# Patient Record
Sex: FEMALE | Race: WHITE | Employment: OTHER | URBAN - METROPOLITAN AREA
[De-identification: names, ages, dates, MRNs, and addresses within clinical notes are randomized per-mention and may not be internally consistent; named-entity substitution may affect disease eponyms.]

---

## 2017-01-11 ENCOUNTER — OFFICE VISIT (OUTPATIENT)
Dept: ENDOCRINOLOGY CLINIC | Facility: CLINIC | Age: 67
End: 2017-01-11

## 2017-01-11 VITALS
HEART RATE: 63 BPM | DIASTOLIC BLOOD PRESSURE: 62 MMHG | SYSTOLIC BLOOD PRESSURE: 132 MMHG | WEIGHT: 171 LBS | RESPIRATION RATE: 18 BRPM | HEIGHT: 65 IN | TEMPERATURE: 99 F | BODY MASS INDEX: 28.49 KG/M2

## 2017-01-11 DIAGNOSIS — E11.65 UNCONTROLLED TYPE 2 DIABETES MELLITUS WITH HYPERGLYCEMIA, UNSPECIFIED LONG TERM INSULIN USE STATUS: Primary | ICD-10-CM

## 2017-01-11 DIAGNOSIS — Z23 NEED FOR TETANUS BOOSTER: ICD-10-CM

## 2017-01-11 DIAGNOSIS — I10 ESSENTIAL HYPERTENSION: ICD-10-CM

## 2017-01-11 PROCEDURE — 99214 OFFICE O/P EST MOD 30 MIN: CPT | Performed by: NURSE PRACTITIONER

## 2017-01-11 PROCEDURE — 95250 CONT GLUC MNTR PHYS/QHP EQP: CPT | Performed by: NURSE PRACTITIONER

## 2017-01-11 RX ORDER — FUROSEMIDE 20 MG/1
20 TABLET ORAL DAILY
Qty: 90 TABLET | Refills: 0 | Status: SHIPPED | OUTPATIENT
Start: 2017-01-11 | End: 2017-04-12

## 2017-01-11 NOTE — PROGRESS NOTES
CC: Patient presents with:  Diabetes: follow up. pt did not bring meter. HPI:   HPI: 78year old y/o female who presents for follow up diabetes visit.  Did not bring any readings stated she is not regularly testing but when she is, she is seeing 160's in Psychiatric/Behavioral: The patient is not nervous/anxious. No depression. Current outpatient prescriptions:   •  furosemide (LASIX) 20 MG Oral Tab, Take 1 tablet (20 mg total) by mouth daily. , Disp: 90 tablet, Rfl: 0  •  Insulin Pen Needle (Zaria Goldman 62.5 MCG/INH Inhalation Aerosol Powder, Breath Activated, Inhale 1 puff into the lungs daily. , Disp: 1 each, Rfl: 2    Exam:  /62 mmHg  Pulse 63  Temp(Src) 98.5 °F (36.9 °C) (Oral)  Resp 18  Ht 65\"  Wt 171 lb  BMI 28.46 kg/m2  Breastfeeding?  No  Con 3 servings/ meal and impact of fat/carb on BG. Benefits of testing BG reviewed in depth. Hypoglycemia S&S, Rx, and when to call MD reviewed using Rule of 15's. Stressed need to call if 2 lows below 80 mg/dl in 1 week for further insulin adjustment.        O

## 2017-01-18 ENCOUNTER — PATIENT OUTREACH (OUTPATIENT)
Dept: CASE MANAGEMENT | Age: 67
End: 2017-01-18

## 2017-01-31 NOTE — PROGRESS NOTES
Spoke to pt for CCM today. Pt states she is doing ok at this time. Pt states she has been seeing the diabetes educator and has to call to schedule her next appt. Pt states she has not been testing her blood sugar as often as she should.   Pt was unable t

## 2017-02-03 DIAGNOSIS — E11.65 UNCONTROLLED TYPE 2 DIABETES MELLITUS WITH HYPERGLYCEMIA, UNSPECIFIED LONG TERM INSULIN USE STATUS: Primary | ICD-10-CM

## 2017-02-05 RX ORDER — INSULIN GLARGINE 300 U/ML
INJECTION, SOLUTION SUBCUTANEOUS
Qty: 4.5 ML | Refills: 0 | Status: SHIPPED | OUTPATIENT
Start: 2017-02-05 | End: 2017-04-11

## 2017-02-20 ENCOUNTER — PATIENT OUTREACH (OUTPATIENT)
Dept: CASE MANAGEMENT | Age: 67
End: 2017-02-20

## 2017-02-26 ENCOUNTER — TELEPHONE (OUTPATIENT)
Dept: ENDOCRINOLOGY CLINIC | Facility: CLINIC | Age: 67
End: 2017-02-26

## 2017-03-14 ENCOUNTER — PATIENT OUTREACH (OUTPATIENT)
Dept: CASE MANAGEMENT | Age: 67
End: 2017-03-14

## 2017-04-11 ENCOUNTER — OFFICE VISIT (OUTPATIENT)
Dept: ENDOCRINOLOGY CLINIC | Facility: CLINIC | Age: 67
End: 2017-04-11

## 2017-04-11 VITALS
HEART RATE: 56 BPM | HEIGHT: 65 IN | BODY MASS INDEX: 29.16 KG/M2 | TEMPERATURE: 98 F | DIASTOLIC BLOOD PRESSURE: 54 MMHG | RESPIRATION RATE: 18 BRPM | WEIGHT: 175 LBS | SYSTOLIC BLOOD PRESSURE: 128 MMHG

## 2017-04-11 DIAGNOSIS — E11.65 UNCONTROLLED TYPE 2 DIABETES MELLITUS WITH HYPERGLYCEMIA, UNSPECIFIED LONG TERM INSULIN USE STATUS: Primary | ICD-10-CM

## 2017-04-11 PROCEDURE — 99214 OFFICE O/P EST MOD 30 MIN: CPT | Performed by: NURSE PRACTITIONER

## 2017-04-11 RX ORDER — LANCETS
1 EACH MISCELLANEOUS 3 TIMES DAILY
Qty: 1 BOX | Refills: 3 | Status: SHIPPED | OUTPATIENT
Start: 2017-04-11 | End: 2018-04-11

## 2017-04-11 RX ORDER — GLIPIZIDE 10 MG/1
10 TABLET, FILM COATED, EXTENDED RELEASE ORAL DAILY
Qty: 30 TABLET | Refills: 0 | Status: SHIPPED | OUTPATIENT
Start: 2017-04-11 | End: 2017-05-10

## 2017-04-11 RX ORDER — METFORMIN HYDROCHLORIDE 500 MG/1
2000 TABLET, EXTENDED RELEASE ORAL
Qty: 60 TABLET | Refills: 0 | COMMUNITY
Start: 2017-04-11 | End: 2017-04-25

## 2017-04-11 NOTE — PROGRESS NOTES
CC: Patient presents with:  Diabetes: follow up. pt did bring meter. HPI:   HPI: 78year old y/o female who presents for follow up diabetes visit. LOV 3 months ago, was supposed to return 2 weeks later with Marine Carver, which fell off and was lost. Flat affect •  GlipiZIDE ER 10 MG Oral Tablet 24 Hr, Take 1 tablet (10 mg total) by mouth daily. , Disp: 30 tablet, Rfl: 0  •  MetFORMIN HCl  MG Oral Tablet 24 Hr, Take 4 tablets (2,000 mg total) by mouth daily with breakfast., Disp: 60 tablet, Rfl: 0  •  Insul mouth daily. , Disp: , Rfl:   •  Levalbuterol Tartrate (XOPENEX HFA) 45 MCG/ACT Inhalation Aerosol, Inhale 2 puffs into the lungs every 8 (eight) hours as needed for Wheezing., Disp: 1 Inhaler, Rfl: 2    Exam:  /54 mmHg  Pulse 56  Temp(Src) 98.4 °F (3 Maintenance  Up to date on annual labs  Needs micro, lipid, cmp   Ophtho / dilated eye exam: up to date no retinopathy   Albumin/Cr ratio: microalbuminuria, improve BG and on arb   Monofilament exam: today   BP: at goal cont meds   Lipids: not at goal cont

## 2017-04-11 NOTE — PATIENT INSTRUCTIONS
Test sugar 2-3 times a day   Increase Toujeo to 32 units nightly    Start glipizide 10 mg before breakfast daily   Metformin increase to 3 tablets in am after 5 days go to 4 tablets daily     Return in 2 weeks

## 2017-04-12 ENCOUNTER — TELEPHONE (OUTPATIENT)
Dept: INTERNAL MEDICINE CLINIC | Facility: CLINIC | Age: 67
End: 2017-04-12

## 2017-04-12 DIAGNOSIS — E11.65 UNCONTROLLED TYPE 2 DIABETES MELLITUS WITH HYPERGLYCEMIA, UNSPECIFIED LONG TERM INSULIN USE STATUS: ICD-10-CM

## 2017-04-12 DIAGNOSIS — I10 ESSENTIAL HYPERTENSION: Primary | ICD-10-CM

## 2017-04-12 DIAGNOSIS — E78.00 HIGH CHOLESTEROL: ICD-10-CM

## 2017-04-12 RX ORDER — FUROSEMIDE 20 MG/1
TABLET ORAL
Qty: 90 TABLET | Refills: 0 | Status: SHIPPED | OUTPATIENT
Start: 2017-04-12 | End: 2017-05-31

## 2017-04-25 ENCOUNTER — OFFICE VISIT (OUTPATIENT)
Dept: ENDOCRINOLOGY CLINIC | Facility: CLINIC | Age: 67
End: 2017-04-25

## 2017-04-25 VITALS
WEIGHT: 176 LBS | TEMPERATURE: 99 F | DIASTOLIC BLOOD PRESSURE: 50 MMHG | BODY MASS INDEX: 29.32 KG/M2 | HEART RATE: 56 BPM | HEIGHT: 65 IN | RESPIRATION RATE: 18 BRPM | SYSTOLIC BLOOD PRESSURE: 130 MMHG

## 2017-04-25 DIAGNOSIS — E78.5 HYPERLIPIDEMIA ASSOCIATED WITH TYPE 2 DIABETES MELLITUS (HCC): ICD-10-CM

## 2017-04-25 DIAGNOSIS — E11.65 UNCONTROLLED TYPE 2 DIABETES MELLITUS WITH HYPERGLYCEMIA, UNSPECIFIED LONG TERM INSULIN USE STATUS: Primary | ICD-10-CM

## 2017-04-25 DIAGNOSIS — E11.69 HYPERLIPIDEMIA ASSOCIATED WITH TYPE 2 DIABETES MELLITUS (HCC): ICD-10-CM

## 2017-04-25 PROCEDURE — 99214 OFFICE O/P EST MOD 30 MIN: CPT | Performed by: NURSE PRACTITIONER

## 2017-04-25 RX ORDER — ROSUVASTATIN CALCIUM 40 MG/1
40 TABLET, COATED ORAL NIGHTLY
Qty: 90 TABLET | Refills: 0 | Status: SHIPPED | OUTPATIENT
Start: 2017-04-25 | End: 2017-05-14

## 2017-04-25 RX ORDER — METFORMIN HYDROCHLORIDE 500 MG/1
1000 TABLET, EXTENDED RELEASE ORAL
Qty: 60 TABLET | Refills: 0 | COMMUNITY
Start: 2017-04-25 | End: 2017-08-07

## 2017-04-25 NOTE — PROGRESS NOTES
CC: Patient presents with:  Diabetes: follow up. pt did bring meter. HPI:   HPI: 78year old y/o female who presents for follow up diabetes visit. In better spirits today, and has started testing more often.  A1C shows no improvement over past few months The patient is not nervous/anxious. No depression.     Current outpatient prescriptions:   •  MetFORMIN HCl  MG Oral Tablet 24 Hr, Take 2 tablets (1,000 mg total) by mouth daily with breakfast., Disp: 60 tablet, Rfl: 0  •  Insulin Glargine (TOUJEO SOL Oral Tablet 24 Hr, Take 1 tablet by mouth daily. , Disp: , Rfl: 3  •  Amlodipine Besy-Benazepril HCl 10-20 MG Oral Cap, Take 1 Cap by mouth daily. , Disp: , Rfl:     Exam:  /50 mmHg  Pulse 56  Temp(Src) 98.5 °F (36.9 °C) (Oral)  Resp 18  Ht 65\"  Wt 17 retinopathy   Albumin/Cr ratio: no microalbuminuria, improve BG and on arb   Monofilament exam: up to date   BP: at goal cont meds   Lipids: not at goal increase statin, low fat diet, recheck 3 months    Tobacco: not ready to quit   Pneumonia vaccine: up t

## 2017-04-25 NOTE — PATIENT INSTRUCTIONS
Metformin XR 1000 mg daily   Toujeo 42 units daily   Continue glipizide xr 10 mg daily     Cholesterol increase to 40 mg nightly   Decrease fats in foods     Try to increase activity   You got this     Return in 2 weeks

## 2017-05-02 ENCOUNTER — PATIENT OUTREACH (OUTPATIENT)
Dept: CASE MANAGEMENT | Age: 67
End: 2017-05-02

## 2017-05-10 ENCOUNTER — OFFICE VISIT (OUTPATIENT)
Dept: ENDOCRINOLOGY CLINIC | Facility: CLINIC | Age: 67
End: 2017-05-10

## 2017-05-10 VITALS
WEIGHT: 176 LBS | RESPIRATION RATE: 18 BRPM | HEART RATE: 56 BPM | SYSTOLIC BLOOD PRESSURE: 120 MMHG | BODY MASS INDEX: 29.32 KG/M2 | HEIGHT: 65 IN | DIASTOLIC BLOOD PRESSURE: 60 MMHG | TEMPERATURE: 98 F

## 2017-05-10 DIAGNOSIS — E11.65 UNCONTROLLED TYPE 2 DIABETES MELLITUS WITH HYPERGLYCEMIA, UNSPECIFIED LONG TERM INSULIN USE STATUS: Primary | ICD-10-CM

## 2017-05-10 PROCEDURE — 99213 OFFICE O/P EST LOW 20 MIN: CPT | Performed by: NURSE PRACTITIONER

## 2017-05-10 RX ORDER — GLIPIZIDE 10 MG/1
20 TABLET, FILM COATED, EXTENDED RELEASE ORAL DAILY
Qty: 60 TABLET | Refills: 0 | Status: SHIPPED | OUTPATIENT
Start: 2017-05-10 | End: 2017-08-07

## 2017-05-10 NOTE — PATIENT INSTRUCTIONS
Rosuvastatin increase to 40 mg ( 2 20 mg tabs) or 1 40 mg tab nightly   Glipizide XR increase to 20 mg (take 2 10 mg tabs every morning)  Continue metformin 1-2 tabs every am as tolerated   Increase Toujeo to 45 units nightly   Continue testing sugar and a

## 2017-05-15 RX ORDER — ROSUVASTATIN CALCIUM 40 MG/1
TABLET, COATED ORAL
Qty: 90 TABLET | Refills: 0 | Status: SHIPPED | OUTPATIENT
Start: 2017-05-15 | End: 2017-12-05

## 2017-05-31 DIAGNOSIS — I10 ESSENTIAL HYPERTENSION: Primary | ICD-10-CM

## 2017-05-31 RX ORDER — FUROSEMIDE 20 MG/1
TABLET ORAL
Qty: 30 TABLET | Refills: 1 | Status: SHIPPED | OUTPATIENT
Start: 2017-05-31 | End: 2018-02-17

## 2017-06-16 ENCOUNTER — PATIENT OUTREACH (OUTPATIENT)
Dept: CASE MANAGEMENT | Age: 67
End: 2017-06-16

## 2017-06-16 DIAGNOSIS — E11.69 HYPERLIPIDEMIA ASSOCIATED WITH TYPE 2 DIABETES MELLITUS (HCC): ICD-10-CM

## 2017-06-16 DIAGNOSIS — J43.2 CENTRILOBULAR EMPHYSEMA (HCC): ICD-10-CM

## 2017-06-16 DIAGNOSIS — I50.22 CHRONIC SYSTOLIC CONGESTIVE HEART FAILURE (HCC): Primary | ICD-10-CM

## 2017-06-16 DIAGNOSIS — E78.5 HYPERLIPIDEMIA ASSOCIATED WITH TYPE 2 DIABETES MELLITUS (HCC): ICD-10-CM

## 2017-06-16 DIAGNOSIS — E78.00 HIGH CHOLESTEROL: ICD-10-CM

## 2017-06-16 DIAGNOSIS — F32.0 MILD SINGLE CURRENT EPISODE OF MAJOR DEPRESSIVE DISORDER (HCC): ICD-10-CM

## 2017-06-16 DIAGNOSIS — I10 ESSENTIAL HYPERTENSION: ICD-10-CM

## 2017-06-16 DIAGNOSIS — E11.65 UNCONTROLLED TYPE 2 DIABETES MELLITUS WITH HYPERGLYCEMIA, UNSPECIFIED LONG TERM INSULIN USE STATUS: ICD-10-CM

## 2017-06-16 PROCEDURE — 99490 CHRNC CARE MGMT STAFF 1ST 20: CPT

## 2017-06-16 NOTE — PROGRESS NOTES
Spoke to pt at length about CCM, current care plan and performed CCM assessment with pt, reviewed meds and compliance. Reviewed pt dx DM2, HLD, HTN, CAD, CHF, COPD and depression.   Support system: lives with family  Diet: Portion control and states she is

## 2017-06-23 ENCOUNTER — PATIENT OUTREACH (OUTPATIENT)
Dept: CASE MANAGEMENT | Age: 67
End: 2017-06-23

## 2017-07-10 ENCOUNTER — TELEPHONE (OUTPATIENT)
Dept: ENDOCRINOLOGY CLINIC | Facility: CLINIC | Age: 67
End: 2017-07-10

## 2017-07-21 ENCOUNTER — PATIENT OUTREACH (OUTPATIENT)
Dept: CASE MANAGEMENT | Age: 67
End: 2017-07-21

## 2017-08-02 ENCOUNTER — TELEPHONE (OUTPATIENT)
Dept: ENDOCRINOLOGY CLINIC | Facility: CLINIC | Age: 67
End: 2017-08-02

## 2017-08-07 ENCOUNTER — OFFICE VISIT (OUTPATIENT)
Dept: ENDOCRINOLOGY CLINIC | Facility: CLINIC | Age: 67
End: 2017-08-07

## 2017-08-07 VITALS
DIASTOLIC BLOOD PRESSURE: 58 MMHG | SYSTOLIC BLOOD PRESSURE: 120 MMHG | HEART RATE: 72 BPM | TEMPERATURE: 99 F | RESPIRATION RATE: 18 BRPM | HEIGHT: 65 IN | BODY MASS INDEX: 30.99 KG/M2 | WEIGHT: 186 LBS

## 2017-08-07 DIAGNOSIS — E11.65 UNCONTROLLED TYPE 2 DIABETES MELLITUS WITH HYPERGLYCEMIA, UNSPECIFIED LONG TERM INSULIN USE STATUS: ICD-10-CM

## 2017-08-07 PROCEDURE — 95251 CONT GLUC MNTR ANALYSIS I&R: CPT | Performed by: NURSE PRACTITIONER

## 2017-08-07 PROCEDURE — 83036 HEMOGLOBIN GLYCOSYLATED A1C: CPT | Performed by: NURSE PRACTITIONER

## 2017-08-07 PROCEDURE — 99213 OFFICE O/P EST LOW 20 MIN: CPT | Performed by: NURSE PRACTITIONER

## 2017-08-07 RX ORDER — METFORMIN HYDROCHLORIDE 500 MG/1
500 TABLET, EXTENDED RELEASE ORAL
Qty: 90 TABLET | Refills: 0 | COMMUNITY
Start: 2017-08-07 | End: 2017-12-13

## 2017-08-08 LAB
CARTRIDGE LOT#: 706 NUMERIC
HEMOGLOBIN A1C: 9.8 % (ref 4.3–5.6)

## 2017-08-08 NOTE — PROGRESS NOTES
CC: Patient presents with:  Diabetes: follow up. pt did bring meter. HPI:   HPI: 75year old y/o female who presents for follow up diabetes visit. Has not been here for 3 months.  Has been testing BG, is no longer in [employer] exercise class but has been ph nervous/anxious. No depression.     Current Outpatient Prescriptions:   •  MetFORMIN HCl  MG Oral Tablet 24 Hr, Take 1 tablet (500 mg total) by mouth daily with breakfast., Disp: 90 tablet, Rfl: 0  •  FUROSEMIDE 20 MG Oral Tab, TAKE 1 TABLET (20 MG TO Wheezing., Disp: 1 Inhaler, Rfl: 2    Exam:  /58   Pulse 72   Temp 98.7 °F (37.1 °C) (Oral)   Resp 18   Ht 65\"   Wt 186 lb   Breastfeeding? No   BMI 30.95 kg/m²   Constitutional: Oriented to person, place, and time. No distress.    HEENT: Normocephal bedtime. Call/fax/email glucose logs or f/u in 21-30 days. Return in about 3 weeks (around 8/28/2017) for diabetes. Pt verbalized understanding and has no further questions at this time.     Claudine HANDY,GEOVANNYE

## 2017-08-11 ENCOUNTER — PATIENT OUTREACH (OUTPATIENT)
Dept: CASE MANAGEMENT | Age: 67
End: 2017-08-11

## 2017-08-11 NOTE — PROGRESS NOTES
Chart review for CCM. LM for pt to call back.   Coordination of education, review of chart, update to record, sending materials:[employer]  Time: 7 minutes

## 2017-09-07 DIAGNOSIS — E78.5 HYPERLIPIDEMIA ASSOCIATED WITH TYPE 2 DIABETES MELLITUS (HCC): Primary | ICD-10-CM

## 2017-09-07 DIAGNOSIS — E11.69 HYPERLIPIDEMIA ASSOCIATED WITH TYPE 2 DIABETES MELLITUS (HCC): Primary | ICD-10-CM

## 2017-09-08 RX ORDER — GLIPIZIDE 10 MG/1
TABLET, FILM COATED, EXTENDED RELEASE ORAL
Qty: 60 TABLET | Refills: 0 | Status: SHIPPED | OUTPATIENT
Start: 2017-09-08 | End: 2017-09-11

## 2017-09-08 NOTE — TELEPHONE ENCOUNTER
Medication(s) to Refill:   Pending Prescriptions Disp Refills    GLIPIZIDE ER 10 MG Oral Tablet 24 Hr [Pharmacy Med Name: GlipiZIDE ER Oral Tablet Extended Release 24 Hour 10 MG] 60 tablet 0     Sig: TAKE 2 TABLETS (20 MG TOTAL) BY MOUTH DAILY           La

## 2017-09-11 ENCOUNTER — PRIOR ORIGINAL RECORDS (OUTPATIENT)
Dept: OTHER | Age: 67
End: 2017-09-11

## 2017-09-11 ENCOUNTER — OFFICE VISIT (OUTPATIENT)
Dept: ENDOCRINOLOGY CLINIC | Facility: CLINIC | Age: 67
End: 2017-09-11

## 2017-09-11 ENCOUNTER — APPOINTMENT (OUTPATIENT)
Dept: LAB | Age: 67
End: 2017-09-11
Attending: NURSE PRACTITIONER
Payer: MEDICARE

## 2017-09-11 VITALS
RESPIRATION RATE: 18 BRPM | SYSTOLIC BLOOD PRESSURE: 138 MMHG | HEIGHT: 65 IN | DIASTOLIC BLOOD PRESSURE: 74 MMHG | BODY MASS INDEX: 29.99 KG/M2 | WEIGHT: 180 LBS | TEMPERATURE: 99 F | HEART RATE: 80 BPM

## 2017-09-11 DIAGNOSIS — R10.11 RUQ ABDOMINAL PAIN: ICD-10-CM

## 2017-09-11 DIAGNOSIS — E11.65 UNCONTROLLED TYPE 2 DIABETES MELLITUS WITH HYPERGLYCEMIA, UNSPECIFIED LONG TERM INSULIN USE STATUS: Primary | ICD-10-CM

## 2017-09-11 DIAGNOSIS — E78.5 HYPERLIPIDEMIA ASSOCIATED WITH TYPE 2 DIABETES MELLITUS (HCC): ICD-10-CM

## 2017-09-11 DIAGNOSIS — E11.69 HYPERLIPIDEMIA ASSOCIATED WITH TYPE 2 DIABETES MELLITUS (HCC): ICD-10-CM

## 2017-09-11 LAB
ALBUMIN SERPL-MCNC: 3.5 G/DL (ref 3.5–4.8)
ALP LIVER SERPL-CCNC: 61 U/L (ref 55–142)
ALT SERPL-CCNC: 32 U/L (ref 14–54)
AST SERPL-CCNC: 25 U/L (ref 15–41)
BILIRUB SERPL-MCNC: 0.5 MG/DL (ref 0.1–2)
BUN BLD-MCNC: 13 MG/DL (ref 8–20)
CALCIUM BLD-MCNC: 8.9 MG/DL (ref 8.3–10.3)
CARTRIDGE LOT#: 741 NUMERIC
CHLORIDE: 108 MMOL/L (ref 101–111)
CHOLEST SMN-MCNC: 185 MG/DL (ref ?–200)
CO2: 25 MMOL/L (ref 22–32)
CREAT BLD-MCNC: 0.85 MG/DL (ref 0.55–1.02)
GLUCOSE BLD-MCNC: 135 MG/DL (ref 70–99)
HDLC SERPL-MCNC: 39 MG/DL (ref 45–?)
HDLC SERPL: 4.74 {RATIO} (ref ?–4.44)
HEMOGLOBIN A1C: 8.4 % (ref 4.3–5.6)
LDLC SERPL CALC-MCNC: 104 MG/DL (ref ?–130)
LDLC SERPL-MCNC: 42 MG/DL (ref 5–40)
M PROTEIN MFR SERPL ELPH: 7.8 G/DL (ref 6.1–8.3)
NONHDLC SERPL-MCNC: 146 MG/DL (ref ?–130)
POTASSIUM SERPL-SCNC: 4.3 MMOL/L (ref 3.6–5.1)
SODIUM SERPL-SCNC: 140 MMOL/L (ref 136–144)
TRIGLYCERIDES: 209 MG/DL (ref ?–150)

## 2017-09-11 PROCEDURE — 99214 OFFICE O/P EST MOD 30 MIN: CPT | Performed by: NURSE PRACTITIONER

## 2017-09-11 PROCEDURE — 83036 HEMOGLOBIN GLYCOSYLATED A1C: CPT | Performed by: NURSE PRACTITIONER

## 2017-09-11 RX ORDER — GLIPIZIDE 10 MG/1
TABLET, FILM COATED, EXTENDED RELEASE ORAL
Qty: 60 TABLET | Refills: 0 | Status: SHIPPED | OUTPATIENT
Start: 2017-09-11 | End: 2017-12-13

## 2017-09-11 RX ORDER — CLOPIDOGREL BISULFATE 75 MG/1
75 TABLET ORAL DAILY
Refills: 0 | COMMUNITY
Start: 2017-09-07 | End: 2018-09-10

## 2017-09-11 NOTE — PROGRESS NOTES
CC: Patient presents with:  Diabetes: follow up. pt did bring meter. HPI:   HPI: 75year old y/o female who presents for follow up diabetes visit.  Has significantly improved since starting Xultophy 20 units daily, is taking meds more consistently and lo breath. Everyday smoker. Cardiovascular: Negative for chest pain, palpitations and leg swelling. ASA daily 81 mg   Gastrointestinal: Negative for nausea, vomiting, diarrhea, and abdominal distention.  + intermittent dull RUQ pain  Genitourinary: Negative f mouth every 8 (eight) hours. , Disp: 90 tablet, Rfl: 3  •  Metoprolol Succinate ER (TOPROL XL) 50 MG Oral Tablet 24 Hr, Take 1 tablet by mouth daily. , Disp: , Rfl: 3  •  Amlodipine Besy-Benazepril HCl 10-20 MG Oral Cap, Take 1 Cap by mouth daily. , Disp: , R and f/u per pcp. Asked to see PCP after US.  CMP today       Orders Placed This Encounter      Lipid Panel [E]      Comp Metabolic Panel (14) [E]    Meds & Refills for this Visit:    Signed Prescriptions Disp Refills    Insulin Degludec-Liraglutide (Maik Jara

## 2017-09-13 ENCOUNTER — PATIENT OUTREACH (OUTPATIENT)
Dept: CASE MANAGEMENT | Age: 67
End: 2017-09-13

## 2017-09-13 NOTE — PROGRESS NOTES
Called pt for CCM today and spoke with family member advising pt unable to come to phone at this time. Encouraged to have pt call back for CCM. Time spent this outreach: 6 minutes collecting, reviewing pt data, communication and documentation.

## 2017-09-19 ENCOUNTER — HOSPITAL ENCOUNTER (OUTPATIENT)
Dept: ULTRASOUND IMAGING | Facility: HOSPITAL | Age: 67
Discharge: HOME | End: 2017-09-19
Attending: NURSE PRACTITIONER
Payer: MEDICARE

## 2017-09-19 DIAGNOSIS — R10.11 RUQ ABDOMINAL PAIN: ICD-10-CM

## 2017-09-19 PROCEDURE — 76700 US EXAM ABDOM COMPLETE: CPT | Performed by: NURSE PRACTITIONER

## 2017-09-25 ENCOUNTER — OFFICE VISIT (OUTPATIENT)
Dept: INTERNAL MEDICINE CLINIC | Facility: CLINIC | Age: 67
End: 2017-09-25

## 2017-09-25 VITALS
SYSTOLIC BLOOD PRESSURE: 138 MMHG | TEMPERATURE: 98 F | DIASTOLIC BLOOD PRESSURE: 50 MMHG | WEIGHT: 183 LBS | HEART RATE: 56 BPM | HEIGHT: 65 IN | BODY MASS INDEX: 30.49 KG/M2

## 2017-09-25 DIAGNOSIS — F32.0 MILD SINGLE CURRENT EPISODE OF MAJOR DEPRESSIVE DISORDER (HCC): ICD-10-CM

## 2017-09-25 DIAGNOSIS — F41.1 ANXIETY STATE: ICD-10-CM

## 2017-09-25 DIAGNOSIS — E78.00 HIGH CHOLESTEROL: ICD-10-CM

## 2017-09-25 DIAGNOSIS — Z23 NEEDS FLU SHOT: ICD-10-CM

## 2017-09-25 DIAGNOSIS — R19.8 ALTERNATING CONSTIPATION AND DIARRHEA: Primary | ICD-10-CM

## 2017-09-25 DIAGNOSIS — I10 ESSENTIAL HYPERTENSION: ICD-10-CM

## 2017-09-25 PROCEDURE — 90653 IIV ADJUVANT VACCINE IM: CPT | Performed by: NURSE PRACTITIONER

## 2017-09-25 PROCEDURE — 99214 OFFICE O/P EST MOD 30 MIN: CPT | Performed by: NURSE PRACTITIONER

## 2017-09-25 PROCEDURE — G0008 ADMIN INFLUENZA VIRUS VAC: HCPCS | Performed by: NURSE PRACTITIONER

## 2017-09-25 NOTE — PROGRESS NOTES
Griselda Pizarro is a 75year old female. Patient presents with:  Test Results: Room 10. Review ultra sound      HPI:   Presents for f/u abd pain. Saw SC last week and US abd ordered and asked her to see GI.  appt Oct 17. She has never had colonscopy. Subcutaneous Solution Pen-injector Inject 20-40 Units into the skin daily.  Disp: 3 mL Rfl: 0   GlipiZIDE ER 10 MG Oral Tablet 24 Hr TAKE 2 TABLETS (20 MG TOTAL) BY MOUTH DAILY Disp: 60 tablet Rfl: 0   MetFORMIN HCl  MG Oral Tablet 24 Hr Take 1 tablet coronary artery 2/28/14    acute MI, CHF, Stents    • CONGESTIVE HEART FAILURE 3/2013    EF 35-40 %   • COPD    • Disorder of liver     \"fatty liver\"   • HEART ATTACK 2/28/14    Acute mi and stents   • High blood pressure    • High cholesterol    • Histo 3    ASSESSMENT AND PLAN:     Needs flu shot    Essential hypertension  Stable  Same meds. Mild single current episode of major depressive disorder (hcc)  Sertraline.     High cholesterol  Stable  crestor  Anxiety state  Alternating constipation and diarr

## 2017-10-13 ENCOUNTER — PATIENT OUTREACH (OUTPATIENT)
Dept: CASE MANAGEMENT | Age: 67
End: 2017-10-13

## 2017-10-13 NOTE — PROGRESS NOTES
Coordination of education, review of chart, update to pt record, compilation and mailing resources/materials: Flu education, Why get the flu vaccine, and request to get flu vaccine with PCP and or [facility] locations.   Time: 5 min

## 2017-10-27 ENCOUNTER — PATIENT OUTREACH (OUTPATIENT)
Dept: CASE MANAGEMENT | Age: 67
End: 2017-10-27

## 2017-10-27 DIAGNOSIS — J43.2 CENTRILOBULAR EMPHYSEMA (HCC): ICD-10-CM

## 2017-10-27 DIAGNOSIS — M15.9 PRIMARY OSTEOARTHRITIS INVOLVING MULTIPLE JOINTS: ICD-10-CM

## 2017-10-27 DIAGNOSIS — I50.22 CHRONIC SYSTOLIC CONGESTIVE HEART FAILURE (HCC): ICD-10-CM

## 2017-10-27 DIAGNOSIS — I10 ESSENTIAL HYPERTENSION: ICD-10-CM

## 2017-10-27 DIAGNOSIS — F32.0 MILD SINGLE CURRENT EPISODE OF MAJOR DEPRESSIVE DISORDER (HCC): ICD-10-CM

## 2017-10-27 DIAGNOSIS — E78.5 HYPERLIPIDEMIA ASSOCIATED WITH TYPE 2 DIABETES MELLITUS (HCC): ICD-10-CM

## 2017-10-27 DIAGNOSIS — I77.9 BILATERAL CAROTID ARTERY DISEASE (HCC): ICD-10-CM

## 2017-10-27 DIAGNOSIS — E11.69 HYPERLIPIDEMIA ASSOCIATED WITH TYPE 2 DIABETES MELLITUS (HCC): ICD-10-CM

## 2017-10-27 DIAGNOSIS — E78.00 HIGH CHOLESTEROL: ICD-10-CM

## 2017-10-27 PROCEDURE — 99490 CHRNC CARE MGMT STAFF 1ST 20: CPT

## 2017-10-27 NOTE — PROGRESS NOTES
Spoke to pt at length about CCM, current care plan and performed CCM assessment with pt, reviewed meds and compliance. Reviewed pt dx HTN, HLD, DM, CAD, CHF, anxiety and depression. Support system: Family. Daughter lives with pt.       Diet: Pt states she vaccine, and request to get flu vaccine with PCP and or [facility] locations. Time: 5 min  Total time spent month to date for CCM: 24 minutes collecting, reviewing pt data, communication and documentation.

## 2017-11-09 ENCOUNTER — TELEPHONE (OUTPATIENT)
Dept: ENDOCRINOLOGY CLINIC | Facility: CLINIC | Age: 67
End: 2017-11-09

## 2017-11-28 ENCOUNTER — PATIENT OUTREACH (OUTPATIENT)
Dept: CASE MANAGEMENT | Age: 67
End: 2017-11-28

## 2017-11-28 NOTE — PROGRESS NOTES
Reviewed chart for CCM today. LVM for pt to call back. Time spent this ccm outreach: 3 min collecting, reviewing pt data, communication and documentation.

## 2017-11-29 ENCOUNTER — TELEPHONE (OUTPATIENT)
Dept: ENDOCRINOLOGY CLINIC | Facility: CLINIC | Age: 67
End: 2017-11-29

## 2017-12-05 DIAGNOSIS — E11.65 UNCONTROLLED TYPE 2 DIABETES MELLITUS WITH HYPERGLYCEMIA, UNSPECIFIED LONG TERM INSULIN USE STATUS: ICD-10-CM

## 2017-12-05 RX ORDER — ROSUVASTATIN CALCIUM 40 MG/1
TABLET, COATED ORAL
Qty: 90 TABLET | Refills: 0 | Status: SHIPPED | OUTPATIENT
Start: 2017-12-05 | End: 2018-03-10

## 2017-12-05 NOTE — TELEPHONE ENCOUNTER
LFV:09/25/17 with SD  Future Appt: none on file  Last Rx: 12/6/16 for 90 days w/1 refill  Last Labs:cmp done 9/11/17 stable  Per protocol to provider  Pt was to return in 4 weeks

## 2017-12-13 ENCOUNTER — OFFICE VISIT (OUTPATIENT)
Dept: ENDOCRINOLOGY CLINIC | Facility: CLINIC | Age: 67
End: 2017-12-13

## 2017-12-13 VITALS
HEIGHT: 65 IN | TEMPERATURE: 98 F | BODY MASS INDEX: 30.49 KG/M2 | HEART RATE: 72 BPM | WEIGHT: 183 LBS | DIASTOLIC BLOOD PRESSURE: 60 MMHG | SYSTOLIC BLOOD PRESSURE: 126 MMHG | RESPIRATION RATE: 18 BRPM

## 2017-12-13 DIAGNOSIS — E78.5 HYPERLIPIDEMIA ASSOCIATED WITH TYPE 2 DIABETES MELLITUS (HCC): Primary | ICD-10-CM

## 2017-12-13 DIAGNOSIS — E11.65 UNCONTROLLED TYPE 2 DIABETES MELLITUS WITH HYPERGLYCEMIA, UNSPECIFIED LONG TERM INSULIN USE STATUS: ICD-10-CM

## 2017-12-13 DIAGNOSIS — E11.69 HYPERLIPIDEMIA ASSOCIATED WITH TYPE 2 DIABETES MELLITUS (HCC): Primary | ICD-10-CM

## 2017-12-13 PROCEDURE — 99214 OFFICE O/P EST MOD 30 MIN: CPT | Performed by: NURSE PRACTITIONER

## 2017-12-13 PROCEDURE — 83036 HEMOGLOBIN GLYCOSYLATED A1C: CPT | Performed by: NURSE PRACTITIONER

## 2017-12-13 RX ORDER — GLIPIZIDE 10 MG/1
TABLET, FILM COATED, EXTENDED RELEASE ORAL
Qty: 60 TABLET | Refills: 0 | Status: SHIPPED | OUTPATIENT
Start: 2017-12-13 | End: 2018-01-08

## 2017-12-13 RX ORDER — INSULIN GLARGINE 300 U/ML
INJECTION, SOLUTION SUBCUTANEOUS
Refills: 0 | COMMUNITY
Start: 2017-12-05 | End: 2017-12-13

## 2017-12-13 NOTE — PROGRESS NOTES
CC: Patient presents with:  Diabetes: follow up. pt forgot meter. HPI:   HPI: 75year old y/o female who presents for follow up diabetes visit. F/U.  Vague about her DM  No readings, but stated has been higher, only checking in am.  Stopped metformin and Pen-injector, Inject 20-40 Units into the skin daily. , Disp: 3 mL, Rfl: 0  •  GlipiZIDE ER 10 MG Oral Tablet 24 Hr, TAKE 2 TABLETS (20 MG TOTAL) BY MOUTH DAILY, Disp: 60 tablet, Rfl: 0  •  ROSUVASTATIN CALCIUM 40 MG Oral Tab, TAKE 1 TABLET (40 MG TOTAL) BY °C) (Oral)   Resp 18   Ht 65\"   Wt 183 lb   Breastfeeding? No   BMI 30.45 kg/m²   Constitutional: Oriented to person, place, and time. No distress. HEENT: Normocephalic and atraumatic. Eyes: Conjunctivae are normal.  Neck: Normal range of motion.  Neck Lipids: not at goal cont statin recheck asap    Tobacco: not ready to quit   Pneumonia vaccine: up to date needs TD   Depression screen: up to date     Check glucoses 1-3 times daily - fasting, premeals and/or bedtime.     Return in about 4 weeks (around

## 2017-12-14 ENCOUNTER — PATIENT OUTREACH (OUTPATIENT)
Dept: CASE MANAGEMENT | Age: 67
End: 2017-12-14

## 2017-12-14 NOTE — PROGRESS NOTES
Reviewed chart for ccm. LVM to call back. Time spent for ccm: 4 min collecting, reviewing pt data, communication and documentation.

## 2017-12-19 ENCOUNTER — MYAURORA ACCOUNT LINK (OUTPATIENT)
Dept: OTHER | Age: 67
End: 2017-12-19

## 2017-12-19 ENCOUNTER — PRIOR ORIGINAL RECORDS (OUTPATIENT)
Dept: OTHER | Age: 67
End: 2017-12-19

## 2018-01-05 ENCOUNTER — PATIENT OUTREACH (OUTPATIENT)
Dept: CASE MANAGEMENT | Age: 68
End: 2018-01-05

## 2018-01-08 ENCOUNTER — TELEPHONE (OUTPATIENT)
Dept: ENDOCRINOLOGY CLINIC | Facility: CLINIC | Age: 68
End: 2018-01-08

## 2018-01-08 DIAGNOSIS — E11.65 UNCONTROLLED TYPE 2 DIABETES MELLITUS WITH HYPERGLYCEMIA, UNSPECIFIED LONG TERM INSULIN USE STATUS: ICD-10-CM

## 2018-01-08 RX ORDER — GLIPIZIDE 10 MG/1
TABLET, FILM COATED, EXTENDED RELEASE ORAL
Qty: 60 TABLET | Refills: 2 | Status: ON HOLD | OUTPATIENT
Start: 2018-01-08 | End: 2018-05-18

## 2018-01-08 NOTE — TELEPHONE ENCOUNTER
Pt due for f/u in January please call to schedule ask if she can do fasting labs prior to visit thanks    Megan Abebe

## 2018-01-11 LAB
ALBUMIN: 3.5 G/DL
ALKALINE PHOSPHATATE(ALK PHOS): 61 IU/L
BILIRUBIN TOTAL: 0.5 MG/DL
BUN: 13 MG/DL
CALCIUM: 8.9 MG/DL
CHLORIDE: 108 MEQ/L
CHOLESTEROL, TOTAL: 185 MG/DL
CREATININE, SERUM: 0.85 MG/DL
GLUCOSE: 135 MG/DL
HDL CHOLESTEROL: 39 MG/DL
LDL CHOLESTEROL: 104 MG/DL
POTASSIUM, SERUM: 4.3 MEQ/L
PROTEIN, TOTAL: 7.8 G/DL
SGOT (AST): 25 IU/L
SGPT (ALT): 32 IU/L
SODIUM: 140 MEQ/L
TRIGLYCERIDES: 209 MG/DL

## 2018-01-12 ENCOUNTER — PATIENT OUTREACH (OUTPATIENT)
Dept: CASE MANAGEMENT | Age: 68
End: 2018-01-12

## 2018-01-12 NOTE — PROGRESS NOTES
Left vm to pt advising removed from ccm program as have been unable to reach pt since few months. Advised can re-enroll at anytime by contact [facility] main office EO#[phone]. Letter mailed to pt.

## 2018-01-23 NOTE — TELEPHONE ENCOUNTER
Future Appointments  2/5/2018 1:45 PM Ryan Najera NP [facility]     For DB FU. Pt will complete labs at [facility] before appt.

## 2018-01-26 ENCOUNTER — MYAURORA ACCOUNT LINK (OUTPATIENT)
Dept: OTHER | Age: 68
End: 2018-01-26

## 2018-01-26 ENCOUNTER — PRIOR ORIGINAL RECORDS (OUTPATIENT)
Dept: OTHER | Age: 68
End: 2018-01-26

## 2018-01-30 ENCOUNTER — PRIOR ORIGINAL RECORDS (OUTPATIENT)
Dept: OTHER | Age: 68
End: 2018-01-30

## 2018-01-31 ENCOUNTER — PRIOR ORIGINAL RECORDS (OUTPATIENT)
Dept: OTHER | Age: 68
End: 2018-01-31

## 2018-02-01 ENCOUNTER — HOSPITAL ENCOUNTER (OUTPATIENT)
Dept: CV DIAGNOSTICS | Facility: HOSPITAL | Age: 68
Discharge: HOME | End: 2018-02-01
Attending: INTERNAL MEDICINE
Payer: MEDICARE

## 2018-02-01 DIAGNOSIS — Z95.1 POSTSURGICAL AORTOCORONARY BYPASS STATUS: ICD-10-CM

## 2018-02-01 DIAGNOSIS — I25.10 CORONARY ATHEROSCLEROSIS OF NATIVE CORONARY ARTERY: ICD-10-CM

## 2018-02-01 PROCEDURE — 93017 CV STRESS TEST TRACING ONLY: CPT | Performed by: INTERNAL MEDICINE

## 2018-02-01 PROCEDURE — 78452 HT MUSCLE IMAGE SPECT MULT: CPT | Performed by: INTERNAL MEDICINE

## 2018-02-01 PROCEDURE — 93018 CV STRESS TEST I&R ONLY: CPT | Performed by: INTERNAL MEDICINE

## 2018-02-02 ENCOUNTER — MED REC SCAN ONLY (OUTPATIENT)
Dept: ENDOCRINOLOGY CLINIC | Facility: CLINIC | Age: 68
End: 2018-02-02

## 2018-02-06 ENCOUNTER — PRIOR ORIGINAL RECORDS (OUTPATIENT)
Dept: OTHER | Age: 68
End: 2018-02-06

## 2018-02-07 ENCOUNTER — PRIOR ORIGINAL RECORDS (OUTPATIENT)
Dept: OTHER | Age: 68
End: 2018-02-07

## 2018-02-17 DIAGNOSIS — I10 ESSENTIAL HYPERTENSION: ICD-10-CM

## 2018-02-19 RX ORDER — FUROSEMIDE 20 MG/1
TABLET ORAL
Qty: 30 TABLET | Refills: 2 | Status: ON HOLD | OUTPATIENT
Start: 2018-02-19 | End: 2018-05-10

## 2018-02-28 DIAGNOSIS — E11.69 HYPERLIPIDEMIA ASSOCIATED WITH TYPE 2 DIABETES MELLITUS (HCC): Primary | ICD-10-CM

## 2018-02-28 DIAGNOSIS — E78.5 HYPERLIPIDEMIA ASSOCIATED WITH TYPE 2 DIABETES MELLITUS (HCC): Primary | ICD-10-CM

## 2018-02-28 DIAGNOSIS — E11.65 UNCONTROLLED TYPE 2 DIABETES MELLITUS WITH HYPERGLYCEMIA, UNSPECIFIED LONG TERM INSULIN USE STATUS: ICD-10-CM

## 2018-02-28 NOTE — TELEPHONE ENCOUNTER
Pt has been given samples and would like a rx to be sent to [facility] for   Insulin Degludec-Liraglutide (XULTOPHY) 100-3.6 UNIT-MG/ML Subcutaneous Solution Pen-injector 3 mL 0 12/13/2017    Sig :  Inject 20-40 Units into the skin daily.        Please advise

## 2018-02-28 NOTE — TELEPHONE ENCOUNTER
Medication(s) to Refill:   Pending Prescriptions Disp Refills    Insulin Degludec-Liraglutide (XULTOPHY) 100-3.6 UNIT-MG/ML Subcutaneous Solution Pen-injector 3 mL 0     Sig: Inject 20-40 Units into the skin daily.              Reason for Medication Refill

## 2018-02-28 NOTE — TELEPHONE ENCOUNTER
Pt overdue for visit please call and ask to do fasting labs prior to visit   Thanks  Jessica Guevara

## 2018-03-05 ENCOUNTER — TELEPHONE (OUTPATIENT)
Dept: ENDOCRINOLOGY CLINIC | Facility: CLINIC | Age: 68
End: 2018-03-05

## 2018-03-05 NOTE — TELEPHONE ENCOUNTER
Future Appointments  Date Time Provider Henok Azucena   3/20/2018 10:30 AM Monae Avendaño NP [facility]     FOR DM FU

## 2018-03-05 NOTE — TELEPHONE ENCOUNTER
Per SC to call and let her know she needs to come in for an OV she is due. Pt and SC is aware that xultophy is not covered.

## 2018-03-10 DIAGNOSIS — E11.65 UNCONTROLLED TYPE 2 DIABETES MELLITUS WITH HYPERGLYCEMIA, UNSPECIFIED LONG TERM INSULIN USE STATUS: ICD-10-CM

## 2018-03-12 RX ORDER — ROSUVASTATIN CALCIUM 40 MG/1
TABLET, COATED ORAL
Qty: 90 TABLET | Refills: 0 | Status: SHIPPED | OUTPATIENT
Start: 2018-03-12 | End: 2018-09-10

## 2018-03-12 NOTE — TELEPHONE ENCOUNTER
LOV: 9/25/17 SD   Future office visit: No upcoming visit  Last labs: 9/11/17 A1C Cmp Lipid   Last RX: Sertraline 12/5/17 #90 Refills  Per protocol: Route to provider

## 2018-04-17 ENCOUNTER — OFFICE VISIT (OUTPATIENT)
Dept: ENDOCRINOLOGY CLINIC | Facility: CLINIC | Age: 68
End: 2018-04-17

## 2018-04-17 ENCOUNTER — APPOINTMENT (OUTPATIENT)
Dept: LAB | Age: 68
End: 2018-04-17
Attending: NURSE PRACTITIONER
Payer: MEDICARE

## 2018-04-17 VITALS
BODY MASS INDEX: 29.49 KG/M2 | RESPIRATION RATE: 18 BRPM | WEIGHT: 177 LBS | TEMPERATURE: 98 F | DIASTOLIC BLOOD PRESSURE: 62 MMHG | HEIGHT: 65 IN | SYSTOLIC BLOOD PRESSURE: 124 MMHG | HEART RATE: 68 BPM

## 2018-04-17 DIAGNOSIS — E11.65 UNCONTROLLED TYPE 2 DIABETES MELLITUS WITH HYPERGLYCEMIA, UNSPECIFIED WHETHER LONG TERM INSULIN USE: Primary | ICD-10-CM

## 2018-04-17 DIAGNOSIS — F33.0 MILD EPISODE OF RECURRENT MAJOR DEPRESSIVE DISORDER (HCC): ICD-10-CM

## 2018-04-17 PROCEDURE — 99214 OFFICE O/P EST MOD 30 MIN: CPT | Performed by: NURSE PRACTITIONER

## 2018-04-17 PROCEDURE — 95250 CONT GLUC MNTR PHYS/QHP EQP: CPT | Performed by: NURSE PRACTITIONER

## 2018-04-17 NOTE — PROGRESS NOTES
CC: Patient presents with:  Diabetes: follow up. pt forgot meter. HPI:   HPI: 75year old y/o female who presents for follow up diabetes visit. F/U. Vague about her DM  No readings, has been a pattern for her.  LOV over 4 months ago,not testing, having d benefit from [facility]. Current Outpatient Prescriptions:   •  Liraglutide (VICTOZA) 18 MG/3ML Subcutaneous Solution Pen-injector, Inject 1.8 mg into the skin daily. , Disp: 3 mL, Rfl: 0  •  Insulin Degludec (TRESIBA FLEXTOUCH) 200 UNIT/ML Subcutaneous Solutio Resp 18   Ht 65\"   Wt 177 lb   Breastfeeding? No   BMI 29.45 kg/m²   Constitutional: Oriented to person, place, and time. No distress. HEENT: Normocephalic and atraumatic. Eyes: Conjunctivae are normal.  Neck: Normal range of motion. Neck supple.    Ca labs   Ophtho / dilated eye exam: due recheck appt made for 4/20/18 with CHRISTIANO Love   Albumin/Cr ratio: microalbuminuria, improve BG and on arb  recheck  Monofilament exam: today  BP: at goal cont meds   Lipids: not at goal cont statin recheck today   Booker

## 2018-04-17 NOTE — PATIENT INSTRUCTIONS
Continue glipizide 20 mg daily   Start victoza (light blue) 0.6 mg daily for 1 week then increase to 1.2 mg daily   Start tresiba 30 units daily   Return in 2 weeks

## 2018-04-26 ENCOUNTER — HOSPITAL ENCOUNTER (INPATIENT)
Facility: HOSPITAL | Age: 68
LOS: 22 days | Discharge: NURSING, REHAB OR LONG-TERM CARE | DRG: 252 | End: 2018-05-18
Attending: EMERGENCY MEDICINE | Admitting: HOSPITALIST
Payer: MEDICARE

## 2018-04-26 ENCOUNTER — APPOINTMENT (OUTPATIENT)
Dept: CT IMAGING | Facility: HOSPITAL | Age: 68
DRG: 252 | End: 2018-04-26
Attending: EMERGENCY MEDICINE
Payer: MEDICARE

## 2018-04-26 DIAGNOSIS — R63.30 FEEDING DIFFICULTIES: ICD-10-CM

## 2018-04-26 DIAGNOSIS — E11.65 UNCONTROLLED TYPE 2 DIABETES MELLITUS WITH HYPERGLYCEMIA (HCC): ICD-10-CM

## 2018-04-26 DIAGNOSIS — R19.7 VOMITING AND DIARRHEA: Primary | ICD-10-CM

## 2018-04-26 DIAGNOSIS — R11.10 VOMITING AND DIARRHEA: Primary | ICD-10-CM

## 2018-04-26 DIAGNOSIS — I77.9 BILATERAL CAROTID ARTERY DISEASE (HCC): ICD-10-CM

## 2018-04-26 DIAGNOSIS — I77.9 RIGHT-SIDED CAROTID ARTERY DISEASE (HCC): ICD-10-CM

## 2018-04-26 DIAGNOSIS — I10 ESSENTIAL HYPERTENSION: ICD-10-CM

## 2018-04-26 LAB
ALBUMIN SERPL-MCNC: 3.8 G/DL (ref 3.5–4.8)
ALP LIVER SERPL-CCNC: 82 U/L (ref 55–142)
ALT SERPL-CCNC: 35 U/L (ref 14–54)
AST SERPL-CCNC: 30 U/L (ref 15–41)
BASOPHILS # BLD AUTO: 0.09 X10(3) UL (ref 0–0.1)
BASOPHILS NFR BLD AUTO: 0.4 %
BILIRUB SERPL-MCNC: 0.5 MG/DL (ref 0.1–2)
BILIRUB UR QL STRIP.AUTO: NEGATIVE
BUN BLD-MCNC: 29 MG/DL (ref 8–20)
CALCIUM BLD-MCNC: 10.3 MG/DL (ref 8.3–10.3)
CHLORIDE: 101 MMOL/L (ref 101–111)
CLARITY UR REFRACT.AUTO: CLEAR
CO2: 23 MMOL/L (ref 22–32)
COLOR UR AUTO: YELLOW
CREAT BLD-MCNC: 1.96 MG/DL (ref 0.55–1.02)
EOSINOPHIL # BLD AUTO: 0.11 X10(3) UL (ref 0–0.3)
EOSINOPHIL NFR BLD AUTO: 0.5 %
ERYTHROCYTE [DISTWIDTH] IN BLOOD BY AUTOMATED COUNT: 14.4 % (ref 11.5–16)
ETHYL ALCOHOL: <3 MG/DL (ref ?–3)
GLUCOSE BLD-MCNC: 315 MG/DL (ref 65–99)
GLUCOSE BLD-MCNC: 321 MG/DL (ref 65–99)
GLUCOSE BLD-MCNC: 332 MG/DL (ref 70–99)
GLUCOSE UR STRIP.AUTO-MCNC: 500 MG/DL
HCT VFR BLD AUTO: 54.6 % (ref 34–50)
HGB BLD-MCNC: 18.2 G/DL (ref 12–16)
IMMATURE GRANULOCYTE COUNT: 0.15 X10(3) UL (ref 0–1)
IMMATURE GRANULOCYTE RATIO %: 0.7 %
LACTIC ACID: 1.8 MMOL/L (ref 0.5–2)
LEUKOCYTE ESTERASE UR QL STRIP.AUTO: NEGATIVE
LIPASE: 202 U/L (ref 73–393)
LYMPHOCYTES # BLD AUTO: 2.38 X10(3) UL (ref 0.9–4)
LYMPHOCYTES NFR BLD AUTO: 10.4 %
M PROTEIN MFR SERPL ELPH: 8.6 G/DL (ref 6.1–8.3)
MCH RBC QN AUTO: 28.8 PG (ref 27–33.2)
MCHC RBC AUTO-ENTMCNC: 33.3 G/DL (ref 31–37)
MCV RBC AUTO: 86.5 FL (ref 81–100)
MONOCYTES # BLD AUTO: 1.2 X10(3) UL (ref 0.1–1)
MONOCYTES NFR BLD AUTO: 5.2 %
NEUTROPHIL ABS PRELIM: 18.96 X10 (3) UL (ref 1.3–6.7)
NEUTROPHILS # BLD AUTO: 18.96 X10(3) UL (ref 1.3–6.7)
NEUTROPHILS NFR BLD AUTO: 82.8 %
NITRITE UR QL STRIP.AUTO: POSITIVE
PH UR STRIP.AUTO: 5.5 [PH] (ref 4.5–8)
PLATELET # BLD AUTO: 311 10(3)UL (ref 150–450)
POTASSIUM SERPL-SCNC: 4.5 MMOL/L (ref 3.6–5.1)
RBC # BLD AUTO: 6.31 X10(6)UL (ref 3.8–5.1)
RBC UR QL AUTO: NEGATIVE
RED CELL DISTRIBUTION WIDTH-SD: 44.5 FL (ref 35.1–46.3)
SODIUM SERPL-SCNC: 134 MMOL/L (ref 136–144)
SP GR UR STRIP.AUTO: 1.02 (ref 1–1.03)
TROPONIN: <0.046 NG/ML (ref ?–0.05)
UROBILINOGEN UR STRIP.AUTO-MCNC: 0.2 MG/DL
WBC # BLD AUTO: 22.9 X10(3) UL (ref 4–13)

## 2018-04-26 PROCEDURE — 99223 1ST HOSP IP/OBS HIGH 75: CPT | Performed by: HOSPITALIST

## 2018-04-26 PROCEDURE — 70450 CT HEAD/BRAIN W/O DYE: CPT | Performed by: EMERGENCY MEDICINE

## 2018-04-26 RX ORDER — ONDANSETRON 2 MG/ML
4 INJECTION INTRAMUSCULAR; INTRAVENOUS ONCE
Status: COMPLETED | OUTPATIENT
Start: 2018-04-26 | End: 2018-04-26

## 2018-04-26 RX ORDER — METOPROLOL SUCCINATE 50 MG/1
50 TABLET, EXTENDED RELEASE ORAL
Status: DISCONTINUED | OUTPATIENT
Start: 2018-04-26 | End: 2018-04-30

## 2018-04-26 RX ORDER — SODIUM CHLORIDE 9 MG/ML
INJECTION, SOLUTION INTRAVENOUS CONTINUOUS
Status: DISCONTINUED | OUTPATIENT
Start: 2018-04-26 | End: 2018-04-27

## 2018-04-26 RX ORDER — ACETAMINOPHEN 325 MG/1
650 TABLET ORAL EVERY 6 HOURS PRN
Status: DISCONTINUED | OUTPATIENT
Start: 2018-04-26 | End: 2018-04-30

## 2018-04-26 RX ORDER — ONDANSETRON 2 MG/ML
4 INJECTION INTRAMUSCULAR; INTRAVENOUS EVERY 6 HOURS PRN
Status: DISCONTINUED | OUTPATIENT
Start: 2018-04-26 | End: 2018-04-27

## 2018-04-26 RX ORDER — DEXTROSE MONOHYDRATE 25 G/50ML
50 INJECTION, SOLUTION INTRAVENOUS
Status: DISCONTINUED | OUTPATIENT
Start: 2018-04-26 | End: 2018-04-27

## 2018-04-26 RX ORDER — ASPIRIN 81 MG/1
81 TABLET ORAL DAILY
Status: DISCONTINUED | OUTPATIENT
Start: 2018-04-26 | End: 2018-04-27

## 2018-04-26 RX ORDER — NICOTINE 21 MG/24HR
1 PATCH, TRANSDERMAL 24 HOURS TRANSDERMAL DAILY
Status: DISCONTINUED | OUTPATIENT
Start: 2018-04-26 | End: 2018-05-02

## 2018-04-26 RX ORDER — HEPARIN SODIUM 5000 [USP'U]/ML
5000 INJECTION, SOLUTION INTRAVENOUS; SUBCUTANEOUS EVERY 12 HOURS SCHEDULED
Status: DISCONTINUED | OUTPATIENT
Start: 2018-04-26 | End: 2018-04-27

## 2018-04-26 RX ORDER — CLOPIDOGREL BISULFATE 75 MG/1
75 TABLET ORAL DAILY
Status: DISCONTINUED | OUTPATIENT
Start: 2018-04-26 | End: 2018-04-27

## 2018-04-26 NOTE — ED INITIAL ASSESSMENT (HPI)
Pt began to vomit today with vomiting and diarrhea after lunch. Pt became dizzy and fell and hit her head. Pt on blood thinners.

## 2018-04-26 NOTE — ED NOTES
Patient to and from CT Scan with RN, on the monitor. Patient was able to stand up after CT scan and sit on stretcher. Facial droop and slurred speech have resolved.   Remains A/OX4

## 2018-04-26 NOTE — ED NOTES
Patient to ED with daughter, reports she was last normal at noon today while they were eating lunch.  + vomiting, weakness and diarrhea at 1pm.  Reports her \"liver and kidney's were hurting\"  Patient is A/Ox4. Patient is very weak, ?  Left sided facial d

## 2018-04-26 NOTE — ED PROVIDER NOTES
Patient Seen in: [facility] Emergency Department    History   Patient presents with:  Nausea/Vomiting/Diarrhea (gastrointestinal)    Stated Complaint: vomiting and diarrhea since noon,  has weakness    HPI    Yuan Stafford is a 16-year-old female presenting SURGERY  No date: BIOPSY OF SKIN LESION      Comment: hyperkeratosis  1996 and 2012: BYPASS SURGERY      Comment: cabg x 4  1996: CABG      Comment: x4  No date: CATH BARE METAL STENT (BMS)      Comment: R carotid/coronary  1987: CHOLECYSTECTOMY  No date: Positive (*)     All other components within normal limits   URINE MICROSCOPIC W REFLEX CULTURE - Abnormal; Notable for the following:     WBC Urine 11-20 (*)     All other components within normal limits   POCT GLUCOSE - Abnormal; Notable for the followin specified.       Medications Prescribed:  Current Discharge Medication List        Present on Admission  Date Reviewed: 9/11/2017          ICD-10-CM Noted POA    Vomiting and diarrhea R11.10, R19.7 4/26/2018 Unknown

## 2018-04-27 ENCOUNTER — ANESTHESIA (OUTPATIENT)
Dept: PERIOP | Facility: HOSPITAL | Age: 68
DRG: 252 | End: 2018-04-27
Payer: MEDICARE

## 2018-04-27 ENCOUNTER — APPOINTMENT (OUTPATIENT)
Dept: GENERAL RADIOLOGY | Facility: HOSPITAL | Age: 68
DRG: 252 | End: 2018-04-27
Attending: INTERNAL MEDICINE
Payer: MEDICARE

## 2018-04-27 ENCOUNTER — APPOINTMENT (OUTPATIENT)
Dept: ULTRASOUND IMAGING | Facility: HOSPITAL | Age: 68
DRG: 252 | End: 2018-04-27
Attending: NURSE PRACTITIONER
Payer: MEDICARE

## 2018-04-27 ENCOUNTER — APPOINTMENT (OUTPATIENT)
Dept: CT IMAGING | Facility: HOSPITAL | Age: 68
DRG: 252 | End: 2018-04-27
Attending: Other
Payer: MEDICARE

## 2018-04-27 ENCOUNTER — APPOINTMENT (OUTPATIENT)
Dept: INTERVENTIONAL RADIOLOGY/VASCULAR | Facility: HOSPITAL | Age: 68
DRG: 252 | End: 2018-04-27
Attending: NURSE PRACTITIONER
Payer: MEDICARE

## 2018-04-27 ENCOUNTER — ANESTHESIA EVENT (OUTPATIENT)
Dept: PERIOP | Facility: HOSPITAL | Age: 68
DRG: 252 | End: 2018-04-27
Payer: MEDICARE

## 2018-04-27 PROBLEM — I65.21 CAROTID OCCLUSION, RIGHT: Status: ACTIVE | Noted: 2018-04-27

## 2018-04-27 PROBLEM — I63.511 ACUTE RIGHT MCA STROKE (HCC): Status: ACTIVE | Noted: 2018-04-27

## 2018-04-27 LAB
ARTERIAL BLD GAS O2 SATURATION: 91 % (ref 92–100)
ARTERIAL BLOOD GAS BASE EXCESS: -6.3
ARTERIAL BLOOD GAS HCO3: 19.2 MEQ/L (ref 22–26)
ARTERIAL BLOOD GAS PCO2: 38 MM HG (ref 35–45)
ARTERIAL BLOOD GAS PH: 7.32 (ref 7.35–7.45)
ARTERIAL BLOOD GAS PO2: 65 MM HG (ref 80–105)
ATRIAL RATE: 58 BPM
BASOPHILS # BLD AUTO: 0.03 X10(3) UL (ref 0–0.1)
BASOPHILS NFR BLD AUTO: 0.3 %
BUN BLD-MCNC: 18 MG/DL (ref 8–20)
BUN BLD-MCNC: 24 MG/DL (ref 8–20)
CALCIUM BLD-MCNC: 7.6 MG/DL (ref 8.3–10.3)
CALCIUM BLD-MCNC: 8.7 MG/DL (ref 8.3–10.3)
CALCULATED O2 SATURATION: 91 % (ref 92–100)
CARBOXYHEMOGLOBIN: 1.6 % SAT (ref 0–3)
CHLORIDE: 105 MMOL/L (ref 101–111)
CHLORIDE: 112 MMOL/L (ref 101–111)
CO2: 21 MMOL/L (ref 22–32)
CO2: 25 MMOL/L (ref 22–32)
CREAT BLD-MCNC: 0.93 MG/DL (ref 0.55–1.02)
CREAT BLD-MCNC: 1.06 MG/DL (ref 0.55–1.02)
EOSINOPHIL # BLD AUTO: 0.12 X10(3) UL (ref 0–0.3)
EOSINOPHIL NFR BLD AUTO: 1.3 %
ERYTHROCYTE [DISTWIDTH] IN BLOOD BY AUTOMATED COUNT: 14.2 % (ref 11.5–16)
EST. AVERAGE GLUCOSE BLD GHB EST-MCNC: 324 MG/DL (ref 68–126)
GLUCOSE BLD-MCNC: 168 MG/DL (ref 70–99)
GLUCOSE BLD-MCNC: 175 MG/DL (ref 65–99)
GLUCOSE BLD-MCNC: 181 MG/DL (ref 65–99)
GLUCOSE BLD-MCNC: 207 MG/DL (ref 65–99)
GLUCOSE BLD-MCNC: 207 MG/DL (ref 70–99)
HAV IGM SER QL: 1.8 MG/DL (ref 1.8–2.5)
HBA1C MFR BLD HPLC: 12.9 % (ref ?–5.7)
HCT VFR BLD AUTO: 45.3 % (ref 34–50)
HGB BLD-MCNC: 14.5 G/DL (ref 12–16)
HSCRP: 20.5 MG/L (ref ?–3)
IMMATURE GRANULOCYTE COUNT: 0.05 X10(3) UL (ref 0–1)
IMMATURE GRANULOCYTE RATIO %: 0.6 %
L/M: 11 L/MIN
LYMPHOCYTES # BLD AUTO: 2.04 X10(3) UL (ref 0.9–4)
LYMPHOCYTES NFR BLD AUTO: 22.7 %
MCH RBC QN AUTO: 27.9 PG (ref 27–33.2)
MCHC RBC AUTO-ENTMCNC: 32 G/DL (ref 31–37)
MCV RBC AUTO: 87.1 FL (ref 81–100)
METHEMOGLOBIN: 0.6 % SAT (ref 0.4–1.5)
MONOCYTES # BLD AUTO: 0.59 X10(3) UL (ref 0.1–1)
MONOCYTES NFR BLD AUTO: 6.6 %
NEUTROPHIL ABS PRELIM: 6.17 X10 (3) UL (ref 1.3–6.7)
NEUTROPHILS # BLD AUTO: 6.17 X10(3) UL (ref 1.3–6.7)
NEUTROPHILS NFR BLD AUTO: 68.5 %
P-R INTERVAL: 168 MS
P/F RATIO: 111.2 MMHG
P2Y12 REACTION UNITS: 225 PRU
PATIENT TEMPERATURE: 98 F
PHOSPHATE SERPL-MCNC: 3 MG/DL (ref 2.5–4.9)
PLATELET # BLD AUTO: 203 10(3)UL (ref 150–450)
POTASSIUM SERPL-SCNC: 4.1 MMOL/L (ref 3.6–5.1)
POTASSIUM SERPL-SCNC: 4.1 MMOL/L (ref 3.6–5.1)
Q-T INTERVAL: 486 MS
QRS DURATION: 94 MS
QTC CALCULATION (BEZET): 477 MS
R AXIS: -14 DEGREES
RBC # BLD AUTO: 5.2 X10(6)UL (ref 3.8–5.1)
RED CELL DISTRIBUTION WIDTH-SD: 45.1 FL (ref 35.1–46.3)
SODIUM SERPL-SCNC: 138 MMOL/L (ref 136–144)
SODIUM SERPL-SCNC: 140 MMOL/L (ref 136–144)
T AXIS: 91 DEGREES
TOTAL HEMOGLOBIN: 12.8 G/DL (ref 11.7–16)
VENTRICULAR RATE: 58 BPM
WBC # BLD AUTO: 9 X10(3) UL (ref 4–13)

## 2018-04-27 PROCEDURE — 70498 CT ANGIOGRAPHY NECK: CPT | Performed by: OTHER

## 2018-04-27 PROCEDURE — B311YZZ FLUOROSCOPY OF RIGHT BRACHIOCEPHALIC-SUBCLAVIAN ARTERY USING OTHER CONTRAST: ICD-10-PCS | Performed by: RADIOLOGY

## 2018-04-27 PROCEDURE — 5A09557 ASSISTANCE WITH RESPIRATORY VENTILATION, GREATER THAN 96 CONSECUTIVE HOURS, CONTINUOUS POSITIVE AIRWAY PRESSURE: ICD-10-PCS | Performed by: INTERNAL MEDICINE

## 2018-04-27 PROCEDURE — B315YZZ FLUOROSCOPY OF BILATERAL COMMON CAROTID ARTERIES USING OTHER CONTRAST: ICD-10-PCS | Performed by: RADIOLOGY

## 2018-04-27 PROCEDURE — 037K35Z DILATION OF RIGHT INTERNAL CAROTID ARTERY WITH TWO DRUG-ELUTING INTRALUMINAL DEVICES, PERCUTANEOUS APPROACH: ICD-10-PCS | Performed by: RADIOLOGY

## 2018-04-27 PROCEDURE — 3E03317 INTRODUCTION OF OTHER THROMBOLYTIC INTO PERIPHERAL VEIN, PERCUTANEOUS APPROACH: ICD-10-PCS | Performed by: RADIOLOGY

## 2018-04-27 PROCEDURE — 71045 X-RAY EXAM CHEST 1 VIEW: CPT | Performed by: INTERNAL MEDICINE

## 2018-04-27 PROCEDURE — 70450 CT HEAD/BRAIN W/O DYE: CPT | Performed by: OTHER

## 2018-04-27 PROCEDURE — 3E053PZ INTRODUCTION OF PLATELET INHIBITOR INTO PERIPHERAL ARTERY, PERCUTANEOUS APPROACH: ICD-10-PCS | Performed by: RADIOLOGY

## 2018-04-27 PROCEDURE — 99291 CRITICAL CARE FIRST HOUR: CPT | Performed by: OTHER

## 2018-04-27 PROCEDURE — 99291 CRITICAL CARE FIRST HOUR: CPT | Performed by: HOSPITALIST

## 2018-04-27 PROCEDURE — 70496 CT ANGIOGRAPHY HEAD: CPT | Performed by: OTHER

## 2018-04-27 PROCEDURE — B312YZZ FLUOROSCOPY OF LEFT SUBCLAVIAN ARTERY USING OTHER CONTRAST: ICD-10-PCS | Performed by: RADIOLOGY

## 2018-04-27 RX ORDER — HEPARIN SODIUM 5000 [USP'U]/ML
INJECTION, SOLUTION INTRAVENOUS; SUBCUTANEOUS
Status: COMPLETED
Start: 2018-04-27 | End: 2018-04-27

## 2018-04-27 RX ORDER — LIDOCAINE HYDROCHLORIDE 10 MG/ML
INJECTION, SOLUTION INFILTRATION; PERINEURAL
Status: COMPLETED
Start: 2018-04-27 | End: 2018-04-27

## 2018-04-27 RX ORDER — VERAPAMIL HYDROCHLORIDE 2.5 MG/ML
INJECTION, SOLUTION INTRAVENOUS
Status: COMPLETED
Start: 2018-04-27 | End: 2018-04-27

## 2018-04-27 RX ORDER — LABETALOL HYDROCHLORIDE 5 MG/ML
10 INJECTION, SOLUTION INTRAVENOUS EVERY 4 HOURS PRN
Status: DISCONTINUED | OUTPATIENT
Start: 2018-04-27 | End: 2018-05-05

## 2018-04-27 RX ORDER — EPTIFIBATIDE 0.75 MG/ML
INJECTION, SOLUTION INTRAVENOUS
Status: COMPLETED
Start: 2018-04-27 | End: 2018-04-27

## 2018-04-27 RX ORDER — MIDAZOLAM HYDROCHLORIDE 1 MG/ML
1 INJECTION INTRAMUSCULAR; INTRAVENOUS EVERY 5 MIN PRN
Status: DISCONTINUED | OUTPATIENT
Start: 2018-04-27 | End: 2018-04-27 | Stop reason: HOSPADM

## 2018-04-27 RX ORDER — IPRATROPIUM BROMIDE AND ALBUTEROL SULFATE 2.5; .5 MG/3ML; MG/3ML
SOLUTION RESPIRATORY (INHALATION)
Status: COMPLETED
Start: 2018-04-27 | End: 2018-04-27

## 2018-04-27 RX ORDER — FAMOTIDINE 20 MG/1
20 TABLET ORAL DAILY
Status: CANCELLED | OUTPATIENT
Start: 2018-04-27

## 2018-04-27 RX ORDER — METOCLOPRAMIDE HYDROCHLORIDE 5 MG/ML
10 INJECTION INTRAMUSCULAR; INTRAVENOUS EVERY 8 HOURS PRN
Status: DISCONTINUED | OUTPATIENT
Start: 2018-04-27 | End: 2018-04-27

## 2018-04-27 RX ORDER — SODIUM CHLORIDE 9 MG/ML
INJECTION, SOLUTION INTRAVENOUS CONTINUOUS
Status: DISCONTINUED | OUTPATIENT
Start: 2018-04-27 | End: 2018-04-28

## 2018-04-27 RX ORDER — IPRATROPIUM BROMIDE AND ALBUTEROL SULFATE 2.5; .5 MG/3ML; MG/3ML
3 SOLUTION RESPIRATORY (INHALATION)
Status: DISCONTINUED | OUTPATIENT
Start: 2018-04-27 | End: 2018-05-09

## 2018-04-27 RX ORDER — LABETALOL HYDROCHLORIDE 5 MG/ML
10 INJECTION, SOLUTION INTRAVENOUS EVERY 10 MIN PRN
Status: DISCONTINUED | OUTPATIENT
Start: 2018-04-27 | End: 2018-04-27

## 2018-04-27 RX ORDER — HYDRALAZINE HYDROCHLORIDE 20 MG/ML
10 INJECTION INTRAMUSCULAR; INTRAVENOUS EVERY 4 HOURS PRN
Status: DISCONTINUED | OUTPATIENT
Start: 2018-04-27 | End: 2018-05-05

## 2018-04-27 RX ORDER — SODIUM CHLORIDE 9 MG/ML
INJECTION, SOLUTION INTRAVENOUS CONTINUOUS
Status: DISCONTINUED | OUTPATIENT
Start: 2018-04-27 | End: 2018-04-27

## 2018-04-27 RX ORDER — EPTIFIBATIDE 0.75 MG/ML
2 INJECTION, SOLUTION INTRAVENOUS CONTINUOUS
Status: DISCONTINUED | OUTPATIENT
Start: 2018-04-27 | End: 2018-04-28

## 2018-04-27 RX ORDER — ACETAMINOPHEN 650 MG/1
650 SUPPOSITORY RECTAL EVERY 4 HOURS PRN
Status: DISCONTINUED | OUTPATIENT
Start: 2018-04-27 | End: 2018-05-18

## 2018-04-27 RX ORDER — SODIUM CHLORIDE, SODIUM LACTATE, POTASSIUM CHLORIDE, CALCIUM CHLORIDE 600; 310; 30; 20 MG/100ML; MG/100ML; MG/100ML; MG/100ML
INJECTION, SOLUTION INTRAVENOUS CONTINUOUS
Status: DISCONTINUED | OUTPATIENT
Start: 2018-04-27 | End: 2018-04-27

## 2018-04-27 RX ORDER — ONDANSETRON 2 MG/ML
4 INJECTION INTRAMUSCULAR; INTRAVENOUS EVERY 6 HOURS PRN
Status: DISCONTINUED | OUTPATIENT
Start: 2018-04-27 | End: 2018-04-27

## 2018-04-27 RX ORDER — IPRATROPIUM BROMIDE AND ALBUTEROL SULFATE 2.5; .5 MG/3ML; MG/3ML
3 SOLUTION RESPIRATORY (INHALATION)
Status: DISCONTINUED | OUTPATIENT
Start: 2018-04-27 | End: 2018-05-18

## 2018-04-27 RX ORDER — HYDROMORPHONE HYDROCHLORIDE 1 MG/ML
0.4 INJECTION, SOLUTION INTRAMUSCULAR; INTRAVENOUS; SUBCUTANEOUS EVERY 5 MIN PRN
Status: DISCONTINUED | OUTPATIENT
Start: 2018-04-27 | End: 2018-04-27 | Stop reason: HOSPADM

## 2018-04-27 RX ORDER — METOCLOPRAMIDE HYDROCHLORIDE 5 MG/ML
10 INJECTION INTRAMUSCULAR; INTRAVENOUS AS NEEDED
Status: DISCONTINUED | OUTPATIENT
Start: 2018-04-27 | End: 2018-04-27 | Stop reason: HOSPADM

## 2018-04-27 RX ORDER — LABETALOL HYDROCHLORIDE 5 MG/ML
10 INJECTION, SOLUTION INTRAVENOUS ONCE AS NEEDED
Status: COMPLETED | OUTPATIENT
Start: 2018-04-27 | End: 2018-04-27

## 2018-04-27 RX ORDER — DEXTROSE MONOHYDRATE 25 G/50ML
50 INJECTION, SOLUTION INTRAVENOUS
Status: DISCONTINUED | OUTPATIENT
Start: 2018-04-27 | End: 2018-04-27 | Stop reason: HOSPADM

## 2018-04-27 RX ORDER — IPRATROPIUM BROMIDE AND ALBUTEROL SULFATE 2.5; .5 MG/3ML; MG/3ML
3 SOLUTION RESPIRATORY (INHALATION) EVERY 4 HOURS PRN
Status: DISCONTINUED | OUTPATIENT
Start: 2018-04-27 | End: 2018-05-15

## 2018-04-27 RX ORDER — NALOXONE HYDROCHLORIDE 0.4 MG/ML
80 INJECTION, SOLUTION INTRAMUSCULAR; INTRAVENOUS; SUBCUTANEOUS AS NEEDED
Status: DISCONTINUED | OUTPATIENT
Start: 2018-04-27 | End: 2018-04-27 | Stop reason: HOSPADM

## 2018-04-27 RX ORDER — FAMOTIDINE 10 MG/ML
20 INJECTION, SOLUTION INTRAVENOUS DAILY
Status: CANCELLED | OUTPATIENT
Start: 2018-04-27

## 2018-04-27 RX ORDER — ONDANSETRON 2 MG/ML
4 INJECTION INTRAMUSCULAR; INTRAVENOUS AS NEEDED
Status: DISCONTINUED | OUTPATIENT
Start: 2018-04-27 | End: 2018-04-27 | Stop reason: HOSPADM

## 2018-04-27 NOTE — ANESTHESIA PREPROCEDURE EVALUATION
PRE-OP EVALUATION    Patient Name: Daniele Raygoza    Pre-op Diagnosis: * No pre-op diagnosis entered *    * No procedures listed *    * No surgeons found in log *    Pre-op vitals reviewed.   Temp: 98 °F (36.7 °C)  Pulse: 52  Resp: 18  BP: 172/68  SpO2: 100 UNIT/ML flextouch 6 Units 6 Units Subcutaneous [email or web address]   Insulin Aspart Pen (NOVOLOG) 100 UNIT/ML flexpen 1-68 Units 1-68 Units Subcutaneous TID CC   Insulin Aspart Pen (NOVOLOG) 100 UNIT/ML flexpen 1-10 Units 1-10 Units Subcutaneous TID AC and H dysfunction. 2. Aortic valve: Structurally normal valve. Trileaflet. Sclerosis no     stenosis with moderate thickening/calcification of leaflets, cannot rule     out vegetation. Clinical correlation is recommended. There was no     stenosis.   3. Mitral v status: Current Every Day Smoker  1.00 Packs/day  For 50.00 Years     Types: Cigarettes    Smokeless tobacco: Never Used    Alcohol use No    Comment: social       Drug use: No   Types:      Available pre-op labs reviewed.     Lab Results  Component Value D

## 2018-04-27 NOTE — CONSULTS
[id number] [facility]ly[facility] Truong Interventional Neuro Radiology Consult    Markus Leonardo Patient Status:  Inpatient    1950 MRN [id number]   [facility] Luis Faustin MD   Hosp Day # 1 PCP Mita Bird MD Abdominal distention    • Anxiety    • Atherosclerosis of coronary artery-2 surgeries for CABG X 4 1996 2012  1996  & 2012   • Belching    • Carotid Artery Stenosis s/p R CEA    • Black stools    • Congestive Heart Failure - EF 35-40 % 3/2013   • COPD   • MG Oral Tab TAKE ONE TABLET BY MOUTH ONE TIME DAILY  Disp: 90 tablet Rfl: 0 4/25/2018 at Unknown time   GLIPIZIDE ER 10 MG Oral Tablet 24 Hr TAKE TWO TABLETS BY MOUTH DAILY  Disp: 60 tablet Rfl: 2 4/25/2018 at Unknown time   aspirin 81 MG Oral Tab EC Take Daily   Clopidogrel Bisulfate (PLAVIX) tab 75 mg 75 mg Oral Daily   Metoprolol Succinate ER (Toprol XL) 24 hr tab 50 mg 50 mg Oral Daily Beta Blocker   Umeclidinium Bromide (INCRUSE ELLIPTA) 62.5 MCG/INH inhaler 1 puff 1 puff Inhalation Daily   glucose (DE Shoulder shrug normal bilaterally. Remaining CN's GI. Sensation to light touch is intact bilaterally.     Motor: hemiparesis of the left arm, limited movement of the left lower extremity, unable to completely lift off the bed, full strength of the ri

## 2018-04-27 NOTE — PROGRESS NOTES
No diarrhea overnight, anastasiya clear liquids. Up to bathroom with one assist. unsteady gait. Iv fluids continued.

## 2018-04-27 NOTE — PROGRESS NOTES
7669 Give am meds to patient,tolerated the oral meds,asking for something to   Eat,she said chicken broth and red jello, called dietary for order. Noticed pt w/ left   Droopy mouth,slurred speech and left upper arm weakness,unable to raise arm,  Step out

## 2018-04-27 NOTE — H&P
[facility]  History and Physical     Odalys Melina Patient Status:  Emergency    1950 MRN [id number]   Location [address] [facility] Attending Tanya Rajput MD   Hosp Day # 0 PCP Mona Monatño MD     Chief Complain APPENDECTOMY  No date: BACK SURGERY  No date: BIOPSY OF SKIN LESION      Comment: hyperkeratosis  1996 and 2012: BYPASS SURGERY      Comment: cabg x 4  1996: CABG      Comment: x4  No date: CATH BARE METAL STENT (BMS)      Comment: R carotid/coronary  7609 External Ointment APPLY 1 APPLICATION TOPICALLY EVERY EVENING FOR TWO WEEKS, THEN EVERY OTHER DAY FOR 2 WEEKS, THEN APPLY TWICE WEEKLY. Disp: 60 g Rfl: 0   aspirin 81 MG Oral Tab EC Take 1 tablet (81 mg total) by mouth daily.  Disp: 30 tablet Rfl: 0   Leval 1640   WBC  22.9*   HGB  18.2*   MCV  86.5   PLT  311.0       Recent Labs   Lab  04/26/18   1640   GLU  332*   BUN  29*   CREATSERUM  1.96*   GFRAA  30*   GFRNAA  26*   CA  10.3   ALB  3.8   NA  134*   K  4.5   CL  101   CO2  23.0   ALKPHO  82   AST  30

## 2018-04-27 NOTE — PROCEDURES
[facility]  Pre-Procedural Note  Ever Modoc Patient Status:  Inpatient    1950 MRN [id number]   [facility] 6NE-A Attending Mathieu Rivera MD   Hosp Day # 1 PCP Negin Barajas MD     Procedure: Cererbral angiography and po patch Transdermal Daily       Labs:    Lab Results  Component Value Date   WBC 9.0 04/27/2018   HGB 14.5 04/27/2018   HCT 45.3 04/27/2018   .0 04/27/2018   CREATSERUM 1.06 04/27/2018   BUN 24 04/27/2018    04/27/2018   K 4.1 04/27/2018   CL 10 procedure. I have discussed the risks and benefits and alternatives with the patient/family. They understand and agree to proceed with plan of care.     4/27/2018  4:18 PM  Shell Yun MD

## 2018-04-27 NOTE — ANESTHESIA POSTPROCEDURE EVALUATION
Lam Kolb Patient Status:  Inpatient   Age/Gender 75year old female MRN [id number]   [facility] 6NE-A Attending Amber Gaona MD   [facility] Day # 1 PCP Franc Beebe MD       Anesthesia Post-op Note    * No procedu

## 2018-04-27 NOTE — PLAN OF CARE
Called to rapid response around 1026. Pt alert and oriented, unable to move left arm with slurred speech and left facial droop. Dr Tarik Ponce at bedside and attempted to call neuro APN/ anthony navigator.  Unable to reach so code stroke called at 1030 and Dr Cornel Hoskins

## 2018-04-27 NOTE — CONSULTS
[facility]  Neurocritical care Consultation    Tariq Bean Patient Status:  Inpatient    1950 MRN [id number]   [facility] 6NE-A Attending Marv Abreu MD   Hosp Day # 1 PCP Maciel Turk MD     Date of Admission:   History:   Diagnosis Date   • Abdominal distention    • Anxiety    • Atherosclerosis of coronary artery 1996  & 2012     2 surgeries for CABG X 4 1996 2012    • Atherosclerosis of coronary artery 2/28/14    acute MI, CHF, Stents    • Belching    • Black st drink alcohol.     Allergies:    Lipitor [Atorvastat*    Myalgia    Comment:TABS  Wellbutrin [Bupropi*        Comment:TABS-tremor    Medications:    Current Facility-Administered Medications:   •  ALTEPLASE (TPA) STROKE BOLUS FROM BAG 7.3 mg infusion, 0.09 %, not currently breastfeeding. GENERAL:  Patient is a(n) 75year old female in no acute distress. HEENT:  Normocephalic, atraumatic  LUNGS: Clear to auscultation bilaterally. HEART:  S1, S2, Regular rate and rhythm. UE: left arm hematoma.  Pulse pre 100-130mmHg    Assessment/Plan:  Patient Active Problem List:     Essential hypertension     Carotid arterial disease (HCC)     Depression     Esophageal reflux     Osteoarthritis     High cholesterol     Anxiety state     Coronary atherosclerosis     S/P Francesco       Time spent 60 minutes to prevent neurologic instability. This involved direct patient intervention complex and decision-making and or extensive discussion with the patient/family and clinical staff. (Exclusive of billlable procedures)    Thank

## 2018-04-27 NOTE — PROGRESS NOTES
[facility]  Progress Note     Kacey Garcia Patient Status:  Inpatient    1950 MRN [id number]   [facility] 4NW-A Attending Emmanuel Manuel MD   Hosp Day # 1 PCP Sandra White MD     Chief Complaint: Dysphagia     Signific last 72 hours. Recent Labs   Lab  04/26/18   1640   TROP  <0.046            Imaging: Imaging data reviewed in Epic.     Medications:   • aspirin  81 mg Oral Daily   • Clopidogrel Bisulfate  75 mg Oral Daily   • Metoprolol Succinate ER  50 mg Oral Daily B

## 2018-04-27 NOTE — CONSULTS
[facility]  Report of Consultation    Jarvisyady Julio Cesar Patient Status:  Inpatient    1950 MRN [id number]   [facility] 6NE-A Attending Crow Richter MD   Hosp Day # 1 PCP Irene Tsang MD     Reason for Consultation: Tony Sanabria liver  · Essential hypertension  · Hyperlipidemia  · PATRICIA    Past Surgical History:  No date: ANGIOGRAM  2/28/14: ANGIOPLASTY (CORONARY)      Comment: stents acute mi  No date: APPENDECTOMY  No date: BACK SURGERY  No date: BIOPSY OF SKIN LESION      Comment Besy-Benazepril HCl 10-20 MG Oral Cap Take 1 Cap by mouth daily. Disp:  Rfl:  4/25/2018 at Unknown time   Liraglutide (VICTOZA) 18 MG/3ML Subcutaneous Solution Pen-injector Inject 1.8 mg into the skin daily.  Disp: 3 mL Rfl: 0    FUROSEMIDE 20 MG Oral Tab T rhythm, normal sinus rhythm, normal S1S2, no murmur. Abdomen: soft, obese, non-tender, non-distended, no masses, no guarding, no     rebound, positive BS. Extremity: Trace to 1+ lower extremity/dependent edema, no cyanosis   Skin: No rashes or lesions.

## 2018-04-27 NOTE — PROCEDURES
[facility]  Neurointerventional Surgery Operative Report  Jimtoñoe Larger Patient Status:  Inpatient    1950 MRN [id number]   [facility] 6NE-A Attending Sherry Rodriguez MD   Hosp Day # 1 PCP Steven Farr MD     Procedure:  Ce procedure well. Disposition: ICU - extubated and stable. Condition: stable    Plan: The patient will be sent directly to the [facility].       Cristofer Holly MD  4/27/2018

## 2018-04-28 ENCOUNTER — APPOINTMENT (OUTPATIENT)
Dept: CT IMAGING | Facility: HOSPITAL | Age: 68
DRG: 252 | End: 2018-04-28
Attending: Other
Payer: MEDICARE

## 2018-04-28 ENCOUNTER — APPOINTMENT (OUTPATIENT)
Dept: ULTRASOUND IMAGING | Facility: HOSPITAL | Age: 68
DRG: 252 | End: 2018-04-28
Attending: NURSE PRACTITIONER
Payer: MEDICARE

## 2018-04-28 ENCOUNTER — APPOINTMENT (OUTPATIENT)
Dept: CT IMAGING | Facility: HOSPITAL | Age: 68
DRG: 252 | End: 2018-04-28
Attending: NURSE PRACTITIONER
Payer: MEDICARE

## 2018-04-28 ENCOUNTER — APPOINTMENT (OUTPATIENT)
Dept: MRI IMAGING | Facility: HOSPITAL | Age: 68
DRG: 252 | End: 2018-04-28
Attending: Other
Payer: MEDICARE

## 2018-04-28 ENCOUNTER — APPOINTMENT (OUTPATIENT)
Dept: GENERAL RADIOLOGY | Facility: HOSPITAL | Age: 68
DRG: 252 | End: 2018-04-28
Attending: INTERNAL MEDICINE
Payer: MEDICARE

## 2018-04-28 LAB
ARTERIAL BLD GAS O2 SATURATION: 88 % (ref 92–100)
ARTERIAL BLD GAS O2 SATURATION: 95 % (ref 92–100)
ARTERIAL BLD GAS O2 SATURATION: 96 % (ref 92–100)
ARTERIAL BLOOD GAS BASE EXCESS: -5.4
ARTERIAL BLOOD GAS BASE EXCESS: -6.1
ARTERIAL BLOOD GAS BASE EXCESS: -6.9
ARTERIAL BLOOD GAS HCO3: 17.9 MEQ/L (ref 22–26)
ARTERIAL BLOOD GAS HCO3: 18.9 MEQ/L (ref 22–26)
ARTERIAL BLOOD GAS HCO3: 19.5 MEQ/L (ref 22–26)
ARTERIAL BLOOD GAS PCO2: 33 MM HG (ref 35–45)
ARTERIAL BLOOD GAS PCO2: 33 MM HG (ref 35–45)
ARTERIAL BLOOD GAS PCO2: 39 MM HG (ref 35–45)
ARTERIAL BLOOD GAS PH: 7.32 (ref 7.35–7.45)
ARTERIAL BLOOD GAS PH: 7.35 (ref 7.35–7.45)
ARTERIAL BLOOD GAS PH: 7.37 (ref 7.35–7.45)
ARTERIAL BLOOD GAS PO2: 59 MM HG (ref 80–105)
ARTERIAL BLOOD GAS PO2: 87 MM HG (ref 80–105)
ARTERIAL BLOOD GAS PO2: 92 MM HG (ref 80–105)
BASOPHILS # BLD AUTO: 0.02 X10(3) UL (ref 0–0.1)
BASOPHILS NFR BLD AUTO: 0.2 %
BUN BLD-MCNC: 19 MG/DL (ref 8–20)
CALCIUM BLD-MCNC: 7.3 MG/DL (ref 8.3–10.3)
CALCULATED O2 SATURATION: 87 % (ref 92–100)
CALCULATED O2 SATURATION: 96 % (ref 92–100)
CALCULATED O2 SATURATION: 97 % (ref 92–100)
CARBOXYHEMOGLOBIN: 1.2 % SAT (ref 0–3)
CARBOXYHEMOGLOBIN: 1.4 % SAT (ref 0–3)
CARBOXYHEMOGLOBIN: 1.4 % SAT (ref 0–3)
CHLORIDE: 113 MMOL/L (ref 101–111)
CO2: 21 MMOL/L (ref 22–32)
CREAT BLD-MCNC: 1.04 MG/DL (ref 0.55–1.02)
EOSINOPHIL # BLD AUTO: 0 X10(3) UL (ref 0–0.3)
EOSINOPHIL NFR BLD AUTO: 0 %
ERYTHROCYTE [DISTWIDTH] IN BLOOD BY AUTOMATED COUNT: 14.1 % (ref 11.5–16)
EXPIRATORY PRESSURE: 6 CM H2O
EXPIRATORY PRESSURE: 6 CM H2O
FIO2: 50 %
FIO2: 80 %
GLUCOSE BLD-MCNC: 158 MG/DL (ref 65–99)
GLUCOSE BLD-MCNC: 227 MG/DL (ref 70–99)
GLUCOSE BLD-MCNC: 236 MG/DL (ref 65–99)
GLUCOSE BLD-MCNC: 248 MG/DL (ref 65–99)
GLUCOSE BLD-MCNC: 269 MG/DL (ref 65–99)
GLUCOSE BLD-MCNC: 334 MG/DL (ref 65–99)
HAV IGM SER QL: 1.6 MG/DL (ref 1.8–2.5)
HCT VFR BLD AUTO: 35.1 % (ref 34–50)
HGB BLD-MCNC: 11.9 G/DL (ref 12–16)
IMMATURE GRANULOCYTE COUNT: 0.03 X10(3) UL (ref 0–1)
IMMATURE GRANULOCYTE RATIO %: 0.3 %
INSP PRESSURE: 12 CM H2O
INSP PRESSURE: 12 CM H2O
L/M: 15 L/MIN
LYMPHOCYTES # BLD AUTO: 1.1 X10(3) UL (ref 0.9–4)
LYMPHOCYTES NFR BLD AUTO: 10.5 %
MCH RBC QN AUTO: 29.8 PG (ref 27–33.2)
MCHC RBC AUTO-ENTMCNC: 33.9 G/DL (ref 31–37)
MCV RBC AUTO: 88 FL (ref 81–100)
METHEMOGLOBIN: 0.5 % SAT (ref 0.4–1.5)
METHEMOGLOBIN: 0.6 % SAT (ref 0.4–1.5)
METHEMOGLOBIN: 0.6 % SAT (ref 0.4–1.5)
MONOCYTES # BLD AUTO: 0.71 X10(3) UL (ref 0.1–1)
MONOCYTES NFR BLD AUTO: 6.8 %
NEUTROPHIL ABS PRELIM: 8.61 X10 (3) UL (ref 1.3–6.7)
NEUTROPHILS # BLD AUTO: 8.61 X10(3) UL (ref 1.3–6.7)
NEUTROPHILS NFR BLD AUTO: 82.2 %
P/F RATIO: 107.9 MMHG
P/F RATIO: 186.1 MMHG
P/F RATIO: 277 MMHG
PATIENT TEMPERATURE: 98.2 F
PATIENT TEMPERATURE: 98.9 F
PATIENT TEMPERATURE: 98.9 F
PLATELET # BLD AUTO: 151 10(3)UL (ref 150–450)
POTASSIUM SERPL-SCNC: 3.6 MMOL/L (ref 3.6–5.1)
RBC # BLD AUTO: 3.99 X10(6)UL (ref 3.8–5.1)
RED CELL DISTRIBUTION WIDTH-SD: 45.1 FL (ref 35.1–46.3)
SODIUM SERPL-SCNC: 142 MMOL/L (ref 136–144)
TOTAL HEMOGLOBIN: 11.7 G/DL (ref 11.7–16)
TOTAL HEMOGLOBIN: 12 G/DL (ref 11.7–16)
TOTAL HEMOGLOBIN: 12.4 G/DL (ref 11.7–16)
VENT RATE: 16 /MIN
WBC # BLD AUTO: 10.5 X10(3) UL (ref 4–13)

## 2018-04-28 PROCEDURE — 99233 SBSQ HOSP IP/OBS HIGH 50: CPT | Performed by: HOSPITALIST

## 2018-04-28 PROCEDURE — 71045 X-RAY EXAM CHEST 1 VIEW: CPT | Performed by: INTERNAL MEDICINE

## 2018-04-28 PROCEDURE — B548ZZA ULTRASONOGRAPHY OF SUPERIOR VENA CAVA, GUIDANCE: ICD-10-PCS | Performed by: INTERNAL MEDICINE

## 2018-04-28 PROCEDURE — 70450 CT HEAD/BRAIN W/O DYE: CPT | Performed by: NURSE PRACTITIONER

## 2018-04-28 PROCEDURE — 02HV33Z INSERTION OF INFUSION DEVICE INTO SUPERIOR VENA CAVA, PERCUTANEOUS APPROACH: ICD-10-PCS | Performed by: INTERNAL MEDICINE

## 2018-04-28 PROCEDURE — 93880 EXTRACRANIAL BILAT STUDY: CPT | Performed by: NURSE PRACTITIONER

## 2018-04-28 PROCEDURE — 70551 MRI BRAIN STEM W/O DYE: CPT | Performed by: OTHER

## 2018-04-28 RX ORDER — DOCUSATE SODIUM 100 MG/1
100 CAPSULE, LIQUID FILLED ORAL 2 TIMES DAILY
Status: DISCONTINUED | OUTPATIENT
Start: 2018-04-28 | End: 2018-04-30

## 2018-04-28 RX ORDER — FAMOTIDINE 10 MG/ML
20 INJECTION, SOLUTION INTRAVENOUS DAILY
Status: DISCONTINUED | OUTPATIENT
Start: 2018-04-28 | End: 2018-05-11

## 2018-04-28 RX ORDER — SODIUM PHOSPHATE, DIBASIC AND SODIUM PHOSPHATE, MONOBASIC 7; 19 G/133ML; G/133ML
1 ENEMA RECTAL ONCE AS NEEDED
Status: DISCONTINUED | OUTPATIENT
Start: 2018-04-28 | End: 2018-05-18

## 2018-04-28 RX ORDER — SODIUM CHLORIDE 9 MG/ML
INJECTION, SOLUTION INTRAVENOUS CONTINUOUS
Status: DISCONTINUED | OUTPATIENT
Start: 2018-04-28 | End: 2018-04-28

## 2018-04-28 RX ORDER — ASPIRIN 300 MG
300 SUPPOSITORY, RECTAL RECTAL DAILY
Status: DISCONTINUED | OUTPATIENT
Start: 2018-04-28 | End: 2018-04-30

## 2018-04-28 RX ORDER — PHENYLEPHRINE HCL IN 0.9% NACL 50MG/250ML
PLASTIC BAG, INJECTION (ML) INTRAVENOUS CONTINUOUS PRN
Status: DISCONTINUED | OUTPATIENT
Start: 2018-04-28 | End: 2018-05-03

## 2018-04-28 RX ORDER — METOCLOPRAMIDE HYDROCHLORIDE 5 MG/ML
10 INJECTION INTRAMUSCULAR; INTRAVENOUS EVERY 8 HOURS PRN
Status: DISCONTINUED | OUTPATIENT
Start: 2018-04-28 | End: 2018-05-03

## 2018-04-28 RX ORDER — BISACODYL 10 MG
10 SUPPOSITORY, RECTAL RECTAL
Status: DISCONTINUED | OUTPATIENT
Start: 2018-04-28 | End: 2018-05-18

## 2018-04-28 RX ORDER — CLOPIDOGREL BISULFATE 75 MG/1
75 TABLET ORAL DAILY
Status: DISCONTINUED | OUTPATIENT
Start: 2018-04-28 | End: 2018-05-18

## 2018-04-28 RX ORDER — DEXMEDETOMIDINE HYDROCHLORIDE 4 UG/ML
INJECTION, SOLUTION INTRAVENOUS CONTINUOUS
Status: DISCONTINUED | OUTPATIENT
Start: 2018-04-28 | End: 2018-05-03

## 2018-04-28 RX ORDER — MAGNESIUM SULFATE HEPTAHYDRATE 40 MG/ML
2 INJECTION, SOLUTION INTRAVENOUS ONCE
Status: COMPLETED | OUTPATIENT
Start: 2018-04-28 | End: 2018-04-28

## 2018-04-28 RX ORDER — FUROSEMIDE 10 MG/ML
20 INJECTION INTRAMUSCULAR; INTRAVENOUS ONCE
Status: COMPLETED | OUTPATIENT
Start: 2018-04-28 | End: 2018-04-28

## 2018-04-28 RX ORDER — FAMOTIDINE 20 MG/1
20 TABLET ORAL DAILY
Status: DISCONTINUED | OUTPATIENT
Start: 2018-04-28 | End: 2018-05-18

## 2018-04-28 RX ORDER — SENNOSIDES 8.6 MG
17.2 TABLET ORAL NIGHTLY
Status: DISCONTINUED | OUTPATIENT
Start: 2018-04-28 | End: 2018-05-08

## 2018-04-28 RX ORDER — SODIUM CHLORIDE 0.9 % (FLUSH) 0.9 %
10 SYRINGE (ML) INJECTION AS NEEDED
Status: DISCONTINUED | OUTPATIENT
Start: 2018-04-28 | End: 2018-05-18

## 2018-04-28 RX ORDER — ONDANSETRON 2 MG/ML
4 INJECTION INTRAMUSCULAR; INTRAVENOUS EVERY 6 HOURS PRN
Status: DISCONTINUED | OUTPATIENT
Start: 2018-04-28 | End: 2018-05-18

## 2018-04-28 RX ORDER — POLYETHYLENE GLYCOL 3350 17 G/17G
17 POWDER, FOR SOLUTION ORAL DAILY PRN
Status: DISCONTINUED | OUTPATIENT
Start: 2018-04-28 | End: 2018-05-18

## 2018-04-28 RX ORDER — EPTIFIBATIDE 0.75 MG/ML
2 INJECTION, SOLUTION INTRAVENOUS CONTINUOUS
Status: DISCONTINUED | OUTPATIENT
Start: 2018-04-28 | End: 2018-04-28

## 2018-04-28 RX ORDER — ASPIRIN 325 MG
325 TABLET, DELAYED RELEASE (ENTERIC COATED) ORAL DAILY
Status: DISCONTINUED | OUTPATIENT
Start: 2018-04-28 | End: 2018-04-30

## 2018-04-28 NOTE — PLAN OF CARE
Assumed care of this patient at [address] [place] [address]. S/p Code CVA with TPA and 2 stents placed to RCA in neuro intervention lab. Neuro checks are every 30 minutes until 00, following every hour x 16 hours per protocol. Patient is drowsy, yet oriented x 4.  Pupils are [place] the night.

## 2018-04-28 NOTE — RESPIRATORY THERAPY NOTE
Pt seen this afternoon for re-insertion of arterial line after being dislodged. Pt is currently on continuous bipap, precidex and cardine drip requiring continuous BP monitoring.  Sterile field was prepped and 20g arrow arterial line placed into the right r

## 2018-04-28 NOTE — PROGRESS NOTES
[facility]  Progress Note    Kristofer Vasquez Patient Status:  Inpatient    1950 MRN [id number]   [facility] 6NE-A Attending Royal Blount MD   Hosp Day # 2 PCP Xu Alcaraz MD     Subjective:  Kristofer Vasquez is a(n) 75 y reviewed with poor film however mild increase interstitial edema is noted some chronicity to left  Diaphragmatic silhouetting  Recent Labs   Lab  04/28/18   0842   ABGPHT  7.35   TVULGR0F  33*   [id number]  92   ABGHCO3  17.9*   ABGBE  -6.9   TEMP  98.2   ALL

## 2018-04-28 NOTE — PLAN OF CARE
Pt. Follows commands, became agitated at about 1700 precedex drip started, family at bedside, vitals stable, repeat head ct completed, no complaints of pain, will continue to monitor.

## 2018-04-28 NOTE — PROGRESS NOTES
[facility]  Interventional Neuroradiology Progress Note    Jose Officer Patient Status:  Inpatient    1950 MRN [id number]   [facility] 6NE-A Attending Radha Alves MD   Hosp Day # 2 PCP Saima Sanderson MD       Subjective: Subcutaneous 4 times per day   • Metoprolol Succinate ER  50 mg Oral Daily Beta Blocker   • insulin detemir  6 Units Subcutaneous [email or web address]   • nicotine  1 patch Transdermal Daily         Labs:    Lab Results  Component Value Date   WBC 10.5 04/28/2018

## 2018-04-28 NOTE — PLAN OF CARE
CARDIOVASCULAR - ADULT    • Maintains optimal cardiac output and hemodynamic stability Progressing    • Absence of cardiac arrhythmias or at baseline Progressing        GASTROINTESTINAL - ADULT    • Minimal or absence of nausea and vomiting Progressing per recent orders. Neuro checks currently Q30 minutes. See assessment for complete details. Right groin remains intact. Left arm from hand to elbow swollen/red/purple upon arrival to unit. Hematoma noted to left upper arm. MD notified.  No further orders re

## 2018-04-28 NOTE — SLP NOTE
Attempted this morning to see patient for evaluation, however, patient currently on BiPap. Will evaluate when patient medically appropriate.

## 2018-04-28 NOTE — SLP NOTE
ADULT SWALLOWING EVALUATION    ASSESSMENT    ASSESSMENT/OVERALL IMPRESSION:  RNMalachi paged to have swallow evaluated as ABG is favorable. Patient was switched from BiPap to 10L NC at this time. Patient required constant verbal cues to keep eyes open. precautions posted HOB. Recommendations/POC:  1.   If acceptable with MD, initiate puree diet (NO eggs, cottage cheese or jello) with nectar-thick liquids using aspiration precautions outlined below with assistance and supervision with all PO due to impu HEART FAILURE 3/2013    EF 35-40 %   • COPD    • Diabetes mellitus ([facility] [address]    • Disorder of liver     \"fatty liver\"   • Fatigue    • HEART ATTACK 2/28/14    Acute mi and stents   • High blood pressure    • High cholesterol    • History of blood transfusion Presentation: Self presentation;Staff/Clinician assistance;Spoon;Cup;Single sips  Patient Positioning: Upright;Midline    Oral Phase of Swallow: Impaired  Bolus Retrieval: Intact  Bilabial Seal: Impaired  Bolus Formation: Impaired  Bolus Propulsion: Impair Visits to Meet Established Goals: 7  Follow Up Needed: Yes  SLP Follow-up Date: 04/29/18    Thank you for your referral.   If you have any questions, please contact Ange Guzmán

## 2018-04-28 NOTE — PROGRESS NOTES
[facility]  Progress Note     Erica Darnell Patient Status:  Inpatient    1950 MRN [id number]   [facility] 4NW-A Attending Amy Toure MD   Hosp Day # 2 PCP Regi Jauregui MD     Chief Complaint: Dysphagia     Signific 113*   CO2  23.0  25.0  21.0*  21.0*   ALKPHO  82   --    --    --    AST  30   --    --    --    ALT  35   --    --    --    BILT  0.5   --    --    --    TP  8.6*   --    --    --        Estimated Creatinine Clearance: 46.6 mL/min (A) (based on SCr of 1.

## 2018-04-28 NOTE — PROGRESS NOTES
[facility]  Neuro Critical Care Progress Note    Tariq Bean Patient Status:  Inpatient    1950 MRN [id number]   [facility] 6NE-A Attending Marv Abreu MD   Hosp Day # 2 PCP Maciel Turk MD     Subjective:    Object mL, 3 mL, Nebulization, 6 times per day  •  CefTRIAXone Sodium (ROCEPHIN) 1 g in sodium chloride 0.9 % 100 mL MBP/ADD-vantage, 1 g, Intravenous, Q24H  •  Insulin Aspart Pen (NOVOLOG) 100 UNIT/ML flexpen 1-10 Units, 1-10 Units, Subcutaneous, 4 times per day protocol. Check left arm hematoma with each assessment  - Maintain -130, monitor for signs of hyperperfusion syndrome.  Use IV labetalol or cardene drip as needed  - CT head tomorrow morning  - Switching to Plavix and ASA  - She is allergic to lipito

## 2018-04-29 ENCOUNTER — APPOINTMENT (OUTPATIENT)
Dept: CT IMAGING | Facility: HOSPITAL | Age: 68
DRG: 252 | End: 2018-04-29
Attending: Other
Payer: MEDICARE

## 2018-04-29 ENCOUNTER — APPOINTMENT (OUTPATIENT)
Dept: GENERAL RADIOLOGY | Facility: HOSPITAL | Age: 68
DRG: 252 | End: 2018-04-29
Attending: INTERNAL MEDICINE
Payer: MEDICARE

## 2018-04-29 LAB
ALBUMIN SERPL-MCNC: 2.6 G/DL (ref 3.5–4.8)
ALP LIVER SERPL-CCNC: 53 U/L (ref 55–142)
ALT SERPL-CCNC: 22 U/L (ref 14–54)
ARTERIAL BLD GAS O2 SATURATION: 94 % (ref 92–100)
ARTERIAL BLOOD GAS BASE EXCESS: -5.6
ARTERIAL BLOOD GAS HCO3: 17.8 MEQ/L (ref 22–26)
ARTERIAL BLOOD GAS PCO2: 29 MM HG (ref 35–45)
ARTERIAL BLOOD GAS PH: 7.42 (ref 7.35–7.45)
ARTERIAL BLOOD GAS PO2: 73 MM HG (ref 80–105)
AST SERPL-CCNC: 47 U/L (ref 15–41)
BASOPHILS # BLD AUTO: 0.03 X10(3) UL (ref 0–0.1)
BASOPHILS NFR BLD AUTO: 0.3 %
BILIRUB SERPL-MCNC: 0.4 MG/DL (ref 0.1–2)
BUN BLD-MCNC: 25 MG/DL (ref 8–20)
CALCIUM BLD-MCNC: 7.9 MG/DL (ref 8.3–10.3)
CALCULATED O2 SATURATION: 94 % (ref 92–100)
CARBOXYHEMOGLOBIN: 1.3 % SAT (ref 0–3)
CHLORIDE: 115 MMOL/L (ref 101–111)
CO2: 21 MMOL/L (ref 22–32)
CREAT BLD-MCNC: 1.09 MG/DL (ref 0.55–1.02)
EOSINOPHIL # BLD AUTO: 0 X10(3) UL (ref 0–0.3)
EOSINOPHIL NFR BLD AUTO: 0 %
ERYTHROCYTE [DISTWIDTH] IN BLOOD BY AUTOMATED COUNT: 14.5 % (ref 11.5–16)
EXPIRATORY PRESSURE: 6 CM H2O
FIO2: 80 %
GLUCOSE BLD-MCNC: 201 MG/DL (ref 65–99)
GLUCOSE BLD-MCNC: 220 MG/DL (ref 65–99)
GLUCOSE BLD-MCNC: 244 MG/DL (ref 70–99)
GLUCOSE BLD-MCNC: 255 MG/DL (ref 65–99)
GLUCOSE BLD-MCNC: 262 MG/DL (ref 65–99)
GLUCOSE BLD-MCNC: 262 MG/DL (ref 65–99)
HAV IGM SER QL: 2.4 MG/DL (ref 1.8–2.5)
HCT VFR BLD AUTO: 33.1 % (ref 34–50)
HGB BLD-MCNC: 10.8 G/DL (ref 12–16)
IMMATURE GRANULOCYTE COUNT: 0.04 X10(3) UL (ref 0–1)
IMMATURE GRANULOCYTE RATIO %: 0.4 %
INSP PRESSURE: 12 CM H2O
LYMPHOCYTES # BLD AUTO: 1.12 X10(3) UL (ref 0.9–4)
LYMPHOCYTES NFR BLD AUTO: 9.9 %
M PROTEIN MFR SERPL ELPH: 6 G/DL (ref 6.1–8.3)
MCH RBC QN AUTO: 28.8 PG (ref 27–33.2)
MCHC RBC AUTO-ENTMCNC: 32.6 G/DL (ref 31–37)
MCV RBC AUTO: 88.3 FL (ref 81–100)
METHEMOGLOBIN: 0.5 % SAT (ref 0.4–1.5)
MONOCYTES # BLD AUTO: 0.99 X10(3) UL (ref 0.1–1)
MONOCYTES NFR BLD AUTO: 8.8 %
NEUTROPHIL ABS PRELIM: 9.1 X10 (3) UL (ref 1.3–6.7)
NEUTROPHILS # BLD AUTO: 9.1 X10(3) UL (ref 1.3–6.7)
NEUTROPHILS NFR BLD AUTO: 80.6 %
P/F RATIO: 335.5 MMHG
PATIENT TEMPERATURE: 99.5 F
PHOSPHATE SERPL-MCNC: 2.1 MG/DL (ref 2.5–4.9)
PLATELET # BLD AUTO: 138 10(3)UL (ref 150–450)
POTASSIUM SERPL-SCNC: 3.8 MMOL/L (ref 3.6–5.1)
RBC # BLD AUTO: 3.75 X10(6)UL (ref 3.8–5.1)
RED CELL DISTRIBUTION WIDTH-SD: 46.6 FL (ref 35.1–46.3)
SODIUM SERPL-SCNC: 144 MMOL/L (ref 136–144)
TOTAL HEMOGLOBIN: 10.7 G/DL (ref 11.7–16)
WBC # BLD AUTO: 11.3 X10(3) UL (ref 4–13)

## 2018-04-29 PROCEDURE — 70496 CT ANGIOGRAPHY HEAD: CPT | Performed by: OTHER

## 2018-04-29 PROCEDURE — 99291 CRITICAL CARE FIRST HOUR: CPT | Performed by: OTHER

## 2018-04-29 PROCEDURE — 70498 CT ANGIOGRAPHY NECK: CPT | Performed by: OTHER

## 2018-04-29 PROCEDURE — 71045 X-RAY EXAM CHEST 1 VIEW: CPT | Performed by: INTERNAL MEDICINE

## 2018-04-29 PROCEDURE — 99233 SBSQ HOSP IP/OBS HIGH 50: CPT | Performed by: HOSPITALIST

## 2018-04-29 RX ORDER — FUROSEMIDE 10 MG/ML
40 INJECTION INTRAMUSCULAR; INTRAVENOUS ONCE
Status: COMPLETED | OUTPATIENT
Start: 2018-04-29 | End: 2018-04-29

## 2018-04-29 RX ORDER — ACETAMINOPHEN 10 MG/ML
1000 INJECTION, SOLUTION INTRAVENOUS EVERY 6 HOURS PRN
Status: DISCONTINUED | OUTPATIENT
Start: 2018-04-29 | End: 2018-04-29

## 2018-04-29 RX ORDER — BUDESONIDE 0.5 MG/2ML
0.5 INHALANT ORAL
Status: DISCONTINUED | OUTPATIENT
Start: 2018-04-29 | End: 2018-05-18

## 2018-04-29 RX ORDER — POTASSIUM CHLORIDE 14.9 MG/ML
20 INJECTION INTRAVENOUS ONCE
Status: COMPLETED | OUTPATIENT
Start: 2018-04-29 | End: 2018-04-29

## 2018-04-29 NOTE — PLAN OF CARE
Pt. Follows commands, no complaints of pain, family at bedside, cardene and precedex drips infusing, vitals stable, will continue to monitor.

## 2018-04-29 NOTE — PROGRESS NOTES
[facility]  Interventional Neuroradiology Progress Note    Lu Schneider Patient Status:  Inpatient    1950 MRN [id number]   [facility] 6NE-A Attending Sigrid Sheldon MD   Hosp Day # 3 PCP Cruzito Ireland MD       Subjective: [email or web address]   • piperacillin-tazobactam  3.375 g Intravenous Q8H   • Clopidogrel Bisulfate  75 mg Oral Daily   • aspirin EC  325 mg Oral Daily    Or   • aspirin  300 mg Rectal Daily   • Senna  17.2 mg Oral Nightly   • docusate sodium  100 mg Oral BID   • Systolic Velocity:  80.62 cm/s              Right ICA/CCA Velocity Ratio:  4.9                              Left CCA Peak Systolic Velocity:  912.73 cm/s              Left Proximal ICA Peak Systolic Velocity:  359.98 cm/s              Left Mid ICA Peak Sys

## 2018-04-29 NOTE — PROGRESS NOTES
[facility]  Progress Note     Tatyana Rayo Patient Status:  Inpatient    1950 MRN [id number]   [facility] 6NE-A Attending Jreson Mancuso MD   Hosp Day # 3 PCP Babita Lau MD     Chief Complaint: CVA  S:  Moving right 22   BILT  0.5   --    --    --   0.4   TP  8.6*   --    --    --   6.0*    < > = values in this interval not displayed. No results for input(s): PTP, INR in the last 72 hours.   Recent Labs   Lab  04/26/18   1640   TROP  <0.046        Imaging: Imaging

## 2018-04-29 NOTE — PLAN OF CARE
Assumed care of this patient at [address] [place] [address]. Patient is drowsy, yet oriented x 4. Precedex gtt infusing for restlessness and mild agitation. Neuro checks every 2 hours per protocol.  MRI Brain completed this evening, critical results reported to [facility], [facility]

## 2018-04-29 NOTE — PHYSICAL THERAPY NOTE
Orders received and hx and chart reviewed. Pt is currently sedated and on BIPAP and RN states pt not appropriate for therapy at this point. Will attempt again as schedule allows. Thank you.

## 2018-04-29 NOTE — SLP NOTE
Attempted to see patient today for follow-up after clinical bedside swallow exam. She was on BiPap yesterday, then was switched to nasal cannula and RN requested swallow evaluation.   She was impulsive during eval and required verbal cues and physical deon

## 2018-04-29 NOTE — PROGRESS NOTES
[facility]  Progress Note    Harrison Zelaya Patient Status:  Inpatient    1950 MRN [id number]   [facility] 6NE-A Attending Yojana Smith MD   Hosp Day # 3 PCP Jhony Navarrete MD     Subjective:  Harrison Zelaya is a(n) 75 ye BUN 29* 24* 18 19 25*   CREATSERUM 1.96* 1.06* 0.93 1.04* 1.09*     No results for input(s): PTP, INR, PTT in the last 72 hours.     Cultures: Urine with E. coli    Radiology:  Chest x-ray with increasing right basilar infiltrate and left perihilar  Evide mellitus  9. COPD  10. Hepato-steatosis/fatty liver - Questionable ascites  11. PATRICIA  12. Nutrition currently unable to take p.o.     Plan at this time to begin diuresis as her weight has increased 4 pounds  Plan to broaden antibiotic coverage for better jeannie

## 2018-04-29 NOTE — OCCUPATIONAL THERAPY NOTE
Attempted to see pt for skilled OT services this date. Pt is currently on Bi-Pap and sedated. Pt's RN reporting that pt is not yet appropriate for skilled therapy intervention. Will re-attempt as schedule allows.  Emilia Bustillos, 04/29/18, 7:15 AM

## 2018-04-30 ENCOUNTER — APPOINTMENT (OUTPATIENT)
Dept: GENERAL RADIOLOGY | Facility: HOSPITAL | Age: 68
DRG: 252 | End: 2018-04-30
Attending: NURSE PRACTITIONER
Payer: MEDICARE

## 2018-04-30 ENCOUNTER — APPOINTMENT (OUTPATIENT)
Dept: ULTRASOUND IMAGING | Facility: HOSPITAL | Age: 68
DRG: 252 | End: 2018-04-30
Attending: INTERNAL MEDICINE
Payer: MEDICARE

## 2018-04-30 ENCOUNTER — APPOINTMENT (OUTPATIENT)
Dept: GENERAL RADIOLOGY | Facility: HOSPITAL | Age: 68
DRG: 252 | End: 2018-04-30
Attending: INTERNAL MEDICINE
Payer: MEDICARE

## 2018-04-30 ENCOUNTER — APPOINTMENT (OUTPATIENT)
Dept: CT IMAGING | Facility: HOSPITAL | Age: 68
DRG: 252 | End: 2018-04-30
Attending: Other
Payer: MEDICARE

## 2018-04-30 LAB
ALBUMIN SERPL-MCNC: 2.5 G/DL (ref 3.5–4.8)
ALP LIVER SERPL-CCNC: 54 U/L (ref 55–142)
ALT SERPL-CCNC: 21 U/L (ref 14–54)
ARTERIAL BLD GAS O2 SATURATION: 94 % (ref 92–100)
ARTERIAL BLD GAS O2 SATURATION: 94 % (ref 92–100)
ARTERIAL BLOOD GAS BASE EXCESS: -4.2
ARTERIAL BLOOD GAS BASE EXCESS: -4.6
ARTERIAL BLOOD GAS HCO3: 17.8 MEQ/L (ref 22–26)
ARTERIAL BLOOD GAS HCO3: 18.1 MEQ/L (ref 22–26)
ARTERIAL BLOOD GAS PCO2: 25 MM HG (ref 35–45)
ARTERIAL BLOOD GAS PCO2: 25 MM HG (ref 35–45)
ARTERIAL BLOOD GAS PH: 7.47 (ref 7.35–7.45)
ARTERIAL BLOOD GAS PH: 7.47 (ref 7.35–7.45)
ARTERIAL BLOOD GAS PO2: 67 MM HG (ref 80–105)
ARTERIAL BLOOD GAS PO2: 67 MM HG (ref 80–105)
AST SERPL-CCNC: 37 U/L (ref 15–41)
BASOPHILS # BLD AUTO: 0.04 X10(3) UL (ref 0–0.1)
BASOPHILS NFR BLD AUTO: 0.3 %
BETA NATRIURETIC PEPTIDE: 1103 PG/ML (ref 2–99)
BILIRUB SERPL-MCNC: 0.6 MG/DL (ref 0.1–2)
BUN BLD-MCNC: 31 MG/DL (ref 8–20)
CALCIUM BLD-MCNC: 8 MG/DL (ref 8.3–10.3)
CALCULATED O2 SATURATION: 94 % (ref 92–100)
CALCULATED O2 SATURATION: 94 % (ref 92–100)
CARBOXYHEMOGLOBIN: 1.4 % SAT (ref 0–3)
CARBOXYHEMOGLOBIN: 1.4 % SAT (ref 0–3)
CHLORIDE: 118 MMOL/L (ref 101–111)
CO2: 19 MMOL/L (ref 22–32)
CREAT BLD-MCNC: 1.1 MG/DL (ref 0.55–1.02)
EOSINOPHIL # BLD AUTO: 0 X10(3) UL (ref 0–0.3)
EOSINOPHIL NFR BLD AUTO: 0 %
ERYTHROCYTE [DISTWIDTH] IN BLOOD BY AUTOMATED COUNT: 14.9 % (ref 11.5–16)
EXPIRATORY PRESSURE: 6 CM H2O
EXPIRATORY PRESSURE: 7 CM H2O
FIO2: 60 %
FIO2: 70 %
GLUCOSE BLD-MCNC: 125 MG/DL (ref 65–99)
GLUCOSE BLD-MCNC: 142 MG/DL (ref 70–99)
GLUCOSE BLD-MCNC: 144 MG/DL (ref 65–99)
GLUCOSE BLD-MCNC: 153 MG/DL (ref 65–99)
GLUCOSE BLD-MCNC: 160 MG/DL (ref 65–99)
GLUCOSE BLD-MCNC: 231 MG/DL (ref 65–99)
HAV IGM SER QL: 2.5 MG/DL (ref 1.8–2.5)
HCT VFR BLD AUTO: 30.9 % (ref 34–50)
HGB BLD-MCNC: 10.3 G/DL (ref 12–16)
IMMATURE GRANULOCYTE COUNT: 0.06 X10(3) UL (ref 0–1)
IMMATURE GRANULOCYTE RATIO %: 0.5 %
INSP PRESSURE: 12 CM H2O
IONIZED CALCIUM: 1.1 MMOL/L (ref 1.12–1.32)
LACTIC ACID ARTERIAL: 1.5 MMOL/L (ref 0.5–2)
LYMPHOCYTES # BLD AUTO: 0.97 X10(3) UL (ref 0.9–4)
LYMPHOCYTES NFR BLD AUTO: 8.2 %
M PROTEIN MFR SERPL ELPH: 6.2 G/DL (ref 6.1–8.3)
MCH RBC QN AUTO: 29.3 PG (ref 27–33.2)
MCHC RBC AUTO-ENTMCNC: 33.3 G/DL (ref 31–37)
MCV RBC AUTO: 88 FL (ref 81–100)
METHEMOGLOBIN: 0.4 % SAT (ref 0.4–1.5)
METHEMOGLOBIN: 0.5 % SAT (ref 0.4–1.5)
MONOCYTES # BLD AUTO: 1.19 X10(3) UL (ref 0.1–1)
MONOCYTES NFR BLD AUTO: 10.1 %
NEUTROPHIL ABS PRELIM: 9.56 X10 (3) UL (ref 1.3–6.7)
NEUTROPHILS # BLD AUTO: 9.56 X10(3) UL (ref 1.3–6.7)
NEUTROPHILS NFR BLD AUTO: 80.9 %
P/F RATIO: 317.9 MMHG
P/F RATIO: 320 MMHG
P2Y12 REACTION UNITS: 160 PRU
PATIENT TEMPERATURE: 98.6 F
PATIENT TEMPERATURE: 98.6 F
PHOSPHATE SERPL-MCNC: 2.7 MG/DL (ref 2.5–4.9)
PLATELET # BLD AUTO: 120 10(3)UL (ref 150–450)
POTASSIUM BLOOD GAS: 3.5 MMOL/L (ref 3.6–5.1)
POTASSIUM SERPL-SCNC: 3.1 MMOL/L (ref 3.6–5.1)
POTASSIUM SERPL-SCNC: 3.3 MMOL/L (ref 3.6–5.1)
POTASSIUM SERPL-SCNC: 3.3 MMOL/L (ref 3.6–5.1)
PROCALCITONIN SERPL-MCNC: <0.11 NG/ML (ref ?–0.11)
RBC # BLD AUTO: 3.51 X10(6)UL (ref 3.8–5.1)
RED CELL DISTRIBUTION WIDTH-SD: 47.3 FL (ref 35.1–46.3)
SODIUM BLOOD GAS: 149 MMOL/L (ref 136–144)
SODIUM SERPL-SCNC: 148 MMOL/L (ref 136–144)
TIDAL VOLUME: 450 ML
TOTAL HEMOGLOBIN: 10.1 G/DL (ref 11.7–16)
TOTAL HEMOGLOBIN: 11 G/DL (ref 11.7–16)
VENT RATE: 20 /MIN
WBC # BLD AUTO: 11.8 X10(3) UL (ref 4–13)

## 2018-04-30 PROCEDURE — 93970 EXTREMITY STUDY: CPT | Performed by: INTERNAL MEDICINE

## 2018-04-30 PROCEDURE — 71045 X-RAY EXAM CHEST 1 VIEW: CPT | Performed by: INTERNAL MEDICINE

## 2018-04-30 PROCEDURE — 99233 SBSQ HOSP IP/OBS HIGH 50: CPT | Performed by: HOSPITALIST

## 2018-04-30 PROCEDURE — 99291 CRITICAL CARE FIRST HOUR: CPT | Performed by: OTHER

## 2018-04-30 PROCEDURE — 76700 US EXAM ABDOM COMPLETE: CPT | Performed by: INTERNAL MEDICINE

## 2018-04-30 PROCEDURE — 99223 1ST HOSP IP/OBS HIGH 75: CPT | Performed by: INTERNAL MEDICINE

## 2018-04-30 PROCEDURE — 70450 CT HEAD/BRAIN W/O DYE: CPT | Performed by: OTHER

## 2018-04-30 PROCEDURE — 71045 X-RAY EXAM CHEST 1 VIEW: CPT | Performed by: NURSE PRACTITIONER

## 2018-04-30 RX ORDER — ROSUVASTATIN CALCIUM 40 MG/1
40 TABLET, COATED ORAL NIGHTLY
Status: DISCONTINUED | OUTPATIENT
Start: 2018-04-30 | End: 2018-05-18

## 2018-04-30 RX ORDER — ARIPIPRAZOLE 15 MG/1
40 TABLET ORAL EVERY 4 HOURS
Status: COMPLETED | OUTPATIENT
Start: 2018-04-30 | End: 2018-04-30

## 2018-04-30 RX ORDER — EZETIMIBE 10 MG/1
10 TABLET ORAL NIGHTLY
Status: DISCONTINUED | OUTPATIENT
Start: 2018-04-30 | End: 2018-05-18

## 2018-04-30 RX ORDER — ACETAMINOPHEN 160 MG/5ML
650 SOLUTION ORAL EVERY 6 HOURS PRN
Status: DISCONTINUED | OUTPATIENT
Start: 2018-04-30 | End: 2018-05-08

## 2018-04-30 RX ORDER — METOPROLOL TARTRATE 50 MG/1
50 TABLET, FILM COATED ORAL
Status: DISCONTINUED | OUTPATIENT
Start: 2018-04-30 | End: 2018-05-02

## 2018-04-30 RX ORDER — ASPIRIN 81 MG/1
81 TABLET ORAL DAILY
Status: DISCONTINUED | OUTPATIENT
Start: 2018-05-01 | End: 2018-05-18

## 2018-04-30 RX ORDER — FUROSEMIDE 10 MG/ML
60 INJECTION INTRAMUSCULAR; INTRAVENOUS
Status: COMPLETED | OUTPATIENT
Start: 2018-04-30 | End: 2018-05-01

## 2018-04-30 RX ORDER — AMIODARONE HYDROCHLORIDE 200 MG/1
400 TABLET ORAL 3 TIMES DAILY
Status: DISCONTINUED | OUTPATIENT
Start: 2018-04-30 | End: 2018-05-01

## 2018-04-30 RX ORDER — QUETIAPINE 25 MG/1
12.5 TABLET, FILM COATED ORAL 2 TIMES DAILY
Status: DISCONTINUED | OUTPATIENT
Start: 2018-04-30 | End: 2018-05-01

## 2018-04-30 NOTE — PLAN OF CARE
Patient drowsy,alert and oriented x3, on precedex drip at 0.5mcg/kg/h. Patient responds appropriately, her right side is very strong, right hand on soft restraints. Left arm is flaccid,swollen and ecchymotic, no sensation on her left side. Left visual field

## 2018-04-30 NOTE — OCCUPATIONAL THERAPY NOTE
OT order received. No activity order from Neuro present following changes in MRI. Spoke with RN about obtaining activity clearance prior to OT eval.  RN in agreement. Will try again later pending upgraded activity orders from Neurology.

## 2018-04-30 NOTE — CONSULTS
[facility]  Report of Consultation    Harrison Zelaya Patient Status:  Inpatient    1950 MRN [id number]   [facility] 6NE-A Attending Yojana Smith MD   Hosp Day # 4 PCP Jhony Navarrete MD     Reason for Consultation:  Hypoxia sleep apnea    • Wears glasses    • Wheezing      Past Surgical History:  No date: ANGIOGRAM  2/28/14: ANGIOPLASTY (CORONARY)      Comment: stents acute mi  No date: APPENDECTOMY  No date: BACK SURGERY  No date: BIOPSY OF SKIN LESION      Comment: hyperker (PLAVIX) tab 75 mg, 75 mg, Oral, Daily  •  Normal Saline Flush 0.9 % injection 10 mL, 10 mL, Intravenous, PRN  •  Dexmedetomidine HCl in NaCl (PRECEDEX) 400 MCG/100ML premix infusion, 0.2-1.5 mcg/kg/hr, Intravenous, Continuous  •  phenylephrine in NaCl (NE Medications:    No current outpatient prescriptions on file. Review of Systems:  See HPI; A total of 12 systems reviewed and otherwise unremarkable.     Physical Exam:  Vital signs: Blood pressure 107/47, pulse 78, temperature 98.9 °F (37.2 °C), temperat fluid component; underlying COPD  - supportive care w/ bipap  - IV lasix   - monitor weights  - monitor bladder scan- discussed w/nurse - leave tate overnight if she needs another straight cath later today  3.  HTN- controlled; bp parameters per neurlogy s

## 2018-04-30 NOTE — OCCUPATIONAL THERAPY NOTE
OCCUPATIONAL THERAPY EVALUATION - INPATIENT     Room Number: 3104/5556-L  Evaluation Date: 4/30/2018  Type of Evaluation: Initial  Presenting Problem: Vomiting and diarrhea    Physician Order: IP Consult to Occupational Therapy  Reason for Therapy: ADL/IAD \"fatty liver\"   • Fatigue    • HEART ATTACK 2/28/14    Acute mi and stents   • High blood pressure    • High cholesterol    • History of blood transfusion    • History of depression    • Leaking of urine    • Night sweats    • Peripheral vascular dis Bearing Restriction: None                PAIN ASSESSMENT  Rating: Unable to rate  Location: back  Management Techniques: Relaxation;Repositioning    COGNITION  Attention Span:  difficulty attending to directions and difficulty dividing attention  Following Total  -   Putting on and taking off regular upper body clothing?: Total  -   Taking care of personal grooming such as brushing teeth?: Total  -   Eating meals?: Total    AM-PAC Score:  Score: 6  Approx Degree of Impairment: 100%  Standardized Score (AM-PA considered high complexity. Pt is highly motivated and would would benefit from continued intensive inpatient rehab in an acute rehab setting.   This would aim to maximize independence/safety with self-care, t/f's, community mobility, and home management a

## 2018-04-30 NOTE — PHYSICAL THERAPY NOTE
PT order received. No activity order from Neuro present following changes in MRI. Spoke with RN about obtaining activity clearance prior to PT eval.  RN in agreement.   Will try again later pending upgraded activity orders from Neurology

## 2018-04-30 NOTE — PROGRESS NOTES
[facility]  Interventional Neuroradiology Progress Note    Che Galeano Patient Status:  Inpatient    1950 MRN [id number]   [facility] 6NE-A Attending Elaine Linda MD   Hosp Day # 4 PCP Steven Farr MD     Subjective:  Leonardo Ireland 3mm brisk, Full movement on the right, left eye unable to fully cross over the midline, left sided extinction, no nystagmus,   facial sensation intact, face asymmetric- left ptosis, unable to show teeth, raise eyebrows or squeeze eye shut on the left   ton Stable appearance to the right MCA infarct with hemorrhagic products and small amount of subarachnoid hemorrhage. Stable midline shift. Stable mass effect. The basal cisterns are patent.     MRI Brain w + w/out 4/28/18:  CONCLUSION:    1.  Changes wit

## 2018-04-30 NOTE — CM/SW NOTE
04/30/18 1100   CM/SW Referral Data   Referral Source Physician   Reason for Referral Discharge planning   Informant Children   Social History   Recreational Drug/Alcohol Use no   Major Changes Last 6 Months no   Domestic/Partner Violence no   Suicidal

## 2018-04-30 NOTE — PROGRESS NOTES
[facility]  Neuro Critical Care Progress Note    Che Galeano Patient Status:  Inpatient    1950 MRN [id number]   [facility] 6NE-A Attending Sherry Rodriguez MD   Hosp Day # 4 PCP Steven Farr MD     Subjective:  Waking a (PRECEDEX) 400 MCG/100ML premix infusion, 0.2-1.5 mcg/kg/hr, Intravenous, Continuous  •  phenylephrine in NaCl (BREE-SYNEPHRINE) 50 mg/250 ml premix infusion SOLN, 100-200 mcg/min, Intravenous, Continuous PRN  •  Senna (SENOKOT) tab TABS 17.2 mg, 17.2 mg, O 04/30/2018   HGB 10.3 04/30/2018   HCT 30.9 04/30/2018   .0 04/30/2018   CREATSERUM 1.10 04/30/2018   BUN 31 04/30/2018    04/30/2018   K 3.3 04/30/2018   K 3.3 04/30/2018    04/30/2018   CO2 19.0 04/30/2018    04/30/2018   CA 8.0 pulmonary congestion     GI  GI Prophylaxis and Bowel regimen ordered    Heme/ID:  - afebrile and culture if temp >100.5 F  - Monitor H/H with goal hemoglobin more than 7gm/dl    Endocrine:  - hyperglycemia protocol    Skin:  - Monitor for skin breakdown.

## 2018-04-30 NOTE — PROGRESS NOTES
[facility]  Progress Note     Thea Fus Patient Status:  Inpatient    1950 MRN [id number]   [facility] 6NE-A Attending Douglas Alvarez MD   Hosp Day # 4 PCP Flakito Mccarty MD     Chief Complaint: CVA  S:    On BIPAP, TROP, CK in the last 72 hours. Imaging: Imaging data reviewed in Epic.   Medications:   • ezetimibe  10 mg Oral Nightly   • furosemide  60 mg Intravenous BID (Diuretic)   • insulin detemir  18 Units Subcutaneous [email or web address]   • QUEtiapine Fumarate  12 15. FEN- NPO since admission  Mostly, now w/ respir compromise   16.  Tobacco dependency  - 1 ppd, Nicoderm patch     PLAN;  US abd - P   Cards consult for AF   On zosyn   plts lower 120K   Seroquel to relax   precedex  Drip wean   New a line   For Bp ass

## 2018-04-30 NOTE — SLP NOTE
Attempted follow up, patient remains on BiPAP and not appropriate. Will continue to follow up as appropriate. Discussed with SUKHJINDER.     Margo Wilson Jay 87 CCC-SLP  Pager [phone]

## 2018-04-30 NOTE — PLAN OF CARE
Safety Risk - Non-Violent Restraints    • Patient will remain free from self-harm Completed          CARDIOVASCULAR - ADULT    • Maintains optimal cardiac output and hemodynamic stability Progressing    • Absence of cardiac arrhythmias or at baseline Progr level or patient's stated pain goal Progressing        RESPIRATORY - ADULT    • Achieves optimal ventilation and oxygenation Progressing        RISK FOR INFECTION - ADULT    • Absence of fever/infection during anticipated neutropenic period Progressing

## 2018-04-30 NOTE — PHYSICAL THERAPY NOTE
PHYSICAL THERAPY EVALUATION - INPATIENT     Room Number: 1861/0611-I  Evaluation Date: 4/30/2018  Type of Evaluation: Initial  Physician Order: PT Eval and Treat    Presenting Problem: CVA  Reason for Therapy: Mobility Dysfunction and Discharge Plann stated as uncontrolled    • Uncontrolled diabetes mellitus ([facility] [address] 3/19/2014   • Unspecified essential hypertension    • Unspecified sleep apnea    • Wears glasses    • Wheezing        Past Surgical History  Past Surgical History:  No date: ANGIOGRAM  2/28/1 functional limits see OT evaluation    Lower extremity ROM is within functional limits     Lower extremity strength is within functional limits Right Hip flexion  4/5  Left Hip flexion  3-/5  Right Knee extension  4/5  Left Knee extension  3-/5  Right Dors min a to total assist for balance. patient attempting to support self on foot board of bed. Returned to supine due to complaints of fatigue, dependent with scooting.     Exercise/Education Provided:  Bed mobility  sitting balance, discharge planning    Pa to sit at EOB x 10 minutes with cga     Goal #4 Assess ambulation as able   Goal #5    Goal #6    Goal Comments: Goals established on 4/30/2018

## 2018-04-30 NOTE — PROGRESS NOTES
[facility]  Progress Note    Ever Adjuntas Patient Status:  Inpatient    1950 MRN [id number]   [facility] 6NE-A Attending Gilbert Friend MD   [facility] Day # 4 PCP Negin Barajas MD     Subjective:  Paul Lester is a(n) 75 ye 30.9*   MCV  88.0  88.3  88.0   MCH  29.8  28.8  29.3   MCHC  33.9  32.6  33.3   RDW  14.1  14.5  14.9   NEPRELIM  8.61*  9.10*  9.56*   WBC  10.5  11.3  11.8   PLT  151.0  138.0*  120.0*     Recent Labs   Lab  04/27/18   1702  04/28/18   0326  04/29/18 remains critically ill. intensify attempts at diuresis  Continue antibiotics  Continues to support with BiPAP at this time    Eric De SR.  Crit care 35  4/30/2018  6:50 AM

## 2018-04-30 NOTE — HISTORICAL OFFICE NOTE
Mehdi Mckeon  : 1950  ACCOUNT:  [id number]  [phone]  PCP: Dr. Kam Sportsman     TODAY'S DATE: 2017  DICTATED BY:  [Dr. Radha Woodard: [Followup of Carotid artery stenosis, bilateral and Followup of History of CABG.]    HP use, advised to quit. smokes, advised to quit. CAFFEINE: 3 cups coffee daily. ALCOHOL: drinks rarely. EXERCISE: physical therapy. DIET: no special diet. MARITAL STATUS: . LIFESTYLE: high stress lifestyle. OCCUPATION: homemaker.      ALLERGIES: No Kno compensated and asymptomatic.   We will base further treatment decisions after the above cardiodiagnostic studies are performed, and we will follow this very nice lady back in office.  ]    PRESCRIPTIONS:   12/06/16 *Clopidogrel [id number]      ONE TABLE

## 2018-05-01 ENCOUNTER — TELEPHONE (OUTPATIENT)
Dept: INTERNAL MEDICINE CLINIC | Facility: CLINIC | Age: 68
End: 2018-05-01

## 2018-05-01 ENCOUNTER — APPOINTMENT (OUTPATIENT)
Dept: GENERAL RADIOLOGY | Facility: HOSPITAL | Age: 68
DRG: 252 | End: 2018-05-01
Attending: INTERNAL MEDICINE
Payer: MEDICARE

## 2018-05-01 LAB
ALBUMIN SERPL-MCNC: 2.1 G/DL (ref 3.5–4.8)
ALP LIVER SERPL-CCNC: 61 U/L (ref 55–142)
ALT SERPL-CCNC: 21 U/L (ref 14–54)
ARTERIAL BLD GAS O2 SATURATION: 96 % (ref 92–100)
ARTERIAL BLOOD GAS BASE EXCESS: -4.6
ARTERIAL BLOOD GAS HCO3: 18.4 MEQ/L (ref 22–26)
ARTERIAL BLOOD GAS PCO2: 27 MM HG (ref 35–45)
ARTERIAL BLOOD GAS PH: 7.45 (ref 7.35–7.45)
ARTERIAL BLOOD GAS PO2: 92 MM HG (ref 80–105)
AST SERPL-CCNC: 29 U/L (ref 15–41)
BASOPHILS # BLD AUTO: 0.02 X10(3) UL (ref 0–0.1)
BASOPHILS NFR BLD AUTO: 0.2 %
BILIRUB SERPL-MCNC: 0.9 MG/DL (ref 0.1–2)
BUN BLD-MCNC: 32 MG/DL (ref 8–20)
CALCIUM BLD-MCNC: 8.5 MG/DL (ref 8.3–10.3)
CALCULATED O2 SATURATION: 98 % (ref 92–100)
CARBOXYHEMOGLOBIN: 1.5 % SAT (ref 0–3)
CHLORIDE: 121 MMOL/L (ref 101–111)
CO2: 20 MMOL/L (ref 22–32)
CREAT BLD-MCNC: 1.04 MG/DL (ref 0.55–1.02)
EOSINOPHIL # BLD AUTO: 0 X10(3) UL (ref 0–0.3)
EOSINOPHIL NFR BLD AUTO: 0 %
ERYTHROCYTE [DISTWIDTH] IN BLOOD BY AUTOMATED COUNT: 15.2 % (ref 11.5–16)
FIO2: 60 %
GLUCOSE BLD-MCNC: 122 MG/DL (ref 65–99)
GLUCOSE BLD-MCNC: 144 MG/DL (ref 65–99)
GLUCOSE BLD-MCNC: 163 MG/DL (ref 70–99)
GLUCOSE BLD-MCNC: 187 MG/DL (ref 65–99)
GLUCOSE BLD-MCNC: 202 MG/DL (ref 70–99)
HAV IGM SER QL: 2.3 MG/DL (ref 1.8–2.5)
HCT VFR BLD AUTO: 29.2 % (ref 34–50)
HGB BLD-MCNC: 9.5 G/DL (ref 12–16)
IMMATURE GRANULOCYTE COUNT: 0.08 X10(3) UL (ref 0–1)
IMMATURE GRANULOCYTE RATIO %: 0.8 %
ISTAT ACTIVATED CLOTTING TIME: 136 SECONDS (ref 74–137)
ISTAT ACTIVATED CLOTTING TIME: 213 SECONDS (ref 74–137)
ISTAT BLOOD GAS BASE DEFICIT: -2 MMOL/L
ISTAT BLOOD GAS HCO3: 23 MEQ/L (ref 22–26)
ISTAT BLOOD GAS O2 SATURATION: 93 % (ref 92–100)
ISTAT BLOOD GAS PCO2: 41 MMHG (ref 35–45)
ISTAT BLOOD GAS PH: 7.36 (ref 7.35–7.45)
ISTAT BLOOD GAS PO2: 71 MMHG (ref 80–105)
ISTAT BLOOD GAS TCO2: 24 MMOL/L (ref 22–32)
ISTAT HEMATOCRIT: 40 % (ref 34–50)
ISTAT IONIZED CALCIUM: 1.22 MMOL/L (ref 1.12–1.32)
ISTAT POTASSIUM: 4 MMOL/L (ref 3.6–5.1)
ISTAT SODIUM: 138 MMOL/L (ref 136–144)
LYMPHOCYTES # BLD AUTO: 0.76 X10(3) UL (ref 0.9–4)
LYMPHOCYTES NFR BLD AUTO: 8 %
M PROTEIN MFR SERPL ELPH: 6 G/DL (ref 6.1–8.3)
MCH RBC QN AUTO: 29.1 PG (ref 27–33.2)
MCHC RBC AUTO-ENTMCNC: 32.5 G/DL (ref 31–37)
MCV RBC AUTO: 89.3 FL (ref 81–100)
METHEMOGLOBIN: 0.4 % SAT (ref 0.4–1.5)
MONOCYTES # BLD AUTO: 0.66 X10(3) UL (ref 0.1–1)
MONOCYTES NFR BLD AUTO: 6.9 %
NEUTROPHIL ABS PRELIM: 7.98 X10 (3) UL (ref 1.3–6.7)
NEUTROPHILS # BLD AUTO: 7.98 X10(3) UL (ref 1.3–6.7)
NEUTROPHILS NFR BLD AUTO: 84.1 %
P/F RATIO: 156.4 MMHG
PATIENT TEMPERATURE: 98 F
PHOSPHATE SERPL-MCNC: 2.5 MG/DL (ref 2.5–4.9)
PLATELET # BLD AUTO: 130 10(3)UL (ref 150–450)
POTASSIUM SERPL-SCNC: 3.2 MMOL/L (ref 3.6–5.1)
POTASSIUM SERPL-SCNC: 3.6 MMOL/L (ref 3.6–5.1)
POTASSIUM SERPL-SCNC: 3.9 MMOL/L (ref 3.6–5.1)
POTASSIUM SERPL-SCNC: 3.9 MMOL/L (ref 3.6–5.1)
RBC # BLD AUTO: 3.27 X10(6)UL (ref 3.8–5.1)
RED CELL DISTRIBUTION WIDTH-SD: 49.6 FL (ref 35.1–46.3)
SODIUM SERPL-SCNC: 150 MMOL/L (ref 136–144)
TOTAL HEMOGLOBIN: 10.2 G/DL (ref 11.7–16)
WBC # BLD AUTO: 9.5 X10(3) UL (ref 4–13)

## 2018-05-01 PROCEDURE — 71045 X-RAY EXAM CHEST 1 VIEW: CPT | Performed by: INTERNAL MEDICINE

## 2018-05-01 PROCEDURE — 99233 SBSQ HOSP IP/OBS HIGH 50: CPT | Performed by: HOSPITALIST

## 2018-05-01 PROCEDURE — 99232 SBSQ HOSP IP/OBS MODERATE 35: CPT | Performed by: INTERNAL MEDICINE

## 2018-05-01 PROCEDURE — 99291 CRITICAL CARE FIRST HOUR: CPT | Performed by: OTHER

## 2018-05-01 RX ORDER — POTASSIUM CHLORIDE 29.8 MG/ML
40 INJECTION INTRAVENOUS ONCE
Status: COMPLETED | OUTPATIENT
Start: 2018-05-01 | End: 2018-05-01

## 2018-05-01 RX ORDER — AMIODARONE HYDROCHLORIDE 200 MG/1
200 TABLET ORAL 3 TIMES DAILY
Status: DISCONTINUED | OUTPATIENT
Start: 2018-05-01 | End: 2018-05-04

## 2018-05-01 RX ORDER — HEPARIN SODIUM 5000 [USP'U]/ML
5000 INJECTION, SOLUTION INTRAVENOUS; SUBCUTANEOUS EVERY 12 HOURS SCHEDULED
Status: DISCONTINUED | OUTPATIENT
Start: 2018-05-01 | End: 2018-05-07

## 2018-05-01 RX ORDER — POTASSIUM CHLORIDE 14.9 MG/ML
20 INJECTION INTRAVENOUS ONCE
Status: COMPLETED | OUTPATIENT
Start: 2018-05-01 | End: 2018-05-01

## 2018-05-01 RX ORDER — QUETIAPINE 25 MG/1
12.5 TABLET, FILM COATED ORAL ONCE
Status: COMPLETED | OUTPATIENT
Start: 2018-05-01 | End: 2018-05-01

## 2018-05-01 RX ORDER — QUETIAPINE 25 MG/1
25 TABLET, FILM COATED ORAL 2 TIMES DAILY
Status: DISCONTINUED | OUTPATIENT
Start: 2018-05-01 | End: 2018-05-08

## 2018-05-01 NOTE — PROGRESS NOTES
[facility]  Interventional Neuroradiology Progress Note    Cristina Bosch Patient Status:  Inpatient    1950 MRN [id number]   [facility] 6NE-A Attending Catrachita Jaramillo MD   Hosp Day # 5 PCP Rodger Hernandez MD       Subjective: forgetful, requires frequent reminding, but able to identify objects correctly   Cranial Nerves:   Left field cut, No deviation, PERRL 3mm brisk, Full movement on the right, left eye unable to fully cross over the midline, left sided extinction, no nystagm 05/01/2018   BILT 0.9 05/01/2018   TP 6.0 05/01/2018   AST 29 05/01/2018   ALT 21 05/01/2018   MG 2.3 05/01/2018   PHOS 2.5 05/01/2018   PGLU 153 04/30/2018 4/17/18  Trig 320  HDL 35  LDL 64    Imaging:  CXR - per Pulm, showing some improvement    Asse

## 2018-05-01 NOTE — DIETARY NOTE
Nutrition short note:  Pt with HgbA1c of 12.9%. Pt is on Bipap and is NPO. Pt is not appropriate for Diabetic education at this time. Will continue to follow for education once appropriate.      Mark Bazan MS, RD, LDN  Pager [phone]

## 2018-05-01 NOTE — OCCUPATIONAL THERAPY NOTE
Attempted to see pt this AM, pt on BiPAP and not approp for therapy, OT will follow up as pt is able, RN in agreement.

## 2018-05-01 NOTE — PROGRESS NOTES
[facility]  Progress Note    Mart Morales Patient Status:  Inpatient    1950 MRN [id number]   [facility] 6NE-A Attending Aysha Ward MD   Hosp Day # 5 PCP Beau Salazar MD     STATUS UPDATE: The patient's oxygenation con BS   Extremity: Less edema, no cyanosis   Neurological: Alert, interactive, no focal deficits    Lab Data Review:  Recent Labs   Lab  04/29/18   0419  04/30/18   0600   RBC  3.75*  3.51*   HGB  10.8*  10.3*   HCT  33.1*  30.9*   MCV  88.3  88.0   MCH  28.8 failure with a preserved ejection fraction  7. Paroxysmal atrial fibrillation  8. Mild chronic renal insufficiency-attempting to diuresis, today's labs pending  9. Peripheral vascular disease-status post procedures on both carotids  10.  Diabetes mellitus

## 2018-05-01 NOTE — SLP NOTE
Attempted follow up, patient remains on BiPAP and not appropriate at this time. Will hold today and continue to follow.     James Wilson Jay 87 CCC-SLP  Pager [phone]

## 2018-05-01 NOTE — PROGRESS NOTES
[facility]  Neuro Critical Care Progress Note    Clay Welsh Patient Status:  Inpatient    1950 MRN [id number]   [facility] 6NE-A Attending Keo Medley MD   Hosp Day # 5 PCP Raghu Fonseca MD     Subjective:  Waking a NG Tube, TID  •  Rosuvastatin Calcium (CRESTOR) 20 MG tab 40 mg, 40 mg, Per NG Tube, Nightly  •  acetaminophen (TYLENOL) 160 MG/5ML oral liquid 650 mg, 650 mg, Oral, Q6H PRN  •  docusate sodium (COLACE) liquid 100 mg, 100 mg, Oral, BID  •  budesonide (PULM day  •  Insulin Aspart Pen (NOVOLOG) 100 UNIT/ML flexpen 1-10 Units, 1-10 Units, Subcutaneous, 4 times per day  •  Labetalol HCl (TRANDATE) injection 10 mg, 10 mg, Intravenous, Q4H PRN  •  hydrALAzine HCl (APRESOLINE) injection 10 mg, 10 mg, Intravenous, Q 150  - 45/30/2018  CT head - stable rmca hemorrhagic comonent  - On Plavix and ASA.  4/30/2018 -  P2Y12 - 168  -PT/OT evaluation when appropriate  - Zetia 10mg PO Q Daily, allergic to statin.  - PT/OT eval, Speech Eval  - Increase Seroquel 25mg po bid    Ca

## 2018-05-01 NOTE — PROGRESS NOTES
[facility]  Progress Note     Kacey Garcia Patient Status:  Inpatient    1950 MRN [id number]   [facility] 6NE-A Attending Chrystal Oppenheim, MD   Hosp Day # 5 PCP Sandra White MD     Chief Complaint: CVA  S:    Much calme --    BILT  0.4  0.6   --    --    TP  6.0*  6.2   --    --      No results for input(s): PTP, INR in the last 72 hours. No results for input(s): TROP, CK in the last 72 hours. Imaging: Imaging data reviewed in Epic.   Medications:   • ezetimibe  10 / EP to see  2. amio started  3. No AC at present   4. ekg today   6. Urinary retention-  neph consult, tate placed    7. Fever- blood cultures P 4/30   8. E.Coli UTI- zosyn. Initially on rocephin x2   9. h/o CEA in 2016  10.  Chronic COPD with hypoxia/ta

## 2018-05-01 NOTE — CM/SW NOTE
Acute rehab recommended. MSW will wait to consult Physiatry until the patient's respiratory status has improved. MSW discussed choices with the patient's daughter Jeromy Garcia at bedside for Acute rehab. She will look at [facility] and [facility].     Ursula [employer]

## 2018-05-01 NOTE — PLAN OF CARE
RESPIRATORY - ADULT    • Achieves optimal ventilation and oxygenation Not Progressing        Assumed care of pt at [address] [place] [address] Road. Pt is s/p R MCA occlusion with stenting and subsequent right carotid stenting. Left side remains flaccid and will move to pain.   Pt c

## 2018-05-01 NOTE — PROGRESS NOTES
[facility]  Progress Note    Kacey Garcia Patient Status:  Inpatient    1950 MRN [id number]   [facility] 6NE-A Attending Chrystal Oppenheim, MD   Hosp Day # 5 PCP Sandra White MD     Assessment:  1. PAF - currently in SR  2. 9.5 05/01/2018   HGB 9.5 05/01/2018   HCT 29.2 05/01/2018   .0 05/01/2018       Lab Results  Component Value Date   PT 13.4 02/28/2014   INR 1.06 02/28/2014       Lab Results  Component Value Date    05/01/2018   K 3.9 05/01/2018   K 3.9 05/01

## 2018-05-01 NOTE — PROGRESS NOTES
05/01/18 1008   Clinical Encounter Type   Visited With Patient and family together  (Daughter Beto Guevara at bedside.   Patient asleep at the time.)   Routine Visit Introduction   Continue Visiting (If requested)   Patient's Supportive Strategies/Resources

## 2018-05-01 NOTE — PHYSICAL THERAPY NOTE
Pt being followed by physical therapy. Pt requiring bipap and not medically appropriate for therapy at this time. Will follow up 5/2/18.

## 2018-05-01 NOTE — PROGRESS NOTES
[facility]  Progress Note    Anuel Harrell Patient Status:  Inpatient    1950 MRN [id number]   [facility] 6NE-A Attending Donna Stover MD   Hosp Day # 5 PCP Irene Tsang MD     On bipap  Good UOP ; had tate placed overni Daily PRN   bisacodyl (DULCOLAX) rectal suppository 10 mg 10 mg Rectal Daily PRN   FLEET ENEMA (FLEET) 7-19 GM/118ML enema 133 mL 1 enema Rectal Once PRN   ondansetron HCl (ZOFRAN) injection 4 mg 4 mg Intravenous Q6H PRN   Or      Metoclopramide HCl (CORRY Results  Component Value Date   WBC 9.5 05/01/2018   HGB 9.5 05/01/2018   HCT 29.2 05/01/2018   .0 05/01/2018   CREATSERUM 1.04 05/01/2018   BUN 32 05/01/2018    05/01/2018   K 3.9 05/01/2018   K 3.9 05/01/2018    05/01/2018   CO2 20.0 0

## 2018-05-01 NOTE — PROGRESS NOTES
Pt awake and alert. Persistently asked to her mouth swabbed. Still remains anxious. Precedex gtt remains on and Seroquel dose increased. Request for Al Dsouza denied by Dr. Cecilia Gamboa. A-line pressures running higher than cuff. AVAPS in place at 60%.  Plan of care r

## 2018-05-02 ENCOUNTER — APPOINTMENT (OUTPATIENT)
Dept: GENERAL RADIOLOGY | Facility: HOSPITAL | Age: 68
DRG: 252 | End: 2018-05-02
Attending: INTERNAL MEDICINE
Payer: MEDICARE

## 2018-05-02 LAB
ALBUMIN SERPL-MCNC: 2.1 G/DL (ref 3.5–4.8)
ALP LIVER SERPL-CCNC: 73 U/L (ref 55–142)
ALT SERPL-CCNC: 28 U/L (ref 14–54)
ARTERIAL BLD GAS O2 SATURATION: 95 % (ref 92–100)
ARTERIAL BLOOD GAS BASE EXCESS: -1.2
ARTERIAL BLOOD GAS HCO3: 22 MEQ/L (ref 22–26)
ARTERIAL BLOOD GAS PCO2: 31 MM HG (ref 35–45)
ARTERIAL BLOOD GAS PH: 7.47 (ref 7.35–7.45)
ARTERIAL BLOOD GAS PO2: 75 MM HG (ref 80–105)
AST SERPL-CCNC: 33 U/L (ref 15–41)
BASOPHILS # BLD AUTO: 0.03 X10(3) UL (ref 0–0.1)
BASOPHILS NFR BLD AUTO: 0.4 %
BILIRUB SERPL-MCNC: 0.7 MG/DL (ref 0.1–2)
BUN BLD-MCNC: 29 MG/DL (ref 8–20)
BUN BLD-MCNC: 29 MG/DL (ref 8–20)
CALCIUM BLD-MCNC: 8.4 MG/DL (ref 8.3–10.3)
CALCIUM BLD-MCNC: 8.6 MG/DL (ref 8.3–10.3)
CALCULATED O2 SATURATION: 96 % (ref 92–100)
CARBOXYHEMOGLOBIN: 1.4 % SAT (ref 0–3)
CHLORIDE: 114 MMOL/L (ref 101–111)
CHLORIDE: 121 MMOL/L (ref 101–111)
CO2: 26 MMOL/L (ref 22–32)
CO2: 26 MMOL/L (ref 22–32)
CREAT BLD-MCNC: 0.96 MG/DL (ref 0.55–1.02)
CREAT BLD-MCNC: 1.14 MG/DL (ref 0.55–1.02)
EOSINOPHIL # BLD AUTO: 0.05 X10(3) UL (ref 0–0.3)
EOSINOPHIL NFR BLD AUTO: 0.6 %
ERYTHROCYTE [DISTWIDTH] IN BLOOD BY AUTOMATED COUNT: 15 % (ref 11.5–16)
FIO2: 60 %
GLUCOSE BLD-MCNC: 112 MG/DL (ref 65–99)
GLUCOSE BLD-MCNC: 179 MG/DL (ref 65–99)
GLUCOSE BLD-MCNC: 231 MG/DL (ref 70–99)
GLUCOSE BLD-MCNC: 273 MG/DL (ref 65–99)
GLUCOSE BLD-MCNC: 98 MG/DL (ref 70–99)
HAV IGM SER QL: 2 MG/DL (ref 1.8–2.5)
HAV IGM SER QL: 2 MG/DL (ref 1.8–2.5)
HCT VFR BLD AUTO: 29.7 % (ref 34–50)
HGB BLD-MCNC: 9.5 G/DL (ref 12–16)
IMMATURE GRANULOCYTE COUNT: 0.03 X10(3) UL (ref 0–1)
IMMATURE GRANULOCYTE RATIO %: 0.4 %
L/M: 40 L/MIN
LYMPHOCYTES # BLD AUTO: 1 X10(3) UL (ref 0.9–4)
LYMPHOCYTES NFR BLD AUTO: 11.9 %
M PROTEIN MFR SERPL ELPH: 6.2 G/DL (ref 6.1–8.3)
MCH RBC QN AUTO: 28.5 PG (ref 27–33.2)
MCHC RBC AUTO-ENTMCNC: 32 G/DL (ref 31–37)
MCV RBC AUTO: 89.2 FL (ref 81–100)
METHEMOGLOBIN: 0.5 % SAT (ref 0.4–1.5)
MONOCYTES # BLD AUTO: 0.56 X10(3) UL (ref 0.1–1)
MONOCYTES NFR BLD AUTO: 6.7 %
NEUTROPHIL ABS PRELIM: 6.75 X10 (3) UL (ref 1.3–6.7)
NEUTROPHILS # BLD AUTO: 6.75 X10(3) UL (ref 1.3–6.7)
NEUTROPHILS NFR BLD AUTO: 80 %
P/F RATIO: 124.6 MMHG
PATIENT TEMPERATURE: 98.6 F
PHOSPHATE SERPL-MCNC: 2.7 MG/DL (ref 2.5–4.9)
PLATELET # BLD AUTO: 158 10(3)UL (ref 150–450)
POTASSIUM SERPL-SCNC: 3.2 MMOL/L (ref 3.6–5.1)
POTASSIUM SERPL-SCNC: 3.5 MMOL/L (ref 3.6–5.1)
POTASSIUM SERPL-SCNC: 3.5 MMOL/L (ref 3.6–5.1)
POTASSIUM SERPL-SCNC: 3.9 MMOL/L (ref 3.6–5.1)
RBC # BLD AUTO: 3.33 X10(6)UL (ref 3.8–5.1)
RED CELL DISTRIBUTION WIDTH-SD: 49.2 FL (ref 35.1–46.3)
SODIUM SERPL-SCNC: 149 MMOL/L (ref 136–144)
SODIUM SERPL-SCNC: 153 MMOL/L (ref 136–144)
TOTAL HEMOGLOBIN: 9.9 G/DL (ref 11.7–16)
WBC # BLD AUTO: 8.4 X10(3) UL (ref 4–13)

## 2018-05-02 PROCEDURE — 71045 X-RAY EXAM CHEST 1 VIEW: CPT | Performed by: INTERNAL MEDICINE

## 2018-05-02 PROCEDURE — 99233 SBSQ HOSP IP/OBS HIGH 50: CPT | Performed by: OTHER

## 2018-05-02 PROCEDURE — 99233 SBSQ HOSP IP/OBS HIGH 50: CPT | Performed by: HOSPITALIST

## 2018-05-02 PROCEDURE — 99233 SBSQ HOSP IP/OBS HIGH 50: CPT | Performed by: INTERNAL MEDICINE

## 2018-05-02 RX ORDER — POTASSIUM CHLORIDE 14.9 MG/ML
20 INJECTION INTRAVENOUS ONCE
Status: COMPLETED | OUTPATIENT
Start: 2018-05-02 | End: 2018-05-02

## 2018-05-02 RX ORDER — ARIPIPRAZOLE 15 MG/1
40 TABLET ORAL EVERY 4 HOURS
Status: COMPLETED | OUTPATIENT
Start: 2018-05-02 | End: 2018-05-02

## 2018-05-02 RX ORDER — LISINOPRIL 10 MG/1
10 TABLET ORAL DAILY
Status: DISCONTINUED | OUTPATIENT
Start: 2018-05-02 | End: 2018-05-04

## 2018-05-02 RX ORDER — POTASSIUM CHLORIDE 29.8 MG/ML
40 INJECTION INTRAVENOUS ONCE
Status: COMPLETED | OUTPATIENT
Start: 2018-05-02 | End: 2018-05-02

## 2018-05-02 RX ORDER — DEXTROSE MONOHYDRATE 50 MG/ML
1000 INJECTION, SOLUTION INTRAVENOUS ONCE
Status: COMPLETED | OUTPATIENT
Start: 2018-05-02 | End: 2018-05-02

## 2018-05-02 RX ORDER — DEXTROSE MONOHYDRATE 50 MG/ML
INJECTION, SOLUTION INTRAVENOUS CONTINUOUS
Status: DISCONTINUED | OUTPATIENT
Start: 2018-05-02 | End: 2018-05-02

## 2018-05-02 RX ORDER — POTASSIUM CHLORIDE 29.8 MG/ML
40 INJECTION INTRAVENOUS ONCE
Status: COMPLETED | OUTPATIENT
Start: 2018-05-02 | End: 2018-05-03

## 2018-05-02 RX ORDER — NICOTINE 21 MG/24HR
1 PATCH, TRANSDERMAL 24 HOURS TRANSDERMAL DAILY
Status: DISCONTINUED | OUTPATIENT
Start: 2018-05-02 | End: 2018-05-18

## 2018-05-02 RX ORDER — FUROSEMIDE 10 MG/ML
40 INJECTION INTRAMUSCULAR; INTRAVENOUS
Status: COMPLETED | OUTPATIENT
Start: 2018-05-02 | End: 2018-05-02

## 2018-05-02 NOTE — PROGRESS NOTES
[facility]  Progress Note    Marciano Pedroza Patient Status:  Inpatient    1950 MRN [id number]   [facility] 6NE-A Attending Antony Nicole MD   Hosp Day # 6 PCP Yuri Bolden MD     Assessment:  1. PAF - currently in SR  2. Without clubbing, cyanosis or edema. Peripheral pulses are 2+. Skin: Warm and dry.      Labs:    Lab Results  Component Value Date   WBC 8.4 05/02/2018   HGB 9.5 05/02/2018   HCT 29.7 05/02/2018   .0 05/02/2018       Lab Results  Component Value Da Ling Littlejohn MD  5/2/2018  9:08 AM

## 2018-05-02 NOTE — PLAN OF CARE
Problem: Impaired Swallowing  Goal: Minimize aspiration risk  Interventions:  - Patient should be alert and upright for all feedings (90 degrees preferred)  - Offer food and liquids at a slow rate  - No straws, all liquids by tsp only  - Encourage small bi

## 2018-05-02 NOTE — PROGRESS NOTES
[facility]  Progress Note    Thea Floyd Patient Status:  Inpatient    1950 MRN [id number]   [facility] 6NE-A Attending Douglas Alvarez MD   [facility] Day # 6 PCP Flakito Mccarty MD     Subjective:  Thea Floyd is a(n) 75 ye 29.3  29.1  28.5   MCHC  33.3  32.5  32.0   RDW  14.9  15.2  15.0   NEPRELIM  9.56*  7.98*  6.75*   WBC  11.8  9.5  8.4   PLT  120.0*  130.0*  158.0     Recent Labs   Lab  04/30/18   0600   05/01/18   0625  05/01/18   1334  05/01/18   6637  05/02/18   0433 fraction  7. Paroxysmal atrial fibrillation  8. Mild chronic renal insufficiency-attempting to diuresis, today's labs pending  9. Peripheral vascular disease-status post procedures on both carotids  10. Diabetes mellitus  11. COPD  15.  Hepato-steatosis/fat

## 2018-05-02 NOTE — PHYSICAL THERAPY NOTE
PHYSICAL THERAPY TREATMENT NOTE - INPATIENT    Room Number: 6074/5214-K     Session: 1  Number of Visits to Meet Established Goals: 6    Presenting Problem: CVA   History related to current admission: Pt is a 75 y.o.  Female admitted 4/26/18 from home with Past Surgical History  Past Surgical History:  No date: ANGIOGRAM  2/28/14: ANGIOPLASTY (CORONARY)      Comment: stents acute mi  No date: APPENDECTOMY  No date: BACK SURGERY  No date: BIOPSY OF SKIN LESION      Comment: hyperkeratosis  1996 and 2012 Scale): 28.58   CMS Modifier (G-Code): CM    FUNCTIONAL ABILITY STATUS  Gait Assessment   Gait Assistance: Not tested           Stoop/Curb Assistance: Not tested  Comment : above per FIM    Skilled Therapy Provided: Pt received supine in bed and is agreeab independent bed mobility, transfers, and gait and stairs. The patient is below baseline and would benefit from skilled inpatient PT to address the above deficits to assist patient in returning to prior to level of function.  Recommend Pt D/C [place] in order to

## 2018-05-02 NOTE — CM/SW NOTE
Pt discussed in rounds this am. Referral made this afternoon to [facility] Per SW note from 5/1- family may also consider [facility].     Deedee Childs, 05/02/18, 3:53 PM

## 2018-05-02 NOTE — PROGRESS NOTES
[facility]  Neuro Critical Care Progress Note    Lu Schneider Patient Status:  Inpatient    1950 MRN [id number]   [facility] 6NE-A Attending Omero Roy MD   Hosp Day # 6 PCP Cruzito Ireland MD     Subjective:  Waking a UNIT/ML injection 5,000 Units, 5,000 Units, Subcutaneous, 2 times per day  •  Sertraline HCl (ZOLOFT) tab 50 mg, 50 mg, Oral, Daily  •  QUEtiapine Fumarate (SEROQUEL) tab 25 mg, 25 mg, Oral, BID  •  amiodarone HCl (PACERONE) tab 200 mg, 200 mg, Per NG Tube (TYLENOL) 650 MG rectal suppository 650 mg, 650 mg, Rectal, Q4H PRN  •  ipratropium-albuterol (DUONEB) nebulizer solution 3 mL, 3 mL, Nebulization, Q1H PRN  •  ipratropium-albuterol (DUONEB) nebulizer solution 3 mL, 3 mL, Nebulization, Q4H PRN  •  niCARdip occlusion, right     Hyponatremia     Aspiration pneumonia of both lower lobes (HCC)     Hypervolemia     Respiratory insufficiency     Hypokalemia     Hypernatremia        Neuro:  Right MCA stroke due to right ICA occlusion and thromboembolism of right M2

## 2018-05-02 NOTE — PROGRESS NOTES
[facility]  Nephrology Progress Note    Mely Pouch Patient Status:  Inpatient    1950 MRN [id number]   [facility] 6NE-A Attending Judit Luna MD   Hosp Day # 6 PCP Franc Beebe MD       SUBJECTIVE:  Awake and alert, c 121*   CO2  19.0*   --    --   20.0*   --    --   26.0   BUN  31*   --    --   32*   --    --   29*   CREATSERUM  1.10*   --    --   1.04*   --    --   0.96   CA  8.0*   --    --   8.5   --    --   8.4   MG  2.5   --    --   2.3   --    --   2.0   PHOS  2. Bisulfate (PLAVIX) tab 75 mg 75 mg Oral Daily   Normal Saline Flush 0.9 % injection 10 mL 10 mL Intravenous PRN   Dexmedetomidine HCl in NaCl (PRECEDEX) 400 MCG/100ML premix infusion 0.2-1.5 mcg/kg/hr Intravenous Continuous   phenylephrine in NaCl (BREE-SYN and free water flushes via NG    #3. HTN- BP stable, cont to follow parameters per neuro    #4. resp insuff- multifactorial with poss pneumonia as well as vol excess. Cont to diurese, lasix decr.   Remains on abx as well.  pulm following    Questions/con

## 2018-05-02 NOTE — PROGRESS NOTES
[facility]  Interventional Neuroradiology Progress Note    Leena Kilgore Patient Status:  Inpatient    1950 MRN [id number]   [facility] 6NE-A Attending Torin Appiah MD   Hosp Day # 6 PCP Kaylynn Woods MD     Subjective:  Rohan Valenzuela of ecchymosis stable, non,tender, no drainage at site, open to air    Inpt Meds:   • furosemide  40 mg Intravenous BID (Diuretic)   • dextrose  1,000 mL Intravenous Once   • nicotine  1 patch Transdermal Daily   • Heparin Sodium (Porcine)  5,000 Units Subc A1C = 12.9     COPD    Plan:  Continue Dual antiplatelet therapy with ASA 81 mg and Plavix 75 mg   Continue statin therapy with Zetia 10 mg and Crestor 40 mg, goal LDL < 80 mg/dL  Sbp goal < 160 mmHg  OK for Heparin SQ for DVT prophylaxis  GI prophylaxis

## 2018-05-02 NOTE — OCCUPATIONAL THERAPY NOTE
OCCUPATIONAL THERAPY TREATMENT NOTE - INPATIENT     Room Number: 3979/2281-S  Session: 1   Number of Visits to Meet Established Goals: 10    Presenting Problem: Vomiting and diarrhea    History related to current admission: Pt was initially admitted for vo • HEART ATTACK 2/28/14    Acute mi and stents   • High blood pressure    • High cholesterol    • History of blood transfusion    • History of depression    • Leaking of urine    • Night sweats    • Peripheral vascular disease ([facility] [address] 3/14    Carotid Artery washing, rinsing, drying)?: Total  -   Toileting, which includes using toilet, bedpan or urinal? : Total  -   Putting on and taking off regular upper body clothing?: Total  -   Taking care of personal grooming such as brushing teeth?: A Lot  -   Eating ariana evaluation  Rehab Potential : Good  Frequency (Obs): Daily      OT Goals:   ADL Goals   Patient will perform grooming: with min assist  Patient will perform toileting: with max assist     Functional Transfer Goals  Patient will perform all functional trans

## 2018-05-02 NOTE — SLP NOTE
ADULT SWALLOWING EVALUATION    ASSESSMENT    ASSESSMENT/OVERALL IMPRESSION:  Patient seen for reassessment of swallow function as she is now off BiPAP. Patient alert and very motivated to participate. She is a bit impulsive but is redirectable.   She foll stroke [facility])    Carotid occlusion, right    Hyponatremia    Aspiration pneumonia of both lower lobes (HCC)    Hypervolemia    Respiratory insufficiency    Hypokalemia    Hypernatremia      Past Medical History  Past Medical History:   Diagnosis Date   • Ab Dwain Rajput MD on 5/02/2018 at 7:24       Approved by: Dwain Rajput MD     Brain CT from 4/30/18 revealed:  Impression     CONCLUSION:  Stable appearance to the right MCA infarct with hemorrhagic products and small amount of subarachnoid hemorrhag in cognitive communcation evaluation as appropriate  In Progress     FOLLOW UP  Treatment Plan/Recommendations: Dysphagia therapy;Communication evaluation  Number of Visits to Meet Established Goals: 6  Follow Up Needed: Yes  SLP Follow-up Date: 05/03/18

## 2018-05-02 NOTE — CONSULTS
.Lam Kolb Patient Status:  Inpatient    1950 MRN [id number]   [facility] 6NE-A Attending Beth Parker MD   [address] Day # 6 PCP Keaton Mitchell MD     Patient Identification  Bipin Pool is a 75year old children / family / friends to help, patient and daughter. Patient is not employed. Daughter works from [address] / Accessibility: 2-story house  Current Functional Status:  Total assistance for all mobility and self-care skills      Past Med carotid artery      [COMPLETED] potassium chloride IVPB premix 40 mEq 40 mEq Intravenous Once   Followed by      [COMPLETED] potassium chloride IVPB premix 20 mEq 20 mEq Intravenous Once   [COMPLETED] furosemide (LASIX) injection 40 mg 40 mg Intravenous IVPB premix 20 mEq 20 mEq Intravenous Once   [COMPLETED] sodium phosphate 15 mmol in sodium chloride 0.9 % 100 mL IVPB 15 mmol Intravenous Once   [COMPLETED] furosemide (LASIX) injection 40 mg 40 mg Intravenous Once   budesonide (PULMICORT) 0.5 MG/2ML nebu (CARDENE) 20 mg/200 ml premix infusion      [COMPLETED] iohexol (OMNIPAQUE) 350 MG/ML injection 60 mL 60 mL Intravenous ONCE PRN   [COMPLETED] niCARdipine HCl in NaCl (CARDENE) 20 mg/200 ml premix infusion      [COMPLETED] eptifibatide (INTEGRILIN) 2 MG/ML 05/02/2018    05/02/2018   K 3.5 05/02/2018    05/02/2018   CO2 26.0 05/02/2018    05/02/2018   CA 8.6 05/02/2018   ALB 2.1 05/02/2018   ALKPHO 73 05/02/2018   BILT 0.7 05/02/2018   TP 6.2 05/02/2018   AST 33 05/02/2018   ALT 28 05/02/20 left neglect. . Sensation to dull touch intact in right upper and lower extremities while severely impaired left upper and lower extremities and reflexes are 1+, bilateral and symmetric for biceps, brachioradialis, triceps, patella and achilles throughout. Physiatric medical oversight to monitor kidney function, cardiac function, pulmonary status, neurologic status, DVT prevention. Estimated length of stay in recommended rehabilitation level of care is 3-4 weeks.       The patient has fair to

## 2018-05-02 NOTE — PLAN OF CARE
CARDIOVASCULAR - ADULT    • Maintains optimal cardiac output and hemodynamic stability Progressing    • Absence of cardiac arrhythmias or at baseline Progressing        DISCHARGE PLANNING    • Discharge to home or other facility with appropriate resources ventilation and oxygenation Progressing        RISK FOR INFECTION - ADULT    • Absence of fever/infection during anticipated neutropenic period Progressing        SAFETY ADULT - FALL    • Free from fall injury Progressing        SKIN/TISSUE INTEGRITY - LUCAS

## 2018-05-02 NOTE — PROGRESS NOTES
[facility]  Progress Note     Sai Villarreal Patient Status:  Inpatient    1950 MRN [id number]   [facility] 6NE-A Attending Yoli Rowell MD   Hosp Day # 6 PCP Joselyn Venegas MD     Chief Complaint: CVA  S:    On vapothe --    ALB  2.5*   --   2.1*   --    --   2.1*   --    --    NA  148*   --   150*   --    --   153*  149*  149*  149*   K  3.3*  3.3*   < >  3.9  3.9   < >  3.2*  3.9  3.5*   --    CL  118*   --   121*   --    --   121*  114*   --    CO2  19.0*   --   20.0* Neurointervention following  3. BP Control   4. Statin   crestor intolerant to atorvastatin in past , zetia   5. Pt rec acute rehab- not ready for acute consult yet  6.   poor po intake needs chopped diet- pockets left side of mouth    Dobbhoff  placed for B/L  CVS: S1, S2, RRR  ABD: Soft, NT, ND, BS+  EXT: LUE flaccid    Imaging: Reviewed  Agree with above  Appreciate consult  Correcting Na+  Cont abx  IV tristan Oakley MD

## 2018-05-03 ENCOUNTER — APPOINTMENT (OUTPATIENT)
Dept: GENERAL RADIOLOGY | Facility: HOSPITAL | Age: 68
DRG: 252 | End: 2018-05-03
Attending: INTERNAL MEDICINE
Payer: MEDICARE

## 2018-05-03 LAB
ALBUMIN SERPL-MCNC: 2.2 G/DL (ref 3.5–4.8)
ALP LIVER SERPL-CCNC: 100 U/L (ref 55–142)
ALT SERPL-CCNC: 76 U/L (ref 14–54)
AST SERPL-CCNC: 81 U/L (ref 15–41)
BASOPHILS # BLD AUTO: 0.05 X10(3) UL (ref 0–0.1)
BASOPHILS NFR BLD AUTO: 0.6 %
BILIRUB SERPL-MCNC: 0.7 MG/DL (ref 0.1–2)
BUN BLD-MCNC: 23 MG/DL (ref 8–20)
CALCIUM BLD-MCNC: 8.9 MG/DL (ref 8.3–10.3)
CHLORIDE: 114 MMOL/L (ref 101–111)
CO2: 29 MMOL/L (ref 22–32)
CREAT BLD-MCNC: 1.03 MG/DL (ref 0.55–1.02)
EOSINOPHIL # BLD AUTO: 0.29 X10(3) UL (ref 0–0.3)
EOSINOPHIL NFR BLD AUTO: 3.3 %
ERYTHROCYTE [DISTWIDTH] IN BLOOD BY AUTOMATED COUNT: 14.8 % (ref 11.5–16)
GLUCOSE BLD-MCNC: 116 MG/DL (ref 70–99)
GLUCOSE BLD-MCNC: 138 MG/DL (ref 65–99)
GLUCOSE BLD-MCNC: 150 MG/DL (ref 65–99)
GLUCOSE BLD-MCNC: 187 MG/DL (ref 65–99)
GLUCOSE BLD-MCNC: 200 MG/DL (ref 65–99)
GLUCOSE BLD-MCNC: 206 MG/DL (ref 65–99)
HAV IGM SER QL: 2 MG/DL (ref 1.8–2.5)
HCT VFR BLD AUTO: 32.4 % (ref 34–50)
HGB BLD-MCNC: 10.2 G/DL (ref 12–16)
IMMATURE GRANULOCYTE COUNT: 0.04 X10(3) UL (ref 0–1)
IMMATURE GRANULOCYTE RATIO %: 0.5 %
LYMPHOCYTES # BLD AUTO: 1.16 X10(3) UL (ref 0.9–4)
LYMPHOCYTES NFR BLD AUTO: 13.2 %
M PROTEIN MFR SERPL ELPH: 6.6 G/DL (ref 6.1–8.3)
MCH RBC QN AUTO: 28.3 PG (ref 27–33.2)
MCHC RBC AUTO-ENTMCNC: 31.5 G/DL (ref 31–37)
MCV RBC AUTO: 90 FL (ref 81–100)
MONOCYTES # BLD AUTO: 0.63 X10(3) UL (ref 0.1–1)
MONOCYTES NFR BLD AUTO: 7.2 %
NEUTROPHIL ABS PRELIM: 6.64 X10 (3) UL (ref 1.3–6.7)
NEUTROPHILS # BLD AUTO: 6.64 X10(3) UL (ref 1.3–6.7)
NEUTROPHILS NFR BLD AUTO: 75.2 %
PHOSPHATE SERPL-MCNC: 3.6 MG/DL (ref 2.5–4.9)
PLATELET # BLD AUTO: 203 10(3)UL (ref 150–450)
POTASSIUM SERPL-SCNC: 3.9 MMOL/L (ref 3.6–5.1)
POTASSIUM SERPL-SCNC: 3.9 MMOL/L (ref 3.6–5.1)
RBC # BLD AUTO: 3.6 X10(6)UL (ref 3.8–5.1)
RED CELL DISTRIBUTION WIDTH-SD: 49.6 FL (ref 35.1–46.3)
SODIUM SERPL-SCNC: 150 MMOL/L (ref 136–144)
WBC # BLD AUTO: 8.8 X10(3) UL (ref 4–13)

## 2018-05-03 PROCEDURE — 99232 SBSQ HOSP IP/OBS MODERATE 35: CPT | Performed by: INTERNAL MEDICINE

## 2018-05-03 PROCEDURE — 99233 SBSQ HOSP IP/OBS HIGH 50: CPT | Performed by: OTHER

## 2018-05-03 PROCEDURE — 71045 X-RAY EXAM CHEST 1 VIEW: CPT | Performed by: INTERNAL MEDICINE

## 2018-05-03 PROCEDURE — 99232 SBSQ HOSP IP/OBS MODERATE 35: CPT | Performed by: HOSPITALIST

## 2018-05-03 PROCEDURE — 99231 SBSQ HOSP IP/OBS SF/LOW 25: CPT | Performed by: NURSE PRACTITIONER

## 2018-05-03 NOTE — PROGRESS NOTES
[facility]  Cardiology Progress Note    Subjective:  Sleeping with Bipap in place. Easy to arouse. Transferred from CCU earlier today. Complains of slight headache to posterior right portion of head. Daughter and sister-in-law in the room.   State Nebulization Ricki@[employer]   • piperacillin-tazobactam  3.375 g Intravenous Q8H   • Clopidogrel Bisulfate  75 mg Oral Daily   • Senna  17.2 mg Oral Nightly   • famoTIDine  20 mg Oral Daily    Or   • famoTIDine  20 mg Intravenous Daily   • ipratropium-albut

## 2018-05-03 NOTE — PROGRESS NOTES
[facility] Interventional Neuro Radiology Progress Note    Claudetta Freud Patient Status:  Inpatient    1950 MRN [id number]   [facility] 6NE-A Attending Juan Luis Reardon MD   Hosp Day # 7 PCP Aurora Rodriguez MD     CC: Left Bruising noted. No drainage noted. Site open to air.       Lab Results  Component Value Date   WBC 8.8 05/03/2018   HGB 10.2 05/03/2018   HCT 32.4 05/03/2018   .0 05/03/2018   CREATSERUM 1.03 05/03/2018   BUN 23 05/03/2018    05/03/2018   K 3 goal < 160mmHg  · GI prophylaxis  · Bilateral effusion and infiltrates  · Pulm following  · Paroxymal a-fib  · Cardiology following  · No anticoagulation currently due to hemorrhagic component of CVA  · Neuro to decide when appropriate to start  · Chronic

## 2018-05-03 NOTE — CM/SW NOTE
AURY met with pt and daughter this am. Explained that Dr. Kaya Marie met with pt and approved for acute rehab. Daughter still deciding between [facility]. Advised that she tour both. Also asked her to let aury know by tomorrow her choice.   Aury gooden

## 2018-05-03 NOTE — PROGRESS NOTES
[facility]  Neuro Critical Care Progress Note    Margarette Gut Patient Status:  Inpatient    1950 MRN [id number]   [facility] 6NE-A Attending Ivana Hoover MD   Hosp Day # 7 PCP Hernandez Nolan MD     Subjective:  Waking a Subcutaneous, [email or web address]  •  Heparin Sodium (Porcine) 5000 UNIT/ML injection 5,000 Units, 5,000 Units, Subcutaneous, 2 times per day  •  Sertraline HCl (ZOLOFT) tab 50 mg, 50 mg, Oral, Daily  •  QUEtiapine Fumarate (SEROQUEL) tab 25 mg, 25 mg, Oral, BID 650 mg, Rectal, Q4H PRN  •  ipratropium-albuterol (DUONEB) nebulizer solution 3 mL, 3 mL, Nebulization, Q1H PRN  •  ipratropium-albuterol (DUONEB) nebulizer solution 3 mL, 3 mL, Nebulization, Q4H PRN  •  niCARdipine HCl (CARDENE) 50 mg in sodium chloride 0 Hyponatremia     Aspiration pneumonia of both lower lobes (HCC)     Hypervolemia     Respiratory insufficiency     Hypokalemia     Hypernatremia        Neuro:  Right MCA stroke due to right ICA occlusion and thromboembolism of right M2 s/p IV tpa and s/p c

## 2018-05-03 NOTE — PROGRESS NOTES
[facility]  Progress Note     Claudetta Freud Patient Status:  Inpatient    1950 MRN [id number]   [facility] 6NE-A Attending Juan Luis Reardon MD   Hosp Day # 7 PCP Aurora Rodriguez MD     Chief Complaint: CVA  S:    Now sound 29.0   ALKPHO  61   --   73   --    --    --   100   AST  29   --   33   --    --    --   81*   ALT  21   --   28   --    --    --   76*   BILT  0.9   --   0.7   --    --    --   0.7   TP  6.0*   --   6.2   --    --    --   6.6    < > = values in this inte NGTD   3. Acute heart failure- diastolic-  1. Resolving    2. Cards following   3. Han removed cont close monitoring    4. Replace lytes   4. PAF  1. Cards following / EP   2. amio  Cont   3.  No AC at present   Hoping to avoid  w/ recent neuro events   5

## 2018-05-03 NOTE — PLAN OF CARE
Problem: Impaired Swallowing  Goal: Minimize aspiration risk  Interventions:  - Patient should be alert and upright for all feedings (90 degrees preferred); 1:1 supervision  - Offer food and liquids at a slow rate  - OK for straws in single sips  - Osmany Childs

## 2018-05-03 NOTE — PROGRESS NOTES
Pt transferred to room 7601 at 1100. Pt A&Ox4. On 8L HF nc. L side weakness. L facial droop and visual cut noted. R arm precautions. Fall and safety precautions maintained. Pt oriented to room and call light. Will continue to monitor.

## 2018-05-03 NOTE — PLAN OF CARE
CARDIOVASCULAR - ADULT    • Maintains optimal cardiac output and hemodynamic stability Progressing    • Absence of cardiac arrhythmias or at baseline Progressing        DISCHARGE PLANNING    • Discharge to home or other facility with appropriate resources from fall injury Progressing        SKIN/TISSUE INTEGRITY - ADULT    • Skin integrity remains intact Progressing    • Incision(s), wounds(s) or drain site(s) healing without S/S of infection Progressing    • Oral mucous membranes remain intact Progressing

## 2018-05-03 NOTE — PROGRESS NOTES
[facility]  Progress Note    Janett Bronson Patient Status:  Inpatient    1950 MRN [id number]   [facility] 6NE-A Attending Dionte Miller MD   [facility] Day # 7 PCP Bell Wilhelm MD     Subjective:  Janett Bronson is a(n) 75 ye 32.0  31.5   RDW  15.2  15.0  14.8   NEPRELIM  7.98*  6.75*  6.64   WBC  9.5  8.4  8.8   PLT  130.0*  158.0  203.0     Recent Labs   Lab  05/02/18   0431  05/02/18   1048  05/02/18   1412  05/02/18   2145  05/03/18   0430   GLU  98  231*   --    --   116* carotids  10. Diabetes mellitus  11. COPD without evidence of acute exacerbation  12. Hepato-steatosis/fatty liver - Questionable ascites  13. PATRICIA  14.  Nutrition beginning orals with speech following     Plan:   Slow improvement with plans for extended jonathan

## 2018-05-03 NOTE — PROGRESS NOTES
[facility]  Nephrology Progress Note    Ever Mohave Patient Status:  Inpatient    1950 MRN [id number]   [facility] 6NE-A Attending Gilbert Friend MD   Hosp Day # 7 PCP Negin Barajas MD       SUBJECTIVE:  Awake and alert, e 3.9  3.9   CL  121*   --    --   121*  114*   --    --   114*   CO2  20.0*   --    --   26.0  26.0   --    --   29.0   BUN  32*   --    --   29*  29*   --    --   23*   CREATSERUM  1.04*   --    --   0.96  1.14*   --    --   1.03*   CA  8.5   --    --   8. tab 75 mg 75 mg Oral Daily   Normal Saline Flush 0.9 % injection 10 mL 10 mL Intravenous PRN   Senna (SENOKOT) tab TABS 17.2 mg 17.2 mg Oral Nightly   PEG 3350 (MIRALAX) powder packet 17 g 17 g Oral Daily PRN   magnesium hydroxide (MILK OF MAGNESIA) 400 MG

## 2018-05-03 NOTE — PLAN OF CARE
HEMATOLOGIC - ADULT    • Free from bleeding injury Completed    • Maintains hematologic stability Completed          CARDIOVASCULAR - ADULT    • Maintains optimal cardiac output and hemodynamic stability Progressing    • Absence of cardiac arrhythmias or a Absence of fever/infection during anticipated neutropenic period Progressing        SAFETY ADULT - FALL    • Free from fall injury Progressing        SKIN/TISSUE INTEGRITY - ADULT    • Skin integrity remains intact Progressing    • Incision(s), wounds(s) o

## 2018-05-03 NOTE — DIETARY NOTE
NUTRITION INITIAL ASSESSMENT    Pt is at moderate nutrition risk. Pt does not meet malnutrition criteria.     NUTRITION DIAGNOSIS/PROBLEM:    Inadequate oral intake related to inability to take sufficient po as evidenced by decreased appetite, dysphagia s/p Last 6 Encounters:  05/03/18 : 73.6 kg (162 lb 4.1 oz)  04/17/18 : 80.3 kg (177 lb)  12/13/17 : 83 kg (183 lb)  09/25/17 : 83 kg (183 lb)  09/11/17 : 81.6 kg (180 lb)  08/07/17 : 84.4 kg (186 lb)      NUTRITION:  Diet: 1800 DM, pureed, nectar thick  Oral S

## 2018-05-03 NOTE — PHYSICAL THERAPY NOTE
PHYSICAL THERAPY TREATMENT NOTE - INPATIENT    Room Number: 1598/8786-Z     Session: 2  Number of Visits to Meet Established Goals: 6    Presenting Problem: CVA   History related to current admission: Pt is a 75 y.o.  Female admitted 4/26/18 from home with Past Surgical History  Past Surgical History:  No date: ANGIOGRAM  2/28/14: ANGIOPLASTY (CORONARY)      Comment: stents acute mi  No date: APPENDECTOMY  No date: BACK SURGERY  No date: BIOPSY OF SKIN LESION      Comment: hyperkeratosis  1996 and 2012 (AM-PAC Scale): 28.58   CMS Modifier (G-Code): CM    FUNCTIONAL ABILITY STATUS  Gait Assessment   Gait Assistance: Not tested           Stoop/Curb Assistance: Not tested  Comment : above per FIM    Skilled Therapy Provided: Pt received supine in bed and is with poor midline orientation, poor static/dynamic sitting balance, decreased safety awareness, decreased insight into impairments, L UE/LE strength deficits, and decreased activity tolerance.  These impairments and comorbidities manifest themselves as func

## 2018-05-03 NOTE — DIETARY NOTE
Nutrition short note:    RD consulted for TF recommendations. Enteral Nutrition - Recommend Glucerna 1.5 at 30 ml/hour advancing 10 ml/hour q 6 hours to goal rate of 50 ml/hour.  Recommend 150 ml water flush q 4 hours to meet fluid needs or per MD discre

## 2018-05-03 NOTE — SLP NOTE
SPEECH DAILY NOTE - INPATIENT    ASSESSMENT & PLAN   ASSESSMENT  Pt seen for dysphagia tx to assess tolerance with recommended diet, ensure proper utilization of aspiration precautions and provide pt/family education.   Patient alert and up in bedside chair accuracy over 2-3 session(s). In Progress  Updated 5/3/18   Goal #2 The patient/family/caregiver will demonstrate understanding and implementation of aspiration precautions and swallow strategies independently over 2-3 session(s).      In Progress   Goal #

## 2018-05-04 ENCOUNTER — APPOINTMENT (OUTPATIENT)
Dept: GENERAL RADIOLOGY | Facility: HOSPITAL | Age: 68
DRG: 252 | End: 2018-05-04
Attending: INTERNAL MEDICINE
Payer: MEDICARE

## 2018-05-04 LAB
ALBUMIN SERPL-MCNC: 2.1 G/DL (ref 3.5–4.8)
ALP LIVER SERPL-CCNC: 96 U/L (ref 55–142)
ALT SERPL-CCNC: 59 U/L (ref 14–54)
AST SERPL-CCNC: 40 U/L (ref 15–41)
ATRIAL RATE: 136 BPM
BILIRUB SERPL-MCNC: 0.6 MG/DL (ref 0.1–2)
BUN BLD-MCNC: 16 MG/DL (ref 8–20)
CALCIUM BLD-MCNC: 9.1 MG/DL (ref 8.3–10.3)
CHLORIDE: 108 MMOL/L (ref 101–111)
CO2: 25 MMOL/L (ref 22–32)
CREAT BLD-MCNC: 0.87 MG/DL (ref 0.55–1.02)
ERYTHROCYTE [DISTWIDTH] IN BLOOD BY AUTOMATED COUNT: 14.5 % (ref 11.5–16)
GLUCOSE BLD-MCNC: 153 MG/DL (ref 70–99)
GLUCOSE BLD-MCNC: 156 MG/DL (ref 65–99)
GLUCOSE BLD-MCNC: 167 MG/DL (ref 65–99)
GLUCOSE BLD-MCNC: 192 MG/DL (ref 65–99)
GLUCOSE BLD-MCNC: 238 MG/DL (ref 65–99)
HAV IGM SER QL: 2.1 MG/DL (ref 1.8–2.5)
HCT VFR BLD AUTO: 33 % (ref 34–50)
HGB BLD-MCNC: 10.2 G/DL (ref 12–16)
M PROTEIN MFR SERPL ELPH: 6.5 G/DL (ref 6.1–8.3)
MCH RBC QN AUTO: 28 PG (ref 27–33.2)
MCHC RBC AUTO-ENTMCNC: 30.9 G/DL (ref 31–37)
MCV RBC AUTO: 90.7 FL (ref 81–100)
PHOSPHATE SERPL-MCNC: 4.3 MG/DL (ref 2.5–4.9)
PLATELET # BLD AUTO: 223 10(3)UL (ref 150–450)
POTASSIUM SERPL-SCNC: 3.9 MMOL/L (ref 3.6–5.1)
Q-T INTERVAL: 398 MS
QRS DURATION: 102 MS
QTC CALCULATION (BEZET): 484 MS
R AXIS: -29 DEGREES
RBC # BLD AUTO: 3.64 X10(6)UL (ref 3.8–5.1)
RED CELL DISTRIBUTION WIDTH-SD: 48.2 FL (ref 35.1–46.3)
SODIUM SERPL-SCNC: 142 MMOL/L (ref 136–144)
T AXIS: 68 DEGREES
VENTRICULAR RATE: 89 BPM
WBC # BLD AUTO: 8 X10(3) UL (ref 4–13)

## 2018-05-04 PROCEDURE — 99223 1ST HOSP IP/OBS HIGH 75: CPT | Performed by: OTHER

## 2018-05-04 PROCEDURE — 71045 X-RAY EXAM CHEST 1 VIEW: CPT | Performed by: INTERNAL MEDICINE

## 2018-05-04 PROCEDURE — 99232 SBSQ HOSP IP/OBS MODERATE 35: CPT | Performed by: HOSPITALIST

## 2018-05-04 PROCEDURE — 99232 SBSQ HOSP IP/OBS MODERATE 35: CPT | Performed by: INTERNAL MEDICINE

## 2018-05-04 RX ORDER — EZETIMIBE 10 MG/1
10 TABLET ORAL NIGHTLY
Qty: 30 TABLET | Refills: 1 | Status: SHIPPED | OUTPATIENT
Start: 2018-05-04 | End: 2018-09-10

## 2018-05-04 RX ORDER — HYDRALAZINE HYDROCHLORIDE 50 MG/1
50 TABLET, FILM COATED ORAL
Status: DISCONTINUED | OUTPATIENT
Start: 2018-05-04 | End: 2018-05-05

## 2018-05-04 RX ORDER — DOCUSATE SODIUM 100 MG/1
100 CAPSULE, LIQUID FILLED ORAL 2 TIMES DAILY
Status: DISCONTINUED | OUTPATIENT
Start: 2018-05-04 | End: 2018-05-18

## 2018-05-04 RX ORDER — LISINOPRIL 20 MG/1
20 TABLET ORAL DAILY
Status: DISCONTINUED | OUTPATIENT
Start: 2018-05-05 | End: 2018-05-05

## 2018-05-04 RX ORDER — AMIODARONE HYDROCHLORIDE 200 MG/1
200 TABLET ORAL DAILY
Status: DISCONTINUED | OUTPATIENT
Start: 2018-05-05 | End: 2018-05-14

## 2018-05-04 NOTE — PROGRESS NOTES
[facility]  Progress Note    Odalys Summers Patient Status:  Inpatient    1950 MRN [id number]   [facility] 6NE-A Attending Isaiah Camacho MD   [address] Day # 8 PCP Mona Montaño MD     Subjective:  Odalys Summers is a(n 75 ye NEPRELIM  6.75*  6.64   --    WBC  8.4  8.8  8.0   PLT  158.0  203.0  223.0     Recent Labs   Lab  05/02/18   1048  05/02/18   1412  05/02/18   2145  05/03/18   0430  05/04/18   0535   GLU  231*   --    --   116*  153*   BUN  29*   --    --   23*  16   C ascites  13. PATRICIA  14.  Nutrition beginning orals with speech following     Plan:   Continue empiric abx, day 6 today  Will continue nocturnal and prn AVAPS  Wean o2 for sats >89%  Continue aspiration precautions    Merlin Dike, MD  05/04/18

## 2018-05-04 NOTE — PROGRESS NOTES
[facility]  Interventional Neuroradiology Progress Note    Laquetta Paget Patient Status:  Inpatient    1950 MRN [id number]   [facility] 7NE-A Attending Tre Graham MD   Hosp Day # 8 PCP Avery Giordano MD     Subjective:  Angel Curtis • insulin detemir  20 Units Subcutaneous [email or web address]   • Heparin Sodium (Porcine)  5,000 Units Subcutaneous 2 times per day   • Sertraline HCl  50 mg Oral Daily   • QUEtiapine Fumarate  25 mg Oral BID   • amiodarone HCl  200 mg Per NG Tube TID   • ezet support nutritional needs for rehab  PT/OT following, recommending AR  Will plan to do single vessel angio on Monday at 0900  Patient to be NPO at midnight on 5/7/18  Nursing to verify informed consent  Continue ASA and Plavix    Discussed with Dr. Joseline Hernandez

## 2018-05-04 NOTE — PHYSICAL THERAPY NOTE
PHYSICAL THERAPY TREATMENT NOTE - INPATIENT    Room Number: 1059/8237-I     Session: 3    Number of Visits to Meet Established Goals: 6    Presenting Problem: CVA  History related to current admission: Pt is a 75 y.o.  Female admitted 4/26/18 from home wit Past Surgical History  Past Surgical History:  No date: ANGIOGRAM  2/28/14: ANGIOPLASTY (CORONARY)      Comment: stents acute mi  No date: APPENDECTOMY  No date: BACK SURGERY  No date: BIOPSY OF SKIN LESION      Comment: hyperkeratosis  1996 and 2012 (AM-PAC Scale): 28.58   CMS Modifier (G-Code): CM    FUNCTIONAL ABILITY STATUS  Gait Assessment   Gait Assistance: Not tested           Stoop/Curb Assistance: Not tested  Comment : above per FIM    Skilled Therapy Provided: Pt presents supine in bed, agree Recommendations: Acute rehabilitation     PLAN  PT Treatment Plan: Bed mobility; Endurance; Energy conservation;Patient education; Family education;Gait training;Neuromuscular re-educate;Transfer training;Balance training  Rehab Potential : Good  Frequency (O

## 2018-05-04 NOTE — PLAN OF CARE
Assumed care at 0700  A&0x4- forgetful  No c/o pain   LUE swelling and bruising   LLE weakness   PRN labetalol given for HTN- HR 50's okayed by APN to give   Nursing will continue to monitor                   CARDIOVASCULAR - ADULT    • Maintains optimal c Patient/Family Long Term Goal Progressing    • Patient/Family Short Term Goal Progressing        RESPIRATORY - ADULT    • Achieves optimal ventilation and oxygenation Progressing        RISK FOR INFECTION - ADULT    • Absence of fever/infection during anti

## 2018-05-04 NOTE — PROGRESS NOTES
[id number] [facility] Neurology Progress Note    Anuel Harrell Patient Status:  Inpatient    1950 MRN [id number]   [facility] 7NE-A Attending Donna Stover MD   Hosp Day # 8 PCP Irene Tsang MD     CC: Left sided weakness    Sub 05/04/2018   .0 05/04/2018   CREATSERUM 0.87 05/04/2018   BUN 16 05/04/2018    05/04/2018   K 3.9 05/04/2018    05/04/2018   CO2 25.0 05/04/2018    05/04/2018   CA 9.1 05/04/2018   ALB 2.1 05/04/2018   ALKPHO 96 05/04/2018   BILT therapy  · ASA 81mg daily  · Plavix 75mg daily  · Bilateral effusion and infiltrates  · Paroxymal a-fib  · Cardiology following  · Chronic renal insufficiency   · Nephrology following  · Crt down to 0.87 today  · CAD  · HF with preserved EF  · DM- poorly c

## 2018-05-04 NOTE — PLAN OF CARE
Assumed care at 1100. Pt A&O x4. Slurred speech, L field cut, L facial droop. L arm flaccid, pt is able to move LLE at the hip and knee joint. Sensation decreased on left side. On 8L HF nc. IS encouraged. BP within parameters.  Incontinent, changed and repo Achieves maximal functionality and self care Progressing        PAIN - ADULT    • Verbalizes/displays adequate comfort level or patient's stated pain goal Progressing        RESPIRATORY - ADULT    • Achieves optimal ventilation and oxygenation Progressing

## 2018-05-04 NOTE — DIETARY NOTE
Nutrition Short Note    3 day calorie count started. Envelope hung on door, will be monitored daily by RD. For accuracy, please document amount of all meals, snacks, and supplements.      Patrick Buckner MA, RD, LDN  Pager [phone]

## 2018-05-04 NOTE — PROGRESS NOTES
[facility]  Progress Note     Sai Villarreal Patient Status:  Inpatient    1950 MRN [id number]   [facility] 6NE-A Attending Yoli Rowell MD   Hosp Day # 8 PCP Joselyn Venegas MD     Chief Complaint: CVA  S:     No c/o ot --    --    --   6.6  6.5     No results for input(s): PTP, INR in the last 72 hours. No results for input(s): TROP, CK in the last 72 hours. Imaging: Imaging data reviewed in Epic.   Medications:   • nicotine  1 patch Transdermal Daily   • metoprolol 5. Urinary retention-   Resolved  Voiding well    6. Fever- blood cultures   4/30  NGTD    Zosyn   7. E.Coli  10-50K   UTI- zosyn    Day 5      Initially on rocephin x2   8. h/o CEA in 2016  9. Chronic COPD with hypoxia/tachypnea.  Probable PATRICIA- Pulmonary

## 2018-05-04 NOTE — PROGRESS NOTES
[facility] Cardiology Progress Note    Subjective:  Noticing some small improvements every day. Starting to eat. Able to stand at bedside yesterday.      Objective:  /34   Pulse 53   Temp 97.7 °F (36.5 °C) (Oral)   Resp 15   Wt 162 lb 4.1 oz (73.6 kg)

## 2018-05-04 NOTE — PLAN OF CARE
Assumed care at 1900. Pt A&Ox4. NSR on tele. 8L O2 hi-flow NC while awake, BIPAP with sleep. Lung sounds diminished. Neuro assessment unchanged. Left arm flaccid, able to move LLE. Speech slurred, but able to understand.  Tolerating thin liquids, aspiration

## 2018-05-04 NOTE — SLP NOTE
SPEECH/LANGUAGE/COGNITIVE EVALUATION & DYSPHAGIA  FOLLOW UP- INPATIENT    Admission Date: 4/26/2018  Evaluation Date: 05/04/18    Reason for Referral: Stroke protocol;RN dysphagia screen    ASSESSMENT & PLAN   ASSESSMENT & IMPRESSION  Patient seen for mayur Level of Function: Independent      Imaging Results:   CXR from 5/4/18 revealed:  Impression     CONCLUSION:    1.  Compared to 5/3/18 chest radiograph, slight improvement of right lower lobe increased opacity may be due to improved inspiratory film, correl

## 2018-05-04 NOTE — PLAN OF CARE
Problem: Impaired Swallowing  Goal: Minimize aspiration risk  Interventions:  - Patient should be alert and upright for all feedings (90 degrees preferred); 1:1 supervision  - Offer food and liquids at a slow rate  - OK for straws in single sips  - Cirilo Fischer

## 2018-05-04 NOTE — CM/SW NOTE
Pt not yet ready for discharge. aury left VM for dtr [address] with contact information to notify [facility] once a decision on [facility] is made. [facility] liaison states that pt will be placed on list for follow up on Monday; they will not admit pt this weekend.  aury [place]

## 2018-05-05 ENCOUNTER — APPOINTMENT (OUTPATIENT)
Dept: GENERAL RADIOLOGY | Facility: HOSPITAL | Age: 68
DRG: 252 | End: 2018-05-05
Attending: INTERNAL MEDICINE
Payer: MEDICARE

## 2018-05-05 LAB
BUN BLD-MCNC: 16 MG/DL (ref 8–20)
CALCIUM BLD-MCNC: 9 MG/DL (ref 8.3–10.3)
CHLORIDE: 106 MMOL/L (ref 101–111)
CO2: 29 MMOL/L (ref 22–32)
CREAT BLD-MCNC: 1.07 MG/DL (ref 0.55–1.02)
GLUCOSE BLD-MCNC: 150 MG/DL (ref 65–99)
GLUCOSE BLD-MCNC: 156 MG/DL (ref 70–99)
GLUCOSE BLD-MCNC: 211 MG/DL (ref 65–99)
GLUCOSE BLD-MCNC: 246 MG/DL (ref 65–99)
GLUCOSE BLD-MCNC: 281 MG/DL (ref 65–99)
GLUCOSE BLD-MCNC: 288 MG/DL (ref 65–99)
POTASSIUM SERPL-SCNC: 3.9 MMOL/L (ref 3.6–5.1)
SODIUM SERPL-SCNC: 140 MMOL/L (ref 136–144)

## 2018-05-05 PROCEDURE — 71045 X-RAY EXAM CHEST 1 VIEW: CPT | Performed by: INTERNAL MEDICINE

## 2018-05-05 PROCEDURE — 99233 SBSQ HOSP IP/OBS HIGH 50: CPT | Performed by: HOSPITALIST

## 2018-05-05 PROCEDURE — 99232 SBSQ HOSP IP/OBS MODERATE 35: CPT | Performed by: INTERNAL MEDICINE

## 2018-05-05 RX ORDER — AMLODIPINE BESYLATE 10 MG/1
10 TABLET ORAL DAILY
Status: DISCONTINUED | OUTPATIENT
Start: 2018-05-05 | End: 2018-05-18

## 2018-05-05 RX ORDER — HYDRALAZINE HYDROCHLORIDE 50 MG/1
100 TABLET, FILM COATED ORAL EVERY 8 HOURS SCHEDULED
Status: DISCONTINUED | OUTPATIENT
Start: 2018-05-05 | End: 2018-05-18

## 2018-05-05 RX ORDER — CLONIDINE HYDROCHLORIDE 0.2 MG/1
0.2 TABLET ORAL ONCE
Status: COMPLETED | OUTPATIENT
Start: 2018-05-05 | End: 2018-05-05

## 2018-05-05 RX ORDER — ACETAMINOPHEN 325 MG/1
TABLET ORAL
Status: COMPLETED
Start: 2018-05-05 | End: 2018-05-05

## 2018-05-05 RX ORDER — LABETALOL HYDROCHLORIDE 5 MG/ML
20 INJECTION, SOLUTION INTRAVENOUS EVERY 4 HOURS PRN
Status: DISCONTINUED | OUTPATIENT
Start: 2018-05-05 | End: 2018-05-18

## 2018-05-05 RX ORDER — LISINOPRIL 20 MG/1
20 TABLET ORAL 2 TIMES DAILY
Status: DISCONTINUED | OUTPATIENT
Start: 2018-05-05 | End: 2018-05-18

## 2018-05-05 RX ORDER — HYDRALAZINE HYDROCHLORIDE 20 MG/ML
20 INJECTION INTRAMUSCULAR; INTRAVENOUS EVERY 4 HOURS PRN
Status: DISCONTINUED | OUTPATIENT
Start: 2018-05-05 | End: 2018-05-18

## 2018-05-05 NOTE — DIETARY NOTE
Nutrition Short Note    3 day calorie count started. Envelope hung on door, will be monitored daily by RD. For accuracy, please document amount of all meals, snacks, and supplements.      5/4/18:  Dinner: 266 calories, 21.2 gm protein      NUTRITION PRESCRI

## 2018-05-05 NOTE — PROGRESS NOTES
[facility]  Progress Note     Daniele Raygoza Patient Status:  Inpatient    1950 MRN [id number]   [facility] 6NE-A Attending Camilla Jansen MD   Hosp Day # 5 PCP Peggy Nguyen MD     Chief Complaint: CVA  S:     Feels ok. 100 mg Oral BID   • nicotine  1 patch Transdermal Daily   • metoprolol Tartrate  25 mg Per NG Tube 2x Daily(Beta Blocker)   • insulin detemir  20 Units Subcutaneous [email or web address]   • Heparin Sodium (Porcine)  5,000 Units Subcutaneous 2 times per day   • Se Nicoderm patch     Working on better BP control  Increased Hydralazine to 100mg Q8H  Appreciate consults   DC planning to rehab hopefully Monday     Quality:  · DVT Prophylaxis: SCDs  · CODE status: Full  · PICC right    · Han placed  Removed   Estimated

## 2018-05-05 NOTE — PLAN OF CARE
Pt with flaccid left arm. Left neglect. Alert. Remains to have elevated bp's and new meds started. Better this afternoon. Remains on Zosyn for pneumonia. Pt needs to be fed as she pockets left cheek and unsteady with intake. Incontinent.  Large areas of bru NEUROLOGICAL - ADULT    • Achieves stable or improved neurological status Progressing    • Achieves maximal functionality and self care Progressing        PAIN - ADULT    • Verbalizes/displays adequate comfort level or patient's stated pain goal Progress

## 2018-05-05 NOTE — PLAN OF CARE
Assumed pt care at [address] [place] [address] for the night shift. Pt sitting in bed eating dinner. Aspiration precautions in place. Dght at the bedside. Pt denies any pain or other discomfort. Neuro assessment unchanged. SBP remains in 170's. Scheduled and prn meds given with brief i

## 2018-05-05 NOTE — PROGRESS NOTES
[facility]  Progress Note    Lucy Rowland Patient Status:  Inpatient    1950 MRN [id number]   [facility] 7NE-A Attending Kanu Sheldon MD   [facility] Day # 9 PCP Basia Verma MD     Subjective:  Lucy Rowland is a(n 75 ye 6.64   --    WBC  8.8  8.0   PLT  203.0  223.0     Recent Labs   Lab  05/03/18   0430  05/04/18   0535  05/05/18   0520   GLU  116*  153*  156*   BUN  23*  16  16   CREATSERUM  1.03*  0.87  1.07*   GFRAA  65  79  62   GFRNAA  56*  69  53*   CA  8.9  9.1  9 saturation.     Myles Nearing Santino SCHMIDT.  5/5/2018  9:26 AM

## 2018-05-05 NOTE — PROGRESS NOTES
[facility]  Nephrology Progress Note    Lucy Rowland Patient Status:  Inpatient    1950 MRN [id number]   [facility] 6NE-A Attending Kanu Sheldon MD   Hosp Day # 9 PCP Chika Dominguez MD       SUBJECTIVE:  Sleepy this AM, BP 4.3   --    GLU   --    --   116*  153*  156*       Recent Labs   Lab  05/03/18   0430  05/04/18   0535   ALT  76*  59*   AST  81*  40   ALB  2.2*  2.1*       Recent Labs   Lab  05/04/18   0612  05/04/18   1234  05/04/18   1800  05/05/18   0021  05/05/18 (MILK OF MAGNESIA) 400 MG/5ML suspension 30 mL 30 mL Oral Daily PRN   bisacodyl (DULCOLAX) rectal suppository 10 mg 10 mg Rectal Daily PRN   FLEET ENEMA (FLEET) 7-19 GM/118ML enema 133 mL 1 enema Rectal Once PRN   ondansetron HCl (ZOFRAN) injection 4 mg 4

## 2018-05-05 NOTE — PROGRESS NOTES
[id number] [facility] Neurology Progress Note    Mely Pettit Patient Status:  Inpatient    1950 MRN [id number]   [facility] 7NE-A Attending Judit Luna MD   [facility] Day # 9 PCP Franc Beebe MD     Subjective:  Mely Pettit deferred    Labs:    Lab Results  Component Value Date   CREATSERUM 1.07 05/05/2018   BUN 16 05/05/2018    05/05/2018   K 3.9 05/05/2018    05/05/2018   CO2 29.0 05/05/2018    05/05/2018   CA 9.0 05/05/2018   PGLU 150 05/05/2018     Imag

## 2018-05-05 NOTE — PROGRESS NOTES
[facility] Card[facility] Progress Note    Subjective:  Slept well. Still no movement in L arm, can pull left leg up at knee. Does not like bipap at night.      Objective:  /52 (BP Location: Left arm)   Pulse 67   Temp 98 °F (36.7 °C) (Oral)   Resp 20   Wt 1

## 2018-05-05 NOTE — SLP NOTE
Attempted to see patient for dysphagia, dysarthria and cognitive therapy, however, patient currently receiving RT. Spoke with RN Efrain Morrison, who reported pocketing and coughing on eggs.   Reviewed results of clinical bedside evaluation in which no eggs, cottage

## 2018-05-06 ENCOUNTER — APPOINTMENT (OUTPATIENT)
Dept: GENERAL RADIOLOGY | Facility: HOSPITAL | Age: 68
DRG: 252 | End: 2018-05-06
Attending: INTERNAL MEDICINE
Payer: MEDICARE

## 2018-05-06 LAB
ALLENS TEST: POSITIVE
ARTERIAL BLD GAS O2 SATURATION: 96 % (ref 92–100)
ARTERIAL BLOOD GAS BASE EXCESS: 1.6
ARTERIAL BLOOD GAS HCO3: 25.3 MEQ/L (ref 22–26)
ARTERIAL BLOOD GAS PCO2: 36 MM HG (ref 35–45)
ARTERIAL BLOOD GAS PH: 7.47 (ref 7.35–7.45)
ARTERIAL BLOOD GAS PO2: 88 MM HG (ref 80–105)
CALCULATED O2 SATURATION: 98 % (ref 92–100)
CARBOXYHEMOGLOBIN: 1.6 % SAT (ref 0–3)
GLUCOSE BLD-MCNC: 113 MG/DL (ref 65–99)
GLUCOSE BLD-MCNC: 182 MG/DL (ref 65–99)
GLUCOSE BLD-MCNC: 209 MG/DL (ref 65–99)
GLUCOSE BLD-MCNC: 216 MG/DL (ref 65–99)
L/M: 4 L/MIN
METHEMOGLOBIN: 0.5 % SAT (ref 0.4–1.5)
P/F RATIO: 430 MMHG
PATIENT TEMPERATURE: 97.8 F
TOTAL HEMOGLOBIN: 10.4 G/DL (ref 11.7–16)

## 2018-05-06 PROCEDURE — 99232 SBSQ HOSP IP/OBS MODERATE 35: CPT | Performed by: HOSPITALIST

## 2018-05-06 PROCEDURE — 71045 X-RAY EXAM CHEST 1 VIEW: CPT | Performed by: INTERNAL MEDICINE

## 2018-05-06 PROCEDURE — 99232 SBSQ HOSP IP/OBS MODERATE 35: CPT | Performed by: OTHER

## 2018-05-06 PROCEDURE — 99232 SBSQ HOSP IP/OBS MODERATE 35: CPT | Performed by: INTERNAL MEDICINE

## 2018-05-06 NOTE — SLP NOTE
SPEECH DAILY NOTE - INPATIENT    ASSESSMENT & PLAN   ASSESSMENT  Pt seen for meal monitor to assess tolerance of recommended diet, ensure proper utilization of aspiration precautions and provide pt/family education.  Pt awake and alert in bed with family/fr 100 % accuracy over 2 session(s). New goal   Goal #2 The patient/family/caregiver will demonstrate understanding and implementation of aspiration precautions and swallow strategies independently over 2-3 session(s).     In Progress     FOLLOW UP  Follow Up

## 2018-05-06 NOTE — PROGRESS NOTES
[facility]  Progress Note     Lucy Rowland Patient Status:  Inpatient    1950 MRN [id number]   [facility] 6NE-A Attending Kanu Sheldon MD   Hosp Day # 8 PCP Basia Verma MD     Chief Complaint: CVA  S:     BP gris Transdermal Daily   • metoprolol Tartrate  25 mg Per NG Tube 2x Daily(Beta Blocker)   • insulin detemir  20 Units Subcutaneous [email or web address]   • Heparin Sodium (Porcine)  5,000 Units Subcutaneous 2 times per day   • Sertraline HCl  50 mg Oral Daily   • QUE Appreciate consults    Quality:  · DVT Prophylaxis: SCDs  · CODE status: Full  · PICC right    · Han placed  Removed   Estimated date of discharge: ?Tuesday  Discharge is dependent on: weaning O2 and NI recs   At this point Ms. Rekha Usama is expected

## 2018-05-06 NOTE — PROGRESS NOTES
[facility]  Progress Note    Margarette Tyler Patient Status:  Inpatient    1950 MRN [id number]   [facility] 7NE-A Attending Natalie Owen MD   Hosp Day # 10 PCP Yaron Davis MD     Subjective:  Margarette Tyler is a(n) 75 y GLU  153*  156*   BUN  16  16   CREATSERUM  0.87  1.07*   GFRAA  79  62   GFRNAA  69  53*   CA  9.1  9.0   NA  142  140   K  3.9  3.9   CL  108  106   CO2  25.0  29.0     Recent Labs   Lab  05/06/18   0825   ABGPHT  7.47*   NSQCZO3C  36   OPOQS2G  88   A

## 2018-05-06 NOTE — PROGRESS NOTES
[id number] [facility] Neurology Progress Note    Kacey Garcia Patient Status:  Inpatient    1950 MRN [id number]   [facility] 7NE-A Attending Chrystal Oppenheim, MD   Hosp Day # 10 PCP Sandra White MD     Subjective:  Kacey Garcia absent  Gait: deferred    Labs:    Lab Results  Component Value Date   PGLU 209 05/06/2018       Imaging:  No new imaging    Impression:  Right MCA stroke- related to right ICA occlusion and right thromboembolism of right M2; s/p IV tPA day #9  Right ICA o Atorvastatin on discharge  - Plan for Angio on tomorrow as per NI. Will need to be NPO for this  - Neurology will continue to follow          Karen Clark D.O.   Neurology

## 2018-05-06 NOTE — PROGRESS NOTES
[facility] Progress Note    Subjective:  Eating  Breakfast and enjoying family at bedside.  L arm remains flaccid     Objective:  BP (!) 171/50 (BP Location: Left arm)   Pulse 53   Temp 97.8 °F (36.6 °C) (Oral)   Resp 18   Wt 163 lb 5.8 oz (74.1 kg)

## 2018-05-06 NOTE — DIETARY NOTE
Nutrition Short Note    3 day calorie count started. Envelope hung on door, will be monitored daily by RD. For accuracy, please document amount of all meals, snacks, and supplements.      5/4/18:  Dinner: 266 calories, 21.2 gm protein    5/5/18  Breakfast:

## 2018-05-06 NOTE — PROGRESS NOTES
Assumed pt care at 0730  Pt drowsy neuro check q4h see flowsheet  nsr/sb on tele   3L NC    bp elevated apresoline given x 1   Good appetite tolerating diet   meds crushed and given with applesauce  Plan for angiogram tomorrow npo p midnight

## 2018-05-06 NOTE — PROGRESS NOTES
[facility]  Nephrology Progress Note    Daniele Raygoza Patient Status:  Inpatient    1950 MRN [id number]   [facility] 6NE-A Attending Camilla Jansen MD   Hosp Day # 8 PCP Yojana Austin MD       SUBJECTIVE:  BP had incr yest Labs   Lab  05/04/18   0535   ALT  59*   AST  40   ALB  2.1*       Recent Labs   Lab  05/05/18   0021  05/05/18   0544  05/05/18   1221  05/05/18   1744  05/05/18   2045   PGLU  246*  150*  211*  281*  288*       Meds:     Current Facility-Administered Med mg 17.2 mg Oral Nightly   PEG 3350 (MIRALAX) powder packet 17 g 17 g Oral Daily PRN   magnesium hydroxide (MILK OF MAGNESIA) 400 MG/5ML suspension 30 mL 30 mL Oral Daily PRN   bisacodyl (DULCOLAX) rectal suppository 10 mg 10 mg Rectal Daily PRN   FLEET SNEHA

## 2018-05-06 NOTE — PLAN OF CARE
BP improved overnight w/SBP in 150's. Neuro assessment stable. Tylenol given for headache. NSR on tele. Remains on 3LHF O2 overnight w/O2 sats >92%.  in place. Sleeping comfortably in no apparent distress. Fall precautions in place. Will monitor.   Jami Musa

## 2018-05-07 ENCOUNTER — APPOINTMENT (OUTPATIENT)
Dept: GENERAL RADIOLOGY | Facility: HOSPITAL | Age: 68
DRG: 252 | End: 2018-05-07
Attending: INTERNAL MEDICINE
Payer: MEDICARE

## 2018-05-07 ENCOUNTER — APPOINTMENT (OUTPATIENT)
Dept: INTERVENTIONAL RADIOLOGY/VASCULAR | Facility: HOSPITAL | Age: 68
DRG: 252 | End: 2018-05-07
Attending: NURSE PRACTITIONER
Payer: MEDICARE

## 2018-05-07 LAB
BUN BLD-MCNC: 21 MG/DL (ref 8–20)
CALCIUM BLD-MCNC: 8.8 MG/DL (ref 8.3–10.3)
CHLORIDE: 102 MMOL/L (ref 101–111)
CO2: 27 MMOL/L (ref 22–32)
CREAT BLD-MCNC: 1.34 MG/DL (ref 0.55–1.02)
GLUCOSE BLD-MCNC: 162 MG/DL (ref 65–99)
GLUCOSE BLD-MCNC: 245 MG/DL (ref 65–99)
GLUCOSE BLD-MCNC: 253 MG/DL (ref 65–99)
GLUCOSE BLD-MCNC: 266 MG/DL (ref 65–99)
GLUCOSE BLD-MCNC: 323 MG/DL (ref 70–99)
POTASSIUM SERPL-SCNC: 4.4 MMOL/L (ref 3.6–5.1)
SODIUM SERPL-SCNC: 136 MMOL/L (ref 136–144)

## 2018-05-07 PROCEDURE — 99232 SBSQ HOSP IP/OBS MODERATE 35: CPT | Performed by: HOSPITALIST

## 2018-05-07 PROCEDURE — 99233 SBSQ HOSP IP/OBS HIGH 50: CPT | Performed by: OTHER

## 2018-05-07 PROCEDURE — 99232 SBSQ HOSP IP/OBS MODERATE 35: CPT | Performed by: INTERNAL MEDICINE

## 2018-05-07 PROCEDURE — B31QYZZ FLUOROSCOPY OF CERVICO-CEREBRAL ARCH USING OTHER CONTRAST: ICD-10-PCS | Performed by: RADIOLOGY

## 2018-05-07 PROCEDURE — B313YZZ FLUOROSCOPY OF RIGHT COMMON CAROTID ARTERY USING OTHER CONTRAST: ICD-10-PCS | Performed by: RADIOLOGY

## 2018-05-07 PROCEDURE — 71045 X-RAY EXAM CHEST 1 VIEW: CPT | Performed by: INTERNAL MEDICINE

## 2018-05-07 RX ORDER — HEPARIN SODIUM 5000 [USP'U]/ML
5000 INJECTION, SOLUTION INTRAVENOUS; SUBCUTANEOUS EVERY 8 HOURS SCHEDULED
Status: DISCONTINUED | OUTPATIENT
Start: 2018-05-07 | End: 2018-05-18

## 2018-05-07 RX ORDER — HEPARIN SODIUM 5000 [USP'U]/ML
INJECTION, SOLUTION INTRAVENOUS; SUBCUTANEOUS
Status: COMPLETED
Start: 2018-05-07 | End: 2018-05-07

## 2018-05-07 RX ORDER — HYDRALAZINE HYDROCHLORIDE 20 MG/ML
INJECTION INTRAMUSCULAR; INTRAVENOUS
Status: COMPLETED
Start: 2018-05-07 | End: 2018-05-07

## 2018-05-07 RX ORDER — LIDOCAINE HYDROCHLORIDE 10 MG/ML
INJECTION, SOLUTION INFILTRATION; PERINEURAL
Status: COMPLETED
Start: 2018-05-07 | End: 2018-05-07

## 2018-05-07 RX ORDER — CEFAZOLIN SODIUM/WATER 2 G/20 ML
SYRINGE (ML) INTRAVENOUS
Status: COMPLETED
Start: 2018-05-07 | End: 2018-05-07

## 2018-05-07 RX ORDER — LABETALOL 100 MG/1
100 TABLET, FILM COATED ORAL EVERY 12 HOURS SCHEDULED
Status: DISCONTINUED | OUTPATIENT
Start: 2018-05-07 | End: 2018-05-09

## 2018-05-07 RX ORDER — MINOXIDIL 2.5 MG/1
2.5 TABLET ORAL 2 TIMES DAILY
Status: DISCONTINUED | OUTPATIENT
Start: 2018-05-07 | End: 2018-05-08

## 2018-05-07 RX ORDER — MIDAZOLAM HYDROCHLORIDE 1 MG/ML
INJECTION INTRAMUSCULAR; INTRAVENOUS
Status: COMPLETED
Start: 2018-05-07 | End: 2018-05-07

## 2018-05-07 NOTE — DIETARY NOTE
NUTRITION Follow Up ASSESSMENT    Pt is at moderate nutrition risk. Pt does not meet malnutrition criteria.     NUTRITION DIAGNOSIS/PROBLEM:    Inadequate oral intake related to inability to take sufficient po as evidenced by decreased appetite, dysphagia s oz). This is 130 % of IBW  BMI: Body mass index is 27.18 kg/m².   IBW: 57 kg  Usual Body Wt: 82 kg    WEIGHT HISTORY:   Wt Readings from Last 6 Encounters:  05/06/18 : 74.1 kg (163 lb 5.8 oz)  04/17/18 : 80.3 kg (177 lb)  12/13/17 : 83 kg (183 lb)  09/25/17

## 2018-05-07 NOTE — PROCEDURES
[facility]  Neurointerventional Surgery Operative Report  Markus Leonardo Patient Status:  Inpatient    1950 MRN [id number]   [facility] Flor [facility]   [facility] Day # 6 PCP Manish Herrera MD

## 2018-05-07 NOTE — PROGRESS NOTES
[facility]  Progress Note     Margarette Gut Patient Status:  Inpatient    1950 MRN [id number]   [facility] 6NE-A Attending Natalie Owen MD   Hosp Day # 6 PCP Hernandez Nolan MD     Chief Complaint: CVA  S:     c/o  mil docusate sodium  100 mg Oral BID   • nicotine  1 patch Transdermal Daily   • metoprolol Tartrate  25 mg Per NG Tube 2x Daily(Beta Blocker)   • insulin detemir  20 Units Subcutaneous [email or web address]   • Sertraline HCl  50 mg Oral Daily   • QUEtiapine Fumarate was following   14. FEN-  Chopped diet, thin liquids    1. Calorie in place       15.  Tobacco dependency  - 1 ppd, Nicoderm patch     PLAN:  s/p angio- no kinking  Sbp remains in 170's-   Minoxidil 2.5 mg BID added   Cr 1.34   levemir held last noc./  this

## 2018-05-07 NOTE — PROGRESS NOTES
[facility]  Nephrology Progress Note    Claudetta Freud Patient Status:  Inpatient    1950 MRN [id number]   [facility] 6NE-A Attending Juan Luis Reardon MD   Hosp Day # 6 PCP Aurora Rodriguez MD       SUBJECTIVE:  BP has been eleva AST, ALB, AMYLASE, LIPASE, LDH in the last 72 hours.     Invalid input(s): ALPHOS, TBIL, DBIL, TPROT    Recent Labs   Lab  05/06/18   0733  05/06/18   1314  05/06/18   1709  05/06/18   2033  05/07/18   0755   PGLU  209*  182*  216*  113*  266*       Meds: 75 mg 75 mg Oral Daily   Normal Saline Flush 0.9 % injection 10 mL 10 mL Intravenous PRN   Senna (SENOKOT) tab TABS 17.2 mg 17.2 mg Oral Nightly   PEG 3350 (MIRALAX) powder packet 17 g 17 g Oral Daily PRN   magnesium hydroxide (MILK OF MAGNESIA) 400 MG/5ML

## 2018-05-07 NOTE — CARDIAC REHAB
Attempted to see patient for stroke education. Just back from cerebral angiogram with sedation. Too drowsy right now , cant keep eyes open. Will try back later today .

## 2018-05-07 NOTE — PROGRESS NOTES
[id number] [facility] Neurology Progress Note    Laquetta Paget Patient Status:  Inpatient    1950 MRN [id number]   [facility] 7NE-A Attending Tre Graham MD   Hosp Day # 6 PCP Avery Giordano MD     CC: Left sided weakness    Jones Value Date   CREATSERUM 1.34 05/07/2018   BUN 21 05/07/2018    05/07/2018   K 4.4 05/07/2018    05/07/2018   CO2 27.0 05/07/2018    05/07/2018   CA 8.8 05/07/2018   PGLU 266 05/07/2018       Diagnostics:  4/30/2018 CT Brain  Stable appea insufficiency   ? Nephrology following  ? Crt increased to 1.34 today  · CAD  · HF with preserved EF  · DM- poorly controlled    Patient more focused today. Plan for diagnotic cerebral angio today.   Await results from calorie count to determine if PEG umesh

## 2018-05-07 NOTE — SLP NOTE
Attempted to see pt for speech therapy services- pt off the floor for testing. Will follow up as scheduling permits. Thank you.     Richard Thornton M.S. [id number] [facility]  Pager [phone]

## 2018-05-07 NOTE — PROGRESS NOTES
[facility]  Progress Note    Clay Welsh Patient Status:  Inpatient    1950 MRN [id number]   [facility] Attending Jose Enrique Multani MD   [facility] Day # 11 PCP Raghu Fonseca MD     Assessment and Plan:   1.  I endarterectomy     History of carotid endarterectomy     Aortic sclerosis     Hyperlipidemia associated with type 2 diabetes mellitus (HCC)     Vomiting and diarrhea     TIP (acute kidney injury) ([address]     Acute right MCA stroke ([address]     Carotid occlusion K  3.9  4.4   CL  106  102   CO2  29.0  27.0     @MG@      Lab Results  Component Value Date   PT 13.4 02/28/2014   PT 13.9 02/27/2014   PT 20.2 (H) 10/22/2012   INR 1.06 02/28/2014   INR 1.11 02/27/2014   INR 1.79 (H) 10/22/2012        No results for in solution 0.5 mg 0.5 mg Nebulization [email or web address]   Piperacillin Sod-Tazobactam So (ZOSYN) 3.375 g in dextrose 5 % 100 mL ADD-vantage 3.375 g Intravenous Q8H   Clopidogrel Bisulfate (PLAVIX) tab 75 mg 75 mg Oral Daily   Normal Saline Flush 0.9 % injection

## 2018-05-07 NOTE — PLAN OF CARE
CARDIOVASCULAR - ADULT    • Maintains optimal cardiac output and hemodynamic stability Progressing    • Absence of cardiac arrhythmias or at baseline Progressing        DISCHARGE PLANNING    • Discharge to home or other facility with appropriate resources ADULT    • Absence of fever/infection during anticipated neutropenic period Progressing        SAFETY ADULT - FALL    • Free from fall injury Progressing        SKIN/TISSUE INTEGRITY - ADULT    • Skin integrity remains intact Progressing    • Incision(s),

## 2018-05-07 NOTE — PHYSICAL THERAPY NOTE
Attempted to see Pt this AM - per RN - systolic BP remains above parameters set by Neuro (<140mmHg). Attempted to see Pt this PM - RN aware of attempt. BP remains >160, and Pt reported extreme fatigue after earlier procedure.   Will f/u later today if t

## 2018-05-07 NOTE — CM/SW NOTE
Pt's dtr [facility] called to say that they have chosen [facility] for acute rehab. Pt not quite ready for d/c.

## 2018-05-07 NOTE — PLAN OF CARE
Assumed care at 1900  Neuro checks q4h  Deficits noted - slurred speech, L facial droop, field cut, L arm flaccid, L leg weakness, L side decreased sensation, L shoulder shrug abnormal  AOx4, Forgetful at times  3L NC,  maintained  SR/SB via tele  BP pa function Progressing    • Maintain proper alignment of affected body part Progressing        NEUROLOGICAL - ADULT    • Achieves stable or improved neurological status Progressing    • Achieves maximal functionality and self care Progressing        PAIN - A

## 2018-05-07 NOTE — PROGRESS NOTES
[facility]  Cardiology Progress Note    Griselda Pizarro Patient Status:  Inpatient    1950 MRN [id number]   [facility] 7NE-A Attending Clinton oDwning MD   Hosp Day # 11 PCP Tyrone Pizarro MD     Subjective:  Tired, mild headach Transdermal Daily   • metoprolol Tartrate  25 mg Per NG Tube 2x Daily(Beta Blocker)   • insulin detemir  20 Units Subcutaneous [email or web address]   • Sertraline HCl  50 mg Oral Daily   • QUEtiapine Fumarate  25 mg Oral BID   • ezetimibe  10 mg Oral Nightly   •

## 2018-05-07 NOTE — PROGRESS NOTES
[facility] Cardiology Progress Note    Subjective:  Denies chest pain or SOB    Objective:  BP (!) 165/51 (BP Location: Right arm)   Pulse 57   Temp 97.8 °F (36.6 °C) (Axillary)   Resp 17   Wt 163 lb 5.8 oz (74.1 kg)   SpO2 91%   BMI 27.18 kg/m²     Physical

## 2018-05-07 NOTE — PROGRESS NOTES
[facility]  Progress Note     Lucy Rowland Patient Status:  Inpatient    1950 MRN [id number]   [facility] 6NE-A Attending Kanu Sheldon MD   Hosp Day # [facility] PCP Basia Verma MD     Chief Complaint: CVA  S:     c/o  mil docusate sodium  100 mg Oral BID   • nicotine  1 patch Transdermal Daily   • metoprolol Tartrate  25 mg Per NG Tube 2x Daily(Beta Blocker)   • insulin detemir  20 Units Subcutaneous Sg@"[email or web address]   • Sertraline HCl  50 mg Oral Daily   • QUEtiapine Fumarate was following   14. FEN-  Chopped diet, thin liquids    1. Calorie in place       15.  Tobacco dependency  - 1 ppd, Nicoderm patch     PLAN:  s/p angio- no kinking  Sbp remains in 170's-   Minoxidil 2.5 mg BID added   Cr 1.34   levemir held last noc./  this

## 2018-05-08 ENCOUNTER — APPOINTMENT (OUTPATIENT)
Dept: GENERAL RADIOLOGY | Facility: HOSPITAL | Age: 68
DRG: 252 | End: 2018-05-08
Attending: INTERNAL MEDICINE
Payer: MEDICARE

## 2018-05-08 LAB
BUN BLD-MCNC: 24 MG/DL (ref 8–20)
CALCIUM BLD-MCNC: 9.3 MG/DL (ref 8.3–10.3)
CHLORIDE: 99 MMOL/L (ref 101–111)
CO2: 29 MMOL/L (ref 22–32)
CREAT BLD-MCNC: 1.33 MG/DL (ref 0.55–1.02)
GLUCOSE BLD-MCNC: 192 MG/DL (ref 65–99)
GLUCOSE BLD-MCNC: 235 MG/DL (ref 65–99)
GLUCOSE BLD-MCNC: 243 MG/DL (ref 65–99)
GLUCOSE BLD-MCNC: 265 MG/DL (ref 70–99)
GLUCOSE BLD-MCNC: 341 MG/DL (ref 65–99)
HAV IGM SER QL: 2.2 MG/DL (ref 1.8–2.5)
POTASSIUM SERPL-SCNC: 4.3 MMOL/L (ref 3.6–5.1)
SODIUM SERPL-SCNC: 134 MMOL/L (ref 136–144)

## 2018-05-08 PROCEDURE — 99233 SBSQ HOSP IP/OBS HIGH 50: CPT | Performed by: OTHER

## 2018-05-08 PROCEDURE — 71045 X-RAY EXAM CHEST 1 VIEW: CPT | Performed by: INTERNAL MEDICINE

## 2018-05-08 PROCEDURE — 99232 SBSQ HOSP IP/OBS MODERATE 35: CPT | Performed by: HOSPITALIST

## 2018-05-08 PROCEDURE — 99232 SBSQ HOSP IP/OBS MODERATE 35: CPT | Performed by: INTERNAL MEDICINE

## 2018-05-08 RX ORDER — CALCIUM CARBONATE 200(500)MG
500 TABLET,CHEWABLE ORAL 2 TIMES DAILY PRN
Status: DISCONTINUED | OUTPATIENT
Start: 2018-05-08 | End: 2018-05-18

## 2018-05-08 RX ORDER — QUETIAPINE 25 MG/1
25 TABLET, FILM COATED ORAL NIGHTLY
Status: DISCONTINUED | OUTPATIENT
Start: 2018-05-08 | End: 2018-05-09

## 2018-05-08 RX ORDER — CALCIUM CARBONATE 200(500)MG
1000 TABLET,CHEWABLE ORAL 2 TIMES DAILY PRN
Status: DISCONTINUED | OUTPATIENT
Start: 2018-05-08 | End: 2018-05-18

## 2018-05-08 RX ORDER — MAGNESIUM HYDROXIDE/ALUMINUM HYDROXICE/SIMETHICONE 120; 1200; 1200 MG/30ML; MG/30ML; MG/30ML
30 SUSPENSION ORAL 4 TIMES DAILY PRN
Status: DISCONTINUED | OUTPATIENT
Start: 2018-05-08 | End: 2018-05-18

## 2018-05-08 RX ORDER — ACETAMINOPHEN 325 MG/1
650 TABLET ORAL EVERY 6 HOURS PRN
Status: DISCONTINUED | OUTPATIENT
Start: 2018-05-08 | End: 2018-05-18

## 2018-05-08 RX ORDER — MINOXIDIL 2.5 MG/1
5 TABLET ORAL 2 TIMES DAILY
Status: DISCONTINUED | OUTPATIENT
Start: 2018-05-08 | End: 2018-05-09

## 2018-05-08 NOTE — PLAN OF CARE
Problem: Patient/Family Goals  Goal: Patient/Family Short Term Goal  Patient's Short Term Goal: to have SBP <160    Interventions:   -add Catapres patch and po  -increase scheduled Hydralazine   -prn Hydralazine IV,prn Lobetalol  -monitor I/O's  - See diana

## 2018-05-08 NOTE — PROGRESS NOTES
[id number] [facility] Neurology Progress Note    Anuel Harrell Patient Status:  Inpatient    1950 MRN [id number]   [facility] 7NE-A Attending Donna Stover MD   [facility] Day # 12 PCP Irene Tsang MD     Subjective:  Anuel Harrell Coord:  Finger-to-nose and Heel-knee-shin is intact on the right, no tremor    Romberg: absent  Gait: deferred    Labs:    Lab Results  Component Value Date   CREATSERUM 1.33 05/08/2018   BUN 24 05/08/2018    05/08/2018   K 4.3 05/08/2018   CL 99 05 detail  Assessment:  Overall condition: stable  Principal Problem:    Acute right MCA stroke (HCC)  Active Problems:    Vomiting and diarrhea    Carotid occlusion, right    Hyponatremia    Aspiration pneumonia of both lower lobes (HCC)    Hypervolemia    R

## 2018-05-08 NOTE — PROGRESS NOTES
INPATIENT NEUROLOGY PROGRESS NOTE    Date: 5/7/2018    Lucy Rowland is a 75year old female at Hospital Day ( LOS: 11 days )    Assessment/Plan:  · Right MCA stroke- related to right ICA occlusion and right thromboembolism of right M2; s/p IV tPA   ? Oral Daily   QUEtiapine Fumarate (SEROQUEL) tab 25 mg 25 mg Oral BID   ezetimibe (ZETIA) tab 10 mg 10 mg Oral Nightly   aspirin EC tab 81 mg 81 mg Oral Daily   Rosuvastatin Calcium (CRESTOR) 20 MG tab 40 mg 40 mg Per NG Tube Nightly   acetaminophen (TYLENO commands.  Face is asymmetrical with left facial droop.  Sadler Keys.  Left field cut greater to right eye than left. Tongue midline.  Minimal muscle movement with shoulder shrug attempt to left  Remaining CNs intact.  Sensation to light touch is decreased

## 2018-05-08 NOTE — PLAN OF CARE
Pt had vomit x1. Dr. Steffanie Leigh notified pt request something for acid reflux. Maalox po given. Will continue to monitor.

## 2018-05-08 NOTE — PLAN OF CARE
Problem: Patient/Family Goals  Goal: Patient/Family Long Term Goal  Patient's Long Term Goal: to go to rehab    Interventions:  - PT/OT  -SW consult  - increase activity as tolerated  - See additional Care Plan goals for specific interventions    Outcome:

## 2018-05-08 NOTE — PROGRESS NOTES
[facility]  Nephrology Progress Note    Janett Bracket Patient Status:  Inpatient    1950 MRN [id number]   [facility] 6NE-A Attending Dionte Miller MD   Hosp Day # 15 PCP Bell Wilhelm MD       SUBJECTIVE:  BP better this AM ALT, AST, ALB, AMYLASE, LIPASE, LDH in the last 72 hours.     Invalid input(s): ALPHOS, TBIL, DBIL, TPROT    Recent Labs   Lab  05/07/18   0755  05/07/18   1315  05/07/18   1641  05/07/18   2039  05/08/18   0740   PGLU  266*  245*  253*  162*  341*       Me Sod-Tazobactam So (ZOSYN) 3.375 g in dextrose 5 % 100 mL ADD-vantage 3.375 g Intravenous Q8H   Clopidogrel Bisulfate (PLAVIX) tab 75 mg 75 mg Oral Daily   Normal Saline Flush 0.9 % injection 10 mL 10 mL Intravenous PRN   PEG 3350 (MIRALAX) powder packet 17

## 2018-05-08 NOTE — PLAN OF CARE
Pt AOx4. Family at bedside. Neuro Q4. NSR. 3LNC.  . Right PICC CDI. Pt mild nausea. Zofran prn relief. Pt mild HA. PRN tylenol relief. Will continue to monitor.

## 2018-05-08 NOTE — PROGRESS NOTES
[facility]  Cardiology Progress Note    Bipin Pool Patient Status:  Inpatient    1950 MRN [id number]   [facility] 7NE-A Attending Beth Parker MD   Hosp Day # 12 PCP Keaton Mitchell MD     Subjective:  Sleepy, easily [place] Fernando mg Oral Q8H [facility]   • AmLODIPine Besylate  10 mg Oral Daily   • amiodarone HCl  200 mg Per NG Tube Daily   • docusate sodium  100 mg Oral BID   • nicotine  1 patch Transdermal Daily   • insulin detemir  20 Units Subcutaneous [email or web address]   • Sertraline HCl

## 2018-05-08 NOTE — PLAN OF CARE
Assumed care at 0730. Pt A&O x4. Drowsy much of the day, reports she did not sleep well overnight due to N/V. Lt sided weakness, field cut, facial droop and slurred speech. On 4L HF nc. NSR/SB on tele.  BP within defined parameters while at rest, BP elevate and self care Progressing        PAIN - ADULT    • Verbalizes/displays adequate comfort level or patient's stated pain goal Progressing        Patient/Family Goals    • Patient/Family Long Term Goal Progressing    • Patient/Family Short Term Goal Progressi

## 2018-05-08 NOTE — PROGRESS NOTES
[facility]  Progress Note     Odalys Melina Patient Status:  Inpatient    1950 MRN [id number]   [facility] 6NE-A Attending Isaiah Camacho MD   Hosp Day # 15 PCP Mona Montaño MD     Chief Complaint: CVA  S:     Vomited Labetalol HCl  100 mg Oral 2 times per day   • CloNIDine HCl  1 patch Transdermal Weekly   • Insulin Aspart Pen  1-20 Units Subcutaneous TID CC   • Insulin Aspart Pen  1-10 Units Subcutaneous TID CC and HS   • lisinopril  20 mg Per NG Tube BID   • hydrALAz 6. Urinary retention- Resolved  7. Fever suspect 2/2 PNA- resolved   8. E.Coli UTI- completed abx but still on zosyn for PNA  9. h/o CEA in 2016  10. Chronic COPD and Probable PATRICIA- Pulmonary following  Wean o2   11. TIP-  Resolved  12.  T2DM-  Uncontrolle

## 2018-05-08 NOTE — PROGRESS NOTES
[facility]  Progress Note    Giancarlo Sutton Patient Status:  Inpatient    1950 MRN [id number]   [facility] 7NE-A Attending Marquez Georges MD   Hosp Day # 12 PCP Sebastian Singleton MD        Assessment and Plan:   1. Initial pre endarterectomy     History of carotid endarterectomy     Aortic sclerosis     Hyperlipidemia associated with type 2 diabetes mellitus (HCC)     Vomiting and diarrhea     TIP (acute kidney injury) ([address]     Acute right MCA stroke ([address]     Carotid occlusion 4.4  4.3   CL  102  99*   CO2  27.0  29.0     @MG@      Lab Results  Component Value Date   PT 13.4 02/28/2014   PT 13.9 02/27/2014   PT 20.2 (H) 10/22/2012   INR 1.06 02/28/2014   INR 1.11 02/27/2014   INR 1.79 (H) 10/22/2012        No results for input( Daily   Rosuvastatin Calcium (CRESTOR) 20 MG tab 40 mg 40 mg Per NG Tube Nightly   acetaminophen (TYLENOL) 160 MG/5ML oral liquid 650 mg 650 mg Oral Q6H PRN   budesonide (PULMICORT) 0.5 MG/2ML nebulizer solution 0.5 mg 0.5 mg Nebulization [email or web address]   P

## 2018-05-08 NOTE — PHYSICAL THERAPY NOTE
PHYSICAL THERAPY TREATMENT NOTE - INPATIENT    Room Number: 5875/3645-V     Session: 4    Number of Visits to Meet Established Goals: 6    Presenting Problem: CVA  History related to current admission: Pt is a 75 y.o.  Female admitted 4/26/18 from home wit Unspecified sleep apnea    • Wears glasses    • Wheezing        Past Surgical History  Past Surgical History:  No date: ANGIOGRAM  2/28/14: ANGIOPLASTY (CORONARY)      Comment: stents acute mi  No date: APPENDECTOMY  No date: BACK SURGERY  No date: BIOPSY Approx Degree of Impairment Score: 81.38%   Standardized Score (AM-PAC Scale): 30.55   CMS Modifier (G-Code): CM    FUNCTIONAL ABILITY STATUS  Gait Assessment   Gait Assistance: Not tested           Stoop/Curb Assistance: Not tested  Comment : above per FI monitored, parameters observed, SBP increased to 163/49 with standing, pt was immediately placed in supine and BP declined 132/48.  Pt continues to present with poor midline orientation, poor static/dynamic sitting balance, visual cut, sensation deficits on

## 2018-05-08 NOTE — PROGRESS NOTES
SUBJECTIVE:No headaches. Emesis yesterday. No fevers. CC: R ICA CVA with L stefani/aspiration PNA and persistent HTN  Interval History: HTN is an issue. Unable to participate in PT yesterday due to elevated BP.   Currently on catapress  0.3.  Hydralazine 10 139/47 98.8 °F (37.1 °C) Oral 65 20 95 %   05/08/18 0741 - - - - - 94 %   05/08/18 0415 152/58 - - 74 - -   05/08/18 0353 (!) 191/50 98 °F (36.7 °C) Oral 73 20 91 %   05/08/18 0100 (!) 161/43 98.4 °F (36.9 °C) Oral 63 20 94 %   05/07/18 1820 147/48 - - 66 05/08/2018   CL 99 05/08/2018   CO2 29.0 05/08/2018    05/08/2018   CA 9.3 05/08/2018   PGLU 341 05/08/2018         ASSESSMENT/PLAN:  Principal Problem:    Acute right MCA stroke [facility])  Active Problems:    Vomiting and diarrhea    Carotid occlusion,

## 2018-05-09 ENCOUNTER — APPOINTMENT (OUTPATIENT)
Dept: GENERAL RADIOLOGY | Facility: HOSPITAL | Age: 68
DRG: 252 | End: 2018-05-09
Attending: INTERNAL MEDICINE
Payer: MEDICARE

## 2018-05-09 LAB
BASOPHILS # BLD AUTO: 0.05 X10(3) UL (ref 0–0.1)
BASOPHILS NFR BLD AUTO: 0.4 %
BUN BLD-MCNC: 36 MG/DL (ref 8–20)
CALCIUM BLD-MCNC: 8.7 MG/DL (ref 8.3–10.3)
CHLORIDE: 103 MMOL/L (ref 101–111)
CO2: 25 MMOL/L (ref 22–32)
CREAT BLD-MCNC: 1.91 MG/DL (ref 0.55–1.02)
EOSINOPHIL # BLD AUTO: 0.42 X10(3) UL (ref 0–0.3)
EOSINOPHIL NFR BLD AUTO: 3.4 %
ERYTHROCYTE [DISTWIDTH] IN BLOOD BY AUTOMATED COUNT: 14.5 % (ref 11.5–16)
GLUCOSE BLD-MCNC: 150 MG/DL (ref 70–99)
GLUCOSE BLD-MCNC: 156 MG/DL (ref 65–99)
GLUCOSE BLD-MCNC: 178 MG/DL (ref 65–99)
GLUCOSE BLD-MCNC: 196 MG/DL (ref 65–99)
GLUCOSE BLD-MCNC: 212 MG/DL (ref 65–99)
HCT VFR BLD AUTO: 29.7 % (ref 34–50)
HGB BLD-MCNC: 9.6 G/DL (ref 12–16)
IMMATURE GRANULOCYTE COUNT: 0.11 X10(3) UL (ref 0–1)
IMMATURE GRANULOCYTE RATIO %: 0.9 %
LYMPHOCYTES # BLD AUTO: 1.86 X10(3) UL (ref 0.9–4)
LYMPHOCYTES NFR BLD AUTO: 15.1 %
MCH RBC QN AUTO: 29.3 PG (ref 27–33.2)
MCHC RBC AUTO-ENTMCNC: 32.3 G/DL (ref 31–37)
MCV RBC AUTO: 90.5 FL (ref 81–100)
MONOCYTES # BLD AUTO: 1.15 X10(3) UL (ref 0.1–1)
MONOCYTES NFR BLD AUTO: 9.3 %
NEUTROPHIL ABS PRELIM: 8.71 X10 (3) UL (ref 1.3–6.7)
NEUTROPHILS # BLD AUTO: 8.71 X10(3) UL (ref 1.3–6.7)
NEUTROPHILS NFR BLD AUTO: 70.9 %
PLATELET # BLD AUTO: 312 10(3)UL (ref 150–450)
POTASSIUM SERPL-SCNC: 4.3 MMOL/L (ref 3.6–5.1)
RBC # BLD AUTO: 3.28 X10(6)UL (ref 3.8–5.1)
RED CELL DISTRIBUTION WIDTH-SD: 46.9 FL (ref 35.1–46.3)
SODIUM SERPL-SCNC: 135 MMOL/L (ref 136–144)
WBC # BLD AUTO: 12.3 X10(3) UL (ref 4–13)

## 2018-05-09 PROCEDURE — 99233 SBSQ HOSP IP/OBS HIGH 50: CPT | Performed by: OTHER

## 2018-05-09 PROCEDURE — 71045 X-RAY EXAM CHEST 1 VIEW: CPT | Performed by: INTERNAL MEDICINE

## 2018-05-09 PROCEDURE — 99233 SBSQ HOSP IP/OBS HIGH 50: CPT | Performed by: HOSPITALIST

## 2018-05-09 PROCEDURE — 99232 SBSQ HOSP IP/OBS MODERATE 35: CPT | Performed by: INTERNAL MEDICINE

## 2018-05-09 RX ORDER — SODIUM CHLORIDE 9 MG/ML
INJECTION, SOLUTION INTRAVENOUS CONTINUOUS
Status: DISCONTINUED | OUTPATIENT
Start: 2018-05-09 | End: 2018-05-11

## 2018-05-09 RX ORDER — LABETALOL 200 MG/1
200 TABLET, FILM COATED ORAL EVERY 12 HOURS SCHEDULED
Status: DISCONTINUED | OUTPATIENT
Start: 2018-05-09 | End: 2018-05-18

## 2018-05-09 RX ORDER — IPRATROPIUM BROMIDE AND ALBUTEROL SULFATE 2.5; .5 MG/3ML; MG/3ML
3 SOLUTION RESPIRATORY (INHALATION)
Status: DISCONTINUED | OUTPATIENT
Start: 2018-05-09 | End: 2018-05-14

## 2018-05-09 NOTE — PLAN OF CARE
Problem: Impaired Swallowing  Goal: Minimize aspiration risk  Interventions:  - Patient should be alert and upright for all feedings (90 degrees preferred); 1:1 direct supervision/assistance  - Offer food and liquids at a slow rate  - OK for straws in sing

## 2018-05-09 NOTE — PROGRESS NOTES
[facility]  Progress Note    Lucy Rowland Patient Status:  Inpatient    1950 MRN [id number]   [facility] 7NE-A Attending Mackenzie Horne MD   [facility] Day # 15 PCP Basia Verma MD       Assessment and Plan:  Patient Active Problem weeks in the setting of small hemorrhagic transformation. As per Dr. Catherine Mancia note from yesterday, would stop asa and continue plavix once oral anticoagulation is resumed. Continue supportive care. Thanks.      Subjective:  No chest pain or short times daily)   Labetalol HCl (NORMODYNE) tab 200 mg 200 mg Oral 2 times per day   0.9%  NaCl infusion  Intravenous Continuous   Alum & Mag Hydroxide-Simeth (MAALOX) oral suspension 30 mL 30 mL Oral QID PRN   QUEtiapine Fumarate (SEROQUEL) tab 25 mg 25 mg O Daily PRN   magnesium hydroxide (MILK OF MAGNESIA) 400 MG/5ML suspension 30 mL 30 mL Oral Daily PRN   bisacodyl (DULCOLAX) rectal suppository 10 mg 10 mg Rectal Daily PRN   FLEET ENEMA (FLEET) 7-19 GM/118ML enema 133 mL 1 enema Rectal Once PRN   ondansetro

## 2018-05-09 NOTE — PROGRESS NOTES
[facility]  Progress Note    Guanako Fierro Patient Status:  Inpatient    1950 MRN [id number]   [facility] 7NE-A Attending Tom Roa MD   [address] Day # 15 PCP Tevin Romo MD     Subjective:  Guanako Fierro is a(n) 75 year 323*  265*  150*   BUN  21*  24*  36*   CREATSERUM  1.34*  1.33*  1.91*   GFRAA  47*  47*  31*   GFRNAA  41*  41*  27*   CA  8.8  9.3  8.7   NA  136  134*  135*   K  4.4  4.3  4.3   CL  102  99*  103   CO2  27.0  29.0  25.0     No results for input(s): ABG  baseline O2 needs   Stop ABX    PT /nutrition / BP control    Amita De SR.  5/9/2018  8:44 AM

## 2018-05-09 NOTE — PHYSICAL THERAPY NOTE
PHYSICAL THERAPY TREATMENT NOTE - INPATIENT    Room Number: 6310/1625-R     Session: 5    Number of Visits to Meet Established Goals: 6    Presenting Problem: CVA  History related to current admission: Pt is a 75 y.o.  Female admitted 4/26/18 from home wit Unspecified sleep apnea    • Wears glasses    • Wheezing        Past Surgical History  Past Surgical History:  No date: ANGIOGRAM  2/28/14: ANGIOPLASTY (CORONARY)      Comment: stents acute mi  No date: APPENDECTOMY  No date: BACK SURGERY  No date: BIOPSY Score: 86.62%   Standardized Score (AM-PAC Scale): 28.58   CMS Modifier (G-Code): CM    FUNCTIONAL ABILITY STATUS  Gait Assessment   Gait Assistance: Not tested  Distance (ft): 0        Stoop/Curb Assistance: Not tested  Comment : above per FIM    Skilled impairments manifest themselves as functional limitations in independent bed mobility, transfers,gait and stairs.  The patient is below baseline and would benefit from skilled inpatient PT to address the above deficits to assist patient in returning to [facility]

## 2018-05-09 NOTE — PLAN OF CARE
PT DROWSY AND SLEEPING MOST OF THE NIGHT. NEURO ASSESSMENT STABLE. BP MAINTAINED WITHIN THE SET PARAMETERS. REMAINS ON 3LO2 PER NC W/O2 SATS >92%.  IN PLACE. NSR ON TELE. INC OF URINE. RACHEL CARE GIVEN. T/R FOR COMFORT. ALL SAFETY IN PLACE. WILL CONT TO MONITOR.

## 2018-05-09 NOTE — OCCUPATIONAL THERAPY NOTE
OCCUPATIONAL THERAPY TREATMENT NOTE - INPATIENT     Room Number: 1977/6516-Z  Session: 2   Number of Visits to Meet Established Goals: 10    Presenting Problem: Vomiting and diarrhea    History related to current admission: Pt was initially admitted for vo Disorder of liver     \"fatty liver\"   • Fatigue    • HEART ATTACK 2/28/14    Acute mi and stents   • High blood pressure    • High cholesterol    • History of blood transfusion    • History of depression    • Leaking of urine    • Night sweats    • Perip drying)?: Total  -   Toileting, which includes using toilet, bedpan or urinal? : Total  -   Putting on and taking off regular upper body clothing?: Total  -   Taking care of personal grooming such as brushing teeth?: A Lot  -   Eating meals?: A Lot    AM-P UE, L UE strength, impaired attention to L side, impaired visual attention, impaired visual perception, impaired trunk control, and decreased sitting balance. Pt will benefit from skilled OT services to maximize independence with ADLs.   Recommend discharg

## 2018-05-09 NOTE — PROGRESS NOTES
[id number] [facility] Neurology Progress Note    Sherry Mccarty Patient Status:  Inpatient    1950 MRN [id number]   [facility] 7NE-A Attending Jamey Mullen MD   [facility] Day # 15 PCP Oneil Bass MD     Subjective:  Sherry Mccarty i 05/09/2018   HCT 29.7 05/09/2018   .0 05/09/2018   CREATSERUM 1.91 05/09/2018   BUN 36 05/09/2018    05/09/2018   K 4.3 05/09/2018    05/09/2018   CO2 25.0 05/09/2018    05/09/2018   CA 8.7 05/09/2018   PGLU 196 05/09/2018     Debra Pulse 66   Temp 98.1 °F (36.7 °C) (Oral)   Resp 18   Wt 163 lb 5.8 oz   SpO2 95%   BMI 27.18 kg/m²   Neuro exam unchanged, see above for detail  Assessment:  Overall condition: unchanged  Principal Problem:    Acute right MCA stroke (HCC)  Active Problems:

## 2018-05-09 NOTE — PLAN OF CARE
Assumed care at 0730  A&Ox4, drowsy, VSS, NSR on tele  Neuros q4. Left sided paralysis, droop, and slurred speech noted  1:1 supervision with meals. Left sided pocketing of food noted.  Aspiration precautions maintained  PO fluids encouraged  Up to [facility] for d Progressing    • Achieves maximal functionality and self care Progressing        PAIN - ADULT    • Verbalizes/displays adequate comfort level or patient's stated pain goal Progressing        Patient/Family Goals    • Patient/Family Long Term Goal Progressi

## 2018-05-09 NOTE — PROGRESS NOTES
[facility]  Progress Note     Erica Darnell Patient Status:  Inpatient    1950 MRN [id number]   [facility] 7NE-A Attending Mike Bearden MD   Hosp Day # 15 PCP Regi Jauregui MD     Chief Complaint: rt sided weakness    S: P 1-20 Units Subcutaneous TID CC   • Insulin Aspart Pen  1-10 Units Subcutaneous TID CC and HS   • lisinopril  20 mg Per NG Tube BID   • hydrALAzine HCl  100 mg Oral Q8H [address]   • AmLODIPine Besylate  10 mg Oral Daily   • amiodarone HCl  200 mg Per NG Tube Da 14. FEN-  Chopped diet, thin liquids    1. Calorie  Count        15.  Tobacco dependency  - 1 ppd, Nicoderm patch      PLAN:  CXR- reviewed   HR better but Bp elevated overnoc    4 liters NC   BB changed to labetalol 100 mg BID   Decrease Seroquel to HS o

## 2018-05-09 NOTE — SLP NOTE
SPEECH DAILY NOTE - INPATIENT    ASSESSMENT & PLAN   ASSESSMENT  Pt seen for dysphagia tx to assess tolerance with recommended diet, ensure proper utilization of aspiration precautions and provide pt/family education. Note patient with suboptimal intake. chopped consistency and thin liquids without overt signs or symptoms of aspiration with 100 % accuracy over 2-3 session(s).  In Progress  Updated 5/3/18   Goal #2 The patient/family/caregiver will demonstrate understanding and implementation of aspiration

## 2018-05-09 NOTE — PROGRESS NOTES
[facility]  Nephrology Progress Note    Yudith Dhillon Patient Status:  Inpatient    1950 MRN [id number]   [facility] 6NE-A Attending Loretta Snider MD   Hosp Day # 15 PCP Jim Crabtree MD       SUBJECTIVE:  No acute events, input(s): ALT, AST, ALB, AMYLASE, LIPASE, LDH in the last 72 hours.     Invalid input(s): ALPHOS, TBIL, DBIL, TPROT    Recent Labs   Lab  05/08/18   0740  05/08/18   1125  05/08/18   1657  05/08/18   2019  05/09/18   0854   PGLU  341*  235*  192*  243*  196 Oral Nightly   aspirin EC tab 81 mg 81 mg Oral Daily   Rosuvastatin Calcium (CRESTOR) 20 MG tab 40 mg 40 mg Per NG Tube Nightly   budesonide (PULMICORT) 0.5 MG/2ML nebulizer solution 0.5 mg 0.5 mg Nebulization [email or web address]   Clopidogrel Bisulfate (PLAVIX)

## 2018-05-10 LAB
ALBUMIN SERPL-MCNC: 2.2 G/DL (ref 3.5–4.8)
ALP LIVER SERPL-CCNC: 60 U/L (ref 55–142)
ALT SERPL-CCNC: 24 U/L (ref 14–54)
AST SERPL-CCNC: 24 U/L (ref 15–41)
BILIRUB SERPL-MCNC: 0.4 MG/DL (ref 0.1–2)
BUN BLD-MCNC: 44 MG/DL (ref 8–20)
BUN BLD-MCNC: 44 MG/DL (ref 8–20)
CALCIUM BLD-MCNC: 8.5 MG/DL (ref 8.3–10.3)
CALCIUM BLD-MCNC: 8.5 MG/DL (ref 8.3–10.3)
CHLORIDE: 105 MMOL/L (ref 101–111)
CHLORIDE: 105 MMOL/L (ref 101–111)
CO2: 24 MMOL/L (ref 22–32)
CO2: 24 MMOL/L (ref 22–32)
CREAT BLD-MCNC: 1.64 MG/DL (ref 0.55–1.02)
CREAT BLD-MCNC: 1.64 MG/DL (ref 0.55–1.02)
GLUCOSE BLD-MCNC: 115 MG/DL (ref 65–99)
GLUCOSE BLD-MCNC: 129 MG/DL (ref 65–99)
GLUCOSE BLD-MCNC: 156 MG/DL (ref 65–99)
GLUCOSE BLD-MCNC: 159 MG/DL (ref 65–99)
GLUCOSE BLD-MCNC: 163 MG/DL (ref 70–99)
GLUCOSE BLD-MCNC: 163 MG/DL (ref 70–99)
HAV IGM SER QL: 2.2 MG/DL (ref 1.8–2.5)
M PROTEIN MFR SERPL ELPH: 6 G/DL (ref 6.1–8.3)
POTASSIUM SERPL-SCNC: 4.8 MMOL/L (ref 3.6–5.1)
POTASSIUM SERPL-SCNC: 4.8 MMOL/L (ref 3.6–5.1)
SODIUM SERPL-SCNC: 136 MMOL/L (ref 136–144)
SODIUM SERPL-SCNC: 136 MMOL/L (ref 136–144)

## 2018-05-10 PROCEDURE — 99233 SBSQ HOSP IP/OBS HIGH 50: CPT | Performed by: OTHER

## 2018-05-10 PROCEDURE — 99232 SBSQ HOSP IP/OBS MODERATE 35: CPT | Performed by: INTERNAL MEDICINE

## 2018-05-10 PROCEDURE — 99233 SBSQ HOSP IP/OBS HIGH 50: CPT | Performed by: HOSPITALIST

## 2018-05-10 RX ORDER — LISINOPRIL 20 MG/1
20 TABLET ORAL 2 TIMES DAILY
Qty: 60 TABLET | Refills: 5 | Status: ON HOLD | OUTPATIENT
Start: 2018-05-10 | End: 2018-06-05

## 2018-05-10 RX ORDER — AMLODIPINE BESYLATE 10 MG/1
10 TABLET ORAL DAILY
Qty: 30 TABLET | Refills: 5 | Status: SHIPPED | OUTPATIENT
Start: 2018-05-11 | End: 2018-09-10

## 2018-05-10 RX ORDER — HYDRALAZINE HYDROCHLORIDE 100 MG/1
100 TABLET, FILM COATED ORAL 3 TIMES DAILY
Qty: 90 TABLET | Refills: 5 | Status: SHIPPED | OUTPATIENT
Start: 2018-05-10 | End: 2018-09-10

## 2018-05-10 RX ORDER — AMIODARONE HYDROCHLORIDE 200 MG/1
200 TABLET ORAL DAILY
Qty: 30 TABLET | Refills: 5 | Status: SHIPPED | OUTPATIENT
Start: 2018-05-11 | End: 2018-09-10

## 2018-05-10 RX ORDER — LABETALOL 200 MG/1
200 TABLET, FILM COATED ORAL EVERY 12 HOURS SCHEDULED
Qty: 60 TABLET | Refills: 5 | Status: ON HOLD | OUTPATIENT
Start: 2018-05-10 | End: 2018-06-05

## 2018-05-10 NOTE — PROGRESS NOTES
[facility]  Progress Note    Thea Floyd Patient Status:  Inpatient    1950 MRN [id number]   [facility] 7NE-A Attending Amy Mancera MD   [address] Day # 15 PCP Flakito Mccarty MD     Subjective:  Thea Floyd is a(n) 75 year hypertension     Carotid arterial disease (HCC)     Depression     Esophageal reflux     Osteoarthritis     High cholesterol     Anxiety state     Coronary atherosclerosis     S/P CABG (coronary artery bypass graft)     CHF (congestive heart failure) [address] [address] attention to nutrition  Continue bronchopulmonary toilet  Med changes being considered by neurology  Ongoing discharge planning    Alex Ortiz MD  5/10/2018  2:18 PM

## 2018-05-10 NOTE — PHYSICAL THERAPY NOTE
PHYSICAL THERAPY TREATMENT NOTE - INPATIENT    Room Number: 2369/3498-M     Session: 6    Number of Visits to Meet Established Goals: 6    Presenting Problem: CVA  History related to current admission: Pt is a 75 y.o.  Female admitted 4/26/18 from home wit Unspecified sleep apnea    • Wears glasses    • Wheezing        Past Surgical History  Past Surgical History:  No date: ANGIOGRAM  2/28/14: ANGIOPLASTY (CORONARY)      Comment: stents acute mi  No date: APPENDECTOMY  No date: BACK SURGERY  No date: BIOPSY 86.62%   Standardized Score (AM-PAC Scale): 28.58   CMS Modifier (G-Code): CM    FUNCTIONAL ABILITY STATUS  Gait Assessment   Gait Assistance: Not tested  Distance (ft): 0        Stoop/Curb Assistance: Not tested  Comment : above per FIM    Skilled Therapy patient in returning to prior to level of function. Continue to recommend AR upon d/c from [facility].     *Recommend PT-to see next session to re-evaluate goals    DISCHARGE RECOMMENDATIONS  PT Discharge Recommendations: Acute rehabilitation     PLAN  PT Treatment

## 2018-05-10 NOTE — PROGRESS NOTES
[facility]  Cardiology Progress Note    Kacey Garcia Patient Status:  Inpatient    1950 MRN [id number]   [facility] 7NE-A Attending Kris Gonzalez MD   Hosp Day # 15 PCP Sandra White MD     Subjective:  Clementina Correa.  No complaints 0.5 mg Nebulization [email or web address]   • Clopidogrel Bisulfate  75 mg Oral Daily   • famoTIDine  20 mg Oral Daily    Or   • famoTIDine  20 mg Intravenous Daily     • sodium chloride 100 mL/hr at 05/09/18 3748       Assessment:  · Acute R MCA stroke with R car
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
## 2018-05-10 NOTE — PROGRESS NOTES
[facility]  Nephrology Progress Note    Giancarlo Sutton Patient Status:  Inpatient    1950 MRN [id number]   [facility] 6NE-A Attending Marquez Georges MD   Hosp Day # 15 PCP Atul Turner MD       SUBJECTIVE:  No acute events, CO2  25.0  29.0  27.0  29.0  25.0  24.0  24.0   BUN  16  16  21*  24*  36*  44*  44*   CREATSERUM  0.87  1.07*  1.34*  1.33*  1.91*  1.64*  1.64*   CA  9.1  9.0  8.8  9.3  8.7  8.5  8.5   MG  2.1   --    --   2.2   --   2.2   PHOS  4.3   --    --    -- nicotine (NICODERM CQ) 14 MG/24HR 1 patch 1 patch Transdermal Daily   insulin detemir (LEVEMIR) 100 UNIT/ML flextouch 20 Units 20 Units Subcutaneous [email or web address]   Sertraline HCl (ZOLOFT) tab 50 mg 50 mg Oral Daily   ezetimibe (ZETIA) tab 10 mg 10 mg Or

## 2018-05-10 NOTE — SLP NOTE
Attempted follow up, patient occupied with psych liason. Will return as schedule permits.     Patsy Thompson 87 CCC-SLP  Pager [phone]

## 2018-05-10 NOTE — PLAN OF CARE
Assumed care at [address]. Stroke protocol, no acute changes. BP within parameters. Aspiration precaution. D/c planning to [facility]. Will cont to monitor.     CARDIOVASCULAR - ADULT    • Maintains optimal cardiac output and hemodynamic stability Progressing    • Patient/Family Short Term Goal Progressing        RESPIRATORY - ADULT    • Achieves optimal ventilation and oxygenation Progressing        RISK FOR INFECTION - ADULT    • Absence of fever/infection during anticipated neutropenic period Progressing        S

## 2018-05-10 NOTE — PLAN OF CARE
Assumed care at 0730  A&Ox4, drowsy, VSS, SB on tele  Neuros q4, left sided weakness, droop, and slurred speech noted  1:1 supervision with meals. Left sided pocketing noted.  Encouraged increase in oral intake  Encouraged up to chair for meals, pt declined functionality and self care Progressing        PAIN - ADULT    • Verbalizes/displays adequate comfort level or patient's stated pain goal Progressing        Patient/Family Goals    • Patient/Family Long Term Goal Progressing    • Patient/Family Short Term

## 2018-05-10 NOTE — PROGRESS NOTES
[facility]  Progress Note    Margarette Gut Patient Status:  Inpatient    1950 MRN [id number]   [facility] 7NE-A Attending Jasmyne Reyna MD   Hosp Day # 15 PCP Hernandez Nolan MD       Assessment and Plan:  Patient Active Problem 98.2 °F (36.8 °C) (Oral)   Resp 19   Wt 163 lb 5.8 oz (74.1 kg)   SpO2 90%   BMI 27.18 kg/m²     Temp (24hrs), Av.2 °F (36.8 °C), Min:98 °F (36.7 °C), Max:98.4 °F (36.9 °C)      Intake/Output:    Intake/Output Summary (Last 24 hours) at 05/10/18 0550 Intravenous Continuous   Alum & Mag Hydroxide-Simeth (MAALOX) oral suspension 30 mL 30 mL Oral QID PRN   Calcium Carbonate Antacid (TUMS) chewable tab 500 mg 500 mg Oral BID PRN   And      Calcium Carbonate Antacid (TUMS) chewable tab 1,000 mg 1,000 mg Ora Daily PRN   FLEET ENEMA (FLEET) 7-19 GM/118ML enema 133 mL 1 enema Rectal Once PRN   ondansetron HCl (ZOFRAN) injection 4 mg 4 mg Intravenous Q6H PRN   famoTIDine (PEPCID) tab 20 mg 20 mg Oral Daily   Or      famoTIDine (PEPCID) injection 20 mg 20 mg Intra

## 2018-05-10 NOTE — PROGRESS NOTES
[id number] [facility] Progress Note    Guanako Fierro Patient Status:  Inpatient    1950 MRN [id number]   [facility] 7NE-A Attending Tom Roa MD   [facility] Day # 15 PCP Tevin Romo MD     Subjective:  Guanako Fierro i CREATSERUM 1.64 05/10/2018   BUN 44 05/10/2018   BUN 44 05/10/2018    05/10/2018    05/10/2018   K 4.8 05/10/2018   K 4.8 05/10/2018    05/10/2018    05/10/2018   CO2 24.0 05/10/2018   CO2 24.0 05/10/2018    05/10/2018   GL detail  Assessment:  Overall condition: unchanged  Principal Problem:    Acute right MCA stroke (HCC)  Active Problems:    Vomiting and diarrhea    Carotid occlusion, right    Hyponatremia    Aspiration pneumonia of both lower lobes (HCC)    Hypervolemia

## 2018-05-10 NOTE — CM/SW NOTE
Order received for PHQ4. Spoke with pt who says she had been diagnosed with depression in the past and takes medication for it which is prescribed to her by her PCP. She says she does not need any other resources for depression/anxiety at this time.   Pt

## 2018-05-10 NOTE — DIETARY NOTE
NUTRITION Follow Up ASSESSMENT    Pt is at moderate nutrition risk. Pt does not meet malnutrition criteria.     NUTRITION DIAGNOSIS/PROBLEM:    Inadequate oral intake related to inability to take sufficient po as evidenced by decreased appetite, dysphagia s may consider tube feeding.      5/3 - Per MD, Guanako Vadim is a(n) 75year old female, with a PMH of CAD s/p CABG x 4, HTN, dyslipidemia, DM, PVD, Left CEA and right carotid stenting, who was initially admitted for GI symptoms, but during stay develope Hugo Luque, Dietetic Intern

## 2018-05-10 NOTE — PROGRESS NOTES
[facility]  Progress Note     Guanako Fierro Patient Status:  Inpatient    1950 MRN [id number]   [facility] 7NE-A Attending Tom Roa MD   Hosp Day # 15 PCP Tevin Romo MD     Chief Complaint: rt sided weakness    S: P Epic.    Medications:   • ipratropium-albuterol  3 mL Nebulization Q4H WA (5 times daily)   • Labetalol HCl  200 mg Oral 2 times per day   • Heparin Sodium (Porcine)  5,000 Units Subcutaneous Q8H [facility]   • CloNIDine HCl  1 patch Transdermal Weekly   • Yong 8. E.Coli UTI- completed abx  Completed    9. h/o CEA in 2016  10. Chronic COPD and Probable PATRICIA- Pulmonary following  Wean o2   11. TIP-  Resolved  12. T2DM-  Uncontrolled A1c 13.4  hyperglycemia protocol. 13. Hypernatremia  Resolved-   14.  FEN-  Cho

## 2018-05-10 NOTE — OCCUPATIONAL THERAPY NOTE
OCCUPATIONAL THERAPY TREATMENT NOTE - INPATIENT     Room Number: 1887/6358-I  Session: 3   Number of Visits to Meet Established Goals: 10    Presenting Problem: Vomiting and diarrhea    History related to current admission: Pt was initially admitted for vo Disorder of liver     \"fatty liver\"   • Fatigue    • HEART ATTACK 2/28/14    Acute mi and stents   • High blood pressure    • High cholesterol    • History of blood transfusion    • History of depression    • Leaking of urine    • Night sweats    • Perip drying)?: Total  -   Toileting, which includes using toilet, bedpan or urinal? : Total  -   Putting on and taking off regular upper body clothing?: Total  -   Taking care of personal grooming such as brushing teeth?: A Lot  -   Eating meals?: A Lot    AM-P Balance activities; Energy conservation/work simplification techniques;ADL training;IADL training;Functional transfer training; Endurance training;Cognitive reorientation;Patient/Family education;Patient/Family training;Neuromuscluar reeducation; Compensatory

## 2018-05-11 LAB
BASOPHILS # BLD AUTO: 0.08 X10(3) UL (ref 0–0.1)
BASOPHILS NFR BLD AUTO: 0.8 %
BUN BLD-MCNC: 36 MG/DL (ref 8–20)
CALCIUM BLD-MCNC: 8.6 MG/DL (ref 8.3–10.3)
CHLORIDE: 107 MMOL/L (ref 101–111)
CO2: 24 MMOL/L (ref 22–32)
CREAT BLD-MCNC: 1.2 MG/DL (ref 0.55–1.02)
EOSINOPHIL # BLD AUTO: 0.32 X10(3) UL (ref 0–0.3)
EOSINOPHIL NFR BLD AUTO: 3.2 %
ERYTHROCYTE [DISTWIDTH] IN BLOOD BY AUTOMATED COUNT: 14.2 % (ref 11.5–16)
GLUCOSE BLD-MCNC: 126 MG/DL (ref 65–99)
GLUCOSE BLD-MCNC: 153 MG/DL (ref 65–99)
GLUCOSE BLD-MCNC: 159 MG/DL (ref 65–99)
GLUCOSE BLD-MCNC: 162 MG/DL (ref 70–99)
GLUCOSE BLD-MCNC: 189 MG/DL (ref 65–99)
HAV IGM SER QL: 2.2 MG/DL (ref 1.8–2.5)
HCT VFR BLD AUTO: 30.1 % (ref 34–50)
HGB BLD-MCNC: 9.4 G/DL (ref 12–16)
IMMATURE GRANULOCYTE COUNT: 0.12 X10(3) UL (ref 0–1)
IMMATURE GRANULOCYTE RATIO %: 1.2 %
LYMPHOCYTES # BLD AUTO: 1.11 X10(3) UL (ref 0.9–4)
LYMPHOCYTES NFR BLD AUTO: 11.2 %
MCH RBC QN AUTO: 28.1 PG (ref 27–33.2)
MCHC RBC AUTO-ENTMCNC: 31.2 G/DL (ref 31–37)
MCV RBC AUTO: 90.1 FL (ref 81–100)
MONOCYTES # BLD AUTO: 0.75 X10(3) UL (ref 0.1–1)
MONOCYTES NFR BLD AUTO: 7.6 %
NEUTROPHIL ABS PRELIM: 7.49 X10 (3) UL (ref 1.3–6.7)
NEUTROPHILS # BLD AUTO: 7.49 X10(3) UL (ref 1.3–6.7)
NEUTROPHILS NFR BLD AUTO: 76 %
PLATELET # BLD AUTO: 355 10(3)UL (ref 150–450)
POTASSIUM SERPL-SCNC: 4.8 MMOL/L (ref 3.6–5.1)
RBC # BLD AUTO: 3.34 X10(6)UL (ref 3.8–5.1)
RED CELL DISTRIBUTION WIDTH-SD: 45.3 FL (ref 35.1–46.3)
SODIUM SERPL-SCNC: 135 MMOL/L (ref 136–144)
WBC # BLD AUTO: 9.9 X10(3) UL (ref 4–13)

## 2018-05-11 PROCEDURE — 99233 SBSQ HOSP IP/OBS HIGH 50: CPT | Performed by: OTHER

## 2018-05-11 PROCEDURE — 99232 SBSQ HOSP IP/OBS MODERATE 35: CPT | Performed by: HOSPITALIST

## 2018-05-11 PROCEDURE — 99232 SBSQ HOSP IP/OBS MODERATE 35: CPT | Performed by: INTERNAL MEDICINE

## 2018-05-11 RX ORDER — MEGESTROL ACETATE 40 MG/ML
400 SUSPENSION ORAL DAILY
Status: DISCONTINUED | OUTPATIENT
Start: 2018-05-11 | End: 2018-05-18

## 2018-05-11 RX ORDER — MEGESTROL ACETATE 40 MG/1
40 TABLET ORAL DAILY
Status: DISCONTINUED | OUTPATIENT
Start: 2018-05-11 | End: 2018-05-11

## 2018-05-11 NOTE — PROGRESS NOTES
[facility]  Progress Note    Sherry Mccarty Patient Status:  Inpatient    1950 MRN [id number]   [facility] 7NE-A Attending Jamey Mullen MD   [facility] Day # 15 PCP Oneil Bass MD     Subjective:  Sherry Mccarty is a(n) 75 year 163*  163*  162*   BUN  36*  44*  44*  36*   CREATSERUM  1.91*  1.64*  1.64*  1.20*   GFRAA  31*  37*  37*  54*   GFRNAA  27*  32*  32*  47*   CA  8.7  8.5  8.5  8.6   NA  135*  136  136  135*   K  4.3  4.8  4.8  4.8   CL  103  105  105  107   CO2  25.0 o2 for sats> 89%  Rehab; discharge planning    Yunior Dee MD  05/11/18

## 2018-05-11 NOTE — SLP NOTE
SPEECH DAILY NOTE - INPATIENT    ASSESSMENT & PLAN   ASSESSMENT  Pt seen for dysphagia tx to assess tolerance with recommended diet, ensure proper utilization of aspiration precautions and provide pt/family education. Patient seen with moisés carty.  Gabrielle 2-3 session(s).  In Progress  Updated 5/3/18   Goal #2 The patient/family/caregiver will demonstrate understanding and implementation of aspiration precautions and swallow strategies independently over 2-3 session(s).     In Progress   Goal #3 Patient will

## 2018-05-11 NOTE — PROGRESS NOTES
[facility]  Cardiology Progress Note    Lu Schneider Patient Status:  Inpatient    1950 MRN [id number]   [facility] 7NE-A Attending Ahsan Jackson MD   Hosp Day # 13 PCP Cruzito Ireland MD     Subjective:  Up in chair, ready to Daily   • ezetimibe  10 mg Oral Nightly   • aspirin  81 mg Oral Daily   • Rosuvastatin Calcium  40 mg Per NG Tube Nightly   • budesonide  0.5 mg Nebulization [email or web address]   • Clopidogrel Bisulfate  75 mg Oral Daily   • famoTIDine  20 mg Oral Daily

## 2018-05-11 NOTE — PROGRESS NOTES
PM&R Follow Up    Chart reviewed. Patient dependent with PT. With SLP, on mechanical soft chopped diet and thin liquids, however, per dietary team 5/10, patient is noted to have inadequate oral intake.  Pt has been supplemented with IVF due to dehydration/A

## 2018-05-11 NOTE — PHYSICAL THERAPY NOTE
PHYSICAL THERAPY TREATMENT NOTE - INPATIENT    Room Number: 1753/9996-B     Session: 7   Number of Visits to Meet Established Goals: 10    Presenting Problem: right MCA CVA    History related to current admission: Pt is a 75 y.o.  Female admitted 4/26/18 f hypertension    • Unspecified sleep apnea    • Wears glasses    • Wheezing        Past Surgical History  Past Surgical History:  No date: ANGIOGRAM  2/28/14: ANGIOPLASTY (CORONARY)      Comment: stents acute mi  No date: APPENDECTOMY  No date: BACK SURGERY 28.58   CMS Modifier (G-Code): CM    FUNCTIONAL ABILITY STATUS  Gait Assessment   Gait Assistance: Not tested  Distance (ft): 0        Stoop/Curb Assistance: Not tested  Comment : above per FIM    Skilled Therapy Provided: Session approved by RN.  Pt receiv address the above deficits to assist patient in returning to prior to level of function. Continue to recommend acute rehab upon D/C to maximize functional independence.     DISCHARGE RECOMMENDATIONS  PT Discharge Recommendations: Acute rehabilitation     PL

## 2018-05-11 NOTE — CM/SW NOTE
[facility] does not have a bed today; however, per Ayana Turner from [place] [address], they are hesitant to take pt at this time due to her poor oral intake and are concerned about her hydration/nutrition.   Dr Aubree Bond, physiatrist from [place] [address], wrote that she may recommend assessment for a

## 2018-05-11 NOTE — DISCHARGE SUMMARY
[facility] HOSPITALIST  DISCHARGE SUMMARY     Odalys Summers Patient Status:  Inpatient    1950 MRN [id number]   [facility] 7NE-A Attending Kristi Sanchez MD   [facility] Day # 13 PCP Mona Montaño MD     Date of Admission: 2018  Date of 200 mg twice daily lisinopril 20 mg twice daily as well as aspirin 81 mg daily and Plavix 75 mg daily. Patient completed treatment for E. coli UTI and she remained hemodynamically stable on the floor awaiting transfer to acute rehab.   She still has signif CONTINUE taking these medications      Instructions Prescription details   aspirin 81 MG Tbec      Take 1 tablet (81 mg total) by mouth daily.    Quantity:  30 tablet  Refills:  0     Clopidogrel Bisulfate 75 MG Tabs  Commonly known as:  PLAVIX      Take Tabs  · AmLODIPine Besylate 10 MG Tabs  · CloNIDine HCl 0.3 MG/24HR Ptwk  · ezetimibe 10 MG Tabs  · HydrALAZINE HCl 100 MG Tabs  · Labetalol HCl 200 MG Tabs  · lisinopril 20 MG Tabs         [place]      Follow-up appointment:   Richard Dominguez MD

## 2018-05-11 NOTE — PROGRESS NOTES
[facility]  Progress Note     Yudith Alejo Patient Status:  Inpatient    1950 MRN [id number]   [facility] 7NE-A Attending Gloria Pérez MD   Hosp Day # 13 PCP Jim Crabtree MD     Chief Complaint: rt sided weakness    S: P [facility]   • CloNIDine HCl  1 patch Transdermal Weekly   • Insulin Aspart Pen  1-20 Units Subcutaneous TID CC   • Insulin Aspart Pen  1-10 Units Subcutaneous TID CC and HS   • lisinopril  20 mg Per NG Tube BID   • hydrALAzine HCl  100 mg Oral Q8H [facility]   • AmLODI Uncontrolled A1c 13.4  hyperglycemia protocol. BS better w/ coverage   13. Hypernatremia  Resolved-   14. FEN-  Chopped diet, thin liquids      need to maintain po liquid intake      15.  Tobacco dependency  - 1 ppd, Nicoderm patch      PLAN:  Bp/ HR sta

## 2018-05-11 NOTE — PLAN OF CARE
Discharge instructions,prescriptions, medications & follow-up appointments in discharge instructions sent to [facility]. Also reviewed instructions with wife. Verbalizes understanding. IV JACOB MORA'd. Wife to drive pt to [facility].   To ca

## 2018-05-11 NOTE — PROGRESS NOTES
[id number] [facility] Progress Note    Ever Rappahannock Patient Status:  Inpatient    1950 MRN [id number]   [facility] 7NE-A Attending Luma Jorgensen MD   [address] Day # 15 PCP Negin Barajas MD     Subjective:  Ever [place] i 05/11/2018   K 4.8 05/11/2018    05/11/2018   CO2 24.0 05/11/2018    05/11/2018   CA 8.6 05/11/2018   MG 2.2 05/11/2018   PGLU 159 05/11/2018         Discussed with OLE Krishna General  Spectra 6 acetaminophen, hydrALAzine HCl, Labetalol HCl, Normal Saline Flush, PEG 3350, magnesium hydroxide, bisacodyl, FLEET ENEMA, ondansetron HCl **OR** [DISCONTINUED] Metoclopramide HCl, acetaminophen, ipratropium-albuterol, ipratropium-albuterol      /50

## 2018-05-11 NOTE — OCCUPATIONAL THERAPY NOTE
OCCUPATIONAL THERAPY TREATMENT NOTE - INPATIENT     Room Number: 6748/9098-M  Session: 4   Number of Visits to Meet Established Goals: 10    Presenting Problem: Vomiting and diarrhea    History related to current admission: Pt was initially admitted for vo Disorder of liver     \"fatty liver\"   • Fatigue    • HEART ATTACK 2/28/14    Acute mi and stents   • High blood pressure    • High cholesterol    • History of blood transfusion    • History of depression    • Leaking of urine    • Night sweats    • Perip currently need…  -   Putting on and taking off regular lower body clothing?: Total  -   Bathing (including washing, rinsing, drying)?: Total  -   Toileting, which includes using toilet, bedpan or urinal? : Total  -   Putting on and taking off regular upper independence with ADLs.   Recommend discharge to ACUTE rehabilitation facility where pt can participate in a comprehensive and intensive therapy program.        OT Discharge Recommendations: Acute rehabilitation       PLAN  OT Treatment Plan: Fabiano Duran

## 2018-05-11 NOTE — PLAN OF CARE
Assumed patient care at [facility]. Pt A&Ox4. VSS on 3L NC. Neuro status unchanged. Incontinent, needs attended to. +BM. No c/o pain. Resting in bed comfortably. Will continue to monitor.      CARDIOVASCULAR - ADULT    • Maintains optimal cardiac outpu

## 2018-05-11 NOTE — PROGRESS NOTES
[facility]  Nephrology Progress Note    Marciano Pedroza Patient Status:  Inpatient    1950 MRN [id number]   [facility] 6NE-A Attending Antony Nicole MD   Hosp Day # 13 PCP Yuri Bolden MD       SUBJECTIVE:  Sleepy today, no 44*  36*   CREATSERUM  1.34*  1.33*  1.91*  1.64*  1.64*  1.20*   CA  8.8  9.3  8.7  8.5  8.5  8.6   MG   --   2.2   --   2.2  2.2   GLU  323*  265*  150*  163*  163*  162*       Recent Labs   Lab  05/10/18   0604   ALT  24   AST  24   ALB  2.2*       Rece HCl (ZOLOFT) tab 50 mg 50 mg Oral Daily   ezetimibe (ZETIA) tab 10 mg 10 mg Oral Nightly   aspirin EC tab 81 mg 81 mg Oral Daily   Rosuvastatin Calcium (CRESTOR) 20 MG tab 40 mg 40 mg Per NG Tube Nightly   budesonide (PULMICORT) 0.5 MG/2ML nebulizer [facility]

## 2018-05-12 LAB
BUN BLD-MCNC: 28 MG/DL (ref 8–20)
CALCIUM BLD-MCNC: 8.7 MG/DL (ref 8.3–10.3)
CHLORIDE: 106 MMOL/L (ref 101–111)
CO2: 23 MMOL/L (ref 22–32)
CREAT BLD-MCNC: 1.05 MG/DL (ref 0.55–1.02)
GLUCOSE BLD-MCNC: 130 MG/DL (ref 65–99)
GLUCOSE BLD-MCNC: 153 MG/DL (ref 65–99)
GLUCOSE BLD-MCNC: 157 MG/DL (ref 65–99)
GLUCOSE BLD-MCNC: 165 MG/DL (ref 70–99)
GLUCOSE BLD-MCNC: 181 MG/DL (ref 65–99)
HAV IGM SER QL: 2 MG/DL (ref 1.8–2.5)
POTASSIUM SERPL-SCNC: 4.9 MMOL/L (ref 3.6–5.1)
SODIUM SERPL-SCNC: 136 MMOL/L (ref 136–144)

## 2018-05-12 PROCEDURE — 99232 SBSQ HOSP IP/OBS MODERATE 35: CPT | Performed by: INTERNAL MEDICINE

## 2018-05-12 PROCEDURE — 99232 SBSQ HOSP IP/OBS MODERATE 35: CPT | Performed by: OTHER

## 2018-05-12 PROCEDURE — 99232 SBSQ HOSP IP/OBS MODERATE 35: CPT | Performed by: HOSPITALIST

## 2018-05-12 NOTE — PHYSICAL THERAPY NOTE
PHYSICAL THERAPY TREATMENT NOTE - INPATIENT    Room Number: 1060/1259-F     Session: 8   Number of Visits to Meet Established Goals: 10    Presenting Problem: right MCA CVA    History related to current admission: Pt is a 75 y.o.  Female admitted 4/26/18 f hypertension    • Unspecified sleep apnea    • Wears glasses    • Wheezing        Past Surgical History  Past Surgical History:  No date: ANGIOGRAM  2/28/14: ANGIOPLASTY (CORONARY)      Comment: stents acute mi  No date: APPENDECTOMY  No date: BACK SURGERY 86.62%   Standardized Score (AM-PAC Scale): 28.58   CMS Modifier (G-Code): CM    FUNCTIONAL ABILITY STATUS  Gait Assessment   Gait Assistance: Not tested  Distance (ft): 0        Stoop/Curb Assistance: Not tested  Comment : above per FIM    Skilled Therapy PLAN  PT Treatment Plan: Bed mobility; Endurance; Energy conservation;Patient education; Family education;Gait training;Neuromuscular re-educate;Transfer training;Balance training  Rehab Potential : Good  Frequency (Obs): Daily    CURRENT GOALS   Goal #1

## 2018-05-12 NOTE — PROGRESS NOTES
[id number] [facility] Progress Note    Lenore Brown Patient Status:  Inpatient    1950 MRN [id number]   [facility] 7NE-A Attending Juan Manuel Cordero MD   [facility] Day # 12 PCP Nahed Dorsey MD     CC: Left sided weakness    Tally Pour weakness. Gait deferred due to patient's sleepiness.      Lab Results  Component Value Date   CREATSERUM 1.05 05/12/2018   BUN 28 05/12/2018    05/12/2018   K 4.9 05/12/2018    05/12/2018   CO2 23.0 05/12/2018    05/12/2018   CA 8.7 05/12 angioplasty and stenting with TICI 2b reperfusion; PPD #15  ? Continue dual antiplatelet therapy  ? ASA 81mg daily  ? Plavix 75mg daily  · Bilateral effusion and infiltrates  · Paroxymal a-fib  ? Cardiology following  ?  No anticoagulation therapy until see Discussion/Recommendations:   As outlined above, no additional recommendations    Vandana Mckeon MD  Vascular & General Neurology  [facility]  5/12/2018    Decision making:         (   ) other records reviewed -1  (   ) labs rev

## 2018-05-12 NOTE — DIETARY NOTE
Calorie Count    Calorie Count re-ordered. Recent calorie count concluded on 5/8 showed that pt was meeting 32% of caloric needs & 46% of protein needs. Pt would be appropriate for supplemental EN. New fady count envelope hung on door.  RD to follow daily

## 2018-05-12 NOTE — PLAN OF CARE
Assumed care at 0700. Patient alert and oriented, drowsy. Vitals stable, NSR per tele, 3L NC. Calorie count in progress, supervision with meals. Up to chair with elen lift, tolerated well. Daughter at bedside, updated on plan of care.  Will continue to mon pain goal Progressing        Patient/Family Goals    • Patient/Family Long Term Goal Progressing    • Patient/Family Short Term Goal Progressing        RESPIRATORY - ADULT    • Achieves optimal ventilation and oxygenation Progressing        RISK FOR INFECT

## 2018-05-12 NOTE — PROGRESS NOTES
[facility]  Progress Note    Lu Schneider Patient Status:  Inpatient    1950 MRN [id number]   [facility] 7NE-A Attending Ahsan Jackson MD   [facility] Day # 12 PCP Cruzito Ireland MD     Subjective:  Lu Schneider is a(n) 75 year artery bypass graft)     CHF (congestive heart failure) (HCC)     COPD (chronic obstructive pulmonary disease) (HCC)     Uncontrolled diabetes mellitus (HCC)     S/P carotid endarterectomy     History of carotid endarterectomy     Aortic sclerosis     Hype

## 2018-05-12 NOTE — PLAN OF CARE
Assumed care at [address]. Pt A&Ox4, forgetful and calls out at times. VSS on 4L NC overnight. NSR per tele. No c/o pain. Incontinent, +BM. Needs attended to. Pt ate 50% of dinner and shake brought in by daughter. Resting in bed comfortably.   Will co

## 2018-05-12 NOTE — PROGRESS NOTES
Cardiology    Awaiting transfer to [address] [place] [address]. Plan is to continue ASA and Plavix for right carotid stent. She will be by Dr. Ladan Burns as outpatient 2-4 weeks. Anticoagulation for AFib held given small hemorrhagic transformation of CVA.   She is holding

## 2018-05-12 NOTE — PROGRESS NOTES
[facility]  Nephrology Progress Note    Margarette Gut Patient Status:  Inpatient    1950 MRN [id number]   [facility] 6NE-A Attending Natalie Owen MD   Hosp Day # 12 PCP Hernandez Nolan MD       SUBJECTIVE:  No acute issues o Oral Nightly   aspirin EC tab 81 mg 81 mg Oral Daily   Rosuvastatin Calcium (CRESTOR) 20 MG tab 40 mg 40 mg Per NG Tube Nightly   budesonide (PULMICORT) 0.5 MG/2ML nebulizer solution 0.5 mg 0.5 mg Nebulization [email or web address]   Clopidogrel Bisulfate (PLAVIX) 05/11/18  0435 05/12/18  0545     136 135* 136   K 4.8  4.8 4.8 4.9     105 107 106   CO2 24.0  24.0 24.0 23.0   BUN 44*  44* 36* 28*   CREATSERUM 1.64*  1.64* 1.20* 1.05*   CA 8.5  8.5 8.6 8.7   MG 2.2 2.2 2.0         Recent Labs   05/10/18  0

## 2018-05-13 LAB
BUN BLD-MCNC: 24 MG/DL (ref 8–20)
CALCIUM BLD-MCNC: 8.7 MG/DL (ref 8.3–10.3)
CHLORIDE: 106 MMOL/L (ref 101–111)
CO2: 24 MMOL/L (ref 22–32)
CREAT BLD-MCNC: 0.99 MG/DL (ref 0.55–1.02)
GLUCOSE BLD-MCNC: 134 MG/DL (ref 70–99)
GLUCOSE BLD-MCNC: 138 MG/DL (ref 65–99)
GLUCOSE BLD-MCNC: 140 MG/DL (ref 65–99)
GLUCOSE BLD-MCNC: 167 MG/DL (ref 65–99)
GLUCOSE BLD-MCNC: 173 MG/DL (ref 65–99)
HAV IGM SER QL: 2 MG/DL (ref 1.8–2.5)
POTASSIUM SERPL-SCNC: 4.5 MMOL/L (ref 3.6–5.1)
SODIUM SERPL-SCNC: 136 MMOL/L (ref 136–144)

## 2018-05-13 PROCEDURE — 99233 SBSQ HOSP IP/OBS HIGH 50: CPT | Performed by: OTHER

## 2018-05-13 PROCEDURE — 99232 SBSQ HOSP IP/OBS MODERATE 35: CPT | Performed by: HOSPITALIST

## 2018-05-13 RX ORDER — ECHINACEA PURPUREA EXTRACT 125 MG
1 TABLET ORAL
Status: DISCONTINUED | OUTPATIENT
Start: 2018-05-13 | End: 2018-05-18

## 2018-05-13 NOTE — PROGRESS NOTES
[facility]  Progress Note     Yudith Alejo Patient Status:  Inpatient    1950 MRN [id number]   [facility] 7NE-A Attending Gloria Pérez MD   Hosp Day # 16 PCP Jim Crabtree MD     Chief Complaint: nausea vomiting weakness CK in the last 168 hours. Imaging: Imaging data reviewed in Epic.     Medications:   • Megestrol Acetate  400 mg Oral Daily   • ipratropium-albuterol  3 mL Nebulization Q4H WA (5 times daily)   • Labetalol HCl  200 mg Oral 2 times per day   • Hepari Nicoderm patch      PLAN:  Bp/ HR stable on multiple meds. NSR  More awake off seroquel now  Needs to be up for meals  Using lift- not sure why not standing? Moves left leg.  Need to push this pt to progress  Cont to wean o2  Pt needs rehab  Quality:  · DVT

## 2018-05-13 NOTE — SLP NOTE
SPEECH DAILY NOTE - INPATIENT    ASSESSMENT & PLAN   ASSESSMENT  Pt seen this AM for dysphagia to assess tolerance of mechanial soft hopped with thin liquid diet. Pt with decreased PO intake. Pt leaning to the left side. Positioned pt more upright.   Pt

## 2018-05-13 NOTE — DIETARY NOTE
Calorie Count    Calorie Count re-ordered. Recent calorie count concluded on 5/8 showed that pt was meeting 32% of caloric needs & 46% of protein needs. Pt continues to struggle with PO intake. Pt would be appropriate for supplemental EN.       New fady

## 2018-05-13 NOTE — PLAN OF CARE
Assumed pt care @ 1930. Pt alert and oriented x 4, drowsy, falling asleep between cares, oxygen sat 93% with 3 L NC,  SB-SR on the monitor, SCD bilaterally on, right arm with triple lumen piccline, daughter at bedside.    Pt eating dinner assisted by ric ELECTROLYTES - ADULT    • Glucose maintained within prescribed range Progressing    • Electrolytes maintained within normal limits Progressing    • Hemodynamic stability and optimal renal function maintained Progressing        MUSCULOSKELETAL - ADULT    •

## 2018-05-13 NOTE — PLAN OF CARE
Alert and oriented time 4, drowsy  BP > 160 labetalol administered, now 157/54  No changes to neuro assessment, same deficts  O2 sat 92% on 3L   Calorie count/ poor appetite, supervised meals, pocketing food on the left cheek    1830 BP elevated again 185/

## 2018-05-13 NOTE — PROGRESS NOTES
Pt's HR went up to 130's for about 10 sec on the monitor. Pt sleeping in bed, no acute distress observed. Dr. Flo Edwards made aware, received order to check BMP and Mg in AM. Will follow orders.

## 2018-05-13 NOTE — PROGRESS NOTES
[facility]  Progress Note     Lucy Rowland Patient Status:  Inpatient    1950 MRN [id number]   [facility] 7NE-A Attending Mackenzie Horne MD   Hosp Day # 16 PCP Basia Verma MD     Chief Complaint: nausea vomiting weakness Imaging: Imaging data reviewed in Epic.     Medications:   • Megestrol Acetate  400 mg Oral Daily   • ipratropium-albuterol  3 mL Nebulization Q4H WA (5 times daily)   • Labetalol HCl  200 mg Oral 2 times per day   • Heparin Sodium (Porcine)  5,000 Unit patch      PLAN:  Bp/ HR stable on multiple meds. NSR  tko IVF  Cr level better  More awake off seroquel now  Needs to be up for meals  Using lift- not sure why not standing? Moves left leg.  Need to push this pt to progress  Should try to get to [place] today-

## 2018-05-13 NOTE — PROGRESS NOTES
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

## 2018-05-14 LAB
GLUCOSE BLD-MCNC: 135 MG/DL (ref 65–99)
GLUCOSE BLD-MCNC: 149 MG/DL (ref 65–99)
GLUCOSE BLD-MCNC: 164 MG/DL (ref 65–99)
GLUCOSE BLD-MCNC: 171 MG/DL (ref 65–99)

## 2018-05-14 PROCEDURE — 99232 SBSQ HOSP IP/OBS MODERATE 35: CPT | Performed by: HOSPITALIST

## 2018-05-14 PROCEDURE — 99232 SBSQ HOSP IP/OBS MODERATE 35: CPT | Performed by: OTHER

## 2018-05-14 RX ORDER — IPRATROPIUM BROMIDE AND ALBUTEROL SULFATE 2.5; .5 MG/3ML; MG/3ML
3 SOLUTION RESPIRATORY (INHALATION)
Status: DISCONTINUED | OUTPATIENT
Start: 2018-05-14 | End: 2018-05-18

## 2018-05-14 RX ORDER — MINOXIDIL 2.5 MG/1
2.5 TABLET ORAL 2 TIMES DAILY
Status: DISCONTINUED | OUTPATIENT
Start: 2018-05-14 | End: 2018-05-18

## 2018-05-14 RX ORDER — AMIODARONE HYDROCHLORIDE 200 MG/1
200 TABLET ORAL DAILY
Status: DISCONTINUED | OUTPATIENT
Start: 2018-05-15 | End: 2018-05-18

## 2018-05-14 NOTE — PROGRESS NOTES
[facility]  Progress Note    Francine Roth Patient Status:  Inpatient    1950 MRN [id number]   [facility] 7NE-A Attending Sundeep Adler MD   Hosp Day # 25 PCP Fani Bailey MD       Subjective:  Up working with pt.  Now more gr TID CC   • Insulin Aspart Pen  1-10 Units Subcutaneous TID CC and HS   • lisinopril  20 mg Per NG Tube BID   • hydrALAzine HCl  100 mg Oral Q8H [facility]   • AmLODIPine Besylate  10 mg Oral Daily   • amiodarone HCl  200 mg Per NG Tube Daily   • docusate sodium

## 2018-05-14 NOTE — PROGRESS NOTES
[facility]  Progress Note     Harrison Zelaya Patient Status:  Inpatient    1950 MRN [id number]   [facility] 7NE-A Attending Carmela Ha MD   Hosp Day # 25 PCP Jhony Navarrete MD     Chief Complaint: nausea vomiting weakness --    --    --    BILT  0.4   --    --    --    TP  6.0*   --    --    --           Imaging: Imaging data reviewed in Epic.     Medications:   • Megestrol Acetate  400 mg Oral Daily   • ipratropium-albuterol  3 mL Nebulization Q4H WA (5 times daily)   • La  Chopped diet, thin liquids      megace  Daily, po intake still limited       10.  Tobacco dependency  - 1 ppd, Nicoderm patch      PLAN:  Needed prn Bp dosed overnoc   4 liters NC   Megace daily but po intake still limited  But poor documentation of po int

## 2018-05-14 NOTE — PLAN OF CARE
Assumed care of patient at 1900. BP elevated, HS BP medications given with stil elevated BP, PRN Hydralazine and Labetalol both given to bring SBP into 160's. Alert and oriented x4. SB/SR on tele. 5 liters per nasal cannula.  Takes pills well crushed in wit

## 2018-05-14 NOTE — OCCUPATIONAL THERAPY NOTE
OCCUPATIONAL THERAPY TREATMENT NOTE - INPATIENT     Room Number: 9604/0532-M  Session: 5   Number of Visits to Meet Established Goals: 10    Presenting Problem: Vomiting and diarrhea    History related to current admission: Pt was initially admitted for vo Disorder of liver     \"fatty liver\"   • Fatigue    • HEART ATTACK 2/28/14    Acute mi and stents   • High blood pressure    • High cholesterol    • History of blood transfusion    • History of depression    • Leaking of urine    • Night sweats    • Perip clothing?: Total  -   Bathing (including washing, rinsing, drying)?: Total  -   Toileting, which includes using toilet, bedpan or urinal? : Total  -   Putting on and taking off regular upper body clothing?: Total  -   Taking care of personal grooming such flaccid L UE, L UE strength, impaired attention to L side, impaired visual attention, impaired visual perception, impaired trunk control, and decreased sitting balance. Pt will benefit from skilled OT services to maximize independence with ADLs.   Recommen

## 2018-05-14 NOTE — PHYSICAL THERAPY NOTE
PHYSICAL THERAPY TREATMENT NOTE - INPATIENT    Room Number: 7624/6483-W     Session: 9   Number of Visits to Meet Established Goals: 10    Presenting Problem: right MCA CVA    History related to current admission: Pt is a 75 y.o.  Female admitted 4/26/18 f hypertension    • Unspecified sleep apnea    • Wears glasses    • Wheezing        Past Surgical History  Past Surgical History:  No date: ANGIOGRAM  2/28/14: ANGIOPLASTY (CORONARY)      Comment: stents acute mi  No date: APPENDECTOMY  No date: BACK SURGERY Impairment Score: 86.62%   Standardized Score (AM-PAC Scale): 28.58   CMS Modifier (G-Code): CM    FUNCTIONAL ABILITY STATUS  Gait Assessment   Gait Assistance: Not tested  Distance (ft): 0        Stoop/Curb Assistance: Not tested  Comment : above per FIM sitting balance, left neglect, impaired sensation/proprioception of LLE/LUE, decreased safety awareness, decreased insight into impairments, LUE/LLE strength deficits, and decreased activity tolerance.      *Pt's daughter Ofelia Carrasco present for session 5/14/2018

## 2018-05-14 NOTE — SLP NOTE
SPEECH DAILY NOTE - INPATIENT    ASSESSMENT & PLAN   ASSESSMENT  Pt seen for dysphagia tx to assess tolerance with recommended diet, ensure proper utilization of aspiration precautions and provide pt/family education.   Patient up in chair, lethargic and wi swallow strategies independently over 2-3 session(s).     In Progress   Goal #3 Patient will participate in cognitive communcation evaluation as appropriate  MET         FOLLOW UP  Follow Up Needed: Yes  SLP Follow-up Date: 05/15/18  Number of Visits to Me

## 2018-05-14 NOTE — PROGRESS NOTES
[facility] [facility] Progress Note    Mart Morales Patient Status:  Inpatient    1950 MRN [id number]   [facility] 7NE-A Attending Liang Valadez MD   [address] Day # 25 PCP Beau Salazar MD     Subjective:  Mart Morales i sensation  Motor:  5/5 strength RUE and RLE, no drift noted on the right side, unable to move left arm, 4/5 proximal LLE strength, 0/5 distal strength, poor tone on the left.    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 She continues to be stable as far as neurologic exam. Will need to ciondult GI for PEG placement. No complaints.  Continu

## 2018-05-14 NOTE — DIETARY NOTE
Nutrition Short Note-Calorie Count    Calorie Count re-ordered. Pt continues to have low po intake, some related to alertness/lethargy, SLP present at visit discussing PEG tube with pt/daughter.  Agreeable to start ensure high protein for additional nutrit

## 2018-05-14 NOTE — PLAN OF CARE
Assumed patient care at 0730. BP elevated this morning, scheduled medications given, prn hydralzine given with improvement. No pain reported. PO intake encouraged, meds crushed in applesauce, Calorie count  Up to chair with total lift.  Stood with PT/ improved neurological status Progressing    • Achieves maximal functionality and self care Progressing        PAIN - ADULT    • Verbalizes/displays adequate comfort level or patient's stated pain goal Progressing        Patient/Family Goals    • Patient/Fa

## 2018-05-14 NOTE — PROGRESS NOTES
[facility]  Progress Note    Giancarlo Sutton Patient Status:  Inpatient    1950 MRN [id number]   [facility] 7NE-A Attending Jeanette Burciaga MD   [address] Day # 25 PCP Atul Turner MD     Subjective:  Giancarlo Sutton is a(n) 75 year GFRAA  54*  63  68   GFRNAA  47*  55*  59*   CA  8.6  8.7  8.7   NA  135*  136  136   K  4.8  4.9  4.5   CL  107  106  106   CO2  24.0  23.0  24.0     No results for input(s): ABGPHT, GNCECR8J, GOUCF2Y, ABGHCO3, ABGBE, TEMP, ALLY, SITE, DEV, THGB in the

## 2018-05-14 NOTE — PHYSICAL THERAPY NOTE
Spoke to [employer] in order to coordinate PT/OT session with nursing staff this AM. RN requesting late morning, 1100 in order to prepare Pt for session. Will trial walking pants this AM with use of elen lift. Will attempt as able.

## 2018-05-14 NOTE — CM/SW NOTE
Spoke with Chong Martin at [facility] to have pt re-evaluated by Dr Cait Pham or Dr Dinah Atkinson for acute rehab. SW following.

## 2018-05-15 ENCOUNTER — APPOINTMENT (OUTPATIENT)
Dept: GENERAL RADIOLOGY | Facility: HOSPITAL | Age: 68
DRG: 252 | End: 2018-05-15
Attending: INTERNAL MEDICINE
Payer: MEDICARE

## 2018-05-15 ENCOUNTER — APPOINTMENT (OUTPATIENT)
Dept: ULTRASOUND IMAGING | Facility: HOSPITAL | Age: 68
DRG: 252 | End: 2018-05-15
Attending: NURSE PRACTITIONER
Payer: MEDICARE

## 2018-05-15 ENCOUNTER — ANESTHESIA EVENT (OUTPATIENT)
Dept: ENDOSCOPY | Facility: HOSPITAL | Age: 68
DRG: 252 | End: 2018-05-15
Payer: MEDICARE

## 2018-05-15 LAB
ALLENS TEST: POSITIVE
ARTERIAL BLD GAS O2 SATURATION: 95 % (ref 92–100)
ARTERIAL BLOOD GAS BASE EXCESS: -2.6
ARTERIAL BLOOD GAS HCO3: 21.2 MEQ/L (ref 22–26)
ARTERIAL BLOOD GAS PCO2: 32 MM HG (ref 35–45)
ARTERIAL BLOOD GAS PH: 7.43 (ref 7.35–7.45)
ARTERIAL BLOOD GAS PO2: 73 MM HG (ref 80–105)
BUN BLD-MCNC: 20 MG/DL (ref 8–20)
CALCIUM BLD-MCNC: 9.3 MG/DL (ref 8.3–10.3)
CALCULATED O2 SATURATION: 95 % (ref 92–100)
CARBOXYHEMOGLOBIN: 1.5 % SAT (ref 0–3)
CHLORIDE: 103 MMOL/L (ref 101–111)
CO2: 23 MMOL/L (ref 22–32)
CREAT BLD-MCNC: 0.91 MG/DL (ref 0.55–1.02)
ERYTHROCYTE [DISTWIDTH] IN BLOOD BY AUTOMATED COUNT: 15.3 % (ref 11.5–16)
GLUCOSE BLD-MCNC: 136 MG/DL (ref 70–99)
GLUCOSE BLD-MCNC: 140 MG/DL (ref 65–99)
GLUCOSE BLD-MCNC: 142 MG/DL (ref 65–99)
GLUCOSE BLD-MCNC: 145 MG/DL (ref 65–99)
GLUCOSE BLD-MCNC: 154 MG/DL (ref 65–99)
HAV IGM SER QL: 2.1 MG/DL (ref 1.8–2.5)
HCT VFR BLD AUTO: 29.7 % (ref 34–50)
HGB BLD-MCNC: 9.8 G/DL (ref 12–16)
L/M: 3 L/MIN
MCH RBC QN AUTO: 29.3 PG (ref 27–33.2)
MCHC RBC AUTO-ENTMCNC: 33 G/DL (ref 31–37)
MCV RBC AUTO: 88.9 FL (ref 81–100)
METHEMOGLOBIN: 0.4 % SAT (ref 0.4–1.5)
P/F RATIO: 362 MMHG
PATIENT TEMPERATURE: 97.5 F
PLATELET # BLD AUTO: 365 10(3)UL (ref 150–450)
POTASSIUM SERPL-SCNC: 4.4 MMOL/L (ref 3.6–5.1)
RBC # BLD AUTO: 3.34 X10(6)UL (ref 3.8–5.1)
RED CELL DISTRIBUTION WIDTH-SD: 48.8 FL (ref 35.1–46.3)
SODIUM SERPL-SCNC: 134 MMOL/L (ref 136–144)
TOTAL HEMOGLOBIN: 10.2 G/DL (ref 11.7–16)
TSI SER-ACNC: 4.43 MIU/ML (ref 0.35–5.5)
WBC # BLD AUTO: 8.3 X10(3) UL (ref 4–13)

## 2018-05-15 PROCEDURE — 76775 US EXAM ABDO BACK WALL LIM: CPT | Performed by: NURSE PRACTITIONER

## 2018-05-15 PROCEDURE — 93975 VASCULAR STUDY: CPT | Performed by: NURSE PRACTITIONER

## 2018-05-15 PROCEDURE — 71045 X-RAY EXAM CHEST 1 VIEW: CPT | Performed by: INTERNAL MEDICINE

## 2018-05-15 PROCEDURE — 99232 SBSQ HOSP IP/OBS MODERATE 35: CPT | Performed by: HOSPITALIST

## 2018-05-15 PROCEDURE — 99232 SBSQ HOSP IP/OBS MODERATE 35: CPT | Performed by: OTHER

## 2018-05-15 RX ORDER — SERTRALINE HYDROCHLORIDE 100 MG/1
100 TABLET, FILM COATED ORAL
Status: DISCONTINUED | OUTPATIENT
Start: 2018-05-15 | End: 2018-05-18

## 2018-05-15 RX ORDER — ASENAPINE 10 MG/1
10 TABLET SUBLINGUAL EVERY EVENING
Status: ON HOLD | COMMUNITY
End: 2018-05-15 | Stop reason: CLARIF

## 2018-05-15 RX ORDER — CEFAZOLIN SODIUM/WATER 2 G/20 ML
2 SYRINGE (ML) INTRAVENOUS
Status: DISPENSED | OUTPATIENT
Start: 2018-05-16 | End: 2018-05-16

## 2018-05-15 RX ORDER — MODAFINIL 100 MG/1
100 TABLET ORAL DAILY
Status: DISCONTINUED | OUTPATIENT
Start: 2018-05-15 | End: 2018-05-18

## 2018-05-15 NOTE — OCCUPATIONAL THERAPY NOTE
OCCUPATIONAL THERAPY TREATMENT NOTE - INPATIENT     Room Number: 3454/0648-C  Session: 6   Number of Visits to Meet Established Goals: 10    Presenting Problem: Vomiting and diarrhea    History related to current admission: Pt was initially admitted for vo Disorder of liver     \"fatty liver\"   • Fatigue    • HEART ATTACK 2/28/14    Acute mi and stents   • High blood pressure    • High cholesterol    • History of blood transfusion    • History of depression    • Leaking of urine    • Night sweats    • Perip rinsing, drying)?: Total  -   Toileting, which includes using toilet, bedpan or urinal? : Total  -   Putting on and taking off regular upper body clothing?: Total  -   Taking care of personal grooming such as brushing teeth?: A Lot  -   Eating meals?: A Lo therapy program.        OT Discharge Recommendations: Acute rehabilitation       PLAN  OT Treatment Plan: Balance activities; Energy conservation/work simplification techniques;ADL training;IADL training;Functional transfer training; Endurance training;Cogni

## 2018-05-15 NOTE — PROGRESS NOTES
[address] [facility] Progress Note    Margarette Tyler Patient Status:  Inpatient    1950 MRN [id number]   [facility] 7NE-A Attending Jasmyne Reyna MD   [facility] Day # 23 PCP Hernandez Nolan MD         Subjective:  Ange Bush intake  COPD  PAF  CAD  HF with preserved EF  DM - poorly controlled (A1c 12. 9)  Depression     Plan  Stroke workup completed  SBP < 160, has been difficult to control, Cardiology   Continue DAPT ASA 81 mg and Plavix 75 mg  Hold AC for AFib until evaluated at bedside today is concerned that calorie count is not correct as she and family have been bring in smoothies etc that she reports mother is consuming. Also concerned that AMY is not adequate and would like AR.  Discussed that her mother is not active enou

## 2018-05-15 NOTE — PLAN OF CARE
Pt and family now accepting of peg tube placement. Care needed with feeding due to pocketing in left cheek. BP within parameters. No prn BP meds given. Tyl given for mild headache and neck pain.       CARDIOVASCULAR - ADULT    • Maintains optimal cardia

## 2018-05-15 NOTE — ANESTHESIA PREPROCEDURE EVALUATION
PRE-OP EVALUATION    Patient Name: Claudetta Freud    Pre-op Diagnosis: Feeding difficulties [R63.3]    Procedure(s):  PERCUTANEOUS ENDOSCOPIC GASTROSTOMY PLACEMENT(PEG)    Surgeon(s) and Role:     Cory Mueller MD - Primary    Pre-op vitals revie Units Subcutaneous TID CC   Insulin Aspart Pen (NOVOLOG) 100 UNIT/ML flexpen 1-10 Units 1-10 Units Subcutaneous TID CC and HS   lisinopril (PRINIVIL,ZESTRIL) tab 20 mg 20 mg Per NG Tube BID   hydrALAzine HCl (APRESOLINE) tab 100 mg 100 mg Oral Q8H [address]   hy mg Oral Daily   Rosuvastatin Calcium (CRESTOR) 20 MG tab 40 mg 40 mg Per NG Tube Nightly   [COMPLETED] potassium chloride IVPB premix 20 mEq 20 mEq Intravenous Once   [COMPLETED] sodium phosphate 15 mmol in sodium chloride 0.9 % 100 mL IVPB 15 mmol Michael Iglesias 650 MG rectal suppository 650 mg 650 mg Rectal Q4H PRN   [COMPLETED] Labetalol HCl (TRANDATE) injection 10 mg 10 mg Intravenous Once PRN   [COMPLETED] eptifibatide (INTEGRILIN) 75 MG/100ML infusion      [COMPLETED] iodixanol (VISIPAQUE) 320 MG/ML injection Inhale 1 puff into the lungs daily. Disp: 1 each Rfl: 2       Allergies: Lipitor [Atorvastatin Calcium];  Wellbutrin [Bupropion Hcl]      Anesthesia Evaluation        Anesthetic Complications           GI/Hepatic/Renal  Comment: Feeding difficulties    (+) Vomiting and diarrhea     TIP (acute kidney injury) ([facility]     Acute right MCA stroke (HCC)     Carotid occlusion, right     Hyponatremia     Aspiration pneumonia of both lower lobes (HCC)     Hypervolemia     Respiratory insufficiency     Hypokalemia     H patient meets guidelines.           Plan/risks discussed with: patient and child/children  Use of blood product(s) discussed with: patient and child/children              Present on Admission:  **None**

## 2018-05-15 NOTE — PROGRESS NOTES
[facility]  Progress Note    Marciano Pedroza Patient Status:  Inpatient    1950 MRN [id number]   [facility] 7NE-A Attending Riccardo Cunningham MD   [address] Day # 23 PCP Yuri Bolden MD     Subjective:  Marciano Pedroza is a(n) 75 year CREATSERUM  1.20*  1.05*  0.99   GFRAA  54*  63  68   GFRNAA  47*  55*  59*   CA  8.6  8.7  8.7   NA  135*  136  136   K  4.8  4.9  4.5   CL  107  106  106   CO2  24.0  23.0  24.0     No results for input(s): ABGPHT, HFRPTU3I, EDSGD7T, ABGHCO3, ABGBE, TE

## 2018-05-15 NOTE — BH PROGRESS NOTE
Received a call from Adam Josue NP about seeing the pt again for her history of depression. Seen the pt last week and the assessment was done. At that time the pt was refusing any services.   She had said, she has her own mental health provider and ha

## 2018-05-15 NOTE — PLAN OF CARE
Patient A/OX4  Cooperative with care and staff  Room air during the day, up in the chair with elen for breakfast and lunch  Participated in therapy  Denies pain, N/V/D  Call light within reach. Needs voiced and attended to. Will continue to monitor.    Fa neurological status Progressing    • Achieves maximal functionality and self care Progressing        PAIN - ADULT    • Verbalizes/displays adequate comfort level or patient's stated pain goal Progressing        Patient/Family Goals    • Patient/Family Long

## 2018-05-15 NOTE — CONSULTS
[facility]                       Gastroenterology Consultation-[facility]    Ellen May Patient Status:  Inpatient    1950 MRN [id number]   [facility] 7NE-A Attending Beatriz Cazares MD   [facility] Day # 23 DANA Duque Night sweats    • Peripheral vascular disease ([facility] 3/14    Carotid Artery Blockage TIA   • Shortness of breath    • Sputum production    • Stented coronary artery    • STROKE 2005    TIA   • Stroke syndrome ([facility] 12/2005    CVA no residual effects   • Typ UNIT/ML injection      [COMPLETED] Lidocaine HCl (XYLOCAINE) 1 % injection      [COMPLETED] fentaNYL citrate (SUBLIMAZE) 0.05 MG/ML injection      [COMPLETED] Midazolam HCl (VERSED) 2 MG/2ML injection      [COMPLETED] ceFAZolin sodium (ANCEF/KEFZOL) 2 GM/2 [COMPLETED] potassium chloride IVPB premix 40 mEq 40 mEq Intravenous Once   [COMPLETED] QUEtiapine Fumarate (SEROQUEL) tab 12.5 mg 12.5 mg Oral Once   [COMPLETED] potassium chloride IVPB premix 40 mEq 40 mEq Intravenous Once   Followed by      [facility] 133 mL 1 enema Rectal Once PRN   ondansetron HCl (ZOFRAN) injection 4 mg 4 mg Intravenous Q6H PRN   famoTIDine (PEPCID) tab 20 mg 20 mg Oral Daily   [COMPLETED] Heparin Sodium (Porcine) 5000 UNIT/ML injection      [COMPLETED] Verapamil HCl (ISOPTIN) 2.5 MG reports no easy bruising, frequent gum bleeding or nose bleeding;   The patient has no history of known chronic anemia            Dermatologic: The patient reports no recent rashes or chronic skin disorders            Rheumatologic: The patient reports no h WBC  9.9   PLT  355.0       Recent Labs   Lab  05/10/18   0604   ALT  24   AST  24       Imaging:     PROCEDURE:  Cerebral angiography and cervical right carotid stenting for acute stroke.      : Dr David Bonner, Interventional Neuroradiology cc  Intravenous normal saline 800 cc  Integrillin IA 7.3 ml  Integrillin IV infusion  Total fluoroscopy time 53.8 min  Total Air kerma 1466 mGy  Blood loss <40 cc     SEDATION: The patient received monitored anesthesia care as administered by the anesthesi angioplasty of the plaque using the   [place] Balloon micro catheter with some improvement in caliber but with early rebound stenosis.  Due to the extreme tortuosity of the carotid just distal to the previous stent and the way the carotid had formed a shereen demonstrates collateral flow through the   ethmoidal and middle meningeal branches to the ophthalmic territory with back filling of the main ophthalmic artery into the supra clinoid right ICA with dilute antegrade flow into the right MCA territory but with stenosis. Blythe Del is a mild long segment stenosis within the remainder of the carotid stent. Blythe Del is a 50% stenosis of the distal left common carotid artery and a mild stenosis of the left external   carotid artery origin.     Repeat angiography of the ri at the distal common femoral segment, projecting over the femoral head.  There is no femoral artery stenosis and the appearance is suitable for the use of a closure device.     Xper CT of the head shows no evidence of intracranial hemorrhage or significant measurements and renal/aortic ratios are as follows:  AORTA:     100 cm/s     RIGHT RENAL ARTERY  ORIGIN:    47 cm/s,  ratio = 0.5  PROXIMAL:  78 cm/s,  ratio = 0.8  MID:       101 cm/s,  ratio = 1.0  DISTAL:    96 cm/s,  ratio = 1.0     MAXIMUM ACCELERATI  Bibasilar infiltrates on a background of COPD     PATIENT STATED HISTORY: (As transcribed by Technologist)           FINDINGS:  Stable changes of COPD.  Right-sided PICC tip in the SVC.  Median sternotomy changes noted.  Cardiomegaly with normal pulmonary the patient, agree with the above findings, assessment and plan. New CVA and carotid vasculopathy post TPA and stenting now on plavix and ASA that is not recommended being held per neurology notes.  Needs enteral nutrition access, high risk for bleeding pos

## 2018-05-15 NOTE — PROGRESS NOTES
SUBJECTIVE:  Left arm and leg weakness, poor appetite, poor endurance and balance. No fevers/chills.  Limited progress with PT and OT  CC: left arm and leg weakness  Interval History:poor appetite, prefers to keep eyes shut, no coughing or choking with meal (!) 49 18 92 % -   05/14/18 1015 (!) 162/42 - - - - - -   05/14/18 0827 (!) 175/46 98.9 °F (37.2 °C) Oral 71 18 92 % -   05/14/18 0748 - - - - - 97 % -   05/14/18 0408 (!) 170/52 98.7 °F (37.1 °C) Oral 53 20 95 % 181 lb 7 oz (82.3 kg)   05/14/18 0100 (!) 1

## 2018-05-15 NOTE — PHYSICAL THERAPY NOTE
PHYSICAL THERAPY TREATMENT NOTE - INPATIENT    Room Number: 8167/3033-N     Session: 10 extended 6 more visits  Number of Visits to Meet Established Goals: 10    Presenting Problem: right MCA CVA        History related to current admission: Pt is a 75 y. o Unspecified essential hypertension    • Unspecified sleep apnea    • Wears glasses    • Wheezing        Past Surgical History  Past Surgical History:  No date: ANGIOGRAM  2/28/14: ANGIOPLASTY (CORONARY)      Comment: stents acute mi  No date: APPENDECTOMY Standardized Score (AM-PAC Scale): 30.55   CMS Modifier (G-Code): CM    FUNCTIONAL ABILITY STATUS  Gait Assessment   Gait Assistance: Not tested  Distance (ft): 0        Stoop/Curb Assistance: Not tested  Comment : above per FIM    Skilled Therapy Provid skilled inpatient PT to address the above deficits to assist patient in returning to prior to level of function      DISCHARGE RECOMMENDATIONS  PT Discharge Recommendations: Acute rehabilitation     PLAN  PT Treatment Plan: Bed mobility; Endurance; Energy co

## 2018-05-15 NOTE — PROGRESS NOTES
[facility]  Progress Note     Cristina Hiro Patient Status:  Inpatient    1950 MRN [id number]   [facility] 7NE-A Attending Roland Dumas MD   Hosp Day # 23 PCP Rodger Hernandez MD     Chief Complaint: nausea vomiting weakness --    ALT  24   --    --    --    BILT  0.4   --    --    --    TP  6.0*   --    --    --           Imaging: Imaging data reviewed in Epic.     Medications:   • amiodarone HCl  200 mg Oral Daily   • minoxidil  2.5 mg Oral BID   • ipratropium-albuterol  3 liquids      megace         10.  Tobacco dependency  - 1 ppd, Nicoderm patch      PLAN:  CXR - CONCLUSION:    Improvement, with some decrease in interstitial opacity likely improving edema, with continued milder interstitial opacities present, continue stab NP   Patient seen and examined agree with the above  Chest cta b/l  Heart RRR s1 S2, no S3 S4  LEs no edema  Dysphagia for g tube today  cva with left weakness  MJ transfer in am?

## 2018-05-15 NOTE — PROGRESS NOTES
[facility]  Cardiology Progress Note    Nathan Paget Patient Status:  Inpatient    1950 MRN [id number]   [facility] 7NE-A Attending Tor Ramon MD   [address] Day # 23 PCP Avery Giordano MD     Subjective:  Sitting up in chair, e Subcutaneous TID CC   • Insulin Aspart Pen  1-10 Units Subcutaneous TID CC and HS   • lisinopril  20 mg Per NG Tube BID   • hydrALAzine HCl  100 mg Oral Q8H [address]   • AmLODIPine Besylate  10 mg Oral Daily   • docusate sodium  100 mg Oral BID   • nicotine  1

## 2018-05-15 NOTE — SLP NOTE
SPEECH DAILY NOTE - INPATIENT    ASSESSMENT & PLAN   ASSESSMENT  Met with patient and family at bedside. Patient reportedly agreeable to PEG. Initially she was refusing again but then after discussion was agreeable. Family is quite supportive.   All ques

## 2018-05-15 NOTE — DIETARY NOTE
[facility] ASSESSMENT    Pt is at moderate nutrition risk. Pt does not meet malnutrition criteria.     NUTRITION DIAGNOSIS/PROBLEM:    Inadequate oral intake related to inability to take sufficient po as evidenced by decreased ap through PO, may consider tube feeding. 5/7 - Calorie count initiated 5/4. Attempted to see pt, pt was not in the room at time of visit. Will f/u. If PO unable to meet 75% of pt needs, may consider tube feeding.      5/3 - Per MD, Margarette bhatti a( least 75% intake of oral supplements  3. No signs of skin breakdown  4. Maintain lean body mass  5. Tolerance of enteral nutrition without signs/symptoms of intolerance (abdominal discomfort/distention)  6.  Alternative means of nutrition at goal to meet 10

## 2018-05-15 NOTE — CM/SW NOTE
Dr Griffin Wetzel from [facility] saw pt yesterday and is now recommending AMY. Pt no longer meeting criteria for acute rehab. Pt agreeable to getting PEG tube now. Spoke with pt's dtr Sera Yen about AMY placement. SNF list given.   Dtr will tour facilities and choose o

## 2018-05-16 ENCOUNTER — ANESTHESIA (OUTPATIENT)
Dept: ENDOSCOPY | Facility: HOSPITAL | Age: 68
DRG: 252 | End: 2018-05-16
Payer: MEDICARE

## 2018-05-16 ENCOUNTER — SURGERY (OUTPATIENT)
Age: 68
End: 2018-05-16

## 2018-05-16 LAB
BASOPHILS # BLD AUTO: 0.06 X10(3) UL (ref 0–0.1)
BASOPHILS NFR BLD AUTO: 0.6 %
BUN BLD-MCNC: 33 MG/DL (ref 8–20)
CALCIUM BLD-MCNC: 9.3 MG/DL (ref 8.3–10.3)
CHLORIDE: 103 MMOL/L (ref 101–111)
CO2: 23 MMOL/L (ref 22–32)
CREAT BLD-MCNC: 1.28 MG/DL (ref 0.55–1.02)
EOSINOPHIL # BLD AUTO: 0.41 X10(3) UL (ref 0–0.3)
EOSINOPHIL NFR BLD AUTO: 4.2 %
ERYTHROCYTE [DISTWIDTH] IN BLOOD BY AUTOMATED COUNT: 15.3 % (ref 11.5–16)
GLUCOSE BLD-MCNC: 141 MG/DL (ref 65–99)
GLUCOSE BLD-MCNC: 152 MG/DL (ref 70–99)
GLUCOSE BLD-MCNC: 159 MG/DL (ref 65–99)
GLUCOSE BLD-MCNC: 162 MG/DL (ref 65–99)
GLUCOSE BLD-MCNC: 164 MG/DL (ref 65–99)
HAV IGM SER QL: 2.1 MG/DL (ref 1.8–2.5)
HCT VFR BLD AUTO: 28.1 % (ref 34–50)
HGB BLD-MCNC: 9.2 G/DL (ref 12–16)
IMMATURE GRANULOCYTE COUNT: 0.05 X10(3) UL (ref 0–1)
IMMATURE GRANULOCYTE RATIO %: 0.5 %
INR BLD: 1.08 (ref 0.9–1.1)
LYMPHOCYTES # BLD AUTO: 1.71 X10(3) UL (ref 0.9–4)
LYMPHOCYTES NFR BLD AUTO: 17.4 %
MCH RBC QN AUTO: 29.1 PG (ref 27–33.2)
MCHC RBC AUTO-ENTMCNC: 32.7 G/DL (ref 31–37)
MCV RBC AUTO: 88.9 FL (ref 81–100)
MONOCYTES # BLD AUTO: 0.95 X10(3) UL (ref 0.1–1)
MONOCYTES NFR BLD AUTO: 9.7 %
NEUTROPHIL ABS PRELIM: 6.62 X10 (3) UL (ref 1.3–6.7)
NEUTROPHILS # BLD AUTO: 6.62 X10(3) UL (ref 1.3–6.7)
NEUTROPHILS NFR BLD AUTO: 67.6 %
PLATELET # BLD AUTO: 312 10(3)UL (ref 150–450)
POTASSIUM SERPL-SCNC: 4.5 MMOL/L (ref 3.6–5.1)
PSA SERPL DL<=0.01 NG/ML-MCNC: 14.4 SECONDS (ref 12.4–14.7)
RBC # BLD AUTO: 3.16 X10(6)UL (ref 3.8–5.1)
RED CELL DISTRIBUTION WIDTH-SD: 47.9 FL (ref 35.1–46.3)
SODIUM SERPL-SCNC: 135 MMOL/L (ref 136–144)
WBC # BLD AUTO: 9.8 X10(3) UL (ref 4–13)

## 2018-05-16 PROCEDURE — 99232 SBSQ HOSP IP/OBS MODERATE 35: CPT | Performed by: INTERNAL MEDICINE

## 2018-05-16 PROCEDURE — 99232 SBSQ HOSP IP/OBS MODERATE 35: CPT | Performed by: OTHER

## 2018-05-16 PROCEDURE — 0DH63UZ INSERTION OF FEEDING DEVICE INTO STOMACH, PERCUTANEOUS APPROACH: ICD-10-PCS | Performed by: INTERNAL MEDICINE

## 2018-05-16 DEVICE — SAFETY PEG KIT 20FR: Type: IMPLANTABLE DEVICE | Status: FUNCTIONAL

## 2018-05-16 RX ORDER — DEXTROSE MONOHYDRATE 25 G/50ML
50 INJECTION, SOLUTION INTRAVENOUS
Status: DISCONTINUED | OUTPATIENT
Start: 2018-05-16 | End: 2018-05-16 | Stop reason: HOSPADM

## 2018-05-16 RX ORDER — SODIUM CHLORIDE, SODIUM LACTATE, POTASSIUM CHLORIDE, CALCIUM CHLORIDE 600; 310; 30; 20 MG/100ML; MG/100ML; MG/100ML; MG/100ML
INJECTION, SOLUTION INTRAVENOUS CONTINUOUS
Status: DISCONTINUED | OUTPATIENT
Start: 2018-05-16 | End: 2018-05-17

## 2018-05-16 RX ORDER — CEFAZOLIN SODIUM/WATER 2 G/20 ML
SYRINGE (ML) INTRAVENOUS
Status: COMPLETED
Start: 2018-05-16 | End: 2018-05-16

## 2018-05-16 RX ORDER — SODIUM CHLORIDE 9 MG/ML
INJECTION, SOLUTION INTRAVENOUS CONTINUOUS
Status: DISCONTINUED | OUTPATIENT
Start: 2018-05-16 | End: 2018-05-17

## 2018-05-16 RX ORDER — ONDANSETRON 2 MG/ML
4 INJECTION INTRAMUSCULAR; INTRAVENOUS AS NEEDED
Status: DISCONTINUED | OUTPATIENT
Start: 2018-05-16 | End: 2018-05-16 | Stop reason: HOSPADM

## 2018-05-16 RX ORDER — NALOXONE HYDROCHLORIDE 0.4 MG/ML
80 INJECTION, SOLUTION INTRAMUSCULAR; INTRAVENOUS; SUBCUTANEOUS AS NEEDED
Status: DISCONTINUED | OUTPATIENT
Start: 2018-05-16 | End: 2018-05-16 | Stop reason: HOSPADM

## 2018-05-16 NOTE — OPERATIVE REPORT
Francine Roth Patient Status:  Inpatient    1950 MRN [id number]   [facility] ENDOSCOPY Attending Honorio Lawrence MD   Hosp Day # 21 PCP Deepa Stack MD     PREOPERATIVE DIAGNOSIS/INDICATION: 75year old female with CAD s/p CA sterile drape, the skin was numbed using a 22 gauge needle and a 5 cc syringe with 1% lidocaine, a total of 2 cc were injected, after this an introducer trocar was advanced into the gastric cavity, a guidewire was advanced through the trocar and grasped th

## 2018-05-16 NOTE — PROGRESS NOTES
[facility]  Progress Note    Daniele Raygoza Patient Status:  Inpatient    1950 MRN [id number]   [facility] 7NE-A Attending Allison Michaels MD   Psychiatric Day # 21 PCP Yojana Austin MD     Assessment and Plan:      1.  Acute rig [facility])     COPD (chronic obstructive pulmonary disease) ([facility]     Uncontrolled diabetes mellitus (HCC)     S/P carotid endarterectomy     History of carotid endarterectomy     Aortic sclerosis     Hyperlipidemia associated with type 2 diabetes mellitus ([facility] 05/12/18   0545  05/13/18   0459  05/15/18   1220  05/16/18   0518   GLU  163*  163*  162*  165*  134*  136*  152*   BUN  44*  44*  36*  28*  24*  20  33*   CREATSERUM  1.64*  1.64*  1.20*  1.05*  0.99  0.91  1.28*   GFRAA  37*  37*  54*  63  68  75  50* Mag Hydroxide-Simeth (MAALOX) oral suspension 30 mL 30 mL Oral QID PRN   Calcium Carbonate Antacid (TUMS) chewable tab 500 mg 500 mg Oral BID PRN   And      Calcium Carbonate Antacid (TUMS) chewable tab 1,000 mg 1,000 mg Oral BID PRN   acetaminophen (TYLEN famoTIDine (PEPCID) tab 20 mg 20 mg Oral Daily   acetaminophen (TYLENOL) 650 MG rectal suppository 650 mg 650 mg Rectal Q4H PRN   ipratropium-albuterol (DUONEB) nebulizer solution 3 mL 3 mL Nebulization Q1H PRN       PEAK flow  PF Readings from Last 1 En

## 2018-05-16 NOTE — PROGRESS NOTES
[facility]  Progress Note     Kaisertoñoyady Galeano Patient Status:  Inpatient    1950 MRN [id number]   [facility] 7NE-A Attending Joe Mesa MD   Hosp Day # 21 PCP Steven Farr MD     Chief Complaint: nausea vomiting weakness 4.5  4.4  4.5   CL  105  105   < >  106  103  103   CO2  24.0  24.0   < >  24.0  23.0  23.0   ALKPHO  60   --    --    --    --    AST  24   --    --    --    --    ALT  24   --    --    --    --    BILT  0.4   --    --    --    --    TP  6.0*   --    -- seen, right side normal      4. Bp better today   4. Acute diastolic CHF- compensated  5. PAF- NSR    1. Amio 200 mg daily   2. DAPT per cards/ neuro  Re timing   will wait until f/u w/ neuro as outpt   6. h/o CEA in 2016  7.  Chronic COPD and Probable PATRICIA-

## 2018-05-16 NOTE — ANESTHESIA POSTPROCEDURE EVALUATION
Lam Kolb Patient Status:  Inpatient   Age/Gender 75year old female MRN [id number]   Location [facility]. Attending Reanna Elizabeth, [address] [facility] [address] Day [address] 21 PCP Deric Land MD       Anesthesia Post-op Note    Proced

## 2018-05-16 NOTE — PHYSICAL THERAPY NOTE
PHYSICAL THERAPY TREATMENT NOTE - INPATIENT    Room Number: 5504/1437-X     Session: 1 (extended 6 more visits 5/15/18)  Number of Visits to Meet Established Goals: 6    Presenting Problem: right MCA CVA        History related to current admission: Pt is 3/19/2014   • Unspecified essential hypertension    • Unspecified sleep apnea    • Wears glasses    • Wheezing        Past Surgical History  Past Surgical History:  No date: ANGIOGRAM  2/28/14: ANGIOPLASTY (CORONARY)      Comment: stents acute mi  No date: Score: 81.38%   Standardized Score (AM-PAC Scale): 30.55   CMS Modifier (G-Code): CM    FUNCTIONAL ABILITY STATUS  Gait Assessment   Gait Assistance: Not tested  Distance (ft): 0        Stoop/Curb Assistance: Not tested  Comment : above per FIM    Skilled CVA, HTN, CEA, COPD  .   The patient presents with the following impairments: decreased safety awareness, decreased static and dynamic sitting and standing balance, decreased endurance , decreased force generating capacity L>R, impaired midline orientation

## 2018-05-16 NOTE — CM/SW NOTE
MSW spoke with OLE Ha to discuss dc planning. The patient will likely have peg placed today. Staff would still like the patient to dc to  for Acute rehab but back up to [facility] will also be discussed. MSW left a message for the patient's daughter Adelia Edmonds.

## 2018-05-16 NOTE — DIETARY NOTE
Nutrition Short Note    Dietitian spoke with daughter on phone, she is concerned with pt's nutritional status and understands RD has been following.  Pt's daughter states she has been bringing in daily smoothies for pt but does not believe they are being re

## 2018-05-16 NOTE — PLAN OF CARE
Assumed patient care at 0730. NPO or PEG placement this afternoon   Vital signs stable. 1-2 L NC. Consents signed. Up to chair this AM   Family updated on plan of care   Will continue to monitor.      CARDIOVASCULAR - ADULT    • Maintains optimal card Patient/Family Long Term Goal Progressing    • Patient/Family Short Term Goal Progressing        RESPIRATORY - ADULT    • Achieves optimal ventilation and oxygenation Progressing        RISK FOR INFECTION - ADULT    • Absence of fever/infection during anti

## 2018-05-16 NOTE — BH PROGRESS NOTE
Went to see the pt per request from Carolyn Wu NP. The pt was not interested in f/u with a psychiatrist or psychotherapist.  She said, she would continue to see her pcp for medication management with the Sertaline.  Was able to talk with her daughter o

## 2018-05-16 NOTE — PROGRESS NOTES
[id number] [facility] Neurology Progress Note    Margarette Gut Patient Status:  Inpatient    1950 MRN [id number]   [facility] 7NE-A Attending Genesis Joyce MD   Hosp Day # 21 PCP Hernandez Nolan MD         Subjective:  Erna Baker 160  Continue DAPT ASA 81 mg and Plavix 75 mg  Hold AC for AFib until evaluated in the office for NI follow up  GI prophylaxis, Pepcid  DVT prophylaxis, heparin  ST and dietician following  GI consulted for PEG--but likely cannot stop both ASA/Plavix due t

## 2018-05-16 NOTE — PLAN OF CARE
Pt ate one third of dinner and drank 480ml. Family brought in Ensure which pt drank two bottles of. POA will sign consent for peg placement in the morning. Pt NPO and subq heparin will be held. BP within parameters and no need for prn BP meds.

## 2018-05-16 NOTE — PROGRESS NOTES
[facility]  Progress Note    Che Galeano Patient Status:  Inpatient    1950 MRN [id number]   [facility] 7NE-A Attending Jesica Freedman MD   [address] Day # 21 PCP Steven Farr MD       Assessment and Plan:  Patient Active Pr °C) (Oral)   Resp 20   Wt 185 lb 6.5 oz (84.1 kg)   SpO2 94%   BMI 30.85 kg/m²     Temp (24hrs), Av.1 °F (36.7 °C), Min:97.4 °F (36.3 °C), Max:98.8 °F (37.1 °C)      Intake/Output:    Intake/Output Summary (Last 24 hours) at 18 1002  Last data fi minoxidil (LONITEN) tab 2.5 mg 2.5 mg Oral BID   ipratropium-albuterol (DUONEB) nebulizer solution 3 mL 3 mL Nebulization QID   Saline Nasal Spray (SALINE MIST) 1 spray 1 spray Each Nare Q3H PRN   Megestrol Acetate (MEGACE) oral suspension 400 mg 400 mg powder packet 17 g 17 g Oral Daily PRN   magnesium hydroxide (MILK OF MAGNESIA) 400 MG/5ML suspension 30 mL 30 mL Oral Daily PRN   bisacodyl (DULCOLAX) rectal suppository 10 mg 10 mg Rectal Daily PRN   FLEET ENEMA (FLEET) 7-19 GM/118ML enema 133 mL 1 enema

## 2018-05-17 LAB
BUN BLD-MCNC: 31 MG/DL (ref 8–20)
CALCIUM BLD-MCNC: 8.9 MG/DL (ref 8.3–10.3)
CHLORIDE: 104 MMOL/L (ref 101–111)
CO2: 23 MMOL/L (ref 22–32)
CREAT BLD-MCNC: 1.08 MG/DL (ref 0.55–1.02)
GLUCOSE BLD-MCNC: 116 MG/DL (ref 65–99)
GLUCOSE BLD-MCNC: 140 MG/DL (ref 70–99)
GLUCOSE BLD-MCNC: 144 MG/DL (ref 65–99)
GLUCOSE BLD-MCNC: 159 MG/DL (ref 65–99)
GLUCOSE BLD-MCNC: 183 MG/DL (ref 65–99)
POTASSIUM SERPL-SCNC: 4.9 MMOL/L (ref 3.6–5.1)
SODIUM SERPL-SCNC: 136 MMOL/L (ref 136–144)

## 2018-05-17 PROCEDURE — 99232 SBSQ HOSP IP/OBS MODERATE 35: CPT | Performed by: INTERNAL MEDICINE

## 2018-05-17 NOTE — OCCUPATIONAL THERAPY NOTE
OCCUPATIONAL THERAPY TREATMENT NOTE - INPATIENT     Room Number: 9040/0261-Y  Session: 7  Number of Visits to Meet Established Goals: 10    Presenting Problem: Vomiting and diarrhea    History related to current admission: Pt was initially admitted for [facility] Diabetes mellitus ([facility] [address]    • Disorder of liver     \"fatty liver\"   • Fatigue    • HEART ATTACK 2/28/14    Acute mi and stents   • High blood pressure    • High cholesterol    • History of blood transfusion    • History of depression    • Leaking of urine off regular lower body clothing?: Total  -   Bathing (including washing, rinsing, drying)?: Total  -   Toileting, which includes using toilet, bedpan or urinal? : Total  -   Putting on and taking off regular upper body clothing?: Total  -   Taking care of ACUTE rehabilitation facility where pt can participate in a comprehensive and intensive therapy program.        OT Discharge Recommendations: Acute rehabilitation       PLAN  OT Treatment Plan: Balance activities; Energy conservation/work simplification cameron

## 2018-05-17 NOTE — DIETARY NOTE
Nutrition Short Note    Spoke with NP, will have TF ran over 24 hours to increase nutritional intake as pt not taking good po. RD ordered Glucerna 1.5 at 40 ml/hour x 24 hours.  Recommend 160 ml water flush q 4 hours to meet fluid needs or per MD discretion

## 2018-05-17 NOTE — CM/SW NOTE
Spoke with pt's dtr Anitha Rodriguez who said her back-up SNF is [facility] if pt does not qualify for [facility]. Referral made to [facility] via ecin. Waiting for a response.

## 2018-05-17 NOTE — PROGRESS NOTES
[facility]  Progress Note    Griselda Pizarro Patient Status:  Inpatient    1950 MRN [id number]   [facility] 7NE-A Attending Kamran Hilario MD   [address] Day # 21 PCP Tyrone Pizarro MD     Assessment and Plan:      1.  Acute rig CHF (congestive heart failure) (HCC)     COPD (chronic obstructive pulmonary disease) (HCC)     Uncontrolled diabetes mellitus (HCC)     S/P carotid endarterectomy     History of carotid endarterectomy     Aortic sclerosis     Hyperlipidemia associated wit 05/13/18   0459  05/15/18   1220  05/16/18   0518  05/17/18   0945   GLU  165*  134*  136*  152*  140*   BUN  28*  24*  20  33*  31*   CREATSERUM  1.05*  0.99  0.91  1.28*  1.08*   GFRAA  63  68  75  50*  61   GFRNAA  55*  59*  65  43*  53*   CA  8.7  8.7 flexpen 1-20 Units 1-20 Units Subcutaneous TID CC   Insulin Aspart Pen (NOVOLOG) 100 UNIT/ML flexpen 1-10 Units 1-10 Units Subcutaneous TID CC and HS   lisinopril (PRINIVIL,ZESTRIL) tab 20 mg 20 mg Per NG Tube BID   hydrALAzine HCl (APRESOLINE) tab 100 mg

## 2018-05-17 NOTE — PROGRESS NOTES
[id number] Aly[facility] Tru[facility] Neurology Progress Note    Che Waleska Patient Status:  Inpatient    1950 MRN [id number]   [facility] 7NE-A Attending Jesica Freedman MD   [address] Day # 21 PCP Steven Farr MD     Subjective:  Elby Apgar Imaging:  No new imaging     Impression:  R MCA stroke ICA occlusion and thromboembolism of right M2 s/p tPA #19  R ICA occlusion s/p carotid angioplasty with stenting with TICI 2b reperfusion ppd #19  BL effusions / infiltrates - resolved  Poor PO i

## 2018-05-17 NOTE — DIETARY NOTE
Nutrition Short Note    Calorie Count- PEG tube placed, TF started and ordered. End calorie count.      5/12  Lunch - 20 cals, 0.5g pro  Dinner  - 330 cals, 28g pro  Total Intake: 350 kcals & 28.5 g pro (21% energy needs, 32% protein needs)    5/13  Break

## 2018-05-17 NOTE — CM/SW NOTE
Pt had PEG placed yesterday. MJ will re-eval on Friday.  Family to make plan for back up AMY facilities

## 2018-05-17 NOTE — PLAN OF CARE
Assumed patient care at 0730, vital signs stable. PEG tube feedings stopped at 0800, restarted to be 24 hours per dietician  Encouraged patient increase oral intake. Calorie count dc'd   Up to chair this afternoon.    Patient to wear resting hand splint a ADULT    • Verbalizes/displays adequate comfort level or patient's stated pain goal Progressing        Patient/Family Goals    • Patient/Family Long Term Goal Progressing    • Patient/Family Short Term Goal Progressing        RESPIRATORY - ADULT    • Achie

## 2018-05-17 NOTE — PLAN OF CARE
Assumed care at [address]. Pt A/O x4. 4-5L O2 overnight. VSS. PRN Tylenol given for  headache. At 2245, Glucerna 1.5 started at 30 ml/hr with a goal of 70/hr. 20 ml residual.   At 0500 Glucerna 1.5 increased to 40ml/hr. Call light within reach.  Will continue to stated pain goal Progressing        Patient/Family Goals    • Patient/Family Long Term Goal Progressing    • Patient/Family Short Term Goal Progressing        RESPIRATORY - ADULT    • Achieves optimal ventilation and oxygenation Progressing        RISK FOR

## 2018-05-17 NOTE — PROGRESS NOTES
[facility]  Progress Note     Gaboino Burnett Patient Status:  Inpatient    1950 MRN [id number]   [facility] 7NE-A Attending Harrell MD   Hosp Day # 21 PCP Vida Montero MD     Chief Complaint: nausea vomiting we ipratropium-albuterol  3 mL Nebulization QID   • Megestrol Acetate  400 mg Oral Daily   • Labetalol HCl  200 mg Oral 2 times per day   • Heparin Sodium (Porcine)  5,000 Units Subcutaneous Q8H [address]   • CloNIDine HCl  1 patch Transdermal Weekly   • Insulin As vs AR    Staff needs to record TF intake each shift  And po intake   Will discuss TF w/ dietary again     Spoke w/ Kirsten Varela wants to se how she does w/ increased nutrition today and reassess her Fri. They have no bed today for her anyway.  Dottie Whipple will look at

## 2018-05-17 NOTE — PHYSICAL THERAPY NOTE
PHYSICAL THERAPY TREATMENT NOTE - INPATIENT    Room Number: 5418/4759-I     Session: 2 (extended 6 more visits 5/15/18)  Number of Visits to Meet Established Goals: 6    Presenting Problem: right MCA CVA     History related to current admission: Pt is a 6 • Unspecified essential hypertension    • Unspecified sleep apnea    • Wears glasses    • Wheezing        Past Surgical History  Past Surgical History:  No date: ANGIOGRAM  2/28/14: ANGIOPLASTY (CORONARY)      Comment: stents acute mi  No date: APPENDECT 81.38%   Standardized Score (AM-PAC Scale): 30.55   CMS Modifier (G-Code): CM    FUNCTIONAL ABILITY STATUS  Gait Assessment   Gait Assistance: Not tested  Distance (ft): 0        Stoop/Curb Assistance: Not tested  Comment : above per FIM    Skilled Therapy to demonstrate supine - sit EOB @ level: max assistance      Goal #2 Patient is able to demonstrate transfers sit to stand at assistance level: max A    Goal #3 Patient is able to sit at EOB x 10 minutes with CGA      Goal #4 Pt will attend to left side wi

## 2018-05-17 NOTE — DIETARY NOTE
1230 [facility] ASSESSMENT    Pt is at moderate nutrition risk. Pt does not meet malnutrition criteria.     NUTRITION DIAGNOSIS/PROBLEM:    Inadequate oral intake related to inability to take sufficient po as evidenced by decreased ap initiate TF. Spoke with pt/family yesterday regarding po and supplements, agreeable to try ensure HP. Will continue to monitor. 5/10 - Noted 1 bottle of Ensure High Protein at bedside when visited.  Pt reports eating \"a little bit\" of the breakfast, a BMI-30.85     2.  Fluid Accumulation: none noted    NUTRITION PRESCRIPTION: using CBW 84 kg   Calories: 4873-9751 calories/day (20-24 calories per kg)  Protein:  grams protein/day (1.5-2 grams protein per kg IBW)  Fluid: ~1 ml/kcal or per MD discretio

## 2018-05-17 NOTE — SLP NOTE
SPEECH DAILY NOTE - INPATIENT    ASSESSMENT & PLAN   ASSESSMENT  Pt seen for dysphagia tx to assess tolerance with recommended diet, ensure proper utilization of aspiration precautions and provide pt/family education.   Patient in bed asleep but arouses to #3 Patient will participate in cognitive communcation evaluation as appropriate  MET     FOLLOW UP  Follow Up Needed: Yes  SLP Follow-up Date: 05/18/18  Number of Visits to Meet Established Goals: 6    Session: 5 of 6    If you have any questions, please c

## 2018-05-17 NOTE — PROGRESS NOTES
[facility]  Cardiology Progress Note    Kristofer Vasquez Patient Status:  Inpatient    1950 MRN [id number]   [facility] 7NE-A Attending Jamie Taylor MD   [address] Day # 21 PCP Xu Alcaraz MD     Subjective:  Hoping to be disc Daily   • Rosuvastatin Calcium  40 mg Per NG Tube Nightly   • budesonide  0.5 mg Nebulization Swati@[email or web address] [place]   • Clopidogrel Bisulfate  75 mg Oral Daily   • famoTIDine  20 mg Oral Daily     • sodium chloride 75 mL/hr at 05/16/18 0950   • lactated ringers St

## 2018-05-18 VITALS
RESPIRATION RATE: 20 BRPM | SYSTOLIC BLOOD PRESSURE: 129 MMHG | HEART RATE: 56 BPM | OXYGEN SATURATION: 94 % | BODY MASS INDEX: 31 KG/M2 | TEMPERATURE: 99 F | DIASTOLIC BLOOD PRESSURE: 49 MMHG | WEIGHT: 183.44 LBS

## 2018-05-18 LAB
GLUCOSE BLD-MCNC: 156 MG/DL (ref 65–99)
GLUCOSE BLD-MCNC: 177 MG/DL (ref 65–99)
GLUCOSE BLD-MCNC: 178 MG/DL (ref 65–99)

## 2018-05-18 PROCEDURE — 99239 HOSP IP/OBS DSCHRG MGMT >30: CPT | Performed by: INTERNAL MEDICINE

## 2018-05-18 PROCEDURE — 99232 SBSQ HOSP IP/OBS MODERATE 35: CPT | Performed by: NURSE PRACTITIONER

## 2018-05-18 RX ORDER — MODAFINIL 100 MG/1
100 TABLET ORAL DAILY
Qty: 1 TABLET | Refills: 0 | Status: SHIPPED | OUTPATIENT
Start: 2018-05-19 | End: 2018-08-30

## 2018-05-18 RX ORDER — MINOXIDIL 2.5 MG/1
2.5 TABLET ORAL 2 TIMES DAILY
Qty: 60 TABLET | Refills: 0 | Status: ON HOLD | OUTPATIENT
Start: 2018-05-18 | End: 2018-06-05

## 2018-05-18 RX ORDER — SERTRALINE HYDROCHLORIDE 100 MG/1
100 TABLET, FILM COATED ORAL
Qty: 30 TABLET | Refills: 0 | Status: SHIPPED | OUTPATIENT
Start: 2018-05-19 | End: 2018-09-10

## 2018-05-18 RX ORDER — MEGESTROL ACETATE 40 MG/ML
400 SUSPENSION ORAL DAILY
Qty: 1 BOTTLE | Refills: 0 | Status: ON HOLD | OUTPATIENT
Start: 2018-05-19 | End: 2018-06-05

## 2018-05-18 NOTE — PROGRESS NOTES
SUBJECTIVE: Drowzy, arousable, denies pain    CC: R ICA CVA with dysphagia/cog impairments and L Gonsalo  Interval History: Underwent EGD with PEG placement on 5/16/18.  Tolerating tube feeds  Scheduled Meds:  • docusate sodium  100 mg Oral BID   • Sertraline 0423 149/48 98 °F (36.7 °C) Oral 61 22 92 % 183 lb 6.8 oz (83.2 kg)   05/18/18 0000 139/46 98.2 °F (36.8 °C) Oral 59 22 95 % -   05/17/18 2030 148/38 98.1 °F (36.7 °C) Oral 56 20 90 % -   05/17/18 1719 148/38 98.6 °F (37 °C) Oral 55 20 93 % -         Intak

## 2018-05-18 NOTE — PROGRESS NOTES
[facility]  Progress Note    Lenore Brown Patient Status:  Inpatient    1950 MRN [id number]   [facility] 7NE-A Attending Km Ashton MD   [address] Day # 25 PCP Nahed Dorsey MD     Subjective:  Lenore Brown is a(n) 75 endarterectomy     Aortic sclerosis     Hyperlipidemia associated with type 2 diabetes mellitus (HCC)     Vomiting and diarrhea     TIP (acute kidney injury) ([address]     Acute right MCA stroke ([address]     Carotid occlusion, right     Hyponatremia     Aspiratio

## 2018-05-18 NOTE — CM/SW NOTE
MD Chayito Ayala - physiatrist evaluated pt and recommended [facility]. sw spoke to dtr L[place] who is now agreeable for pt to DC to [facility]. Bed confirmed at [facility]. RN to call report to [phone].  [facility] ambulance arranged for 6pm. PCS form comp

## 2018-05-18 NOTE — PROGRESS NOTES
[facility]  Progress Note     Margarette Gut Patient Status:  Inpatient    1950 MRN [id number]   [facility] 7NE-A Attending Heredia MD   Hosp Day # 25 PCP Hernandez Nolan MD     Chief Complaint: nausea vomiting we times per day   • Heparin Sodium (Porcine)  5,000 Units Subcutaneous Q8H [address]   • CloNIDine HCl  1 patch Transdermal Weekly   • Insulin Aspart Pen  1-20 Units Subcutaneous TID CC   • Insulin Aspart Pen  1-10 Units Subcutaneous TID CC and HS   • lisinopril today  Quality:  · DVT Prophylaxis: SCDs  Heparin tid      · CODE status: Full  · PICC right    · Han placed  Removed   Estimated date of discharge:   FRI    Discharge is dependent on:  Bp control, po intake    At this point Ms. Reese Dickey is expected to b

## 2018-05-18 NOTE — PLAN OF CARE
Assumed care at [address]. Pt A/o x4, forgetful. 4L of O2 overnight. NSR/SB on tele. VSS. Glucerna 1.5 infusing at goal, 40ml/hr with 160ml water flushes q4. Plan for MJ to   Reevaluate pt today. Call light within reach. Will continue to monitor.     CARDIOVASC Progressing        Patient/Family Goals    • Patient/Family Long Term Goal Progressing    • Patient/Family Short Term Goal Progressing        RESPIRATORY - ADULT    • Achieves optimal ventilation and oxygenation Progressing        RISK FOR INFECTION - ADUL

## 2018-05-18 NOTE — SLP NOTE
SPEECH DAILY NOTE - INPATIENT    ASSESSMENT & PLAN   ASSESSMENT  Pt seen for dysphagia tx to assess tolerance with recommended diet, ensure proper utilization of aspiration precautions and provide pt/family education.   Patient up in bed and alert, though b of Visits to Meet Established Goals: 6    Session: 6 of 6, added 2 additional sessions    If you have any questions, please contact Elissa Nguyen [phone]  Pager [phone]

## 2018-05-18 NOTE — PLAN OF CARE
Patient A/Ox4, forgetful but easily reoriented  Very drowsy this morning and difficult to hold conversation  Up in the recliner for breakfast, poor intake due to lethargy  4L NC while asleep  Denies pain, N/V/D  TF at goal, minimal residual, surgical site maximal functionality and self care Progressing        PAIN - ADULT    • Verbalizes/displays adequate comfort level or patient's stated pain goal Progressing        Patient/Family Goals    • Patient/Family Long Term Goal Progressing    • Patient/Family Wendy

## 2018-05-18 NOTE — PLAN OF CARE
NURSING DISCHARGE NOTE    Discharged Rehab facility , [facility], via Ambulance.   Accompanied by Support staff  Belongings Taken by patient/family including glasses and flower vase    Report given to [place] [phone]  Daughter Marian Cardenass neurological status Adequate for Discharge    • Achieves maximal functionality and self care Adequate for Discharge        PAIN - ADULT    • Verbalizes/displays adequate comfort level or patient's stated pain goal Adequate for Discharge        Patient/Fami

## 2018-05-18 NOTE — PHYSICAL THERAPY NOTE
PHYSICAL THERAPY TREATMENT NOTE - INPATIENT    Room Number: 6683/9633-O     Session: 3 (extended 6 more visits 5/15/18)  Number of Visits to Meet Established Goals: 6    Presenting Problem: right MCA CVA     History related to current admission: Pt is a 6 • Unspecified essential hypertension    • Unspecified sleep apnea    • Wears glasses    • Wheezing        Past Surgical History  Past Surgical History:  No date: ANGIOGRAM  2/28/14: ANGIOPLASTY (CORONARY)      Comment: stents acute mi  No date: APPENDECT Standardized Score (AM-PAC Scale): 28.58   CMS Modifier (G-Code): CM    FUNCTIONAL ABILITY STATUS  Gait Assessment   Gait Assistance: Not tested  Distance (ft): 0        Stoop/Curb Assistance: Not tested  Comment : above scores based on dept protocol assistance      Goal #2 Patient is able to demonstrate transfers sit to stand at assistance level: max A    Goal #3 Patient is able to sit at EOB x 10 minutes with CGA      Goal #4 Pt will attend to left side with 50% verbal cueing  met 5/16 new: 25% verba

## 2018-05-18 NOTE — DISCHARGE SUMMARY
[facility] [place] HOSPITALIST  DISCHARGE SUMMARY     Marciano Pedroza Patient Status:  Inpatient    1950 MRN [id number]   [facility] 7NE-A Attending Jt Dodd MD   [facility] Day # 24 PCP Yuri Bolden MD     Date of Admission: 2018  Gelacio nausea vomiting. Patient had been admitted to the medical floor. Patient went in in the morning to give a.m. meds and patient found to have left facial droop slurred speech stefani-paresis of her left side. Code stroke was called.   CT showed an occluded ri feedings. Patient also needs to free water to maintain good fluid balance is. Patient has been followed by  Pulmonology for her acute respiratory failure due to stroke primarily with hypoxemia and initial thought for possible aspiration.   Patient was mariano rehabilitation with speech PT OT  · Left arm splint at night  · Needs to remain off cigarettes  · Wean O2 as able  · Needs to follow-up with cardiology, neurology, PCP  · Compliance issues in the past    Lab/Test results pending at Discharge:   · None    C 30 tablet  Refills:  0     Clopidogrel Bisulfate 75 MG Tabs  Commonly known as:  PLAVIX      Take 75 mg by mouth daily.    Refills:  0     GlipiZIDE ER 10 MG Tb24  Commonly known as:  GLIPIZIDE XL      TAKE TWO TABLETS BY MOUTH DAILY   Quantity:  60 tablet Tabs  · lisinopril 20 MG Tabs         Follow-up appointment:   Dee Saul MD  [address] [place] [address] [phone]    On 6/26/2018  appointment time 2:20pm    Markus Nguyen MD  [address] [facility] [address]

## 2018-05-18 NOTE — CM/SW NOTE
05/18/18 1600   Discharge disposition   Expected discharge disposition Skilled Nurs   Name of Jad Francis   Discharge transportation [employer]  [phone] [facility] of [address]  H\"[phone]

## 2018-05-18 NOTE — OCCUPATIONAL THERAPY NOTE
OCCUPATIONAL THERAPY TREATMENT NOTE - INPATIENT     Room Number: 0947/8330-O  Session: 8   Number of Visits to Meet Established Goals: 10    Presenting Problem: Vomiting and diarrhea    History related to current admission:Pt was initially admitted for [facility] Diabetes mellitus ([facility] [address]    • Disorder of liver     \"fatty liver\"   • Fatigue    • HEART ATTACK 2/28/14    Acute mi and stents   • High blood pressure    • High cholesterol    • History of blood transfusion    • History of depression    • Leaking of urine bedpan or urinal? : Total  -   Putting on and taking off regular upper body clothing?: Total  -   Taking care of personal grooming such as brushing teeth?: A Lot  -   Eating meals?: A Lot    AM-PAC Score:  Score: 8  Approx Degree of Impairment: 85.69%  Sta reeducation; Compensatory technique education;Continued evaluation  Rehab Potential : Good  Frequency (Obs): Daily           OT Goals: -progressing 5/18  ADL Goals   Patient will perform grooming: with min assist  Patient will perform toileting: with max as

## 2018-05-19 ENCOUNTER — HOSPITAL ENCOUNTER (INPATIENT)
Facility: HOSPITAL | Age: 68
LOS: 16 days | Discharge: NURSING, REHAB OR LONG-TERM CARE | DRG: 377 | End: 2018-06-05
Attending: EMERGENCY MEDICINE | Admitting: INTERNAL MEDICINE
Payer: MEDICARE

## 2018-05-19 ENCOUNTER — APPOINTMENT (OUTPATIENT)
Dept: GENERAL RADIOLOGY | Facility: HOSPITAL | Age: 68
DRG: 377 | End: 2018-05-19
Attending: EMERGENCY MEDICINE
Payer: MEDICARE

## 2018-05-19 DIAGNOSIS — R11.2 NON-INTRACTABLE VOMITING WITH NAUSEA, UNSPECIFIED VOMITING TYPE: Primary | ICD-10-CM

## 2018-05-19 DIAGNOSIS — E87.5 HYPERKALEMIA: ICD-10-CM

## 2018-05-19 LAB
ALBUMIN SERPL-MCNC: 2.6 G/DL (ref 3.5–4.8)
ALLENS TEST: POSITIVE
ALP LIVER SERPL-CCNC: 56 U/L (ref 55–142)
ALT SERPL-CCNC: 13 U/L (ref 14–54)
ANTIBODY SCREEN: NEGATIVE
APTT PPP: 29.8 SECONDS (ref 26.1–34.6)
ARTERIAL BLD GAS O2 SATURATION: 97 % (ref 92–100)
ARTERIAL BLOOD GAS BASE EXCESS: -1.5
ARTERIAL BLOOD GAS HCO3: 22.1 MEQ/L (ref 22–26)
ARTERIAL BLOOD GAS PCO2: 33 MM HG (ref 35–45)
ARTERIAL BLOOD GAS PH: 7.45 (ref 7.35–7.45)
ARTERIAL BLOOD GAS PO2: 228 MM HG (ref 80–105)
AST SERPL-CCNC: 17 U/L (ref 15–41)
BASOPHILS # BLD AUTO: 0.06 X10(3) UL (ref 0–0.1)
BASOPHILS NFR BLD AUTO: 0.5 %
BILIRUB SERPL-MCNC: 0.5 MG/DL (ref 0.1–2)
BUN BLD-MCNC: 46 MG/DL (ref 8–20)
CALCIUM BLD-MCNC: 9.4 MG/DL (ref 8.3–10.3)
CALCULATED O2 SATURATION: 100 % (ref 92–100)
CARBOXYHEMOGLOBIN: 1.6 % SAT (ref 0–3)
CHLORIDE: 101 MMOL/L (ref 101–111)
CO2: 25 MMOL/L (ref 22–32)
CREAT BLD-MCNC: 1.39 MG/DL (ref 0.55–1.02)
DEPRECATED HBV CORE AB SER IA-ACNC: 164.4 NG/ML (ref 10–291)
EOSINOPHIL # BLD AUTO: 0.18 X10(3) UL (ref 0–0.3)
EOSINOPHIL NFR BLD AUTO: 1.5 %
ERYTHROCYTE [DISTWIDTH] IN BLOOD BY AUTOMATED COUNT: 15.8 % (ref 11.5–16)
EXPIRATORY PRESSURE: 5 CM H2O
FIO2: 85 %
FOLATE (FOLIC ACID), SERUM: 16.5 NG/ML (ref 8.7–24)
GLUCOSE BLD-MCNC: 129 MG/DL (ref 70–99)
GLUCOSE BLD-MCNC: 131 MG/DL (ref 65–99)
GLUCOSE BLD-MCNC: 139 MG/DL (ref 65–99)
GLUCOSE BLD-MCNC: 215 MG/DL (ref 65–99)
HAV AB SERPL IA-ACNC: 1028 PG/ML (ref 193–986)
HCT VFR BLD AUTO: 30.2 % (ref 34–50)
HGB BLD-MCNC: 9.7 G/DL (ref 12–16)
IMMATURE GRANULOCYTE COUNT: 0.06 X10(3) UL (ref 0–1)
IMMATURE GRANULOCYTE RATIO %: 0.5 %
INR BLD: 1.1 (ref 0.9–1.1)
INSP PRESSURE: 12 CM H2O
IRON SATURATION: 13 % (ref 13–45)
IRON: 40 UG/DL (ref 28–170)
LIPASE: 190 U/L (ref 73–393)
LYMPHOCYTES # BLD AUTO: 0.69 X10(3) UL (ref 0.9–4)
LYMPHOCYTES NFR BLD AUTO: 5.6 %
M PROTEIN MFR SERPL ELPH: 7 G/DL (ref 6.1–8.3)
MCH RBC QN AUTO: 28.5 PG (ref 27–33.2)
MCHC RBC AUTO-ENTMCNC: 32.1 G/DL (ref 31–37)
MCV RBC AUTO: 88.8 FL (ref 81–100)
METHEMOGLOBIN: 0.5 % SAT (ref 0.4–1.5)
MONOCYTES # BLD AUTO: 0.8 X10(3) UL (ref 0.1–1)
MONOCYTES NFR BLD AUTO: 6.5 %
NEUTROPHIL ABS PRELIM: 10.49 X10 (3) UL (ref 1.3–6.7)
NEUTROPHILS # BLD AUTO: 10.49 X10(3) UL (ref 1.3–6.7)
NEUTROPHILS NFR BLD AUTO: 85.4 %
PATIENT TEMPERATURE: 98.6 F
PLATELET # BLD AUTO: 270 10(3)UL (ref 150–450)
POTASSIUM SERPL-SCNC: 5.3 MMOL/L (ref 3.6–5.1)
PSA SERPL DL<=0.01 NG/ML-MCNC: 14.6 SECONDS (ref 12.4–14.7)
RBC # BLD AUTO: 3.4 X10(6)UL (ref 3.8–5.1)
RED CELL DISTRIBUTION WIDTH-SD: 50.6 FL (ref 35.1–46.3)
RH BLOOD TYPE: POSITIVE
SODIUM SERPL-SCNC: 134 MMOL/L (ref 136–144)
TOTAL HEMOGLOBIN: 9.7 G/DL (ref 11.7–16)
TOTAL IRON BINDING CAPACITY: 297 UG/DL (ref 298–536)
TRANSFERRIN: 199 MG/DL (ref 200–360)
WBC # BLD AUTO: 12.3 X10(3) UL (ref 4–13)

## 2018-05-19 PROCEDURE — 5A09557 ASSISTANCE WITH RESPIRATORY VENTILATION, GREATER THAN 96 CONSECUTIVE HOURS, CONTINUOUS POSITIVE AIRWAY PRESSURE: ICD-10-PCS | Performed by: HOSPITALIST

## 2018-05-19 PROCEDURE — 74019 RADEX ABDOMEN 2 VIEWS: CPT | Performed by: EMERGENCY MEDICINE

## 2018-05-19 PROCEDURE — 99223 1ST HOSP IP/OBS HIGH 75: CPT | Performed by: INTERNAL MEDICINE

## 2018-05-19 RX ORDER — HYDRALAZINE HYDROCHLORIDE 50 MG/1
100 TABLET, FILM COATED ORAL 3 TIMES DAILY
Status: DISCONTINUED | OUTPATIENT
Start: 2018-05-19 | End: 2018-05-24

## 2018-05-19 RX ORDER — CLOPIDOGREL BISULFATE 75 MG/1
75 TABLET ORAL DAILY
Status: DISCONTINUED | OUTPATIENT
Start: 2018-05-19 | End: 2018-06-05

## 2018-05-19 RX ORDER — LABETALOL 200 MG/1
200 TABLET, FILM COATED ORAL EVERY 12 HOURS SCHEDULED
Status: DISCONTINUED | OUTPATIENT
Start: 2018-05-19 | End: 2018-05-30

## 2018-05-19 RX ORDER — ONDANSETRON 2 MG/ML
4 INJECTION INTRAMUSCULAR; INTRAVENOUS EVERY 6 HOURS PRN
Status: DISCONTINUED | OUTPATIENT
Start: 2018-05-19 | End: 2018-06-05

## 2018-05-19 RX ORDER — ASPIRIN 81 MG/1
81 TABLET ORAL DAILY
Status: DISCONTINUED | OUTPATIENT
Start: 2018-05-19 | End: 2018-06-05

## 2018-05-19 RX ORDER — AMLODIPINE BESYLATE 5 MG/1
10 TABLET ORAL DAILY
Status: DISCONTINUED | OUTPATIENT
Start: 2018-05-19 | End: 2018-06-05

## 2018-05-19 RX ORDER — IPRATROPIUM BROMIDE AND ALBUTEROL SULFATE 2.5; .5 MG/3ML; MG/3ML
3 SOLUTION RESPIRATORY (INHALATION) EVERY 6 HOURS PRN
Status: DISCONTINUED | OUTPATIENT
Start: 2018-05-19 | End: 2018-06-05

## 2018-05-19 RX ORDER — AMIODARONE HYDROCHLORIDE 200 MG/1
200 TABLET ORAL DAILY
Status: DISCONTINUED | OUTPATIENT
Start: 2018-05-19 | End: 2018-06-05

## 2018-05-19 RX ORDER — MINOXIDIL 2.5 MG/1
2.5 TABLET ORAL 2 TIMES DAILY
Status: DISCONTINUED | OUTPATIENT
Start: 2018-05-19 | End: 2018-05-23

## 2018-05-19 RX ORDER — METOCLOPRAMIDE HYDROCHLORIDE 5 MG/ML
10 INJECTION INTRAMUSCULAR; INTRAVENOUS EVERY 8 HOURS PRN
Status: DISCONTINUED | OUTPATIENT
Start: 2018-05-19 | End: 2018-05-23

## 2018-05-19 RX ORDER — ONDANSETRON 2 MG/ML
INJECTION INTRAMUSCULAR; INTRAVENOUS
Status: COMPLETED
Start: 2018-05-19 | End: 2018-05-19

## 2018-05-19 RX ORDER — ROSUVASTATIN CALCIUM 20 MG/1
40 TABLET, COATED ORAL NIGHTLY
Status: DISCONTINUED | OUTPATIENT
Start: 2018-05-19 | End: 2018-06-05

## 2018-05-19 RX ORDER — SERTRALINE HYDROCHLORIDE 100 MG/1
100 TABLET, FILM COATED ORAL
Status: DISCONTINUED | OUTPATIENT
Start: 2018-05-19 | End: 2018-06-05

## 2018-05-19 RX ORDER — HYDRALAZINE HYDROCHLORIDE 20 MG/ML
10 INJECTION INTRAMUSCULAR; INTRAVENOUS EVERY 4 HOURS PRN
Status: DISCONTINUED | OUTPATIENT
Start: 2018-05-19 | End: 2018-06-05

## 2018-05-19 RX ORDER — FAMOTIDINE 10 MG/ML
20 INJECTION, SOLUTION INTRAVENOUS ONCE
Status: COMPLETED | OUTPATIENT
Start: 2018-05-19 | End: 2018-05-19

## 2018-05-19 RX ORDER — SODIUM CHLORIDE 9 MG/ML
INJECTION, SOLUTION INTRAVENOUS CONTINUOUS
Status: DISCONTINUED | OUTPATIENT
Start: 2018-05-19 | End: 2018-05-19

## 2018-05-19 RX ORDER — FUROSEMIDE 10 MG/ML
40 INJECTION INTRAMUSCULAR; INTRAVENOUS ONCE
Status: COMPLETED | OUTPATIENT
Start: 2018-05-19 | End: 2018-05-19

## 2018-05-19 RX ORDER — MEGESTROL ACETATE 40 MG/ML
400 SUSPENSION ORAL DAILY
Status: DISCONTINUED | OUTPATIENT
Start: 2018-05-19 | End: 2018-05-20

## 2018-05-19 RX ORDER — ENOXAPARIN SODIUM 100 MG/ML
40 INJECTION SUBCUTANEOUS DAILY
Status: DISCONTINUED | OUTPATIENT
Start: 2018-05-19 | End: 2018-05-21

## 2018-05-19 RX ORDER — MODAFINIL 100 MG/1
100 TABLET ORAL DAILY
Status: DISCONTINUED | OUTPATIENT
Start: 2018-05-19 | End: 2018-06-05

## 2018-05-19 RX ORDER — DEXTROSE MONOHYDRATE 25 G/50ML
50 INJECTION, SOLUTION INTRAVENOUS
Status: DISCONTINUED | OUTPATIENT
Start: 2018-05-19 | End: 2018-06-05

## 2018-05-19 NOTE — ED PROVIDER NOTES
Patient Seen in: [facility] Emergency Department    History   Patient presents with:  GI Bleeding (gastrointestinal)    Stated Complaint: gi bleeding    HELLEN Bañuelos is a pleasant 76-year-old female presenting to the emergency department for vomitin SURGERY  No date: BIOPSY OF SKIN LESION      Comment: hyperkeratosis  1996 and 2012: BYPASS SURGERY      Comment: cabg x 4  1996: CABG      Comment: x4  No date: CATH BARE METAL STENT (BMS)      Comment: R carotid/coronary  1987: CHOLECYSTECTOMY  No date: Absolute 10.49 (*)     Lymphocyte Absolute 0.69 (*)     All other components within normal limits   PROTHROMBIN TIME (PT) - Normal   PTT, ACTIVATED - Normal   CBC WITH DIFFERENTIAL WITH PLATELET    Narrative:      The following orders were created for panel

## 2018-05-19 NOTE — PLAN OF CARE
Seen & evaluated by Dr Casi Wells. with orders for clear liquid diet & may start tube feedings. Dietician to recommend GTF. All due meds given through G tube. Gastro informed RN & daughter patient can resume aspirin & plavix. Clarification needed whether patient n

## 2018-05-19 NOTE — PLAN OF CARE
Admitted from [facility] for possible GI bleed. Vomited 3x,emesis had browninsh discoloration,daughter states it was dark chocolate colored emesis PTA. Patient is comfortably resting in bed with O2 per NC,no SOB noted. No further vomiting. Very drowsy & falls aslee

## 2018-05-19 NOTE — H&P
[facility]  History and Physical     Marcianoshawanda Pedroza Patient Status:  Observation    1950 MRN [id number]   [facility] 4NW-A Attending Jt Dodd MD   Hosp Day # 0 PCP Yuri Bolden MD     Chief Complaint: nausea and essential hypertension    • Unspecified sleep apnea    • Wears glasses    • Wheezing         Past Surgical History: Past Surgical History:  No date: ANGIOGRAM  2/28/14: ANGIOPLASTY (CORONARY)      Comment: stents acute mi  No date: APPENDECTOMY  No date:  B route daily. Disp: 30 tablet Rfl: 5   CloNIDine HCl 0.3 MG/24HR Transdermal Patch Weekly Place 1 patch onto the skin once a week. Disp: 4 patch Rfl: 5   hydrALAzine HCl 100 MG Oral Tab Take 1 tablet (100 mg total) by mouth 3 (three) times daily.  Disp: 90 t PERRLA. Anicteric. Neck: No lymphadenopathy. No JVD. No carotid bruits. Respiratory: Clear to auscultation bilaterally. No wheezes. No rhonchi. Cardiovascular: S1, S2. Regular rate and rhythm. No murmurs, rubs or gallops. Equal pulses.    Chest and Back: MCA stroke due to ICA occlusion with left hemiplegia   1. On supplemental TF  2. Dietician consult for TF  3. Hold ASA and plavix until seen by GI  6. HTN   1. Resume home meds  7. uncontrolled DM 2  1. Hyperglycemia protocol  8. Chronic COPD   9.  Chronic

## 2018-05-19 NOTE — CONSULTS
[facility]                       Gastroenterology Consultation-[facility]    [facility] Patient Status:  Observation    1950 MRN [id number]   [facility] 4NW-A Attending Marisa Mcgarry MD   H Unspecified essential hypertension    • Unspecified sleep apnea    • Wears glasses    • Wheezing      PSHx: Past Surgical History:  No date: ANGIOGRAM  2/28/14: ANGIOPLASTY (CORONARY)      Comment: stents acute mi  No date: APPENDECTOMY  No date: BACK SURG dextrose 50 % injection 50 mL 50 mL Intravenous Q15 Min PRN   Or      glucose (DEX4) oral liquid 30 g 30 g Oral Q15 Min PRN   Or      Glucose-Vitamin C (DEX-4) 4-0.006 g chewable tab 8 tablet 8 tablet Oral Q15 Min PRN   0.9%  NaCl infusion  Intravenous reports on history of prior solid tumor or hematologic malignancy           ENT: The patient reports no hoarseness of voice, hearing loss, sinus congestion, tinnitus           Neurologic: +CVA; denies seizure    PE: /59 (BP Location: Right arm)   Pul MCH  28.5   MCHC  32.1   RDW  15.8   NEPRELIM  10.49*   WBC  12.3   PLT  270.0       Recent Labs   Lab  05/19/18   0837   ALT  13*   AST  17       Imaging:   Abdominal XR:  CONCLUSION:    1. No acute abdominal findings.   2.  Small left-sided pleural eff

## 2018-05-19 NOTE — CM/SW NOTE
EMILIANA received a page to c/b Alfredo Lozano ([phone]) regarding pt. EMILIANA attempted to c/b, however no answer.      GÓMEZ Perez

## 2018-05-19 NOTE — RESPIRATORY THERAPY NOTE
Incruse attempted with patient not able to take deep enough breath to obtain med. Daughter states patient no longer takes medication.

## 2018-05-19 NOTE — PLAN OF CARE
Noted with labored breathing on 5L O2 ,O2 sat 85-89%. Placed on non rebreather & notified respiratory. Placed patient on high back rest.Will continue to monitor. Paged Dr Enrique Hanks to refer.     [facility] with Dr Katie Lopez obtained for lasix IV 40 once, stop

## 2018-05-19 NOTE — CM/SW NOTE
Patient is sleeping in her bed with her daughter at her bedside. Daughter, Yoni Carpenter reports that patient was just discharged yesterday to [address] [facility] [address].   She reports that she came this morning to see her Mom at 0600 and her Mom felt terrible so she jimy

## 2018-05-19 NOTE — CM/SW NOTE
Pt admitted from [facility] where pt was discharged to on 5/18/18 for AMY. aury confirmed with keon of [facility] that they can accept pt back. Pt was last evaluated by [facility] on 5/18/18 and was declined for their AMY and acute rehab units.  aury to fol

## 2018-05-20 ENCOUNTER — APPOINTMENT (OUTPATIENT)
Dept: GENERAL RADIOLOGY | Facility: HOSPITAL | Age: 68
DRG: 377 | End: 2018-05-20
Attending: INTERNAL MEDICINE
Payer: MEDICARE

## 2018-05-20 PROBLEM — R09.02 HYPOXIA: Status: ACTIVE | Noted: 2018-05-20

## 2018-05-20 LAB
BUN BLD-MCNC: 42 MG/DL (ref 8–20)
CALCIUM BLD-MCNC: 9 MG/DL (ref 8.3–10.3)
CHLORIDE: 106 MMOL/L (ref 101–111)
CO2: 24 MMOL/L (ref 22–32)
CREAT BLD-MCNC: 1.44 MG/DL (ref 0.55–1.02)
FOLATE (FOLIC ACID), SERUM: 17.9 NG/ML (ref 8.7–24)
GLUCOSE BLD-MCNC: 103 MG/DL (ref 65–99)
GLUCOSE BLD-MCNC: 104 MG/DL (ref 65–99)
GLUCOSE BLD-MCNC: 111 MG/DL (ref 65–99)
GLUCOSE BLD-MCNC: 95 MG/DL (ref 65–99)
GLUCOSE BLD-MCNC: 95 MG/DL (ref 70–99)
HGB BLD-MCNC: 7.9 G/DL (ref 12–16)
HGB BLD-MCNC: 8.7 G/DL (ref 12–16)
POTASSIUM SERPL-SCNC: 5 MMOL/L (ref 3.6–5.1)
PRO-BETA NATRIURETIC PEPTIDE: ABNORMAL PG/ML (ref ?–125)
PROCALCITONIN SERPL-MCNC: 0.26 NG/ML (ref ?–0.11)
SODIUM SERPL-SCNC: 138 MMOL/L (ref 136–144)

## 2018-05-20 PROCEDURE — 71045 X-RAY EXAM CHEST 1 VIEW: CPT | Performed by: INTERNAL MEDICINE

## 2018-05-20 PROCEDURE — 99233 SBSQ HOSP IP/OBS HIGH 50: CPT | Performed by: HOSPITALIST

## 2018-05-20 RX ORDER — FUROSEMIDE 10 MG/ML
40 INJECTION INTRAMUSCULAR; INTRAVENOUS
Status: COMPLETED | OUTPATIENT
Start: 2018-05-20 | End: 2018-05-21

## 2018-05-20 NOTE — SLP NOTE
ADULT SWALLOWING EVALUATION    ASSESSMENT    ASSESSMENT/OVERALL IMPRESSION:  Pt seen for BSSE. Pt just discharged from [facility] on 5/18/18 and was seen by our service from end of April until discharge following a CVA.  Chopped and thin liquids were r Atherosclerosis of coronary artery 1996  & 2012     2 surgeries for CABG X 4 1996 2012    • Atherosclerosis of coronary artery 2/28/14    acute MI, CHF, Stents    • Belching    • Black stools    • CONGESTIVE HEART FAILURE 3/2013    EF 35-40 %   • COPD    • Upright;Midline    Oral Phase of Swallow: Within Functional Limits                      Pharyngeal Phase of Swallow: Within Functional Limits           (Please note: Silent aspiration cannot be evaluated clinically.  Videofluoroscopic Swallow Study is requi

## 2018-05-20 NOTE — PROGRESS NOTES
Gastroenterology Progress Note  Patient Name: Giancarlo Sutton  Chief Complaint: N/V, CGE  S: Pt denies N/V, melena. She wants to eat. She was placed on Bipap overnight.    O: /45 (BP Location: Right arm)   Pulse 66   Temp 98.6 °F (37 °C) (Oral)   R c-scope when she had come off of blood thinners given anemia  8.  Obtain folate    Total time spent in the care of the patient today: 25 minutes    Oscar Martinez DO  11:47 AM  5/20/2018  [facility] Gastroenterology  [phone]

## 2018-05-20 NOTE — PHYSICAL THERAPY NOTE
PHYSICAL THERAPY EVALUATION - INPATIENT     Room Number: 410/410-A  Evaluation Date: 5/20/2018  Type of Evaluation: Initial  Physician Order: PT Eval and Treat    Presenting Problem: intractable nausea with vomiting  Reason for Therapy: Mobility Dysf Uncontrolled diabetes mellitus ([facility] [address] 3/19/2014   • Unspecified essential hypertension    • Unspecified sleep apnea    • Wears glasses    • Wheezing        Past Surgical History  Past Surgical History:  No date: ANGIOGRAM  2/28/14: ANGIOPLASTY (CORONARY) ;Strength 0/5  Left  impaired ;Strength 0/5    Lower extremity ROM is within functional limits PROM, WFL AAROM    Lower extremity strength is within functional limits except for the following:    Left Hip flexion  3-/5  Left Knee extension  3/5  Left K falling asleep again and required some verbal cueing to wake up. Pt found to be incontinent of urine, rolled to L side with max A, rolled to R side with total A. Total A for tasha hygiene. PCT made aware that pt's linens will need to be changed and cleaned. conservation;Patient education; Family education;Gait training;Range of motion;Strengthening;Transfer training;Balance training  Rehab Potential : Good  Frequency (Obs): 5x/week  Number of Visits to Meet Established Goals: 5      CURRENT GOALS    Goal #1 Pa

## 2018-05-20 NOTE — CONSULTS
[facility]  Report of Consultation    Michael Burnett Patient Status:  Observation    1950 MRN [id number]   [facility] 4NW-A Attending Godfrey Richards MD   Hosp Day # 0 PCP Vida Montero MD     Reason for Consultation:  [employer] • Unspecified essential hypertension    • Unspecified sleep apnea    • Wears glasses    • Wheezing      Past Surgical History:  No date: ANGIOGRAM  2/28/14: ANGIOPLASTY (CORONARY)      Comment: stents acute mi  No date: APPENDECTOMY  No date: BACK SURGER Nightly  •  Sertraline HCl (ZOLOFT) tab 100 mg, 100 mg, Oral, Daily  •  Umeclidinium Bromide (INCRUSE ELLIPTA) 62.5 MCG/INH inhaler 1 puff, 1 puff, Inhalation, Daily  •  glucose (DEX4) oral liquid 15 g, 15 g, Oral, Q15 Min PRN **OR** Glucose-Vitamin C (DEX Negative for rash, skin lesions, and pruritus. Hematologic/lymphatic: Negative for easy bruising, bleeding, and lymphadenopathy. Musculoskeletal: Negative for myalgias, arthralgias, muscle weakness.   Neurological: Negative for headaches, dizziness, seizu lesions.    Neurological: Alert, interactive, L weakness    Lab Data Review:  Recent Labs   Lab  05/15/18   1220  05/16/18   0518  05/19/18   0837  05/20/18   0651   WBC  8.3  9.8  12.3   --    HGB  9.8*  9.2*  9.7*  8.7*   HCT  29.7*  28.1*  30.2*   -- provigil  · Diet per GI. Possible plans for EGD, colonoscopy as outpatient unless progressive anemia  · Aspiration precautions with meals when diet approved    Thank you for the consultation. Will follow with you. Discussed with RN at the bedside.

## 2018-05-20 NOTE — DIETARY NOTE
[facility]     NUTRITION Initial ASSESSMENT     Pt is at moderate nutrition risk.  Pt does not meet malnutrition criteria.     NUTRITION DIAGNOSIS/PROBLEM:     Inadequate oral intake related to inability to take sufficient po as evidenced by need for s Poor  Intake: 50%  Intake Meeting Needs: No  Food Allergies: No  Cultural/Ethnic/Restoration Preferences Addresses: Yes     NUTRITION RELATED PHYSICAL FINDINGS:     1. Body Fat/Muscle Mass: BMI-30.29     2.  Fluid Accumulation: none noted     NUTRITION PRESCR

## 2018-05-20 NOTE — PROGRESS NOTES
[facility]  Progress Note     Markus Leonardo Patient Status:  Inpatient    1950 MRN [id number]   [facility] 4NW-A Attending Lana Marinelli MD   Hosp Day # 0 PCP Manish Herrera MD     Chief Complaint: Emesis    S: Patient de 81 mg Oral Daily   • CloNIDine HCl  1 patch Transdermal Weekly   • Clopidogrel Bisulfate  75 mg Oral Daily   • HydrALAZINE HCl  100 mg Oral TID   • insulin detemir  20 Units Subcutaneous BID   • Labetalol HCl  200 mg Oral 2 times per day   • Megestrol Acet team.    Humberto Quevedo MD          **Certification      PHYSICIAN Certification of Need for Inpatient Hospitalization - Initial Certification    Patient will require inpatient services that will reasonably be expected to span two midnight's based on the c

## 2018-05-20 NOTE — PLAN OF CARE
Diabetes/Glucose Control    • Glucose maintained within prescribed range Progressing        GASTROINTESTINAL - ADULT    • Minimal or absence of nausea and vomiting Progressing        HEMATOLOGIC - ADULT    • Maintains hematologic stability Progressing    •

## 2018-05-20 NOTE — PLAN OF CARE
Problem: Impaired Swallowing  Goal: Minimize aspiration risk  Interventions:   Full assist with all meals  - Patient should be alert and upright for all feedings (90 degrees preferred)  - Offer food and liquids at a slow rate  - No straws  - Encourage smal

## 2018-05-20 NOTE — PROGRESS NOTES
Assumed care at [facility], patient on Bipap, saturation is 100%, lasix 40 mg IVP given. IVfluids discontinued,  pulm. Consulted re; earlier episode of resp. Distress, ABG done,  PO2 is elevated, relayed to pulm. BIPAP settings adjusted by Resp.  Therapist. Chema Woodruff

## 2018-05-21 ENCOUNTER — APPOINTMENT (OUTPATIENT)
Dept: GENERAL RADIOLOGY | Facility: HOSPITAL | Age: 68
DRG: 377 | End: 2018-05-21
Attending: INTERNAL MEDICINE
Payer: MEDICARE

## 2018-05-21 LAB
BASOPHILS # BLD AUTO: 0.03 X10(3) UL (ref 0–0.1)
BASOPHILS NFR BLD AUTO: 0.3 %
BUN BLD-MCNC: 56 MG/DL (ref 8–20)
CALCIUM BLD-MCNC: 8.7 MG/DL (ref 8.3–10.3)
CHLORIDE: 104 MMOL/L (ref 101–111)
CO2: 24 MMOL/L (ref 22–32)
CREAT BLD-MCNC: 1.96 MG/DL (ref 0.55–1.02)
EOSINOPHIL # BLD AUTO: 0.42 X10(3) UL (ref 0–0.3)
EOSINOPHIL NFR BLD AUTO: 4.8 %
ERYTHROCYTE [DISTWIDTH] IN BLOOD BY AUTOMATED COUNT: 15.9 % (ref 11.5–16)
GLUCOSE BLD-MCNC: 114 MG/DL (ref 70–99)
GLUCOSE BLD-MCNC: 132 MG/DL (ref 65–99)
GLUCOSE BLD-MCNC: 138 MG/DL (ref 65–99)
GLUCOSE BLD-MCNC: 141 MG/DL (ref 65–99)
GLUCOSE BLD-MCNC: 158 MG/DL (ref 65–99)
HCT VFR BLD AUTO: 25.8 % (ref 34–50)
HGB BLD-MCNC: 8.1 G/DL (ref 12–16)
HGB BLD-MCNC: 8.4 G/DL (ref 12–16)
IMMATURE GRANULOCYTE COUNT: 0.04 X10(3) UL (ref 0–1)
IMMATURE GRANULOCYTE RATIO %: 0.5 %
LYMPHOCYTES # BLD AUTO: 1.32 X10(3) UL (ref 0.9–4)
LYMPHOCYTES NFR BLD AUTO: 15 %
MCH RBC QN AUTO: 28.2 PG (ref 27–33.2)
MCHC RBC AUTO-ENTMCNC: 31.4 G/DL (ref 31–37)
MCV RBC AUTO: 89.9 FL (ref 81–100)
MONOCYTES # BLD AUTO: 0.96 X10(3) UL (ref 0.1–1)
MONOCYTES NFR BLD AUTO: 10.9 %
NEUTROPHIL ABS PRELIM: 6.01 X10 (3) UL (ref 1.3–6.7)
NEUTROPHILS # BLD AUTO: 6.01 X10(3) UL (ref 1.3–6.7)
NEUTROPHILS NFR BLD AUTO: 68.5 %
PLATELET # BLD AUTO: 197 10(3)UL (ref 150–450)
POTASSIUM SERPL-SCNC: 4.7 MMOL/L (ref 3.6–5.1)
RBC # BLD AUTO: 2.87 X10(6)UL (ref 3.8–5.1)
RED CELL DISTRIBUTION WIDTH-SD: 52.2 FL (ref 35.1–46.3)
SODIUM SERPL-SCNC: 137 MMOL/L (ref 136–144)
WBC # BLD AUTO: 8.8 X10(3) UL (ref 4–13)

## 2018-05-21 PROCEDURE — 99232 SBSQ HOSP IP/OBS MODERATE 35: CPT | Performed by: INTERNAL MEDICINE

## 2018-05-21 PROCEDURE — 71045 X-RAY EXAM CHEST 1 VIEW: CPT | Performed by: INTERNAL MEDICINE

## 2018-05-21 RX ORDER — PANTOPRAZOLE SODIUM 40 MG/1
40 TABLET, DELAYED RELEASE ORAL
Qty: 60 TABLET | Refills: 2 | Status: SHIPPED | OUTPATIENT
Start: 2018-05-21 | End: 2018-08-30 | Stop reason: DRUGHIGH

## 2018-05-21 RX ORDER — ENOXAPARIN SODIUM 100 MG/ML
30 INJECTION SUBCUTANEOUS DAILY
Status: DISCONTINUED | OUTPATIENT
Start: 2018-05-22 | End: 2018-05-27

## 2018-05-21 NOTE — SLP NOTE
SPEECH DAILY NOTE - INPATIENT    ASSESSMENT & PLAN   ASSESSMENT  Pt seen for dysphagia tx to assess tolerance with recommended diet, ensure proper utilization of aspiration precautions and provide pt/family education.  Patient alert and and up in bed, known of aspiration precautions and swallow strategies independently over 1-2 session(s).     MET       FOLLOW UP  Follow Up Needed: No  SLP Follow-up Date: 05/21/18       Session: 1    If you have any questions, please contact Yovani Darnell

## 2018-05-21 NOTE — PROGRESS NOTES
[facility] Pulmonary/Critical Care Progress Note     SUBJECTIVE/24H Events: All events, procedures, notes reviewed. She denies concerns, feels \"okay\" today. Denies cough, dyspnea, wheezing, pain.  No f/c/s.  o2 weaned 5. 3*  5.0  4.7   CL  101  106  104   CO2  25.0  24.0  24.0     Recent Labs   Lab  05/16/18   0518  05/19/18   0837  05/20/18   0651  05/20/18   1813  05/21/18   0551   RBC  3.16*  3.40*   --    --   2.87*   HGB  9.2*  9.7*  8.7*  7.9*  8.1*   HCT  28.1*  3 atelectasis/infiltrates. Dictated by: Raulito Stewart MD on 5/20/2018 at 6:48     Approved by: Raulito Stewart MD            Xr Abdomen Obstructive Series Routine(2 Vw)(cpt=74019)    Result Date: 5/19/2018  CONCLUSION:  1.   No acute abdominal finding respiratory status; would continue holding abx at this time. Afebrile, normal WBC and improvement in Chest imaging with diuresis. Suspect respiratory distress related to fluid overload and/or secretions/aspiration which have improved.    · Continue diuresis

## 2018-05-21 NOTE — PROGRESS NOTES
[facility] Pharmacy Note:  Renal Dose Adjustment for Enoxaparin (LOVENOX)    Erica Darnell has been prescribed Enoxaparin (LOVENOX) 40 mg subcutaneously every 24 hours. Estimated Creatinine Clearance: 24.7 mL/min (A) (based on SCr of 1.96 mg/dL (H)).     He

## 2018-05-21 NOTE — PROGRESS NOTES
[facility]    Gastroenterology Follow-Up Note      Clay Welsh Patient Status:  Inpatient    1950 MRN [id number]   [facility] 4NW-A Attending Jagdish Graham MD   Hosp Day # 1 PCP Raghu Fonseca MD     Chief Complaint/Reaso

## 2018-05-21 NOTE — PROGRESS NOTES
[facility]  Progress Note     Tariq Bean Patient Status:  Inpatient    1950 MRN [id number]   [facility] 4NW-A Attending Leonides Rodgers MD   Hosp Day # 1 PCP Maciel Turk MD     Chief Complaint: on 4 L NC    S: Patient 0837   PTP  14.4  14.6   INR  1.08  1.10       No results for input(s): TROP, CK in the last 168 hours. Imaging: Imaging data reviewed in Epic.     Medications:   • furosemide  40 mg Intravenous BID (Diuretic)   • amiodarone HCl  200 mg Per NG Tube · Code: full     Estimated date of discharge: TBD  Discharge is dependent on: Clinical coure  At this point Ms. Bijal Sánchez is expected to be discharge to: TBD     Plan of care discussed with patient, RN .  Mariya Bassett MD

## 2018-05-21 NOTE — CM/SW NOTE
05/21/18 1100   CM/SW Referral Data   Referral Source Social Work (self-referral)   Reason for Referral Discharge planning   Informant Patient   Readmission Assessment   Factors that patient feels contributed to this readmission (vomiting)   Was full as

## 2018-05-21 NOTE — PLAN OF CARE
No acute change overnight, O2 remains at 4 liters, sats 92-94%. inco of urine.  G tube feeds increased to 40cc at 3am , next increase will be 9am. Residual 20-60cc

## 2018-05-21 NOTE — PLAN OF CARE
Problem: Impaired Swallowing  Goal: Minimize aspiration risk  Interventions:   Full assist with all meals  - Patient should be alert and upright for all feedings (90 degrees preferred)  - Offer food and liquids at a slow rate  - Encourage small bites of fo

## 2018-05-21 NOTE — PLAN OF CARE
GASTROINTESTINAL - ADULT    • Minimal or absence of nausea and vomiting Progressing        HEMATOLOGIC - ADULT    • Free from bleeding injury Progressing        Impaired Swallowing    • Minimize aspiration risk Progressing          GASTROINTESTINAL - ADULT

## 2018-05-22 ENCOUNTER — APPOINTMENT (OUTPATIENT)
Dept: GENERAL RADIOLOGY | Facility: HOSPITAL | Age: 68
DRG: 377 | End: 2018-05-22
Attending: INTERNAL MEDICINE
Payer: MEDICARE

## 2018-05-22 LAB
GLUCOSE BLD-MCNC: 149 MG/DL (ref 65–99)
GLUCOSE BLD-MCNC: 154 MG/DL (ref 65–99)
GLUCOSE BLD-MCNC: 170 MG/DL (ref 65–99)
GLUCOSE BLD-MCNC: 183 MG/DL (ref 65–99)
HGB BLD-MCNC: 8.2 G/DL (ref 12–16)

## 2018-05-22 PROCEDURE — 71045 X-RAY EXAM CHEST 1 VIEW: CPT | Performed by: INTERNAL MEDICINE

## 2018-05-22 PROCEDURE — 99232 SBSQ HOSP IP/OBS MODERATE 35: CPT | Performed by: INTERNAL MEDICINE

## 2018-05-22 RX ORDER — FUROSEMIDE 10 MG/ML
40 INJECTION INTRAMUSCULAR; INTRAVENOUS
Status: DISCONTINUED | OUTPATIENT
Start: 2018-05-22 | End: 2018-05-23

## 2018-05-22 NOTE — PROGRESS NOTES
[facility]  Progress Note    Lu Schneider Patient Status:  Inpatient    1950 MRN [id number]   [facility] 4NW-A Attending Nas Roth MD   Hosp Day # 2 PCP Cruzito Ireland MD     Assessment:  · Hypoxemic respiratory distr Hyponatremia     Aspiration pneumonia of both lower lobes (HCC)     Hypervolemia     Respiratory insufficiency     Hypokalemia     Hypernatremia     Acute hypoxemic respiratory failure (HCC)     Non-intractable vomiting with nausea     Non-intractable vomi 95  114*   BUN  33*  31*  46*  42*  56*   CREATSERUM  1.28*  1.08*  1.39*  1.44*  1.96*   GFRAA  50*  61  45*  43*  30*   GFRNAA  43*  53*  39*  37*  26*   CA  9.3  8.9  9.4  9.0  8.7   ALB   --    --   2.6*   --    --    NA  135*  136  134*  138  137   K Umeclidinium Bromide (INCRUSE ELLIPTA) 62.5 MCG/INH inhaler 1 puff 1 puff Inhalation Daily   glucose (DEX4) oral liquid 15 g 15 g Oral Q15 Min PRN   Or      Glucose-Vitamin C (DEX-4) 4-0.006 g chewable tab 4 tablet 4 tablet Oral Q15 Min PRN   Or      dex

## 2018-05-22 NOTE — PROGRESS NOTES
[facility]  Progress Note     Markus Leonardo Patient Status:  Inpatient    1950 MRN [id number]   [facility] 4NW-A Attending Julián Thapa MD   Hosp Day # 2 PCP Manish Herrera MD     Chief Complaint: on 4 L NC    S: Patient Clearance: 24.7 mL/min (A) (based on SCr of 1.96 mg/dL (H)). Recent Labs   Lab  05/16/18   0518  05/19/18   0837   PTP  14.4  14.6   INR  1.08  1.10       No results for input(s): TROP, CK in the last 168 hours.          Imaging: Imaging data reviewed in hemiplegia   1. ASA, statin, plavix   11. COPD without exacerbation  1. Incruse   12. Uncontrolled DM 2  A1c > 13 at last admission   1. Hyperglycemia protocol  2. ISS/ levemir bid   13. PVD  14. Hx of tobacco use   15.  Depression  Zoloft      PLAN  Cont l

## 2018-05-22 NOTE — PHYSICAL THERAPY NOTE
PHYSICAL THERAPY TREATMENT NOTE - INPATIENT    Room Number: 410/410-A     Session: 1   Number of Visits to Meet Established Goals: 5    Presenting Problem: intractable nausea with vomiting       History related to current admission: Pt is 75year old fema unspecified type diabetes mellitus without mention of complication, not stated as uncontrolled    • [facility] 3/19/2014   • Unspecified essential hypertension    • Unspecified sleep apnea    • Wears glasses    • Wheezing        P Need to walk in hospital room?: Total   -   Climbing 3-5 steps with a railing?: Total       AM-PAC Score:  Raw Score: 9   PT Approx Degree of Impairment Score: 81.38%   Standardized Score (AM-PAC Scale): 30.55   CMS Modifier (G-Code): CM    FUNCTIONAL CAROLE 81.38% degree of basic mobility impairment. Research supports that patients with this level of impairment often benefit from LTAC. Pt presents with L sided weakness , L inattention , and is able to follow commands.  At this time, Pt. presents with decre

## 2018-05-23 ENCOUNTER — APPOINTMENT (OUTPATIENT)
Dept: GENERAL RADIOLOGY | Facility: HOSPITAL | Age: 68
DRG: 377 | End: 2018-05-23
Attending: INTERNAL MEDICINE
Payer: MEDICARE

## 2018-05-23 ENCOUNTER — APPOINTMENT (OUTPATIENT)
Dept: ULTRASOUND IMAGING | Facility: HOSPITAL | Age: 68
DRG: 377 | End: 2018-05-23
Attending: INTERNAL MEDICINE
Payer: MEDICARE

## 2018-05-23 ENCOUNTER — APPOINTMENT (OUTPATIENT)
Dept: CT IMAGING | Facility: HOSPITAL | Age: 68
DRG: 377 | End: 2018-05-23
Attending: INTERNAL MEDICINE
Payer: MEDICARE

## 2018-05-23 LAB
BASOPHILS # BLD AUTO: 0.02 X10(3) UL (ref 0–0.1)
BASOPHILS NFR BLD AUTO: 0.2 %
BETA NATRIURETIC PEPTIDE: 780 PG/ML (ref 2–99)
BUN BLD-MCNC: 86 MG/DL (ref 8–20)
CALCIUM BLD-MCNC: 8 MG/DL (ref 8.3–10.3)
CHLORIDE: 98 MMOL/L (ref 101–111)
CO2: 24 MMOL/L (ref 22–32)
CREAT BLD-MCNC: 3.09 MG/DL (ref 0.55–1.02)
CREAT UR-SCNC: 69 MG/DL
EOSINOPHIL # BLD AUTO: 0.83 X10(3) UL (ref 0–0.3)
EOSINOPHIL NFR BLD AUTO: 10.1 %
ERYTHROCYTE [DISTWIDTH] IN BLOOD BY AUTOMATED COUNT: 15.9 % (ref 11.5–16)
GLUCOSE BLD-MCNC: 121 MG/DL (ref 65–99)
GLUCOSE BLD-MCNC: 125 MG/DL (ref 70–99)
GLUCOSE BLD-MCNC: 138 MG/DL (ref 65–99)
GLUCOSE BLD-MCNC: 139 MG/DL (ref 65–99)
GLUCOSE BLD-MCNC: 143 MG/DL (ref 65–99)
HCT VFR BLD AUTO: 24.1 % (ref 34–50)
HGB BLD-MCNC: 7.6 G/DL (ref 12–16)
IMMATURE GRANULOCYTE COUNT: 0.03 X10(3) UL (ref 0–1)
IMMATURE GRANULOCYTE RATIO %: 0.4 %
LYMPHOCYTES # BLD AUTO: 1.1 X10(3) UL (ref 0.9–4)
LYMPHOCYTES NFR BLD AUTO: 13.4 %
MCH RBC QN AUTO: 28.7 PG (ref 27–33.2)
MCHC RBC AUTO-ENTMCNC: 31.5 G/DL (ref 31–37)
MCV RBC AUTO: 90.9 FL (ref 81–100)
MONOCYTES # BLD AUTO: 0.81 X10(3) UL (ref 0.1–1)
MONOCYTES NFR BLD AUTO: 9.9 %
NEUTROPHIL ABS PRELIM: 5.43 X10 (3) UL (ref 1.3–6.7)
NEUTROPHILS # BLD AUTO: 5.43 X10(3) UL (ref 1.3–6.7)
NEUTROPHILS NFR BLD AUTO: 66 %
PLATELET # BLD AUTO: 171 10(3)UL (ref 150–450)
POTASSIUM SERPL-SCNC: 5.5 MMOL/L (ref 3.6–5.1)
RBC # BLD AUTO: 2.65 X10(6)UL (ref 3.8–5.1)
RED CELL DISTRIBUTION WIDTH-SD: 52.3 FL (ref 35.1–46.3)
SED RATE-ML: 60 MM/HR (ref 0–25)
SODIUM SERPL-SCNC: 131 MMOL/L (ref 136–144)
SODIUM SERPL-SCNC: 46 MMOL/L
WBC # BLD AUTO: 8.2 X10(3) UL (ref 4–13)

## 2018-05-23 PROCEDURE — 99223 1ST HOSP IP/OBS HIGH 75: CPT | Performed by: INTERNAL MEDICINE

## 2018-05-23 PROCEDURE — 71250 CT THORAX DX C-: CPT | Performed by: INTERNAL MEDICINE

## 2018-05-23 PROCEDURE — 99232 SBSQ HOSP IP/OBS MODERATE 35: CPT | Performed by: INTERNAL MEDICINE

## 2018-05-23 PROCEDURE — 71048 X-RAY EXAM CHEST 4+ VIEWS: CPT | Performed by: INTERNAL MEDICINE

## 2018-05-23 PROCEDURE — 76775 US EXAM ABDO BACK WALL LIM: CPT | Performed by: INTERNAL MEDICINE

## 2018-05-23 RX ORDER — METOCLOPRAMIDE HYDROCHLORIDE 5 MG/ML
5 INJECTION INTRAMUSCULAR; INTRAVENOUS EVERY 8 HOURS PRN
Status: DISCONTINUED | OUTPATIENT
Start: 2018-05-23 | End: 2018-06-05

## 2018-05-23 NOTE — CONSULTS
[facility] Pharmacy Note:  Renal Dose Adjustment for Metoclopramide (REGLAN)    Marciano Pedroza has been prescribed Metoclopramide (REGLAN) 10 mg every 8 hours as needed. Estimated Creatinine Clearance: 15.7 mL/min (A) (based on SCr of 3.09 mg/dL (H)).     He

## 2018-05-23 NOTE — CONSULTS
[facility]  Report of Consultation    Daniele Raygoza Patient Status:  Inpatient    1950 MRN [id number]   [facility] 4NW-A Attending Jimmie Rodriguez MD   Hosp Day # 3 PCP Yojana Austin MD       Assessment / Plan:    1) TIP- agre 2/28/14    acute MI, CHF, Stents    • Belching    • Black stools    • CONGESTIVE HEART FAILURE 3/2013    EF 35-40 %   • COPD    • Diabetes mellitus ([facility] [address]    • Disorder of liver     \"fatty liver\"   • Fatigue    • HEART ATTACK 2/28/14    Acute mi and stent •  Enoxaparin Sodium (LOVENOX) 30 MG/0.3ML injection 30 mg, 30 mg, Subcutaneous, Daily  •  amiodarone HCl (PACERONE) tab 200 mg, 200 mg, Per NG Tube, Daily  •  AmLODIPine Besylate (NORVASC) tab 10 mg, 10 mg, Oral, Daily  •  aspirin EC tab 81 mg, 81 mg, O Oral Tab EC Take 1 tablet (40 mg total) by mouth 2 (two) times daily before meals. Before meal       Review of Systems:  Please see HPI for pertinent positives. 10 point review of systems otherwise reviewed and negative.      Physical Exam:  /61 (BP L

## 2018-05-23 NOTE — PLAN OF CARE
Diabetes/Glucose Control    • Glucose maintained within prescribed range Progressing          GASTROINTESTINAL - ADULT    • Minimal or absence of nausea and vomiting Progressing          Impaired Swallowing    • Minimize aspiration risk Progressing

## 2018-05-23 NOTE — PROGRESS NOTES
[facility]  Progress Note    Sean Thomas Patient Status:  Inpatient    1950 MRN [id number]   [facility] 4NW-A Attending Marcos Zimmerman MD   Hosp Day # 3 PCP Deric Land MD     Assessment:  · Hypoxemic respiratory distress Hypernatremia     Acute hypoxemic respiratory failure (HCC)     Non-intractable vomiting with nausea     Non-intractable vomiting with nausea, unspecified vomiting type     Hyperkalemia     Hypoxia     Acute on chronic diastolic heart failure ([address]     Ur 9.0  8.7  8.0*   ALB   --   2.6*   --    --    --    NA  136  134*  138  137  131*   K  4.9  5.3*  5.0  4.7  5.5*   CL  104  101  106  104  98*   CO2  23.0  25.0  24.0  24.0  24.0   ALKPHO   --   56   --    --    --    AST   --   17   --    --    --    ALT Glucose-Vitamin C (DEX-4) 4-0.006 g chewable tab 4 tablet 4 tablet Oral Q15 Min PRN   Or      dextrose 50 % injection 50 mL 50 mL Intravenous Q15 Min PRN   Or      glucose (DEX4) oral liquid 30 g 30 g Oral Q15 Min PRN   Or      Glucose-Vitamin C (DEX-4)

## 2018-05-23 NOTE — PROGRESS NOTES
[facility]  Progress Note     Guanako Fierro Patient Status:  Inpatient    1950 MRN [id number]   [facility] 4NW-A Attending Sukhi Velazquez MD   Hosp Day # 3 PCP Tevin Romo MD     Chief Complaint: on 6L %    S: Danish Eugenie 05/19/18   0837   PTP  14.6   INR  1.10       No results for input(s): TROP, CK in the last 168 hours. Imaging: Imaging data reviewed in Epic.     Medications:   • enoxaparin  30 mg Subcutaneous Daily   • amiodarone HCl  200 mg Per NG Tube Daily   • 1. Hyperglycemia protocol  2. ISS/ levemir bid   13. PVD  14. Hx of tobacco use   1.  Depression  Zoloft       Quality:  · DVT Prophylaxis:lovenox  · CODE status: full  · Han: none  · Central line: none    Estimated date of discharge: TBD  Discharge is

## 2018-05-23 NOTE — PROGRESS NOTES
Patient was bladders scanned and 550 was shown. Han placed for retension and accurate I and O. Pt has 1300 of urine. Dr Ashley Donahue and Dr Andrews Vegas aware. Pt up in chair with elen lift at this time.

## 2018-05-23 NOTE — PHYSICAL THERAPY NOTE
PHYSICAL THERAPY TREATMENT NOTE - INPATIENT    Room Number: 410/410-A     Session: 2   Number of Visits to Meet Established Goals: 5    Presenting Problem: intractable nausea with vomiting       History related to current admission: Pt is 75year old fema artery    • STROKE 2005    TIA   • Stroke syndrome ([facility] 12/2005    CVA no residual effects   • Type II or unspecified type diabetes mellitus without mention of complication, not stated as uncontrolled    • Uncontrolled diabetes mellitus ([facility] 3/19/2014 sitting on the side of the bed?: A Lot   How much help from another person does the patient currently need. ..   -   Moving to and from a bed to a chair (including a wheelchair)?: Total   -   Need to walk in hospital room?: Total   -   Climbing 3-5 steps wi rehabilitation patient should achieve supervision level in functional mobility. DISCHARGE RECOMMENDATIONS  PT Discharge Recommendations: Sub-acute rehabilitation (ELOS 12-14 days)     PLAN  PT Treatment Plan: Bed mobility; Body mechanics; Endurance; En

## 2018-05-24 LAB
ALLENS TEST: POSITIVE
ARTERIAL BLD GAS O2 SATURATION: 95 % (ref 92–100)
ARTERIAL BLOOD GAS BASE EXCESS: -0.4
ARTERIAL BLOOD GAS HCO3: 23.1 MEQ/L (ref 22–26)
ARTERIAL BLOOD GAS PCO2: 33 MM HG (ref 35–45)
ARTERIAL BLOOD GAS PH: 7.46 (ref 7.35–7.45)
ARTERIAL BLOOD GAS PO2: 69 MM HG (ref 80–105)
BASOPHILS # BLD AUTO: 0.03 X10(3) UL (ref 0–0.1)
BASOPHILS NFR BLD AUTO: 0.4 %
BILIRUB UR QL STRIP.AUTO: NEGATIVE
BUN BLD-MCNC: 101 MG/DL (ref 8–20)
CALCIUM BLD-MCNC: 8.2 MG/DL (ref 8.3–10.3)
CALCULATED O2 SATURATION: 95 % (ref 92–100)
CARBOXYHEMOGLOBIN: 1.6 % SAT (ref 0–3)
CHLORIDE: 98 MMOL/L (ref 101–111)
CLARITY UR REFRACT.AUTO: CLEAR
CO2: 24 MMOL/L (ref 22–32)
COLOR UR AUTO: YELLOW
CREAT BLD-MCNC: 3.03 MG/DL (ref 0.55–1.02)
EOSINOPHIL # BLD AUTO: 0.76 X10(3) UL (ref 0–0.3)
EOSINOPHIL NFR BLD AUTO: 9 %
ERYTHROCYTE [DISTWIDTH] IN BLOOD BY AUTOMATED COUNT: 16.1 % (ref 11.5–16)
GLUCOSE BLD-MCNC: 124 MG/DL (ref 70–99)
GLUCOSE BLD-MCNC: 144 MG/DL (ref 65–99)
GLUCOSE BLD-MCNC: 146 MG/DL (ref 65–99)
GLUCOSE BLD-MCNC: 155 MG/DL (ref 65–99)
GLUCOSE BLD-MCNC: 189 MG/DL (ref 65–99)
GLUCOSE UR STRIP.AUTO-MCNC: NEGATIVE MG/DL
HAV IGM SER QL: 2.8 MG/DL (ref 1.8–2.5)
HCT VFR BLD AUTO: 23.6 % (ref 34–50)
HGB BLD-MCNC: 7.7 G/DL (ref 12–16)
IMMATURE GRANULOCYTE COUNT: 0.03 X10(3) UL (ref 0–1)
IMMATURE GRANULOCYTE RATIO %: 0.4 %
KETONES UR STRIP.AUTO-MCNC: NEGATIVE MG/DL
L/M: 5 L/MIN
LEUKOCYTE ESTERASE UR QL STRIP.AUTO: NEGATIVE
LYMPHOCYTES # BLD AUTO: 1.04 X10(3) UL (ref 0.9–4)
LYMPHOCYTES NFR BLD AUTO: 12.3 %
MCH RBC QN AUTO: 29.3 PG (ref 27–33.2)
MCHC RBC AUTO-ENTMCNC: 32.6 G/DL (ref 31–37)
MCV RBC AUTO: 89.7 FL (ref 81–100)
METHEMOGLOBIN: 0.5 % SAT (ref 0.4–1.5)
MONOCYTES # BLD AUTO: 0.86 X10(3) UL (ref 0.1–1)
MONOCYTES NFR BLD AUTO: 10.2 %
NEUTROPHIL ABS PRELIM: 5.73 X10 (3) UL (ref 1.3–6.7)
NEUTROPHILS # BLD AUTO: 5.73 X10(3) UL (ref 1.3–6.7)
NEUTROPHILS NFR BLD AUTO: 67.7 %
NITRITE UR QL STRIP.AUTO: NEGATIVE
PATIENT TEMPERATURE: 98.4 F
PH UR STRIP.AUTO: 5 [PH] (ref 4.5–8)
PLATELET # BLD AUTO: 175 10(3)UL (ref 150–450)
POTASSIUM SERPL-SCNC: 5.5 MMOL/L (ref 3.6–5.1)
PROCALCITONIN SERPL-MCNC: 0.62 NG/ML
PROT UR STRIP.AUTO-MCNC: NEGATIVE MG/DL
RBC # BLD AUTO: 2.63 X10(6)UL (ref 3.8–5.1)
RBC UR QL AUTO: NEGATIVE
RED CELL DISTRIBUTION WIDTH-SD: 52.4 FL (ref 35.1–46.3)
SODIUM SERPL-SCNC: 132 MMOL/L (ref 136–144)
SP GR UR STRIP.AUTO: 1.01 (ref 1–1.03)
TOTAL HEMOGLOBIN: 8 G/DL (ref 11.7–16)
UROBILINOGEN UR STRIP.AUTO-MCNC: <2 MG/DL
WBC # BLD AUTO: 8.5 X10(3) UL (ref 4–13)

## 2018-05-24 PROCEDURE — 99232 SBSQ HOSP IP/OBS MODERATE 35: CPT | Performed by: INTERNAL MEDICINE

## 2018-05-24 PROCEDURE — 99233 SBSQ HOSP IP/OBS HIGH 50: CPT | Performed by: INTERNAL MEDICINE

## 2018-05-24 RX ORDER — SODIUM POLYSTYRENE SULFONATE 15 G/60ML
15 SUSPENSION ORAL; RECTAL ONCE
Status: COMPLETED | OUTPATIENT
Start: 2018-05-24 | End: 2018-05-24

## 2018-05-24 NOTE — PROGRESS NOTES
[facility]  Nephrology Progress Note    Yudith Dhillon Attending:  Manjit Soto MD       Assessment and Plan:       1) TIP- agree this is due to a combination of mild volume depletion / recent diuresis along with urinary retention; no other acute lb    General: awake alert  HEENT: No scleral icterus, MMM  Neck: Supple, no MEGHANA or thyromegaly  Cardiac: Regular rate and rhythm, S1, S2 normal, no murmur or tub  Lungs: Decreased BS at bases bilaterally   Abdomen: Soft, non-tender. + bowel sounds, no pal PRN   Or      dextrose 50 % injection 50 mL 50 mL Intravenous Q15 Min PRN   Or      glucose (DEX4) oral liquid 30 g 30 g Oral Q15 Min PRN   Or      Glucose-Vitamin C (DEX-4) 4-0.006 g chewable tab 8 tablet 8 tablet Oral Q15 Min PRN   ondansetron HCl (ZOFRA

## 2018-05-24 NOTE — PHYSICAL THERAPY NOTE
PHYSICAL THERAPY TREATMENT NOTE - INPATIENT    Room Number: 410/410-A     Session: 3   Number of Visits to Meet Established Goals: 5    Presenting Problem: intractable nausea with vomiting       History related to current admission: Pt is 75year old fema artery    • STROKE 2005    TIA   • Stroke syndrome ([facility] 12/2005    CVA no residual effects   • Type II or unspecified type diabetes mellitus without mention of complication, not stated as uncontrolled    • Uncontrolled diabetes mellitus ([facility] 3/19/2014 of the bed?: A Lot   How much help from another person does the patient currently need. ..   -   Moving to and from a bed to a chair (including a wheelchair)?: Total   -   Need to walk in hospital room?: Total   -   Climbing 3-5 steps with a railing?: Total Due to above deficits, Pt will benefit from continued IP PT, so that patient may achieve highest functional independence/return to baseline. Subacute rehab is recommended for 12-14 days.   After this period of rehabilitation patient should achieve supervis

## 2018-05-24 NOTE — OCCUPATIONAL THERAPY NOTE
OCCUPATIONAL THERAPY EVALUATION - INPATIENT     Room Number: 410/410-A  Evaluation Date: 5/23/2018  Type of Evaluation: Initial  Presenting Problem: N/V, hypoxia, hyperkalemia     Physician Order: IP Consult to Occupational Therapy  Reason for Therapy: ADL artery    • STROKE 2005    TIA   • Stroke syndrome ([facility] 12/2005    CVA no residual effects   • Type II or unspecified type diabetes mellitus without mention of complication, not stated as uncontrolled    • Uncontrolled diabetes mellitus ([facility] 3/19/2014 decreased awareness of need for assistance and decreased awareness of need for safety  Problem Solving:  assistance required to generate solutions and assistance required to implement solutions    VISION  Will perform formal visual assessment next session session/findings; All patient questions and concerns addressed    ASSESSMENT     Patient is a 75year old female admitted on 5/19/2018 for N/V, hypoxia, hyperkalemia. Complete medical history and occupational profile noted above.  Functional outcome measures eval/education; Neuromuscluar reeducation; Compensatory technique education;Continued evaluation  Rehab Potential : Fair  Frequency (Obs): 3x/week  Number of Visits to Meet Established Goals: 3    ADL Goals   Patient will perform eating: with min assist  Ervin Robert

## 2018-05-24 NOTE — DIETARY NOTE
[facility]     NUTRITION FOLLOW UP ASSESSMENT     Pt is at moderate nutrition risk.  Pt does not meet malnutrition criteria.     NUTRITION DIAGNOSIS/PROBLEM:     Inadequate oral intake related to inability to take sufficient po as evidenced by need for emesis. Now with possible CGE including PUD, gastritis/esohpagitis, AVM, small hiatal hernia. RD paged for TF recommendations.     ANTHROPOMETRICS:  Ht:  65\"  Wt: 82.6 kg. This is 145% of IBW  BMI: Body mass index is 30.32 kg/m².   IBW: 57 kg  Usual Body W

## 2018-05-24 NOTE — OCCUPATIONAL THERAPY NOTE
OCCUPATIONAL THERAPY TREATMENT NOTE - INPATIENT     Room Number: 410/410-A  Session: 1   Number of Visits to Meet Established Goals: 3    Presenting Problem: N/V, hypoxia, hyperkalemia     History related to current admission: Pt is 75year old female admi unspecified type diabetes mellitus without mention of complication, not stated as uncontrolled    • Uncontrolled diabetes mellitus ([facility] 3/19/2014   • Unspecified essential hypertension    • Unspecified sleep apnea    • Wears glasses    • Wheezing        P TRANSFER ASSESSMENT  Supine to Sit : Dependent assistance  Sit to Stand: Not tested    Skilled Therapy Provided: Pt seen semi supine. Educated pt on Self-ROM of LUE.  Hand over hand assist and frequent cues for sustained attention, early termination, motor eval/education; Neuromuscluar reeducation; Compensatory technique education;Continued evaluation  Rehab Potential : Fair  Frequency (Obs): 3x/week      OT Goals:   ADL Goals -ongoing  Patient will perform eating: with min assist  Patient will perform groomin

## 2018-05-24 NOTE — PROGRESS NOTES
[facility]  Progress Note    Lucy Rowland Patient Status:  Inpatient    1950 MRN [id number]   [facility] 4NW-A Attending Keisha Bhakta MD   Hosp Day # 4 PCP Chika Dominguez MD     Subjective:  Lucy Rowland is a(n) 60 yea Cultures: bc remain neg     Radiology:  Ct chest reviewed - lloyd effusions and lower lobe infiltrates       Medications reviewed     Assessment and Plan:   Patient Active Problem List:     Essential hypertension     Carotid arterial disease ([facility] continue to hold on antibiotics at this time  Serial chest x-rays and ongoing assessment  Continue efforts of bronchopulmonary toilet  Continuing to follow      Linda Nelson MD  5/24/2018  3:19 PM

## 2018-05-24 NOTE — PROGRESS NOTES
[facility]  Progress Note     Hulon Bracket Patient Status:  Inpatient    1950 MRN [id number]   [facility] 4NW-A Attending Jimmy Sanford MD   Hosp Day # 4 PCP Bell Wilhelm MD     Chief Complaint: on 6L %    S: Opal Chimera 56   --    --    --    --    AST  17   --    --    --    --    ALT  13*   --    --    --    --    BILT  0.5   --    --    --    --    TP  7.0   --    --    --    --     < > = values in this interval not displayed.        Estimated Creatinine Clearance: 16 m 9. PVD  10. Cerebrovascular disease, recent right MCA stroke due to ICA occlusion with left hemiplegia   1. ASA, statin, plavix   11. COPD without exacerbation  1. Incruse   2. BIPAP as needed   12. Uncontrolled DM 2  A1c > 13 at last admission   1.  Hype

## 2018-05-24 NOTE — PROGRESS NOTES
Vitals stable and afebrile. No complaints of pain. Tube feeding continued at goal rate of 50mL. 160mL water flushes q4h. Tolerating feeding well, no residuals noted. Han patent. BIPAP on, sating in high 90's.

## 2018-05-25 ENCOUNTER — APPOINTMENT (OUTPATIENT)
Dept: GENERAL RADIOLOGY | Facility: HOSPITAL | Age: 68
DRG: 377 | End: 2018-05-25
Attending: INTERNAL MEDICINE
Payer: MEDICARE

## 2018-05-25 LAB
ALLENS TEST: POSITIVE
ALLENS TEST: POSITIVE
ARTERIAL BLD GAS O2 SATURATION: 95 % (ref 92–100)
ARTERIAL BLD GAS O2 SATURATION: 96 % (ref 92–100)
ARTERIAL BLOOD GAS BASE EXCESS: 1
ARTERIAL BLOOD GAS BASE EXCESS: 1.1
ARTERIAL BLOOD GAS HCO3: 25.8 MEQ/L (ref 22–26)
ARTERIAL BLOOD GAS HCO3: 26.5 MEQ/L (ref 22–26)
ARTERIAL BLOOD GAS PCO2: 40 MM HG (ref 35–45)
ARTERIAL BLOOD GAS PCO2: 46 MM HG (ref 35–45)
ARTERIAL BLOOD GAS PH: 7.38 (ref 7.35–7.45)
ARTERIAL BLOOD GAS PH: 7.42 (ref 7.35–7.45)
ARTERIAL BLOOD GAS PO2: 72 MM HG (ref 80–105)
ARTERIAL BLOOD GAS PO2: 76 MM HG (ref 80–105)
BASOPHILS # BLD AUTO: 0.02 X10(3) UL (ref 0–0.1)
BASOPHILS # BLD AUTO: 0.02 X10(3) UL (ref 0–0.1)
BASOPHILS NFR BLD AUTO: 0.1 %
BASOPHILS NFR BLD AUTO: 0.2 %
BUN BLD-MCNC: 82 MG/DL (ref 8–20)
BUN BLD-MCNC: 90 MG/DL (ref 8–20)
CALCIUM BLD-MCNC: 8.2 MG/DL (ref 8.3–10.3)
CALCIUM BLD-MCNC: 8.4 MG/DL (ref 8.3–10.3)
CALCULATED O2 SATURATION: 94 % (ref 92–100)
CALCULATED O2 SATURATION: 96 % (ref 92–100)
CARBOXYHEMOGLOBIN: 1.3 % SAT (ref 0–3)
CARBOXYHEMOGLOBIN: 1.6 % SAT (ref 0–3)
CHLORIDE: 100 MMOL/L (ref 101–111)
CHLORIDE: 101 MMOL/L (ref 101–111)
CO2: 25 MMOL/L (ref 22–32)
CO2: 26 MMOL/L (ref 22–32)
CREAT BLD-MCNC: 1.76 MG/DL (ref 0.55–1.02)
CREAT BLD-MCNC: 2.21 MG/DL (ref 0.55–1.02)
EOSINOPHIL # BLD AUTO: 0.2 X10(3) UL (ref 0–0.3)
EOSINOPHIL # BLD AUTO: 0.67 X10(3) UL (ref 0–0.3)
EOSINOPHIL NFR BLD AUTO: 1.4 %
EOSINOPHIL NFR BLD AUTO: 7.7 %
ERYTHROCYTE [DISTWIDTH] IN BLOOD BY AUTOMATED COUNT: 16 % (ref 11.5–16)
ERYTHROCYTE [DISTWIDTH] IN BLOOD BY AUTOMATED COUNT: 16.1 % (ref 11.5–16)
EXPIRATORY PRESSURE: 5 CM H2O
EXPIRATORY PRESSURE: 5 CM H2O
FIO2: 45 %
FIO2: 50 %
GLUCOSE BLD-MCNC: 123 MG/DL (ref 65–99)
GLUCOSE BLD-MCNC: 136 MG/DL (ref 70–99)
GLUCOSE BLD-MCNC: 137 MG/DL (ref 70–99)
GLUCOSE BLD-MCNC: 156 MG/DL (ref 65–99)
GLUCOSE BLD-MCNC: 163 MG/DL (ref 65–99)
GLUCOSE BLD-MCNC: 173 MG/DL (ref 65–99)
HAV IGM SER QL: 2.9 MG/DL (ref 1.8–2.5)
HCT VFR BLD AUTO: 23.9 % (ref 34–50)
HCT VFR BLD AUTO: 25.8 % (ref 34–50)
HGB BLD-MCNC: 7.7 G/DL (ref 12–16)
HGB BLD-MCNC: 8.3 G/DL (ref 12–16)
IMMATURE GRANULOCYTE COUNT: 0.04 X10(3) UL (ref 0–1)
IMMATURE GRANULOCYTE COUNT: 0.05 X10(3) UL (ref 0–1)
IMMATURE GRANULOCYTE RATIO %: 0.4 %
IMMATURE GRANULOCYTE RATIO %: 0.5 %
INSP PRESSURE: 12 CM H2O
LYMPHOCYTES # BLD AUTO: 0.44 X10(3) UL (ref 0.9–4)
LYMPHOCYTES # BLD AUTO: 0.9 X10(3) UL (ref 0.9–4)
LYMPHOCYTES NFR BLD AUTO: 10.3 %
LYMPHOCYTES NFR BLD AUTO: 3.2 %
MCH RBC QN AUTO: 29.5 PG (ref 27–33.2)
MCH RBC QN AUTO: 29.6 PG (ref 27–33.2)
MCHC RBC AUTO-ENTMCNC: 32.2 G/DL (ref 31–37)
MCHC RBC AUTO-ENTMCNC: 32.2 G/DL (ref 31–37)
MCV RBC AUTO: 91.6 FL (ref 81–100)
MCV RBC AUTO: 92.1 FL (ref 81–100)
METHEMOGLOBIN: 0.5 % SAT (ref 0.4–1.5)
METHEMOGLOBIN: 0.5 % SAT (ref 0.4–1.5)
MONOCYTES # BLD AUTO: 0.82 X10(3) UL (ref 0.1–1)
MONOCYTES # BLD AUTO: 0.87 X10(3) UL (ref 0.1–1)
MONOCYTES NFR BLD AUTO: 6.3 %
MONOCYTES NFR BLD AUTO: 9.4 %
NEUTROPHIL ABS PRELIM: 12.24 X10 (3) UL (ref 1.3–6.7)
NEUTROPHIL ABS PRELIM: 6.28 X10 (3) UL (ref 1.3–6.7)
NEUTROPHILS # BLD AUTO: 12.24 X10(3) UL (ref 1.3–6.7)
NEUTROPHILS # BLD AUTO: 6.28 X10(3) UL (ref 1.3–6.7)
NEUTROPHILS NFR BLD AUTO: 71.9 %
NEUTROPHILS NFR BLD AUTO: 88.6 %
PATIENT TEMPERATURE: 97 F
PATIENT TEMPERATURE: 98.1 F
PLATELET # BLD AUTO: 171 10(3)UL (ref 150–450)
PLATELET # BLD AUTO: 196 10(3)UL (ref 150–450)
POTASSIUM SERPL-SCNC: 4.8 MMOL/L (ref 3.6–5.1)
POTASSIUM SERPL-SCNC: 4.9 MMOL/L (ref 3.6–5.1)
PROCALCITONIN SERPL-MCNC: 0.39 NG/ML
RBC # BLD AUTO: 2.61 X10(6)UL (ref 3.8–5.1)
RBC # BLD AUTO: 2.8 X10(6)UL (ref 3.8–5.1)
RED CELL DISTRIBUTION WIDTH-SD: 52.7 FL (ref 35.1–46.3)
RED CELL DISTRIBUTION WIDTH-SD: 53.5 FL (ref 35.1–46.3)
SODIUM SERPL-SCNC: 136 MMOL/L (ref 136–144)
SODIUM SERPL-SCNC: 136 MMOL/L (ref 136–144)
TIDAL VOLUME: 500 ML
TOTAL HEMOGLOBIN: 9 G/DL (ref 11.7–16)
TOTAL HEMOGLOBIN: 9.6 G/DL (ref 11.7–16)
TROPONIN: 0.22 NG/ML (ref ?–0.05)
VENT RATE: 18 /MIN
WBC # BLD AUTO: 13.8 X10(3) UL (ref 4–13)
WBC # BLD AUTO: 8.7 X10(3) UL (ref 4–13)

## 2018-05-25 PROCEDURE — 71045 X-RAY EXAM CHEST 1 VIEW: CPT | Performed by: INTERNAL MEDICINE

## 2018-05-25 PROCEDURE — 99232 SBSQ HOSP IP/OBS MODERATE 35: CPT | Performed by: INTERNAL MEDICINE

## 2018-05-25 PROCEDURE — 99233 SBSQ HOSP IP/OBS HIGH 50: CPT | Performed by: INTERNAL MEDICINE

## 2018-05-25 RX ORDER — IPRATROPIUM BROMIDE AND ALBUTEROL SULFATE 2.5; .5 MG/3ML; MG/3ML
3 SOLUTION RESPIRATORY (INHALATION)
Status: DISCONTINUED | OUTPATIENT
Start: 2018-05-25 | End: 2018-05-30

## 2018-05-25 RX ORDER — HYDRALAZINE HYDROCHLORIDE 50 MG/1
100 TABLET, FILM COATED ORAL EVERY 8 HOURS SCHEDULED
Status: DISCONTINUED | OUTPATIENT
Start: 2018-05-25 | End: 2018-06-05

## 2018-05-25 RX ORDER — IPRATROPIUM BROMIDE AND ALBUTEROL SULFATE 2.5; .5 MG/3ML; MG/3ML
3 SOLUTION RESPIRATORY (INHALATION)
Status: DISCONTINUED | OUTPATIENT
Start: 2018-05-25 | End: 2018-05-25

## 2018-05-25 RX ORDER — FUROSEMIDE 10 MG/ML
40 INJECTION INTRAMUSCULAR; INTRAVENOUS EVERY 8 HOURS
Status: DISCONTINUED | OUTPATIENT
Start: 2018-05-25 | End: 2018-05-26

## 2018-05-25 RX ORDER — FUROSEMIDE 10 MG/ML
40 INJECTION INTRAMUSCULAR; INTRAVENOUS ONCE
Status: DISCONTINUED | OUTPATIENT
Start: 2018-05-26 | End: 2018-05-25 | Stop reason: DRUGHIGH

## 2018-05-25 RX ORDER — FUROSEMIDE 10 MG/ML
40 INJECTION INTRAMUSCULAR; INTRAVENOUS
Status: DISCONTINUED | OUTPATIENT
Start: 2018-05-25 | End: 2018-05-25

## 2018-05-25 RX ORDER — FUROSEMIDE 10 MG/ML
40 INJECTION INTRAMUSCULAR; INTRAVENOUS ONCE
Status: COMPLETED | OUTPATIENT
Start: 2018-05-25 | End: 2018-05-25

## 2018-05-25 NOTE — PROGRESS NOTES
[facility]  Progress Note    Harrison Zelaya Patient Status:  Inpatient    1950 MRN [id number]   [facility] 4NW-A Attending Sanchez Sheffield MD   Hosp Day # 5 PCP Jhony Navarrete MD     Subjective:  Harrison Zelaya is a(n) 75 yea 15*  15*  22*   CA  8.0*  8.2*  8.2*   NA  131*  132*  136   K  5.5*  5.5*  4.9   CL  98*  98*  101   CO2  24.0  24.0  25.0       Lab Results  Component Value Date   PT 13.4 02/28/2014   PT 13.9 02/27/2014   PT 20.2 (H) 10/22/2012   INR 1.10 05/19/2018   I

## 2018-05-25 NOTE — PROGRESS NOTES
No new events overnight. Denies any pain. Vitals stable. Continued on tube feeding, tolerating well. No residuals noted. No nausea/vomiting. Large BM overnight. BIPAP on, sating in mid 90's. Fall precautions in place. Will monitor.

## 2018-05-25 NOTE — OCCUPATIONAL THERAPY NOTE
OCCUPATIONAL THERAPY TREATMENT NOTE - INPATIENT     Room Number: 410/410-A  Session: 2   Number of Visits to Meet Established Goals: 3    Presenting Problem: N/V, hypoxia, hyperkalemia     History related to current admission: Pt is 75year old female admi • Type II or unspecified type diabetes mellitus without mention of complication, not stated as uncontrolled    • Uncontrolled diabetes mellitus ([facility] 3/19/2014   • Unspecified essential hypertension    • Unspecified sleep apnea    • Wears glasses    • Wh CL    FUNCTIONAL TRANSFER ASSESSMENT  Supine to Sit : Dependent assistance  Sit to Stand: Not tested    Skilled Therapy Provided: Pt seen semi supine. Completed UE re-assesment.  Pt with improved scapular retraction on L side, continues to require assist to reeducation; Compensatory technique education;Continued evaluation  Rehab Potential : Fair  Frequency (Obs): 3x/week      OT Goals:   ADL Goals -ongoing  Patient will perform eating: with min assist  Patient will perform grooming: with min assist     Functi

## 2018-05-25 NOTE — PHYSICAL THERAPY NOTE
PHYSICAL THERAPY TREATMENT NOTE - INPATIENT    Room Number: 410/410-A     Session: 4  Number of Visits to Meet Established Goals: 5    Presenting Problem: intractable nausea with vomiting       History related to current admission: Pt is 75year old femal Stented coronary artery    • STROKE 2005    TIA   • Stroke syndrome ([facility] 12/2005    CVA no residual effects   • Type II or unspecified type diabetes mellitus without mention of complication, not stated as uncontrolled    • Uncontrolled diabetes mellitus ( Unable   -   Moving from lying on back to sitting on the side of the bed?: A Lot   How much help from another person does the patient currently need. ..   -   Moving to and from a bed to a chair (including a wheelchair)?: Total   -   Need to walk in hospita inattention , and is able to follow commands.  Pt able to tolerate EOB activities for ~25' c mod/max A and progress to CGA/supervision for static seated bal . At this time, Pt. presents with decreased balance, impaired sensation to LLE ,  impaired strength,

## 2018-05-25 NOTE — PROGRESS NOTES
[facility]  Nephrology Progress Note    Francine Roth Attending:  Estiven Diego MD       Assessment and Plan:    1) TIP- agree this is due to a combination of mild volume depletion / recent diuresis / relative hypotension along with urinary reten organomegaly  Extremities: Without clubbing, cyanosis; no edema  Neurologic: Cranial nerves grossly intact, moving all extremities  Skin: Warm and dry, no rashes       Labs:     Lab Results  Component Value Date   WBC 8.7 05/25/2018   HGB 7.7 05/25/2018 (NOVOLOG) 100 UNIT/ML flexpen 2-10 Units 2-10 Units Subcutaneous TID CC and HS   Pantoprazole Sodium (PROTONIX) 40 mg in Sodium Chloride 0.9 % 10 mL IV push 40 mg Intravenous Q12H   hydrALAzine HCl (APRESOLINE) injection 10 mg 10 mg Intravenous Q4H PRN   i

## 2018-05-25 NOTE — CDS QUERY
Present on Admission (POA)  Mary Ma    Dear Doctor:  Clinical information (provided below) does not conclusively specify if the condition was Present On Admission (POA).  In order to apply the POA indicator to the final set cerebrovascular accident associated with carotid artery occlusion and atherosclerotic plaque emboli. Status post urgent stenting of the right carotid artery”.    4/27 Neurology Note:” initially admitted for gastroenteritis symptoms developed an acute left s

## 2018-05-25 NOTE — PROGRESS NOTES
[facility]  Progress Note     Sherry Montoyawashington Patient Status:  Inpatient    1950 MRN [id number]   [facility] 4NW-A Attending Jin Samuel MD   Hosp Day # 5 PCP Oneil Bass MD     Chief Complaint: on 6L %    S: Miller Vasquez AST  17   --    --    --    --    ALT  13*   --    --    --    --    BILT  0.5   --    --    --    --    TP  7.0   --    --    --    --     < > = values in this interval not displayed.        Estimated Creatinine Clearance: 21.9 mL/min (A) (based on SCr o 9. PVD  10. Cerebrovascular disease, recent right MCA stroke due to ICA occlusion with left hemiplegia   1. ASA, statin, plavix   11. COPD without exacerbation  1. Incruse   2. BIPAP as needed   3. pulm following   12.  Uncontrolled DM 2  A1c > 13 at last

## 2018-05-26 ENCOUNTER — PRIOR ORIGINAL RECORDS (OUTPATIENT)
Dept: OTHER | Age: 68
End: 2018-05-26

## 2018-05-26 ENCOUNTER — APPOINTMENT (OUTPATIENT)
Dept: GENERAL RADIOLOGY | Facility: HOSPITAL | Age: 68
DRG: 377 | End: 2018-05-26
Attending: INTERNAL MEDICINE
Payer: MEDICARE

## 2018-05-26 ENCOUNTER — APPOINTMENT (OUTPATIENT)
Dept: CV DIAGNOSTICS | Facility: HOSPITAL | Age: 68
DRG: 377 | End: 2018-05-26
Attending: NURSE PRACTITIONER
Payer: MEDICARE

## 2018-05-26 LAB
ATRIAL RATE: 104 BPM
ATRIAL RATE: 71 BPM
BASOPHILS # BLD AUTO: 0.02 X10(3) UL (ref 0–0.1)
BASOPHILS NFR BLD AUTO: 0.2 %
BUN BLD-MCNC: 82 MG/DL (ref 8–20)
CALCIUM BLD-MCNC: 8.8 MG/DL (ref 8.3–10.3)
CHLORIDE: 100 MMOL/L (ref 101–111)
CO2: 26 MMOL/L (ref 22–32)
CREAT BLD-MCNC: 1.68 MG/DL (ref 0.55–1.02)
EOSINOPHIL # BLD AUTO: 0.02 X10(3) UL (ref 0–0.3)
EOSINOPHIL NFR BLD AUTO: 0.2 %
ERYTHROCYTE [DISTWIDTH] IN BLOOD BY AUTOMATED COUNT: 16.1 % (ref 11.5–16)
GLUCOSE BLD-MCNC: 118 MG/DL (ref 65–99)
GLUCOSE BLD-MCNC: 124 MG/DL (ref 65–99)
GLUCOSE BLD-MCNC: 125 MG/DL (ref 70–99)
GLUCOSE BLD-MCNC: 137 MG/DL (ref 65–99)
GLUCOSE BLD-MCNC: 210 MG/DL (ref 65–99)
HCT VFR BLD AUTO: 24.9 % (ref 34–50)
HGB BLD-MCNC: 7.9 G/DL (ref 12–16)
IMMATURE GRANULOCYTE COUNT: 0.04 X10(3) UL (ref 0–1)
IMMATURE GRANULOCYTE RATIO %: 0.4 %
LYMPHOCYTES # BLD AUTO: 0.73 X10(3) UL (ref 0.9–4)
LYMPHOCYTES NFR BLD AUTO: 7.8 %
MCH RBC QN AUTO: 29.3 PG (ref 27–33.2)
MCHC RBC AUTO-ENTMCNC: 31.7 G/DL (ref 31–37)
MCV RBC AUTO: 92.2 FL (ref 81–100)
MONOCYTES # BLD AUTO: 0.61 X10(3) UL (ref 0.1–1)
MONOCYTES NFR BLD AUTO: 6.6 %
NEUTROPHIL ABS PRELIM: 7.88 X10 (3) UL (ref 1.3–6.7)
NEUTROPHILS # BLD AUTO: 7.88 X10(3) UL (ref 1.3–6.7)
NEUTROPHILS NFR BLD AUTO: 84.8 %
P AXIS: 40 DEGREES
P AXIS: 54 DEGREES
P-R INTERVAL: 172 MS
P-R INTERVAL: 172 MS
PLATELET # BLD AUTO: 188 10(3)UL (ref 150–450)
POTASSIUM SERPL-SCNC: 4.3 MMOL/L (ref 3.6–5.1)
PRO-BETA NATRIURETIC PEPTIDE: ABNORMAL PG/ML (ref ?–125)
Q-T INTERVAL: 358 MS
Q-T INTERVAL: 402 MS
QRS DURATION: 110 MS
QRS DURATION: 130 MS
QTC CALCULATION (BEZET): 436 MS
QTC CALCULATION (BEZET): 470 MS
R AXIS: 7 DEGREES
R AXIS: 8 DEGREES
RBC # BLD AUTO: 2.7 X10(6)UL (ref 3.8–5.1)
RED CELL DISTRIBUTION WIDTH-SD: 53.6 FL (ref 35.1–46.3)
SODIUM SERPL-SCNC: 138 MMOL/L (ref 136–144)
T AXIS: 115 DEGREES
T AXIS: 136 DEGREES
TROPONIN: 2 NG/ML (ref ?–0.05)
TROPONIN: 2.67 NG/ML (ref ?–0.05)
TROPONIN: 3.23 NG/ML (ref ?–0.05)
VENTRICULAR RATE: 104 BPM
VENTRICULAR RATE: 71 BPM
WBC # BLD AUTO: 9.3 X10(3) UL (ref 4–13)

## 2018-05-26 PROCEDURE — 99233 SBSQ HOSP IP/OBS HIGH 50: CPT | Performed by: INTERNAL MEDICINE

## 2018-05-26 PROCEDURE — 99233 SBSQ HOSP IP/OBS HIGH 50: CPT | Performed by: HOSPITALIST

## 2018-05-26 PROCEDURE — 93306 TTE W/DOPPLER COMPLETE: CPT | Performed by: NURSE PRACTITIONER

## 2018-05-26 PROCEDURE — 71045 X-RAY EXAM CHEST 1 VIEW: CPT | Performed by: INTERNAL MEDICINE

## 2018-05-26 RX ORDER — FUROSEMIDE 10 MG/ML
40 INJECTION INTRAMUSCULAR; INTRAVENOUS
Status: DISCONTINUED | OUTPATIENT
Start: 2018-05-27 | End: 2018-05-26

## 2018-05-26 RX ORDER — FUROSEMIDE 10 MG/ML
40 INJECTION INTRAMUSCULAR; INTRAVENOUS
Status: DISCONTINUED | OUTPATIENT
Start: 2018-05-26 | End: 2018-05-28

## 2018-05-26 NOTE — PLAN OF CARE
Achieves optimal ventilation and oxygenation Progressing        Pt A0x4. Forgetful. Poor insight into conversations at times. Hx recent CVA w/ left side effected. VSS. Trop elevated this shift. MD's aware. Echocardiogram completed this shift.  Repeat trop a

## 2018-05-26 NOTE — PROGRESS NOTES
[facility]  Nephrology Progress Note    Sean Thomas Attending:  Marcos Zimmerman MD       Subjective:  Placed on bipap for resp distress yesterday pm; transferred to ICU  Lasix given w/ good response  Currently on NC  Feels better      Current Fac UNIT/ML flexpen 2-10 Units 2-10 Units Subcutaneous TID CC and HS   Pantoprazole Sodium (PROTONIX) 40 mg in Sodium Chloride 0.9 % 10 mL IV push 40 mg Intravenous Q12H   hydrALAzine HCl (APRESOLINE) injection 10 mg 10 mg Intravenous Q4H PRN   ipratropium-alb have acute respiratory failure which in retrospect appears to be related to volume overload + underlying diastolic dysfunction + ? Infection(aspiration)  - continue diuretics  - monitor labs/UOP - shon tate     2.  Resp insuff - supportive care w/ PPV/abx/d

## 2018-05-26 NOTE — SLP NOTE
ADULT SWALLOWING EVALUATION    ASSESSMENT    ASSESSMENT/OVERALL IMPRESSION:  Patient seen for a clinical bedside swallowing evaluation secondary to stroke protocol and concern for PO efficiency for adequate nutrition/hydration.  Patient admitted to [facility] Assessment of Swallow Function Score: No abnormality detected    RECOMMENDATIONS   Diet Recommendations - Solids: Regular  Diet Recommendations - Liquid:  Thin (straws ok)     Compensatory Strategies Recommended: Alternate consistencies;Small bites and sips mellitus without mention of complication, not stated as uncontrolled    • Uncontrolled diabetes mellitus ([facility] 3/19/2014   • Unspecified essential hypertension    • Unspecified sleep apnea    • Wears glasses    • Wheezing        Prior Living Situation: Kayla consistent with possible esophageal involvement  Comments: No esophageal concerns at this time.      GOALS  Goal #1 The patient will tolerate regular consistency and thin (straws ok) liquids without overt signs or symptoms of aspiration with 100 % accuracy

## 2018-05-26 NOTE — HISTORICAL OFFICE NOTE
Bebeblaine Loida  : 1950  ACCOUNT:  [id number]  [phone]  PCP: Dr. Mona Montaño     TODAY'S DATE: 2017  DICTATED BY:  [Dr. Santoyo Leonardo: [Followup of Carotid artery stenosis, bilateral and Followup of History of CABG.]    HP use, advised to quit. smokes, advised to quit. CAFFEINE: 3 cups coffee daily. ALCOHOL: drinks rarely. EXERCISE: physical therapy. DIET: no special diet. MARITAL STATUS: . LIFESTYLE: high stress lifestyle. OCCUPATION: homemaker.      ALLERGIES: No Kno compensated and asymptomatic.   We will base further treatment decisions after the above cardiodiagnostic studies are performed, and we will follow this very nice lady back in office.  ]    PRESCRIPTIONS:   12/06/16 *Clopidogrel [id number]      ONE TABLE

## 2018-05-26 NOTE — PROGRESS NOTES
Lam Kolb Patient Status:  Inpatient    1950 MRN [id number]   [facility] 4SW-A Attending Liya Arreaga MD   Hosp Day # 5 PCP Keaton Mitchell MD     Critical Care Progress Note     S: Pt with increased Tracie Matmichelle thrush noted. Lungs: Rhonchi and crackles throughout   Heart: Regular rate and rhythm, normal S1S2, no murmur. Abdomen: soft, non-tender, non-distended, positive BS. Extremity: no pedal edema, 1+ edema to left hand   Skin: No rashes or lesions.  Bruis TF/water flushes tonight. Critical Care Time greater than: 35 minutes   Discussed with [facility], on call, Dr. Martin Degree, APN  5/25/2018  8:57 PM    Addendum 5235:  EKG was unchanged, no report of chest pain. Troponin resulted 0.216.   Cardiol

## 2018-05-26 NOTE — PROGRESS NOTES
[facility]  Progress Note     Mely Pouch Patient Status:  Inpatient    1950 MRN [id number]   [facility] 4SW-A Attending Mirian Staley MD   Hosp Day # 6 PCP Franc Beebe MD     Chief Complaint: resp failure    S: Richad Angels HCl  200 mg Per NG Tube Daily   • AmLODIPine Besylate  10 mg Oral Daily   • aspirin  81 mg Oral Daily   • CloNIDine HCl  1 patch Transdermal Weekly   • Clopidogrel Bisulfate  75 mg Oral Daily   • insulin detemir  20 Units Subcutaneous BID   • Labetalol HCl

## 2018-05-26 NOTE — PROGRESS NOTES
[facility]  Progress Note    Janett Bronson Patient Status:  Inpatient    1950 MRN [id number]   [facility] 4NW-A Attending Jimmy Sanford MD   Hosp Day # 6 PCP Bell Wilhelm MD     Subjective:  Janett Bronson is a(n) 75 yea 137*  136*  125*   BUN  90*  82*  82*   CREATSERUM  2.21*  1.76*  1.68*   GFRAA  26*  34*  36*   GFRNAA  22*  29*  31*   CA  8.2*  8.4  8.8   NA  136  136  138   K  4.9  4.8  4.3   CL  101  100*  100*   CO2  25.0  26.0  26.0       Lab Results  Component Va

## 2018-05-26 NOTE — CONSULTS
[facility]  Report of Consultation    Ellen May Patient Status:  Inpatient    1950 MRN [id number]   [facility] 4SW-A Attending Claire Oliva MD   Hosp Day # 6 PCP Ambar Garcia MD     Reason for Consultation: elevated t 3/19/2014   • Unspecified essential hypertension    • Unspecified sleep apnea    • Wears glasses    • Wheezing      Past Surgical History:  No date: ANGIOGRAM  2/28/14: ANGIOPLASTY (CORONARY)      Comment: stents acute mi  No date: APPENDECTOMY  No date:  B 100 UNIT/ML flextouch 20 Units 20 Units Subcutaneous BID   Labetalol HCl (NORMODYNE) tab 200 mg 200 mg Oral 2 times per day   modafinil (PROVIGIL) tab 100 mg 100 mg Oral Daily   Rosuvastatin Calcium (CRESTOR) 20 MG tab 40 mg 40 mg Oral Nightly   Sertraline tube  Extremities: tr edema  Neurologic: Alert and oriented, normal affect. Skin: Warm and dry.      Laboratories and Data:    Labs:     Lab Results  Component Value Date   WBC 9.3 05/26/2018   HGB 7.9 05/26/2018   HCT 24.9 05/26/2018   .0 05/26/201 one had normal LVSF with diastolic dysfunction  · Coffee ground emesis and anemia, s/p EGD per GI  · PAF- SR on amio. Anticoagulation on hold with small hemorrhagic conversion of cva.  Plans were to re-eval after seeing Dr. Damien Edwards 2-4 weeks  · History of

## 2018-05-26 NOTE — PLAN OF CARE
5/25 1951: Pt received transfer from the floor alert, anxious. BIPAP. Saline lock. GT clamped. Han. Pt's daughter at bedside. 2100: OLE Bernard orders noted. Pt's family at bedside. NPO. Lasix given. ST w/ BBB. BS-123. AVAP 45%. 5/26 0200:  Pt brianna

## 2018-05-27 ENCOUNTER — APPOINTMENT (OUTPATIENT)
Dept: GENERAL RADIOLOGY | Facility: HOSPITAL | Age: 68
DRG: 377 | End: 2018-05-27
Attending: INTERNAL MEDICINE
Payer: MEDICARE

## 2018-05-27 LAB
ALLENS TEST: POSITIVE
ARTERIAL BLD GAS O2 SATURATION: 95 % (ref 92–100)
ARTERIAL BLOOD GAS BASE EXCESS: 5.4
ARTERIAL BLOOD GAS HCO3: 29.4 MEQ/L (ref 22–26)
ARTERIAL BLOOD GAS PCO2: 41 MM HG (ref 35–45)
ARTERIAL BLOOD GAS PH: 7.48 (ref 7.35–7.45)
ARTERIAL BLOOD GAS PO2: 81 MM HG (ref 80–105)
ATRIAL RATE: 55 BPM
BASOPHILS # BLD AUTO: 0.03 X10(3) UL (ref 0–0.1)
BASOPHILS NFR BLD AUTO: 0.4 %
BUN BLD-MCNC: 58 MG/DL (ref 8–20)
CALCIUM BLD-MCNC: 8.8 MG/DL (ref 8.3–10.3)
CALCULATED O2 SATURATION: 97 % (ref 92–100)
CARBOXYHEMOGLOBIN: 1.4 % SAT (ref 0–3)
CHLORIDE: 100 MMOL/L (ref 101–111)
CO2: 27 MMOL/L (ref 22–32)
CREAT BLD-MCNC: 1.43 MG/DL (ref 0.55–1.02)
EOSINOPHIL # BLD AUTO: 0.49 X10(3) UL (ref 0–0.3)
EOSINOPHIL NFR BLD AUTO: 6.1 %
ERYTHROCYTE [DISTWIDTH] IN BLOOD BY AUTOMATED COUNT: 16 % (ref 11.5–16)
GLUCOSE BLD-MCNC: 132 MG/DL (ref 65–99)
GLUCOSE BLD-MCNC: 156 MG/DL (ref 65–99)
GLUCOSE BLD-MCNC: 158 MG/DL (ref 65–99)
GLUCOSE BLD-MCNC: 90 MG/DL (ref 70–99)
GLUCOSE BLD-MCNC: 91 MG/DL (ref 65–99)
HAV IGM SER QL: 2.1 MG/DL (ref 1.8–2.5)
HCT VFR BLD AUTO: 24 % (ref 34–50)
HGB BLD-MCNC: 7.7 G/DL (ref 12–16)
IMMATURE GRANULOCYTE COUNT: 0.03 X10(3) UL (ref 0–1)
IMMATURE GRANULOCYTE RATIO %: 0.4 %
IONIZED CALCIUM: 1.13 MMOL/L (ref 1.12–1.32)
LACTIC ACID ARTERIAL: <1.3 MMOL/L (ref 0.5–2)
LYMPHOCYTES # BLD AUTO: 1.08 X10(3) UL (ref 0.9–4)
LYMPHOCYTES NFR BLD AUTO: 13.4 %
MCH RBC QN AUTO: 29.2 PG (ref 27–33.2)
MCHC RBC AUTO-ENTMCNC: 32.1 G/DL (ref 31–37)
MCV RBC AUTO: 90.9 FL (ref 81–100)
METHEMOGLOBIN: 0.4 % SAT (ref 0.4–1.5)
MONOCYTES # BLD AUTO: 0.75 X10(3) UL (ref 0.1–1)
MONOCYTES NFR BLD AUTO: 9.3 %
NEUTROPHIL ABS PRELIM: 5.65 X10 (3) UL (ref 1.3–6.7)
NEUTROPHILS # BLD AUTO: 5.65 X10(3) UL (ref 1.3–6.7)
NEUTROPHILS NFR BLD AUTO: 70.4 %
P AXIS: 48 DEGREES
P-R INTERVAL: 168 MS
PATIENT TEMPERATURE: 98.6 F
PLATELET # BLD AUTO: 186 10(3)UL (ref 150–450)
POTASSIUM BLOOD GAS: 3.4 MMOL/L (ref 3.6–5.1)
POTASSIUM SERPL-SCNC: 3.6 MMOL/L (ref 3.6–5.1)
Q-T INTERVAL: 446 MS
QRS DURATION: 114 MS
QTC CALCULATION (BEZET): 426 MS
R AXIS: -13 DEGREES
RBC # BLD AUTO: 2.64 X10(6)UL (ref 3.8–5.1)
RED CELL DISTRIBUTION WIDTH-SD: 52.6 FL (ref 35.1–46.3)
SODIUM BLOOD GAS: 139 MMOL/L (ref 136–144)
SODIUM SERPL-SCNC: 139 MMOL/L (ref 136–144)
T AXIS: 159 DEGREES
TOTAL HEMOGLOBIN: 9.2 G/DL (ref 11.7–16)
VENTRICULAR RATE: 55 BPM
WBC # BLD AUTO: 8 X10(3) UL (ref 4–13)

## 2018-05-27 PROCEDURE — 99233 SBSQ HOSP IP/OBS HIGH 50: CPT | Performed by: HOSPITALIST

## 2018-05-27 PROCEDURE — 99233 SBSQ HOSP IP/OBS HIGH 50: CPT | Performed by: INTERNAL MEDICINE

## 2018-05-27 PROCEDURE — 71045 X-RAY EXAM CHEST 1 VIEW: CPT | Performed by: INTERNAL MEDICINE

## 2018-05-27 RX ORDER — ARIPIPRAZOLE 15 MG/1
40 TABLET ORAL ONCE
Status: COMPLETED | OUTPATIENT
Start: 2018-05-27 | End: 2018-05-27

## 2018-05-27 RX ORDER — ENOXAPARIN SODIUM 100 MG/ML
40 INJECTION SUBCUTANEOUS DAILY
Status: DISCONTINUED | OUTPATIENT
Start: 2018-05-28 | End: 2018-06-05

## 2018-05-27 RX ORDER — PANTOPRAZOLE SODIUM 40 MG/1
40 TABLET, DELAYED RELEASE ORAL
Status: DISCONTINUED | OUTPATIENT
Start: 2018-05-27 | End: 2018-06-05

## 2018-05-27 RX ORDER — PANTOPRAZOLE SODIUM 40 MG/1
40 TABLET, DELAYED RELEASE ORAL
Status: DISCONTINUED | OUTPATIENT
Start: 2018-05-27 | End: 2018-05-27

## 2018-05-27 NOTE — PROGRESS NOTES
[facility]  Nephrology Progress Note    Mart Morales Attending:  Azra Juarez MD       Subjective:  No acute issues overnight  Sitting up in bed; on NC O2      Current Facility-Administered Medications:  [START ON 5/28/2018] Enoxaparin Sodium ( Subcutaneous TID CC and HS   Pantoprazole Sodium (PROTONIX) 40 mg in Sodium Chloride 0.9 % 10 mL IV push 40 mg Intravenous Q12H   hydrALAzine HCl (APRESOLINE) injection 10 mg 10 mg Intravenous Q4H PRN   ipratropium-albuterol (DUONEB) nebulizer solution 3 m volume overload + underlying diastolic dysfunction + ? Infection(aspiration)  - continue diuretics  - monitor labs/UOP - has lea     2. Resp insuff - supportive care w/ PPV/abx/diuretics as tolerated/aspiration precautions    3.  HTN - stable; continue am

## 2018-05-27 NOTE — PLAN OF CARE
Assumed care at 1900. Pt alert and oriented x4, forgetful. Left facial droop, left arm flaccid, left leg weakness. Decreased sensation on left side. Received pt on 13L of oxygen high flow. Placed on AVAPS at night FiO2 45%.  SR/SB with bundle branch block a

## 2018-05-27 NOTE — PROGRESS NOTES
[facility]  Progress Note     Sean Thomas Patient Status:  Inpatient    1950 MRN [id number]   [facility] 4SW-A Attending Benita Lion MD   Hosp Day # 7 PCP Deric Land MD     Chief Complaint: resp failure    S: Ninfa Fallon HCl  200 mg Per NG Tube Daily   • AmLODIPine Besylate  10 mg Oral Daily   • aspirin  81 mg Oral Daily   • CloNIDine HCl  1 patch Transdermal Weekly   • Clopidogrel Bisulfate  75 mg Oral Daily   • insulin detemir  20 Units Subcutaneous BID   • Labetalol HCl

## 2018-05-27 NOTE — PLAN OF CARE
Problem: Impaired Swallowing  Goal: Minimize aspiration risk  Interventions:   Supervision with all meals  - Patient should be alert and upright for all feedings (90 degrees preferred)  - Offer food and liquids at a slow rate  - Encourage small bites of fo

## 2018-05-27 NOTE — PROGRESS NOTES
[facility] Pharmacy Note:  Renal Dose Adjustment for Enoxaparin (LOVENOX)    Odalys Summers has been prescribed Enoxaparin (LOVENOX) 30 mg subcutaneously every 24 hours. Estimated Creatinine Clearance: 33.9 mL/min (A) (based on SCr of 1.43 mg/dL (H)).     He

## 2018-05-27 NOTE — SLP NOTE
SPEECH/LANGUAGE/COGNITIVE EVALUATION - INPATIENT    Admission Date: 5/19/2018  Evaluation Date: 05/27/18    Reason for Referral: Stroke protocol    ASSESSMENT & PLAN   ASSESSMENT & IMPRESSION  Patient seen for a cognitive-communication evaluation secondary taken part in this evaluation and plan of treatment and have been advised and agree on the findings and goals.       FOLLOW UP  Treatment Plan/Recommendations: No further inpatient SLP service warranted (*for cognition)  Number of Visits to Meet Established

## 2018-05-27 NOTE — SLP NOTE
SPEECH DAILY NOTE - INPATIENT    ASSESSMENT & PLAN   ASSESSMENT  Patient seen for skilled dysphagia intervention. Patient alert and awake in room; oriented x4/4.  RN in room reported patient tolerating recommended diet (I.e., regular/thin) well, with no ove consistencies;Small bites and sips (check left buccal cavity for pocketing)  Aspiration Precautions: Upright position;Small bites and sips  Medication Administration Recommendations: No restrictions    Patient Experiencing Pain: No     Discharge Recommenda

## 2018-05-27 NOTE — PROGRESS NOTES
[facility]  Progress Note    Crsitina Bosch Patient Status:  Inpatient    1950 MRN [id number]   [facility] 4NW-A Attending Angela Wilhelm MD   Hosp Day # 7 PCP oRdger Hernandez MD     Subjective:  Cristina Bosch is a(n) 75 yea 4.8  4.3  3.6   CL  100*  100*  100*   CO2  26.0  26.0  27.0       Lab Results  Component Value Date   PT 13.4 02/28/2014   PT 13.9 02/27/2014   PT 20.2 (H) 10/22/2012   INR 1.10 05/19/2018   INR 1.08 05/16/2018   INR 1.06 02/28/2014     Cultures: n/a    R

## 2018-05-27 NOTE — PROGRESS NOTES
[facility] Pharmacy Note:  Renal Dose Adjustment for Enoxaparin (LOVENOX)    Alicia Mohan has been prescribed Enoxaparin (LOVENOX) 30 mg subcutaneously every 24 hours. Estimated Creatinine Clearance: 33.9 mL/min (A) (based on SCr of 1.43 mg/dL (H)).     He

## 2018-05-27 NOTE — PROGRESS NOTES
[facility]  Progress Note    Alicia Mohan Patient Status:  Inpatient    1950 MRN [id number]   [facility] 4SW-A Attending Nixon Villanueva MD   Hosp Day # 7 PCP Jeni Rios MD     Subjective:  Patient seen and examined. PT 13.9 02/27/2014   PT 20.2 (H) 10/22/2012   INR 1.10 05/19/2018   INR 1.08 05/16/2018   INR 1.06 02/28/2014       Medications:    Current Facility-Administered Medications:  [START ON 5/28/2018] Enoxaparin Sodium (LOVENOX) 40 MG/0.4ML injection 40 mg 4 Sodium (PROTONIX) 40 mg in Sodium Chloride 0.9 % 10 mL IV push 40 mg Intravenous Q12H   hydrALAzine HCl (APRESOLINE) injection 10 mg 10 mg Intravenous Q4H PRN   ipratropium-albuterol (DUONEB) nebulizer solution 3 mL 3 mL Nebulization Q6H PRN       Assessme neurologic event and the requirement for systemic anticoagulation with any type of percutaneous intervention.     Ayan Bond MD  5/27/2018  9:29 AM

## 2018-05-27 NOTE — PLAN OF CARE
Problem: Impaired Cognition  Goal: Patient will exhibit improved attention, thought processing and/or memory  Interventions:  - Allow additional time for processing after asking questions or providing instructions  Outcome: Progressing

## 2018-05-28 ENCOUNTER — APPOINTMENT (OUTPATIENT)
Dept: GENERAL RADIOLOGY | Facility: HOSPITAL | Age: 68
DRG: 377 | End: 2018-05-28
Attending: INTERNAL MEDICINE
Payer: MEDICARE

## 2018-05-28 PROBLEM — R77.8 ELEVATED TROPONIN: Status: ACTIVE | Noted: 2018-05-28

## 2018-05-28 PROBLEM — R79.89 ELEVATED TROPONIN: Status: ACTIVE | Noted: 2018-05-28

## 2018-05-28 LAB
ALLENS TEST: POSITIVE
ARTERIAL BLD GAS O2 SATURATION: 96 % (ref 92–100)
ARTERIAL BLOOD GAS BASE EXCESS: 4.4
ARTERIAL BLOOD GAS HCO3: 27.9 MEQ/L (ref 22–26)
ARTERIAL BLOOD GAS PCO2: 37 MM HG (ref 35–45)
ARTERIAL BLOOD GAS PH: 7.5 (ref 7.35–7.45)
ARTERIAL BLOOD GAS PO2: 59 MM HG (ref 80–105)
BUN BLD-MCNC: 46 MG/DL (ref 8–20)
CALCIUM BLD-MCNC: 8.5 MG/DL (ref 8.3–10.3)
CALCULATED O2 SATURATION: 93 % (ref 92–100)
CARBOXYHEMOGLOBIN: 1.5 % SAT (ref 0–3)
CHLORIDE: 102 MMOL/L (ref 101–111)
CO2: 29 MMOL/L (ref 22–32)
CREAT BLD-MCNC: 1.18 MG/DL (ref 0.55–1.02)
GLUCOSE BLD-MCNC: 117 MG/DL (ref 65–99)
GLUCOSE BLD-MCNC: 148 MG/DL (ref 65–99)
GLUCOSE BLD-MCNC: 79 MG/DL (ref 70–99)
GLUCOSE BLD-MCNC: 82 MG/DL (ref 65–99)
GLUCOSE BLD-MCNC: 86 MG/DL (ref 65–99)
HAV IGM SER QL: 1.8 MG/DL (ref 1.8–2.5)
IONIZED CALCIUM: 1.16 MMOL/L (ref 1.12–1.32)
L/M: 6 L/MIN
LACTIC ACID ARTERIAL: <1.3 MMOL/L (ref 0.5–2)
METHEMOGLOBIN: 0.4 % SAT (ref 0.4–1.5)
P/F RATIO: 282.5 MMHG
PATIENT TEMPERATURE: 98.5 F
POTASSIUM BLOOD GAS: 3.5 MMOL/L (ref 3.6–5.1)
POTASSIUM SERPL-SCNC: 3.5 MMOL/L (ref 3.6–5.1)
POTASSIUM SERPL-SCNC: 3.9 MMOL/L (ref 3.6–5.1)
SODIUM BLOOD GAS: 137 MMOL/L (ref 136–144)
SODIUM SERPL-SCNC: 138 MMOL/L (ref 136–144)
TOTAL HEMOGLOBIN: 7.6 G/DL (ref 11.7–16)

## 2018-05-28 PROCEDURE — 99232 SBSQ HOSP IP/OBS MODERATE 35: CPT | Performed by: INTERNAL MEDICINE

## 2018-05-28 PROCEDURE — 71045 X-RAY EXAM CHEST 1 VIEW: CPT | Performed by: INTERNAL MEDICINE

## 2018-05-28 PROCEDURE — 99232 SBSQ HOSP IP/OBS MODERATE 35: CPT | Performed by: HOSPITALIST

## 2018-05-28 RX ORDER — POTASSIUM CHLORIDE 20 MEQ/1
40 TABLET, EXTENDED RELEASE ORAL EVERY 4 HOURS
Status: COMPLETED | OUTPATIENT
Start: 2018-05-28 | End: 2018-05-28

## 2018-05-28 RX ORDER — TORSEMIDE 20 MG/1
20 TABLET ORAL
Status: DISCONTINUED | OUTPATIENT
Start: 2018-05-28 | End: 2018-06-01

## 2018-05-28 RX ORDER — MAGNESIUM OXIDE 400 MG (241.3 MG MAGNESIUM) TABLET
400 TABLET ONCE
Status: COMPLETED | OUTPATIENT
Start: 2018-05-28 | End: 2018-05-28

## 2018-05-28 NOTE — PLAN OF CARE
GASTROINTESTINAL - ADULT    • Minimal or absence of nausea and vomiting Progressing        RESPIRATORY - ADULT    • Achieves optimal ventilation and oxygenation Progressing        Assumed care at [address]. A/O x4. On 4L HFNC. Denies SOB. VSS. Denies pain.  Due

## 2018-05-28 NOTE — PLAN OF CARE
Received patient from ICU. VSS. Patient's O2 increased to 8 LHFNC at this time. AVAPS, tele, . Patient is AO. Denies pain at this time. Needs met, WCTM.

## 2018-05-28 NOTE — PROGRESS NOTES
[facility]  Progress Note     Erica Darnell Patient Status:  Inpatient    1950 MRN [id number]   [facility] 4SW-A Attending Roswell Sicard, MD   Hosp Day # 8 PCP Regi Jauregui MD     Chief Complaint: resp failure    S: María Blackmon BID (Diuretic)   • hydrALAzine HCl  100 mg Oral Q8H [address]   • ipratropium-albuterol  3 mL Nebulization QID   • amiodarone HCl  200 mg Per NG Tube Daily   • AmLODIPine Besylate  10 mg Oral Daily   • aspirin  81 mg Oral Daily   • CloNIDine HCl  1 patch Carolina David

## 2018-05-28 NOTE — PROGRESS NOTES
[facility]  Progress Note    Thea Floyd     Subjective:  No chest pain or shortness of breath at rest with oxygen by NC in place. Apparently was here for vomiting with coffee grounds. Developed sob and tx to icu. Diuresed. Doing better.  Seen on Results  Component Value Date   PT 13.4 02/28/2014   PT 13.9 02/27/2014   PT 20.2 (H) 10/22/2012   INR 1.10 05/19/2018   INR 1.08 05/16/2018   INR 1.06 02/28/2014     .         Medications:    Current Facility-Administered Medications:  Enoxaparin Sodium (L ondansetron HCl (ZOFRAN) injection 4 mg 4 mg Intravenous Q6H PRN   hydrALAzine HCl (APRESOLINE) injection 10 mg 10 mg Intravenous Q4H PRN   ipratropium-albuterol (DUONEB) nebulizer solution 3 mL 3 mL Nebulization Q6H PRN       Assessment and Plan:  Princ

## 2018-05-28 NOTE — PLAN OF CARE
Assumed pt care at 0730. aa/ox4. Breathing unlabored. Denies pain. Tele SB/NSR. Tolerating po intake, asp precautions, poor appetite. Up in chair x2 today with lift. Weaning oxygen. bipap prn for sob/sleep.

## 2018-05-28 NOTE — PROGRESS NOTES
[facility]  Nephrology Progress Note    Claudetta Freud Attending:  Elver Motta MD       Subjective:  No acute issues overnight         Current Facility-Administered Medications:  Potassium Chloride ER (K-DUR M20) CR tab 40 mEq 40 mEq Oral Q4H tablet Oral Q15 Min PRN   ondansetron HCl (ZOFRAN) injection 4 mg 4 mg Intravenous Q6H PRN   hydrALAzine HCl (APRESOLINE) injection 10 mg 10 mg Intravenous Q4H PRN   ipratropium-albuterol (DUONEB) nebulizer solution 3 mL 3 mL Nebulization Q6H PRN stable; continue amlodipine/clonidine patch/hydralazine/lasix/labetalol    4. CAD s/p CABG     5. Recent R MCA CVA due to ICA occlusion with L hemiplegia    6.  Anemia- hgb stable; due to above + GIB; on PPI - monitor    Santhosh Dukes  5/28/2018

## 2018-05-28 NOTE — PROGRESS NOTES
[facility]  Progress Note    Guanako Fierro Patient Status:  Inpatient    1950 MRN [id number]   [facility] 4NW-A Attending Sukhi Velazquez MD   Hosp Day # 8 PCP Teivn Romo MD     Subjective:  Guanako Fierro is a(n) 75 yea GFRNAA  31*  38*  48*   CA  8.8  8.8  8.5   NA  138  139  138   K  4.3  3.6  3.5*   CL  100*  100*  102   CO2  26.0  27.0  29.0       Lab Results  Component Value Date   PT 13.4 02/28/2014   PT 13.9 02/27/2014   PT 20.2 (H) 10/22/2012   INR 1.10 05/19/20

## 2018-05-29 ENCOUNTER — APPOINTMENT (OUTPATIENT)
Dept: GENERAL RADIOLOGY | Facility: HOSPITAL | Age: 68
DRG: 377 | End: 2018-05-29
Attending: INTERNAL MEDICINE
Payer: MEDICARE

## 2018-05-29 ENCOUNTER — PRIOR ORIGINAL RECORDS (OUTPATIENT)
Dept: OTHER | Age: 68
End: 2018-05-29

## 2018-05-29 LAB
ALLENS TEST: POSITIVE
ARTERIAL BLD GAS O2 SATURATION: 96 % (ref 92–100)
ARTERIAL BLOOD GAS BASE EXCESS: 2.2
ARTERIAL BLOOD GAS HCO3: 25.3 MEQ/L (ref 22–26)
ARTERIAL BLOOD GAS PCO2: 33 MM HG (ref 35–45)
ARTERIAL BLOOD GAS PH: 7.5 (ref 7.35–7.45)
ARTERIAL BLOOD GAS PO2: 69 MM HG (ref 80–105)
BUN BLD-MCNC: 38 MG/DL (ref 8–20)
CALCIUM BLD-MCNC: 8.8 MG/DL (ref 8.3–10.3)
CALCULATED O2 SATURATION: 95 % (ref 92–100)
CARBOXYHEMOGLOBIN: 1.5 % SAT (ref 0–3)
CHLORIDE: 102 MMOL/L (ref 101–111)
CO2: 28 MMOL/L (ref 22–32)
CREAT BLD-MCNC: 1.04 MG/DL (ref 0.55–1.02)
GLUCOSE BLD-MCNC: 104 MG/DL (ref 65–99)
GLUCOSE BLD-MCNC: 141 MG/DL (ref 65–99)
GLUCOSE BLD-MCNC: 143 MG/DL (ref 65–99)
GLUCOSE BLD-MCNC: 84 MG/DL (ref 70–99)
GLUCOSE BLD-MCNC: 88 MG/DL (ref 65–99)
HAV IGM SER QL: 1.7 MG/DL (ref 1.8–2.5)
IONIZED CALCIUM: 1.14 MMOL/L (ref 1.12–1.32)
L/M: 10 L/MIN
LACTIC ACID ARTERIAL: <1.3 MMOL/L (ref 0.5–2)
METHEMOGLOBIN: 0.3 % SAT (ref 0.4–1.5)
P/F RATIO: 328.9 MMHG
PATIENT TEMPERATURE: 98.6 F
POTASSIUM BLOOD GAS: 3.7 MMOL/L (ref 3.6–5.1)
POTASSIUM SERPL-SCNC: 3.7 MMOL/L (ref 3.6–5.1)
SODIUM BLOOD GAS: 135 MMOL/L (ref 136–144)
SODIUM SERPL-SCNC: 138 MMOL/L (ref 136–144)
TOTAL HEMOGLOBIN: 8.2 G/DL (ref 11.7–16)

## 2018-05-29 PROCEDURE — 99233 SBSQ HOSP IP/OBS HIGH 50: CPT | Performed by: INTERNAL MEDICINE

## 2018-05-29 PROCEDURE — 99232 SBSQ HOSP IP/OBS MODERATE 35: CPT | Performed by: HOSPITALIST

## 2018-05-29 PROCEDURE — 71045 X-RAY EXAM CHEST 1 VIEW: CPT | Performed by: INTERNAL MEDICINE

## 2018-05-29 RX ORDER — POTASSIUM CHLORIDE 20 MEQ/1
40 TABLET, EXTENDED RELEASE ORAL ONCE
Status: DISCONTINUED | OUTPATIENT
Start: 2018-05-29 | End: 2018-05-29

## 2018-05-29 RX ORDER — MAGNESIUM OXIDE 400 MG (241.3 MG MAGNESIUM) TABLET
400 TABLET ONCE
Status: DISCONTINUED | OUTPATIENT
Start: 2018-05-29 | End: 2018-05-29

## 2018-05-29 NOTE — DIETARY NOTE
[facility]     NUTRITION FOLLOW UP ASSESSMENT     Pt is at moderate nutrition risk.  Pt does not meet malnutrition criteria.     NUTRITION DIAGNOSIS/PROBLEM:     Inadequate oral intake related to inability to take sufficient po as evidenced by need for CGE including PUD, gastritis/esohpagitis, AVM, small hiatal hernia. RD paged for TF recommendations.     ANTHROPOMETRICS:  Ht:  65\"  Wt: 80.9 kg. This is 142% of IBW  BMI: Body mass index is 30.32 kg/m².   IBW: 57 kg  Usual Body Wt: 82 kg     WEIGHT HISTOR

## 2018-05-29 NOTE — PROGRESS NOTES
[facility]  Progress Note    Claudetta Apolinarefren     Subjective:  No chest pain or shortness of breath.        Intake/Output:    Intake/Output Summary (Last 24 hours) at 05/29/18 1420  Last data filed at 05/29/18 0450   Gross per 24 hour   Intake flextouch 20 Units 20 Units Subcutaneous Daily   torsemide (DEMADEX) tab 20 mg 20 mg Oral BID (Diuretic)   Enoxaparin Sodium (LOVENOX) 40 MG/0.4ML injection 40 mg 40 mg Subcutaneous Daily   Pantoprazole Sodium (PROTONIX) EC tab 40 mg 40 mg Oral BID AC   In kidney injury) [facility])  Active Problems:    Essential hypertension    Depression    High cholesterol    Cad    Hx of CABG twice both times at [address]    Uncontrolled diabetes mellitus (HCC)    Non-intractable vomiting with nausea    Hypoxia    Acute

## 2018-05-29 NOTE — OCCUPATIONAL THERAPY NOTE
Attempted to see pt x2 this AM. Pt first needing new OT/PT orders due to ICU transfer this weekend. Now occupied with transfer to CTU. Per RN, no change in medical status since leaving ICU.  Will follow up as appropriate and as able

## 2018-05-29 NOTE — OCCUPATIONAL THERAPY NOTE
OCCUPATIONAL THERAPY EVALUATION - INPATIENT     Room Number: 0205/2367-J  Evaluation Date: 5/29/2018  Type of Evaluation: Re-evaluation and s/p ICU transfer  Presenting Problem: N/V, hypoxia, hyperkalemia     Physician Order: IP Consult to Occupational The High blood pressure    • High cholesterol    • History of blood transfusion    • History of depression    • Leaking of urine    • Night sweats    • [facility]    Carotid Artery Blockage TIA   • Shortness of breath    • Sputum pr (L side inattention)  Fall Risk: High fall risk    WEIGHT BEARING RESTRICTION  Weight Bearing Restriction: None                PAIN ASSESSMENT  Rating: Unable to rate  Location: LUE  Management Techniques: Repositioning    COGNITION  Attention Span:  atten currently need…  -   Putting on and taking off regular lower body clothing?: Total  -   Bathing (including washing, rinsing, drying)?: Total  -   Toileting, which includes using toilet, bedpan or urinal? : Total  -   Putting on and taking off regular upper level of impairment often benefit from long term care; AMY is recommended due to significant decline from baseline. Subacute rehab is recommended for 12-14 days. After this period of rehabilitation patient should maximize independence in ADL/transfers.

## 2018-05-29 NOTE — PROGRESS NOTES
[facility]  Nephrology Progress Note    Margarette Tyler Attending:  Arabella Valentin MD       Assessment and Plan:    1) TIP- agree this is due to a combination of mild volume depletion / recent diuresis / relative hypotension along with urinary reten 05/29/2018   CA 8.8 05/29/2018   MG 1.7 05/29/2018   PGLU 88 05/29/2018       Imaging: All imaging studies reviewed.     Meds:     Current Facility-Administered Medications:  insulin detemir (LEVEMIR) 100 UNIT/ML flextouch 18 Units 18 Units Subcutaneous Ni tablet Oral Q15 Min PRN   ondansetron HCl (ZOFRAN) injection 4 mg 4 mg Intravenous Q6H PRN   hydrALAzine HCl (APRESOLINE) injection 10 mg 10 mg Intravenous Q4H PRN   ipratropium-albuterol (DUONEB) nebulizer solution 3 mL 3 mL Nebulization Q6H PRN         Q

## 2018-05-29 NOTE — PLAN OF CARE
Diabetes/Glucose Control    • Glucose maintained within prescribed range Progressing        GASTROINTESTINAL - ADULT    • Minimal or absence of nausea and vomiting Progressing        HEMATOLOGIC - ADULT    • Maintains hematologic stability Progressing    • (3) assistive equipment and person

## 2018-05-29 NOTE — PHYSICAL THERAPY NOTE
PHYSICAL THERAPY EVALUATION - INPATIENT     Room Number: 3669/7923-J  Evaluation Date: 5/29/2018  Type of Evaluation: Initial  Physician Order: PT Eval and Treat    Presenting Problem: intractable nausea with vomiting  Reason for Therapy: Mobility Dy stents   • High blood pressure    • High cholesterol    • History of blood transfusion    • History of depression    • Leaking of urine    • Night sweats    • Peripheral vascular disease ([facility] 3/14    Carotid Artery Blockage TIA   • Shortness of breath ASSESSMENT  Rating: Unable to rate  Location: LUE- intermittent  Management Techniques: Activity promotion; Body mechanics; Relaxation;Repositioning    COGNITION  · Attention Span:  difficulty dividing attention  · Following Commands:  follows one step comma Approx Degree of Impairment Score: 81.38%   Standardized Score (AM-PAC Scale): 30.55   CMS Modifier (G-Code): CM    FUNCTIONAL ABILITY STATUS  Gait Assessment   Gait Assistance: Not tested           Stoop/Curb Assistance: Not tested       Skilled Therapy P and gait. The patient is below baseline and would benefit from skilled inpatient PT to address the above deficits to assist patient in returning to prior to level of function. Subacute rehab is recommended for 12-14 days.   After this period of rehabilita

## 2018-05-29 NOTE — PROGRESS NOTES
[facility]  Progress Note     Alicia Mohan Patient Status:  Inpatient    1950 MRN [id number]   [facility] 4SW-A Attending Nixon Villanueva MD   Hosp Day # 5 PCP Jeni Rios MD     Chief Complaint: resp failure    S: Sherita Donahue mg Oral BID AC   • Insulin Aspart Pen  1-68 Units Subcutaneous TID CC   • Insulin Aspart Pen  1-68 Units Subcutaneous TID CC and HS   • hydrALAzine HCl  100 mg Oral Q8H [facility]   • ipratropium-albuterol  3 mL Nebulization QID   • amiodarone HCl  200 mg Per NG remove  · Central line: no    Estimated date of discharge: TBD  Discharge is dependent on: clinical stability  At this point Ms. Danilo Pichardo is expected to be discharge to: home    Plan of care discussed with patient or family at bedside.     Skye Webster

## 2018-05-29 NOTE — SLP NOTE
SPEECH DAILY NOTE - INPATIENT    ASSESSMENT & PLAN   ASSESSMENT  Patient seen for dysphagia tx to assess tolerance with recommended diet, ensure proper utilization of aspiration precautions and provide pt/family education.   Patient sitting up in bed with b patient will utilize compensatory strategies as outlined by  BSSE (clinical evaluation) including Small bites, Small sips, Alternate liquids/solids, Upright 90 degrees, Upright 90 degrees 30 mins after meal, Supervision with meals, check left buccal cavity

## 2018-05-29 NOTE — PROGRESS NOTES
[facility]  Progress Note    Ever Pamlico Patient Status:  Inpatient    1950 MRN [id number]   [facility] 5NW-A Attending Joseline Rothman MD   [facility] Day # 9 PCP Negin Barajas MD     Subjective:  Paul Lester is a(n) 75 ye 188.0  186.0     Recent Labs   Lab  05/27/18   0419  05/28/18   0403  05/28/18   1257  05/29/18   0527   GLU  90  79   --   84   BUN  58*  46*   --   38*   CREATSERUM  1.43*  1.18*   --   1.04*   GFRAA  43*  55*   --   64   GFRNAA  38*  48*   --   55*   CA

## 2018-05-29 NOTE — PROGRESS NOTES
[facility]  Cardiology Progress Note    Lenore Brown Patient Status:  Inpatient    1950 MRN [id number]   [facility] 5NW-A Attending Kaykay Jack MD   Hosp Day # 9 PCP Nahed Dorsey MD     Subjective: Wants to know when sh Nightly   • insulin detemir  20 Units Subcutaneous Daily   • torsemide  20 mg Oral BID (Diuretic)   • enoxaparin  40 mg Subcutaneous Daily   • Pantoprazole Sodium  40 mg Oral BID AC   • Insulin Aspart Pen  1-68 Units Subcutaneous TID CC   • Insulin Aspart emesis- recent PEG, EGD normal, hgb stable  · CVA with residual L weakness  · CAD s/p CABG 1996, 2012 on plavix, ASA  · Diastolic heart failure- reduced LV function on ECHO 5/26, LVEF 60-65% 1/2018.    · PAF- currently NSR on amiodarone  · HTN- on amlodipin

## 2018-05-30 ENCOUNTER — APPOINTMENT (OUTPATIENT)
Dept: ULTRASOUND IMAGING | Facility: HOSPITAL | Age: 68
DRG: 377 | End: 2018-05-30
Attending: INTERNAL MEDICINE
Payer: MEDICARE

## 2018-05-30 ENCOUNTER — APPOINTMENT (OUTPATIENT)
Dept: GENERAL RADIOLOGY | Facility: HOSPITAL | Age: 68
DRG: 377 | End: 2018-05-30
Attending: INTERNAL MEDICINE
Payer: MEDICARE

## 2018-05-30 LAB
BASOPHILS # BLD AUTO: 0.04 X10(3) UL (ref 0–0.1)
BASOPHILS NFR BLD AUTO: 0.6 %
BUN BLD-MCNC: 37 MG/DL (ref 8–20)
CALCIUM BLD-MCNC: 8.4 MG/DL (ref 8.3–10.3)
CHLORIDE: 100 MMOL/L (ref 101–111)
CO2: 27 MMOL/L (ref 22–32)
CREAT BLD-MCNC: 1.17 MG/DL (ref 0.55–1.02)
D-DIMER: 3.39 UG/ML FEU (ref 0–0.49)
EOSINOPHIL # BLD AUTO: 0.15 X10(3) UL (ref 0–0.3)
EOSINOPHIL NFR BLD AUTO: 2.3 %
ERYTHROCYTE [DISTWIDTH] IN BLOOD BY AUTOMATED COUNT: 16.2 % (ref 11.5–16)
GLUCOSE BLD-MCNC: 107 MG/DL (ref 70–99)
GLUCOSE BLD-MCNC: 111 MG/DL (ref 65–99)
GLUCOSE BLD-MCNC: 124 MG/DL (ref 65–99)
GLUCOSE BLD-MCNC: 146 MG/DL (ref 65–99)
GLUCOSE BLD-MCNC: 165 MG/DL (ref 65–99)
HAV IGM SER QL: 1.7 MG/DL (ref 1.8–2.5)
HCT VFR BLD AUTO: 23.1 % (ref 34–50)
HGB BLD-MCNC: 7.3 G/DL (ref 12–16)
IMMATURE GRANULOCYTE COUNT: 0.04 X10(3) UL (ref 0–1)
IMMATURE GRANULOCYTE RATIO %: 0.6 %
LYMPHOCYTES # BLD AUTO: 0.68 X10(3) UL (ref 0.9–4)
LYMPHOCYTES NFR BLD AUTO: 10.6 %
MCH RBC QN AUTO: 28.7 PG (ref 27–33.2)
MCHC RBC AUTO-ENTMCNC: 31.6 G/DL (ref 31–37)
MCV RBC AUTO: 90.9 FL (ref 81–100)
MONOCYTES # BLD AUTO: 0.59 X10(3) UL (ref 0.1–1)
MONOCYTES NFR BLD AUTO: 9.2 %
NEUTROPHIL ABS PRELIM: 4.93 X10 (3) UL (ref 1.3–6.7)
NEUTROPHILS # BLD AUTO: 4.93 X10(3) UL (ref 1.3–6.7)
NEUTROPHILS NFR BLD AUTO: 76.7 %
PLATELET # BLD AUTO: 251 10(3)UL (ref 150–450)
POTASSIUM SERPL-SCNC: 3.1 MMOL/L (ref 3.6–5.1)
POTASSIUM SERPL-SCNC: 3.1 MMOL/L (ref 3.6–5.1)
RBC # BLD AUTO: 2.54 X10(6)UL (ref 3.8–5.1)
RED CELL DISTRIBUTION WIDTH-SD: 53.3 FL (ref 35.1–46.3)
SODIUM SERPL-SCNC: 137 MMOL/L (ref 136–144)
WBC # BLD AUTO: 6.4 X10(3) UL (ref 4–13)

## 2018-05-30 PROCEDURE — 93970 EXTREMITY STUDY: CPT | Performed by: INTERNAL MEDICINE

## 2018-05-30 PROCEDURE — 99232 SBSQ HOSP IP/OBS MODERATE 35: CPT | Performed by: HOSPITALIST

## 2018-05-30 PROCEDURE — 71045 X-RAY EXAM CHEST 1 VIEW: CPT | Performed by: INTERNAL MEDICINE

## 2018-05-30 PROCEDURE — 99233 SBSQ HOSP IP/OBS HIGH 50: CPT | Performed by: INTERNAL MEDICINE

## 2018-05-30 RX ORDER — IPRATROPIUM BROMIDE AND ALBUTEROL SULFATE 2.5; .5 MG/3ML; MG/3ML
3 SOLUTION RESPIRATORY (INHALATION)
Status: DISCONTINUED | OUTPATIENT
Start: 2018-05-30 | End: 2018-06-05

## 2018-05-30 RX ORDER — LABETALOL 100 MG/1
100 TABLET, FILM COATED ORAL ONCE
Status: COMPLETED | OUTPATIENT
Start: 2018-05-30 | End: 2018-05-30

## 2018-05-30 RX ORDER — POTASSIUM CHLORIDE 20 MEQ/1
40 TABLET, EXTENDED RELEASE ORAL ONCE
Status: COMPLETED | OUTPATIENT
Start: 2018-05-30 | End: 2018-05-30

## 2018-05-30 RX ORDER — MAGNESIUM SULFATE HEPTAHYDRATE 40 MG/ML
2 INJECTION, SOLUTION INTRAVENOUS ONCE
Status: COMPLETED | OUTPATIENT
Start: 2018-05-30 | End: 2018-05-30

## 2018-05-30 RX ORDER — FUROSEMIDE 10 MG/ML
40 INJECTION INTRAMUSCULAR; INTRAVENOUS ONCE
Status: COMPLETED | OUTPATIENT
Start: 2018-05-30 | End: 2018-05-30

## 2018-05-30 NOTE — PROGRESS NOTES
[facility]  Cardiology Progress Note    Subjective: Intermittently short of breath, even at rest at times. No chest pain. Frustrated about her long hospital stay. Continues to wear BiPap, even during the day. CXR my view slightly better today. 100 mg Oral Daily   • Umeclidinium Bromide  1 puff Inhalation Daily       Telemetry: SR w/ PVC's, (4) beats WCT earlier today    Assessment:    Principal Problem:    TIP (acute kidney injury)  Active Problems:    Essential hypertension    Depression    Hig

## 2018-05-30 NOTE — PROGRESS NOTES
[facility]  Progress Note    Guanako Fierro Patient Status:  Inpatient    1950 MRN [id number]   [facility] 8NE-A Attending Lindsey Pereira MD   [facility] Day # 10 PCP Tevin Romo MD     Subjective:  Guanako Fierro is a(n) 75 y 24.9*  24.0*  23.1*   MCV  92.2  90.9  90.9   MCH  29.3  29.2  28.7   MCHC  31.7  32.1  31.6   RDW  16.1*  16.0  16.2*   NEPRELIM  7.88*  5.65  4.93   WBC  9.3  8.0  6.4   PLT  188.0  186.0  251.0     Recent Labs   Lab  05/28/18   0403  05/28/18   1257  05 clearance  Mobilize/out of bed as tolerated  Aspiration precautions; keep HOB >30 degrees  Venous Doppler study, D-dimer assay-patient may warrant a CT angiogram of the chest if these are in some way suggestive of thromboembolic disease.     Lin Gunderson

## 2018-05-30 NOTE — PLAN OF CARE
GASTROINTESTINAL - ADULT    • Minimal or absence of nausea and vomiting Progressing        Impaired Cognition    • Patient will exhibit improved attention, thought processing and/or memory Progressing        Impaired Swallowing    • Minimize aspiration ris

## 2018-05-30 NOTE — PHYSICAL THERAPY NOTE
PHYSICAL THERAPY TREATMENT NOTE - INPATIENT    Room Number: 1932/6023-C     Session: 1   Number of Visits to Meet Established Goals: 5    Presenting Problem: intractable nausea with vomiting    Problem List  Principal Problem:    TIP (acute kidney injury) mi  No date: APPENDECTOMY  No date: BACK SURGERY  No date: BIOPSY OF SKIN LESION      Comment: hyperkeratosis  1996 and 2012: BYPASS SURGERY      Comment: cabg x 4  1996: CABG      Comment: x4  No date: CATH BARE METAL STENT (BMS)      Comment: R carotid/c CMS Modifier (G-Code): CM    FUNCTIONAL ABILITY STATUS  Gait Assessment   Gait Assistance: Not tested           Stoop/Curb Assistance: Not tested       Skilled Therapy Provided:     Pt presented in supine upon PT arrival. Pt willing to participate in ses PLAN  PT Treatment Plan: Bed mobility; Body mechanics; Endurance; Energy conservation;Patient education; Family education;Gait training;Neuromuscular re-educate;Range of motion;Strengthening;Transfer training;Balance training  Rehab Potential : Grant Guerrero

## 2018-05-30 NOTE — OCCUPATIONAL THERAPY NOTE
OCCUPATIONAL THERAPY TREATMENT NOTE - INPATIENT     Room Number: 7605/9452-A  Session: 1 after re-eval   Number of Visits to Meet Established Goals: 8    Presenting Problem: N/V, hypoxia, hyperkalemia     History related to current admission: Pt is 67 year • Night sweats    • Peripheral vascular disease ([facility] 3/14    Carotid Artery Blockage TIA   • Shortness of breath    • Sputum production    • Stented coronary artery    • STROKE 2005    TIA   • Stroke syndrome [facility]) 12/2005    CVA no residual effects   • such as brushing teeth?: A Lot  -   Eating meals?: A Little    AM-PAC Score:  Score: 10  Approx Degree of Impairment: 74.7%  Standardized Score (AM-PAC Scale): 27.31  CMS Modifier (G-Code): CL    FUNCTIONAL TRANSFER ASSESSMENT  Supine to Sit : Dependent as eval/education; Neuromuscluar reeducation; Compensatory technique education  Rehab Potential : Fair  Frequency (Obs): 5x/week      OT Goals: -all goals ongoing 5/30  ADL Goals   Patient will perform grooming: with min assist  Patient will perform upper body

## 2018-05-30 NOTE — PROGRESS NOTES
[facility]  Progress Note     Laquetta Paget Patient Status:  Inpatient    1950 MRN [id number]   [facility] 4SW-A Attending Dina Black MD   Hosp Day # 8 PCP Avery Giordano MD     Chief Complaint: resp failure    S: Becki Alvarez 40 mg Subcutaneous Daily   • Pantoprazole Sodium  40 mg Oral BID AC   • Insulin Aspart Pen  1-68 Units Subcutaneous TID CC   • Insulin Aspart Pen  1-68 Units Subcutaneous TID CC and HS   • hydrALAzine HCl  100 mg Oral Q8H [facility]   • ipratropium-albuterol  3 line: no    Estimated date of discharge: TBD  Discharge is dependent on: clinical stability  At this point Ms. Camelia Rubinstein is expected to be discharge to: home    Plan of care discussed with patient or family at bedside.     Abena Jose MD

## 2018-05-30 NOTE — PROGRESS NOTES
[facility]  Nephrology Progress Note    Bipin Pool Attending:  Liya Arreaga MD       Assessment and Plan:    1) TIP- agree this is due to a combination of mild volume depletion / recent diuresis / relative hypotension along with urinary reten 6.4 05/30/2018   HGB 7.3 05/30/2018   HCT 23.1 05/30/2018   .0 05/30/2018   CREATSERUM 1.17 05/30/2018   BUN 37 05/30/2018    05/30/2018   K 3.1 05/30/2018   K 3.1 05/30/2018    05/30/2018   CO2 27.0 05/30/2018    05/30/2018   CA tablet 4 tablet Oral Q15 Min PRN   Or      dextrose 50 % injection 50 mL 50 mL Intravenous Q15 Min PRN   Or      glucose (DEX4) oral liquid 30 g 30 g Oral Q15 Min PRN   Or      Glucose-Vitamin C (DEX-4) 4-0.006 g chewable tab 8 tablet 8 tablet Oral Q15 Min

## 2018-05-31 ENCOUNTER — APPOINTMENT (OUTPATIENT)
Dept: GENERAL RADIOLOGY | Facility: HOSPITAL | Age: 68
DRG: 377 | End: 2018-05-31
Attending: INTERNAL MEDICINE
Payer: MEDICARE

## 2018-05-31 ENCOUNTER — APPOINTMENT (OUTPATIENT)
Dept: CT IMAGING | Facility: HOSPITAL | Age: 68
DRG: 377 | End: 2018-05-31
Attending: HOSPITALIST
Payer: MEDICARE

## 2018-05-31 LAB
BUN BLD-MCNC: 38 MG/DL (ref 8–20)
CALCIUM BLD-MCNC: 8.5 MG/DL (ref 8.3–10.3)
CHLORIDE: 99 MMOL/L (ref 101–111)
CO2: 28 MMOL/L (ref 22–32)
CREAT BLD-MCNC: 1.01 MG/DL (ref 0.55–1.02)
GLUCOSE BLD-MCNC: 119 MG/DL (ref 70–99)
GLUCOSE BLD-MCNC: 134 MG/DL (ref 65–99)
GLUCOSE BLD-MCNC: 157 MG/DL (ref 65–99)
GLUCOSE BLD-MCNC: 201 MG/DL (ref 65–99)
GLUCOSE BLD-MCNC: 202 MG/DL (ref 65–99)
HAV IGM SER QL: 2 MG/DL (ref 1.8–2.5)
POTASSIUM SERPL-SCNC: 3.2 MMOL/L (ref 3.6–5.1)
POTASSIUM SERPL-SCNC: 4.6 MMOL/L (ref 3.6–5.1)
SODIUM SERPL-SCNC: 137 MMOL/L (ref 136–144)

## 2018-05-31 PROCEDURE — 71275 CT ANGIOGRAPHY CHEST: CPT | Performed by: HOSPITALIST

## 2018-05-31 PROCEDURE — 99233 SBSQ HOSP IP/OBS HIGH 50: CPT | Performed by: HOSPITALIST

## 2018-05-31 PROCEDURE — 71045 X-RAY EXAM CHEST 1 VIEW: CPT | Performed by: INTERNAL MEDICINE

## 2018-05-31 PROCEDURE — 99233 SBSQ HOSP IP/OBS HIGH 50: CPT | Performed by: INTERNAL MEDICINE

## 2018-05-31 RX ORDER — POTASSIUM CHLORIDE 20 MEQ/1
40 TABLET, EXTENDED RELEASE ORAL EVERY 4 HOURS
Status: DISPENSED | OUTPATIENT
Start: 2018-05-31 | End: 2018-05-31

## 2018-05-31 RX ORDER — PREDNISONE 20 MG/1
40 TABLET ORAL
Status: COMPLETED | OUTPATIENT
Start: 2018-05-31 | End: 2018-06-04

## 2018-05-31 RX ORDER — ARIPIPRAZOLE 15 MG/1
40 TABLET ORAL ONCE
Status: COMPLETED | OUTPATIENT
Start: 2018-05-31 | End: 2018-05-31

## 2018-05-31 RX ORDER — LABETALOL 200 MG/1
400 TABLET, FILM COATED ORAL EVERY 12 HOURS SCHEDULED
Status: DISCONTINUED | OUTPATIENT
Start: 2018-05-31 | End: 2018-06-05

## 2018-05-31 NOTE — PROGRESS NOTES
[facility]  Progress Note     Harrison Zelaya Patient Status:  Inpatient    1950 MRN [id number]   [facility] 4SW-A Attending Yared Frias MD   [facility] Day # 6 PCP Jhony Navarrete MD     Chief Complaint: resp failure    S: Libia Downy insulin detemir  18 Units Subcutaneous Nightly   • insulin detemir  20 Units Subcutaneous Daily   • torsemide  20 mg Oral BID (Diuretic)   • enoxaparin  40 mg Subcutaneous Daily   • Pantoprazole Sodium  40 mg Oral BID AC   • Insulin Aspart Pen  1-68 Units QHS  2. I:C 1:15  3. ICF 1:20>140    Plan of care:   US legs  Nonocclusive thrombus within one of the paired proximal left peroneal veins.   CXr no change  k 3.2   D dimer 3.39   Eating  per notes   BIPAP overnoc   CTA  Lungs today     Labetalol inc 3

## 2018-05-31 NOTE — OCCUPATIONAL THERAPY NOTE
OCCUPATIONAL THERAPY TREATMENT NOTE - INPATIENT     Room Number: 8038/6781-Q  Session: 2   Number of Visits to Meet Established Goals: 8    Presenting Problem: N/V, hypoxia, hyperkalemia     History related to current admission: Pt is 75year old female ad sweats    • Peripheral vascular disease ([facility] 3/14    Carotid Artery Blockage TIA   • Shortness of breath    • Sputum production    • Stented coronary artery    • STROKE 2005    TIA   • Stroke syndrome ([facility] 12/2005    CVA no residual effects   • Type II o Putting on and taking off regular upper body clothing?: A Lot  -   Taking care of personal grooming such as brushing teeth?: A Lot  -   Eating meals?: A Little    AM-PAC Score:  Score: 10  Approx Degree of Impairment: 74.7%  Standardized Score (AM-PAC Sc from skilled OT services to maximize independence with ADLs. Continue to recommend AMY. Pt will benefit from skilled OT services to maximize independence with ADLs.        OT Discharge Recommendations: Sub-acute rehabilitation (ELOS 12-14 days)  OT Device

## 2018-05-31 NOTE — PHYSICAL THERAPY NOTE
Attempted to see Pt this AM - RN Verna aware of attempt. Pt is pending CTA for r/o PE - requesting PT HOLD until completion of testing. Will f/u later today if time permits, after all other patients are attempted per tentative schedule.

## 2018-05-31 NOTE — PROGRESS NOTES
[facility]  Nephrology Progress Note    Mely Pettit Attending:  Iban Moreno MD       Assessment and Plan:    1) TIP- agree this is due to a combination of mild volume depletion / recent diuresis / relative hypotension along with urinary reten 05/31/2018   BUN 38 05/31/2018    05/31/2018   K 3.2 05/31/2018   CL 99 05/31/2018   CO2 28.0 05/31/2018    05/31/2018   CA 8.5 05/31/2018   DDIMER 3.39 05/30/2018   MG 2.0 05/31/2018   PGLU 134 05/31/2018       Imaging:   All imaging studies r tablet Oral Q15 Min PRN   Or      dextrose 50 % injection 50 mL 50 mL Intravenous Q15 Min PRN   Or      glucose (DEX4) oral liquid 30 g 30 g Oral Q15 Min PRN   Or      Glucose-Vitamin C (DEX-4) 4-0.006 g chewable tab 8 tablet 8 tablet Oral Q15 Min PRN   on

## 2018-05-31 NOTE — PROGRESS NOTES
[facility]  Cardiology Progress Note    Subjective:  Does not feel breathing is yet back to baseline. Presently on 8L high-flow oxygen. Non productive cough noted.     Objective:  /52 (BP Location: Right arm)   Pulse 57   Temp 98.1 °F (36.7 °C) (asymptomatic)    Assessment:    Principal Problem:    TIP (acute kidney injury)  Active Problems:    Essential hypertension    Depression    High cholesterol    Cad    Hx of CABG '96 w/ redo '12    Uncontrolled diabetes mellitus    Non-intractable vomitin

## 2018-05-31 NOTE — PROGRESS NOTES
[facility]  Progress Note    Che Galeano Patient Status:  Inpatient    1950 MRN [id number]   [facility] 8NE-A Attending Randi Morelos MD   [address] Day # 6 PCP Steven Farr MD     Subjective:  Che Galeano is a 75 year WBC  9.3  8.0  6.4   PLT  188.0  186.0  251.0     Recent Labs   Lab  05/29/18   0527  05/30/18   0501  05/31/18   0439   GLU  84  107*  119*   BUN  38*  37*  38*   CREATSERUM  1.04*  1.17*  1.01   GFRAA  64  55*  66   GFRNAA  55*  48*  57*   CA  8.8  8.4 carries increased risk of bleeding with thoracentesis - she prefers 'a conservative approach'. Add short burst of steroid given wheezing - could be related to volume overload, but given plateau, will see if this helps.     Elisa Deal MD  SLA

## 2018-05-31 NOTE — PHYSICAL THERAPY NOTE
PHYSICAL THERAPY TREATMENT NOTE - INPATIENT    Room Number: 8867/8749-R     Session: 2  Number of Visits to Meet Established Goals: 5    Presenting Problem: intractable nausea with vomiting    Problem List  Principal Problem:    TIP (acute kidney injury) mi  No date: APPENDECTOMY  No date: BACK SURGERY  No date: BIOPSY OF SKIN LESION      Comment: hyperkeratosis  1996 and 2012: BYPASS SURGERY      Comment: cabg x 4  1996: CABG      Comment: x4  No date: CATH BARE METAL STENT (BMS)      Comment: R carotid/c Scale): 28.58   CMS Modifier (G-Code): CM    FUNCTIONAL ABILITY STATUS  Gait Assessment   Gait Assistance: Not tested           Stoop/Curb Assistance: Not tested       Skilled Therapy Provided:     Pt presented in supine upon PT arrival. Pt willing to part training;Neuromuscular re-educate;Range of motion;Strengthening;Transfer training;Balance training  Rehab Potential : Good  Frequency (Obs): 5x/week    CURRENT GOALS     Goal #1 Patient is able to demonstrate supine - sit EOB @ level: moderate assistance

## 2018-05-31 NOTE — PLAN OF CARE
Impaired Activities of Daily Living    • Achieve highest/safest level of independence in self care Not Progressing        Impaired Functional Mobility    • Achieve highest/safest level of mobility/gait Not Progressing        RESPIRATORY - ADULT    • Achiev

## 2018-06-01 ENCOUNTER — APPOINTMENT (OUTPATIENT)
Dept: GENERAL RADIOLOGY | Facility: HOSPITAL | Age: 68
DRG: 377 | End: 2018-06-01
Attending: INTERNAL MEDICINE
Payer: MEDICARE

## 2018-06-01 LAB
ALLENS TEST: POSITIVE
ARTERIAL BLD GAS O2 SATURATION: 96 % (ref 92–100)
ARTERIAL BLOOD GAS BASE EXCESS: 2.7
ARTERIAL BLOOD GAS HCO3: 26.5 MEQ/L (ref 22–26)
ARTERIAL BLOOD GAS PCO2: 37 MM HG (ref 35–45)
ARTERIAL BLOOD GAS PH: 7.47 (ref 7.35–7.45)
ARTERIAL BLOOD GAS PO2: 87 MM HG (ref 80–105)
BASOPHILS # BLD AUTO: 0.02 X10(3) UL (ref 0–0.1)
BASOPHILS NFR BLD AUTO: 0.3 %
BUN BLD-MCNC: 39 MG/DL (ref 8–20)
CALCIUM BLD-MCNC: 8.7 MG/DL (ref 8.3–10.3)
CALCULATED O2 SATURATION: 97 % (ref 92–100)
CARBOXYHEMOGLOBIN: 1.4 % SAT (ref 0–3)
CHLORIDE: 99 MMOL/L (ref 101–111)
CO2: 28 MMOL/L (ref 22–32)
CREAT BLD-MCNC: 1.09 MG/DL (ref 0.55–1.02)
EOSINOPHIL # BLD AUTO: 0 X10(3) UL (ref 0–0.3)
EOSINOPHIL NFR BLD AUTO: 0 %
ERYTHROCYTE [DISTWIDTH] IN BLOOD BY AUTOMATED COUNT: 16.4 % (ref 11.5–16)
FIO2: 45 %
GLUCOSE BLD-MCNC: 122 MG/DL (ref 65–99)
GLUCOSE BLD-MCNC: 136 MG/DL (ref 65–99)
GLUCOSE BLD-MCNC: 142 MG/DL (ref 70–99)
GLUCOSE BLD-MCNC: 172 MG/DL (ref 65–99)
GLUCOSE BLD-MCNC: 230 MG/DL (ref 65–99)
HCT VFR BLD AUTO: 24.1 % (ref 34–50)
HGB BLD-MCNC: 7.5 G/DL (ref 12–16)
IMMATURE GRANULOCYTE COUNT: 0.06 X10(3) UL (ref 0–1)
IMMATURE GRANULOCYTE RATIO %: 0.9 %
IONIZED CALCIUM: 1.17 MMOL/L (ref 1.12–1.32)
LACTIC ACID ARTERIAL: <1.3 MMOL/L (ref 0.5–2)
LYMPHOCYTES # BLD AUTO: 0.48 X10(3) UL (ref 0.9–4)
LYMPHOCYTES NFR BLD AUTO: 7.3 %
MCH RBC QN AUTO: 28.6 PG (ref 27–33.2)
MCHC RBC AUTO-ENTMCNC: 31.1 G/DL (ref 31–37)
MCV RBC AUTO: 92 FL (ref 81–100)
METHEMOGLOBIN: 0.5 % SAT (ref 0.4–1.5)
MONOCYTES # BLD AUTO: 0.41 X10(3) UL (ref 0.1–1)
MONOCYTES NFR BLD AUTO: 6.2 %
NEUTROPHIL ABS PRELIM: 5.6 X10 (3) UL (ref 1.3–6.7)
NEUTROPHILS # BLD AUTO: 5.6 X10(3) UL (ref 1.3–6.7)
NEUTROPHILS NFR BLD AUTO: 85.3 %
PATIENT TEMPERATURE: 98.5 F
PLATELET # BLD AUTO: 264 10(3)UL (ref 150–450)
POTASSIUM BLOOD GAS: 3.5 MMOL/L (ref 3.6–5.1)
POTASSIUM SERPL-SCNC: 3.8 MMOL/L (ref 3.6–5.1)
RBC # BLD AUTO: 2.62 X10(6)UL (ref 3.8–5.1)
RED CELL DISTRIBUTION WIDTH-SD: 54.5 FL (ref 35.1–46.3)
SODIUM BLOOD GAS: 139 MMOL/L (ref 136–144)
SODIUM SERPL-SCNC: 138 MMOL/L (ref 136–144)
TOTAL HEMOGLOBIN: 8.1 G/DL (ref 11.7–16)
WBC # BLD AUTO: 6.6 X10(3) UL (ref 4–13)

## 2018-06-01 PROCEDURE — 71045 X-RAY EXAM CHEST 1 VIEW: CPT | Performed by: INTERNAL MEDICINE

## 2018-06-01 PROCEDURE — 99233 SBSQ HOSP IP/OBS HIGH 50: CPT | Performed by: INTERNAL MEDICINE

## 2018-06-01 PROCEDURE — 99232 SBSQ HOSP IP/OBS MODERATE 35: CPT | Performed by: HOSPITALIST

## 2018-06-01 RX ORDER — TORSEMIDE 20 MG/1
40 TABLET ORAL
Status: DISCONTINUED | OUTPATIENT
Start: 2018-06-01 | End: 2018-06-05

## 2018-06-01 RX ORDER — ARIPIPRAZOLE 15 MG/1
40 TABLET ORAL ONCE
Status: COMPLETED | OUTPATIENT
Start: 2018-06-01 | End: 2018-06-01

## 2018-06-01 NOTE — PROGRESS NOTES
[facility]  Nephrology Progress Note    Daniele Raygoza Attending:  Jimmie Rodriguez MD       Assessment and Plan:    1) TIP- agree this is due to a combination of mild volume depletion / recent diuresis / relative hypotension along with urinary reten extremities  Skin: Warm and dry, no rashes       Labs:     Lab Results  Component Value Date   WBC 6.6 06/01/2018   HGB 7.5 06/01/2018   HCT 24.1 06/01/2018   .0 06/01/2018   CREATSERUM 1.09 06/01/2018   BUN 39 06/01/2018    06/01/2018   K 3.8 mg 100 mg Oral Daily   Umeclidinium Bromide (INCRUSE ELLIPTA) 62.5 MCG/INH inhaler 1 puff 1 puff Inhalation Daily   glucose (DEX4) oral liquid 15 g 15 g Oral Q15 Min PRN   Or      Glucose-Vitamin C (DEX-4) 4-0.006 g chewable tab 4 tablet 4 tablet Oral Q15

## 2018-06-01 NOTE — PROGRESS NOTES
Had anxiety and inc resp req with hypoxia. Placed on bipap. Diuretics increased. Wean back to HFNC later today if possible. Plan to monitor LE thrombus with serial US but no indication for full AC. Started on pred for bronchospasms.       Orpah Washington,

## 2018-06-01 NOTE — PHYSICAL THERAPY NOTE
Attempted to see Pt this AM - RN Sheridan aware of attempt. Pt with SOB and currently on BI-Pap. RN requesting PT/OT re-attempt later today. [phone]: second attempt to see Pt - Per RN Frances - Pt still on AVAP and with fast respiratory rate.   Not appropriate

## 2018-06-01 NOTE — OCCUPATIONAL THERAPY NOTE
Attempted to see pt this AM. Pt with SOB and currently on BI-Pap. Will re-attempt again as pt is able. RN aware.

## 2018-06-01 NOTE — PROGRESS NOTES
[facility]  Progress Note    Thea Floyd     Subjective:  No chest pain or shortness of breath.       Intake/Output:    Intake/Output Summary (Last 24 hours) at 06/01/18 1621  Last data filed at 06/01/18 0900   Gross per 24 hour   Intake tab 40 mg 40 mg Oral BID (Diuretic)   Labetalol HCl (NORMODYNE) tab 400 mg 400 mg Oral 2 times per day   predniSONE (DELTASONE) tab 40 mg 40 mg Oral Daily with breakfast   ipratropium-albuterol (DUONEB) nebulizer solution 3 mL 3 mL Nebulization Q4H WA (5 t (APRESOLINE) injection 10 mg 10 mg Intravenous Q4H PRN   ipratropium-albuterol (DUONEB) nebulizer solution 3 mL 3 mL Nebulization Q6H PRN       Assessment and Plan:  Principal Problem:    Respiratory insuff  Active Problems:    Essential hypertension    Anurag Tomlin

## 2018-06-01 NOTE — PLAN OF CARE
Diabetes/Glucose Control    • Glucose maintained within prescribed range Progressing        GASTROINTESTINAL - ADULT    • Minimal or absence of nausea and vomiting Progressing        HEMATOLOGIC - ADULT    • Maintains hematologic stability Progressing

## 2018-06-01 NOTE — PROGRESS NOTES
[facility]  Progress Note    Odalys Summers Patient Status:  Inpatient    1950 MRN [id number]   [facility] 8NE-A Attending Kendall Soares MD   [facility] Day # 12 PCP Mona Montaño MD     Subjective:  Odalys Summers is a 75 year 7. 7*  7.3*  7.5*   HCT  24.0*  23.1*  24.1*   MCV  90.9  90.9  92.0   MCH  29.2  28.7  28.6   MCHC  32.1  31.6  31.1   RDW  16.0  16.2*  16.4*   NEPRELIM  5.65  4.93  5.60   WBC  8.0  6.4  6.6   PLT  186.0  251.0  264.0     Recent Labs   Lab  05/30/18   05 frequent bronchodilators for airway clearance  Mobilize/Out of bed as tolerated  Aspiration precautions; keep HOB >30 degrees  Again discussed thoracenteses with patient to alleviate symptoms however she remains on plavix given recent stroke.   Discussed wi

## 2018-06-01 NOTE — PLAN OF CARE
Assumed care of patient at 1100. Patient a/ox4. Left arm is flaccid and left leg has minimal movement post recent CVA this year. Left facial droop which is also the patient's baseline prior to this admission. AVAPs until 1700.  Patient transitioned to 12L H

## 2018-06-01 NOTE — PROGRESS NOTES
[facility]  Progress Note     Sean Thomas Patient Status:  Inpatient    1950 MRN [id number]   [facility] 4SW-A Attending Benita Lion MD   Hosp Day # 15 PCP Deric Land MD     Chief Complaint: resp failure    S: Josh Dquue mg Oral BID (Diuretic)   • Labetalol HCl  400 mg Oral 2 times per day   • predniSONE  40 mg Oral Daily with breakfast   • ipratropium-albuterol  3 mL Nebulization Q4H WA (5 times daily)   • insulin detemir  18 Units Subcutaneous Nightly   • insulin detemir for HR control, Bp beter   11. Cerebrovascular disease s/p recent MCA CVA with residual left hemiparesis  1. ASA/statin/plavix  Neuro does not rec stopping plavix at this time   12. COPD  15. Hypokalemia   Replace/ monitor   14.  Left leg DVT- does not need

## 2018-06-02 ENCOUNTER — APPOINTMENT (OUTPATIENT)
Dept: GENERAL RADIOLOGY | Facility: HOSPITAL | Age: 68
DRG: 377 | End: 2018-06-02
Attending: INTERNAL MEDICINE
Payer: MEDICARE

## 2018-06-02 LAB
ALLENS TEST: POSITIVE
ARTERIAL BLD GAS O2 SATURATION: 98 % (ref 92–100)
ARTERIAL BLOOD GAS BASE EXCESS: 5.9
ARTERIAL BLOOD GAS HCO3: 29.5 MEQ/L (ref 22–26)
ARTERIAL BLOOD GAS PCO2: 39 MM HG (ref 35–45)
ARTERIAL BLOOD GAS PH: 7.5 (ref 7.35–7.45)
ARTERIAL BLOOD GAS PO2: 313 MM HG (ref 80–105)
BASOPHILS # BLD AUTO: 0.02 X10(3) UL (ref 0–0.1)
BASOPHILS NFR BLD AUTO: 0.2 %
BUN BLD-MCNC: 49 MG/DL (ref 8–20)
CALCIUM BLD-MCNC: 8.9 MG/DL (ref 8.3–10.3)
CALCULATED O2 SATURATION: 100 % (ref 92–100)
CARBOXYHEMOGLOBIN: 1.3 % SAT (ref 0–3)
CHLORIDE: 100 MMOL/L (ref 101–111)
CO2: 30 MMOL/L (ref 22–32)
CREAT BLD-MCNC: 1.22 MG/DL (ref 0.55–1.02)
EOSINOPHIL # BLD AUTO: 0.01 X10(3) UL (ref 0–0.3)
EOSINOPHIL NFR BLD AUTO: 0.1 %
ERYTHROCYTE [DISTWIDTH] IN BLOOD BY AUTOMATED COUNT: 16.5 % (ref 11.5–16)
GLUCOSE BLD-MCNC: 139 MG/DL (ref 65–99)
GLUCOSE BLD-MCNC: 191 MG/DL (ref 65–99)
GLUCOSE BLD-MCNC: 208 MG/DL (ref 65–99)
GLUCOSE BLD-MCNC: 80 MG/DL (ref 70–99)
GLUCOSE BLD-MCNC: 87 MG/DL (ref 65–99)
HCT VFR BLD AUTO: 24.5 % (ref 34–50)
HGB BLD-MCNC: 7.6 G/DL (ref 12–16)
IMMATURE GRANULOCYTE COUNT: 0.05 X10(3) UL (ref 0–1)
IMMATURE GRANULOCYTE RATIO %: 0.6 %
LYMPHOCYTES # BLD AUTO: 0.96 X10(3) UL (ref 0.9–4)
LYMPHOCYTES NFR BLD AUTO: 10.8 %
MCH RBC QN AUTO: 28.6 PG (ref 27–33.2)
MCHC RBC AUTO-ENTMCNC: 31 G/DL (ref 31–37)
MCV RBC AUTO: 92.1 FL (ref 81–100)
METHEMOGLOBIN: 0.4 % SAT (ref 0.4–1.5)
MONOCYTES # BLD AUTO: 0.82 X10(3) UL (ref 0.1–1)
MONOCYTES NFR BLD AUTO: 9.2 %
NEUTROPHIL ABS PRELIM: 7.06 X10 (3) UL (ref 1.3–6.7)
NEUTROPHILS # BLD AUTO: 7.06 X10(3) UL (ref 1.3–6.7)
NEUTROPHILS NFR BLD AUTO: 79.1 %
PATIENT TEMPERATURE: 98.6 F
PLATELET # BLD AUTO: 291 10(3)UL (ref 150–450)
POTASSIUM SERPL-SCNC: 3.5 MMOL/L (ref 3.6–5.1)
POTASSIUM SERPL-SCNC: 3.5 MMOL/L (ref 3.6–5.1)
POTASSIUM SERPL-SCNC: 3.9 MMOL/L (ref 3.6–5.1)
RBC # BLD AUTO: 2.66 X10(6)UL (ref 3.8–5.1)
RED CELL DISTRIBUTION WIDTH-SD: 55.4 FL (ref 35.1–46.3)
SODIUM SERPL-SCNC: 139 MMOL/L (ref 136–144)
TOTAL HEMOGLOBIN: 7.9 G/DL (ref 11.7–16)
WBC # BLD AUTO: 8.9 X10(3) UL (ref 4–13)

## 2018-06-02 PROCEDURE — 99232 SBSQ HOSP IP/OBS MODERATE 35: CPT | Performed by: INTERNAL MEDICINE

## 2018-06-02 PROCEDURE — 71045 X-RAY EXAM CHEST 1 VIEW: CPT | Performed by: INTERNAL MEDICINE

## 2018-06-02 PROCEDURE — 99232 SBSQ HOSP IP/OBS MODERATE 35: CPT | Performed by: HOSPITALIST

## 2018-06-02 RX ORDER — POTASSIUM CHLORIDE 20 MEQ/1
40 TABLET, EXTENDED RELEASE ORAL EVERY 4 HOURS
Status: COMPLETED | OUTPATIENT
Start: 2018-06-02 | End: 2018-06-02

## 2018-06-02 RX ORDER — FUROSEMIDE 10 MG/ML
80 INJECTION INTRAMUSCULAR; INTRAVENOUS ONCE
Status: COMPLETED | OUTPATIENT
Start: 2018-06-02 | End: 2018-06-02

## 2018-06-02 NOTE — PROGRESS NOTES
[facility]  Nephrology Progress Note    Anuel Harrell Patient Status:  Inpatient    1950 MRN [id number]   [facility] 8NE-A Attending Payton Huffman MD   Hosp Day # 15 PCP Irene Tsang MD       SUBJECTIVE:  Awake and alert o normal, no murmur or rub  Lungs: Clear without wheezes, rales, rhonchi. Abdomen: Soft, non-tender. + bowel sounds, PEG noted  Extremities: Without clubbing, cyanosis or edema.   Neurologic: L arm flaccid, occ movement L leg  Skin: Warm and dry, no rash (LEVEMIR) 100 UNIT/ML flextouch 20 Units 20 Units Subcutaneous Daily   Enoxaparin Sodium (LOVENOX) 40 MG/0.4ML injection 40 mg 40 mg Subcutaneous Daily   Pantoprazole Sodium (PROTONIX) EC tab 40 mg 40 mg Oral BID AC   Insulin Aspart Pen (NOVOLOG) 100 UNIT/ HTN- BP stable and much better overall. Cont current meds- amlodipine/catapres/hydralazine/labetalol    #3. resp failure- stable    #4. Recent stroke- L hemiplegia    Questions/concerns were discussed with patient and/or family by bedside.           Radha Cisneros

## 2018-06-02 NOTE — PROGRESS NOTES
[facility]  Progress Note     Clay Welsh Patient Status:  Inpatient    1950 MRN [id number]   [facility] 8NE-A Attending Dominga Guajardo MD   [address] Day # 15 PCP Raghu Fonseca MD     Chief Complaint: resp failure    S: Bel Randle ipratropium-albuterol  3 mL Nebulization Q4H WA (5 times daily)   • insulin detemir  18 Units Subcutaneous Nightly   • insulin detemir  20 Units Subcutaneous Daily   • enoxaparin  40 mg Subcutaneous Daily   • Pantoprazole Sodium  40 mg Oral BID AC   • Insu hemiparesis  1. ASA/statin/plavix  Neuro does not rec stopping plavix at this time given recent coiling  13. COPD  15. Hypokalemia   Replace/ monitor   15. Left leg DVT- does not need full AC at this time.    16. DM2  1. levemir 20 QD/ 18 QHS  2. I:C 1:15

## 2018-06-02 NOTE — PROGRESS NOTES
[facility]  Progress Note    Claudetta Freud Patient Status:  Inpatient    1950 MRN [id number]   [facility] 8NE-A Attending Patrecia Cooks, MD   [facility] Day # 15 PCP Aurora Rodriguez MD     Subjective:  Claudetta Freud is a(n) 75 y 28.6   MCHC  31.6  31.1  31.0   RDW  16.2*  16.4*  16.5*   NEPRELIM  4.93  5.60  7.06*   WBC  6.4  6.6  8.9   PLT  251.0  264.0  291.0     Recent Labs   Lab  05/31/18   0439  05/31/18   2108  06/01/18   0452  06/02/18   0446   GLU  119*   --   142*  80   B due to pulm edema)  Echo bubble study and 100% O2 shunt study ordered in light of her marked oxygen requirements.     Nayan Block SR.  6/2/2018  1:07 PM

## 2018-06-03 ENCOUNTER — APPOINTMENT (OUTPATIENT)
Dept: CV DIAGNOSTICS | Facility: HOSPITAL | Age: 68
DRG: 377 | End: 2018-06-03
Attending: INTERNAL MEDICINE
Payer: MEDICARE

## 2018-06-03 ENCOUNTER — APPOINTMENT (OUTPATIENT)
Dept: GENERAL RADIOLOGY | Facility: HOSPITAL | Age: 68
DRG: 377 | End: 2018-06-03
Attending: INTERNAL MEDICINE
Payer: MEDICARE

## 2018-06-03 LAB
BUN BLD-MCNC: 51 MG/DL (ref 8–20)
CALCIUM BLD-MCNC: 9 MG/DL (ref 8.3–10.3)
CHLORIDE: 99 MMOL/L (ref 101–111)
CO2: 31 MMOL/L (ref 22–32)
CREAT BLD-MCNC: 1.25 MG/DL (ref 0.55–1.02)
GLUCOSE BLD-MCNC: 120 MG/DL (ref 70–99)
GLUCOSE BLD-MCNC: 143 MG/DL (ref 65–99)
GLUCOSE BLD-MCNC: 152 MG/DL (ref 65–99)
GLUCOSE BLD-MCNC: 191 MG/DL (ref 65–99)
GLUCOSE BLD-MCNC: 196 MG/DL (ref 65–99)
POTASSIUM SERPL-SCNC: 3.2 MMOL/L (ref 3.6–5.1)
SODIUM SERPL-SCNC: 139 MMOL/L (ref 136–144)

## 2018-06-03 PROCEDURE — 99232 SBSQ HOSP IP/OBS MODERATE 35: CPT | Performed by: INTERNAL MEDICINE

## 2018-06-03 PROCEDURE — 99232 SBSQ HOSP IP/OBS MODERATE 35: CPT | Performed by: HOSPITALIST

## 2018-06-03 PROCEDURE — 93308 TTE F-UP OR LMTD: CPT | Performed by: INTERNAL MEDICINE

## 2018-06-03 PROCEDURE — 71045 X-RAY EXAM CHEST 1 VIEW: CPT | Performed by: INTERNAL MEDICINE

## 2018-06-03 RX ORDER — POTASSIUM CHLORIDE 20 MEQ/1
40 TABLET, EXTENDED RELEASE ORAL EVERY 4 HOURS
Status: COMPLETED | OUTPATIENT
Start: 2018-06-03 | End: 2018-06-03

## 2018-06-03 NOTE — PROGRESS NOTES
Diabetes/Glucose Control     • Glucose maintained within prescribed range Progressing           GASTROINTESTINAL - ADULT     • Minimal or absence of nausea and vomiting Progressing           HEMATOLOGIC - ADULT     • Maintains hematologic stability Progres

## 2018-06-03 NOTE — PHYSICAL THERAPY NOTE
PHYSICAL THERAPY TREATMENT NOTE - INPATIENT    Room Number: 7245/5103-Y     Session: 3  Number of Visits to Meet Established Goals: 3    Presenting Problem: intractable nausea with vomiting    Problem List  Principal Problem:    TIP (acute kidney injury) mi  No date: APPENDECTOMY  No date: BACK SURGERY  No date: BIOPSY OF SKIN LESION      Comment: hyperkeratosis  1996 and 2012: BYPASS SURGERY      Comment: cabg x 4  1996: CABG      Comment: x4  No date: CATH BARE METAL STENT (BMS)      Comment: R carotid/c Score (AM-PAC Scale): 30.55   CMS Modifier (G-Code): CM    FUNCTIONAL ABILITY STATUS  Gait Assessment   Gait Assistance: Not tested           Stoop/Curb Assistance: Not tested       Skilled Therapy Provided:  Pt in bed with HOB elevated upon PT arrival, ea will benefit from continued IPPT so that the patient may achieve highest functional independence/return to baseline. DC recommendation remains AMY.      DISCHARGE RECOMMENDATIONS  PT Discharge Recommendations: Sub-acute rehabilitation (ELOS 12-14 days)

## 2018-06-03 NOTE — PROGRESS NOTES
[facility]  Progress Note     Erica Darnell Patient Status:  Inpatient    1950 MRN [id number]   [facility] 8NE-A Attending Roswell Sicard, MD   [facility] Day # 15 PCP Regi Jauregui MD     Chief Complaint: resp failure    S: Dunia Mora WA (5 times daily)   • insulin detemir  18 Units Subcutaneous Nightly   • insulin detemir  20 Units Subcutaneous Daily   • enoxaparin  40 mg Subcutaneous Daily   • Pantoprazole Sodium  40 mg Oral BID AC   • Insulin Aspart Pen  1-68 Units Subcutaneous TID C 1:20>140       Plan of care: improving today. Wean O2 as able.     Quality:  · DVT Prophylaxis: lovenox  · CODE status: full  · Han: no  · Central line: no    Estimated date of discharge: TBD  Discharge is dependent on: clinical stability  At this point

## 2018-06-03 NOTE — PROGRESS NOTES
[facility]  Progress Note    Mely Pettit Patient Status:  Inpatient    1950 MRN [id number]   [facility] 8NE-A Attending Mirian Staley MD   Hosp Day # 15 PCP Franc Beebe MD     Subjective:  States she is feeling a lit nausea    Non-intractable vomiting with nausea, unspecified vomiting type    Hyperkalemia    Hypoxia    Acute on chronic diastolic heart failure (HCC)    Urinary retention    Dehydration    Hypotension    Elevated troponin      Continue supportive medical

## 2018-06-03 NOTE — PROGRESS NOTES
[facility]  Nephrology Progress Note    Ellen May Patient Status:  Inpatient    1950 MRN [id number]   [facility] 8NE-A Attending Claire Oliva MD   Hosp Day # 15 PCP Ambar Garcia MD       SUBJECTIVE:  Looks good this A thyromegaly  Cardiac: Regular rate and rhythm, S1, S2 normal, no murmur or rub  Lungs: Clear without wheezes, rales, rhonchi. Abdomen: Soft, non-tender. + bowel sounds, PEG noted  Extremities: Without clubbing, cyanosis or edema.   Neurologic: L arm flac 100 UNIT/ML flextouch 18 Units 18 Units Subcutaneous Nightly   insulin detemir (LEVEMIR) 100 UNIT/ML flextouch 20 Units 20 Units Subcutaneous Daily   Enoxaparin Sodium (LOVENOX) 40 MG/0.4ML injection 40 mg 40 mg Subcutaneous Daily   Pantoprazole Sodium (DC diuresis and may need to tolerate higher creat to maintain volume status    #2. HTN- BP stable and much better overall. Cont current meds- amlodipine/catapres/hydralazine/labetalol    #3. resp failure- stable    #4. Recent stroke- L hemiplegia    #5.  Hyp

## 2018-06-03 NOTE — PROGRESS NOTES
[facility]  Progress Note    Anuel Harrell Patient Status:  Inpatient    1950 MRN [id number]   [facility] 8NE-A Attending Payton Huffman MD   [facility] Day # 15 PCP Irene Tsang MD     Subjective:  Anuel Harrell is a(n) 75 y MCV  90.9  92.0  92.1   MCH  28.7  28.6  28.6   MCHC  31.6  31.1  31.0   RDW  16.2*  16.4*  16.5*   NEPRELIM  4.93  5.60  7.06*   WBC  6.4  6.6  8.9   PLT  251.0  264.0  291.0     Recent Labs   Lab  06/01/18   0452  06/02/18   0446  06/02/18   1903  06/0 with continued goal of at least net 1000mL negative; monitor weights, I/Os and electrolytes  Wean o2 for sats >89%; NIPPV as needed  Continue Chest PT and frequent bronchodilators for airway clearance  Mobilize/Out of bed as tolerated  Aspiration precautio

## 2018-06-04 ENCOUNTER — APPOINTMENT (OUTPATIENT)
Dept: GENERAL RADIOLOGY | Facility: HOSPITAL | Age: 68
DRG: 377 | End: 2018-06-04
Attending: INTERNAL MEDICINE
Payer: MEDICARE

## 2018-06-04 LAB
BUN BLD-MCNC: 53 MG/DL (ref 8–20)
CALCIUM BLD-MCNC: 8.9 MG/DL (ref 8.3–10.3)
CHLORIDE: 97 MMOL/L (ref 101–111)
CO2: 32 MMOL/L (ref 22–32)
CREAT BLD-MCNC: 1.26 MG/DL (ref 0.55–1.02)
ERYTHROCYTE [DISTWIDTH] IN BLOOD BY AUTOMATED COUNT: 16.3 % (ref 11.5–16)
GLUCOSE BLD-MCNC: 103 MG/DL (ref 65–99)
GLUCOSE BLD-MCNC: 141 MG/DL (ref 65–99)
GLUCOSE BLD-MCNC: 277 MG/DL (ref 65–99)
GLUCOSE BLD-MCNC: 324 MG/DL (ref 65–99)
GLUCOSE BLD-MCNC: 94 MG/DL (ref 70–99)
HCT VFR BLD AUTO: 27.7 % (ref 34–50)
HGB BLD-MCNC: 8.8 G/DL (ref 12–16)
MCH RBC QN AUTO: 29 PG (ref 27–33.2)
MCHC RBC AUTO-ENTMCNC: 31.8 G/DL (ref 31–37)
MCV RBC AUTO: 91.4 FL (ref 81–100)
PLATELET # BLD AUTO: 327 10(3)UL (ref 150–450)
POTASSIUM SERPL-SCNC: 3.4 MMOL/L (ref 3.6–5.1)
POTASSIUM SERPL-SCNC: 4.6 MMOL/L (ref 3.6–5.1)
RBC # BLD AUTO: 3.03 X10(6)UL (ref 3.8–5.1)
RED CELL DISTRIBUTION WIDTH-SD: 54.2 FL (ref 35.1–46.3)
SODIUM SERPL-SCNC: 137 MMOL/L (ref 136–144)
WBC # BLD AUTO: 12.5 X10(3) UL (ref 4–13)

## 2018-06-04 PROCEDURE — 71045 X-RAY EXAM CHEST 1 VIEW: CPT | Performed by: INTERNAL MEDICINE

## 2018-06-04 PROCEDURE — 99233 SBSQ HOSP IP/OBS HIGH 50: CPT | Performed by: INTERNAL MEDICINE

## 2018-06-04 PROCEDURE — 99232 SBSQ HOSP IP/OBS MODERATE 35: CPT | Performed by: HOSPITALIST

## 2018-06-04 RX ORDER — POLYETHYLENE GLYCOL 3350 17 G/17G
17 POWDER, FOR SOLUTION ORAL DAILY
Status: DISCONTINUED | OUTPATIENT
Start: 2018-06-04 | End: 2018-06-05

## 2018-06-04 RX ORDER — ARIPIPRAZOLE 15 MG/1
40 TABLET ORAL EVERY 4 HOURS
Status: COMPLETED | OUTPATIENT
Start: 2018-06-04 | End: 2018-06-04

## 2018-06-04 NOTE — PROGRESS NOTES
[facility]  Progress Note     Lenore Brown Patient Status:  Inpatient    1950 MRN [id number]   [facility] 8NE-A Attending Kaykay Jack MD   Hosp Day # 13 PCP Nahed Dorsey MD     Chief Complaint: resp failure    S: Cassius Echeverria Oral Daily with breakfast   • ipratropium-albuterol  3 mL Nebulization Q4H WA (5 times daily)   • insulin detemir  18 Units Subcutaneous Nightly   • insulin detemir  20 Units Subcutaneous Daily   • enoxaparin  40 mg Subcutaneous Daily   • Pantoprazole Sodi hydralazine  12. Cerebrovascular disease s/p recent MCA CVA with residual left hemiparesis, right carotid stent on 4/27  1. ASA/statin/plavix  13. COPD  15. Hypokalemia   15. Left leg DVT in peroneal  1. Serial exams  16. DM2  1. levemir 20 QD/ 18 QHS  2.

## 2018-06-04 NOTE — PROGRESS NOTES
[facility]  Progress Note    Che Galeano Patient Status:  Inpatient    1950 MRN [id number]   [facility] 8NE-A Attending Randi Morelos MD   Hosp Day # 13 PCP Steven Farr MD   Assessment:  1.  Acute hypoxic respiratory (congestive heart failure) (HCC)     COPD (chronic obstructive pulmonary disease) (HCC)     Uncontrolled diabetes mellitus (HCC)     S/P carotid endarterectomy     History of carotid endarterectomy     Aortic sclerosis     Hyperlipidemia associated with ty kg)      Physical Exam:  General: alert, oriented, no apparent distress  Heart: Regular rate and rhythm, normal S1S2  Lungs:  diminishd   Abdomen: soft, nontender, no guarding, no rebound, positive BS  Extremity: no lower extremity edema, no cyanosis  Neur Units 18 Units Subcutaneous Nightly   insulin detemir (LEVEMIR) 100 UNIT/ML flextouch 20 Units 20 Units Subcutaneous Daily   Enoxaparin Sodium (LOVENOX) 40 MG/0.4ML injection 40 mg 40 mg Subcutaneous Daily   Pantoprazole Sodium (PROTONIX) EC tab 40 mg 40 m

## 2018-06-04 NOTE — CM/SW NOTE
Spoke with [facility] card regarding acute rehab evaluation.   Order obtained; referral sent via [facility]; message also left for nurse liaison hoa

## 2018-06-04 NOTE — DIETARY NOTE
[facility]     NUTRITION FOLLOW UP ASSESSMENT     Pt is at moderate nutrition risk.  Pt does not meet malnutrition criteria.     NUTRITION DIAGNOSIS/PROBLEM:     Inadequate energy intake related to inability to consume sufficient energy as evidenced by cup qd.     1120- 75year old female with recent stroke, left hemiplegia, cad/PVD/DM2/HTN/COPD who was discharged to rehab then presented with nausea and vomiting and ground coffee emesis. Now with possible CGE including PUD, gastritis/esohpagitis, AVM, sm

## 2018-06-04 NOTE — PHYSICAL THERAPY NOTE
PHYSICAL THERAPY TREATMENT NOTE - INPATIENT    Room Number: 7803/1456-H     Session: 4  Number of Visits to Meet Established Goals: 6    Presenting Problem: intractable nausea with vomiting     History related to current admission: Pt is 75year old femal Stroke syndrome ([facility] 12/2005    CVA no residual effects   • Type II or unspecified type diabetes mellitus without mention of complication, not stated as uncontrolled    • Uncontrolled diabetes mellitus ([facility] 3/19/2014   • Unspecified essential hypertensio wheelchair, bedside commode, etc.): Unable   -   Moving from lying on back to sitting on the side of the bed?: A Lot   How much help from another person does the patient currently need. ..   -   Moving to and from a bed to a chair (including a wheelchair)?: continues to exhibit impaired LEFT sensation, proprioception, force generating capacity and motor control. The pt exhibits impaired static and dynamic sitting and standing balance, impaired cardiopulmonary endurance, and muscular endurance.   The pt contin

## 2018-06-04 NOTE — PROGRESS NOTES
[facility]  Cardiology Progress Note    Subjective:  Slowly improving. Presently on 4Liters/nc.     Objective:  /63 (BP Location: Right arm)   Pulse 53   Temp 98.3 °F (36.8 °C) (Oral)   Resp 18   Wt 162 lb 0.6 oz (73.5 kg)   SpO2 95%   BMI 26.96 emesis- resolved  · CVA with residual L weakness, bed bound  · CAD s/p CABG , troponin peak 3.2, plan for medical management at this time  · Acute on Chronic systolic CHF (EF ~90-20%) - net volume losses ~8Liters.     · PAF- continues in  NSR on amiodarone

## 2018-06-04 NOTE — PLAN OF CARE
GASTROINTESTINAL - ADULT    • Minimal or absence of nausea and vomiting Progressing        RESPIRATORY - ADULT    • Achieves optimal ventilation and oxygenation Progressing        NSR on telemetry. VSS. No c/o cardiac symptoms.     Received pt on 4L O2 pe

## 2018-06-04 NOTE — CM/SW NOTE
Spoke with nurse liaison keon from [facility]--can accept patient back on bipap, not AVAPS. The only [facility] able to accept AVAPS is the [place] and [place] facilities--[facility] card updated.   [facility] updates sent

## 2018-06-04 NOTE — PROGRESS NOTES
[facility]  Nephrology Progress Note    Michael Burnett Attending:  Maria Eugenia Cueto MD       Assessment and Plan:    1) TIP- agree this is due to a combination of mild volume depletion / recent diuresis / relative hypotension along with urinary reten Value Date   WBC 12.5 06/04/2018   HGB 8.8 06/04/2018   HCT 27.7 06/04/2018   .0 06/04/2018   CREATSERUM 1.26 06/04/2018   BUN 53 06/04/2018    06/04/2018   K 3.4 06/04/2018   CL 97 06/04/2018   CO2 32.0 06/04/2018   GLU 94 06/04/2018   CA 8.9 g Oral Q15 Min PRN   Or      Glucose-Vitamin C (DEX-4) 4-0.006 g chewable tab 4 tablet 4 tablet Oral Q15 Min PRN   Or      dextrose 50 % injection 50 mL 50 mL Intravenous Q15 Min PRN   Or      glucose (DEX4) oral liquid 30 g 30 g Oral Q15 Min PRN   Or

## 2018-06-05 ENCOUNTER — APPOINTMENT (OUTPATIENT)
Dept: GENERAL RADIOLOGY | Facility: HOSPITAL | Age: 68
DRG: 377 | End: 2018-06-05
Attending: INTERNAL MEDICINE
Payer: MEDICARE

## 2018-06-05 ENCOUNTER — PRIOR ORIGINAL RECORDS (OUTPATIENT)
Dept: OTHER | Age: 68
End: 2018-06-05

## 2018-06-05 VITALS
TEMPERATURE: 98 F | WEIGHT: 160.94 LBS | HEART RATE: 56 BPM | DIASTOLIC BLOOD PRESSURE: 52 MMHG | OXYGEN SATURATION: 95 % | BODY MASS INDEX: 27 KG/M2 | RESPIRATION RATE: 16 BRPM | SYSTOLIC BLOOD PRESSURE: 141 MMHG

## 2018-06-05 LAB
ALLENS TEST: POSITIVE
ARTERIAL BLD GAS O2 SATURATION: 95 % (ref 92–100)
ARTERIAL BLOOD GAS BASE EXCESS: 8.1
ARTERIAL BLOOD GAS HCO3: 32.1 MEQ/L (ref 22–26)
ARTERIAL BLOOD GAS PCO2: 42 MM HG (ref 35–45)
ARTERIAL BLOOD GAS PH: 7.5 (ref 7.35–7.45)
ARTERIAL BLOOD GAS PO2: 76 MM HG (ref 80–105)
BUN BLD-MCNC: 55 MG/DL (ref 8–20)
CALCIUM BLD-MCNC: 9 MG/DL (ref 8.3–10.3)
CALCULATED O2 SATURATION: 97 % (ref 92–100)
CARBOXYHEMOGLOBIN: 1.5 % SAT (ref 0–3)
CHLORIDE: 96 MMOL/L (ref 101–111)
CO2: 31 MMOL/L (ref 22–32)
CREAT BLD-MCNC: 1.31 MG/DL (ref 0.55–1.02)
GLUCOSE BLD-MCNC: 122 MG/DL (ref 65–99)
GLUCOSE BLD-MCNC: 233 MG/DL (ref 65–99)
GLUCOSE BLD-MCNC: 90 MG/DL (ref 70–99)
L/M: 5 L/MIN
METHEMOGLOBIN: 0.5 % SAT (ref 0.4–1.5)
P/F RATIO: 369 MMHG
PATIENT TEMPERATURE: 98 F
POTASSIUM SERPL-SCNC: 3.2 MMOL/L (ref 3.6–5.1)
SODIUM SERPL-SCNC: 137 MMOL/L (ref 136–144)
TOTAL HEMOGLOBIN: 10.5 G/DL (ref 11.7–16)

## 2018-06-05 PROCEDURE — 71045 X-RAY EXAM CHEST 1 VIEW: CPT | Performed by: INTERNAL MEDICINE

## 2018-06-05 PROCEDURE — 99239 HOSP IP/OBS DSCHRG MGMT >30: CPT | Performed by: HOSPITALIST

## 2018-06-05 PROCEDURE — 99232 SBSQ HOSP IP/OBS MODERATE 35: CPT | Performed by: INTERNAL MEDICINE

## 2018-06-05 RX ORDER — ARIPIPRAZOLE 15 MG/1
40 TABLET ORAL EVERY 4 HOURS
Status: COMPLETED | OUTPATIENT
Start: 2018-06-05 | End: 2018-06-05

## 2018-06-05 RX ORDER — LABETALOL 200 MG/1
400 TABLET, FILM COATED ORAL EVERY 12 HOURS SCHEDULED
Qty: 60 TABLET | Refills: 5 | Status: SHIPPED | COMMUNITY
Start: 2018-06-05 | End: 2018-08-30

## 2018-06-05 RX ORDER — TORSEMIDE 20 MG/1
40 TABLET ORAL
Refills: 0 | Status: SHIPPED | COMMUNITY
Start: 2018-06-05 | End: 2018-08-30

## 2018-06-05 NOTE — PLAN OF CARE
Patient discharging to [facility]. Report called to SUKHJINDER Andino. IV's removed prior to discharge. Discharge paperwork sent with transport. Belongings collected and given to transport to take with patient.

## 2018-06-05 NOTE — PROGRESS NOTES
[facility]  Progress Note    Lucy Rowland Patient Status:  Inpatient    1950 MRN [id number]   [facility] 8NE-A Attending Destinee Neville MD   [facility] Day # 12 PCP Basia Verma MD     Assessment:  1.  Acute hypoxic respiratory f (chronic obstructive pulmonary disease) (HCC)     Uncontrolled diabetes mellitus (HCC)     S/P carotid endarterectomy     History of carotid endarterectomy     Aortic sclerosis     Hyperlipidemia associated with type 2 diabetes mellitus (HCC)     Vomiting cracles   Abdomen: soft, nontender, no guarding, no rebound, positive BS  Extremity: no lower extremity edema, no cyanosis  Neurological:, no new focal deficits    Lab Data Review:    Recent Labs   06/04/18  0500   WBC 12.5   HGB 8.8*   .0       Rec Enoxaparin Sodium (LOVENOX) 40 MG/0.4ML injection 40 mg 40 mg Subcutaneous Daily   Pantoprazole Sodium (PROTONIX) EC tab 40 mg 40 mg Oral BID AC   Insulin Aspart Pen (NOVOLOG) 100 UNIT/ML flexpen 1-68 Units 1-68 Units Subcutaneous TID CC   Insulin Aspart

## 2018-06-05 NOTE — PLAN OF CARE
6/4 2000: Pt received AOx4. 5L high flow NC. SR. RAC and RFA saline lock. GT clamped. Incontinent. SCD. Denies pain. 0200: Intermittently will find pt holding her nasal cannula and pt will desaturates to 80's. Instructed pt to keep O2 nasal cannula on.   0

## 2018-06-05 NOTE — OCCUPATIONAL THERAPY NOTE
OCCUPATIONAL THERAPY TREATMENT NOTE - INPATIENT     Room Number: 3426/6731-V  Session: 2   Number of Visits to Meet Established Goals: 8    Presenting Problem: Acute Kidney Infection     History related to current admission: Pt is 75year old female admitt History of depression    • Leaking of urine    • Night sweats    • Peripheral vascular disease ([facility] 3/14    Carotid Artery Blockage TIA   • Shortness of breath    • Sputum production    • Stented coronary artery    • STROKE 2005    TIA   • Stroke syndrome as brushing teeth?: A Lot  -   Eating meals?: A Little    AM-PAC Score:  Score: 10  Approx Degree of Impairment: 74.7%  Standardized Score (AM-PAC Scale): 27.31  CMS Modifier (G-Code): CL    FUNCTIONAL TRANSFER ASSESSMENT  Supine to Sit : Dependent assista will perform upper body bathing:  with mod assist     Functional Transfer Goals  Patient will transfer from sit to supine:  with max assist  Patient will transfer from supine to sit:  with max assist  Patient will transfer from sit to stand:  with max assi

## 2018-06-05 NOTE — PROGRESS NOTES
[facility]  Progress Note     Markus Leonardo Patient Status:  Inpatient    1950 MRN [id number]   [facility] [place] 8NE-A Attending Rosario Williamson MD   [facility] Day # 12 PCP Manish Herrera MD     Chief Complaint: resp failure    S: Tonia Lyman mL Nebulization Q4H WA (5 times daily)   • insulin detemir  18 Units Subcutaneous Nightly   • insulin detemir  20 Units Subcutaneous Daily   • enoxaparin  40 mg Subcutaneous Daily   • Pantoprazole Sodium  40 mg Oral BID AC   • Insulin Aspart Pen  1-68 Unit in peroneal  1. Serial exams  16. DM2  1. levemir 18  QD/ 18 QHS  2. I:C 1:15  3. ICF 1:20>140  4. BS liable        Plan of care:    Turned down for acute rehab again   On 5 liters NC overnoc  No BIPAP used   CXR P   Replace k   Need to reach [facility]- to discus

## 2018-06-05 NOTE — PHYSICAL THERAPY NOTE
PHYSICAL THERAPY TREATMENT NOTE - INPATIENT    Room Number: 4517/0528-K     Session: 5  Number of Visits to Meet Established Goals: 6    Presenting Problem: intractable nausea with vomiting     History related to current admission: Pt is 75year old femal Stroke syndrome ([facility] 12/2005    CVA no residual effects   • Type II or unspecified type diabetes mellitus without mention of complication, not stated as uncontrolled    • Uncontrolled diabetes mellitus ([facility] 3/19/2014   • Unspecified essential hypertensio the side of the bed?: A Lot   How much help from another person does the patient currently need. ..   -   Moving to and from a bed to a chair (including a wheelchair)?: Total   -   Need to walk in hospital room?: Total   -   Climbing 3-5 steps with a railin achieve highest functional independence/return to baseline.  Recommend AMY upon [facility] d/c.     DISCHARGE RECOMMENDATIONS  PT Discharge Recommendations: Sub-acute rehabilitation (ELOS 12-14 days)--Recommend physiatry evaluation as pt and family req

## 2018-06-05 NOTE — PROGRESS NOTES
[facility]  Nephrology Progress Note    Janett Bronson Attending:  Jimmy Sanford MD       Assessment and Plan:    1) TIP- agree this is due to a combination of mild volume depletion / recent diuresis / relative hypotension along with urinary reten Lab Results  Component Value Date   CREATSERUM 1.31 06/05/2018   BUN 55 06/05/2018    06/05/2018   K 3.2 06/05/2018   CL 96 06/05/2018   CO2 31.0 06/05/2018   GLU 90 06/05/2018   CA 9.0 06/05/2018   PGLU 277 06/04/2018       Imaging:   All imaging g Oral Q15 Min PRN   Or      Glucose-Vitamin C (DEX-4) 4-0.006 g chewable tab 4 tablet 4 tablet Oral Q15 Min PRN   Or      dextrose 50 % injection 50 mL 50 mL Intravenous Q15 Min PRN   Or      glucose (DEX4) oral liquid 30 g 30 g Oral Q15 Min PRN   Or

## 2018-06-05 NOTE — PROGRESS NOTES
[facility]  Cardiology Progress Note    Subjective:  Slowly better each day. Now back to near baseline oxygen requirements. No cp.  No sob at rest.     Objective:  /58 (BP Location: Right arm)   Pulse 56   Temp 98 °F (36.7 °C) (Oral)   Resp 18 SR    Assessment:    · Acute respiratory failure, continues with high O2 need  · Coffee ground emesis- resolved  · CVA with residual L weakness, bed bound. Sp stent right common and internal carotid in April.    · CAD s/p CABG , troponin peak 3.2, plan for

## 2018-06-05 NOTE — CM/SW NOTE
Patient cleared for return to [facility]. Updated liaison keon, able to accept patient back. [facility] with pcs form to pick patient up at 1500.   Nurse to call report to [phone] and send d/c paperwork

## 2018-06-05 NOTE — CONSULTS
.Lam Kolb Patient Status:  Inpatient    1950 MRN [id number]   [facility] 8NE-A Attending Cecilia Dash MD   [facility] Day # 13 PCP Yuri Bolden MD     Patient Identification  Marciano Pedroza is a 75 year ol Environment / Accessibility: 2-story house  Current Functional Status:  Total assistance for all mobility and ADL      Past Medical History:  @Medical hx@  Past Medical History:   Diagnosis Date   • Abdominal distention    • Anxiety    • Atherosclerosis of [COMPLETED] Potassium Chloride ER (K-DUR M20) CR tab 40 mEq 40 mEq Oral Q4H   [COMPLETED] Potassium Chloride ER (K-DUR M20) CR tab 40 mEq 40 mEq Oral Q4H   [COMPLETED] furosemide (LASIX) injection 80 mg 80 mg Intravenous Once   [COMPLETED] potassium chlo hydrALAzine HCl (APRESOLINE) tab 100 mg 100 mg Oral Q8H [address]   [COMPLETED] furosemide (LASIX) injection 40 mg 40 mg Intravenous Once   [COMPLETED] Sodium Polystyrene Sulfonate (KAYEXALATE) 15 GM/60ML suspension 15 g 15 g Oral Once   Metoclopramide HCl (RE Smokeless tobacco: Never Used    Alcohol use No    Comment: social       Family History   Problem Relation Age of Onset   • Acute MI [OTHER] Father    • Alzheimer's Disease [OTHER] Mother        Allergies:    Lipitor [Atorvastat*    MYALGIA    Comment:TABS Musculoskeletal:     Right Upper Extremity:  Strength is 5. ROM WNL. Left Upper Extremity:  Strength is 2 proximally and 0 distally. ROM WNL. Right Lower Extremity:  Strength  is 5. ROM WNL.    Left Lower Extremity: Strength  is hip flexor and knee subacute rehabilitation, working with PT/OT/SLP to upgrade present functional status, provide family training, assess home equipment and assistive device needs.   If she is too medically complex for subacute placement then consider long-term acute care for

## 2018-06-06 NOTE — DISCHARGE SUMMARY
[facility] HOSPITALIST  DISCHARGE SUMMARY     Anuel Harrell Patient Status:  Inpatient    1950 MRN [id number]   [facility] Attending No att. providers found   [address] Day # 12 PCP Irene Tsang MD     Date of Admission: 2018 rehab last night presented with nausea and vomiting and what sounds like possible ground coffee emesis. Patient is very drowsy and cannot provide any history. Daughter by bedside and is providing history .  Per daughter, pt was c/o feeling unwell to her shabana a drop in her left ventricular EF to 30–35% not stable for any invasive cardiac workup at present will be medically managed. Repeat echo showed no shunt. Patient diabetic has continued on insulin has been adjusted as needed based on her oral intake.   P normal.     Systolic function was mildly reduced. 2. Regional wall motion abnormality: Possible moderate hypokinesis of the     mid inferoseptal myocardium; possible mild hypokinesis of the mid     anterior myocardium.   3. Aortic valve: Trileaflet; mildly Instructions Prescription details   Insulin Aspart Pen 100 UNIT/ML Sopn  Commonly known as:  Jordyn Nelson  What changed:  · how much to take  · when to take this  · additional instructions      Inject 1-68 Units into the skin 3 (three) times daily with meal 4 patch  Refills:  5     Clopidogrel Bisulfate 75 MG Tabs  Commonly known as:  PLAVIX      Take 75 mg by mouth daily. Refills:  0     ezetimibe 10 MG Tabs  Commonly known as:  ZETIA      Take 1 tablet (10 mg total) by mouth nightly.    Quantity:  30 tabl [facility], [address] [place] [address] [place] [id number]    Go on 5/30/2018  for a follow-up with the GI nurse practitioner at 1:20 pm     Tabby Velasco MD  [address] [address]  [phone]    Schedule

## 2018-06-07 ENCOUNTER — SNF VISIT (OUTPATIENT)
Dept: INTERNAL MEDICINE CLINIC | Age: 68
End: 2018-06-07

## 2018-06-07 VITALS
DIASTOLIC BLOOD PRESSURE: 70 MMHG | TEMPERATURE: 99 F | OXYGEN SATURATION: 97 % | RESPIRATION RATE: 18 BRPM | SYSTOLIC BLOOD PRESSURE: 148 MMHG | HEART RATE: 63 BPM

## 2018-06-07 DIAGNOSIS — E11.8 UNCONTROLLED TYPE 2 DIABETES MELLITUS WITH COMPLICATION, WITHOUT LONG-TERM CURRENT USE OF INSULIN (HCC): ICD-10-CM

## 2018-06-07 DIAGNOSIS — E78.5 HYPERLIPIDEMIA ASSOCIATED WITH TYPE 2 DIABETES MELLITUS (HCC): ICD-10-CM

## 2018-06-07 DIAGNOSIS — Z95.1 S/P CABG (CORONARY ARTERY BYPASS GRAFT): ICD-10-CM

## 2018-06-07 DIAGNOSIS — F32.A ANXIETY AND DEPRESSION: ICD-10-CM

## 2018-06-07 DIAGNOSIS — J96.01 ACUTE RESPIRATORY FAILURE WITH HYPOXIA (HCC): ICD-10-CM

## 2018-06-07 DIAGNOSIS — E11.69 HYPERLIPIDEMIA ASSOCIATED WITH TYPE 2 DIABETES MELLITUS (HCC): ICD-10-CM

## 2018-06-07 DIAGNOSIS — I48.0 PAF (PAROXYSMAL ATRIAL FIBRILLATION) (HCC): ICD-10-CM

## 2018-06-07 DIAGNOSIS — J43.2 CENTRILOBULAR EMPHYSEMA (HCC): ICD-10-CM

## 2018-06-07 DIAGNOSIS — R53.1 WEAKNESS: ICD-10-CM

## 2018-06-07 DIAGNOSIS — F41.1 ANXIETY STATE: ICD-10-CM

## 2018-06-07 DIAGNOSIS — I63.031 CEREBROVASCULAR ACCIDENT (CVA) DUE TO THROMBOSIS OF RIGHT CAROTID ARTERY (HCC): ICD-10-CM

## 2018-06-07 DIAGNOSIS — I82.402 ACUTE DEEP VEIN THROMBOSIS (DVT) OF LEFT LOWER EXTREMITY, UNSPECIFIED VEIN (HCC): ICD-10-CM

## 2018-06-07 DIAGNOSIS — J90 PLEURAL EFFUSION ON LEFT: ICD-10-CM

## 2018-06-07 DIAGNOSIS — E11.65 UNCONTROLLED TYPE 2 DIABETES MELLITUS WITH COMPLICATION, WITHOUT LONG-TERM CURRENT USE OF INSULIN (HCC): ICD-10-CM

## 2018-06-07 DIAGNOSIS — I50.22 CHRONIC SYSTOLIC CONGESTIVE HEART FAILURE (HCC): ICD-10-CM

## 2018-06-07 DIAGNOSIS — F41.9 ANXIETY AND DEPRESSION: ICD-10-CM

## 2018-06-07 DIAGNOSIS — K29.00 OTHER ACUTE GASTRITIS WITHOUT HEMORRHAGE: Primary | ICD-10-CM

## 2018-06-07 DIAGNOSIS — N18.30 CKD (CHRONIC KIDNEY DISEASE) STAGE 3, GFR 30-59 ML/MIN (HCC): ICD-10-CM

## 2018-06-07 DIAGNOSIS — I10 ESSENTIAL HYPERTENSION WITH GOAL BLOOD PRESSURE LESS THAN 130/85: ICD-10-CM

## 2018-06-07 DIAGNOSIS — F32.0 MILD SINGLE CURRENT EPISODE OF MAJOR DEPRESSIVE DISORDER (HCC): ICD-10-CM

## 2018-06-07 PROCEDURE — 99310 SBSQ NF CARE HIGH MDM 45: CPT | Performed by: NURSE PRACTITIONER

## 2018-06-07 NOTE — PROGRESS NOTES
Mely Pettit  : 1950  Age 70year old  female patient is admitted to Facility: [facility] for AMY after stress gastritis/acute on chronic respiratory failure/LLE non-occlusive DVT.     [facility] Admit date:  18 for acute righ of admission to [facility] developed intractable n/v w/ coffee ground emesis and readmitted to hospital.  Diagnosed w/ stress gastritis and TIP.   Hospital course complicated by acute hypoxic respiratory failure and left peroneal venous non-occlusive thrombus but OF SKIN LESION      Comment: hyperkeratosis  1996 and 2012: BYPASS SURGERY      Comment: cabg x 4  1996: CABG      Comment: x4  No date: CATH BARE METAL STENT (BMS)      Comment: R carotid/coronary  1987: CHOLECYSTECTOMY  No date:  SPLY CAROTID STENT  19 tablet Rfl: 2   Sertraline HCl 100 MG Oral Tab Take 1 tablet (100 mg total) by mouth once daily. Disp: 30 tablet Rfl: 0   modafinil 100 MG Oral Tab Take 1 tablet (100 mg total) by mouth daily.  Disp: 1 tablet Rfl: 0   amiodarone HCl 200 MG Oral Tab 1 tablet denies nausea, vomiting, constipation, diarrhea; no rectal bleeding; no heartburn  :no dysuria, urgency or frequency; no vaginal discharge; no urinary incontinence; no hematuria  MUSCULOSKELETAL:no joint complaints upper or lower extremities  NEURO:no se VISIT:  6.6.18    Mg  2.0    Glucose  91  BUN  58  Cr  1.21  GFR  44  Na  12  K  4.0    WBC  11.41  Hgb  10.0  Hct  32.3    [employer], notes, lab and imaging results reviewed. Medication reconciliation completed.         SEE PLAN BELOW  Stress prn  2. F/U w/ Dr Martinez/nephrology after AMY    T2 DM  1. Accu checks QID; notify <70 or >350  2. Levemir 20u q AM and 18u q HS  3. Novolog 6u AC TID and SS AC and HS for correction (in hospital ICR 1:13 and ICF 1:20>140)  4.  Last A1C 12.9% on 4.26.18

## 2018-06-14 ENCOUNTER — SNF VISIT (OUTPATIENT)
Dept: INTERNAL MEDICINE CLINIC | Age: 68
End: 2018-06-14

## 2018-06-14 VITALS
WEIGHT: 152.81 LBS | SYSTOLIC BLOOD PRESSURE: 144 MMHG | RESPIRATION RATE: 18 BRPM | TEMPERATURE: 97 F | BODY MASS INDEX: 25 KG/M2 | HEART RATE: 60 BPM | DIASTOLIC BLOOD PRESSURE: 72 MMHG | OXYGEN SATURATION: 98 %

## 2018-06-14 DIAGNOSIS — E87.6 HYPOKALEMIA: ICD-10-CM

## 2018-06-14 DIAGNOSIS — R53.1 WEAKNESS: ICD-10-CM

## 2018-06-14 DIAGNOSIS — N18.30 CKD (CHRONIC KIDNEY DISEASE) STAGE 3, GFR 30-59 ML/MIN (HCC): ICD-10-CM

## 2018-06-14 DIAGNOSIS — K29.00 OTHER ACUTE GASTRITIS WITHOUT HEMORRHAGE: Primary | ICD-10-CM

## 2018-06-14 DIAGNOSIS — I63.031 CEREBROVASCULAR ACCIDENT (CVA) DUE TO THROMBOSIS OF RIGHT CAROTID ARTERY (HCC): ICD-10-CM

## 2018-06-14 DIAGNOSIS — J96.01 ACUTE RESPIRATORY FAILURE WITH HYPOXIA (HCC): ICD-10-CM

## 2018-06-14 PROCEDURE — 99309 SBSQ NF CARE MODERATE MDM 30: CPT | Performed by: NURSE PRACTITIONER

## 2018-06-14 RX ORDER — POTASSIUM CHLORIDE 20 MEQ/1
10 TABLET, EXTENDED RELEASE ORAL DAILY
COMMUNITY
End: 2018-08-30

## 2018-06-14 NOTE — PROGRESS NOTES
Daniele Raygoza, 105/1950, 70year old, female    Chief Complaint:  Patient presents with:   Follow - Up: stress gastritis/acute on chronic respiratory failure/LLE non-occlusive DVT  Weakness  Hyperglycemia  Abnormal Labs       Subjective:   PMH significa drainage from eyes  HENT: normocephalic; normal nose, no nasal drainage, mucous membranes pink, moist, pharynx no exudate, no visible cerumen.  +left facial droop  NECK: supple; FROM; no JVD, no TMG, no carotid bruits  BREAST: ---deferred  RESPIRATORY: rale to the point that she could transition to acute rehab at [facility] to continue rehab after AMY; EMILIANA to assist w/ DC plans  10. F/U w/ Dr Riki Jack on 6.26.18     Left pleural effusion  1. Repeat CXR in one wk to f/u     Nicotine dependence  1.

## 2018-06-19 ENCOUNTER — SNF VISIT (OUTPATIENT)
Dept: INTERNAL MEDICINE CLINIC | Age: 68
End: 2018-06-19

## 2018-06-19 VITALS
DIASTOLIC BLOOD PRESSURE: 56 MMHG | OXYGEN SATURATION: 93 % | HEART RATE: 53 BPM | SYSTOLIC BLOOD PRESSURE: 146 MMHG | TEMPERATURE: 98 F | RESPIRATION RATE: 18 BRPM | WEIGHT: 148.81 LBS | BODY MASS INDEX: 25 KG/M2

## 2018-06-19 DIAGNOSIS — J96.01 ACUTE RESPIRATORY FAILURE WITH HYPOXIA (HCC): ICD-10-CM

## 2018-06-19 DIAGNOSIS — E87.6 HYPOKALEMIA: ICD-10-CM

## 2018-06-19 DIAGNOSIS — N18.30 CKD (CHRONIC KIDNEY DISEASE) STAGE 3, GFR 30-59 ML/MIN (HCC): ICD-10-CM

## 2018-06-19 DIAGNOSIS — J90 PLEURAL EFFUSION ON LEFT: ICD-10-CM

## 2018-06-19 DIAGNOSIS — I63.031 CEREBROVASCULAR ACCIDENT (CVA) DUE TO THROMBOSIS OF RIGHT CAROTID ARTERY (HCC): ICD-10-CM

## 2018-06-19 DIAGNOSIS — K59.01 SLOW TRANSIT CONSTIPATION: ICD-10-CM

## 2018-06-19 DIAGNOSIS — K29.00 OTHER ACUTE GASTRITIS WITHOUT HEMORRHAGE: Primary | ICD-10-CM

## 2018-06-19 PROCEDURE — 99309 SBSQ NF CARE MODERATE MDM 30: CPT | Performed by: NURSE PRACTITIONER

## 2018-06-19 RX ORDER — POLYETHYLENE GLYCOL 3350 17 G/17G
17 POWDER, FOR SOLUTION ORAL DAILY PRN
COMMUNITY
End: 2018-08-20 | Stop reason: ALTCHOICE

## 2018-06-19 RX ORDER — DOCUSATE SODIUM 100 MG/1
100 CAPSULE, LIQUID FILLED ORAL 2 TIMES DAILY
COMMUNITY
End: 2018-12-12 | Stop reason: CLARIF

## 2018-06-19 NOTE — PROGRESS NOTES
Bipin Pool, 105/1950, 70year old, female    Chief Complaint:  Patient presents with:   Follow - Up: stress gastritis/acute on chronic respiratory failure/LLE non-occlusive DVT  Constipation  Hyperglycemia       Subjective:   PMH significant CVA in 2 bruits  BREAST: ---deferred  RESPIRATORY: rales (c/w ILD) auscultated; no wheezing  CARDIOVASCULAR: S1, S2 normal, RRR; no S3, no S4; , no click, no murmur  ABDOMEN:  normal active BS+, soft, nondistended; no organomegaly, no masses; no bruits; nontender, days  9. Plan DC on or before 6.24.18; likely will require longer. Goal is to improve tolerance and physical mobility to the point that she could transition to acute rehab at [facility] to continue rehab after AMY; SW to assist w/ DC plans  10.  F/U w/ Dr Garcia

## 2018-06-21 ENCOUNTER — SNF VISIT (OUTPATIENT)
Dept: INTERNAL MEDICINE CLINIC | Age: 68
End: 2018-06-21

## 2018-06-21 VITALS
TEMPERATURE: 98 F | HEART RATE: 54 BPM | BODY MASS INDEX: 25 KG/M2 | SYSTOLIC BLOOD PRESSURE: 140 MMHG | WEIGHT: 149.19 LBS | DIASTOLIC BLOOD PRESSURE: 64 MMHG | OXYGEN SATURATION: 93 %

## 2018-06-21 DIAGNOSIS — R53.1 WEAKNESS: ICD-10-CM

## 2018-06-21 DIAGNOSIS — J96.01 ACUTE RESPIRATORY FAILURE WITH HYPOXIA (HCC): ICD-10-CM

## 2018-06-21 DIAGNOSIS — K29.00 OTHER ACUTE GASTRITIS WITHOUT HEMORRHAGE: Primary | ICD-10-CM

## 2018-06-21 DIAGNOSIS — I63.031 CEREBROVASCULAR ACCIDENT (CVA) DUE TO THROMBOSIS OF RIGHT CAROTID ARTERY (HCC): ICD-10-CM

## 2018-06-21 PROCEDURE — 99308 SBSQ NF CARE LOW MDM 20: CPT | Performed by: NURSE PRACTITIONER

## 2018-06-21 NOTE — PROGRESS NOTES
Margarette Tyler, 105/1950, 70year old, female    Chief Complaint:  Patient presents with:   Follow - Up: stress gastritis/acute on chronic respiratory failure/LLE non-occlusive DVT  Weakness       Subjective:   PMH significant CVA in 2016, nicotine depen normocephalic; normal nose, no nasal drainage, mucous membranes pink, moist, pharynx no exudate, no visible cerumen.  +left facial droop  NECK: supple; FROM; no JVD, no TMG, no carotid bruits  BREAST: ---deferred  RESPIRATORY: rales (c/w ILD) auscultated; n transition to acute rehab at [facility] to continue rehab after AMY; SW to assist w/ DC plans  10. F/U w/ Dr Joy Hernandez on 6.26.18     Left pleural effusion  1. Resolved     Nicotine dependence  1.  abstinence     Suspected COPD and PATRICIA  1.  O2 sa

## 2018-06-26 ENCOUNTER — SNF VISIT (OUTPATIENT)
Dept: INTERNAL MEDICINE CLINIC | Age: 68
End: 2018-06-26

## 2018-06-26 VITALS
HEART RATE: 66 BPM | RESPIRATION RATE: 18 BRPM | WEIGHT: 147.81 LBS | DIASTOLIC BLOOD PRESSURE: 99 MMHG | OXYGEN SATURATION: 98 % | BODY MASS INDEX: 25 KG/M2 | TEMPERATURE: 98 F | SYSTOLIC BLOOD PRESSURE: 137 MMHG

## 2018-06-26 DIAGNOSIS — E11.8 UNCONTROLLED TYPE 2 DIABETES MELLITUS WITH COMPLICATION, WITHOUT LONG-TERM CURRENT USE OF INSULIN (HCC): ICD-10-CM

## 2018-06-26 DIAGNOSIS — I63.031 CEREBROVASCULAR ACCIDENT (CVA) DUE TO THROMBOSIS OF RIGHT CAROTID ARTERY (HCC): Primary | ICD-10-CM

## 2018-06-26 DIAGNOSIS — E11.65 UNCONTROLLED TYPE 2 DIABETES MELLITUS WITH COMPLICATION, WITHOUT LONG-TERM CURRENT USE OF INSULIN (HCC): ICD-10-CM

## 2018-06-26 DIAGNOSIS — E87.6 HYPOKALEMIA: ICD-10-CM

## 2018-06-26 DIAGNOSIS — N18.30 CKD (CHRONIC KIDNEY DISEASE) STAGE 3, GFR 30-59 ML/MIN (HCC): ICD-10-CM

## 2018-06-26 DIAGNOSIS — R53.1 WEAKNESS: ICD-10-CM

## 2018-06-26 PROCEDURE — 99308 SBSQ NF CARE LOW MDM 20: CPT | Performed by: NURSE PRACTITIONER

## 2018-06-26 NOTE — PROGRESS NOTES
Yudith Dhillon, 105/1950, 70year old, female    Chief Complaint:  Patient presents with:   Follow - Up: stress gastritis/acute on chronic respiratory failure/LLE non-occlusive DVT  Hyperglycemia       Subjective:   PMH significant CVA in 2016, nicotine ---deferred  RESPIRATORY: rales (c/w ILD) auscultated; no wheezing  CARDIOVASCULAR: S1, S2 normal, RRR; no S3, no S4; , no click, no murmur  ABDOMEN:  normal active BS+, soft, nondistended; no organomegaly, no masses; no bruits; nontender, no guarding, no effusion  1. Resolved     Nicotine dependence  1.  abstinence     Suspected COPD and PATRICIA  1. O2 sat q shift  2. If unable to wean O2 may need to qualify for home O2 @ DC  3. Incruse ellipta 62.5 mcg/act; 1 puff daily  4.  Xopenex HFA 45 mcg/act; 2 pu

## 2018-06-28 ENCOUNTER — SNF VISIT (OUTPATIENT)
Dept: INTERNAL MEDICINE CLINIC | Age: 68
End: 2018-06-28

## 2018-06-28 VITALS — WEIGHT: 148 LBS | OXYGEN SATURATION: 98 % | BODY MASS INDEX: 25 KG/M2 | HEART RATE: 54 BPM

## 2018-06-28 DIAGNOSIS — R53.1 WEAKNESS: ICD-10-CM

## 2018-06-28 DIAGNOSIS — I63.031 CEREBROVASCULAR ACCIDENT (CVA) DUE TO THROMBOSIS OF RIGHT CAROTID ARTERY (HCC): Primary | ICD-10-CM

## 2018-06-28 DIAGNOSIS — E11.8 UNCONTROLLED TYPE 2 DIABETES MELLITUS WITH COMPLICATION, WITHOUT LONG-TERM CURRENT USE OF INSULIN (HCC): ICD-10-CM

## 2018-06-28 DIAGNOSIS — E11.65 UNCONTROLLED TYPE 2 DIABETES MELLITUS WITH COMPLICATION, WITHOUT LONG-TERM CURRENT USE OF INSULIN (HCC): ICD-10-CM

## 2018-06-28 PROCEDURE — 99309 SBSQ NF CARE MODERATE MDM 30: CPT | Performed by: NURSE PRACTITIONER

## 2018-06-28 NOTE — PROGRESS NOTES
Clay Welsh, 105/1950, 70year old, female    Chief Complaint:  Patient presents with:   Follow - Up: stress gastritis/acute on chronic respiratory failure/LLE non-occlusive DVT  Weakness  Hyperglycemia       Subjective:   PMH significant CVA in 2016, no nasal drainage, mucous membranes pink, moist, pharynx no exudate, no visible cerumen.  +left facial droop  NECK: supple; FROM; no JVD, no TMG, no carotid bruits  BREAST: ---deferred  RESPIRATORY: clear to auscultation anteriorly and posteriorly, decrease rehab at [facility] or day treatment program to continue rehab after AMY; SW to assist w/ DC plans  10. F/U w/ Dr Kentrell Whiting      Left pleural effusion  1. Resolved     Nicotine dependence  1.  abstinence     Suspected COPD and PATRICIA  1.  O2 sat q s

## 2018-07-03 ENCOUNTER — SNF VISIT (OUTPATIENT)
Dept: INTERNAL MEDICINE CLINIC | Age: 68
End: 2018-07-03

## 2018-07-03 VITALS
DIASTOLIC BLOOD PRESSURE: 76 MMHG | OXYGEN SATURATION: 97 % | RESPIRATION RATE: 18 BRPM | SYSTOLIC BLOOD PRESSURE: 147 MMHG | HEART RATE: 58 BPM

## 2018-07-03 DIAGNOSIS — E87.6 HYPOKALEMIA: ICD-10-CM

## 2018-07-03 DIAGNOSIS — E11.65 UNCONTROLLED TYPE 2 DIABETES MELLITUS WITH COMPLICATION, WITHOUT LONG-TERM CURRENT USE OF INSULIN (HCC): ICD-10-CM

## 2018-07-03 DIAGNOSIS — I63.031 CEREBROVASCULAR ACCIDENT (CVA) DUE TO THROMBOSIS OF RIGHT CAROTID ARTERY (HCC): Primary | ICD-10-CM

## 2018-07-03 DIAGNOSIS — E11.8 UNCONTROLLED TYPE 2 DIABETES MELLITUS WITH COMPLICATION, WITHOUT LONG-TERM CURRENT USE OF INSULIN (HCC): ICD-10-CM

## 2018-07-03 DIAGNOSIS — R53.1 WEAKNESS: ICD-10-CM

## 2018-07-03 DIAGNOSIS — N18.30 CKD (CHRONIC KIDNEY DISEASE) STAGE 3, GFR 30-59 ML/MIN (HCC): ICD-10-CM

## 2018-07-03 DIAGNOSIS — K29.00 OTHER ACUTE GASTRITIS WITHOUT HEMORRHAGE: ICD-10-CM

## 2018-07-03 PROCEDURE — 99309 SBSQ NF CARE MODERATE MDM 30: CPT | Performed by: NURSE PRACTITIONER

## 2018-07-03 NOTE — PROGRESS NOTES
Sai Villarreal, 105/1950, 70year old, female    Chief Complaint:  Patient presents with:   Follow - Up: stress gastritis/acute on chronic respiratory failure/LLE non-occlusive DVT  Weakness  Nasal Congestion       Subjective:   PMH significant CVA in 20 HEALTH: well developed, well nourished, in no apparent distress  LINES, TUBES, DRAINS:  g-tube - location epigastrum  SKIN: +rash to buttocks, no suspicious lesions, pale, warm, dry  WOUND:  none  EYES: PERRLA, EOMI, sclera anicteric, conjunctiva normal; t stenting/weakness  1. Fall precautions  2. Aspiration precautions  3. Mechanical soft diet w/ thin liquids  4. PT/OT/SLP eval and tx  5. Modalities  6. AFO for LLE  7. Modafinil 100 mg daily  8. ELOS 13-16 days  9.  Plan DC on or before 7.2.18; likely will buttocks  1.  Miconazole 2% crm BID     OLE Cutler  7/3/2018  10:00 AM

## 2018-07-05 ENCOUNTER — SNF VISIT (OUTPATIENT)
Dept: INTERNAL MEDICINE CLINIC | Age: 68
End: 2018-07-05

## 2018-07-05 VITALS
WEIGHT: 145.81 LBS | BODY MASS INDEX: 24 KG/M2 | OXYGEN SATURATION: 94 % | SYSTOLIC BLOOD PRESSURE: 148 MMHG | TEMPERATURE: 98 F | DIASTOLIC BLOOD PRESSURE: 88 MMHG | HEART RATE: 62 BPM | RESPIRATION RATE: 18 BRPM

## 2018-07-05 DIAGNOSIS — N18.30 CKD (CHRONIC KIDNEY DISEASE) STAGE 3, GFR 30-59 ML/MIN (HCC): ICD-10-CM

## 2018-07-05 DIAGNOSIS — K21.9 GASTROESOPHAGEAL REFLUX DISEASE, ESOPHAGITIS PRESENCE NOT SPECIFIED: ICD-10-CM

## 2018-07-05 DIAGNOSIS — F41.1 ANXIETY STATE: ICD-10-CM

## 2018-07-05 DIAGNOSIS — I48.0 PAF (PAROXYSMAL ATRIAL FIBRILLATION) (HCC): ICD-10-CM

## 2018-07-05 DIAGNOSIS — E78.00 HIGH CHOLESTEROL: ICD-10-CM

## 2018-07-05 DIAGNOSIS — E11.69 HYPERLIPIDEMIA ASSOCIATED WITH TYPE 2 DIABETES MELLITUS (HCC): ICD-10-CM

## 2018-07-05 DIAGNOSIS — K29.00 OTHER ACUTE GASTRITIS WITHOUT HEMORRHAGE: ICD-10-CM

## 2018-07-05 DIAGNOSIS — M15.9 PRIMARY OSTEOARTHRITIS INVOLVING MULTIPLE JOINTS: ICD-10-CM

## 2018-07-05 DIAGNOSIS — J43.2 CENTRILOBULAR EMPHYSEMA (HCC): ICD-10-CM

## 2018-07-05 DIAGNOSIS — I63.031 CEREBROVASCULAR ACCIDENT (CVA) DUE TO THROMBOSIS OF RIGHT CAROTID ARTERY (HCC): Primary | ICD-10-CM

## 2018-07-05 DIAGNOSIS — K59.01 SLOW TRANSIT CONSTIPATION: ICD-10-CM

## 2018-07-05 DIAGNOSIS — E78.5 HYPERLIPIDEMIA ASSOCIATED WITH TYPE 2 DIABETES MELLITUS (HCC): ICD-10-CM

## 2018-07-05 DIAGNOSIS — I82.402 ACUTE DEEP VEIN THROMBOSIS (DVT) OF LEFT LOWER EXTREMITY, UNSPECIFIED VEIN (HCC): ICD-10-CM

## 2018-07-05 DIAGNOSIS — E11.8 UNCONTROLLED TYPE 2 DIABETES MELLITUS WITH COMPLICATION, WITHOUT LONG-TERM CURRENT USE OF INSULIN (HCC): ICD-10-CM

## 2018-07-05 DIAGNOSIS — E87.6 HYPOKALEMIA: ICD-10-CM

## 2018-07-05 DIAGNOSIS — E11.65 UNCONTROLLED TYPE 2 DIABETES MELLITUS WITH COMPLICATION, WITHOUT LONG-TERM CURRENT USE OF INSULIN (HCC): ICD-10-CM

## 2018-07-05 DIAGNOSIS — I10 ESSENTIAL HYPERTENSION WITH GOAL BLOOD PRESSURE LESS THAN 130/85: ICD-10-CM

## 2018-07-05 DIAGNOSIS — R53.1 WEAKNESS: ICD-10-CM

## 2018-07-05 DIAGNOSIS — Z95.1 S/P CABG (CORONARY ARTERY BYPASS GRAFT): ICD-10-CM

## 2018-07-05 DIAGNOSIS — J96.01 ACUTE RESPIRATORY FAILURE WITH HYPOXIA (HCC): ICD-10-CM

## 2018-07-05 DIAGNOSIS — I50.22 CHRONIC SYSTOLIC CONGESTIVE HEART FAILURE (HCC): ICD-10-CM

## 2018-07-05 PROCEDURE — 99316 NF DSCHRG MGMT 30 MIN+: CPT | Performed by: NURSE PRACTITIONER

## 2018-07-05 NOTE — PROGRESS NOTES
Lucy Kashif, 105/1950, 70year old, female is being discharged from Facility: [address]    Date of Admission:  6.5.18    Date of Discharge:  7.6.18                                 Admitting Diagnoses:  1.  Stress gastr location epigastrum  SKIN: +erythematous rash to buttocks and perineum, no suspicious lesions, pale, warm, dry  WOUND:  none  EYES: PERRLA, EOMI, sclera anicteric, conjunctiva normal; there is no nystagmus, no drainage from eyes  HENT: normocephalic; ken thrombus/DVT  1. No anticoagulation d/t recent CVA     Recent right MCA CVA w/ left stefani-plegia s/p TPA and Right ICA stenting/weakness  1. Fall precautions  2. Aspiration precautions  3. Mechanical soft diet w/ thin liquids  4. PT/OT/SLP eval and tx  5.  Mallika Roque hrs      Medication Reconciliation Completed:  Yes    Follow Up Visits:  Dr Cedeno Cornea  Dr Tony Zendejas  After rehab  Dr Martinez/nephrology  After rehab      Greater than 30 minutes spent in coordination of care in preparation for discharge.     Neymar

## 2018-07-05 NOTE — PROGRESS NOTES
Claudetta Freud, 105/1950, 70year old, female    Chief Complaint:  Patient presents with:   Follow - Up: stress gastritis/acute on chronic respiratory failure/LLE non-occlusive DVT  Rash  Weakness       Subjective:   PMH significant CVA in 2016, nicotine lb 12.8 oz   SpO2 94%   BMI 24.26 kg/m²   GENERAL HEALTH: well developed, well nourished, in no apparent distress  LINES, TUBES, DRAINS:  g-tube - location epigastrum  SKIN: +rash to buttocks, no suspicious lesions, pale, warm, dry  WOUND:  none  EYES: PER right MCA CVA w/ left stefani-plegia s/p TPA and Right ICA stenting/weakness  1. Fall precautions  2. Aspiration precautions  3. Mechanical soft diet w/ thin liquids  4. PT/OT/SLP eval and tx  5. Modalities  6. AFO for LLE  7. Modafinil 100 mg daily  8.  ELOS BMP tomorrow    Cutaneous Candida to buttocks  1.  Miconazole 2% crm BID     LOE Valdes  7/5/2018  8:20 AM

## 2018-07-12 ENCOUNTER — TELEPHONE (OUTPATIENT)
Dept: INTERNAL MEDICINE CLINIC | Facility: CLINIC | Age: 68
End: 2018-07-12

## 2018-07-17 LAB
BUN: 55 MG/DL
CALCIUM: 9 MG/DL
CHLORIDE: 96 MEQ/L
CREATININE, SERUM: 1.31 MG/DL
ESR (SED RATE): 60 MM/HR
GLUCOSE: 90 MG/DL
HEMATOCRIT: 27.7 %
HEMOGLOBIN: 8.8 G/DL
MAGNESIUM: 1.7 MG/DL
PLATELETS: 327 K/UL
POTASSIUM, SERUM: 3.2 MEQ/L
PROBNP: NORMAL PG/ML
RED BLOOD COUNT: 3.03 X 10-6/U
SODIUM: 137 MEQ/L
WHITE BLOOD COUNT: 12.5 X 10-3/U

## 2018-07-30 ENCOUNTER — MED REC SCAN ONLY (OUTPATIENT)
Dept: INTERNAL MEDICINE CLINIC | Facility: CLINIC | Age: 68
End: 2018-07-30

## 2018-07-31 ENCOUNTER — PATIENT OUTREACH (OUTPATIENT)
Dept: CASE MANAGEMENT | Age: 68
End: 2018-07-31

## 2018-08-02 ENCOUNTER — TELEPHONE (OUTPATIENT)
Dept: SURGERY | Facility: CLINIC | Age: 68
End: 2018-08-02

## 2018-08-02 NOTE — TELEPHONE ENCOUNTER
Francisca Jackson from [facility] called to check if pt needs to schedule w/Dr Miroslava Cuba. Pt has never been seen in our office. Has an appt w/Muqrogerio.  Please advise

## 2018-08-02 NOTE — TELEPHONE ENCOUNTER
LMTCB. See message below. Patient needs appt with  and needs to complete Carotid US prior to appointment.

## 2018-08-02 NOTE — TELEPHONE ENCOUNTER
After review of patient's chart patient had CASS angioplasty/stent on 4/26/18 with . A carotid US was ordered on 5/8/18 and needs to be completed and patient will also need to f/u in the office for 3 month f/u to review [address].

## 2018-08-06 ENCOUNTER — TELEPHONE (OUTPATIENT)
Dept: ENDOCRINOLOGY CLINIC | Facility: CLINIC | Age: 68
End: 2018-08-06

## 2018-08-07 ENCOUNTER — TELEPHONE (OUTPATIENT)
Dept: NEUROLOGY | Facility: CLINIC | Age: 68
End: 2018-08-07

## 2018-08-07 NOTE — TELEPHONE ENCOUNTER
OLE Gautam at [facility], questioning if patient should be on more anticoagulation due to CVA 5/18. 5/16/18 note per Dr Pamela Rojas :  ASA and Plavix for secondary stroke prevention       - Will need to discuss further full [facility] as outpatient with NI.   Inform

## 2018-08-09 ENCOUNTER — HOSPITAL ENCOUNTER (OUTPATIENT)
Dept: CT IMAGING | Facility: HOSPITAL | Age: 68
Discharge: HOME | End: 2018-08-09
Attending: Other
Payer: MEDICARE

## 2018-08-09 ENCOUNTER — OFFICE VISIT (OUTPATIENT)
Dept: NEUROLOGY | Facility: CLINIC | Age: 68
End: 2018-08-09
Payer: MEDICARE

## 2018-08-09 ENCOUNTER — TELEPHONE (OUTPATIENT)
Dept: NEUROLOGY | Facility: CLINIC | Age: 68
End: 2018-08-09

## 2018-08-09 VITALS
SYSTOLIC BLOOD PRESSURE: 132 MMHG | DIASTOLIC BLOOD PRESSURE: 60 MMHG | HEIGHT: 65 IN | HEART RATE: 72 BPM | RESPIRATION RATE: 16 BRPM

## 2018-08-09 DIAGNOSIS — E11.69 HYPERLIPIDEMIA ASSOCIATED WITH TYPE 2 DIABETES MELLITUS (HCC): ICD-10-CM

## 2018-08-09 DIAGNOSIS — I50.33 ACUTE ON CHRONIC DIASTOLIC HEART FAILURE (HCC): ICD-10-CM

## 2018-08-09 DIAGNOSIS — I69.151 HEMIPARESIS OF RIGHT DOMINANT SIDE AS LATE EFFECT OF NONTRAUMATIC INTRACEREBRAL HEMORRHAGE (HCC): ICD-10-CM

## 2018-08-09 DIAGNOSIS — E78.5 HYPERLIPIDEMIA ASSOCIATED WITH TYPE 2 DIABETES MELLITUS (HCC): ICD-10-CM

## 2018-08-09 DIAGNOSIS — I63.511 ACUTE RIGHT MCA STROKE (HCC): Primary | ICD-10-CM

## 2018-08-09 DIAGNOSIS — I63.511 ACUTE RIGHT MCA STROKE (HCC): ICD-10-CM

## 2018-08-09 DIAGNOSIS — F32.0 CURRENT MILD EPISODE OF MAJOR DEPRESSIVE DISORDER, UNSPECIFIED WHETHER RECURRENT (HCC): ICD-10-CM

## 2018-08-09 DIAGNOSIS — I65.21 CAROTID OCCLUSION, RIGHT: ICD-10-CM

## 2018-08-09 PROBLEM — I69.159: Status: ACTIVE | Noted: 2018-08-09

## 2018-08-09 PROCEDURE — 99215 OFFICE O/P EST HI 40 MIN: CPT | Performed by: OTHER

## 2018-08-09 PROCEDURE — 70450 CT HEAD/BRAIN W/O DYE: CPT | Performed by: OTHER

## 2018-08-09 RX ORDER — LOSARTAN POTASSIUM 50 MG/1
50 TABLET ORAL 2 TIMES DAILY
Refills: 0 | COMMUNITY
Start: 2018-07-30 | End: 2018-09-10

## 2018-08-09 RX ORDER — TIOTROPIUM BROMIDE 18 UG/1
18 CAPSULE ORAL; RESPIRATORY (INHALATION) DAILY
Refills: 0 | COMMUNITY
Start: 2018-07-31 | End: 2018-09-10

## 2018-08-09 RX ORDER — WARFARIN SODIUM 2 MG/1
2 TABLET ORAL NIGHTLY
COMMUNITY
End: 2018-08-30

## 2018-08-09 RX ORDER — INSULIN GLARGINE 100 [IU]/ML
INJECTION, SOLUTION SUBCUTANEOUS
Refills: 0 | COMMUNITY
Start: 2018-07-30 | End: 2018-08-30 | Stop reason: DRUGHIGH

## 2018-08-09 RX ORDER — SPIRONOLACTONE 25 MG/1
12.5 TABLET ORAL DAILY
Refills: 0 | COMMUNITY
Start: 2018-07-31 | End: 2018-09-10

## 2018-08-09 RX ORDER — NYSTATIN 100000 [USP'U]/G
1 POWDER TOPICAL 2 TIMES DAILY
Refills: 0 | COMMUNITY
Start: 2018-07-30 | End: 2018-12-12 | Stop reason: CLARIF

## 2018-08-09 NOTE — PATIENT INSTRUCTIONS
Refill policies:    • Allow 2-3 business days for refills; controlled substances may take longer.   • Contact your pharmacy at least 5 days prior to running out of medication and have them send an electronic request or submit request through the “request re entire amount billed. Precertification and Prior Authorizations: If your physician has recommended that you have a procedure or additional testing performed.   [facility]) will contact your insurance carrier to obtain pre-certi

## 2018-08-09 NOTE — TELEPHONE ENCOUNTER
Phone call form radiology for STAT C[facility]. Per report encephalomalacia form previous stroke is seen. Acute on chronic sinusitis and small vessel changes. Pt has left radiology I will forward this report to Dr. Aydin Lopez for any further recommendations.

## 2018-08-09 NOTE — PROGRESS NOTES
[address]   Neurology; INITIAL CLINIC VISIT  2018, 11:38 AM     Giancarlo Sutton Patient Status:  No patient class for patient encounter    1950 MRN [id number]   Location [address] [place] [address] Patient underwent right ICA carotid angioplasty and stenting with TICI2 b recanalization. Her P2Y12 was 232 so started on Integrillin and no bleeding seen on Xper CT. She was successfully extubated and she is awake and able to follow my commands.  Left arm Comment: cabg x 4  1996: CABG      Comment: x4  No date: CATH BARE METAL STENT (BMS)      Comment: R carotid/coronary  1987: CHOLECYSTECTOMY  No date: HC SPLY CAROTID STENT  1984: Monalisa Thomas  2005: OTHER SURGICAL HISTORY      Comment: Hx of tra AmLODIPine Besylate 10 MG Oral Tab Take 1 tablet (10 mg total) by mouth daily. Disp: 30 tablet Rfl: 5   ezetimibe 10 MG Oral Tab Take 1 tablet (10 mg total) by mouth nightly.  Disp: 30 tablet Rfl: 1   ROSUVASTATIN CALCIUM 40 MG Oral Tab TAKE ONE TABLET BY M Umeclidinium Bromide 62.5 MCG/INH Inhalation Aerosol Powder, Breath Activated Inhale 1 puff into the lungs daily.  Disp: 1 each Rfl: 2         REVIEW OF SYSTEMS:    GENERAL HEALTH:  feels well, calm, normal appetite,   EYES: no visual complaints or deficits CN II:  Visual fields full, Pupils equal and reactive, Fundi normal       CN III, IV, VI:  Horrizontal and vertical movements normal.  Normal pursuit and saccades.                             No nystagmus, no ptosis       CN V: Masseters strong, Facial CONCLUSION:  Stable appearance to the right MCA infarct with hemorrhagic products and small amount of subarachnoid hemorrhage. Stable midline shift. Stable mass effect. The basal cisterns are patent.     Dictated by: Romel Luna MD on 4/30/2018 at I will repeat CT to make sure complete or near complete  resolution of bleeding, if good, she can go back coumadin along with low dose of ASA 81 mg qd,   Stop Plavix once coumadin is therapeutic level  Continue PT and OT daily, write a note to her [facility] fac

## 2018-08-10 ENCOUNTER — TELEPHONE (OUTPATIENT)
Dept: SURGERY | Facility: CLINIC | Age: 68
End: 2018-08-10

## 2018-08-10 DIAGNOSIS — I63.511 ACUTE RIGHT MCA STROKE (HCC): Primary | ICD-10-CM

## 2018-08-10 DIAGNOSIS — I65.21 CAROTID OCCLUSION, RIGHT: ICD-10-CM

## 2018-08-13 ENCOUNTER — HOSPITAL ENCOUNTER (OUTPATIENT)
Dept: ULTRASOUND IMAGING | Facility: HOSPITAL | Age: 68
Discharge: HOME | End: 2018-08-13
Attending: NURSE PRACTITIONER
Payer: COMMERCIAL

## 2018-08-13 DIAGNOSIS — I63.511 ACUTE RIGHT MCA STROKE (HCC): ICD-10-CM

## 2018-08-13 DIAGNOSIS — I65.21 CAROTID OCCLUSION, RIGHT: ICD-10-CM

## 2018-08-13 PROCEDURE — 93880 EXTRACRANIAL BILAT STUDY: CPT | Performed by: RADIOLOGY

## 2018-08-13 NOTE — TELEPHONE ENCOUNTER
Spoke with Madison Stanley in Outpatient registration, new order placed with diagnosis accepted by Medicare. No other issues.

## 2018-08-14 ENCOUNTER — TELEPHONE (OUTPATIENT)
Dept: SURGERY | Facility: CLINIC | Age: 68
End: 2018-08-14

## 2018-08-14 DIAGNOSIS — I65.29 STENOSIS OF CAROTID ARTERY, UNSPECIFIED LATERALITY: Primary | ICD-10-CM

## 2018-08-14 NOTE — TELEPHONE ENCOUNTER
Left detailed message on patient's confidential voicemail. Informed her of Dr. Jayson Lance recommendation:   \"patient needs CTA of neck prior to 3 month follow up. \"     Patient is scheduled to see Dr. Jayson Lance on 8/20/18

## 2018-08-15 ENCOUNTER — TELEPHONE (OUTPATIENT)
Dept: NEUROLOGY | Facility: CLINIC | Age: 68
End: 2018-08-15

## 2018-08-15 NOTE — TELEPHONE ENCOUNTER
Spoke with patient's nurse Jose Ramon Dye at [facility] and relayed message from Dr. Dulce Berrios regarding [address] results. She verbalized understanding, agrees to plan and expresses intent to comply with advice given.   Answered all questions and pt was encouraged to call

## 2018-08-20 ENCOUNTER — OFFICE VISIT (OUTPATIENT)
Dept: SURGERY | Facility: CLINIC | Age: 68
End: 2018-08-20
Payer: MEDICARE

## 2018-08-20 ENCOUNTER — TELEPHONE (OUTPATIENT)
Dept: SURGERY | Facility: CLINIC | Age: 68
End: 2018-08-20

## 2018-08-20 VITALS
WEIGHT: 149 LBS | BODY MASS INDEX: 24.83 KG/M2 | HEIGHT: 65 IN | DIASTOLIC BLOOD PRESSURE: 52 MMHG | SYSTOLIC BLOOD PRESSURE: 122 MMHG | HEART RATE: 62 BPM

## 2018-08-20 DIAGNOSIS — T82.898D CAROTID STENT OCCLUSION, SUBSEQUENT ENCOUNTER: ICD-10-CM

## 2018-08-20 DIAGNOSIS — I65.21 CAROTID OCCLUSION, RIGHT: Primary | ICD-10-CM

## 2018-08-20 DIAGNOSIS — I63.231 CEREBROVASCULAR ACCIDENT (CVA) DUE TO OCCLUSION OF RIGHT CAROTID ARTERY (HCC): ICD-10-CM

## 2018-08-20 DIAGNOSIS — I65.21 CAROTID STENOSIS, RIGHT: ICD-10-CM

## 2018-08-20 PROCEDURE — 99214 OFFICE O/P EST MOD 30 MIN: CPT | Performed by: RADIOLOGY

## 2018-08-20 NOTE — TELEPHONE ENCOUNTER
[address], spoke with Dre Luis, according to her chart patient has history of A-fib and that is why she is on coumadin. Dr. Morley Heart informed.

## 2018-08-20 NOTE — TELEPHONE ENCOUNTER
----- Message from Kyra Clifton MD sent at 8/20/2018 10:53 AM CDT -----  Hi, Just check what reason for starting coumadin is - couldn't see it in note. We are going to get CTA to check her R carotid stent.

## 2018-08-20 NOTE — PROGRESS NOTES
4 months post emergent CASS stenting for high cervical ICA occlusion at distal end of pre-existing stent performed years before at OSH. Had been on ASA/plavix. Her plavix assay was subtherapeutic to we changed to brilinta.  Had some hemorrhagic transformati

## 2018-08-20 NOTE — PATIENT INSTRUCTIONS
Refill policies:    • Allow 2-3 business days for refills; controlled substances may take longer.   • Contact your pharmacy at least 5 days prior to running out of medication and have them send an electronic request or submit request through the “request re entire amount billed. Precertification and Prior Authorizations: If your physician has recommended that you have a procedure or additional testing performed.   [facility]) will contact your insurance carrier to obtain pre-certi

## 2018-08-20 NOTE — PROGRESS NOTES
Review of Systems:    Hand Dominance: right  General: no symptoms reported  Neuro: weakness, paralysis and no symptoms reported  Head: no symptoms reported  Musculoskeletal: no symptoms reported  Cardiovascular: no symptoms reported  Gastrointestinal: no s

## 2018-08-22 ENCOUNTER — APPOINTMENT (OUTPATIENT)
Dept: LAB | Facility: HOSPITAL | Age: 68
End: 2018-08-22
Attending: RADIOLOGY
Payer: COMMERCIAL

## 2018-08-22 ENCOUNTER — TELEPHONE (OUTPATIENT)
Dept: SURGERY | Facility: CLINIC | Age: 68
End: 2018-08-22

## 2018-08-22 ENCOUNTER — HOSPITAL ENCOUNTER (OUTPATIENT)
Dept: CT IMAGING | Facility: HOSPITAL | Age: 68
Discharge: HOME | End: 2018-08-22
Attending: RADIOLOGY
Payer: COMMERCIAL

## 2018-08-22 DIAGNOSIS — N28.9 IMPAIRED RENAL FUNCTION: Primary | ICD-10-CM

## 2018-08-22 DIAGNOSIS — I65.29 STENOSIS OF CAROTID ARTERY, UNSPECIFIED LATERALITY: ICD-10-CM

## 2018-08-22 LAB
ANION GAP SERPL CALC-SCNC: 10 MMOL/L (ref 0–18)
BUN BLD-MCNC: 64 MG/DL (ref 8–20)
BUN/CREAT SERPL: 28.6 (ref 10–20)
CALCIUM BLD-MCNC: 9.3 MG/DL (ref 8.3–10.3)
CHLORIDE SERPL-SCNC: 101 MMOL/L (ref 101–111)
CO2 SERPL-SCNC: 27 MMOL/L (ref 22–32)
CREAT BLD-MCNC: 2.24 MG/DL (ref 0.55–1.02)
ERYTHROCYTE [DISTWIDTH] IN BLOOD BY AUTOMATED COUNT: 15 % (ref 11.5–16)
GLUCOSE BLD-MCNC: 117 MG/DL (ref 70–99)
HCT VFR BLD AUTO: 37.6 % (ref 34–50)
HGB BLD-MCNC: 11.9 G/DL (ref 12–16)
MCH RBC QN AUTO: 27.9 PG (ref 27–33.2)
MCHC RBC AUTO-ENTMCNC: 31.6 G/DL (ref 31–37)
MCV RBC AUTO: 88.1 FL (ref 81–100)
OSMOLALITY SERPL CALC.SUM OF ELEC: 305 MOSM/KG (ref 275–295)
PLATELET # BLD AUTO: 237 10(3)UL (ref 150–450)
POTASSIUM SERPL-SCNC: 3.9 MMOL/L (ref 3.6–5.1)
RBC # BLD AUTO: 4.27 X10(6)UL (ref 3.8–5.1)
RED CELL DISTRIBUTION WIDTH-SD: 48.4 FL (ref 35.1–46.3)
SODIUM SERPL-SCNC: 138 MMOL/L (ref 136–144)
WBC # BLD AUTO: 8.5 X10(3) UL (ref 4–13)

## 2018-08-22 PROCEDURE — 80048 BASIC METABOLIC PNL TOTAL CA: CPT | Performed by: NURSE PRACTITIONER

## 2018-08-22 PROCEDURE — 36415 COLL VENOUS BLD VENIPUNCTURE: CPT | Performed by: NURSE PRACTITIONER

## 2018-08-22 PROCEDURE — 82565 ASSAY OF CREATININE: CPT

## 2018-08-22 PROCEDURE — 85027 COMPLETE CBC AUTOMATED: CPT | Performed by: NURSE PRACTITIONER

## 2018-08-22 NOTE — TELEPHONE ENCOUNTER
Returned call from Symone,   Call routed to Dr. Yash Littlejohn back to inform her orders were placed for BMP,CBC, UA

## 2018-08-22 NOTE — TELEPHONE ENCOUNTER
Informed by radiology department that patients GFR was low.     In fact her GFR is 50% less than what it was when she last had labs checked in June of this year  She can not have contrast today given her impaired renal function  I have ordered BMP, CBC, and

## 2018-08-23 LAB — CREAT SERPL-MCNC: 2.3 MG/DL (ref 0.55–1.02)

## 2018-08-23 NOTE — TELEPHONE ENCOUNTER
Faxed information to PCP, requested they call pt for appt. And informed them labs done.   PCP is in epic

## 2018-08-27 ENCOUNTER — TELEPHONE (OUTPATIENT)
Dept: INTERNAL MEDICINE CLINIC | Facility: CLINIC | Age: 68
End: 2018-08-27

## 2018-08-27 NOTE — TELEPHONE ENCOUNTER
LOV 9/25/17 with SD. Pt will need HFU/SNF appt dx CVA. Scheduled with Carolina Ortiz [phone], pt's dtr for 8/29/18 at 4:15pm 30 minutes. Lisa Gann understanding.     Carolina Ortiz states pt was in [facility] for CVA, [place] and no

## 2018-08-27 NOTE — TELEPHONE ENCOUNTER
Torie Lee [facility] Rad asking if we ordered any labs today from [facility].   Pt coming for CTA Carotid Arteries ordered by Dr Linsey Mayo, GFR salty, Torie Lee asking if pt should be hydrated prior to testing today at [facility] will call patient for information and call

## 2018-08-27 NOTE — TELEPHONE ENCOUNTER
Radiology calling, asking if patient is going to get repeat labs or will she need hydration. Informed her that [facility] informed me that patient is scheduled to have repeat labs today.  PCP will need to decide if labs are still too elevated to have contr

## 2018-08-27 NOTE — TELEPHONE ENCOUNTER
Alexandra states repeat Creatinine scheduled for today per PCP instructions. Asking if CTA should be done as scheduled on 8/31/18. Informed her that the decision would be up to the PCP and results of repeat labs.  No other issues

## 2018-08-27 NOTE — TELEPHONE ENCOUNTER
Crow Rincon  From [facility] is calling to clarify if PCP is managing Pt's labs. Crow Rincon will only be here until 3:00 p.m. Today.

## 2018-08-27 NOTE — TELEPHONE ENCOUNTER
Jam Avila with [facility] called to verify if Dr Cielo Tirado is signing orders for [facility] Francisco Marie for this patient? Also, they will be using the medication list they have on hand. Please advise.

## 2018-08-27 NOTE — TELEPHONE ENCOUNTER
Pt called stating she is home from rehab/hospital from a stroke and needs fup w/AS-please call to sched

## 2018-08-28 ENCOUNTER — PATIENT OUTREACH (OUTPATIENT)
Dept: CASE MANAGEMENT | Age: 68
End: 2018-08-28

## 2018-08-28 ENCOUNTER — TELEPHONE (OUTPATIENT)
Dept: INTERNAL MEDICINE CLINIC | Facility: CLINIC | Age: 68
End: 2018-08-28

## 2018-08-28 DIAGNOSIS — Z02.9 ENCOUNTERS FOR ADMINISTRATIVE PURPOSE: ICD-10-CM

## 2018-08-28 NOTE — TELEPHONE ENCOUNTER
Veto Novoa at [facility] notified pt has not been seen in the office for [place] yet, has an appt scheduled with SD for 8/29/18, has been seeing Dr Patricia Mcintosh and Dr Mohinder Edgar at [facility], notified last rehab stay [facility]. Veto Novoa will call them to ask about INR monitoring.

## 2018-08-28 NOTE — PROGRESS NOTES
Initial Post Discharge Follow Up   Discharge Date: 8/25/18 From [facility]. Pt stated she was sent to [facility], then [facility] and then went to [facility].    Contact Date: 8/28/2018    Consent Verification:  Assessment Completed Wi Pt stated she plans to go over all of her medications at [facility] appt to see if some can be taken away. Med rec to be completed at [facility]. Current Outpatient Prescriptions:  LANTUS 100 UNIT/ML Subcutaneous Solution 10-15 Units.    Disp:  Rfl: 0   Losartan mouth daily. Disp: 1 tablet Rfl: 0   amiodarone HCl 200 MG Oral Tab 1 tablet (200 mg total) by Per NG Tube route daily. Disp: 30 tablet Rfl: 5   CloNIDine HCl 0.3 MG/24HR Transdermal Patch Weekly Place 1 patch onto the skin once a week.  (Patient taking dif No    Referrals/orders at D/C:  Home Health ordered at D/C? Yes   Has HH been set up? Yes   If Yes: With Whom: [facility]    When: Services started yesterday. DME ordered at D/C? No      Services ordered at D/C?   Yes   What services: PT/OT/ST   Have you reason as to why you cannot make your appointments? No     NCM Reviewed upcoming Specialist Appt with patient     Not Applicable     Overall Rating:    How would you rate the care you received while in the hospital?  good      Interventions by NCM: All d/

## 2018-08-28 NOTE — TELEPHONE ENCOUNTER
Maria Victoria  with [facility] called and stated that she had the [facility] nurse admit the pt to [facility] services yesterday 08/27/18. Pts daughter went and picked up the pts medication but there was no Coumadin 2mg.  Pts daughter stated that the pharmacy told her that

## 2018-08-28 NOTE — TELEPHONE ENCOUNTER
Forwarded note to Ni Magallanes RN: We have not seen the pt for HFU as yet. Deonte Myrick would defer determination for CTA Arteries and labs associated with that order to Dr Shawn Hopkins or staff due to her history of renal insufficiency.

## 2018-08-29 ENCOUNTER — TELEPHONE (OUTPATIENT)
Dept: SURGERY | Facility: CLINIC | Age: 68
End: 2018-08-29

## 2018-08-29 ENCOUNTER — HOSPITAL ENCOUNTER (OUTPATIENT)
Dept: ULTRASOUND IMAGING | Facility: HOSPITAL | Age: 68
Discharge: HOME | End: 2018-08-29
Attending: NURSE PRACTITIONER
Payer: MEDICARE

## 2018-08-29 ENCOUNTER — TELEPHONE (OUTPATIENT)
Dept: INTERNAL MEDICINE CLINIC | Facility: CLINIC | Age: 68
End: 2018-08-29

## 2018-08-29 ENCOUNTER — PRIOR ORIGINAL RECORDS (OUTPATIENT)
Dept: OTHER | Age: 68
End: 2018-08-29

## 2018-08-29 ENCOUNTER — APPOINTMENT (OUTPATIENT)
Dept: LAB | Facility: HOSPITAL | Age: 68
End: 2018-08-29
Attending: NURSE PRACTITIONER
Payer: MEDICARE

## 2018-08-29 DIAGNOSIS — M79.89 LEFT LEG SWELLING: ICD-10-CM

## 2018-08-29 PROBLEM — F17.200 SMOKER: Status: ACTIVE | Noted: 2018-08-29

## 2018-08-29 PROCEDURE — 93971 EXTREMITY STUDY: CPT | Performed by: NURSE PRACTITIONER

## 2018-08-29 PROCEDURE — 80053 COMPREHEN METABOLIC PANEL: CPT | Performed by: NURSE PRACTITIONER

## 2018-08-29 PROCEDURE — 36415 COLL VENOUS BLD VENIPUNCTURE: CPT | Performed by: NURSE PRACTITIONER

## 2018-08-29 NOTE — PROGRESS NOTES
HPI:    Bipin Pool is a 70year old female here today for hospital follow up. complicated with 2 hospitalizations and AMY placements.     She was discharged from Inpatient hospital, [facility] to Sina Simons to [facility] to Sina Simons to [facility] then to [facility] patient report at [facility] and was d/c from [facility] and [facility]   Everything was going well and she did well with therapy     F/u in [facility] includes Dr Marin Schwartz 8/9 and Dr Link Mckeon 8/20  CTA was ordered by Dr Link Mckeon for f/u as cardiology would like to resume c and her renal function has been unstable   -     COMP METABOLIC PANEL (14)  -     NEPHROLOGY - INTERNAL    Acute right MCA stroke [facility])  Per Dr Herrera Jose and Dr Manish Jacobson. Needs CTA but unable to do with current creatininine.      Carotid occlusion, right    Asp Solution Pen-injector Inject 26 Units into the skin daily. Insulin Aspart Pen 100 UNIT/ML Subcutaneous Solution Pen-injector Inject 1-68 Units into the skin 3 (three) times daily with meals.  1 unit for every 13 grams of cards eaten   Insulin Aspart Pen Atherosclerosis of coronary artery (1996  & 2012); Atherosclerosis of coronary artery (2/28/14); Belching; Black stools; CONGESTIVE HEART FAILURE (3/2013); COPD; Diabetes mellitus ([facility] Disorder of liver; Fatigue; HEART ATTACK (2/28/14);  High blood pressu Position: Sitting, Cuff Size: adult)   Pulse 60   Temp 98.4 °F (36.9 °C) (Oral)   Ht 65\"    GENERAL: well developed, well nourished, in no apparent distress  CHEST: no chest tenderness  LUNGS: clear to auscultation  CARDIO: RRR   GI: good BS's, no masses, to do with current creatininine.      Carotid occlusion, right    Aspiration pneumonia of both lower lobes, unspecified aspiration pneumonia type ([facility]  Stable    Respiratory insufficiency    Acute systolic CHF (congestive heart failure) (HCC)  Stable     H

## 2018-08-29 NOTE — TELEPHONE ENCOUNTER
Per Dr Joseline Hernandez,   He would like a our office to  follow up in 30days to see if GFR has increased and if pt has had imaging done. This call postponed to appear at that time.

## 2018-08-29 NOTE — TELEPHONE ENCOUNTER
Routed to Provider as Wendy Sepulveda and LM for  Vince Arias, pt should wait to re-schedule until GFR increases.

## 2018-08-29 NOTE — TELEPHONE ENCOUNTER
Calling to request a plan. Pt is scheduled for a CT per policy they are not allowed to do the CT scan with contrast. Pt doesn't see a kidney specialist and due to that they need a plan from the PCP.  Please advise

## 2018-08-29 NOTE — IMAGING NOTE
Pt with low GFR=22 from 8/22. Pt does NOT follow up with nephrologist. Ralf Friends Dr. Babita Austin office and has not been seen in a long time. Will see APN today.  Need plan to proceed with CT with Myrna Jamil office and left message that Ni Chu

## 2018-08-29 NOTE — TELEPHONE ENCOUNTER
CTA Carotid Arteries ordered by Dr. Aramis Penaloza with SD on 09/25/2017  Pt has hospital f/up scheduled today at 4:15pm    Glucose 70 - 99 mg/dL 117     Sodium 136 - 144 mmol/L 138    Potassium 3.6 - 5.1 mmol/L 3.9    Chloride 101 - 111 mmol/L 101    CO2

## 2018-08-29 NOTE — TELEPHONE ENCOUNTER
Has not been seen by PCP since Sept 2017   I will be seeing the patient today for follow up as scheduled. Unable to provide a plan at this time. They will need to notify Dr Laisha Olson the CTA cannot be done at this time.

## 2018-08-29 NOTE — TELEPHONE ENCOUNTER
Call received back from Radiology per Mery Bonilla did she SD message in regards to patient. Will contact Dr. Lulú Shaikh office to advise. Imaging is a follow up non urgent. Will have a better plan after visit today.

## 2018-08-30 ENCOUNTER — TELEPHONE (OUTPATIENT)
Dept: INTERNAL MEDICINE CLINIC | Facility: CLINIC | Age: 68
End: 2018-08-30

## 2018-08-30 NOTE — TELEPHONE ENCOUNTER
Pt's daughter dropped off copy of PW from [facility]. Put copy in SD basket and copy in hang folder.

## 2018-08-31 ENCOUNTER — HOSPITAL ENCOUNTER (OUTPATIENT)
Dept: CT IMAGING | Facility: HOSPITAL | Age: 68
Discharge: HOME | End: 2018-08-31
Attending: RADIOLOGY
Payer: MEDICARE

## 2018-08-31 ENCOUNTER — TELEPHONE (OUTPATIENT)
Dept: INTERNAL MEDICINE CLINIC | Facility: CLINIC | Age: 68
End: 2018-08-31

## 2018-08-31 NOTE — TELEPHONE ENCOUNTER
Per OLE Muñoz and Dr. Jonas Ramos Neuro-radiologist.  Pt needs to be seen by PCP for HFU, also Dr. Jonas Ramos will not be managing abnormal labs. All is deferred to PCP as our office does not manage impaired liver function.      Imaging for our office can wait

## 2018-08-31 NOTE — TELEPHONE ENCOUNTER
Pt's daughter calling, she updated mail order pharmacy, in [facility], confirmed with her the two future appts    Future Appointments  Date Time Provider Henok Zeng   9/4/2018 1:40 PM Karen Nelson MD [facility]   11/5/2018 10:00 AM Vinnie Rivas

## 2018-08-31 NOTE — TELEPHONE ENCOUNTER
Reviewed medications with [facility] and Ernestine Henley pt's dtr. Spoke to SD and will address Coumadin vs Plavix and Lantus doses on Tues 9/4/18.

## 2018-08-31 NOTE — TELEPHONE ENCOUNTER
Jennifer Waller returned Merly's call. She said [employer] can reach her on her work cell - [phone].

## 2018-09-01 NOTE — TELEPHONE ENCOUNTER
Labs done following ov on Wednesday  Slight improvement in renal insufficiency. Lab request was deferred earlier as I had not seen patient prior to the request for over 3 months following a complicated medical hospitalization.    She has an appt with Dr Ulysses Perking

## 2018-09-04 ENCOUNTER — TELEPHONE (OUTPATIENT)
Dept: INTERNAL MEDICINE CLINIC | Facility: CLINIC | Age: 68
End: 2018-09-04

## 2018-09-04 ENCOUNTER — OFFICE VISIT (OUTPATIENT)
Dept: NEPHROLOGY | Facility: CLINIC | Age: 68
End: 2018-09-04
Payer: MEDICARE

## 2018-09-04 VITALS — DIASTOLIC BLOOD PRESSURE: 74 MMHG | SYSTOLIC BLOOD PRESSURE: 132 MMHG

## 2018-09-04 DIAGNOSIS — I10 ESSENTIAL HYPERTENSION: ICD-10-CM

## 2018-09-04 DIAGNOSIS — N17.9 AKI (ACUTE KIDNEY INJURY) (HCC): ICD-10-CM

## 2018-09-04 DIAGNOSIS — N18.30 CKD (CHRONIC KIDNEY DISEASE) STAGE 3, GFR 30-59 ML/MIN (HCC): Primary | ICD-10-CM

## 2018-09-04 PROCEDURE — 99214 OFFICE O/P EST MOD 30 MIN: CPT | Performed by: INTERNAL MEDICINE

## 2018-09-04 NOTE — TELEPHONE ENCOUNTER
Received fax from [employer]  requesting face to face, certification and plan of care. Forms were placed on SD bin for review.

## 2018-09-04 NOTE — TELEPHONE ENCOUNTER
LM for Ling Werner at [phone] to call back to confirm pt will be seeing a Cardiologist(if so, which one)

## 2018-09-04 NOTE — PROGRESS NOTES
Nephrology Progress Note      Lenore Brown is a 70year old female.     HPI:   Patient presents with:  Kidney Problem      Julia Ashley was seen in the nephrology clinic today in follow-up for management of fluctuating renal function in the setting unspecified type diabetes mellitus without mention of complication, not stated as uncontrolled    • Uncontrolled diabetes mellitus ([facility] 3/19/2014   • Unspecified essential hypertension    • Unspecified sleep apnea    • Wears glasses    • Wheezing       Pa mouth daily. Disp:  Rfl: 0   SPIRIVA HANDIHALER 18 MCG Inhalation Cap Inhale 18 mcg into the lungs daily. Disp:  Rfl: 0   docusate sodium 100 MG Oral Cap Take 100 mg by mouth 2 (two) times daily.  Disp:  Rfl:    Sertraline HCl 100 MG Oral Tab Take 1 tab or rub  Lungs: Clear without wheezes, rales, rhonchi. Abdomen: Soft, non-tender. + bowel sounds, no palpable organomegaly  Extremities: Trace left ankle edema.   Neurologic: Left hemiparesis, she is in a wheelchair   skin: Warm and dry, no rashes      LA BLOODURINE Negative 05/23/2018   PHURINE 5.0 05/23/2018   PROUR Negative 05/23/2018   UROBILINOGEN <2.0 05/23/2018   NITRITE Negative 05/23/2018   LEUUR Negative 05/23/2018   NMIC Microscopic not indicated 05/23/2018   WBCUR 11-20 (A) 04/26/2018   RBCUR

## 2018-09-05 ENCOUNTER — APPOINTMENT (OUTPATIENT)
Dept: ULTRASOUND IMAGING | Facility: HOSPITAL | Age: 68
End: 2018-09-05
Attending: EMERGENCY MEDICINE
Payer: MEDICARE

## 2018-09-05 ENCOUNTER — TELEPHONE (OUTPATIENT)
Dept: INTERNAL MEDICINE CLINIC | Facility: CLINIC | Age: 68
End: 2018-09-05

## 2018-09-05 ENCOUNTER — HOSPITAL ENCOUNTER (EMERGENCY)
Facility: HOSPITAL | Age: 68
Discharge: HOME | End: 2018-09-05
Attending: EMERGENCY MEDICINE
Payer: MEDICARE

## 2018-09-05 ENCOUNTER — PRIOR ORIGINAL RECORDS (OUTPATIENT)
Dept: OTHER | Age: 68
End: 2018-09-05

## 2018-09-05 ENCOUNTER — APPOINTMENT (OUTPATIENT)
Dept: GENERAL RADIOLOGY | Facility: HOSPITAL | Age: 68
End: 2018-09-05
Attending: NURSE PRACTITIONER
Payer: MEDICARE

## 2018-09-05 VITALS
SYSTOLIC BLOOD PRESSURE: 159 MMHG | HEART RATE: 48 BPM | DIASTOLIC BLOOD PRESSURE: 74 MMHG | OXYGEN SATURATION: 98 % | TEMPERATURE: 97 F | WEIGHT: 140 LBS | HEIGHT: 65 IN | BODY MASS INDEX: 23.32 KG/M2 | RESPIRATION RATE: 14 BRPM

## 2018-09-05 DIAGNOSIS — R00.1 BRADYCARDIA: Primary | ICD-10-CM

## 2018-09-05 LAB
ALBUMIN SERPL-MCNC: 3.3 G/DL (ref 3.5–4.8)
ALBUMIN/GLOB SERPL: 0.8 {RATIO} (ref 1–2)
ALP LIVER SERPL-CCNC: 58 U/L (ref 55–142)
ALT SERPL-CCNC: 64 U/L (ref 14–54)
ANION GAP SERPL CALC-SCNC: 9 MMOL/L (ref 0–18)
APTT PPP: 30.8 SECONDS (ref 26.1–34.6)
AST SERPL-CCNC: 54 U/L (ref 15–41)
ATRIAL RATE: 41 BPM
BASOPHILS # BLD AUTO: 0.05 X10(3) UL (ref 0–0.1)
BASOPHILS NFR BLD AUTO: 0.6 %
BILIRUB SERPL-MCNC: 0.6 MG/DL (ref 0.1–2)
BUN BLD-MCNC: 33 MG/DL (ref 8–20)
BUN/CREAT SERPL: 17.8 (ref 10–20)
CALCIUM BLD-MCNC: 8.8 MG/DL (ref 8.3–10.3)
CHLORIDE SERPL-SCNC: 105 MMOL/L (ref 101–111)
CO2 SERPL-SCNC: 22 MMOL/L (ref 22–32)
CREAT BLD-MCNC: 1.85 MG/DL (ref 0.55–1.02)
D-DIMER: 2.07 UG/ML FEU (ref 0–0.49)
EOSINOPHIL # BLD AUTO: 0.43 X10(3) UL (ref 0–0.3)
EOSINOPHIL NFR BLD AUTO: 4.9 %
ERYTHROCYTE [DISTWIDTH] IN BLOOD BY AUTOMATED COUNT: 15.2 % (ref 11.5–16)
GLOBULIN PLAS-MCNC: 4.2 G/DL (ref 2.5–4)
GLUCOSE BLD-MCNC: 94 MG/DL (ref 70–99)
HCT VFR BLD AUTO: 32.8 % (ref 34–50)
HGB BLD-MCNC: 10.7 G/DL (ref 12–16)
IMMATURE GRANULOCYTE COUNT: 0.02 X10(3) UL (ref 0–1)
IMMATURE GRANULOCYTE RATIO %: 0.2 %
INR BLD: 1.06 (ref 0.9–1.1)
LYMPHOCYTES # BLD AUTO: 1.73 X10(3) UL (ref 0.9–4)
LYMPHOCYTES NFR BLD AUTO: 19.8 %
M PROTEIN MFR SERPL ELPH: 7.5 G/DL (ref 6.1–8.3)
MCH RBC QN AUTO: 29.1 PG (ref 27–33.2)
MCHC RBC AUTO-ENTMCNC: 32.6 G/DL (ref 31–37)
MCV RBC AUTO: 89.1 FL (ref 81–100)
MONOCYTES # BLD AUTO: 0.78 X10(3) UL (ref 0.1–1)
MONOCYTES NFR BLD AUTO: 8.9 %
NEUTROPHIL ABS PRELIM: 5.73 X10 (3) UL (ref 1.3–6.7)
NEUTROPHILS # BLD AUTO: 5.73 X10(3) UL (ref 1.3–6.7)
NEUTROPHILS NFR BLD AUTO: 65.6 %
OSMOLALITY SERPL CALC.SUM OF ELEC: 289 MOSM/KG (ref 275–295)
P AXIS: 65 DEGREES
P-R INTERVAL: 196 MS
PLATELET # BLD AUTO: 217 10(3)UL (ref 150–450)
POTASSIUM SERPL-SCNC: 5.2 MMOL/L (ref 3.6–5.1)
PSA SERPL DL<=0.01 NG/ML-MCNC: 14.2 SECONDS (ref 12.4–14.7)
Q-T INTERVAL: 518 MS
QRS DURATION: 100 MS
QTC CALCULATION (BEZET): 427 MS
R AXIS: -22 DEGREES
RBC # BLD AUTO: 3.68 X10(6)UL (ref 3.8–5.1)
RED CELL DISTRIBUTION WIDTH-SD: 48.7 FL (ref 35.1–46.3)
SODIUM SERPL-SCNC: 136 MMOL/L (ref 136–144)
T AXIS: 48 DEGREES
TROPONIN I SERPL-MCNC: <0.046 NG/ML (ref ?–0.05)
TSI SER-ACNC: 4.57 MIU/ML (ref 0.35–5.5)
VENTRICULAR RATE: 41 BPM
WBC # BLD AUTO: 8.7 X10(3) UL (ref 4–13)

## 2018-09-05 PROCEDURE — 85610 PROTHROMBIN TIME: CPT | Performed by: NURSE PRACTITIONER

## 2018-09-05 PROCEDURE — 85025 COMPLETE CBC W/AUTO DIFF WBC: CPT | Performed by: NURSE PRACTITIONER

## 2018-09-05 PROCEDURE — 80053 COMPREHEN METABOLIC PANEL: CPT | Performed by: NURSE PRACTITIONER

## 2018-09-05 PROCEDURE — 93970 EXTREMITY STUDY: CPT | Performed by: EMERGENCY MEDICINE

## 2018-09-05 PROCEDURE — 99285 EMERGENCY DEPT VISIT HI MDM: CPT

## 2018-09-05 PROCEDURE — 85730 THROMBOPLASTIN TIME PARTIAL: CPT | Performed by: NURSE PRACTITIONER

## 2018-09-05 PROCEDURE — 93010 ELECTROCARDIOGRAM REPORT: CPT

## 2018-09-05 PROCEDURE — 85378 FIBRIN DEGRADE SEMIQUANT: CPT | Performed by: NURSE PRACTITIONER

## 2018-09-05 PROCEDURE — 71046 X-RAY EXAM CHEST 2 VIEWS: CPT | Performed by: NURSE PRACTITIONER

## 2018-09-05 PROCEDURE — 36415 COLL VENOUS BLD VENIPUNCTURE: CPT

## 2018-09-05 PROCEDURE — 84484 ASSAY OF TROPONIN QUANT: CPT | Performed by: NURSE PRACTITIONER

## 2018-09-05 PROCEDURE — 84443 ASSAY THYROID STIM HORMONE: CPT | Performed by: NURSE PRACTITIONER

## 2018-09-05 PROCEDURE — 93005 ELECTROCARDIOGRAM TRACING: CPT

## 2018-09-05 NOTE — TELEPHONE ENCOUNTER
Pt is calling to let AS know. . Pulse rate is running, low 44 resting rate, when walking up to 52. That is a low pulse, Pt is being seen at her home for PT. Is there any instructions from the doctor to continue? Is there anything they should be doing?

## 2018-09-05 NOTE — TELEPHONE ENCOUNTER
Called pt, LMTCB for more information re: HR and any s/s of bradycardia. [facility], she stated she was not with pt, the PTA was and stated she checked pt's pulse with stethoscope and pulse oximeter and got the same reading, HR 44.   Reports pt'

## 2018-09-05 NOTE — ED PROVIDER NOTES
Patient Seen in: [facility] Emergency Department    History   Patient presents with:  Arrythmia/Palpitations (cardiovascular)    Stated Complaint: pulse 38, asymptomatic    HPI  Patient is a 41-year-old female presents to the ER with bradycardia.   H ([facility] 3/19/2014   • Unspecified essential hypertension    • Unspecified sleep apnea    • Wears glasses    • Wheezing        Past Surgical History:  No date: ANGIOGRAM  2/28/14: ANGIOPLASTY (CORONARY)      Comment: stents acute mi  No date: APPENDECTOMY  No murmur heard. No carotid bruits auscultated. Calves are supple, non-tender, without swelling. Negative Brianna's sign B/L   Pulmonary/Chest: Effort normal. No respiratory distress. She has no wheezes.  She has rales (Bibasilar crackles with left greater jules Normal   PROTHROMBIN TIME (PT) - Normal   PTT, ACTIVATED - Normal   CBC WITH DIFFERENTIAL WITH PLATELET    Narrative: The following orders were created for panel order CBC WITH DIFFERENTIAL WITH PLATELET.   Procedure                               Abnorm 8/13/2018  PROCEDURE:  US CAROTID DOPPLER BILAT - DIAG IMG (CPT=93880)  COMPARISON:  [facility] , CT BRAIN OR HEAD ([id number]), 8/09/2018, 15:27.   INDICATIONS:  I65.21 Occlusion and stenosis of right carotid artery I63.511 Cerebral infarction due to unspecified oc carotid arteries bilaterally, greater on the right. This is suggestive of stenosis in the range of 50-69%. There is a right carotid stent which appears patent but may contain in stent stenosis.   This may be better characterize with conventional angiogram Doppler spectral analysis, and color flow. Doppler imaging were performed. The following veins were imaged:  Common, deep, and superficial femoral, popliteal, sapheno-femoral junction, posterior tibial veins, and the contralateral common femoral vein.   P Rigoberto Hand . 51.  [phone]    Schedule an appointment as soon as possible for a visit      Deejay Hoyt MD  [address] [id number]  [phone]    Schedule an appointment as soon as p

## 2018-09-05 NOTE — TELEPHONE ENCOUNTER
Maria Victoria from PT dept returned call, stating around 11:45 am today, pt's HR 42 manual, /60, O2 97%, sitting upright in wheelchair, feet on the floor.   Pt is asymptomatic, felt tired because she woke up early around 7:15 am and she drank about 8 oz water

## 2018-09-05 NOTE — TELEPHONE ENCOUNTER
Please call Daughter Lauri Vogel. Explain importance of cardiology follow up as I will not be filling her cardiac medications when refills are needed as well as current situation. Her mother does not want to return to ER for evaluation.    If this does not r

## 2018-09-05 NOTE — ED INITIAL ASSESSMENT (HPI)
Stroke April 26 and then to rehab; patient states home RN today took vitals and concerned for low HR (patient states it was 38 but BP was good)

## 2018-09-05 NOTE — TELEPHONE ENCOUNTER
Called [facility]) and left detailed VM explaining that pt needs to see Cardiology asap or she will need to go to ER for eval/treatment ([phone] [address] per HIPAA).   Called [facility] ([facility]), ([phone] Rosario per HIPAA) and explained urgency of having pt evaluated and was informed they we

## 2018-09-09 DIAGNOSIS — E11.65 UNCONTROLLED TYPE 2 DIABETES MELLITUS WITH HYPERGLYCEMIA (HCC): ICD-10-CM

## 2018-09-10 ENCOUNTER — TELEPHONE (OUTPATIENT)
Dept: INTERNAL MEDICINE CLINIC | Facility: CLINIC | Age: 68
End: 2018-09-10

## 2018-09-10 DIAGNOSIS — I69.151 HEMIPARESIS OF RIGHT DOMINANT SIDE AS LATE EFFECT OF NONTRAUMATIC INTRACEREBRAL HEMORRHAGE (HCC): ICD-10-CM

## 2018-09-10 DIAGNOSIS — F32.0 CURRENT MILD EPISODE OF MAJOR DEPRESSIVE DISORDER, UNSPECIFIED WHETHER RECURRENT (HCC): ICD-10-CM

## 2018-09-10 DIAGNOSIS — I10 ESSENTIAL HYPERTENSION: Primary | ICD-10-CM

## 2018-09-10 DIAGNOSIS — E11.65 UNCONTROLLED TYPE 2 DIABETES MELLITUS WITH HYPERGLYCEMIA (HCC): ICD-10-CM

## 2018-09-10 DIAGNOSIS — E78.00 HIGH CHOLESTEROL: ICD-10-CM

## 2018-09-10 DIAGNOSIS — J43.2 CENTRILOBULAR EMPHYSEMA (HCC): ICD-10-CM

## 2018-09-10 RX ORDER — GLIPIZIDE 10 MG/1
TABLET, FILM COATED, EXTENDED RELEASE ORAL
Qty: 60 TABLET | Refills: 1 | Status: SHIPPED | OUTPATIENT
Start: 2018-09-10 | End: 2018-09-10

## 2018-09-10 NOTE — TELEPHONE ENCOUNTER
Pts daughter Julieth Warren called and would like a call back from [place] to make herman that its all set up for the pt to start getting her medications from her mail order pharmacy     Please advise

## 2018-09-10 NOTE — TELEPHONE ENCOUNTER
Naheed Boyer pt's dtr states pt was in here on 8/29/18 to see SD, states pt has an appt on 9/19/18 with Dr Evelyn Oliveira Cardiologist.  Pt was given a 30 day supply of medications when she was discharged from [facility] so she will need refills soon.   Risa Junior

## 2018-09-11 ENCOUNTER — TELEPHONE (OUTPATIENT)
Dept: INTERNAL MEDICINE CLINIC | Facility: CLINIC | Age: 68
End: 2018-09-11

## 2018-09-11 RX ORDER — AMLODIPINE BESYLATE 10 MG/1
10 TABLET ORAL DAILY
Qty: 90 TABLET | Refills: 1 | Status: SHIPPED | OUTPATIENT
Start: 2018-09-11 | End: 2018-09-22

## 2018-09-11 RX ORDER — CLOPIDOGREL BISULFATE 75 MG/1
75 TABLET ORAL DAILY
Qty: 90 TABLET | Refills: 0 | Status: SHIPPED | OUTPATIENT
Start: 2018-09-11 | End: 2018-09-22

## 2018-09-11 RX ORDER — LOSARTAN POTASSIUM 50 MG/1
50 TABLET ORAL 2 TIMES DAILY
Qty: 180 TABLET | Refills: 0 | Status: SHIPPED | OUTPATIENT
Start: 2018-09-11 | End: 2018-09-22

## 2018-09-11 RX ORDER — HYDRALAZINE HYDROCHLORIDE 100 MG/1
100 TABLET, FILM COATED ORAL 3 TIMES DAILY
Qty: 270 TABLET | Refills: 0 | Status: SHIPPED | OUTPATIENT
Start: 2018-09-11 | End: 2018-09-22

## 2018-09-11 RX ORDER — ROSUVASTATIN CALCIUM 40 MG/1
40 TABLET, COATED ORAL DAILY
Qty: 90 TABLET | Refills: 1 | Status: SHIPPED | OUTPATIENT
Start: 2018-09-11 | End: 2018-09-22

## 2018-09-11 RX ORDER — EZETIMIBE 10 MG/1
10 TABLET ORAL NIGHTLY
Qty: 90 TABLET | Refills: 1 | Status: SHIPPED | OUTPATIENT
Start: 2018-09-11 | End: 2018-09-22

## 2018-09-11 RX ORDER — SPIRONOLACTONE 25 MG/1
12.5 TABLET ORAL DAILY
Qty: 45 TABLET | Refills: 0 | Status: SHIPPED | OUTPATIENT
Start: 2018-09-11 | End: 2018-09-22

## 2018-09-11 RX ORDER — SERTRALINE HYDROCHLORIDE 100 MG/1
100 TABLET, FILM COATED ORAL
Qty: 90 TABLET | Refills: 1 | Status: SHIPPED | OUTPATIENT
Start: 2018-09-11 | End: 2018-12-26

## 2018-09-11 RX ORDER — TIOTROPIUM BROMIDE 18 UG/1
18 CAPSULE ORAL; RESPIRATORY (INHALATION) DAILY
Qty: 90 CAPSULE | Refills: 1 | Status: SHIPPED | OUTPATIENT
Start: 2018-09-11 | End: 2019-03-12

## 2018-09-11 RX ORDER — PANTOPRAZOLE SODIUM 40 MG/1
40 TABLET, DELAYED RELEASE ORAL EVERY EVENING
Qty: 90 TABLET | Refills: 1 | Status: SHIPPED | OUTPATIENT
Start: 2018-09-11 | End: 2018-09-22

## 2018-09-11 NOTE — TELEPHONE ENCOUNTER
LM for Stephen Hunter, pt's dtr at [phone] (M) vm medications ordered for [facility], discontinued Glipizide, notified in the future Cardiology and /or Nephrology should refill Plavix, Hydralazine and Spironolactone and to call back if any questio

## 2018-09-11 NOTE — TELEPHONE ENCOUNTER
D/c glipizide at this time. Will re eval in f/u with glucoses  Clarify vial/syringe of lantus versus pen?   Ok for 90 days 1 refill  Clarify losartan 50mg is bid?  plavix and spironolactone, hydralazine filled 90 days no refills   Further refills per spec

## 2018-09-11 NOTE — TELEPHONE ENCOUNTER
SD sent in rx today for Rosuvastatin Calcium 40 MG Oral Tab and they have noted pt has allergy to Lipitor-ok to give this rx? ? Call to advise

## 2018-09-12 ENCOUNTER — TELEPHONE (OUTPATIENT)
Dept: INTERNAL MEDICINE CLINIC | Facility: CLINIC | Age: 68
End: 2018-09-12

## 2018-09-12 NOTE — TELEPHONE ENCOUNTER
Called [facility] Hernan and notified him ok to dispense Crestor pt has taken in the past and does not have allergy to Lipitor only sensitivity.

## 2018-09-14 ENCOUNTER — TELEPHONE (OUTPATIENT)
Dept: INTERNAL MEDICINE CLINIC | Facility: CLINIC | Age: 68
End: 2018-09-14

## 2018-09-14 NOTE — TELEPHONE ENCOUNTER
Order for needing a resting hand splint for her left hand have been placed in SD bin to review and sign

## 2018-09-19 ENCOUNTER — PRIOR ORIGINAL RECORDS (OUTPATIENT)
Dept: OTHER | Age: 68
End: 2018-09-19

## 2018-09-19 ENCOUNTER — MYAURORA ACCOUNT LINK (OUTPATIENT)
Dept: OTHER | Age: 68
End: 2018-09-19

## 2018-09-19 NOTE — TELEPHONE ENCOUNTER
Pts daughter [place] Cordial called and staterd that pts cardiologist Carla Choi will be filling all medications except for the mood medication and the acid reflux medication.      If we have any questions please call Yvonne Bar at [phone]

## 2018-09-20 NOTE — TELEPHONE ENCOUNTER
And I assume diabetic medications per us as well. Ok to send 90 days if needed to mail order  Should see me or AS in 8 weeks.

## 2018-09-20 NOTE — TELEPHONE ENCOUNTER
Rhonda Chavarria, pt's dtr indicates SD/AS should order DM meds also. Dtr notified [facility] for [facility] Dtr notified to schedule a f/u appt with SD or AS in 8 weeks. Dtr verbalizes understanding.

## 2018-09-22 DIAGNOSIS — I10 ESSENTIAL HYPERTENSION: ICD-10-CM

## 2018-09-22 DIAGNOSIS — I69.151 HEMIPARESIS OF RIGHT DOMINANT SIDE AS LATE EFFECT OF NONTRAUMATIC INTRACEREBRAL HEMORRHAGE (HCC): ICD-10-CM

## 2018-09-22 DIAGNOSIS — E11.65 UNCONTROLLED TYPE 2 DIABETES MELLITUS WITH HYPERGLYCEMIA (HCC): ICD-10-CM

## 2018-09-22 DIAGNOSIS — E78.00 HIGH CHOLESTEROL: ICD-10-CM

## 2018-09-22 NOTE — TELEPHONE ENCOUNTER
Pt's daughter called stated mother will not get her mail order in time is requesting a 30 day supply to be sent to her local [facility] on file.  Please advise she is out of 3 of the following and will run out tomorrow from the rest. Please advise ASAP    ezetim

## 2018-09-23 RX ORDER — CLOPIDOGREL BISULFATE 75 MG/1
75 TABLET ORAL DAILY
Qty: 30 TABLET | Refills: 0 | Status: SHIPPED | OUTPATIENT
Start: 2018-09-23 | End: 2019-03-12

## 2018-09-23 RX ORDER — ROSUVASTATIN CALCIUM 40 MG/1
40 TABLET, COATED ORAL DAILY
Qty: 30 TABLET | Refills: 0 | Status: SHIPPED | OUTPATIENT
Start: 2018-09-23

## 2018-09-23 RX ORDER — PANTOPRAZOLE SODIUM 40 MG/1
40 TABLET, DELAYED RELEASE ORAL EVERY EVENING
Qty: 30 TABLET | Refills: 0 | Status: SHIPPED | OUTPATIENT
Start: 2018-09-23 | End: 2021-04-15

## 2018-09-23 RX ORDER — AMLODIPINE BESYLATE 10 MG/1
10 TABLET ORAL DAILY
Qty: 30 TABLET | Refills: 0 | Status: SHIPPED | OUTPATIENT
Start: 2018-09-23

## 2018-09-23 RX ORDER — SPIRONOLACTONE 25 MG/1
12.5 TABLET ORAL DAILY
Qty: 15 TABLET | Refills: 0 | Status: SHIPPED | OUTPATIENT
Start: 2018-09-23

## 2018-09-23 RX ORDER — HYDRALAZINE HYDROCHLORIDE 100 MG/1
100 TABLET, FILM COATED ORAL 3 TIMES DAILY
Qty: 90 TABLET | Refills: 0 | Status: SHIPPED | OUTPATIENT
Start: 2018-09-23 | End: 2019-03-12

## 2018-09-23 RX ORDER — EZETIMIBE 10 MG/1
10 TABLET ORAL NIGHTLY
Qty: 30 TABLET | Refills: 0 | Status: SHIPPED | OUTPATIENT
Start: 2018-09-23 | End: 2019-03-12

## 2018-09-23 RX ORDER — LOSARTAN POTASSIUM 50 MG/1
50 TABLET ORAL 2 TIMES DAILY
Qty: 60 TABLET | Refills: 0 | Status: SHIPPED | OUTPATIENT
Start: 2018-09-23

## 2018-09-25 NOTE — TELEPHONE ENCOUNTER
Per Dr. Carmen Nielsen via staff message    \"Due to her poor renal function, I would like to not pursue CTA which may give us limited information in any case. Plan is to do a conventional single vessel angiogram in October.  She must stay on her antiplatelet medic

## 2018-09-26 LAB
ALBUMIN: 3.3 G/DL
ALBUMIN: 3.7 G/DL
ALKALINE PHOSPHATATE(ALK PHOS): 58 IU/L
ALKALINE PHOSPHATATE(ALK PHOS): 70 IU/L
ALT (SGPT): 64 U/L
AST (SGOT): 54 U/L
BILIRUBIN TOTAL: 0.4 MG/DL
BILIRUBIN TOTAL: 0.6 MG/DL
BUN: 33 MG/DL
BUN: 37 MG/DL
CALCIUM: 8.8 MG/DL
CALCIUM: 9.4 MG/DL
CHLORIDE: 105 MEQ/L
CREATININE, SERUM: 1.71 MG/DL
CREATININE, SERUM: 1.85 MG/DL
GLOBULIN: 4.1 G/DL
GLOBULIN: 4.2 G/DL
GLUCOSE: 107 MG/DL
GLUCOSE: 94 MG/DL
HEMATOCRIT: 32.8 %
HEMOGLOBIN A1C: 6.5 %
HEMOGLOBIN: 10.7 G/DL
PLATELETS: 217 K/UL
POTASSIUM, SERUM: 4.4 MEQ/L
POTASSIUM, SERUM: 5.2 MEQ/L
PROTEIN, TOTAL: 7.5 G/DL
PROTEIN, TOTAL: 7.8 G/DL
RED BLOOD COUNT: 3.68 X 10-6/U
SGOT (AST): 53 IU/L
SGOT (AST): 54 IU/L
SGPT (ALT): 64 IU/L
SGPT (ALT): 75 IU/L
SODIUM: 136 MEQ/L
SODIUM: 139 MEQ/L
THYROID STIMULATING HORMONE: 4.57 MLU/L
WHITE BLOOD COUNT: 8.7 X 10-3/U

## 2018-09-27 NOTE — TELEPHONE ENCOUNTER
Left detailed message on personalized VM informing pt to not stop her Asprin and Not stop her plavix. Informed her Dr. Aleksey Guardado is not wanting to do a CTA at this time and she should call our office for more information.        Will attempt to call patient ba

## 2018-09-28 ENCOUNTER — TELEPHONE (OUTPATIENT)
Dept: INTERNAL MEDICINE CLINIC | Facility: CLINIC | Age: 68
End: 2018-09-28

## 2018-10-03 ENCOUNTER — TELEPHONE (OUTPATIENT)
Dept: INTERNAL MEDICINE CLINIC | Facility: CLINIC | Age: 68
End: 2018-10-03

## 2018-10-03 NOTE — TELEPHONE ENCOUNTER
Mariana Flores is finishing up home health PT and OT and daughter Gustavo treadwell per hipaa is requesting a referral for outside PT and OT to continue. Please advise.

## 2018-10-03 NOTE — TELEPHONE ENCOUNTER
LOV: 8/29/18    Called [facility] per HIPAA) for more information. LMTCB to clarify if she is requesting OT/PT outside the home and asked if pt would be able to come to [facility] location.

## 2018-10-12 ENCOUNTER — TELEPHONE (OUTPATIENT)
Dept: INTERNAL MEDICINE CLINIC | Facility: CLINIC | Age: 68
End: 2018-10-12

## 2018-10-12 NOTE — TELEPHONE ENCOUNTER
Received orders from [address] [place] [address] and were placed on SD bin for signature.

## 2018-10-12 NOTE — TELEPHONE ENCOUNTER
Tried to [facility] for Kati Toledo, pt's dtr at [phone] but mailbox is full. LM for pt at [phone] (M)vm to call back to discuss PT. LM for pt at [phone] (H) to call back to discuss PT.

## 2018-10-15 ENCOUNTER — TELEPHONE (OUTPATIENT)
Dept: INTERNAL MEDICINE CLINIC | Facility: CLINIC | Age: 68
End: 2018-10-15

## 2018-10-15 NOTE — TELEPHONE ENCOUNTER
Orders received from [facility] for resting hand splint .  Order placed in SD bin to be reviewed and sent back

## 2018-10-19 ENCOUNTER — TELEPHONE (OUTPATIENT)
Dept: INTERNAL MEDICINE CLINIC | Facility: CLINIC | Age: 68
End: 2018-10-19

## 2018-10-19 NOTE — TELEPHONE ENCOUNTER
LM for [place] [facility], pt's dtr to call back if any orders are needed, notified we have been trying to reach her but have been unsuccessful, notified if she doesn't call back , we will assume she received everything she needed.

## 2018-10-19 NOTE — TELEPHONE ENCOUNTER
Pt was told to call SD and let her know she has been accepted at [facility] for out patient rehab

## 2018-10-26 ENCOUNTER — TELEPHONE (OUTPATIENT)
Dept: INTERNAL MEDICINE CLINIC | Facility: CLINIC | Age: 68
End: 2018-10-26

## 2018-10-26 NOTE — TELEPHONE ENCOUNTER
Received request for order regarding need for left upper extremity brace. Please see documentation from last ov regarding her recent stroke and left sided hemiparesis.   She continues to work with PT and OT to regain independence as outpatient    She is un

## 2018-10-26 NOTE — TELEPHONE ENCOUNTER
Paperwork received from [email or web address] to be reviewed signed and faxed back , placed in SD bin to be completed .

## 2018-10-29 ENCOUNTER — TELEPHONE (OUTPATIENT)
Dept: INTERNAL MEDICINE CLINIC | Facility: CLINIC | Age: 68
End: 2018-10-29

## 2018-11-05 ENCOUNTER — TELEPHONE (OUTPATIENT)
Dept: NEUROLOGY | Facility: CLINIC | Age: 68
End: 2018-11-05

## 2018-11-05 ENCOUNTER — TELEPHONE (OUTPATIENT)
Dept: INTERNAL MEDICINE CLINIC | Facility: CLINIC | Age: 68
End: 2018-11-05

## 2018-11-05 NOTE — TELEPHONE ENCOUNTER
Fax received from [employer]  For DWO  For elbow assists/wrist assist form to be reviewed signed dated and faxed back company is also requiring a note be sent back with paperwork , placed in SD bin to be completed.

## 2018-11-12 NOTE — TELEPHONE ENCOUNTER
Faxed DWO, progress notes and letter signed by SD to [facility], confirmation was received.

## 2018-11-16 ENCOUNTER — PRIOR ORIGINAL RECORDS (OUTPATIENT)
Dept: OTHER | Age: 68
End: 2018-11-16

## 2018-12-12 ENCOUNTER — APPOINTMENT (OUTPATIENT)
Dept: CT IMAGING | Facility: HOSPITAL | Age: 68
End: 2018-12-12
Attending: EMERGENCY MEDICINE
Payer: MEDICARE

## 2018-12-12 ENCOUNTER — HOSPITAL ENCOUNTER (OUTPATIENT)
Facility: HOSPITAL | Age: 68
Setting detail: OBSERVATION
Discharge: HOME HEALTH | End: 2018-12-14
Attending: EMERGENCY MEDICINE | Admitting: HOSPITALIST
Payer: MEDICARE

## 2018-12-12 ENCOUNTER — APPOINTMENT (OUTPATIENT)
Dept: GENERAL RADIOLOGY | Facility: HOSPITAL | Age: 68
End: 2018-12-12
Attending: EMERGENCY MEDICINE
Payer: MEDICARE

## 2018-12-12 ENCOUNTER — APPOINTMENT (OUTPATIENT)
Dept: CV DIAGNOSTICS | Facility: HOSPITAL | Age: 68
End: 2018-12-12
Attending: INTERNAL MEDICINE
Payer: MEDICARE

## 2018-12-12 DIAGNOSIS — R77.8 ELEVATED TROPONIN: ICD-10-CM

## 2018-12-12 DIAGNOSIS — R41.0 DELIRIUM, ACUTE: ICD-10-CM

## 2018-12-12 DIAGNOSIS — N39.0 URINARY TRACT INFECTION WITHOUT HEMATURIA, SITE UNSPECIFIED: Primary | ICD-10-CM

## 2018-12-12 PROBLEM — R79.89 AZOTEMIA: Status: ACTIVE | Noted: 2018-12-12

## 2018-12-12 PROBLEM — E87.2 METABOLIC ACIDOSIS: Status: ACTIVE | Noted: 2018-12-12

## 2018-12-12 PROBLEM — R73.9 HYPERGLYCEMIA: Status: ACTIVE | Noted: 2018-12-12

## 2018-12-12 PROBLEM — E87.20 METABOLIC ACIDOSIS: Status: ACTIVE | Noted: 2018-12-12

## 2018-12-12 LAB
ALBUMIN SERPL-MCNC: 3.4 G/DL (ref 3.1–4.5)
ALBUMIN/GLOB SERPL: 0.8 {RATIO} (ref 1–2)
ALP LIVER SERPL-CCNC: 62 U/L (ref 55–142)
ALT SERPL-CCNC: 58 U/L (ref 14–54)
ANION GAP SERPL CALC-SCNC: 12 MMOL/L (ref 0–18)
AST SERPL-CCNC: 45 U/L (ref 15–41)
BASOPHILS # BLD AUTO: 0.04 X10(3) UL (ref 0–0.1)
BASOPHILS NFR BLD AUTO: 0.3 %
BILIRUB SERPL-MCNC: 0.4 MG/DL (ref 0.1–2)
BILIRUB UR QL STRIP.AUTO: NEGATIVE
BUN BLD-MCNC: 29 MG/DL (ref 8–20)
BUN/CREAT SERPL: 25.4 (ref 10–20)
CALCIUM BLD-MCNC: 9.2 MG/DL (ref 8.3–10.3)
CHLORIDE SERPL-SCNC: 108 MMOL/L (ref 101–111)
CLARITY UR REFRACT.AUTO: CLEAR
CO2 SERPL-SCNC: 20 MMOL/L (ref 22–32)
CREAT BLD-MCNC: 1.14 MG/DL (ref 0.55–1.02)
EOSINOPHIL # BLD AUTO: 0.48 X10(3) UL (ref 0–0.3)
EOSINOPHIL NFR BLD AUTO: 4.1 %
ERYTHROCYTE [DISTWIDTH] IN BLOOD BY AUTOMATED COUNT: 17.2 % (ref 11.5–16)
GLOBULIN PLAS-MCNC: 4.4 G/DL (ref 2.8–4.4)
GLUCOSE BLD-MCNC: 141 MG/DL (ref 70–99)
GLUCOSE BLD-MCNC: 163 MG/DL (ref 65–99)
GLUCOSE BLD-MCNC: 93 MG/DL (ref 65–99)
GLUCOSE BLD-MCNC: 93 MG/DL (ref 65–99)
GLUCOSE UR STRIP.AUTO-MCNC: NEGATIVE MG/DL
HCT VFR BLD AUTO: 37.9 % (ref 34–50)
HGB BLD-MCNC: 12.3 G/DL (ref 12–16)
HYALINE CASTS #/AREA URNS AUTO: PRESENT /LPF
IMMATURE GRANULOCYTE COUNT: 0.03 X10(3) UL (ref 0–1)
IMMATURE GRANULOCYTE RATIO %: 0.3 %
KETONES UR STRIP.AUTO-MCNC: NEGATIVE MG/DL
LYMPHOCYTES # BLD AUTO: 1.27 X10(3) UL (ref 0.9–4)
LYMPHOCYTES NFR BLD AUTO: 10.9 %
M PROTEIN MFR SERPL ELPH: 7.8 G/DL (ref 6.4–8.2)
MCH RBC QN AUTO: 28.5 PG (ref 27–33.2)
MCHC RBC AUTO-ENTMCNC: 32.5 G/DL (ref 31–37)
MCV RBC AUTO: 87.9 FL (ref 81–100)
MONOCYTES # BLD AUTO: 0.5 X10(3) UL (ref 0.1–1)
MONOCYTES NFR BLD AUTO: 4.3 %
NEUTROPHIL ABS PRELIM: 9.32 X10 (3) UL (ref 1.3–6.7)
NEUTROPHILS # BLD AUTO: 9.32 X10(3) UL (ref 1.3–6.7)
NEUTROPHILS NFR BLD AUTO: 80.1 %
NITRITE UR QL STRIP.AUTO: POSITIVE
OSMOLALITY SERPL CALC.SUM OF ELEC: 298 MOSM/KG (ref 275–295)
PH UR STRIP.AUTO: 5 [PH] (ref 4.5–8)
PLATELET # BLD AUTO: 226 10(3)UL (ref 150–450)
POTASSIUM SERPL-SCNC: 4.1 MMOL/L (ref 3.6–5.1)
PROT UR STRIP.AUTO-MCNC: NEGATIVE MG/DL
RBC # BLD AUTO: 4.31 X10(6)UL (ref 3.8–5.1)
RBC UR QL AUTO: NEGATIVE
RED CELL DISTRIBUTION WIDTH-SD: 55.7 FL (ref 35.1–46.3)
SODIUM SERPL-SCNC: 140 MMOL/L (ref 136–144)
SP GR UR STRIP.AUTO: 1.01 (ref 1–1.03)
TROPONIN I SERPL-MCNC: 0.07 NG/ML (ref ?–0.05)
TROPONIN I SERPL-MCNC: 0.32 NG/ML (ref ?–0.05)
TROPONIN I SERPL-MCNC: 0.52 NG/ML (ref ?–0.05)
TSI SER-ACNC: 2.07 MIU/ML (ref 0.35–5.5)
UROBILINOGEN UR STRIP.AUTO-MCNC: <2 MG/DL
WBC # BLD AUTO: 11.6 X10(3) UL (ref 4–13)

## 2018-12-12 PROCEDURE — 93306 TTE W/DOPPLER COMPLETE: CPT | Performed by: INTERNAL MEDICINE

## 2018-12-12 PROCEDURE — 70450 CT HEAD/BRAIN W/O DYE: CPT | Performed by: EMERGENCY MEDICINE

## 2018-12-12 PROCEDURE — 71045 X-RAY EXAM CHEST 1 VIEW: CPT | Performed by: EMERGENCY MEDICINE

## 2018-12-12 PROCEDURE — 99220 INITIAL OBSERVATION CARE,LEVL III: CPT | Performed by: STUDENT IN AN ORGANIZED HEALTH CARE EDUCATION/TRAINING PROGRAM

## 2018-12-12 RX ORDER — HYDRALAZINE HYDROCHLORIDE 50 MG/1
100 TABLET, FILM COATED ORAL 3 TIMES DAILY
Status: DISCONTINUED | OUTPATIENT
Start: 2018-12-12 | End: 2018-12-14

## 2018-12-12 RX ORDER — METOCLOPRAMIDE HYDROCHLORIDE 5 MG/ML
10 INJECTION INTRAMUSCULAR; INTRAVENOUS EVERY 8 HOURS PRN
Status: DISCONTINUED | OUTPATIENT
Start: 2018-12-12 | End: 2018-12-14

## 2018-12-12 RX ORDER — SERTRALINE HYDROCHLORIDE 100 MG/1
100 TABLET, FILM COATED ORAL
Status: DISCONTINUED | OUTPATIENT
Start: 2018-12-12 | End: 2018-12-14

## 2018-12-12 RX ORDER — ASPIRIN 81 MG/1
81 TABLET ORAL DAILY
Status: DISCONTINUED | OUTPATIENT
Start: 2018-12-12 | End: 2018-12-14

## 2018-12-12 RX ORDER — SODIUM PHOSPHATE, DIBASIC AND SODIUM PHOSPHATE, MONOBASIC 7; 19 G/133ML; G/133ML
1 ENEMA RECTAL ONCE AS NEEDED
Status: DISCONTINUED | OUTPATIENT
Start: 2018-12-12 | End: 2018-12-14

## 2018-12-12 RX ORDER — ACETAMINOPHEN 325 MG/1
650 TABLET ORAL EVERY 6 HOURS PRN
Status: DISCONTINUED | OUTPATIENT
Start: 2018-12-12 | End: 2018-12-14

## 2018-12-12 RX ORDER — AMLODIPINE BESYLATE 10 MG/1
10 TABLET ORAL DAILY
Status: DISCONTINUED | OUTPATIENT
Start: 2018-12-12 | End: 2018-12-14

## 2018-12-12 RX ORDER — POLYETHYLENE GLYCOL 3350 17 G/17G
17 POWDER, FOR SOLUTION ORAL DAILY PRN
Status: DISCONTINUED | OUTPATIENT
Start: 2018-12-12 | End: 2018-12-14

## 2018-12-12 RX ORDER — ONDANSETRON 2 MG/ML
4 INJECTION INTRAMUSCULAR; INTRAVENOUS EVERY 6 HOURS PRN
Status: DISCONTINUED | OUTPATIENT
Start: 2018-12-12 | End: 2018-12-14

## 2018-12-12 RX ORDER — BISACODYL 10 MG
10 SUPPOSITORY, RECTAL RECTAL
Status: DISCONTINUED | OUTPATIENT
Start: 2018-12-12 | End: 2018-12-14

## 2018-12-12 RX ORDER — PANTOPRAZOLE SODIUM 40 MG/1
40 TABLET, DELAYED RELEASE ORAL EVERY EVENING
Status: DISCONTINUED | OUTPATIENT
Start: 2018-12-12 | End: 2018-12-14

## 2018-12-12 RX ORDER — LOSARTAN POTASSIUM 50 MG/1
50 TABLET ORAL 2 TIMES DAILY
Status: DISCONTINUED | OUTPATIENT
Start: 2018-12-12 | End: 2018-12-14

## 2018-12-12 RX ORDER — ROSUVASTATIN CALCIUM 40 MG/1
40 TABLET, COATED ORAL DAILY
Status: DISCONTINUED | OUTPATIENT
Start: 2018-12-12 | End: 2018-12-14

## 2018-12-12 RX ORDER — EZETIMIBE 10 MG/1
10 TABLET ORAL NIGHTLY
Status: DISCONTINUED | OUTPATIENT
Start: 2018-12-12 | End: 2018-12-14

## 2018-12-12 RX ORDER — CLOPIDOGREL BISULFATE 75 MG/1
75 TABLET ORAL DAILY
Status: DISCONTINUED | OUTPATIENT
Start: 2018-12-12 | End: 2018-12-14

## 2018-12-12 RX ORDER — SPIRONOLACTONE 25 MG/1
12.5 TABLET ORAL DAILY
Status: DISCONTINUED | OUTPATIENT
Start: 2018-12-12 | End: 2018-12-14

## 2018-12-12 RX ORDER — DEXTROSE MONOHYDRATE 25 G/50ML
50 INJECTION, SOLUTION INTRAVENOUS
Status: DISCONTINUED | OUTPATIENT
Start: 2018-12-12 | End: 2018-12-14

## 2018-12-12 NOTE — H&P
[facility] HOSPITALIST  History and Physical     Sai Villarreal Patient Status:  Observation    1950 MRN [id number]   [facility] 7NE-A Attending Caroline Ibarra MD   Hosp Day # 0 PCP Joselyn Venegas MD     Chief Complaint: AMS    Histor ENDARTERECTOMY Left 4/29/2016    Performed by Kamala Jackson MD at . [address] (BMS)      R carotid/coronary   • CHOLECYSTECTOMY  1987   •  SPLY CAROTID STENT     • [address] [facility] [place]   • OTHER SURGICAL HISTORY  2005    Hx of 0   Sertraline HCl 100 MG Oral Tab Take 1 tablet (100 mg total) by mouth once daily. Disp: 90 tablet Rfl: 1   insulin glargine 100 UNIT/ML Subcutaneous Solution Inject 10 Units into the skin every morning.  Inject 10 units in the am Disp: 5 pen Rfl: 1   ace the last 168 hours. Recent Labs   Lab  12/12/18   0921  12/12/18   1217   TROP  0.066*  0.320*       Imaging: Imaging data reviewed in Epic. ASSESSMENT / PLAN:     1. AMS due to suspected UTI  1. IV Abx  2. Urine Cx pending   3.  CT head without acu

## 2018-12-12 NOTE — ED NOTES
Patient remains updated with all results and plan for admission. Patient has completed food tray, tolerated well. Observed resting comfortably on cart. No distress observed.

## 2018-12-12 NOTE — ED NOTES
Report called to [facility] for 0705. Patient remains updated with plan of care. No complaints, watching tv at this time. Blood sugar stable following lunch and insulin administration. Alert and appropriate, baseline mental status per daughter.  Waiting for

## 2018-12-12 NOTE — ED NOTES
Patient taken to CT department at this time. Patient's daughter at bedside and remains updated with plan of care. States patient seems back to her baseline.

## 2018-12-12 NOTE — ED PROVIDER NOTES
Patient Seen in: [facility] Emergency Department    History   Patient presents with:  Altered Mental Status (neurologic)    Stated Complaint: ams    HPI    69-year-old white female with a past medical history of myocardial infarction, 2 coronary art unspecified type diabetes mellitus without mention of complication, not stated as uncontrolled    • [facility] 3/19/2014   • Unspecified essential hypertension    • Unspecified sleep apnea    • Wears glasses    • Wheezing        P transferred to emergency department bed. Her HEENT exam reveals a left-sided facial droop. Face is mildly flushed skin is somewhat clammy. She is looking around the room and answering questions. She is oriented x3 to person and place and time.   She has 9.32 (*)     Eosinophil Absolute 0.48 (*)     All other components within normal limits   CBC WITH DIFFERENTIAL WITH PLATELET    Narrative: The following orders were created for panel order CBC WITH DIFFERENTIAL WITH PLATELET.   Procedure HISTORY: (As transcribed by Technologist)  AMS. Confusion. FINDINGS:           Again identified is an area of encephalomalacia within the right frontal     lobe compatible with prior infarct.   This is unchanged from the prior     exam.  No ne without hematuria, site unspecified  (primary encounter diagnosis)  Delirium, acute  Elevated troponin    Disposition:  Admit  12/12/2018  1:22 pm    Follow-up:  No follow-up provider specified.       Medications Prescribed:  Current Discharge Medication Crystal Deal

## 2018-12-12 NOTE — HISTORICAL OFFICE NOTE
Cristobal Washington  : 1950  ACCOUNT:  [id number]  [phone]  PCP: Dr. Jhony Navarrete     TODAY'S DATE: 2017  DICTATED BY:  [Dr. Chou Pat: [Followup of Carotid artery stenosis, bilateral and Followup of History of CABG.]    HP use, advised to quit. smokes, advised to quit. CAFFEINE: 3 cups coffee daily. ALCOHOL: drinks rarely. EXERCISE: physical therapy. DIET: no special diet. MARITAL STATUS: . LIFESTYLE: high stress lifestyle. OCCUPATION: homemaker.      ALLERGIES: No Kno compensated and asymptomatic.   We will base further treatment decisions after the above cardiodiagnostic studies are performed, and we will follow this very nice lady back in office.  ]    PRESCRIPTIONS:   12/06/16 *Clopidogrel [id number]      ONE TABLE

## 2018-12-12 NOTE — ED INITIAL ASSESSMENT (HPI)
Patient arrives from home following  Reports of confusion and speech changes since about 840 am per daughter. Patient arrives drowsy but answering questions. Hx of prior CVA in April with left sided weakness.  Patient arrives covered in urine, strong odor t

## 2018-12-12 NOTE — ED NOTES
Patient reports she missed her am dose of lantus 10 units this morning. MD notified of this and verbal order obtained to administer. Lunch tray ordered for patient as well.

## 2018-12-12 NOTE — ED NOTES
Pt states she has to go on the bedpan. Pt noted to be incontinent of urine. Pt's linens changed and pt made comfortable on cart. Pt repositioned on cart for comfort and placed on bedpan for bowel movement.

## 2018-12-12 NOTE — ED NOTES
Patient is resting comfortably. Patient and daughter updated with results. Patient assisted onto bed pan. No distress observed. VSS.

## 2018-12-12 NOTE — PLAN OF CARE
Admitted from ER   Admission information received   Orders received and carried out   Alert, oriented x4   ECHO done   Rocephin for UTI   NC as needed.  Saturations high 90s on RA   Plan of care discussed with patient and daughter, will continue to monitor

## 2018-12-13 LAB
ANION GAP SERPL CALC-SCNC: 6 MMOL/L (ref 0–18)
ATRIAL RATE: 78 BPM
BASOPHILS # BLD AUTO: 0.07 X10(3) UL (ref 0–0.1)
BASOPHILS NFR BLD AUTO: 0.8 %
BNP SERPL-MCNC: 235 PG/ML (ref 2–99)
BUN BLD-MCNC: 27 MG/DL (ref 8–20)
BUN/CREAT SERPL: 28.1 (ref 10–20)
CALCIUM BLD-MCNC: 9.1 MG/DL (ref 8.3–10.3)
CHLORIDE SERPL-SCNC: 107 MMOL/L (ref 101–111)
CK SERPL-CCNC: 219 IU/L (ref 26–192)
CO2 SERPL-SCNC: 26 MMOL/L (ref 22–32)
CREAT BLD-MCNC: 0.96 MG/DL (ref 0.55–1.02)
EOSINOPHIL # BLD AUTO: 0.31 X10(3) UL (ref 0–0.3)
EOSINOPHIL NFR BLD AUTO: 3.8 %
ERYTHROCYTE [DISTWIDTH] IN BLOOD BY AUTOMATED COUNT: 17.2 % (ref 11.5–16)
GLUCOSE BLD-MCNC: 128 MG/DL (ref 65–99)
GLUCOSE BLD-MCNC: 193 MG/DL (ref 65–99)
GLUCOSE BLD-MCNC: 85 MG/DL (ref 65–99)
GLUCOSE BLD-MCNC: 87 MG/DL (ref 70–99)
GLUCOSE BLD-MCNC: 97 MG/DL (ref 65–99)
HAV IGM SER QL: 2 MG/DL (ref 1.8–2.5)
HCT VFR BLD AUTO: 37.6 % (ref 34–50)
HGB BLD-MCNC: 11.6 G/DL (ref 12–16)
IMMATURE GRANULOCYTE COUNT: 0.03 X10(3) UL (ref 0–1)
IMMATURE GRANULOCYTE RATIO %: 0.4 %
LYMPHOCYTES # BLD AUTO: 1.58 X10(3) UL (ref 0.9–4)
LYMPHOCYTES NFR BLD AUTO: 19.2 %
MCH RBC QN AUTO: 27.4 PG (ref 27–33.2)
MCHC RBC AUTO-ENTMCNC: 30.9 G/DL (ref 31–37)
MCV RBC AUTO: 88.9 FL (ref 81–100)
MONOCYTES # BLD AUTO: 0.72 X10(3) UL (ref 0.1–1)
MONOCYTES NFR BLD AUTO: 8.7 %
NEUTROPHIL ABS PRELIM: 5.54 X10 (3) UL (ref 1.3–6.7)
NEUTROPHILS # BLD AUTO: 5.54 X10(3) UL (ref 1.3–6.7)
NEUTROPHILS NFR BLD AUTO: 67.1 %
OSMOLALITY SERPL CALC.SUM OF ELEC: 292 MOSM/KG (ref 275–295)
P AXIS: 19 DEGREES
P-R INTERVAL: 160 MS
PLATELET # BLD AUTO: 233 10(3)UL (ref 150–450)
POTASSIUM SERPL-SCNC: 4 MMOL/L (ref 3.6–5.1)
Q-T INTERVAL: 414 MS
QRS DURATION: 100 MS
QTC CALCULATION (BEZET): 471 MS
R AXIS: -19 DEGREES
RBC # BLD AUTO: 4.23 X10(6)UL (ref 3.8–5.1)
RED CELL DISTRIBUTION WIDTH-SD: 56 FL (ref 35.1–46.3)
SED RATE-ML: 28 MM/HR (ref 0–25)
SODIUM SERPL-SCNC: 139 MMOL/L (ref 136–144)
T AXIS: 74 DEGREES
TROPONIN I SERPL-MCNC: 0.22 NG/ML (ref ?–0.05)
TROPONIN I SERPL-MCNC: 0.38 NG/ML (ref ?–0.05)
TSI SER-ACNC: 1.71 MIU/ML (ref 0.35–5.5)
VENTRICULAR RATE: 78 BPM
WBC # BLD AUTO: 8.3 X10(3) UL (ref 4–13)

## 2018-12-13 PROCEDURE — 99225 SUBSEQUENT OBSERVATION CARE: CPT | Performed by: STUDENT IN AN ORGANIZED HEALTH CARE EDUCATION/TRAINING PROGRAM

## 2018-12-13 RX ORDER — ENOXAPARIN SODIUM 100 MG/ML
40 INJECTION SUBCUTANEOUS EVERY 24 HOURS
Status: DISCONTINUED | OUTPATIENT
Start: 2018-12-13 | End: 2018-12-13

## 2018-12-13 RX ORDER — ENOXAPARIN SODIUM 100 MG/ML
40 INJECTION SUBCUTANEOUS DAILY
Status: DISCONTINUED | OUTPATIENT
Start: 2018-12-14 | End: 2018-12-14

## 2018-12-13 NOTE — CM/SW NOTE
12/13/18 1400   CM/SW Screening   Referral Source Social Work (self-referral)   [address] staff; Chart review;Nursing rounds   Patient's Mental Status Alert;Oriented   Patient's [address]   Patient lives with Children   Patient

## 2018-12-13 NOTE — PROGRESS NOTES
[facility] HOSPITALIST  Progress Note     Ellen May Patient Status:  Observation    1950 MRN [id number]   [address] Attending Rebecca Olguin MD   Hosp Day # 0 PCP Ambar Garcia MD     Chief Complaint: AMS    S: Patient  Mor aspirin  81 mg Oral Daily   • AmLODIPine Besylate  10 mg Oral Daily   • ezetimibe  10 mg Oral Nightly   • HydrALAZINE HCl  100 mg Oral TID   • Losartan Potassium  50 mg Oral BID   • Pantoprazole Sodium  40 mg Oral QPM   • Rosuvastatin Calcium  40 mg Oral D

## 2018-12-13 NOTE — HOME CARE LIAISON
Referral received from  Lorie. [facility] is able to accept the patient.     Liaison has met with patient and they are agreeable to home health for the following services:   RN PT OT    [facility] has included the following jermaine

## 2018-12-13 NOTE — PROGRESS NOTES
[facility]  Cardiology Progress Note    Sean Thomas Patient Status:  Observation    1950 MRN [id number]   [facility] 7NE-A Attending Janet East MD   Hosp Day # 0 PCP Deric Land MD     Subjective:  Denies pain.  Alert dry.     Medications:  • insulin detemir  10 Units Subcutaneous Daily   • Clopidogrel Bisulfate  75 mg Oral Daily   • aspirin  81 mg Oral Daily   • AmLODIPine Besylate  10 mg Oral Daily   • ezetimibe  10 mg Oral Nightly   • HydrALAZINE HCl  100 mg Oral TID

## 2018-12-13 NOTE — PLAN OF CARE
Patient alert and oriented times four but forgetful. Meds given per MAR. Vital signs stable. Denies any pain or discomfort. Resting comfortably in bed. Call light in reach.      CARDIOVASCULAR - ADULT    • Maintains optimal cardiac output and hemodynamic st

## 2018-12-13 NOTE — PHYSICAL THERAPY NOTE
PHYSICAL THERAPY EVALUATION - INPATIENT     Room Number: 5600/3718-P  Evaluation Date: 12/13/2018  Type of Evaluation: Initial  Physician Order: PT Eval and Treat    Presenting Problem: UTI  Reason for Therapy: Mobility Dysfunction and Discharge Plan Surgical History:   Procedure Laterality Date   • ANGIOGRAM     • ANGIOPLASTY (CORONARY)  2/28/14    stents acute mi   • APPENDECTOMY     • BACK SURGERY     • BIOPSY OF SKIN LESION      hyperkeratosis   • BYPASS SURGERY  1996 and 2012    cabg x 4   • CABG 0/5    Lower extremity ROM is within functional limits except for the following:   Left Hip flexion 1/2 ROM  Left Knee extension <1/2 ROM with hypertonicity  Left Knee flexion 1/2 ROM  Left Dorsiflexion 0  Left Plantar flexion 0    Lower extremity strength shoes. Encouraged pt to sit up in chair but she politely declined. Min A for sit to supine. Updated pt on role of PT, POC, DC planning/recs, positioning.,activity.        Exercise/Education Provided:  Bed mobility  Functional activity tolerated  Transfer tr Stand at assistance level: supervision     Goal #3 Patient is able to ambulate 50 feet with assist device: walker - stefani at assistance level: supervision     Goal #4    Goal #5    Goal #6    Goal Comments: Goals established on 12/13/2018

## 2018-12-14 ENCOUNTER — TELEPHONE (OUTPATIENT)
Dept: INTERNAL MEDICINE CLINIC | Facility: CLINIC | Age: 68
End: 2018-12-14

## 2018-12-14 VITALS
SYSTOLIC BLOOD PRESSURE: 142 MMHG | TEMPERATURE: 99 F | BODY MASS INDEX: 23.32 KG/M2 | DIASTOLIC BLOOD PRESSURE: 87 MMHG | RESPIRATION RATE: 19 BRPM | HEART RATE: 68 BPM | OXYGEN SATURATION: 95 % | HEIGHT: 65 IN | WEIGHT: 140 LBS

## 2018-12-14 LAB
ANION GAP SERPL CALC-SCNC: 7 MMOL/L (ref 0–18)
BUN BLD-MCNC: 28 MG/DL (ref 8–20)
BUN/CREAT SERPL: 25.2 (ref 10–20)
CALCIUM BLD-MCNC: 8.8 MG/DL (ref 8.3–10.3)
CHLORIDE SERPL-SCNC: 107 MMOL/L (ref 101–111)
CK SERPL-CCNC: 151 IU/L (ref 26–192)
CO2 SERPL-SCNC: 25 MMOL/L (ref 22–32)
CREAT BLD-MCNC: 1.11 MG/DL (ref 0.55–1.02)
GLUCOSE BLD-MCNC: 102 MG/DL (ref 65–99)
GLUCOSE BLD-MCNC: 112 MG/DL (ref 70–99)
GLUCOSE BLD-MCNC: 116 MG/DL (ref 65–99)
GLUCOSE BLD-MCNC: 117 MG/DL (ref 65–99)
OSMOLALITY SERPL CALC.SUM OF ELEC: 294 MOSM/KG (ref 275–295)
POTASSIUM SERPL-SCNC: 4 MMOL/L (ref 3.6–5.1)
SODIUM SERPL-SCNC: 139 MMOL/L (ref 136–144)
TROPONIN I SERPL-MCNC: 0.1 NG/ML (ref ?–0.05)

## 2018-12-14 PROCEDURE — 99217 OBSERVATION CARE DISCHARGE: CPT | Performed by: STUDENT IN AN ORGANIZED HEALTH CARE EDUCATION/TRAINING PROGRAM

## 2018-12-14 RX ORDER — LEVOFLOXACIN 750 MG/1
750 TABLET ORAL
Qty: 3 TABLET | Refills: 0 | Status: SHIPPED | OUTPATIENT
Start: 2018-12-15 | End: 2019-03-12 | Stop reason: ALTCHOICE

## 2018-12-14 RX ORDER — LEVOFLOXACIN 750 MG/1
750 TABLET ORAL
Qty: 3 TABLET | Refills: 0 | Status: SHIPPED | OUTPATIENT
Start: 2018-12-15 | End: 2018-12-14

## 2018-12-14 NOTE — TELEPHONE ENCOUNTER
Pt is being discharged today. Would like to know if AS will sign orders for Nursing, occupational and physical therapy.

## 2018-12-14 NOTE — TELEPHONE ENCOUNTER
Called pt to schedule HFU, pt stated she is still in the hospital and waiting to be discharged home. When asked to schedule, pt preferred to wait and she will call the office Monday to schedule HFU. Await call back.     Mandeep Timmons @ [facility] back and stated

## 2018-12-14 NOTE — PROGRESS NOTES
[facility]  Cardiology Progress Note    Odalys Summers Patient Status:  Observation    1950 MRN [id number]   [facility] 7NE-A Attending Inga Hdz MD   Hosp Day # 0 PCP Mona Montaño MD     Subjective:  No cardiac complain redo, Pci  · History of CVA, CEA  · COPD  · DM  · HTN  · Dyslipidemia  · PATRICIA    Plan:   · Continue present cardiac regimen, stable from cardiac perspective   · Antibiotics for UTI per primary care  · Ok to discharge from cardiac perspective, will sign off.

## 2018-12-14 NOTE — CM/SW NOTE
12/14/18 1600   Discharge disposition   Expected discharge disposition Home-Health   Name of [facility]mer [facility] 1 services after discharge Skilled home care   Discharge transportation Private car

## 2018-12-14 NOTE — PLAN OF CARE
Patient alert and oriented times four but forgetful at times. Meds given per MAR. Patient kept clean and dry. Vital signs stable. Denies any pain or discomfort. Resting comfortably in bed. Call light in reach.      CARDIOVASCULAR - ADULT    • Maintains opti

## 2018-12-14 NOTE — CM/SW NOTE
Pt ready for d/c today. Pt will go home with [facility]. Navid Jimenez at [facility] aware.

## 2018-12-14 NOTE — PHYSICAL THERAPY NOTE
PHYSICAL THERAPY TREATMENT NOTE - INPATIENT    Room Number: 5864/7392-H     Session: 1   Number of Visits to Meet Established Goals: 4    Presenting Problem: UTI    Problem List  Principal Problem:    Urinary tract infection without hematuria, site unspec BARE METAL STENT (BMS)      R carotid/coronary   • CHOLECYSTECTOMY  1987   • HC SPLY CAROTID STENT     • [address] [facility] [place]   • OTHER SURGICAL HISTORY  2005    Hx of transcath placement of intrarhoracic carotid artery   • PERCUTANEOUS ENDOSCOPIC SHERMAN based on dept protocol    Skilled Therapy Provided:     Pt presented in supine upon PT arrival. Pt willing to participate in session, working towards increased functional mobility and independence.   Pt performed supine<>sit transfer with c SBA and VC's for caregiver/family provide 24 hour)     PLAN  PT Treatment Plan: Bed mobility; Endurance; Patient education; Family education;Gait training;Neuromuscular re-educate;Transfer training  Rehab Potential : Fair  Frequency (Obs): 5x/week    CURRENT GOALS     Goal #1

## 2018-12-14 NOTE — PLAN OF CARE
A/Ox4, can be forgetful. Left weakness from previous stroke in April. ON RA, NSR per tele. 11 sec run SVT, cards notified. No c/o pain. Up w/ stefani walker, arm sling, and shoes w/ physicial therapy. Incontinent of urine. Adequate urine output.    IV ro

## 2018-12-14 NOTE — PROGRESS NOTES
[facility]  Progress Note     Tatyana Rayo Patient Status:  Observation    1950 MRN [id number]   [address] [facility] [address] Attending Ama Garcia MD   Hosp Day # 0 PCP Babita Lau MD     Chief Complaint: AMS    S: Patient doin insulin detemir  10 Units Subcutaneous Daily   • Clopidogrel Bisulfate  75 mg Oral Daily   • aspirin  81 mg Oral Daily   • AmLODIPine Besylate  10 mg Oral Daily   • ezetimibe  10 mg Oral Nightly   • HydrALAZINE HCl  100 mg Oral TID   • Losartan Potassium

## 2018-12-14 NOTE — OCCUPATIONAL THERAPY NOTE
OCCUPATIONAL THERAPY EVALUATION - INPATIENT     Room Number: 7203/8615-N  Evaluation Date: 12/14/2018  Type of Evaluation: Initial  Presenting Problem: UTI, mental status change    Physician Order: IP Consult to Occupational Therapy  Reason for Therapy: AD Laterality Date   • ANGIOGRAM     • ANGIOPLASTY (CORONARY)  2/28/14    stents acute mi   • APPENDECTOMY     • BACK SURGERY     • BIOPSY OF SKIN LESION      hyperkeratosis   • BYPASS SURGERY  1996 and 2012    cabg x 4   • CABG  1996    x4   • CAROTID ENDART Repositioning    COGNITION  Overall Cognitive Status:  WFL - within functional limits  Safety Judgement:  good awareness of safety precautions  Awareness of Errors:  good awareness of errors made  Awareness of Deficits:  fully aware of deficits  Problem So on both feet independently after OT provided min A to place them over her toes. Pt already had a black wrist brace on. PT donned AFO on total A. Educated the pt about use of one-handed technique for UE dressing.  Pt was able to guide a robe on with min A, Clinical Decision Making LOW - Analysis of occupational profile, problem-focused assessments, limited treatment options    Overall Complexity LOW     OT Discharge Recommendations: 24 hour care/supervision;Home with home health PT/OT       PLAN  OT Treatm

## 2018-12-14 NOTE — HOME CARE LIAISON
Received call from [facility] at Dr. Aamir Cadena office. Patient to call office on Monday to schedule post acute care follow up. If they do not hear from her they will reach out to her and let us know about signing for home health orders.   Will continue to fol

## 2018-12-16 LAB
ATRIAL RATE: 69 BPM
P AXIS: 32 DEGREES
P-R INTERVAL: 160 MS
Q-T INTERVAL: 430 MS
QRS DURATION: 94 MS
QTC CALCULATION (BEZET): 460 MS
R AXIS: -8 DEGREES
T AXIS: 75 DEGREES
VENTRICULAR RATE: 69 BPM

## 2018-12-17 ENCOUNTER — PATIENT OUTREACH (OUTPATIENT)
Dept: CASE MANAGEMENT | Age: 68
End: 2018-12-17

## 2018-12-17 DIAGNOSIS — Z02.9 ENCOUNTERS FOR UNSPECIFIED ADMINISTRATIVE PURPOSE: ICD-10-CM

## 2018-12-17 NOTE — TELEPHONE ENCOUNTER
Pt's daughter called to schedule pt's HFU. Scheduled first available (AS, SD no openings this week) with AD on 12/21 at 11:30am.  Pt's daughter is transporting pt since pt is wheelchair bound and requested appt around lunchtime only d/t her work schedule.

## 2018-12-17 NOTE — DISCHARGE SUMMARY
[facility] HOSPITALIST  DISCHARGE SUMMARY     Lenore Brown Patient Status:  Observation    1950 MRN [id number]   [facility] 7NE-A Attending No att. providers found   Hosp Day # 0 PCP Nahed Dorsey MD     Date of Admission: 2018 as needed for Pain. Refills:  0     AmLODIPine Besylate 10 MG Tabs  Commonly known as:  NORVASC      Take 1 tablet (10 mg total) by mouth daily. Quantity:  30 tablet  Refills:  0     aspirin 81 MG Tbec      Take 1 tablet (81 mg total) by mouth daily. [facility] [place], [address] [facility] [address] [phone], [address] [place] [address] Drive, Alistair Garcia [address]    Phone:  [phone]   · levofloxacin 750 MG Tabs         [place] n[place]    Follow-up appointment:   Geo Bland MD  [address] [place] [address]

## 2018-12-21 ENCOUNTER — TELEPHONE (OUTPATIENT)
Dept: INTERNAL MEDICINE CLINIC | Facility: CLINIC | Age: 68
End: 2018-12-21

## 2018-12-21 ENCOUNTER — HOSPITAL ENCOUNTER (OUTPATIENT)
Dept: ULTRASOUND IMAGING | Age: 68
Discharge: HOME | End: 2018-12-21
Attending: INTERNAL MEDICINE
Payer: MEDICARE

## 2018-12-21 ENCOUNTER — OFFICE VISIT (OUTPATIENT)
Dept: INTERNAL MEDICINE CLINIC | Facility: CLINIC | Age: 68
End: 2018-12-21
Payer: MEDICARE

## 2018-12-21 VITALS
BODY MASS INDEX: 23 KG/M2 | WEIGHT: 137 LBS | DIASTOLIC BLOOD PRESSURE: 56 MMHG | HEART RATE: 72 BPM | TEMPERATURE: 98 F | RESPIRATION RATE: 16 BRPM | SYSTOLIC BLOOD PRESSURE: 140 MMHG

## 2018-12-21 DIAGNOSIS — I25.10 ATHEROSCLEROSIS OF NATIVE CORONARY ARTERY OF NATIVE HEART WITHOUT ANGINA PECTORIS: ICD-10-CM

## 2018-12-21 DIAGNOSIS — N30.00 ACUTE CYSTITIS WITHOUT HEMATURIA: ICD-10-CM

## 2018-12-21 DIAGNOSIS — M79.662 PAIN OF LEFT CALF: Primary | ICD-10-CM

## 2018-12-21 DIAGNOSIS — R77.8 ELEVATED TROPONIN: ICD-10-CM

## 2018-12-21 DIAGNOSIS — H10.32 ACUTE BACTERIAL CONJUNCTIVITIS OF LEFT EYE: ICD-10-CM

## 2018-12-21 DIAGNOSIS — M79.89 LEFT LEG SWELLING: ICD-10-CM

## 2018-12-21 DIAGNOSIS — F17.200 SMOKER: ICD-10-CM

## 2018-12-21 DIAGNOSIS — M79.662 PAIN OF LEFT CALF: ICD-10-CM

## 2018-12-21 DIAGNOSIS — E11.65 UNCONTROLLED TYPE 2 DIABETES MELLITUS WITH HYPERGLYCEMIA (HCC): ICD-10-CM

## 2018-12-21 DIAGNOSIS — I10 ESSENTIAL HYPERTENSION: ICD-10-CM

## 2018-12-21 DIAGNOSIS — R41.0 DISORIENTATION: ICD-10-CM

## 2018-12-21 PROCEDURE — 1111F DSCHRG MED/CURRENT MED MERGE: CPT | Performed by: INTERNAL MEDICINE

## 2018-12-21 PROCEDURE — 99496 TRANSJ CARE MGMT HIGH F2F 7D: CPT | Performed by: INTERNAL MEDICINE

## 2018-12-21 PROCEDURE — 93971 EXTREMITY STUDY: CPT | Performed by: INTERNAL MEDICINE

## 2018-12-21 RX ORDER — CIPROFLOXACIN HYDROCHLORIDE 3.5 MG/ML
2 SOLUTION/ DROPS TOPICAL
Qty: 5 ML | Refills: 1 | Status: SHIPPED | OUTPATIENT
Start: 2018-12-21 | End: 2018-12-26

## 2018-12-21 NOTE — PROGRESS NOTES
HPI:    Laquetta Paget is a 70year old female here today for hospital follow up.    She was discharged from Inpatient hospital, [facility] to Home   Admission Date: 12/12/18   Discharge Date: 12/14/18  Hospital Discharge Diagnoses (since 11/21/20 without evidence of acute intracranial hemorrhage. The patient's mental status and overall level of strength improved significantly, for which she was discharged to a subacute rehabilitation facility. She was discharged home with p.o.  Levaquin 750 mg neftaly Inhale 1 capsule (18 mcg total) into the lungs daily. Sertraline HCl 100 MG Oral Tab Take 1 tablet (100 mg total) by mouth once daily. insulin glargine 100 UNIT/ML Subcutaneous Solution Inject 10 Units into the skin every morning.  Inject 10 units in th about 8 months ago. Her smoking use included cigarettes. She has a 50.00 pack-year smoking history. she has never used smokeless tobacco. She reports that she does not drink alcohol or use drugs.      ROS:   GENERAL: weight stable, energy stable, no sweatin to acute lower uncomplicated urinary tract infection  No further evaluation or changes in management at this time  Non-recurrent    Elevated troponin:  No ECG, telemetry, or echo changes  Cleared by cardiology service  Continue dual antiplatelet therapy an

## 2018-12-26 DIAGNOSIS — F32.0 CURRENT MILD EPISODE OF MAJOR DEPRESSIVE DISORDER, UNSPECIFIED WHETHER RECURRENT (HCC): ICD-10-CM

## 2018-12-26 DIAGNOSIS — I69.151 HEMIPARESIS OF RIGHT DOMINANT SIDE AS LATE EFFECT OF NONTRAUMATIC INTRACEREBRAL HEMORRHAGE (HCC): ICD-10-CM

## 2018-12-26 RX ORDER — SERTRALINE HYDROCHLORIDE 100 MG/1
TABLET, FILM COATED ORAL
Qty: 90 TABLET | Refills: 0 | Status: SHIPPED | OUTPATIENT
Start: 2018-12-26 | End: 2019-05-17

## 2018-12-26 RX ORDER — CLOPIDOGREL BISULFATE 75 MG/1
TABLET ORAL
Qty: 90 TABLET | Refills: 0 | Status: SHIPPED | OUTPATIENT
Start: 2018-12-26 | End: 2019-02-28

## 2018-12-26 NOTE — TELEPHONE ENCOUNTER
Master for Franck Pretty @[facility] to inform, per AS, will sign orders for RN, PT and OT. To call back at the office if any further questions.

## 2018-12-26 NOTE — TELEPHONE ENCOUNTER
Patient still has enough sertraline until March     Please advise,     LOV:12/21/18 AD  FOV:none on file   LAST RX:   Clopidogrel 9/23/18 75 mg take 1 tab daily 30 tabs 0 refills   LAST LABS:12/14/18 poct glucose,myocardio profile ,bmp  PER PROTOCOL: to pr

## 2018-12-27 ENCOUNTER — TELEPHONE (OUTPATIENT)
Dept: INTERNAL MEDICINE CLINIC | Facility: CLINIC | Age: 68
End: 2018-12-27

## 2018-12-27 NOTE — TELEPHONE ENCOUNTER
Home health orders were ordered to start this week, Pt is requesting it begin next week after January 1, 2019. Needs verbal orders to change.

## 2018-12-28 ENCOUNTER — MED REC SCAN ONLY (OUTPATIENT)
Dept: INTERNAL MEDICINE CLINIC | Facility: CLINIC | Age: 68
End: 2018-12-28

## 2018-12-28 NOTE — TELEPHONE ENCOUNTER
Dom Bahena PT [facility] notified ok to start [address] next week.

## 2018-12-31 RX ORDER — PEN NEEDLE, DIABETIC 32GX 5/32"
NEEDLE, DISPOSABLE MISCELLANEOUS
Qty: 100 EACH | Refills: 0 | Status: SHIPPED | OUTPATIENT
Start: 2018-12-31 | End: 2019-01-09

## 2019-01-11 ENCOUNTER — MED REC SCAN ONLY (OUTPATIENT)
Dept: INTERNAL MEDICINE CLINIC | Facility: CLINIC | Age: 69
End: 2019-01-11

## 2019-01-23 ENCOUNTER — TELEPHONE (OUTPATIENT)
Dept: INTERNAL MEDICINE CLINIC | Facility: CLINIC | Age: 69
End: 2019-01-23

## 2019-01-23 NOTE — TELEPHONE ENCOUNTER
LM on VM at [phone] for Lois West informing ok for [place] orders as well as Claritin/allegra. Rodrigue Long to call back with any further questions.

## 2019-01-23 NOTE — TELEPHONE ENCOUNTER
Anali Butler from [facility] called stating patients uti is resolved but he wants to continue to see her for 1/week for next 2 weeks since pt has runny nose and cough-he would like ok to give Claritin/allegra to help with runny nose-call to give verbal ok

## 2019-02-06 ENCOUNTER — TELEPHONE (OUTPATIENT)
Dept: INTERNAL MEDICINE CLINIC | Facility: CLINIC | Age: 69
End: 2019-02-06

## 2019-02-06 NOTE — TELEPHONE ENCOUNTER
Calling from [facility] health  Would like orders for 3 additional weeks 2 x a week  Working on [employer]  Please advise

## 2019-02-06 NOTE — TELEPHONE ENCOUNTER
Called and spoke with Prieto Whitten to inform, per AS, ok for 3 additional weeks 2x a week to continue working on transfers, function mobility, and strength training. Informed pt will need F/U OV with us.  Mona Magdaleno verbalized understanding and agreed with POC, st

## 2019-02-21 ENCOUNTER — TELEPHONE (OUTPATIENT)
Dept: INTERNAL MEDICINE CLINIC | Facility: CLINIC | Age: 69
End: 2019-02-21

## 2019-02-26 ENCOUNTER — APPOINTMENT (OUTPATIENT)
Dept: GENERAL RADIOLOGY | Facility: HOSPITAL | Age: 69
End: 2019-02-26
Attending: EMERGENCY MEDICINE
Payer: MEDICARE

## 2019-02-26 ENCOUNTER — HOSPITAL ENCOUNTER (EMERGENCY)
Facility: HOSPITAL | Age: 69
Discharge: HOME | End: 2019-02-26
Attending: EMERGENCY MEDICINE
Payer: MEDICARE

## 2019-02-26 ENCOUNTER — APPOINTMENT (OUTPATIENT)
Dept: CT IMAGING | Facility: HOSPITAL | Age: 69
End: 2019-02-26
Attending: EMERGENCY MEDICINE
Payer: MEDICARE

## 2019-02-26 VITALS
HEART RATE: 61 BPM | HEIGHT: 66 IN | DIASTOLIC BLOOD PRESSURE: 48 MMHG | RESPIRATION RATE: 16 BRPM | OXYGEN SATURATION: 98 % | WEIGHT: 136.88 LBS | TEMPERATURE: 97 F | BODY MASS INDEX: 22 KG/M2 | SYSTOLIC BLOOD PRESSURE: 134 MMHG

## 2019-02-26 DIAGNOSIS — R56.9 SEIZURE (HCC): Primary | ICD-10-CM

## 2019-02-26 LAB
ALBUMIN SERPL-MCNC: 3.5 G/DL (ref 3.4–5)
ALBUMIN/GLOB SERPL: 0.9 {RATIO} (ref 1–2)
ALP LIVER SERPL-CCNC: 63 U/L (ref 55–142)
ALT SERPL-CCNC: 108 U/L (ref 13–56)
ANION GAP SERPL CALC-SCNC: 13 MMOL/L (ref 0–18)
APTT PPP: 31.2 SECONDS (ref 26.1–34.6)
AST SERPL-CCNC: 87 U/L (ref 15–37)
ATRIAL RATE: 82 BPM
BASOPHILS # BLD AUTO: 0.03 X10(3) UL (ref 0–0.2)
BASOPHILS NFR BLD AUTO: 0.3 %
BILIRUB SERPL-MCNC: 0.3 MG/DL (ref 0.1–2)
BILIRUB UR QL STRIP.AUTO: NEGATIVE
BUN BLD-MCNC: 29 MG/DL (ref 7–18)
BUN/CREAT SERPL: 23.4 (ref 10–20)
CALCIUM BLD-MCNC: 9.1 MG/DL (ref 8.5–10.1)
CHLORIDE SERPL-SCNC: 107 MMOL/L (ref 98–107)
CLARITY UR REFRACT.AUTO: CLEAR
CO2 SERPL-SCNC: 20 MMOL/L (ref 21–32)
COLOR UR AUTO: YELLOW
CREAT BLD-MCNC: 1.24 MG/DL (ref 0.55–1.02)
DEPRECATED RDW RBC AUTO: 49.8 FL (ref 35.1–46.3)
EOSINOPHIL # BLD AUTO: 0.48 X10(3) UL (ref 0–0.7)
EOSINOPHIL NFR BLD AUTO: 5.4 %
ERYTHROCYTE [DISTWIDTH] IN BLOOD BY AUTOMATED COUNT: 14.7 % (ref 11–15)
ETHANOL SERPL-MCNC: <3 MG/DL (ref ?–3)
GLOBULIN PLAS-MCNC: 4 G/DL (ref 2.8–4.4)
GLUCOSE BLD-MCNC: 171 MG/DL (ref 70–99)
GLUCOSE UR STRIP.AUTO-MCNC: NEGATIVE MG/DL
HCT VFR BLD AUTO: 39.8 % (ref 35–48)
HGB BLD-MCNC: 12.7 G/DL (ref 12–16)
IMM GRANULOCYTES # BLD AUTO: 0.02 X10(3) UL (ref 0–1)
IMM GRANULOCYTES NFR BLD: 0.2 %
INR BLD: 1.08 (ref 0.9–1.1)
KETONES UR STRIP.AUTO-MCNC: NEGATIVE MG/DL
LEUKOCYTE ESTERASE UR QL STRIP.AUTO: NEGATIVE
LYMPHOCYTES # BLD AUTO: 2.27 X10(3) UL (ref 1–4)
LYMPHOCYTES NFR BLD AUTO: 25.5 %
M PROTEIN MFR SERPL ELPH: 7.5 G/DL (ref 6.4–8.2)
MCH RBC QN AUTO: 29.4 PG (ref 26–34)
MCHC RBC AUTO-ENTMCNC: 31.9 G/DL (ref 31–37)
MCV RBC AUTO: 92.1 FL (ref 80–100)
MONOCYTES # BLD AUTO: 0.6 X10(3) UL (ref 0.1–1)
MONOCYTES NFR BLD AUTO: 6.7 %
NEUTROPHILS # BLD AUTO: 5.49 X10 (3) UL (ref 1.5–7.7)
NEUTROPHILS # BLD AUTO: 5.49 X10(3) UL (ref 1.5–7.7)
NEUTROPHILS NFR BLD AUTO: 61.9 %
NITRITE UR QL STRIP.AUTO: NEGATIVE
OSMOLALITY SERPL CALC.SUM OF ELEC: 300 MOSM/KG (ref 275–295)
P AXIS: 29 DEGREES
P-R INTERVAL: 162 MS
PH UR STRIP.AUTO: 5 [PH] (ref 4.5–8)
PLATELET # BLD AUTO: 212 10(3)UL (ref 150–450)
POTASSIUM SERPL-SCNC: 4.2 MMOL/L (ref 3.5–5.1)
PROT UR STRIP.AUTO-MCNC: NEGATIVE MG/DL
PSA SERPL DL<=0.01 NG/ML-MCNC: 14.4 SECONDS (ref 12.4–14.7)
Q-T INTERVAL: 388 MS
QRS DURATION: 90 MS
QTC CALCULATION (BEZET): 453 MS
R AXIS: -10 DEGREES
RBC # BLD AUTO: 4.32 X10(6)UL (ref 3.8–5.3)
RBC UR QL AUTO: NEGATIVE
SODIUM SERPL-SCNC: 140 MMOL/L (ref 136–145)
SP GR UR STRIP.AUTO: 1.01 (ref 1–1.03)
T AXIS: 77 DEGREES
TROPONIN I SERPL-MCNC: <0.045 NG/ML (ref ?–0.04)
UROBILINOGEN UR STRIP.AUTO-MCNC: <2 MG/DL
VENTRICULAR RATE: 82 BPM
WBC # BLD AUTO: 8.9 X10(3) UL (ref 4–11)

## 2019-02-26 PROCEDURE — 93005 ELECTROCARDIOGRAM TRACING: CPT

## 2019-02-26 PROCEDURE — 85610 PROTHROMBIN TIME: CPT | Performed by: EMERGENCY MEDICINE

## 2019-02-26 PROCEDURE — 93010 ELECTROCARDIOGRAM REPORT: CPT

## 2019-02-26 PROCEDURE — 71045 X-RAY EXAM CHEST 1 VIEW: CPT | Performed by: EMERGENCY MEDICINE

## 2019-02-26 PROCEDURE — 85730 THROMBOPLASTIN TIME PARTIAL: CPT | Performed by: EMERGENCY MEDICINE

## 2019-02-26 PROCEDURE — 96374 THER/PROPH/DIAG INJ IV PUSH: CPT

## 2019-02-26 PROCEDURE — 99285 EMERGENCY DEPT VISIT HI MDM: CPT

## 2019-02-26 PROCEDURE — 80320 DRUG SCREEN QUANTALCOHOLS: CPT | Performed by: EMERGENCY MEDICINE

## 2019-02-26 PROCEDURE — 70450 CT HEAD/BRAIN W/O DYE: CPT | Performed by: EMERGENCY MEDICINE

## 2019-02-26 PROCEDURE — 99283 EMERGENCY DEPT VISIT LOW MDM: CPT

## 2019-02-26 PROCEDURE — 84484 ASSAY OF TROPONIN QUANT: CPT | Performed by: EMERGENCY MEDICINE

## 2019-02-26 PROCEDURE — 80053 COMPREHEN METABOLIC PANEL: CPT | Performed by: EMERGENCY MEDICINE

## 2019-02-26 PROCEDURE — 96361 HYDRATE IV INFUSION ADD-ON: CPT

## 2019-02-26 PROCEDURE — 81003 URINALYSIS AUTO W/O SCOPE: CPT | Performed by: EMERGENCY MEDICINE

## 2019-02-26 PROCEDURE — 85025 COMPLETE CBC W/AUTO DIFF WBC: CPT | Performed by: EMERGENCY MEDICINE

## 2019-02-26 RX ORDER — SODIUM CHLORIDE 9 MG/ML
125 INJECTION, SOLUTION INTRAVENOUS CONTINUOUS
Status: DISCONTINUED | OUTPATIENT
Start: 2019-02-26 | End: 2019-02-26

## 2019-02-26 RX ORDER — LEVETIRACETAM 500 MG/1
500 TABLET ORAL 2 TIMES DAILY
Qty: 60 TABLET | Refills: 0 | Status: SHIPPED | OUTPATIENT
Start: 2019-02-26 | End: 2019-03-28

## 2019-02-26 RX ORDER — SODIUM CHLORIDE 9 MG/ML
1000 INJECTION, SOLUTION INTRAVENOUS ONCE
Status: DISCONTINUED | OUTPATIENT
Start: 2019-02-26 | End: 2019-02-26

## 2019-02-26 NOTE — ED NOTES
Patient was incontinent when she came in I did straight cath her with no return will try again later

## 2019-02-26 NOTE — ED INITIAL ASSESSMENT (HPI)
Medics called for a 10 minute seizure activity no hx of seizures they gave her 2mg of versed received patient with a patent airway

## 2019-02-26 NOTE — ED PROVIDER NOTES
Patient Seen in: [facility] Emergency Department    History   Patient presents with:  Seizure Disorder (neurologic)    Stated Complaint: seizure    HPI    The patient is a 16-year-old female with multiple past medical problems, listed below, includi ANGIOGRAM     • ANGIOPLASTY (CORONARY)  2/28/14    stents acute mi   • APPENDECTOMY     • BACK SURGERY     • BIOPSY OF SKIN LESION      hyperkeratosis   • BYPASS SURGERY  1996 and 2012    cabg x 4   • CABG  1996    x4   • CAROTID ENDARTERECTOMY Left 4/29/2 lymphadenopathy. Mucus membranes moist.   Supple neck without any meningismus or rigidity  Cardiovascular:    Regular rhythm without murmurs rubs or gallops, no peripheral edema or JVD. Radial and DP pulses 2+ bilaterally.   Lungs: Speaking without any dis CBC W/ DIFFERENTIAL[[id number]]          Abnormal            Final result                 Please view results for these tests on the individual orders.    URINALYSIS WITH CULTURE REFLEX   RAINBOW DRAW LAVENDER   RAINBOW DRAW LIGHT GREEN   RAINBOW DRAW acute territorial infarction. No     hemorrhage. No extra-axial fluid     collections. The basal cisterns are patent. The craniocervical junction     is unremarkable. SINUSES:           No sign of acute sinusitis.       MASTOIDS:          No sign of follow-up with Dr. Shyann York as an outpatient.   She was provided prescription for Keppra, discharged home in stable condition            Disposition and Plan     Clinical Impression:  Seizure ([address]  (primary encounter diagnosis)    Disposition:  Discharge  2/2

## 2019-02-26 NOTE — ED NOTES
Dr Carrera spoke with the daughter on the phone prior to discharge provided her with his treatment plan for home  Patient is sitting in the wheel chair.  Juice given

## 2019-02-27 ENCOUNTER — TELEPHONE (OUTPATIENT)
Dept: INTERNAL MEDICINE CLINIC | Facility: CLINIC | Age: 69
End: 2019-02-27

## 2019-02-27 DIAGNOSIS — E78.00 HIGH CHOLESTEROL: Primary | ICD-10-CM

## 2019-02-27 DIAGNOSIS — E11.65 UNCONTROLLED TYPE 2 DIABETES MELLITUS WITH HYPERGLYCEMIA (HCC): ICD-10-CM

## 2019-02-27 DIAGNOSIS — R73.9 HYPERGLYCEMIA: ICD-10-CM

## 2019-02-27 NOTE — TELEPHONE ENCOUNTER
Medicare wellness  Future Appointments   Date Time Provider Henok Zeng       [facility]       [facility]   3/12/2019  2:00 PM JANELL Barba [facility]     Orders to Andra Link aware must fast no call back required

## 2019-02-28 ENCOUNTER — MED REC SCAN ONLY (OUTPATIENT)
Dept: INTERNAL MEDICINE CLINIC | Facility: CLINIC | Age: 69
End: 2019-02-28

## 2019-02-28 ENCOUNTER — TELEPHONE (OUTPATIENT)
Dept: NEPHROLOGY | Facility: CLINIC | Age: 69
End: 2019-02-28

## 2019-02-28 VITALS
DIASTOLIC BLOOD PRESSURE: 66 MMHG | HEART RATE: 86 BPM | HEIGHT: 64 IN | BODY MASS INDEX: 24.24 KG/M2 | SYSTOLIC BLOOD PRESSURE: 130 MMHG | WEIGHT: 142 LBS

## 2019-02-28 VITALS
HEIGHT: 64 IN | WEIGHT: 176 LBS | DIASTOLIC BLOOD PRESSURE: 70 MMHG | HEART RATE: 59 BPM | BODY MASS INDEX: 30.05 KG/M2 | SYSTOLIC BLOOD PRESSURE: 162 MMHG

## 2019-02-28 DIAGNOSIS — E78.00 HIGH CHOLESTEROL: ICD-10-CM

## 2019-02-28 DIAGNOSIS — I69.151 HEMIPARESIS OF RIGHT DOMINANT SIDE AS LATE EFFECT OF NONTRAUMATIC INTRACEREBRAL HEMORRHAGE (HCC): ICD-10-CM

## 2019-02-28 DIAGNOSIS — I10 ESSENTIAL HYPERTENSION: ICD-10-CM

## 2019-02-28 RX ORDER — CLOPIDOGREL BISULFATE 75 MG/1
TABLET ORAL
Qty: 90 TABLET | Refills: 0 | Status: SHIPPED | OUTPATIENT
Start: 2019-02-28 | End: 2019-07-15

## 2019-02-28 NOTE — TELEPHONE ENCOUNTER
LOV: 12/21/18 w/ AD for hospital f/u, 8/29/18 w/ SD for hospital f/u  FOV: 3/24/19 w/ SD for [address]  Last labs: 2/26/19   Last Refill: 12/26/18 qt:90    Per protocol routed to provider

## 2019-02-28 NOTE — TELEPHONE ENCOUNTER
Last Office Visit: 12-21-18 with AD for hospital follow up  Last Rx Filled:   emzetimbe 9-23-18 30 tabs with no refills   Hydralazine 9-23-18 90 tabs with no refills  Last Labs: 2-26-19 cbc/cmp  Future Appointment: 3-12-19    Per protocol to provider

## 2019-03-01 RX ORDER — EZETIMIBE 10 MG/1
TABLET ORAL
Qty: 90 TABLET | Refills: 0 | Status: SHIPPED | OUTPATIENT
Start: 2019-03-01 | End: 2019-07-14

## 2019-03-01 RX ORDER — HYDRALAZINE HYDROCHLORIDE 100 MG/1
TABLET, FILM COATED ORAL
Qty: 270 TABLET | Refills: 0 | Status: SHIPPED | OUTPATIENT
Start: 2019-03-01 | End: 2019-07-14

## 2019-03-02 ENCOUNTER — LAB ENCOUNTER (OUTPATIENT)
Dept: LAB | Age: 69
End: 2019-03-02
Attending: INTERNAL MEDICINE
Payer: MEDICARE

## 2019-03-02 DIAGNOSIS — E78.5 DYSLIPIDEMIA: ICD-10-CM

## 2019-03-02 DIAGNOSIS — E78.00 HIGH CHOLESTEROL: ICD-10-CM

## 2019-03-02 DIAGNOSIS — E11.65 UNCONTROLLED TYPE 2 DIABETES MELLITUS WITH HYPERGLYCEMIA (HCC): ICD-10-CM

## 2019-03-02 LAB
CHOLEST SMN-MCNC: 123 MG/DL (ref ?–200)
EST. AVERAGE GLUCOSE BLD GHB EST-MCNC: 131 MG/DL (ref 68–126)
HBA1C MFR BLD HPLC: 6.2 % (ref ?–5.7)
HDLC SERPL-MCNC: 36 MG/DL (ref 40–59)
LDLC SERPL CALC-MCNC: 59 MG/DL (ref ?–100)
NONHDLC SERPL-MCNC: 87 MG/DL (ref ?–130)
TRIGL SERPL-MCNC: 139 MG/DL (ref 30–149)
TSI SER-ACNC: 1.92 MIU/ML (ref 0.36–3.74)
VLDLC SERPL CALC-MCNC: 28 MG/DL (ref 0–30)

## 2019-03-02 PROCEDURE — 80061 LIPID PANEL: CPT

## 2019-03-02 PROCEDURE — 84443 ASSAY THYROID STIM HORMONE: CPT

## 2019-03-02 PROCEDURE — 83036 HEMOGLOBIN GLYCOSYLATED A1C: CPT

## 2019-03-02 PROCEDURE — 80053 COMPREHEN METABOLIC PANEL: CPT

## 2019-03-03 LAB
ALBUMIN SERPL-MCNC: 3.9 G/DL (ref 3.4–5)
ALBUMIN/GLOB SERPL: 1 {RATIO} (ref 1–2)
ALP LIVER SERPL-CCNC: 58 U/L (ref 55–142)
ALT SERPL-CCNC: 133 U/L (ref 13–56)
ANION GAP SERPL CALC-SCNC: 11 MMOL/L (ref 0–18)
AST SERPL-CCNC: 105 U/L (ref 15–37)
BILIRUB SERPL-MCNC: 0.4 MG/DL (ref 0.1–2)
BUN BLD-MCNC: 24 MG/DL (ref 7–18)
BUN/CREAT SERPL: 20.9 (ref 10–20)
CALCIUM BLD-MCNC: 9.2 MG/DL (ref 8.5–10.1)
CHLORIDE SERPL-SCNC: 107 MMOL/L (ref 98–107)
CO2 SERPL-SCNC: 22 MMOL/L (ref 21–32)
CREAT BLD-MCNC: 1.15 MG/DL (ref 0.55–1.02)
GLOBULIN PLAS-MCNC: 3.9 G/DL (ref 2.8–4.4)
GLUCOSE BLD-MCNC: 96 MG/DL (ref 70–99)
M PROTEIN MFR SERPL ELPH: 7.8 G/DL (ref 6.4–8.2)
OSMOLALITY SERPL CALC.SUM OF ELEC: 294 MOSM/KG (ref 275–295)
POTASSIUM SERPL-SCNC: 4.2 MMOL/L (ref 3.5–5.1)
SODIUM SERPL-SCNC: 140 MMOL/L (ref 136–145)

## 2019-03-04 DIAGNOSIS — E11.65 UNCONTROLLED TYPE 2 DIABETES MELLITUS WITH HYPERGLYCEMIA (HCC): ICD-10-CM

## 2019-03-04 DIAGNOSIS — R79.89 ELEVATED LFTS: Primary | ICD-10-CM

## 2019-03-04 DIAGNOSIS — I50.9 CONGESTIVE HEART FAILURE, UNSPECIFIED HF CHRONICITY, UNSPECIFIED HEART FAILURE TYPE (HCC): Primary | ICD-10-CM

## 2019-03-04 DIAGNOSIS — I10 ESSENTIAL HYPERTENSION: ICD-10-CM

## 2019-03-04 PROBLEM — R09.02 HYPOXIA: Status: RESOLVED | Noted: 2018-05-20 | Resolved: 2019-03-04

## 2019-03-04 PROBLEM — R73.9 HYPERGLYCEMIA: Status: RESOLVED | Noted: 2018-12-12 | Resolved: 2019-03-04

## 2019-03-04 PROBLEM — R11.2 NON-INTRACTABLE VOMITING WITH NAUSEA, UNSPECIFIED VOMITING TYPE: Status: RESOLVED | Noted: 2018-05-19 | Resolved: 2019-03-04

## 2019-03-04 PROBLEM — R11.10 VOMITING AND DIARRHEA: Status: RESOLVED | Noted: 2018-04-26 | Resolved: 2019-03-04

## 2019-03-04 PROBLEM — I63.511 ACUTE RIGHT MCA STROKE (HCC): Status: RESOLVED | Noted: 2018-04-27 | Resolved: 2019-03-04

## 2019-03-04 PROBLEM — R41.0 DELIRIUM, ACUTE: Status: RESOLVED | Noted: 2018-12-12 | Resolved: 2019-03-04

## 2019-03-04 PROBLEM — R19.7 VOMITING AND DIARRHEA: Status: RESOLVED | Noted: 2018-04-26 | Resolved: 2019-03-04

## 2019-03-04 PROBLEM — E87.20 METABOLIC ACIDOSIS: Status: RESOLVED | Noted: 2018-12-12 | Resolved: 2019-03-04

## 2019-03-04 PROBLEM — Z86.73 HISTORY OF CVA (CEREBROVASCULAR ACCIDENT): Status: ACTIVE | Noted: 2019-03-04

## 2019-03-04 PROBLEM — I69.30 HISTORY OF CVA WITH RESIDUAL DEFICIT: Status: ACTIVE | Noted: 2019-03-04

## 2019-03-04 PROBLEM — E87.5 HYPERKALEMIA: Status: RESOLVED | Noted: 2018-05-19 | Resolved: 2019-03-04

## 2019-03-04 PROBLEM — N18.30 CKD (CHRONIC KIDNEY DISEASE) STAGE 3, GFR 30-59 ML/MIN (HCC): Status: ACTIVE | Noted: 2019-03-04

## 2019-03-04 PROBLEM — R11.2 NON-INTRACTABLE VOMITING WITH NAUSEA: Status: RESOLVED | Noted: 2018-05-19 | Resolved: 2019-03-04

## 2019-03-04 PROBLEM — E87.2 METABOLIC ACIDOSIS: Status: RESOLVED | Noted: 2018-12-12 | Resolved: 2019-03-04

## 2019-03-04 PROBLEM — R77.8 ELEVATED TROPONIN: Status: RESOLVED | Noted: 2018-05-28 | Resolved: 2019-03-04

## 2019-03-04 PROBLEM — N39.0 URINARY TRACT INFECTION WITHOUT HEMATURIA: Status: RESOLVED | Noted: 2018-12-12 | Resolved: 2019-03-04

## 2019-03-04 PROBLEM — N39.0 URINARY TRACT INFECTION WITHOUT HEMATURIA, SITE UNSPECIFIED: Status: RESOLVED | Noted: 2018-12-12 | Resolved: 2019-03-04

## 2019-03-12 ENCOUNTER — OFFICE VISIT (OUTPATIENT)
Dept: NEUROLOGY | Facility: CLINIC | Age: 69
End: 2019-03-12
Payer: MEDICARE

## 2019-03-12 VITALS — SYSTOLIC BLOOD PRESSURE: 116 MMHG | DIASTOLIC BLOOD PRESSURE: 52 MMHG | HEART RATE: 68 BPM | RESPIRATION RATE: 16 BRPM

## 2019-03-12 DIAGNOSIS — I69.30 HISTORY OF CVA WITH RESIDUAL DEFICIT: ICD-10-CM

## 2019-03-12 DIAGNOSIS — Z98.890 HISTORY OF CAROTID ENDARTERECTOMY: ICD-10-CM

## 2019-03-12 DIAGNOSIS — I69.359 SPASTIC HEMIPARESIS AS LATE EFFECT OF CEREBRAL INFARCTION (HCC): ICD-10-CM

## 2019-03-12 DIAGNOSIS — I69.151 HEMIPARESIS OF RIGHT DOMINANT SIDE AS LATE EFFECT OF NONTRAUMATIC INTRACEREBRAL HEMORRHAGE (HCC): Primary | ICD-10-CM

## 2019-03-12 DIAGNOSIS — I65.21 CAROTID OCCLUSION, RIGHT: ICD-10-CM

## 2019-03-12 PROCEDURE — 99214 OFFICE O/P EST MOD 30 MIN: CPT | Performed by: OTHER

## 2019-03-12 NOTE — PROGRESS NOTES
[facility] [facility]   Neurology; follow up CLINIC VISIT  3/12/2019    Makrus Leonardo Patient Status:  No patient class for patient encounter    1950 MRN [id number]   Location [address] [place] [address], [address] [place] [address], [address] [place] [address] PCP A for CABG X 4 1996 2012    • Atherosclerosis of coronary artery 2/28/14    acute MI, CHF, Stents    • Belching    • Black stools    • CONGESTIVE HEART FAILURE 3/2013    EF 35-40 %   • COPD    • Diabetes mellitus ([facility]    • Disorder of liver     \"fatty live Her smoking use included cigarettes. She has a 50.00 pack-year smoking history. she has never used smokeless tobacco. She reports that she does not drink alcohol or use drugs.     ALLERGIES:    Lipitor [Atorvastat*    MYALGIA    Comment:TABS  Wellbutrin [Bu no visual complaints or deficits  HEENT: denies nasal congestion, sinus pain or sore throat; no  hearing loss;  RESPIRATORY: denies shortness of breath, wheezing or cough   CARDIOVASCULAR: denies chest pain, no palpitations   GI: denies nausea, vomiting, c sensations normal,        CN  VII:  Slightly asymmetric facial movement, flattening in her L.  Nasolabial fold,        CN VIII:  Normal hearing       CN IX, XI:  Normal Gag, uvula palate midline       CN XII:  SCM strong, equal shoulder shrug  Motor: normal On 4/27/2018, Patient underwent right ICA carotid angioplasty and stenting with TICI2 b recanalization, there was hemorrhagic transformation after procedure, she feels better overall last three months,  NIHSS now is 7   She has been on ASA 81 mg along with

## 2019-03-30 ENCOUNTER — HOSPITAL ENCOUNTER (OUTPATIENT)
Dept: ULTRASOUND IMAGING | Age: 69
Discharge: HOME | End: 2019-03-30
Attending: NURSE PRACTITIONER
Payer: MEDICARE

## 2019-03-30 ENCOUNTER — LAB ENCOUNTER (OUTPATIENT)
Dept: LAB | Age: 69
End: 2019-03-30
Attending: NURSE PRACTITIONER
Payer: MEDICARE

## 2019-03-30 DIAGNOSIS — R79.89 ELEVATED LFTS: ICD-10-CM

## 2019-03-30 DIAGNOSIS — E78.00 HIGH CHOLESTEROL: ICD-10-CM

## 2019-03-30 DIAGNOSIS — I50.9 CONGESTIVE HEART FAILURE, UNSPECIFIED HF CHRONICITY, UNSPECIFIED HEART FAILURE TYPE (HCC): ICD-10-CM

## 2019-03-30 DIAGNOSIS — E11.65 UNCONTROLLED TYPE 2 DIABETES MELLITUS WITH HYPERGLYCEMIA (HCC): ICD-10-CM

## 2019-03-30 DIAGNOSIS — I10 ESSENTIAL HYPERTENSION: ICD-10-CM

## 2019-03-30 PROCEDURE — 83540 ASSAY OF IRON: CPT

## 2019-03-30 PROCEDURE — 76700 US EXAM ABDOM COMPLETE: CPT | Performed by: NURSE PRACTITIONER

## 2019-03-30 PROCEDURE — 80053 COMPREHEN METABOLIC PANEL: CPT

## 2019-03-30 PROCEDURE — 82728 ASSAY OF FERRITIN: CPT

## 2019-03-30 PROCEDURE — 36415 COLL VENOUS BLD VENIPUNCTURE: CPT

## 2019-03-30 PROCEDURE — 80074 ACUTE HEPATITIS PANEL: CPT

## 2019-04-01 ENCOUNTER — TELEPHONE (OUTPATIENT)
Dept: INTERNAL MEDICINE CLINIC | Facility: CLINIC | Age: 69
End: 2019-04-01

## 2019-04-01 NOTE — TELEPHONE ENCOUNTER
Pts daughter Jerica Gunderson called and stated that when you call to advise them of the labs and imaging results please call her on her cell phone     [phone]    Jerica Gunderson is on [facility]

## 2019-04-02 DIAGNOSIS — R79.89 ELEVATED LFTS: Primary | ICD-10-CM

## 2019-04-19 ENCOUNTER — APPOINTMENT (OUTPATIENT)
Dept: LAB | Age: 69
End: 2019-04-19
Attending: INTERNAL MEDICINE
Payer: MEDICARE

## 2019-04-19 ENCOUNTER — OFFICE VISIT (OUTPATIENT)
Dept: INTERNAL MEDICINE CLINIC | Facility: CLINIC | Age: 69
End: 2019-04-19
Payer: MEDICARE

## 2019-04-19 VITALS
BODY MASS INDEX: 23 KG/M2 | HEART RATE: 64 BPM | RESPIRATION RATE: 16 BRPM | TEMPERATURE: 98 F | DIASTOLIC BLOOD PRESSURE: 60 MMHG | SYSTOLIC BLOOD PRESSURE: 120 MMHG | HEIGHT: 65 IN

## 2019-04-19 DIAGNOSIS — I69.151 HEMIPARESIS OF RIGHT DOMINANT SIDE AS LATE EFFECT OF NONTRAUMATIC INTRACEREBRAL HEMORRHAGE (HCC): ICD-10-CM

## 2019-04-19 DIAGNOSIS — Z00.00 ENCOUNTER FOR ANNUAL HEALTH EXAMINATION: Primary | ICD-10-CM

## 2019-04-19 DIAGNOSIS — I69.30 HISTORY OF CVA WITH RESIDUAL DEFICIT: ICD-10-CM

## 2019-04-19 DIAGNOSIS — R79.89 ELEVATED LFTS: ICD-10-CM

## 2019-04-19 DIAGNOSIS — Z87.891 FORMER SMOKER: ICD-10-CM

## 2019-04-19 DIAGNOSIS — K21.9 GASTROESOPHAGEAL REFLUX DISEASE, ESOPHAGITIS PRESENCE NOT SPECIFIED: ICD-10-CM

## 2019-04-19 DIAGNOSIS — E11.65 UNCONTROLLED TYPE 2 DIABETES MELLITUS, WITHOUT LONG-TERM CURRENT USE OF INSULIN (HCC): ICD-10-CM

## 2019-04-19 DIAGNOSIS — I50.9 CONGESTIVE HEART FAILURE, UNSPECIFIED HF CHRONICITY, UNSPECIFIED HEART FAILURE TYPE (HCC): ICD-10-CM

## 2019-04-19 DIAGNOSIS — IMO0001 MILD AORTIC SCLEROSIS: ICD-10-CM

## 2019-04-19 DIAGNOSIS — Z98.890 S/P CAROTID ENDARTERECTOMY: ICD-10-CM

## 2019-04-19 DIAGNOSIS — E78.5 HYPERLIPIDEMIA ASSOCIATED WITH TYPE 2 DIABETES MELLITUS (HCC): ICD-10-CM

## 2019-04-19 DIAGNOSIS — K76.0 FATTY LIVER: ICD-10-CM

## 2019-04-19 DIAGNOSIS — E78.00 HIGH CHOLESTEROL: ICD-10-CM

## 2019-04-19 DIAGNOSIS — Z95.1 HX OF CABG: ICD-10-CM

## 2019-04-19 DIAGNOSIS — I69.359 SPASTIC HEMIPARESIS AS LATE EFFECT OF CEREBRAL INFARCTION (HCC): ICD-10-CM

## 2019-04-19 DIAGNOSIS — N18.30 CKD (CHRONIC KIDNEY DISEASE) STAGE 3, GFR 30-59 ML/MIN (HCC): ICD-10-CM

## 2019-04-19 DIAGNOSIS — R56.9 SEIZURE (HCC): ICD-10-CM

## 2019-04-19 DIAGNOSIS — F41.1 ANXIETY STATE: ICD-10-CM

## 2019-04-19 DIAGNOSIS — I10 ESSENTIAL HYPERTENSION: ICD-10-CM

## 2019-04-19 DIAGNOSIS — I77.9 RIGHT-SIDED CAROTID ARTERY DISEASE, UNSPECIFIED TYPE (HCC): ICD-10-CM

## 2019-04-19 DIAGNOSIS — J43.2 CENTRILOBULAR EMPHYSEMA (HCC): ICD-10-CM

## 2019-04-19 DIAGNOSIS — F32.0 CURRENT MILD EPISODE OF MAJOR DEPRESSIVE DISORDER, UNSPECIFIED WHETHER RECURRENT (HCC): ICD-10-CM

## 2019-04-19 DIAGNOSIS — Z12.31 ENCOUNTER FOR SCREENING MAMMOGRAM FOR MALIGNANT NEOPLASM OF BREAST: ICD-10-CM

## 2019-04-19 DIAGNOSIS — I65.21 CAROTID OCCLUSION, RIGHT: ICD-10-CM

## 2019-04-19 DIAGNOSIS — M15.9 PRIMARY OSTEOARTHRITIS INVOLVING MULTIPLE JOINTS: ICD-10-CM

## 2019-04-19 DIAGNOSIS — Z98.890 HISTORY OF CAROTID ENDARTERECTOMY: ICD-10-CM

## 2019-04-19 DIAGNOSIS — E11.69 HYPERLIPIDEMIA ASSOCIATED WITH TYPE 2 DIABETES MELLITUS (HCC): ICD-10-CM

## 2019-04-19 DIAGNOSIS — I25.10 ATHEROSCLEROSIS OF NATIVE CORONARY ARTERY OF NATIVE HEART WITHOUT ANGINA PECTORIS: ICD-10-CM

## 2019-04-19 PROCEDURE — 80053 COMPREHEN METABOLIC PANEL: CPT

## 2019-04-19 PROCEDURE — 99214 OFFICE O/P EST MOD 30 MIN: CPT | Performed by: NURSE PRACTITIONER

## 2019-04-19 PROCEDURE — G0439 PPPS, SUBSEQ VISIT: HCPCS | Performed by: NURSE PRACTITIONER

## 2019-04-19 NOTE — PROGRESS NOTES
HPI:   Che Galeano is a 70year old female who presents for a Medicare Subsequent Annual Wellness visit (Pt already had Initial Annual Wellness).   Congestive heart failure, unspecified HF chronicity, unspecified heart failure type ([facility]  Has been s to monitor. Anxiety state  Sertraline 100mg. Right-sided carotid artery disease, unspecified type ([facility]  Cont to monitor. Stable     Carotid occlusion, right  S/p endarterectomy    History of carotid endarterectomy Stable  Cont to monitor.      Hx Cannot do without help    She has Toileting difficulties based on screening of functional status. Toileting: Cannot do without help  She has Eating difficulties based on screening of functional status.    Eating: Need some help  She has Driving difficulti quittin.9      Smokeless tobacco: Never Used       Ms. Fran Ramirez already takes aspirin and has it on her medication list.   CAGE Alcohol screening   Thea Floyd was screened for Alcohol abuse and had a score of 0 so is at low risk.     Patient Car recent labs)   Lab Results   Component Value Date    WBC 8.9 02/26/2019    HGB 12.7 02/26/2019    .0 02/26/2019        ALLERGIES:   She is allergic to lipitor [atorvastatin calcium] and wellbutrin [bupropion hcl].     CURRENT MEDICATIONS:     [address] Stented coronary artery, STROKE (2005), Stroke syndrome (12/2005), Type II or unspecified type diabetes mellitus without mention of complication, not stated as uncontrolled, Uncontrolled diabetes mellitus ([facility] (3/19/2014), Unspecified essential hypertensi as of this encounter: 65\". Weight as of 2/26/19: 136 lb 14.5 oz.     Medicare Hearing Assessment  (Required for AWV/SWV)    Finger Rub       Visual Acuity                           General Appearance:  Alert, cooperative, no distress, appears stated age 22 10/24/2007, 11/04/2014        ASSESSMENT AND OTHER RELEVANT CHRONIC CONDITIONS:   Paul Lester is a 70year old female who presents for a Medicare Assessment.      PLAN SUMMARY:   Diagnoses and all orders for this visit:    Encounter for annual heal multiple jointsStable  Cont to monitor. CKD (chronic kidney disease) stage 3, GFR 30-59 ml/min ([facility])  Stable   Cont to monitor. Anxiety state  Sertraline 100mg. Right-sided carotid artery disease, unspecified type ([facility]  Cont to monitor.   Stab 99     GLUCOSE (mg/dL)   Date Value   04/19/2017 212 (H)          Cardiovascular Disease Screening     LDL Annually LDL Cholesterol (mg/dL)   Date Value   03/02/2019 59     LDL-CHOLESTEROL (mg/dL (calc))   Date Value   04/19/2017 138 (H)        EKG - w/ In concentrates   Clients of institutions for the mentally retarded   Persons who live in the same house as a HepB virus carrier   Homosexual men   Illicit injectable drug abusers     Tetanus Toxoid  Only covered with a cut with metal- TD and TDaP Not covered

## 2019-04-19 NOTE — PATIENT INSTRUCTIONS
[address] SCREENING SCHEDULE   Tests on this list are recommended by your physician but may not be covered, or covered at this frequency, by your insurer. Please check with your insurance carrier before scheduling to verify coverage.    Patrick Kawasaki for this or any previous visit.  Limited to patients who meet one of the following criteria:   • Men who are 73-68 years old and have smoked more than 100 cigarettes in their lifetime   • Anyone with a family history    Colorectal Cancer Screening  Covered Please get this Mammogram regularly   Immunizations      Influenza  Covered Annually Orders placed or performed in visit on 09/23/16   • FLU VACC [address] ANTIG   Orders placed or performed in visit on 11/30/15   • FLU VACC [address] ANTIG   Order http://[email or web address] [place]ph.state. [place]/public/books/advin.htm  A link to the [employer]. This site has a lot of good information including definitions of the different types of Advance Directives.  It also has the State forms available on it's webs

## 2019-04-23 DIAGNOSIS — R79.89 ELEVATED LFTS: Primary | ICD-10-CM

## 2019-04-23 DIAGNOSIS — K76.0 FATTY LIVER: ICD-10-CM

## 2019-05-17 DIAGNOSIS — F32.0 CURRENT MILD EPISODE OF MAJOR DEPRESSIVE DISORDER, UNSPECIFIED WHETHER RECURRENT (HCC): ICD-10-CM

## 2019-05-18 NOTE — TELEPHONE ENCOUNTER
LOV:4/19/19 SD  FOV:none on file   LAST RX:12/26/18 100 mg take 1 tab daily 90 tabs 0 refills   LAST LABS:4/19/19 cmp  PER PROTOCOL:to provider

## 2019-05-19 RX ORDER — SERTRALINE HYDROCHLORIDE 100 MG/1
TABLET, FILM COATED ORAL
Qty: 90 TABLET | Refills: 0 | Status: SHIPPED | OUTPATIENT
Start: 2019-05-19 | End: 2019-07-14

## 2019-05-21 RX ORDER — INSULIN GLARGINE 100 [IU]/ML
INJECTION, SOLUTION SUBCUTANEOUS
Qty: 15 ML | Refills: 0 | Status: SHIPPED | OUTPATIENT
Start: 2019-05-21 | End: 2019-10-22

## 2019-05-21 NOTE — TELEPHONE ENCOUNTER
Last Office Visit: 4-19-19 with SD for cpe   Last Rx Filled: 9-11-18 5 pens with 1 refil   Last Labs: 4-19-19 cmp. 3-2-19 lipid/tsh/cmp/hga1c  Future Appointment: none    Per protocol to provider

## 2019-05-22 RX ORDER — AMLODIPINE BESYLATE 10 MG/1
10 TABLET ORAL DAILY
Qty: 90 TABLET | Refills: 0 | Status: SHIPPED | OUTPATIENT
Start: 2019-05-22 | End: 2019-07-14 | Stop reason: SDUPTHER

## 2019-05-22 RX ORDER — AMLODIPINE BESYLATE 10 MG/1
10 TABLET ORAL DAILY
COMMUNITY
Start: 2018-11-19 | End: 2019-05-22 | Stop reason: SDUPTHER

## 2019-06-06 ENCOUNTER — TELEPHONE (OUTPATIENT)
Dept: INTERNAL MEDICINE CLINIC | Facility: CLINIC | Age: 69
End: 2019-06-06

## 2019-06-15 ENCOUNTER — APPOINTMENT (OUTPATIENT)
Dept: CT IMAGING | Facility: HOSPITAL | Age: 69
DRG: 101 | End: 2019-06-15
Attending: EMERGENCY MEDICINE
Payer: MEDICARE

## 2019-06-15 ENCOUNTER — HOSPITAL ENCOUNTER (INPATIENT)
Facility: HOSPITAL | Age: 69
LOS: 1 days | Discharge: HOME HEALTH | DRG: 101 | End: 2019-06-16
Attending: EMERGENCY MEDICINE | Admitting: HOSPITALIST
Payer: MEDICARE

## 2019-06-15 DIAGNOSIS — N30.00 ACUTE CYSTITIS WITHOUT HEMATURIA: ICD-10-CM

## 2019-06-15 DIAGNOSIS — R56.9 SEIZURE (HCC): Primary | ICD-10-CM

## 2019-06-15 DIAGNOSIS — Z87.891 PERSONAL HISTORY OF TOBACCO USE, PRESENTING HAZARDS TO HEALTH: Primary | ICD-10-CM

## 2019-06-15 PROCEDURE — 99223 1ST HOSP IP/OBS HIGH 75: CPT | Performed by: HOSPITALIST

## 2019-06-15 PROCEDURE — 70450 CT HEAD/BRAIN W/O DYE: CPT | Performed by: EMERGENCY MEDICINE

## 2019-06-15 RX ORDER — METOCLOPRAMIDE HYDROCHLORIDE 5 MG/ML
5 INJECTION INTRAMUSCULAR; INTRAVENOUS EVERY 8 HOURS PRN
Status: DISCONTINUED | OUTPATIENT
Start: 2019-06-15 | End: 2019-06-16

## 2019-06-15 RX ORDER — DEXTROSE MONOHYDRATE 25 G/50ML
50 INJECTION, SOLUTION INTRAVENOUS
Status: DISCONTINUED | OUTPATIENT
Start: 2019-06-15 | End: 2019-06-16

## 2019-06-15 RX ORDER — EZETIMIBE 10 MG/1
10 TABLET ORAL NIGHTLY
Status: DISCONTINUED | OUTPATIENT
Start: 2019-06-15 | End: 2019-06-16

## 2019-06-15 RX ORDER — SPIRONOLACTONE 25 MG/1
12.5 TABLET ORAL DAILY
Status: DISCONTINUED | OUTPATIENT
Start: 2019-06-15 | End: 2019-06-16

## 2019-06-15 RX ORDER — ENOXAPARIN SODIUM 100 MG/ML
40 INJECTION SUBCUTANEOUS EVERY EVENING
Status: DISCONTINUED | OUTPATIENT
Start: 2019-06-15 | End: 2019-06-16

## 2019-06-15 RX ORDER — LOSARTAN POTASSIUM 50 MG/1
50 TABLET ORAL 2 TIMES DAILY
Status: DISCONTINUED | OUTPATIENT
Start: 2019-06-15 | End: 2019-06-16

## 2019-06-15 RX ORDER — ASPIRIN 81 MG/1
81 TABLET ORAL DAILY
Status: DISCONTINUED | OUTPATIENT
Start: 2019-06-15 | End: 2019-06-16

## 2019-06-15 RX ORDER — PANTOPRAZOLE SODIUM 40 MG/1
40 TABLET, DELAYED RELEASE ORAL EVERY EVENING
Status: DISCONTINUED | OUTPATIENT
Start: 2019-06-15 | End: 2019-06-16

## 2019-06-15 RX ORDER — LORAZEPAM 2 MG/ML
1 INJECTION INTRAMUSCULAR ONCE
Status: COMPLETED | OUTPATIENT
Start: 2019-06-15 | End: 2019-06-15

## 2019-06-15 RX ORDER — ONDANSETRON 2 MG/ML
4 INJECTION INTRAMUSCULAR; INTRAVENOUS EVERY 6 HOURS PRN
Status: DISCONTINUED | OUTPATIENT
Start: 2019-06-15 | End: 2019-06-16

## 2019-06-15 RX ORDER — CLOPIDOGREL BISULFATE 75 MG/1
75 TABLET ORAL
Status: DISCONTINUED | OUTPATIENT
Start: 2019-06-15 | End: 2019-06-16

## 2019-06-15 RX ORDER — ACETAMINOPHEN 500 MG
500 TABLET ORAL EVERY 4 HOURS PRN
Status: DISCONTINUED | OUTPATIENT
Start: 2019-06-15 | End: 2019-06-16

## 2019-06-15 RX ORDER — SODIUM CHLORIDE 9 MG/ML
INJECTION, SOLUTION INTRAVENOUS CONTINUOUS
Status: ACTIVE | OUTPATIENT
Start: 2019-06-15 | End: 2019-06-15

## 2019-06-15 RX ORDER — HYDRALAZINE HYDROCHLORIDE 50 MG/1
100 TABLET, FILM COATED ORAL EVERY 8 HOURS SCHEDULED
Status: DISCONTINUED | OUTPATIENT
Start: 2019-06-15 | End: 2019-06-16

## 2019-06-15 RX ORDER — ROSUVASTATIN CALCIUM 40 MG/1
40 TABLET, COATED ORAL NIGHTLY
Status: DISCONTINUED | OUTPATIENT
Start: 2019-06-15 | End: 2019-06-16

## 2019-06-15 RX ORDER — AMLODIPINE BESYLATE 5 MG/1
10 TABLET ORAL DAILY
Status: DISCONTINUED | OUTPATIENT
Start: 2019-06-15 | End: 2019-06-16

## 2019-06-15 NOTE — ED NOTES
Pt asleep on cart but arousable, c/o feeling tired. Skin w/d,resps reg/unlabored. Daughter remains at bedside.

## 2019-06-15 NOTE — ED NOTES
Pt lying on cart with eyes closed, appears drowsy, but oriented x 3, skin w/d,resps reg/unlabored. Pt denies complaints at this time, pt's daughter at bedside.

## 2019-06-15 NOTE — PROGRESS NOTES
[facility] Pharmacy Note:  Renal Dose Adjustment for Metoclopramide (REGLAN)    Mely Pouch has been prescribed Metoclopramide (REGLAN) 10 mg every 8 hours as needed for N/V.     Estimated Creatinine Clearance: 38.3 mL/min (A) (based on SCr of 1.23 mg/dL (H)

## 2019-06-15 NOTE — PROGRESS NOTES
NURSING ADMISSION NOTE      Patient admitted via Cart  Oriented to room. Safety precautions initiated. Bed in low position. Call light in reach. Admission navigator completed. Seizure precautions. Will endorse to oncoming shift RN.

## 2019-06-15 NOTE — H&P
[facility]  History and Physical     Lucy Rowland Patient Status:  Emergency    1950 MRN [id number]   Location [address] [facility] Attending Michell Jara MD   Hosp Day # 0 PCP Basia Verma MD     Chief Complaint 2/26/19   • Shortness of breath    • Sputum production    • Stented coronary artery    • STROKE 2005    TIA   • Stroke syndrome 12/2005    CVA no residual effects   • Type II or unspecified type diabetes mellitus without mention of complication, not stated EVERY MORNING Disp: 15 mL Rfl: 0   SERTRALINE  MG Oral Tab TAKE 1 TABLET BY MOUTH ONCE DAILY Disp: 90 tablet Rfl: 0   HYDRALAZINE  MG Oral Tab TAKE 1 TABLET BY MOUTH 3 TIMES DAILY Disp: 270 tablet Rfl: 0   EZETIMIBE 10 MG Oral Tab TAKE 1 TABL gallops. Equal pulses. Chest and Back: No tenderness or deformity. Abdomen: Soft, nontender, nondistended. Positive bowel sounds. No rebound, guarding or organomegaly. Neurologic: No focal neurological deficits. CNII-XII grossly intact.   Musculoskelet

## 2019-06-15 NOTE — ED INITIAL ASSESSMENT (HPI)
Pt presents to ed from home per [facility]. Pt lives at home with daughter. Pt had 2 grand mal and 1 petit seizure witnessed by daughter today. Pt arrived AOx4. Answering questions appropriately.  Pt denies headache, denies n/v.

## 2019-06-15 NOTE — ED PROVIDER NOTES
Patient Seen in: [facility] Emergency Department    History   Patient presents with:  Seizure Disorder (neurologic)    Stated Complaint: Shakir Stephmeme    HPI    54-year-old female comes in the hospital complaint of having had 3 seizures this morning.   The syndrome 12/2005    CVA no residual effects   • Type II or unspecified type diabetes mellitus without mention of complication, not stated as uncontrolled    • Uncontrolled diabetes mellitus ([facility] 3/19/2014   • Unspecified essential hypertension    • Unspec BMI 23.30 kg/m²         Physical Exam    HEENT : NCAT, EOMI, PEERL, throat clear, neck supple, no JVD, trachea midline, No LAD  Heart: S1S2 normal. No murmurs, regular rate and rhythm  Lungs: Clear to auscultation bilaterally  Abdomen: Soft nontender nondi -----------         ------                     CBC W/ DIFFERENTIAL[[id number]]          Abnormal            Final result                 Please view results for these tests on the individual orders.    RAINBOW DRAW BLUE   RAINBOW DRAW LAV in the ER with accurate read back.   1544 hr.   Dictated by: Richard Quach MD on 6/15/2019 at 15:40     Approved by: Richard Quach MD            Medications   levETIRAcetam (KEPPRA) 500 mg in sodium chloride 0.9% 100 mL IVPB (has no administration in time range

## 2019-06-15 NOTE — ED NOTES
Patient is resting comfortably on cart. Pt alert and oriented, speech clear, skin w/d,resps reg/unlabored. keppra infusing per pump. Pt repositioned on cart and depend changed . Pt aware will call report shortly.

## 2019-06-15 NOTE — ED NOTES
Pt able to move left leg without difficulty, left arm- contracted. Pt had a wet brief, changed brief.

## 2019-06-16 ENCOUNTER — APPOINTMENT (OUTPATIENT)
Dept: CT IMAGING | Facility: HOSPITAL | Age: 69
DRG: 101 | End: 2019-06-16
Attending: Other
Payer: MEDICARE

## 2019-06-16 VITALS
HEART RATE: 65 BPM | SYSTOLIC BLOOD PRESSURE: 133 MMHG | WEIGHT: 141.88 LBS | DIASTOLIC BLOOD PRESSURE: 67 MMHG | HEIGHT: 65 IN | BODY MASS INDEX: 23.64 KG/M2 | OXYGEN SATURATION: 93 % | RESPIRATION RATE: 18 BRPM | TEMPERATURE: 99 F

## 2019-06-16 PROCEDURE — 99223 1ST HOSP IP/OBS HIGH 75: CPT | Performed by: OTHER

## 2019-06-16 PROCEDURE — 70450 CT HEAD/BRAIN W/O DYE: CPT | Performed by: OTHER

## 2019-06-16 PROCEDURE — 95816 EEG AWAKE AND DROWSY: CPT | Performed by: OTHER

## 2019-06-16 PROCEDURE — 99239 HOSP IP/OBS DSCHRG MGMT >30: CPT | Performed by: HOSPITALIST

## 2019-06-16 RX ORDER — LEVETIRACETAM 500 MG/1
500 TABLET ORAL 2 TIMES DAILY
Status: DISCONTINUED | OUTPATIENT
Start: 2019-06-16 | End: 2019-06-16

## 2019-06-16 RX ORDER — CIPROFLOXACIN 500 MG/1
500 TABLET, FILM COATED ORAL 2 TIMES DAILY
Qty: 10 TABLET | Refills: 0 | Status: SHIPPED | OUTPATIENT
Start: 2019-06-16 | End: 2019-06-21

## 2019-06-16 RX ORDER — LEVETIRACETAM 500 MG/1
500 TABLET ORAL 2 TIMES DAILY
Qty: 60 TABLET | Refills: 0 | Status: SHIPPED | OUTPATIENT
Start: 2019-06-16 | End: 2019-06-27

## 2019-06-16 NOTE — PLAN OF CARE
Problem: Diabetes/Glucose Control  Goal: Glucose maintained within prescribed range  Description  INTERVENTIONS:  - Monitor Blood Glucose as ordered  - Assess for signs and symptoms of hyperglycemia and hypoglycemia  - Administer ordered medications to m baseline  Description  INTERVENTIONS:  - Continuous cardiac monitoring, monitor vital signs, obtain 12 lead EKG if indicated  - Evaluate effectiveness of antiarrhythmic and heart rate control medications as ordered  - Initiate emergency measures for life t

## 2019-06-16 NOTE — PLAN OF CARE
NURSING DISCHARGE NOTE    Discharged Home via Wheelchair. Accompanied by Family member and Support staff  Belongings Taken by patient/family. Discharge instructions reviewed with patient & daughter Kimberlee Woo at the bedside.  2 x hard copy prescript Implement non-pharmacological measures as appropriate and evaluate response  - Consider cultural and social influences on pain and pain management  - Manage/alleviate anxiety  - Utilize distraction and/or relaxation techniques  - Monitor for opioid side ef range  Description  INTERVENTIONS:  - Monitor Blood Glucose as ordered  - Assess for signs and symptoms of hyperglycemia and hypoglycemia  - Administer ordered medications to maintain glucose within target range  - Assess barriers to adequate nutritional i temperature, glucose, and sodium.  Initiate appropriate interventions as ordered  6/16/2019 1844 by Keren Omalley RN  Outcome: Adequate for Discharge  6/16/2019 1420 by Keren Omalley RN  Outcome: Progressing  Goal: Absence of seizures  Description  IN

## 2019-06-16 NOTE — CONSULTS
Neurology H&P    Kacey Garcia Patient Status:  Inpatient    1950 MRN [id number]   [facility] 3NE-A Attending Aaron Del Angel MD   Hosp Day # 1 PCP Sandra White MD     Subjective:  Kacey Garcia is a(n) 78year old female with disease) stage 3, GFR 30-59 ml/min (HCC)     History of CVA with residual deficit     Spastic hemiparesis as late effect of cerebral infarction ([facility]     Seizure (HCC)     Fatty liver     Elevated LFTs     Acute cystitis without hematuria      PMHx:  Past Hx of transcath placement of intrarhoracic carotid artery   • PERCUTANEOUS ENDOSCOPIC GASTROSTOMY PLACEMENT/JEJUNOSTOMY TUBE PLACEMENT N/A 5/16/2018    Performed by Chidi Chavarria MD at [facility] ENDOSCOPY       SocHx:  Social History    Tobacco Use      Smok sensation intact, strong eye closure, L lower facial weakness, palate elevation intact, dysarthric speech, no tongue fasciculations or atrophy, strong shoulder shrug on R with limited LUE mobility.     MOTOR EXAMINATION: normal tone, and strength 5/5 on RUE hr.    Assessment: This is a 77 y/o female with multiple medical problems including stroke and a subsequent seizure.  She was being observed off AED as there was only one seizure noted during her hospitalization for stroke a year ago and had no recurrent e

## 2019-06-16 NOTE — PHYSICAL THERAPY NOTE
PHYSICAL THERAPY EVALUATION - INPATIENT     Room Number: 7227/9634-L  Evaluation Date: 6/16/2019  Type of Evaluation: Initial  Physician Order: PT Eval and Treat    Presenting Problem: seizures  Reason for Therapy: Mobility Dysfunction and Discharge Problem:    Seizure ([facility]  Active Problems:    Carotid arterial disease (HCC)    Depression    High cholesterol    Anxiety state    CHF (congestive heart failure) (HCC)    COPD (chronic obstructive pulmonary disease) ([facility]    Uncontrolled type 2 diabetes m Maximus Ly MD at . [address] (BMS)      R carotid/coronary   • CHOLECYSTECTOMY  1987   • HC SPLY CAROTID STENT     • [facility] [place]   • OTHER SURGICAL HISTORY  2005    Hx of transcath placement of intrarhoracic carotid ASSESSMENT  Upper extremity ROM and strength are within functional limits except for the following:  L UE with no AROM, noted L wrist with flexion contracture, flexed at elbow, left shoulder flexion grossly 1/2 ROM.  Strength L UE grossly 0/5, except L shou Score:  Raw Score: 16   Approx Degree of Impairment: 54.16%   Standardized Score (AM-PAC Scale): 40.78   CMS Modifier (G-Code): CK    FUNCTIONAL ABILITY STATUS  Gait Assessment   Gait Assistance: Dependent assistance(actual min assist)  Distance (ft): 15 degree of impairment in mobility. Research supports that patients with this level of impairment may benefit from AMY.  However pt is near baseline for mobility and has proper family and caregiver support with equipment needs met in home and is therefore demetrius

## 2019-06-16 NOTE — PROCEDURES
[facility] - ELECTROENCEPHALOGRAM (EEG) REPORT  Patient Name:  Claudetta Freud   MRN / CSN:  [id number] / [id number]   Date of Birth / Age:  6/5/1950 /  78year old   Encounter Date:  6/16/19         METHODS:  Twenty-two electrodes were applied according to the 1 record. In portions of the record that were readable the background varied form 5-8Hz consistent with a mild encephalopathy.  No clear seizures ere recorded but due t significant movement artifact if clinical seizures are still suspected then EEG may be rep

## 2019-06-16 NOTE — PROGRESS NOTES
[facility] HOSPITALIST  Progress Note     Marciano Pedroza Patient Status:  Inpatient    1950 MRN [id number]   [facility] 3NE-A Attending Tanja Gates MD   Hosp Day # 1 PCP Raiza Rowan MD     Chief Complaint: Seizures    S: Patient d mg Oral Daily   • ezetimibe  10 mg Oral Nightly   • hydrALAZINE HCl  100 mg Oral Q8H [address]   • insulin detemir  10 Units Subcutaneous Daily   • spironolactone  12.5 mg Oral Daily   • Sertraline HCl  100 mg Oral Daily   • Rosuvastatin Calcium  40 mg Oral Nigh

## 2019-06-16 NOTE — CM/SW NOTE
06/16/19 1500   CM/SW Referral Data   Referral Source Physician   Reason for Referral Discharge planning   Informant Patient   Patient Info   Patient's Mental Status Alert;Oriented   Patient's Home Environment Condo/Apt no elevator   Number of Levels in

## 2019-06-16 NOTE — PLAN OF CARE
Assumed care of patient at [facility]. Patient A&Ox4, very drowsy. No seizure activity noted overnight or today so far. Keppra BID. Repeat CT Brain at 1500. EEG in progress. On 1-2L NC PRN especially when asleep. . NSR on telemetry with PVC's.   Pilar Fisher Utilize distraction and/or relaxation techniques  - Monitor for opioid side effects  - Notify MD/LIP if interventions unsuccessful or patient reports new pain  - Anticipate increased pain with activity and pre-medicate as appropriate  Outcome: Progressing symptoms of electrolyte imbalances  - Administer electrolyte replacement as ordered  - Monitor response to electrolyte replacements, including rhythm and repeat lab results as appropriate  - Fluid restriction as ordered  - Instruct patient on fluid and nut Progressing     Problem: Impaired Functional Mobility  Goal: Achieve highest/safest level of mobility/gait  Description  Interventions:  - Assess patient's functional ability and stability  - Promote increasing activity/tolerance for mobility and gait  - E

## 2019-06-16 NOTE — HOME CARE LIAISON
Received referral from [address] [place] [address]. Met with patient at the bedside. Called patient's dtr Viola Rose, with patient's permission to discuss home services. They are agreeable to [address]  Residential brochure provided with contact informat

## 2019-06-17 ENCOUNTER — TELEPHONE (OUTPATIENT)
Dept: INTERNAL MEDICINE CLINIC | Facility: CLINIC | Age: 69
End: 2019-06-17

## 2019-06-17 ENCOUNTER — PATIENT OUTREACH (OUTPATIENT)
Dept: CASE MANAGEMENT | Age: 69
End: 2019-06-17

## 2019-06-17 DIAGNOSIS — Z02.9 ENCOUNTERS FOR ADMINISTRATIVE PURPOSE: ICD-10-CM

## 2019-06-17 NOTE — PROGRESS NOTES
Initial Post Discharge Follow Up   Discharge Date: 6/16/19  Contact Date: 6/17/2019    Consent Verification:  Assessment Completed With: Patient  HIPAA Verified?   Yes    Discharge Dx:   Seizures     General:   • How have you been since your discharge fro MG Oral Tab TAKE 1 TABLET BY MOUTH EVERY NIGHT Disp: 90 tablet Rfl: 0   CLOPIDOGREL BISULFATE 75 MG Oral Tab TAKE 1 TABLET BY MOUTH EVERY DAY Disp: 90 tablet Rfl: 0   spironolactone 25 MG Oral Tab Take 0.5 tablets (12.5 mg total) by mouth daily.  Disp: 15 t scheduled at D/C within 7-14 days  no     NCM Reviewed/scheduled/rescheduled PCP TCM/HFU appointment with pt:  Patient states that she will call back to schedule      Have you made all of your follow up appointments? no    Is there any reason as to why you

## 2019-06-17 NOTE — DISCHARGE SUMMARY
[facility] [place] HOSPITALIST  DISCHARGE SUMMARY     Clay Welsh Patient Status:  Inpatient    1950 MRN [id number]   [facility] 3NE-A Attending No att. providers found   Hosp Day # 1 PCP Raghu Fonseca MD     Date of Admission: 6/15/2019  D readmission after discharge from the hospital.  **Certification    Admission date was 6/15/2019. Inpatient stay was shorter than expected.   Patient's Seizure ([facility] was initially serious enough to expect a more lengthy hospitalization but patient improved insulin glargine      INJECT 10 UNITS UNDER THE SKIN EVERY MORNING   Quantity:  15 mL  Refills:  0     losartan Potassium 50 MG Tabs  Commonly known as:  COZAAR      Take 1 tablet (50 mg total) by mouth 2 (two) times daily.    Quantity:  60 tablet  Refills: edema.  -----------------------------------------------------------------------------------------------  PATIENT DISCHARGE INSTRUCTIONS: See electronic chart    Vi Luque MD    Time spent:  > 30 minutes

## 2019-06-18 ENCOUNTER — TELEPHONE (OUTPATIENT)
Dept: INTERNAL MEDICINE CLINIC | Facility: CLINIC | Age: 69
End: 2019-06-18

## 2019-06-18 NOTE — TELEPHONE ENCOUNTER
Completed start of care for the pt today. Will be sending over orders.  Any questions please call Julianna Morris

## 2019-06-18 NOTE — TELEPHONE ENCOUNTER
Received fax from [employer] stating patient has not completed cologuard test it was placed on SD bin for review.

## 2019-06-20 ENCOUNTER — TELEPHONE (OUTPATIENT)
Dept: INTERNAL MEDICINE CLINIC | Facility: CLINIC | Age: 69
End: 2019-06-20

## 2019-06-20 NOTE — TELEPHONE ENCOUNTER
Judene Kayser called stating she is not going to see this patient this week as she has a neuro appt tomorrow so she will not be able to do physical therapy eval until next week

## 2019-06-24 ENCOUNTER — OFFICE VISIT (OUTPATIENT)
Dept: INTERNAL MEDICINE CLINIC | Facility: CLINIC | Age: 69
End: 2019-06-24
Payer: MEDICARE

## 2019-06-24 VITALS
HEIGHT: 65 IN | SYSTOLIC BLOOD PRESSURE: 128 MMHG | HEART RATE: 60 BPM | BODY MASS INDEX: 24 KG/M2 | DIASTOLIC BLOOD PRESSURE: 60 MMHG

## 2019-06-24 DIAGNOSIS — E78.00 HIGH CHOLESTEROL: ICD-10-CM

## 2019-06-24 DIAGNOSIS — I69.30 HISTORY OF CVA WITH RESIDUAL DEFICIT: ICD-10-CM

## 2019-06-24 DIAGNOSIS — I10 ESSENTIAL HYPERTENSION: ICD-10-CM

## 2019-06-24 DIAGNOSIS — N39.0 URINARY TRACT INFECTION WITHOUT HEMATURIA, SITE UNSPECIFIED: ICD-10-CM

## 2019-06-24 DIAGNOSIS — G40.909 SEIZURE DISORDER (HCC): Primary | ICD-10-CM

## 2019-06-24 DIAGNOSIS — E11.65 UNCONTROLLED TYPE 2 DIABETES MELLITUS, WITHOUT LONG-TERM CURRENT USE OF INSULIN (HCC): ICD-10-CM

## 2019-06-24 PROCEDURE — 99496 TRANSJ CARE MGMT HIGH F2F 7D: CPT | Performed by: NURSE PRACTITIONER

## 2019-06-24 PROCEDURE — 1111F DSCHRG MED/CURRENT MED MERGE: CPT | Performed by: NURSE PRACTITIONER

## 2019-06-24 NOTE — PROGRESS NOTES
HPI:    Odalys Summers is a 78year old female here today for hospital follow up.    She was discharged from Inpatient hospital, [facility] to Home   Admission Date: 6/15/19   Discharge Date: 6/16/19  Hospital Discharge Diagnoses (since 5/25/2019) Outpatient Medications on File Prior to Visit:  levETIRAcetam 500 MG Oral Tab Take 1 tablet (500 mg total) by mouth 2 (two) times daily.    LANTUS SOLOSTAR 100 UNIT/ML Subcutaneous Solution Pen-injector INJECT 10 UNITS UNDER THE SKIN EVERY MORNING   SERTRAL Uncontrolled diabetes mellitus ([facility] (3/19/2014), Unspecified essential hypertension, Unspecified sleep apnea, Wears glasses, and Wheezing.     She  has a past surgical history that includes biopsy of skin lesion; cholecystectomy (1945); cabg (1996); kirstin cholesterol  Stable      History of CVA with residual deficit  Stable  Currently home PT  Will transition to [facility]    Uncontrolled type 2 diabetes mellitus, without long-term current use of insulin (HCC)  Stable     Urinary tract infection without hematuria,

## 2019-06-27 ENCOUNTER — OFFICE VISIT (OUTPATIENT)
Dept: NEUROLOGY | Facility: CLINIC | Age: 69
End: 2019-06-27
Payer: MEDICARE

## 2019-06-27 VITALS
HEART RATE: 70 BPM | RESPIRATION RATE: 16 BRPM | SYSTOLIC BLOOD PRESSURE: 128 MMHG | DIASTOLIC BLOOD PRESSURE: 80 MMHG | BODY MASS INDEX: 23 KG/M2 | WEIGHT: 141 LBS

## 2019-06-27 DIAGNOSIS — R56.9 SEIZURE (HCC): Primary | ICD-10-CM

## 2019-06-27 DIAGNOSIS — I69.30 HISTORY OF CVA WITH RESIDUAL DEFICIT: ICD-10-CM

## 2019-06-27 DIAGNOSIS — I69.359 SPASTIC HEMIPARESIS AS LATE EFFECT OF CEREBRAL INFARCTION (HCC): ICD-10-CM

## 2019-06-27 PROCEDURE — 99215 OFFICE O/P EST HI 40 MIN: CPT | Performed by: OTHER

## 2019-06-27 RX ORDER — LEVETIRACETAM 500 MG/1
500 TABLET ORAL 2 TIMES DAILY
Qty: 180 TABLET | Refills: 3 | Status: SHIPPED | OUTPATIENT
Start: 2019-06-27 | End: 2019-10-14

## 2019-06-27 NOTE — PROGRESS NOTES
[facility]   Neurology; follow up CLINIC VISIT  2019    Paul Lester Patient Status:  No patient class for patient encounter    1950 MRN [id number]   Location [address] [place] [address], [address] [place] [address], [address] [place] [address] DANA JACKSON Shortness of breath    • Sputum production    • Stented coronary artery    • STROKE 2005    TIA   • Stroke syndrome 12/2005    CVA no residual effects   • Type II or unspecified type diabetes mellitus without mention of complication, not stated as Jordin Chaudhry SERTRALINE  MG Oral Tab TAKE 1 TABLET BY MOUTH ONCE DAILY Disp: 90 tablet Rfl: 0   HYDRALAZINE  MG Oral Tab TAKE 1 TABLET BY MOUTH 3 TIMES DAILY Disp: 270 tablet Rfl: 0   EZETIMIBE 10 MG Oral Tab TAKE 1 TABLET BY MOUTH EVERY NIGHT Disp: 90 food or seasonal allergies      PHYSICAL EXAMINATION:  VITAL SIGNS: /80   Pulse 70   Resp 16   Wt 141 lb   BMI 23.46 kg/m²    Body mass index is 23.46 kg/m². General:  Patient is a 78year old female in no acute distress.   appearance: Normal develop infarct with hemorrhagic products and small amount of subarachnoid hemorrhage. Stable midline shift. Stable mass effect. The basal cisterns are patent.     Dictated by: Shantell Bailey MD on 4/30/2018 at 8:54       Approved by: Shantell Bailey MD Seizure ([facility]  (primary encounter diagnosis)    (Y89.127) Spastic hemiparesis as late effect of cerebral infarction ([facility]    (I69.30) History of CVA with residual deficit        Summery:  Recurrence of seizure, on 6/15/2019,   She is on keppra 500 mg bid n

## 2019-06-27 NOTE — PROGRESS NOTES
The patient denies any seizures since leaving the hospital. Patient denies any memory changes, speech and vision. The patient denies any stroke like symptoms.

## 2019-07-01 ENCOUNTER — TELEPHONE (OUTPATIENT)
Dept: INTERNAL MEDICINE CLINIC | Facility: CLINIC | Age: 69
End: 2019-07-01

## 2019-07-01 NOTE — TELEPHONE ENCOUNTER
Received paperwork from [facility], they are unable to process sample, stool exceeds the allowable weight (300grams). The patient will be contacted to initiate a new sample collection.    JOHN

## 2019-07-06 DIAGNOSIS — E11.65 UNCONTROLLED TYPE 2 DIABETES MELLITUS, WITHOUT LONG-TERM CURRENT USE OF INSULIN (HCC): Primary | ICD-10-CM

## 2019-07-06 NOTE — TELEPHONE ENCOUNTER
Pts daughter called and asked for a refill of the TECHLITE PEN NEEDLES 32G X 4 MM that she uses for her Lantus be sent to her mail order pharmacy     [address] [place] [address], [address] [place] [address],

## 2019-07-09 NOTE — TELEPHONE ENCOUNTER
Patient's daughter(Jaye) just received a call from [facility] stating that the needles are not covered by her Insurance. Stephen Hunter states patient used to take [place] and Lantus and no longer takes the [place].   Patient is still on the Lantus and has bee

## 2019-07-10 ENCOUNTER — TELEPHONE (OUTPATIENT)
Dept: INTERNAL MEDICINE CLINIC | Facility: CLINIC | Age: 69
End: 2019-07-10

## 2019-07-10 NOTE — TELEPHONE ENCOUNTER
Yanique is calling  From residential [facility] wanting to increase PT 1X week for 3 Weeks for balance in walking

## 2019-07-10 NOTE — TELEPHONE ENCOUNTER
Called and spoke to Yanique at [facility], provided verbal orders per SD to increase PT 1x per week for 3 weeks. She verbalized understanding.

## 2019-07-14 DIAGNOSIS — F32.0 CURRENT MILD EPISODE OF MAJOR DEPRESSIVE DISORDER, UNSPECIFIED WHETHER RECURRENT (HCC): ICD-10-CM

## 2019-07-14 DIAGNOSIS — I10 ESSENTIAL HYPERTENSION: ICD-10-CM

## 2019-07-14 DIAGNOSIS — E78.00 HIGH CHOLESTEROL: ICD-10-CM

## 2019-07-15 ENCOUNTER — TELEPHONE (OUTPATIENT)
Dept: INTERNAL MEDICINE CLINIC | Facility: CLINIC | Age: 69
End: 2019-07-15

## 2019-07-15 ENCOUNTER — TELEPHONE (OUTPATIENT)
Dept: CARDIOLOGY | Age: 69
End: 2019-07-15

## 2019-07-15 DIAGNOSIS — I69.151 HEMIPARESIS OF RIGHT DOMINANT SIDE AS LATE EFFECT OF NONTRAUMATIC INTRACEREBRAL HEMORRHAGE (HCC): ICD-10-CM

## 2019-07-15 DIAGNOSIS — E78.00 HIGH CHOLESTEROL: ICD-10-CM

## 2019-07-15 RX ORDER — EZETIMIBE 10 MG/1
TABLET ORAL
Qty: 90 TABLET | Refills: 1 | Status: SHIPPED | OUTPATIENT
Start: 2019-07-15 | End: 2019-07-15

## 2019-07-15 RX ORDER — CLOPIDOGREL BISULFATE 75 MG/1
75 TABLET ORAL
Qty: 90 TABLET | Refills: 0 | Status: SHIPPED | OUTPATIENT
Start: 2019-07-15 | End: 2021-04-15

## 2019-07-15 RX ORDER — HYDRALAZINE HYDROCHLORIDE 100 MG/1
TABLET, FILM COATED ORAL
Qty: 270 TABLET | Refills: 1 | Status: SHIPPED | OUTPATIENT
Start: 2019-07-15 | End: 2019-12-19

## 2019-07-15 RX ORDER — SERTRALINE HYDROCHLORIDE 100 MG/1
TABLET, FILM COATED ORAL
Qty: 90 TABLET | Refills: 0 | Status: SHIPPED | OUTPATIENT
Start: 2019-07-15 | End: 2019-10-12

## 2019-07-15 RX ORDER — EZETIMIBE 10 MG/1
TABLET ORAL
Qty: 30 TABLET | Refills: 0 | Status: SHIPPED | OUTPATIENT
Start: 2019-07-15 | End: 2020-03-31

## 2019-07-15 RX ORDER — AMLODIPINE BESYLATE 10 MG/1
TABLET ORAL
Qty: 90 TABLET | Refills: 0 | Status: SHIPPED | OUTPATIENT
Start: 2019-07-15 | End: 2019-10-16 | Stop reason: SDUPTHER

## 2019-07-15 NOTE — TELEPHONE ENCOUNTER
Patients daughter is calling and they need a refill to the local pharmacy [facility] on [address]   They have a script coming from mail order but it won't be on time  Please send   EZETIMIBE 10 MG Oral Tab    Thank you

## 2019-07-15 NOTE — TELEPHONE ENCOUNTER
HUU:[birth year] SD  FOV:none on file   LAST RX:5/19/19 100 mg take 1 tab daily 90 tabs 0 refills   LAST LABS:6/16/19 poct glucose   PER PROTOCOL: to provider

## 2019-07-15 NOTE — TELEPHONE ENCOUNTER
Last Office Visit: 6-24-19 with SD for hospital follow up   Last Rx Filled: 2-28-19 90 tabs with no refills   Last Labs: 6-15-19 cbc/cmp/hga1c  Future Appointment: none    Per protocol to provider

## 2019-07-16 ENCOUNTER — TELEPHONE (OUTPATIENT)
Dept: INTERNAL MEDICINE CLINIC | Facility: CLINIC | Age: 69
End: 2019-07-16

## 2019-07-16 NOTE — TELEPHONE ENCOUNTER
Moni, pt's dtr agrees DM Eye Exam is important for pt, referred to Dr Meghna Jane MD, info given. Dtr will call to schedule an appt and have results faxed to our office for pts chart. Lili Leach verbalizes understanding.

## 2019-07-16 NOTE — TELEPHONE ENCOUNTER
Has pt done eye exam as referred by SD in April? If yes get report, if not remind her to do. Referral for [facility] placed by SD at last OV to Dr. Kiel Beltran.

## 2019-07-25 RX ORDER — LEVETIRACETAM 500 MG/1
500 TABLET ORAL 2 TIMES DAILY
COMMUNITY
Start: 2019-06-27

## 2019-07-25 RX ORDER — ASPIRIN 81 MG/1
81 TABLET ORAL DAILY
COMMUNITY
Start: 2016-05-05

## 2019-07-25 RX ORDER — CLOPIDOGREL BISULFATE 75 MG/1
75 TABLET ORAL DAILY
COMMUNITY
Start: 2018-11-19 | End: 2019-07-29 | Stop reason: SDUPTHER

## 2019-07-25 RX ORDER — PANTOPRAZOLE SODIUM 40 MG/1
40 TABLET, DELAYED RELEASE ORAL
COMMUNITY
Start: 2018-09-23 | End: 2020-10-27

## 2019-07-25 RX ORDER — HYDRALAZINE HYDROCHLORIDE 100 MG/1
TABLET, FILM COATED ORAL
COMMUNITY
Start: 2018-11-19 | End: 2020-11-30 | Stop reason: SDUPTHER

## 2019-07-25 RX ORDER — SPIRONOLACTONE 25 MG/1
12.5 TABLET ORAL DAILY
COMMUNITY
Start: 2018-09-23 | End: 2019-07-29 | Stop reason: SDUPTHER

## 2019-07-25 RX ORDER — ACETAMINOPHEN 500 MG
500 TABLET ORAL PRN
COMMUNITY

## 2019-07-25 RX ORDER — ROSUVASTATIN CALCIUM 40 MG/1
40 TABLET, COATED ORAL DAILY
COMMUNITY
Start: 2018-09-23 | End: 2019-07-29 | Stop reason: SDUPTHER

## 2019-07-25 RX ORDER — LOSARTAN POTASSIUM 50 MG/1
50 TABLET ORAL 2 TIMES DAILY
COMMUNITY
Start: 2018-09-23 | End: 2019-07-29 | Stop reason: SDUPTHER

## 2019-07-25 RX ORDER — SERTRALINE HYDROCHLORIDE 100 MG/1
TABLET, FILM COATED ORAL
COMMUNITY
Start: 2018-09-19

## 2019-07-25 RX ORDER — EZETIMIBE 10 MG/1
TABLET ORAL
COMMUNITY
Start: 2018-11-19 | End: 2019-07-29 | Stop reason: SDUPTHER

## 2019-07-28 PROBLEM — N18.30 CKD (CHRONIC KIDNEY DISEASE) STAGE 3, GFR 30-59 ML/MIN (CMD): Status: ACTIVE | Noted: 2019-03-04

## 2019-07-28 PROBLEM — I69.30 HISTORY OF CVA WITH RESIDUAL DEFICIT: Status: ACTIVE | Noted: 2019-03-04

## 2019-07-29 ENCOUNTER — OFFICE VISIT (OUTPATIENT)
Dept: CARDIOLOGY | Age: 69
End: 2019-07-29

## 2019-07-29 VITALS
HEIGHT: 65 IN | BODY MASS INDEX: 22.66 KG/M2 | WEIGHT: 136 LBS | HEART RATE: 70 BPM | SYSTOLIC BLOOD PRESSURE: 102 MMHG | DIASTOLIC BLOOD PRESSURE: 50 MMHG

## 2019-07-29 DIAGNOSIS — I10 HYPERTENSION, BENIGN: ICD-10-CM

## 2019-07-29 DIAGNOSIS — I69.30 HISTORY OF CVA WITH RESIDUAL DEFICIT: ICD-10-CM

## 2019-07-29 DIAGNOSIS — N18.30 CKD (CHRONIC KIDNEY DISEASE) STAGE 3, GFR 30-59 ML/MIN (CMD): ICD-10-CM

## 2019-07-29 DIAGNOSIS — I50.9 CONGESTIVE HEART FAILURE, UNSPECIFIED HF CHRONICITY, UNSPECIFIED HEART FAILURE TYPE (CMD): ICD-10-CM

## 2019-07-29 DIAGNOSIS — I65.23 ASYMPTOMATIC CAROTID ARTERY STENOSIS, BILATERAL: ICD-10-CM

## 2019-07-29 DIAGNOSIS — Z95.1 HX OF CABG: ICD-10-CM

## 2019-07-29 DIAGNOSIS — E78.00 PURE HYPERCHOLESTEROLEMIA: ICD-10-CM

## 2019-07-29 DIAGNOSIS — Z82.49 FAMILY HISTORY OF CORONARY ARTERIOSCLEROSIS: ICD-10-CM

## 2019-07-29 DIAGNOSIS — Z98.890 HISTORY OF CAROTID ENDARTERECTOMY: ICD-10-CM

## 2019-07-29 DIAGNOSIS — J44.9 CHRONIC OBSTRUCTIVE PULMONARY DISEASE, UNSPECIFIED COPD TYPE (CMD): Primary | ICD-10-CM

## 2019-07-29 DIAGNOSIS — I25.10 CORONARY ARTERY DISEASE INVOLVING NATIVE HEART WITHOUT ANGINA PECTORIS, UNSPECIFIED VESSEL OR LESION TYPE: ICD-10-CM

## 2019-07-29 PROCEDURE — 99214 OFFICE O/P EST MOD 30 MIN: CPT | Performed by: INTERNAL MEDICINE

## 2019-07-29 RX ORDER — CLOPIDOGREL BISULFATE 75 MG/1
75 TABLET ORAL DAILY
Qty: 90 TABLET | Refills: 1 | Status: SHIPPED | OUTPATIENT
Start: 2019-07-29 | End: 2020-07-05 | Stop reason: SDUPTHER

## 2019-07-29 RX ORDER — EZETIMIBE 10 MG/1
10 TABLET ORAL NIGHTLY
Qty: 90 TABLET | Refills: 2 | Status: SHIPPED | OUTPATIENT
Start: 2019-07-29 | End: 2020-04-06 | Stop reason: SDUPTHER

## 2019-07-29 RX ORDER — ROSUVASTATIN CALCIUM 40 MG/1
40 TABLET, COATED ORAL DAILY
Qty: 90 TABLET | Refills: 2 | Status: SHIPPED | OUTPATIENT
Start: 2019-07-29 | End: 2020-07-05 | Stop reason: SDUPTHER

## 2019-07-29 RX ORDER — FUROSEMIDE 20 MG/1
20 TABLET ORAL PRN
Qty: 30 TABLET | Refills: 1 | Status: SHIPPED | OUTPATIENT
Start: 2019-07-29

## 2019-07-29 RX ORDER — SPIRONOLACTONE 25 MG/1
12.5 TABLET ORAL DAILY
Qty: 45 TABLET | Refills: 1 | Status: SHIPPED | OUTPATIENT
Start: 2019-07-29 | End: 2020-04-06 | Stop reason: SDUPTHER

## 2019-07-29 RX ORDER — LOSARTAN POTASSIUM 50 MG/1
50 TABLET ORAL 2 TIMES DAILY
Qty: 180 TABLET | Refills: 1 | Status: SHIPPED | OUTPATIENT
Start: 2019-07-29 | End: 2020-04-06 | Stop reason: SDUPTHER

## 2019-07-29 SDOH — HEALTH STABILITY: PHYSICAL HEALTH: ON AVERAGE, HOW MANY MINUTES DO YOU ENGAGE IN EXERCISE AT THIS LEVEL?: 0 MIN

## 2019-07-29 SDOH — HEALTH STABILITY: PHYSICAL HEALTH: ON AVERAGE, HOW MANY DAYS PER WEEK DO YOU ENGAGE IN MODERATE TO STRENUOUS EXERCISE (LIKE A BRISK WALK)?: 0 DAYS

## 2019-07-31 ENCOUNTER — TELEPHONE (OUTPATIENT)
Dept: INTERNAL MEDICINE CLINIC | Facility: CLINIC | Age: 69
End: 2019-07-31

## 2019-08-28 ENCOUNTER — OFFICE VISIT (OUTPATIENT)
Dept: INTERNAL MEDICINE CLINIC | Facility: CLINIC | Age: 69
End: 2019-08-28
Payer: MEDICARE

## 2019-08-28 VITALS
TEMPERATURE: 98 F | BODY MASS INDEX: 23 KG/M2 | DIASTOLIC BLOOD PRESSURE: 50 MMHG | HEIGHT: 65 IN | SYSTOLIC BLOOD PRESSURE: 110 MMHG | HEART RATE: 52 BPM

## 2019-08-28 DIAGNOSIS — I69.30 HISTORY OF CVA WITH RESIDUAL DEFICIT: ICD-10-CM

## 2019-08-28 DIAGNOSIS — R10.2 VAGINAL PAIN: Primary | ICD-10-CM

## 2019-08-28 PROCEDURE — 87480 CANDIDA DNA DIR PROBE: CPT | Performed by: NURSE PRACTITIONER

## 2019-08-28 PROCEDURE — 87510 GARDNER VAG DNA DIR PROBE: CPT | Performed by: NURSE PRACTITIONER

## 2019-08-28 PROCEDURE — 87660 TRICHOMONAS VAGIN DIR PROBE: CPT | Performed by: NURSE PRACTITIONER

## 2019-08-28 PROCEDURE — 99214 OFFICE O/P EST MOD 30 MIN: CPT | Performed by: NURSE PRACTITIONER

## 2019-08-28 NOTE — PROGRESS NOTES
Janett Bronson is a 78year old female. Patient presents with:  Vaginal Problem: LG. Room 11. Vaginal pain since this morning       HPI:   Presents for eval of vaginal pain. Started this am.  States \"inside pain\" more on left side.   No bleeding or (75 mg total) by mouth once daily. Disp: 90 tablet Rfl: 0   Insulin Pen Needle (BD PEN NEEDLE ANNABELLE U/F) 32G X 4 MM Does not apply Misc Inject 1 pen into the skin daily for 90 doses.  Disp: 90 each Rfl: 1   Insulin Pen Needle (TECHLITE PEN NEEDLES) 32G X 4 M disease ([facility] 3/14    Carotid Artery Blockage TIA   • Seizure ([facility]     new onset on 2/26/19   • Shortness of breath    • Sputum production    • Stented coronary artery    • STROKE 2005    TIA   • Stroke syndrome 12/2005    CVA no residual effects   • Type vagina. No lesion noted. Noted abrasion in the area of concern without palpable findings.     EXTREMITIES: no edema, normal strength and tone  PSYCH: alert and oriented x 3    ASSESSMENT AND PLAN:     Vaginal pain  (primary encounter diagnosis)  Will use

## 2019-08-29 RX ORDER — METRONIDAZOLE 500 MG/1
500 TABLET ORAL 2 TIMES DAILY
Qty: 14 TABLET | Refills: 0 | Status: SHIPPED | OUTPATIENT
Start: 2019-08-29 | End: 2020-06-12

## 2019-10-04 RX ORDER — PANTOPRAZOLE SODIUM 40 MG/1
40 TABLET, DELAYED RELEASE ORAL
OUTPATIENT
Start: 2019-10-04

## 2019-10-10 ENCOUNTER — OFFICE VISIT (OUTPATIENT)
Dept: NEUROLOGY | Facility: CLINIC | Age: 69
End: 2019-10-10
Payer: MEDICARE

## 2019-10-10 ENCOUNTER — LAB ENCOUNTER (OUTPATIENT)
Dept: LAB | Age: 69
End: 2019-10-10
Attending: Other
Payer: MEDICARE

## 2019-10-10 ENCOUNTER — TELEPHONE (OUTPATIENT)
Dept: NEUROLOGY | Facility: CLINIC | Age: 69
End: 2019-10-10

## 2019-10-10 VITALS
DIASTOLIC BLOOD PRESSURE: 70 MMHG | WEIGHT: 141 LBS | RESPIRATION RATE: 14 BRPM | HEART RATE: 66 BPM | SYSTOLIC BLOOD PRESSURE: 128 MMHG | BODY MASS INDEX: 23 KG/M2

## 2019-10-10 DIAGNOSIS — R56.9 SEIZURE (HCC): ICD-10-CM

## 2019-10-10 DIAGNOSIS — R79.89 ELEVATED LFTS: ICD-10-CM

## 2019-10-10 DIAGNOSIS — I69.30 HISTORY OF CVA WITH RESIDUAL DEFICIT: ICD-10-CM

## 2019-10-10 DIAGNOSIS — I69.151 HEMIPARESIS OF RIGHT DOMINANT SIDE AS LATE EFFECT OF NONTRAUMATIC INTRACEREBRAL HEMORRHAGE (HCC): ICD-10-CM

## 2019-10-10 DIAGNOSIS — I65.21 CAROTID OCCLUSION, RIGHT: Primary | ICD-10-CM

## 2019-10-10 DIAGNOSIS — I69.359 SPASTIC HEMIPARESIS AS LATE EFFECT OF CEREBRAL INFARCTION (HCC): ICD-10-CM

## 2019-10-10 DIAGNOSIS — K76.0 FATTY LIVER: ICD-10-CM

## 2019-10-10 PROCEDURE — 80053 COMPREHEN METABOLIC PANEL: CPT

## 2019-10-10 PROCEDURE — 36415 COLL VENOUS BLD VENIPUNCTURE: CPT

## 2019-10-10 PROCEDURE — 85025 COMPLETE CBC W/AUTO DIFF WBC: CPT

## 2019-10-10 PROCEDURE — 99214 OFFICE O/P EST MOD 30 MIN: CPT | Performed by: OTHER

## 2019-10-10 NOTE — PROGRESS NOTES
[facility]   Neurology; follow up CLINIC VISIT  10/10/2019    Pavithra Lemus Patient Status:  No patient class for patient encounter    1950 MRN [id number]   Location [address] [place] [address], [address] [place] [address], [address] [place] [address] PCP [facility])     new onset on 2/26/19   • Shortness of breath    • Sputum production    • Stented coronary artery    • STROKE 2005    TIA   • Stroke syndrome 12/2005    CVA no residual effects   • Type II or unspecified type diabetes mellitus without mention of c tablet Rfl: 1   SERTRALINE  MG Oral Tab TAKE 1 TABLET BY MOUTH DAILY Disp: 90 tablet Rfl: 0   ezetimibe 10 MG Oral Tab TAKE 1 TABLET BY MOUTH EVERY NIGHT Disp: 30 tablet Rfl: 0   Clopidogrel Bisulfate 75 MG Oral Tab Take 1 tablet (75 mg total) by mo heartburn  GENITAL/: no dysuria, urgency or frequency;  no hernias; no nocturia  GYNE/: no dysuria, urgency or frequency; no urinary incontinence  MUSCULOSKELETAL: no joint complaints in  upper or lower extremities  NEURO: see HPI;  PSYCHE: no symptoms Sensory; decreased light touch, temperature, in her L. Face, arm and leg,  Normal proprioception;  DTRs; Symmetric in both arms and legs, including biceps, triceps, knees, ankles,  Babinski sign is negative on the R, positive on the L.    Coordination: N The curvilinear hyperdensity associated with this area is stable and is likely related to calcification. No acute intracranial hemorrhage.           Dictated by: Maxwell Galindo MD on 6/16/2019 at 15:25       Approved by: Maxwell Galindo MD        [facility] AT [facility] 4/10/2020). We discussed in depth regarding diagnosis, prognosis, treatment. The patient and caregiver were given ample opportunity to ask questions. All questions and concerns were addressed.      Ayo Rincon MD  General Neurology   Neuromuscular/ Electro

## 2019-10-10 NOTE — TELEPHONE ENCOUNTER
----- Message from Gloria Lawrence MD sent at 10/10/2019 12:59 PM CDT -----  Labs fine, but slightly dehydrated, increase fluid intake

## 2019-10-12 DIAGNOSIS — F32.0 CURRENT MILD EPISODE OF MAJOR DEPRESSIVE DISORDER, UNSPECIFIED WHETHER RECURRENT (HCC): ICD-10-CM

## 2019-10-14 DIAGNOSIS — R56.9 SEIZURE (HCC): Primary | ICD-10-CM

## 2019-10-14 RX ORDER — SERTRALINE HYDROCHLORIDE 100 MG/1
TABLET, FILM COATED ORAL
Qty: 90 TABLET | Refills: 0 | Status: SHIPPED | OUTPATIENT
Start: 2019-10-14 | End: 2020-01-02

## 2019-10-14 RX ORDER — LEVETIRACETAM 500 MG/1
500 TABLET ORAL 2 TIMES DAILY
Qty: 30 TABLET | Refills: 0 | Status: SHIPPED | OUTPATIENT
Start: 2019-10-14 | End: 2020-04-21

## 2019-10-14 NOTE — TELEPHONE ENCOUNTER
Berto NAGY  FOV:none on file   LAST RX: 7/15/19 100 mg take 1 tab daily 90 tabs 0 refills   LAST LABS: 10/10/19 cmp,cbc  PER PROTOCOL: to provider

## 2019-10-14 NOTE — TELEPHONE ENCOUNTER
Patient gets Keppra from mail order pharmacy and will need a new RX sent for a few days since she only has 4 pills remaining.     Patient has current RX with [place] [facility] order that was sent on 6/27/2019    Medication: Keppra    Date of last refill: 06/27/

## 2019-10-15 DIAGNOSIS — F32.0 CURRENT MILD EPISODE OF MAJOR DEPRESSIVE DISORDER, UNSPECIFIED WHETHER RECURRENT (HCC): ICD-10-CM

## 2019-10-16 RX ORDER — AMLODIPINE BESYLATE 10 MG/1
10 TABLET ORAL DAILY
Qty: 90 TABLET | Refills: 0 | Status: SHIPPED | OUTPATIENT
Start: 2019-10-16 | End: 2020-01-03 | Stop reason: SDUPTHER

## 2019-10-16 RX ORDER — SERTRALINE HYDROCHLORIDE 100 MG/1
TABLET, FILM COATED ORAL
Qty: 90 TABLET | Refills: 0 | OUTPATIENT
Start: 2019-10-16

## 2019-10-16 RX ORDER — PANTOPRAZOLE SODIUM 40 MG/1
40 TABLET, DELAYED RELEASE ORAL
OUTPATIENT
Start: 2019-10-16

## 2019-10-16 NOTE — TELEPHONE ENCOUNTER
Patient's daughter Raheel Leone checking on status of Keppra refill. Informed daughter that refill ws sent to [facility] on 10/14.

## 2019-10-17 RX ORDER — PANTOPRAZOLE SODIUM 40 MG/1
40 TABLET, DELAYED RELEASE ORAL
Qty: 90 TABLET | Refills: 3 | OUTPATIENT
Start: 2019-10-17

## 2019-10-18 RX ORDER — PANTOPRAZOLE SODIUM 40 MG/1
40 TABLET, DELAYED RELEASE ORAL
OUTPATIENT
Start: 2019-10-18

## 2019-10-22 RX ORDER — INSULIN GLARGINE 100 [IU]/ML
INJECTION, SOLUTION SUBCUTANEOUS
Qty: 15 ML | Refills: 0 | Status: SHIPPED | OUTPATIENT
Start: 2019-10-22 | End: 2020-01-08

## 2019-10-22 NOTE — TELEPHONE ENCOUNTER
Last Ov: 8/28/19, SD, acute  Upcoming appt: no upcoming appt  Last labs: CBC, CMP 10/10/19  Last Rx: lantus solostar 100 iu/mL, 15mL, 0R 5/21/19    Per Protocol, not on protocol. Rx pending.

## 2019-12-19 DIAGNOSIS — I10 ESSENTIAL HYPERTENSION: ICD-10-CM

## 2019-12-19 RX ORDER — HYDRALAZINE HYDROCHLORIDE 100 MG/1
100 TABLET, FILM COATED ORAL 3 TIMES DAILY
Qty: 270 TABLET | Refills: 0 | Status: SHIPPED | OUTPATIENT
Start: 2019-12-19 | End: 2020-03-09

## 2019-12-19 NOTE — TELEPHONE ENCOUNTER
Last Office Visit: 8-28-19 with AD for vaginal pain   Last Rx Filled: 7-15-19 270 tabs with 1 refill   Last Labs: 10-10-19 cbc/cmp  Future Appointment: none    Per protocol to provider

## 2019-12-31 DIAGNOSIS — F32.0 CURRENT MILD EPISODE OF MAJOR DEPRESSIVE DISORDER, UNSPECIFIED WHETHER RECURRENT (HCC): ICD-10-CM

## 2020-01-02 RX ORDER — SERTRALINE HYDROCHLORIDE 100 MG/1
TABLET, FILM COATED ORAL
Qty: 90 TABLET | Refills: 0 | Status: SHIPPED | OUTPATIENT
Start: 2020-01-02 | End: 2020-03-23

## 2020-01-02 NOTE — TELEPHONE ENCOUNTER
Last Ov: 8/28/19, SD, acute  Upcoming appt: no upcoming appt  Last labs: CBC, CMP 10/10/19  Last Rx: sertraline 100mg, #90, 0R 10/14/19    Per Protocol, not on protocol. Rx pending.

## 2020-01-03 ENCOUNTER — TELEPHONE (OUTPATIENT)
Dept: CARDIOLOGY | Age: 70
End: 2020-01-03

## 2020-01-03 DIAGNOSIS — I10 HYPERTENSION, BENIGN: Primary | ICD-10-CM

## 2020-01-06 RX ORDER — AMLODIPINE BESYLATE 10 MG/1
10 TABLET ORAL DAILY
Qty: 90 TABLET | Refills: 0 | Status: SHIPPED | OUTPATIENT
Start: 2020-01-06 | End: 2020-03-24

## 2020-01-08 DIAGNOSIS — N18.30 CKD (CHRONIC KIDNEY DISEASE) STAGE 3, GFR 30-59 ML/MIN (HCC): ICD-10-CM

## 2020-01-08 DIAGNOSIS — I50.9 CONGESTIVE HEART FAILURE, UNSPECIFIED HF CHRONICITY, UNSPECIFIED HEART FAILURE TYPE (HCC): Primary | ICD-10-CM

## 2020-01-08 DIAGNOSIS — E11.65 UNCONTROLLED TYPE 2 DIABETES MELLITUS, WITHOUT LONG-TERM CURRENT USE OF INSULIN (HCC): ICD-10-CM

## 2020-01-08 DIAGNOSIS — R56.9 SEIZURE (HCC): ICD-10-CM

## 2020-01-08 RX ORDER — INSULIN GLARGINE 100 [IU]/ML
INJECTION, SOLUTION SUBCUTANEOUS
Qty: 15 ML | Refills: 0 | Status: SHIPPED | OUTPATIENT
Start: 2020-01-08 | End: 2020-03-26

## 2020-01-08 NOTE — TELEPHONE ENCOUNTER
Last Ov: 8/28/19, SD, acute  Upcoming appt: no upcoming appt  Last labs: CBC, CMP 10/10/19  Last Rx: Lantus 100 iu/mL, 15mL, 0R 10/22/19    Per Protocol, not on protocol. Rx pending.

## 2020-02-11 ENCOUNTER — APPOINTMENT (OUTPATIENT)
Dept: CARDIOLOGY | Age: 70
End: 2020-02-11

## 2020-02-18 ENCOUNTER — APPOINTMENT (OUTPATIENT)
Dept: CARDIOLOGY | Age: 70
End: 2020-02-18

## 2020-03-06 DIAGNOSIS — I10 ESSENTIAL HYPERTENSION: ICD-10-CM

## 2020-03-09 RX ORDER — HYDRALAZINE HYDROCHLORIDE 100 MG/1
TABLET, FILM COATED ORAL
Qty: 270 TABLET | Refills: 0 | Status: SHIPPED | OUTPATIENT
Start: 2020-03-09 | End: 2020-05-26

## 2020-03-09 NOTE — TELEPHONE ENCOUNTER
Last VISIT 8/28/19 SD    Last REFILL 12/19/19 Hydralazine 270T 0R    Last LABS 10/12/19 CMP, CBC    Future Appointments   Date Time Provider Henok Zeng   4/21/2020 10:00 AM Jo Godinez MD [facility]         Per PROTOCOL?  HTN protocol failed

## 2020-03-20 DIAGNOSIS — F32.0 CURRENT MILD EPISODE OF MAJOR DEPRESSIVE DISORDER, UNSPECIFIED WHETHER RECURRENT (HCC): ICD-10-CM

## 2020-03-23 DIAGNOSIS — I10 HYPERTENSION, BENIGN: ICD-10-CM

## 2020-03-23 RX ORDER — SERTRALINE HYDROCHLORIDE 100 MG/1
TABLET, FILM COATED ORAL
Qty: 90 TABLET | Refills: 0 | Status: SHIPPED | OUTPATIENT
Start: 2020-03-23 | End: 2020-04-21

## 2020-03-23 NOTE — TELEPHONE ENCOUNTER
LOV:8/28/19 SD  FOV:none on file   LAST RX:1/2/20 100 mg take 1 tab daily 90 tabs 0 refills   LAST LABS: 10/10/19 cmp,cbc  PER PROTOCOL: to provider

## 2020-03-24 RX ORDER — AMLODIPINE BESYLATE 10 MG/1
10 TABLET ORAL DAILY
Qty: 90 TABLET | Refills: 3 | Status: SHIPPED | OUTPATIENT
Start: 2020-03-24 | End: 2020-11-30 | Stop reason: SDUPTHER

## 2020-03-26 RX ORDER — INSULIN GLARGINE 100 [IU]/ML
INJECTION, SOLUTION SUBCUTANEOUS
Qty: 15 ML | Refills: 0 | Status: SHIPPED | OUTPATIENT
Start: 2020-03-26 | End: 2020-06-12

## 2020-03-26 NOTE — TELEPHONE ENCOUNTER
Last Ov: 8/28/19, SD, acute  Upcoming appt: no upcoming appt  Last labs: CBC, CMP 10/10/19  Last Rx: Lantus SoloStar 100 iu/mL, 15mL, 0R 1/8/20    Per Protocol, not on protocol. Rx pending.

## 2020-03-27 ENCOUNTER — TELEPHONE (OUTPATIENT)
Dept: NEUROLOGY | Facility: CLINIC | Age: 70
End: 2020-03-27

## 2020-03-27 NOTE — TELEPHONE ENCOUNTER
Patient's daughter Anitha Jennifer notified that 4/21 demetrius't cancelled due to CV outbreak. Cristina Bosch verbally consents to a Virtual/Telephone Check-In service on 04/21/2020 @ 10:00.  [phone].   Patient understands and accepts financial responsibility

## 2020-03-30 DIAGNOSIS — E78.00 HIGH CHOLESTEROL: ICD-10-CM

## 2020-03-31 RX ORDER — EZETIMIBE 10 MG/1
TABLET ORAL
Qty: 90 TABLET | Refills: 0 | Status: SHIPPED | OUTPATIENT
Start: 2020-03-31 | End: 2021-04-15

## 2020-03-31 RX ORDER — PEN NEEDLE, DIABETIC 32GX 5/32"
NEEDLE, DISPOSABLE MISCELLANEOUS
Qty: 100 EACH | Refills: 1 | Status: SHIPPED | OUTPATIENT
Start: 2020-03-31

## 2020-03-31 NOTE — TELEPHONE ENCOUNTER
Last Ov:8/28/19  Upcoming appt:none  Last labs:10/10/19 CBC, CMP  Last Rx:7/15/19    Per Protocol sent for review

## 2020-04-06 ENCOUNTER — TELEPHONE (OUTPATIENT)
Dept: CARDIOLOGY | Age: 70
End: 2020-04-06

## 2020-04-06 DIAGNOSIS — E78.00 PURE HYPERCHOLESTEROLEMIA: Primary | ICD-10-CM

## 2020-04-06 DIAGNOSIS — I10 HYPERTENSION, BENIGN: ICD-10-CM

## 2020-04-06 RX ORDER — LOSARTAN POTASSIUM 50 MG/1
50 TABLET ORAL 2 TIMES DAILY
Qty: 180 TABLET | Refills: 0 | Status: SHIPPED | OUTPATIENT
Start: 2020-04-06 | End: 2020-06-25

## 2020-04-06 RX ORDER — EZETIMIBE 10 MG/1
10 TABLET ORAL NIGHTLY
Qty: 90 TABLET | Refills: 0 | Status: SHIPPED | OUTPATIENT
Start: 2020-04-06 | End: 2020-06-25

## 2020-04-06 RX ORDER — SPIRONOLACTONE 25 MG/1
12.5 TABLET ORAL DAILY
Qty: 45 TABLET | Refills: 0 | Status: SHIPPED | OUTPATIENT
Start: 2020-04-06 | End: 2020-06-25

## 2020-04-16 ENCOUNTER — TELEPHONE (OUTPATIENT)
Dept: CARDIOLOGY | Age: 70
End: 2020-04-16

## 2020-04-20 ENCOUNTER — TELEPHONE (OUTPATIENT)
Dept: CARDIOLOGY | Age: 70
End: 2020-04-20

## 2020-04-21 ENCOUNTER — VIRTUAL PHONE E/M (OUTPATIENT)
Dept: NEUROLOGY | Facility: CLINIC | Age: 70
End: 2020-04-21
Payer: MEDICARE

## 2020-04-21 ENCOUNTER — APPOINTMENT (OUTPATIENT)
Dept: CARDIOLOGY | Age: 70
End: 2020-04-21

## 2020-04-21 DIAGNOSIS — F32.0 CURRENT MILD EPISODE OF MAJOR DEPRESSIVE DISORDER, UNSPECIFIED WHETHER RECURRENT (HCC): ICD-10-CM

## 2020-04-21 DIAGNOSIS — R56.9 SEIZURE (HCC): Primary | ICD-10-CM

## 2020-04-21 DIAGNOSIS — E78.00 HIGH CHOLESTEROL: ICD-10-CM

## 2020-04-21 DIAGNOSIS — I69.359 SPASTIC HEMIPARESIS AS LATE EFFECT OF CEREBRAL INFARCTION (HCC): ICD-10-CM

## 2020-04-21 PROCEDURE — 99442 PHONE E/M BY PHYS 11-20 MIN: CPT | Performed by: OTHER

## 2020-04-21 RX ORDER — SERTRALINE HYDROCHLORIDE 100 MG/1
100 TABLET, FILM COATED ORAL DAILY
Qty: 90 TABLET | Refills: 3 | Status: SHIPPED | OUTPATIENT
Start: 2020-04-21 | End: 2020-12-15

## 2020-04-21 RX ORDER — LEVETIRACETAM 500 MG/1
500 TABLET ORAL 2 TIMES DAILY
Qty: 30 TABLET | Refills: 0 | Status: SHIPPED | OUTPATIENT
Start: 2020-04-21 | End: 2020-07-07

## 2020-04-21 NOTE — PROGRESS NOTES
HPI:    Patient ID: Jose Officer is a 78year old female.     HPI    Review of Systems         Current Outpatient Medications   Medication Sig Dispense Refill   • EZETIMIBE 10 MG Oral Tab TAKE 1 TABLET BY MOUTH EVERY NIGHT 90 tablet 0   • TRUEPLUS PEN prescriptions requested or ordered in this encounter       Imaging & Referrals:  None       #[id number]

## 2020-04-21 NOTE — PROGRESS NOTES
Virtual Telephone Check-In    Anuel Harrell verbally consents to a Virtual/Telephone Check-In visit on 04/21/20. Patient understands and accepts financial responsibility for any deductible, co-insurance and/or co-pays associated with this service.

## 2020-04-26 NOTE — PLAN OF CARE
Assumed care at 0730. Pt A&O x4. On 3L nc. NSR/SB on tele. BP elevated. Scheduled norvasc, lisinopril, and hydralazine given. PRN labetalol x2 administered.  Minoxidil added for better BP control per renal. 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 Patient had a mechanical fall and underwent a left hip hemiarthroplasty in 1/2020 at [facility]. She tolerated surgery and discharged to [facility] rehab. She presented to [facility] on 3/13/2020 for painful vertebral compression fractures, once her pain was treated she was discharged to [facility] rehab.    ER vitals:  T 97.6, P 87, /62, RR 18, 95% RA.  WBC 18.2 --> 9.9.   . .   D-dimer 1430, ferritin 206.  PCT 0.13.  UA Large LE, (+) wbc's.   Ucx >100K GNR and >100K CoNS.   Bcx (+) CoNS 4/4 bottles.   Covid pcr (+) 4/22.      Multiple imaging performed including CT pelvis and femur, showing  dislocated femoral component of the left hip hemiarthroplasty. Mild cortical irregularity and lucencies at the left greater trochanter, which is difficult to assess due to artifact. This may be in part postsurgical although nondisplaced fracture cannot be excluded.  Also with foci of air in the left mid lateral thigh soft tissue, without a discrete fluid collection. Differential diagnosis includes injection, penetrating trauma and infection.  Question sigmoid colon diverticulitis.     Pt seen by Ortho service, pt initially planned IR aspiration of L hip to rule out prosthetic joint infection, given CT findings and bacteremia with CoNS.  Also planned for OR for possible revision vs resection arthroplasty, now cancelled due to Covid (+) status.     ID consulted for further recommendations.     CoNS bacteremia:    - Pt with 4/4 blood cultures positive, also Ucx (+) CoNS (secondary to descending infection from bacetermia?).  Given elevated inflammatory markers, and new dislocated left hip hemiarthroplasty with lucency, concern for prosthetic joint infection.    - Add vancomycin 1gm IV q24.  check trough prior to 3rd dose.  f/u sensitivities.  Repeat blood cultures 4/24 NGTD.  Will add rifampin for adjunctive treatment in the setting of prosthesis. Monitor weekly LFTs.     - Recommend 6 weeks of IV abx from date of blood culture clearance.  Sensitivities reviewed, no oral options for chronic suppressive treatment due to resistance.  Pt not currently a surgical candidate as per Orthopedics.  Recommend reevaluation for surgery once pt is stable as IV abx alone unlikely clear infection.      - Recommend echocardiogram.     - Monitor WBC, temp curve, trend inflammatory markers.       Covid (+):    - Cont supportive care.  Trend ferritin, D-dimer, CRP every 48-72 hours.  Monitor pulse ox.  Currently oxygenating adequately on room air.     - Cxr shows grossly clear lungs.      - check baseline EKG and Qtc.  Doubt benefit of plaquenil given adequate oxygenation.  All therapeutics are investigational.      UTI:    - Growing both E.coli and CoNS.  Cont rocephin (sensitive) x 7 days (through 4/27) and vanco x 6 weeks, f/u culture results.      - Pt with h/o pessary.  consider GYN f/u, will defer to medicine.      Will follow,    Sue Armstrong  [phone]

## 2020-05-26 DIAGNOSIS — I10 ESSENTIAL HYPERTENSION: ICD-10-CM

## 2020-05-26 RX ORDER — HYDRALAZINE HYDROCHLORIDE 100 MG/1
TABLET, FILM COATED ORAL
Qty: 270 TABLET | Refills: 0 | Status: SHIPPED | OUTPATIENT
Start: 2020-05-26 | End: 2020-06-12

## 2020-05-26 NOTE — TELEPHONE ENCOUNTER
Last OV 8.28.19 w/ SD (acute)    Last PE 4.19.19-Overdue   Last REFILL 3.9.20 Hydralazine 100mg #270 0R  Last LABS No recent labs within last 12 months     No future appointments. Per PROTOCOL?  FAILED-no appt in last 6 months or next 3 months     Plea

## 2020-06-04 NOTE — TELEPHONE ENCOUNTER
Future Appointments   Date Time Provider Henok Zeng   6/12/2020 10:00 AM JANELL Stacy [facility]       Pt states she can keep track of her BP's

## 2020-06-12 ENCOUNTER — VIRTUAL PHONE E/M (OUTPATIENT)
Dept: INTERNAL MEDICINE CLINIC | Facility: CLINIC | Age: 70
End: 2020-06-12
Payer: MEDICARE

## 2020-06-12 ENCOUNTER — TELEPHONE (OUTPATIENT)
Dept: INTERNAL MEDICINE CLINIC | Facility: CLINIC | Age: 70
End: 2020-06-12

## 2020-06-12 VITALS
BODY MASS INDEX: 23 KG/M2 | HEART RATE: 57 BPM | DIASTOLIC BLOOD PRESSURE: 55 MMHG | SYSTOLIC BLOOD PRESSURE: 130 MMHG | WEIGHT: 141 LBS

## 2020-06-12 DIAGNOSIS — E11.65 UNCONTROLLED TYPE 2 DIABETES MELLITUS, WITHOUT LONG-TERM CURRENT USE OF INSULIN (HCC): ICD-10-CM

## 2020-06-12 DIAGNOSIS — R79.89 ELEVATED LFTS: ICD-10-CM

## 2020-06-12 DIAGNOSIS — I69.359 SPASTIC HEMIPARESIS AS LATE EFFECT OF CEREBRAL INFARCTION (HCC): ICD-10-CM

## 2020-06-12 DIAGNOSIS — I69.30 HISTORY OF CVA WITH RESIDUAL DEFICIT: ICD-10-CM

## 2020-06-12 DIAGNOSIS — N18.30 CKD (CHRONIC KIDNEY DISEASE) STAGE 3, GFR 30-59 ML/MIN (HCC): ICD-10-CM

## 2020-06-12 DIAGNOSIS — I65.21 CAROTID OCCLUSION, RIGHT: ICD-10-CM

## 2020-06-12 DIAGNOSIS — Z00.00 ENCOUNTER FOR ANNUAL HEALTH EXAMINATION: Primary | ICD-10-CM

## 2020-06-12 DIAGNOSIS — F32.0 CURRENT MILD EPISODE OF MAJOR DEPRESSIVE DISORDER, UNSPECIFIED WHETHER RECURRENT (HCC): ICD-10-CM

## 2020-06-12 DIAGNOSIS — F17.200 SMOKER: ICD-10-CM

## 2020-06-12 DIAGNOSIS — E78.5 HYPERLIPIDEMIA ASSOCIATED WITH TYPE 2 DIABETES MELLITUS (HCC): ICD-10-CM

## 2020-06-12 DIAGNOSIS — I65.21 OCCLUSION OF RIGHT CAROTID ARTERY: ICD-10-CM

## 2020-06-12 DIAGNOSIS — R56.9 SEIZURE (HCC): ICD-10-CM

## 2020-06-12 DIAGNOSIS — I25.10 ATHEROSCLEROSIS OF NATIVE CORONARY ARTERY OF NATIVE HEART WITHOUT ANGINA PECTORIS: ICD-10-CM

## 2020-06-12 DIAGNOSIS — M15.9 PRIMARY OSTEOARTHRITIS INVOLVING MULTIPLE JOINTS: ICD-10-CM

## 2020-06-12 DIAGNOSIS — I69.151 HEMIPARESIS OF RIGHT DOMINANT SIDE AS LATE EFFECT OF NONTRAUMATIC INTRACEREBRAL HEMORRHAGE (HCC): ICD-10-CM

## 2020-06-12 DIAGNOSIS — F41.1 ANXIETY STATE: ICD-10-CM

## 2020-06-12 DIAGNOSIS — I50.9 CONGESTIVE HEART FAILURE, UNSPECIFIED HF CHRONICITY, UNSPECIFIED HEART FAILURE TYPE (HCC): ICD-10-CM

## 2020-06-12 DIAGNOSIS — I10 ESSENTIAL HYPERTENSION: ICD-10-CM

## 2020-06-12 DIAGNOSIS — J43.2 CENTRILOBULAR EMPHYSEMA (HCC): ICD-10-CM

## 2020-06-12 DIAGNOSIS — Z98.890 HISTORY OF CAROTID ENDARTERECTOMY: ICD-10-CM

## 2020-06-12 DIAGNOSIS — Z95.1 HX OF CABG: ICD-10-CM

## 2020-06-12 DIAGNOSIS — K76.0 FATTY LIVER: ICD-10-CM

## 2020-06-12 DIAGNOSIS — E11.69 HYPERLIPIDEMIA ASSOCIATED WITH TYPE 2 DIABETES MELLITUS (HCC): ICD-10-CM

## 2020-06-12 DIAGNOSIS — K21.9 GASTROESOPHAGEAL REFLUX DISEASE, ESOPHAGITIS PRESENCE NOT SPECIFIED: ICD-10-CM

## 2020-06-12 PROBLEM — N30.00 ACUTE CYSTITIS WITHOUT HEMATURIA: Status: RESOLVED | Noted: 2019-06-15 | Resolved: 2020-06-12

## 2020-06-12 PROCEDURE — 99214 OFFICE O/P EST MOD 30 MIN: CPT | Performed by: NURSE PRACTITIONER

## 2020-06-12 PROCEDURE — G0439 PPPS, SUBSEQ VISIT: HCPCS | Performed by: NURSE PRACTITIONER

## 2020-06-12 RX ORDER — HYDRALAZINE HYDROCHLORIDE 100 MG/1
100 TABLET, FILM COATED ORAL 2 TIMES DAILY
Qty: 270 TABLET | Refills: 0 | COMMUNITY
Start: 2020-06-12 | End: 2020-09-10

## 2020-06-12 NOTE — TELEPHONE ENCOUNTER
Message   Received: Today   Message Contents   JANELL Mccord  [facility] Clinical Staff             Please order labs in Epic to [facility] to be completed.   Thanks.

## 2020-06-12 NOTE — PATIENT INSTRUCTIONS
[address] SCREENING SCHEDULE   Tests on this list are recommended by your physician but may not be covered, or covered at this frequency, by your insurer. Please check with your insurance carrier before scheduling to verify coverage.    Chris Velazquez found for this or any previous visit.  Limited to patients who meet one of the following criteria:   • Men who are 73-68 years old and have smoked more than 100 cigarettes in their lifetime   • Anyone with a family history    Colorectal Cancer Screening  Co 02/26/2012 Please get this Mammogram regularly   Immunizations      Influenza  Covered Annually Orders placed or performed in visit on 09/23/16   • FLU VACC PRSV FREE INC ANTIG   Orders placed or performed in visit on 11/30/15   • FLU VACC PRSV FREE INC AN http://www. idph.state. [place]/public/books/advin.htm  A link to the [employer]. This site has a lot of good information including definitions of the different types of Advance Directives.  It also has the State forms available on it's webs

## 2020-06-12 NOTE — TELEPHONE ENCOUNTER
Filled out form for labs to be completed by [employer]. Faxed order and confirmation received.   FYI to SD.

## 2020-06-12 NOTE — PROGRESS NOTES
Time spent   [address] outside of [address] Verbal Consent   I conducted a telehealth visit with Sherry Mccarty today, 06/12/20, which was completed using two-way, real-time interactive audio    This has been done in good vonda to prov endarterectomy    Elevated LFTs  Due for labs  Will need to montior. Hx of CABG stable     CKD (chronic kidney disease) stage 3, GFR 30-59 ml/min ([facility])  Due for labs  Will continue to montior.      Primary osteoarthritis involving multiple joints stable bid.     History of CVA with residual deficit  As above  Requesting home PT    Her last annual assessment has been over 1 year: Annual Physical due on 04/19/2020         Fall/Risk Assessment   She has been screened for Falls and is low risk: Fall/Risk Scor She smoked tobacco in the past but quit greater than 12 months ago.   Social History    Tobacco Use      Smoking status: Former Smoker        Packs/day: 1.00        Years: 50.00        Pack years: 50        Types: Cigarettes        Quit date: 4/20/2018 03/02/2019    HDL 36 (L) 03/02/2019    LDL 59 03/02/2019    TRIG 139 03/02/2019          Last Chemistry Labs:   Lab Results   Component Value Date    AST 37 10/10/2019    ALT 50 10/10/2019    CA 9.2 10/10/2019    ALB 3.6 10/10/2019    TSH 1.920 03/02/2019 her father; Alzheimer's Disease in her mother. SOCIAL HISTORY:   She  reports that she quit smoking about 2 years ago. Her smoking use included cigarettes. She has a 50.00 pack-year smoking history.  She has never used smokeless tobacco. She reports that 11/04/2014        ASSESSMENT AND OTHER RELEVANT CHRONIC CONDITIONS:   Janett Bronson is a 69year old female who presents for a Medicare Assessment.      PLAN SUMMARY:   Diagnoses and all orders for this visit:    Encounter for annual health examination to joe. Smoker  She has recently quit,. Commended her for this. Congestive heart failure, unspecified HF chronicity, unspecified heart failure type ([facility] [address]  Stable  Cont to monitor closely especially in light of homebound during pandemic.      At Glaucoma, AA>50, > 65 Data entered on: 12/29/2016   Last Dilated Eye Exam 12/29/2016       Bone Density Screening      Dexascan Every two years No results found for this or any previous visit. No flowsheet data found.     Pap and Pelvic      Pap: Ev Value   10/10/2019 4.3     POTASSIUM (mmol/L)   Date Value   04/19/2017 4.9    No flowsheet data found.     Creatinine  Annually CREATININE (mg/dL)   Date Value   04/19/2017 0.95     Creatinine (mg/dL)   Date Value   10/10/2019 1.13 (H)    No flowsheet data

## 2020-06-17 ENCOUNTER — TELEPHONE (OUTPATIENT)
Dept: INTERNAL MEDICINE CLINIC | Facility: CLINIC | Age: 70
End: 2020-06-17

## 2020-06-22 ENCOUNTER — MED REC SCAN ONLY (OUTPATIENT)
Dept: INTERNAL MEDICINE CLINIC | Facility: CLINIC | Age: 70
End: 2020-06-22

## 2020-06-22 NOTE — PROGRESS NOTES
Received Note from [address] stating patient [facility] delayed, unable to reach patient. Placed on provider AS desk for Mat Diaz.

## 2020-06-23 ENCOUNTER — TELEPHONE (OUTPATIENT)
Dept: INTERNAL MEDICINE CLINIC | Facility: CLINIC | Age: 70
End: 2020-06-23

## 2020-06-23 NOTE — TELEPHONE ENCOUNTER
Home health calling with plan of care for PT once a week for 6 weeks. She is requesting an order for Occupational therapy. Verbal order will be ok. Also calling to obtain the last A1C or this pt.  Please advise

## 2020-06-24 ENCOUNTER — TELEPHONE (OUTPATIENT)
Dept: INTERNAL MEDICINE CLINIC | Facility: CLINIC | Age: 70
End: 2020-06-24

## 2020-06-24 DIAGNOSIS — I10 HYPERTENSION, BENIGN: ICD-10-CM

## 2020-06-24 NOTE — TELEPHONE ENCOUNTER
Daughter on hipaa is calling to ask if there is a reason why a urine needs to be collected pt is not able to go. The nurse went there today and itzel blood for protein and informed them the protein can be collected by blood. No need to do the urine.  Please

## 2020-06-24 NOTE — TELEPHONE ENCOUNTER
Spoke with patient's dtr, ok per HIPAA-notified per SD can defer urine as she is on ARB, FYI to dtr the serum protein is not what we are evaluating for, we are assessing urine for microalbuminuria or protein leak into the urine, this is a condition we leonila

## 2020-06-25 ENCOUNTER — MED REC SCAN ONLY (OUTPATIENT)
Dept: INTERNAL MEDICINE CLINIC | Facility: CLINIC | Age: 70
End: 2020-06-25

## 2020-06-25 DIAGNOSIS — E78.00 PURE HYPERCHOLESTEROLEMIA: ICD-10-CM

## 2020-06-25 RX ORDER — LOSARTAN POTASSIUM 50 MG/1
TABLET ORAL
Qty: 180 TABLET | Refills: 0 | Status: SHIPPED | OUTPATIENT
Start: 2020-06-25 | End: 2020-08-26

## 2020-06-25 RX ORDER — EZETIMIBE 10 MG/1
TABLET ORAL
Qty: 90 TABLET | Refills: 3 | Status: SHIPPED | OUTPATIENT
Start: 2020-06-25

## 2020-06-25 RX ORDER — SPIRONOLACTONE 25 MG/1
TABLET ORAL
Qty: 45 TABLET | Refills: 0 | Status: SHIPPED | OUTPATIENT
Start: 2020-06-25 | End: 2020-08-26

## 2020-06-25 NOTE — TELEPHONE ENCOUNTER
Per SD, labs OK. Spoke with pt's daughter per HIPAA OK, notified of result notes. She expressed understanding.

## 2020-06-26 NOTE — TELEPHONE ENCOUNTER
Yanique SLAUGHTER [facility] wants to also request OT     Please call back [phone]

## 2020-07-01 ENCOUNTER — TELEMEDICINE (OUTPATIENT)
Dept: INTERNAL MEDICINE CLINIC | Facility: CLINIC | Age: 70
End: 2020-07-01
Payer: MEDICARE

## 2020-07-01 ENCOUNTER — HOSPITAL ENCOUNTER (EMERGENCY)
Facility: HOSPITAL | Age: 70
Discharge: HOME | End: 2020-07-01
Attending: EMERGENCY MEDICINE
Payer: MEDICARE

## 2020-07-01 VITALS
OXYGEN SATURATION: 92 % | SYSTOLIC BLOOD PRESSURE: 173 MMHG | BODY MASS INDEX: 25.83 KG/M2 | WEIGHT: 155 LBS | DIASTOLIC BLOOD PRESSURE: 72 MMHG | TEMPERATURE: 100 F | HEART RATE: 77 BPM | RESPIRATION RATE: 20 BRPM | HEIGHT: 65 IN

## 2020-07-01 DIAGNOSIS — E78.5 HYPERLIPIDEMIA ASSOCIATED WITH TYPE 2 DIABETES MELLITUS (HCC): ICD-10-CM

## 2020-07-01 DIAGNOSIS — F41.1 ANXIETY STATE: ICD-10-CM

## 2020-07-01 DIAGNOSIS — N18.30 CKD (CHRONIC KIDNEY DISEASE) STAGE 3, GFR 30-59 ML/MIN (HCC): ICD-10-CM

## 2020-07-01 DIAGNOSIS — E11.69 HYPERLIPIDEMIA ASSOCIATED WITH TYPE 2 DIABETES MELLITUS (HCC): ICD-10-CM

## 2020-07-01 DIAGNOSIS — R56.9 SEIZURE (HCC): ICD-10-CM

## 2020-07-01 DIAGNOSIS — E11.65 UNCONTROLLED TYPE 2 DIABETES MELLITUS, WITHOUT LONG-TERM CURRENT USE OF INSULIN (HCC): ICD-10-CM

## 2020-07-01 DIAGNOSIS — L03.211 CELLULITIS OF FACE: Primary | ICD-10-CM

## 2020-07-01 DIAGNOSIS — I69.151 HEMIPARESIS OF RIGHT DOMINANT SIDE AS LATE EFFECT OF NONTRAUMATIC INTRACEREBRAL HEMORRHAGE (HCC): ICD-10-CM

## 2020-07-01 DIAGNOSIS — H10.32 ACUTE BACTERIAL CONJUNCTIVITIS OF LEFT EYE: ICD-10-CM

## 2020-07-01 DIAGNOSIS — L03.211 FACIAL CELLULITIS: Primary | ICD-10-CM

## 2020-07-01 PROCEDURE — 99283 EMERGENCY DEPT VISIT LOW MDM: CPT

## 2020-07-01 PROCEDURE — 99214 OFFICE O/P EST MOD 30 MIN: CPT | Performed by: NURSE PRACTITIONER

## 2020-07-01 RX ORDER — ERYTHROMYCIN 5 MG/G
1 OINTMENT OPHTHALMIC EVERY 6 HOURS
Qty: 1 G | Refills: 0 | Status: SHIPPED | OUTPATIENT
Start: 2020-07-01 | End: 2020-07-08

## 2020-07-01 RX ORDER — TETRACAINE HYDROCHLORIDE 5 MG/ML
1 SOLUTION OPHTHALMIC ONCE
Status: COMPLETED | OUTPATIENT
Start: 2020-07-01 | End: 2020-07-01

## 2020-07-01 RX ORDER — ACETAMINOPHEN 500 MG
1000 TABLET ORAL ONCE
Status: COMPLETED | OUTPATIENT
Start: 2020-07-01 | End: 2020-07-01

## 2020-07-01 RX ORDER — VALACYCLOVIR HYDROCHLORIDE 1 G/1
1 TABLET, FILM COATED ORAL 3 TIMES DAILY
Qty: 21 TABLET | Refills: 0 | Status: SHIPPED | OUTPATIENT
Start: 2020-07-01 | End: 2020-07-08

## 2020-07-01 RX ORDER — CEPHALEXIN 500 MG/1
500 CAPSULE ORAL 4 TIMES DAILY
Qty: 40 CAPSULE | Refills: 0 | Status: SHIPPED | OUTPATIENT
Start: 2020-07-01 | End: 2020-07-11

## 2020-07-01 NOTE — PROGRESS NOTES
Due to COVID-19 ACTION PLAN, the patient's office visit was converted to a video visit. This visit is conducted using video and audio. The patient consents to this service.   The patient understands and accepts financial responsibility for any deductible, times daily. 270 tablet 0   • levETIRAcetam 500 MG Oral Tab Take 1 tablet (500 mg total) by mouth 2 (two) times daily. 30 tablet 0   • Sertraline HCl 100 MG Oral Tab Take 1 tablet (100 mg total) by mouth daily.  90 tablet 3   • EZETIMIBE 10 MG Oral Tab TAKE cheek noted. Difficult assessment via video. ASSESSMENT AND PLAN:     Facial cellulitis  (primary encounter diagnosis)  Discussed risk of facial cellulitis and orbital cellulitis.   Appearance of infection tip of nose and concern is for worsening dakota

## 2020-07-01 NOTE — ED INITIAL ASSESSMENT (HPI)
Patient with c/o redness to nose for three days, today noticed redness and drainage to left eye as well.

## 2020-07-01 NOTE — ED PROVIDER NOTES
Patient Seen in: [facility] Emergency Department      History   Patient presents with:  Cellulitis    Stated Complaint: skin infection on nose    HPI    Radu Bañuelos is a pleasant 69-year-old female coming with complaints of eye drainage and rash.   Patient 2/28/14    stents acute mi   • APPENDECTOMY     • BACK SURGERY     • BIOPSY OF SKIN LESION      hyperkeratosis   • BYPASS SURGERY  1996 and 2012    cabg x 4   • CABG  1996    x4   • CAROTID ENDARTERECTOMY Left 4/29/2016    Performed by Odalys Goode MD a No lesions inside the nares themselves. Oropharyngeal exam was normal no other rash noted over the patient's face no hypersensitivity to the skin to the scalp.   Lungs were clear cardiovascular exam shows regular rhythm abdomen soft nontender extremities n

## 2020-07-05 ENCOUNTER — TELEPHONE (OUTPATIENT)
Dept: CARDIOLOGY | Age: 70
End: 2020-07-05

## 2020-07-05 DIAGNOSIS — E78.00 HYPERCHOLESTEREMIA: ICD-10-CM

## 2020-07-05 DIAGNOSIS — Z95.5 S/P PRIMARY ANGIOPLASTY WITH CORONARY STENT: ICD-10-CM

## 2020-07-05 DIAGNOSIS — E78.00 HYPERCHOLESTEREMIA: Primary | ICD-10-CM

## 2020-07-05 RX ORDER — ROSUVASTATIN CALCIUM 40 MG/1
40 TABLET, COATED ORAL DAILY
Qty: 30 TABLET | Refills: 0 | Status: SHIPPED | OUTPATIENT
Start: 2020-07-05 | End: 2020-10-21 | Stop reason: SDUPTHER

## 2020-07-05 RX ORDER — CLOPIDOGREL BISULFATE 75 MG/1
75 TABLET ORAL DAILY
Qty: 30 TABLET | Refills: 0 | Status: SHIPPED | OUTPATIENT
Start: 2020-07-05 | End: 2020-10-21 | Stop reason: SDUPTHER

## 2020-07-06 ENCOUNTER — TELEPHONE (OUTPATIENT)
Dept: CARDIOLOGY | Age: 70
End: 2020-07-06

## 2020-07-07 DIAGNOSIS — R56.9 SEIZURE (HCC): ICD-10-CM

## 2020-07-07 RX ORDER — LEVETIRACETAM 500 MG/1
500 TABLET ORAL 2 TIMES DAILY
Qty: 60 TABLET | Refills: 5 | Status: SHIPPED | OUTPATIENT
Start: 2020-07-07 | End: 2020-12-15

## 2020-07-07 NOTE — TELEPHONE ENCOUNTER
Medication: keppra    Date of last refill: 4/21/20 (#30/0)  [place]    Last office visit: 4/21/20  Due back to clinic per last office note:  1 year  Date next office visit scheduled:  No future appointments.     Last O

## 2020-07-10 RX ORDER — ROSUVASTATIN CALCIUM 40 MG/1
40 TABLET, COATED ORAL DAILY
Qty: 90 TABLET | OUTPATIENT
Start: 2020-07-10

## 2020-07-10 RX ORDER — CLOPIDOGREL BISULFATE 75 MG/1
75 TABLET ORAL DAILY
Qty: 90 TABLET | OUTPATIENT
Start: 2020-07-10

## 2020-07-29 ENCOUNTER — TELEPHONE (OUTPATIENT)
Dept: INTERNAL MEDICINE CLINIC | Facility: CLINIC | Age: 70
End: 2020-07-29

## 2020-08-04 ENCOUNTER — TELEPHONE (OUTPATIENT)
Dept: CARDIOLOGY | Age: 70
End: 2020-08-04

## 2020-08-04 ENCOUNTER — APPOINTMENT (OUTPATIENT)
Dept: CARDIOLOGY | Age: 70
End: 2020-08-04

## 2020-08-23 DIAGNOSIS — I10 HYPERTENSION, BENIGN: ICD-10-CM

## 2020-08-26 RX ORDER — SPIRONOLACTONE 25 MG/1
TABLET ORAL
Qty: 45 TABLET | Refills: 0 | Status: SHIPPED | OUTPATIENT
Start: 2020-08-26 | End: 2020-11-30 | Stop reason: SDUPTHER

## 2020-08-26 RX ORDER — LOSARTAN POTASSIUM 50 MG/1
TABLET ORAL
Qty: 180 TABLET | Refills: 0 | Status: SHIPPED | OUTPATIENT
Start: 2020-08-26 | End: 2020-12-07 | Stop reason: SDUPTHER

## 2020-09-02 NOTE — TELEPHONE ENCOUNTER
Forms have been signed, letter was attached to the fax and it was sent to scan. Confirmation was received. Procedure Name:  Radiofrequency Ablation Genicular, Right  performed by Yandy Reza MD on 8/5/2020.    Date of procedure: 8/5/20    Pain Diary:   Prior to procedure: 5 at rest;   8 while walking or with activity   At discharge: 0   Current: 0 at rest;  0  while walking or with activity    Pain relief:   100%    Next pain management appointment: None, follow up appt scheduled    PSR'S:  Please schedule pt in office with Dr. Reza for medication management for her back.

## 2020-09-09 DIAGNOSIS — I10 ESSENTIAL HYPERTENSION: ICD-10-CM

## 2020-09-10 RX ORDER — HYDRALAZINE HYDROCHLORIDE 100 MG/1
TABLET, FILM COATED ORAL
Qty: 270 TABLET | Refills: 0 | Status: SHIPPED | OUTPATIENT
Start: 2020-09-10 | End: 2021-04-15

## 2020-09-10 NOTE — TELEPHONE ENCOUNTER
Last Ov: 6/12/20, SD, virtual visit (CPE)  Last labs: 6/16/20 in media  Last Rx: hydralazine 100mg, #270m 0R 6/12/20    No future appointments. Per Protocol - failed, pt due for appt. Rx pending.

## 2020-10-21 ENCOUNTER — TELEPHONE (OUTPATIENT)
Dept: CARDIOLOGY | Age: 70
End: 2020-10-21

## 2020-10-21 DIAGNOSIS — Z82.49 FAMILY HISTORY OF CORONARY ARTERIOSCLEROSIS: ICD-10-CM

## 2020-10-21 DIAGNOSIS — Z95.5 S/P PRIMARY ANGIOPLASTY WITH CORONARY STENT: ICD-10-CM

## 2020-10-21 DIAGNOSIS — E78.00 HYPERCHOLESTEREMIA: ICD-10-CM

## 2020-10-21 DIAGNOSIS — I10 HYPERTENSION, BENIGN: Primary | ICD-10-CM

## 2020-10-21 RX ORDER — CLOPIDOGREL BISULFATE 75 MG/1
75 TABLET ORAL DAILY
Qty: 30 TABLET | Refills: 0 | Status: SHIPPED | OUTPATIENT
Start: 2020-10-21 | End: 2020-11-30 | Stop reason: SDUPTHER

## 2020-10-21 RX ORDER — ROSUVASTATIN CALCIUM 40 MG/1
40 TABLET, COATED ORAL DAILY
Qty: 30 TABLET | Refills: 0 | Status: SHIPPED | OUTPATIENT
Start: 2020-10-21 | End: 2020-11-30 | Stop reason: SDUPTHER

## 2020-10-27 ENCOUNTER — V-VISIT (OUTPATIENT)
Dept: CARDIOLOGY | Age: 70
End: 2020-10-27

## 2020-10-27 VITALS — BODY MASS INDEX: 25.83 KG/M2 | WEIGHT: 155 LBS | HEIGHT: 65 IN

## 2020-10-27 DIAGNOSIS — N18.30 STAGE 3 CHRONIC KIDNEY DISEASE, UNSPECIFIED WHETHER STAGE 3A OR 3B CKD (CMD): ICD-10-CM

## 2020-10-27 DIAGNOSIS — Z98.890 HISTORY OF CAROTID ENDARTERECTOMY: ICD-10-CM

## 2020-10-27 DIAGNOSIS — I10 HYPERTENSION, BENIGN: ICD-10-CM

## 2020-10-27 DIAGNOSIS — I65.29 STENOSIS OF CAROTID ARTERY, UNSPECIFIED LATERALITY: ICD-10-CM

## 2020-10-27 DIAGNOSIS — Z95.1 HX OF CABG: Primary | ICD-10-CM

## 2020-10-27 DIAGNOSIS — R53.83 FATIGUE, UNSPECIFIED TYPE: ICD-10-CM

## 2020-10-27 DIAGNOSIS — I25.10 CORONARY ARTERY DISEASE INVOLVING NATIVE HEART WITHOUT ANGINA PECTORIS, UNSPECIFIED VESSEL OR LESION TYPE: ICD-10-CM

## 2020-10-27 DIAGNOSIS — J44.9 CHRONIC OBSTRUCTIVE PULMONARY DISEASE, UNSPECIFIED COPD TYPE (CMD): ICD-10-CM

## 2020-10-27 DIAGNOSIS — I50.9 CONGESTIVE HEART FAILURE, UNSPECIFIED HF CHRONICITY, UNSPECIFIED HEART FAILURE TYPE (CMD): ICD-10-CM

## 2020-10-27 DIAGNOSIS — E78.00 PURE HYPERCHOLESTEROLEMIA: ICD-10-CM

## 2020-10-27 DIAGNOSIS — Z82.49 FAMILY HISTORY OF CORONARY ARTERIOSCLEROSIS: ICD-10-CM

## 2020-10-27 DIAGNOSIS — I69.30 HISTORY OF CVA WITH RESIDUAL DEFICIT: ICD-10-CM

## 2020-10-27 PROCEDURE — 99214 OFFICE O/P EST MOD 30 MIN: CPT | Performed by: INTERNAL MEDICINE

## 2020-10-27 SDOH — HEALTH STABILITY: PHYSICAL HEALTH: ON AVERAGE, HOW MANY DAYS PER WEEK DO YOU ENGAGE IN MODERATE TO STRENUOUS EXERCISE (LIKE A BRISK WALK)?: 0 DAYS

## 2020-10-27 SDOH — HEALTH STABILITY: PHYSICAL HEALTH: ON AVERAGE, HOW MANY MINUTES DO YOU ENGAGE IN EXERCISE AT THIS LEVEL?: 0 MIN

## 2020-10-27 ASSESSMENT — ENCOUNTER SYMPTOMS
HEMATOCHEZIA: 0
CHILLS: 0
FEVER: 0
BRUISES/BLEEDS EASILY: 0
SUSPICIOUS LESIONS: 0
HEMOPTYSIS: 0
WEIGHT GAIN: 0
ALLERGIC/IMMUNOLOGIC COMMENTS: NO NEW FOOD ALLERGIES
COUGH: 0
WEIGHT LOSS: 0

## 2020-10-27 ASSESSMENT — PATIENT HEALTH QUESTIONNAIRE - PHQ9
SUM OF ALL RESPONSES TO PHQ9 QUESTIONS 1 AND 2: 0
SUM OF ALL RESPONSES TO PHQ9 QUESTIONS 1 AND 2: 0
CLINICAL INTERPRETATION OF PHQ9 SCORE: NO FURTHER SCREENING NEEDED
2. FEELING DOWN, DEPRESSED OR HOPELESS: NOT AT ALL
1. LITTLE INTEREST OR PLEASURE IN DOING THINGS: NOT AT ALL
CLINICAL INTERPRETATION OF PHQ2 SCORE: NO FURTHER SCREENING NEEDED

## 2020-11-23 ENCOUNTER — TELEPHONE (OUTPATIENT)
Dept: INTERNAL MEDICINE CLINIC | Facility: CLINIC | Age: 70
End: 2020-11-23

## 2020-11-23 RX ORDER — FUROSEMIDE 20 MG/1
20 TABLET ORAL
COMMUNITY
Start: 2019-07-29 | End: 2021-04-15

## 2020-11-23 NOTE — TELEPHONE ENCOUNTER
Pts daughter called and stated that Dr. Memo Diego wants the pt to have these tests ordered.  She also sent over a copy via fax of the lab requests-    Thyroid Stimulating Panel  Fasting Lipid Panel   CMP  CBC    Please advise

## 2020-11-23 NOTE — TELEPHONE ENCOUNTER
Carolina Ortiz, pt's dtr will call Dr Scott Farias office to ask what specific labs she would want ordered. Pt has used [facility] before in the past.  Hold for [employer] return call.

## 2020-11-23 NOTE — TELEPHONE ENCOUNTER
Pts daughter called and stated that the pts heart doctor wants her to get blood work done so she can get her RX refilled.  The heart dr asked that she reach out to us to have someone come out to the home and draw her blood     Please advise

## 2020-11-30 ENCOUNTER — TELEPHONE (OUTPATIENT)
Dept: CARDIOLOGY | Age: 70
End: 2020-11-30

## 2020-11-30 ENCOUNTER — TELEPHONE (OUTPATIENT)
Dept: INTERNAL MEDICINE CLINIC | Facility: CLINIC | Age: 70
End: 2020-11-30

## 2020-11-30 DIAGNOSIS — Z95.5 S/P PRIMARY ANGIOPLASTY WITH CORONARY STENT: ICD-10-CM

## 2020-11-30 DIAGNOSIS — E78.00 HYPERCHOLESTEREMIA: ICD-10-CM

## 2020-11-30 DIAGNOSIS — Z82.49 FAMILY HISTORY OF CORONARY ARTERIOSCLEROSIS: ICD-10-CM

## 2020-11-30 DIAGNOSIS — I10 HYPERTENSION, BENIGN: ICD-10-CM

## 2020-11-30 RX ORDER — ROSUVASTATIN CALCIUM 40 MG/1
40 TABLET, COATED ORAL DAILY
Qty: 90 TABLET | Refills: 3 | Status: SHIPPED | OUTPATIENT
Start: 2020-11-30 | End: 2020-12-07 | Stop reason: SDUPTHER

## 2020-11-30 RX ORDER — SPIRONOLACTONE 25 MG/1
12.5 TABLET ORAL DAILY
Qty: 45 TABLET | Refills: 3 | Status: SHIPPED | OUTPATIENT
Start: 2020-11-30 | End: 2020-12-29

## 2020-11-30 RX ORDER — HYDRALAZINE HYDROCHLORIDE 100 MG/1
TABLET, FILM COATED ORAL
Qty: 180 TABLET | Refills: 3 | Status: SHIPPED | OUTPATIENT
Start: 2020-11-30

## 2020-11-30 RX ORDER — CLOPIDOGREL BISULFATE 75 MG/1
75 TABLET ORAL DAILY
Qty: 90 TABLET | Refills: 3 | Status: SHIPPED | OUTPATIENT
Start: 2020-11-30 | End: 2020-12-07 | Stop reason: SDUPTHER

## 2020-11-30 RX ORDER — AMLODIPINE BESYLATE 10 MG/1
10 TABLET ORAL DAILY
Qty: 90 TABLET | Refills: 3 | Status: SHIPPED | OUTPATIENT
Start: 2020-11-30

## 2020-12-01 NOTE — TELEPHONE ENCOUNTER
JOHAN for Deanne Lisa pt's dtr at [phone] (M)vm to call back to go over lab results and recommendations from [address] [place] [address]. Lab results at [facility].

## 2020-12-01 NOTE — TELEPHONE ENCOUNTER
Labs stable   Same medications. Should be seen in the office if at all possible as she has not been seen for over 1 year. New anemia. Please check iron and ferrintin with repeat CBC in 6 weeks.

## 2020-12-07 ENCOUNTER — TELEPHONE (OUTPATIENT)
Dept: NEUROLOGY | Facility: CLINIC | Age: 70
End: 2020-12-07

## 2020-12-07 ENCOUNTER — TELEPHONE (OUTPATIENT)
Dept: CARDIOLOGY | Age: 70
End: 2020-12-07

## 2020-12-07 DIAGNOSIS — I10 HYPERTENSION, BENIGN: ICD-10-CM

## 2020-12-07 DIAGNOSIS — E78.00 HYPERCHOLESTEREMIA: ICD-10-CM

## 2020-12-07 DIAGNOSIS — I69.359 SPASTIC HEMIPARESIS AS LATE EFFECT OF CEREBRAL INFARCTION (HCC): Primary | ICD-10-CM

## 2020-12-07 DIAGNOSIS — Z82.49 FAMILY HISTORY OF CORONARY ARTERIOSCLEROSIS: ICD-10-CM

## 2020-12-07 DIAGNOSIS — Z95.5 S/P PRIMARY ANGIOPLASTY WITH CORONARY STENT: ICD-10-CM

## 2020-12-07 RX ORDER — ROSUVASTATIN CALCIUM 40 MG/1
40 TABLET, COATED ORAL DAILY
Qty: 30 TABLET | Refills: 0 | Status: SHIPPED | OUTPATIENT
Start: 2020-12-07 | End: 2020-12-07 | Stop reason: SDUPTHER

## 2020-12-07 RX ORDER — ROSUVASTATIN CALCIUM 40 MG/1
40 TABLET, COATED ORAL DAILY
Qty: 90 TABLET | Refills: 3 | Status: SHIPPED | OUTPATIENT
Start: 2020-12-07

## 2020-12-07 RX ORDER — CLOPIDOGREL BISULFATE 75 MG/1
75 TABLET ORAL DAILY
Qty: 90 TABLET | Refills: 3 | Status: SHIPPED | OUTPATIENT
Start: 2020-12-07

## 2020-12-07 RX ORDER — LOSARTAN POTASSIUM 50 MG/1
50 TABLET ORAL 2 TIMES DAILY
Qty: 30 TABLET | Refills: 0 | Status: SHIPPED | OUTPATIENT
Start: 2020-12-07 | End: 2020-12-07 | Stop reason: SDUPTHER

## 2020-12-07 RX ORDER — BACLOFEN 10 MG/1
10 TABLET ORAL 2 TIMES DAILY
Qty: 30 TABLET | Refills: 5 | Status: SHIPPED | OUTPATIENT
Start: 2020-12-07 | End: 2021-03-18

## 2020-12-07 RX ORDER — LOSARTAN POTASSIUM 50 MG/1
50 TABLET ORAL 2 TIMES DAILY
Qty: 180 TABLET | Refills: 3 | Status: SHIPPED | OUTPATIENT
Start: 2020-12-07

## 2020-12-07 RX ORDER — CLOPIDOGREL BISULFATE 75 MG/1
75 TABLET ORAL DAILY
Qty: 30 TABLET | Refills: 0 | Status: SHIPPED | OUTPATIENT
Start: 2020-12-07 | End: 2020-12-07 | Stop reason: SDUPTHER

## 2020-12-07 RX ORDER — BACLOFEN 10 MG/1
10 TABLET ORAL 2 TIMES DAILY
Qty: 30 TABLET | Refills: 5 | Status: SHIPPED | OUTPATIENT
Start: 2020-12-07 | End: 2020-12-07

## 2020-12-07 NOTE — TELEPHONE ENCOUNTER
Provider requested that RN call patient's daughter back to discuss prescription and PT orders. Patient's daughter requested that RX for Baclofen go to local [facility].  Placed new order to go to local based on written order and cancelled RX that went to

## 2020-12-07 NOTE — TELEPHONE ENCOUNTER
pts daughter states in the last 24hrs pts muscles in left leg have tightened up and she is unable to release; pts daughter does not know what to do to help

## 2020-12-07 NOTE — TELEPHONE ENCOUNTER
Called her daughter back, her muscle spasm off and on after movement, it is common after stroke and movement. Will give her baclofen 10 mg PRN #30  Home PT for leg weakness and spasm.

## 2020-12-08 ENCOUNTER — APPOINTMENT (OUTPATIENT)
Dept: CARDIOLOGY | Age: 70
End: 2020-12-08

## 2020-12-11 ENCOUNTER — TELEPHONE (OUTPATIENT)
Dept: NEUROLOGY | Facility: CLINIC | Age: 70
End: 2020-12-11

## 2020-12-11 NOTE — TELEPHONE ENCOUNTER
Spoke with Julieth Warren at [facility] who states that pt would need a more current visit with the provider for medicare to cover PT. They also need the primary diagnosis changed to be more specific such as left or right sided hemiparesis.      New referral will need s

## 2020-12-15 NOTE — PROGRESS NOTES
This follow up is conducted as a video visit after consent was obtained from patient and patient' POA      HISTORY OF PRESENT ILLNESS:  Lucy Rowland is a(n) 69year old, right handed,  female who presents for         Current symptoms and major concern medications were discussed in details. The patient and family were given ample opportunity to ask questions. All questions and concerns were addressed to satisfactory status.   Covid-19  symptoms were screened and education of prevention was discussed urvashi

## 2020-12-29 DIAGNOSIS — I10 HYPERTENSION, BENIGN: ICD-10-CM

## 2020-12-29 RX ORDER — SPIRONOLACTONE 25 MG/1
TABLET ORAL
Qty: 45 TABLET | Refills: 0 | Status: SHIPPED | OUTPATIENT
Start: 2020-12-29 | End: 2021-03-24 | Stop reason: SDUPTHER

## 2020-12-29 NOTE — TELEPHONE ENCOUNTER
Spoke to Trice Chavarria, pt's dtr, notified labs stable, same medications see in the office, repeat cbc, iron and ferritin in 6 weeks. Faxed order for [facility] as requested.     Paperwork for [employer] for the [place] [employer] with Rama Nixon

## 2021-01-04 ENCOUNTER — MED REC SCAN ONLY (OUTPATIENT)
Dept: INTERNAL MEDICINE CLINIC | Facility: CLINIC | Age: 71
End: 2021-01-04

## 2021-01-04 PROBLEM — D64.9 ANEMIA: Status: ACTIVE | Noted: 2021-01-04

## 2021-01-25 DIAGNOSIS — Z23 NEED FOR VACCINATION: ICD-10-CM

## 2021-03-15 ENCOUNTER — TELEPHONE (OUTPATIENT)
Dept: INTERNAL MEDICINE CLINIC | Facility: CLINIC | Age: 71
End: 2021-03-15

## 2021-03-15 NOTE — TELEPHONE ENCOUNTER
Daughter stated she is hr POA and she hasn't taken her mother out anywhere. Prior to covid mother would visit her son in detention but after covid she prefers not to take her out however her sister wants to take her and she doesn't think its safe. Pt hasn't been vaccinated. Calling to see what AS thoughts are.  Please advise

## 2021-03-15 NOTE — TELEPHONE ENCOUNTER
Spoke to Hansel Zavala dtr(POA), she states her sister wants to take pt to see her son at the [facility]. Discussed precautions for COVID.   Hansel Zavala will call [facility] [place] to see if they are doing any ZOOM visits with inmates first.  FYI to [address] 7/1/20 with SD.

## 2021-03-17 ENCOUNTER — TELEPHONE (OUTPATIENT)
Dept: INTERNAL MEDICINE CLINIC | Facility: CLINIC | Age: 71
End: 2021-03-17

## 2021-03-18 ENCOUNTER — TELEPHONE (OUTPATIENT)
Dept: INTERNAL MEDICINE CLINIC | Facility: CLINIC | Age: 71
End: 2021-03-18

## 2021-03-18 DIAGNOSIS — D64.9 ANEMIA, UNSPECIFIED TYPE: ICD-10-CM

## 2021-03-18 DIAGNOSIS — I69.359 SPASTIC HEMIPARESIS AS LATE EFFECT OF CEREBRAL INFARCTION (HCC): ICD-10-CM

## 2021-03-18 DIAGNOSIS — E11.65 UNCONTROLLED TYPE 2 DIABETES MELLITUS, WITHOUT LONG-TERM CURRENT USE OF INSULIN (HCC): Primary | ICD-10-CM

## 2021-03-18 DIAGNOSIS — R56.9 SEIZURE (HCC): ICD-10-CM

## 2021-03-18 DIAGNOSIS — F41.1 ANXIETY STATE: ICD-10-CM

## 2021-03-18 RX ORDER — LEVETIRACETAM 500 MG/1
500 TABLET ORAL 2 TIMES DAILY
Qty: 60 TABLET | Refills: 2 | Status: SHIPPED | OUTPATIENT
Start: 2021-03-18 | End: 2021-08-11

## 2021-03-18 RX ORDER — INSULIN GLARGINE 100 [IU]/ML
8 INJECTION, SOLUTION SUBCUTANEOUS EVERY MORNING
Qty: 3 ML | Refills: 0 | Status: SHIPPED | OUTPATIENT
Start: 2021-03-18 | End: 2021-03-19

## 2021-03-18 RX ORDER — BACLOFEN 10 MG/1
10 TABLET ORAL 2 TIMES DAILY
Qty: 30 TABLET | Refills: 2 | Status: SHIPPED | OUTPATIENT
Start: 2021-03-18 | End: 2021-09-13

## 2021-03-18 NOTE — TELEPHONE ENCOUNTER
Medication: BACLOFEN 10 MG TABLETS + LEVETIRACETAM 500 MG TABLETS    Date of last refill: 12/07/2020 (#30/5) + 12/15/2020 (#60/5)  [place]    Last office visit: 12/15/2020  Due back to clinic per last office note:  N

## 2021-03-18 NOTE — TELEPHONE ENCOUNTER
Last VV with SD was 7/1/20. Last labs 11/24/20 cbc, a1c=6.3    OK to send 3ml to [facility]. Pended for [facility]. Needs Mail order sent as well.   Caregiver just told dtr today that pt is totally out of Lantus, still taking 8 units every am.     TB

## 2021-03-18 NOTE — TELEPHONE ENCOUNTER
Patient is in need of a temporary script for     insulin glargine (LANTUS SOLOSTAR) 100 UNIT/ML Subcutaneous Solution Pen-injector    Sent to [facility] today until mail order comes in.

## 2021-03-19 RX ORDER — INSULIN GLARGINE 100 [IU]/ML
8 INJECTION, SOLUTION SUBCUTANEOUS EVERY MORNING
Qty: 9 ML | Refills: 0 | Status: SHIPPED | OUTPATIENT
Start: 2021-03-19 | End: 2021-04-15

## 2021-03-19 NOTE — TELEPHONE ENCOUNTER
Needs ov and diabetic labs. Last in person ov was October 2019 with Dr Surjit Crabtree. August 2019 with our office. If she cannot come to the office she should really be seen by a home physician. 90 days sent to mail order.

## 2021-03-22 NOTE — TELEPHONE ENCOUNTER
Spoke with ernst and advised of lab orders faxed to [facility] with confirmation of fax received. Navid Keita will call and schedule virtual follow up with SD once lab appointment scheduled.  Indicated for [facility] to fax results to our office when availabl

## 2021-03-22 NOTE — TELEPHONE ENCOUNTER
Daughter Rajni Mares - on hipaa, calling to check on status on insulin medication, I advised was sent to mail order pharmacy. Advised pt that she may need to be seen as her last ov was 8/19.   Pt stated she was advised that her mom shouldn't leave her house

## 2021-03-22 NOTE — TELEPHONE ENCOUNTER
[facility] to draw labs are ordered if possible. Please have them inform us when [facility] draws blood so we can be aware to look for results   Telemedicine visit to follow lab results.       Form completed

## 2021-03-22 NOTE — TELEPHONE ENCOUNTER
[facility] with virtual follow up until patient able to schedule annual? If ok with SD can send orders to start labs for blood draw. Additional Safety/Bands:

## 2021-03-24 DIAGNOSIS — I10 HYPERTENSION, BENIGN: ICD-10-CM

## 2021-03-24 RX ORDER — SPIRONOLACTONE 25 MG/1
TABLET ORAL
Qty: 15 TABLET | Refills: 0 | Status: SHIPPED | OUTPATIENT
Start: 2021-03-24 | End: 2021-04-30

## 2021-04-15 ENCOUNTER — HOSPITAL ENCOUNTER (OUTPATIENT)
Dept: CARDIOLOGY CLINIC | Facility: HOSPITAL | Age: 71
Discharge: HOME | End: 2021-04-15
Attending: INTERNAL MEDICINE
Payer: MEDICARE

## 2021-04-15 ENCOUNTER — HOSPITAL ENCOUNTER (OUTPATIENT)
Dept: CV DIAGNOSTICS | Facility: HOSPITAL | Age: 71
Discharge: HOME | End: 2021-04-15
Attending: INTERNAL MEDICINE
Payer: MEDICARE

## 2021-04-15 ENCOUNTER — MED REC SCAN ONLY (OUTPATIENT)
Dept: INTERNAL MEDICINE CLINIC | Facility: CLINIC | Age: 71
End: 2021-04-15

## 2021-04-15 ENCOUNTER — OFFICE VISIT (OUTPATIENT)
Dept: INTERNAL MEDICINE CLINIC | Facility: CLINIC | Age: 71
End: 2021-04-15
Payer: MEDICARE

## 2021-04-15 VITALS
TEMPERATURE: 98 F | HEART RATE: 64 BPM | BODY MASS INDEX: 26 KG/M2 | HEIGHT: 65 IN | RESPIRATION RATE: 16 BRPM | SYSTOLIC BLOOD PRESSURE: 110 MMHG | DIASTOLIC BLOOD PRESSURE: 60 MMHG

## 2021-04-15 DIAGNOSIS — I25.10 ATHEROSCLEROSIS OF NATIVE CORONARY ARTERY OF NATIVE HEART WITHOUT ANGINA PECTORIS: ICD-10-CM

## 2021-04-15 DIAGNOSIS — F32.0 CURRENT MILD EPISODE OF MAJOR DEPRESSIVE DISORDER WITHOUT PRIOR EPISODE (HCC): ICD-10-CM

## 2021-04-15 DIAGNOSIS — Z12.11 COLON CANCER SCREENING: ICD-10-CM

## 2021-04-15 DIAGNOSIS — J44.9 CHRONIC OBSTRUCTIVE PULMONARY DISEASE, UNSPECIFIED COPD TYPE (HCC): ICD-10-CM

## 2021-04-15 DIAGNOSIS — I69.30 HISTORY OF CVA WITH RESIDUAL DEFICIT: ICD-10-CM

## 2021-04-15 DIAGNOSIS — I69.151 HEMIPARESIS OF RIGHT DOMINANT SIDE AS LATE EFFECT OF NONTRAUMATIC INTRACEREBRAL HEMORRHAGE (HCC): ICD-10-CM

## 2021-04-15 DIAGNOSIS — E78.00 PURE HYPERCHOLESTEROLEMIA: ICD-10-CM

## 2021-04-15 DIAGNOSIS — I10 ESSENTIAL HYPERTENSION: ICD-10-CM

## 2021-04-15 DIAGNOSIS — I50.9 CONGESTIVE HEART FAILURE, UNSPECIFIED HF CHRONICITY, UNSPECIFIED HEART FAILURE TYPE (HCC): ICD-10-CM

## 2021-04-15 DIAGNOSIS — Z98.890 HISTORY OF CAROTID ENDARTERECTOMY: ICD-10-CM

## 2021-04-15 DIAGNOSIS — N18.30 STAGE 3 CHRONIC KIDNEY DISEASE, UNSPECIFIED WHETHER STAGE 3A OR 3B CKD (HCC): ICD-10-CM

## 2021-04-15 DIAGNOSIS — I65.29 STENOSIS OF CAROTID ARTERY, UNSPECIFIED LATERALITY: ICD-10-CM

## 2021-04-15 DIAGNOSIS — E78.00 HIGH CHOLESTEROL: ICD-10-CM

## 2021-04-15 DIAGNOSIS — R53.83 FATIGUE, UNSPECIFIED TYPE: ICD-10-CM

## 2021-04-15 DIAGNOSIS — L98.9 SKIN LESION: Primary | ICD-10-CM

## 2021-04-15 DIAGNOSIS — Z95.1 HX OF CABG: ICD-10-CM

## 2021-04-15 DIAGNOSIS — E11.65 TYPE 2 DIABETES MELLITUS WITH HYPERGLYCEMIA, WITH LONG-TERM CURRENT USE OF INSULIN (HCC): ICD-10-CM

## 2021-04-15 DIAGNOSIS — Z79.4 TYPE 2 DIABETES MELLITUS WITH HYPERGLYCEMIA, WITH LONG-TERM CURRENT USE OF INSULIN (HCC): ICD-10-CM

## 2021-04-15 DIAGNOSIS — I10 BENIGN HYPERTENSION: ICD-10-CM

## 2021-04-15 PROBLEM — Z93.1 GASTROSTOMY STATUS (HCC): Status: ACTIVE | Noted: 2021-04-15

## 2021-04-15 PROBLEM — I48.0 PAROXYSMAL ATRIAL FIBRILLATION (HCC): Status: ACTIVE | Noted: 2021-04-15

## 2021-04-15 PROCEDURE — 93306 TTE W/DOPPLER COMPLETE: CPT | Performed by: INTERNAL MEDICINE

## 2021-04-15 PROCEDURE — 93880 EXTRACRANIAL BILAT STUDY: CPT | Performed by: INTERNAL MEDICINE

## 2021-04-15 PROCEDURE — 99214 OFFICE O/P EST MOD 30 MIN: CPT | Performed by: PHYSICIAN ASSISTANT

## 2021-04-15 RX ORDER — INSULIN GLARGINE 100 [IU]/ML
8 INJECTION, SOLUTION SUBCUTANEOUS EVERY MORNING
Qty: 9 ML | Refills: 0 | Status: SHIPPED | OUTPATIENT
Start: 2021-04-15 | End: 2021-06-22

## 2021-04-15 RX ORDER — HYDRALAZINE HYDROCHLORIDE 100 MG/1
100 TABLET, FILM COATED ORAL 2 TIMES DAILY
Qty: 180 TABLET | Refills: 0 | Status: SHIPPED | OUTPATIENT
Start: 2021-04-15 | End: 2021-06-21

## 2021-04-15 RX ORDER — EZETIMIBE 10 MG/1
10 TABLET ORAL NIGHTLY
Qty: 90 TABLET | Refills: 0 | Status: SHIPPED | OUTPATIENT
Start: 2021-04-15 | End: 2021-06-22

## 2021-04-15 RX ORDER — CLOPIDOGREL BISULFATE 75 MG/1
75 TABLET ORAL
Qty: 90 TABLET | Refills: 0 | Status: SHIPPED | OUTPATIENT
Start: 2021-04-15 | End: 2021-06-22

## 2021-04-15 NOTE — PROGRESS NOTES
Patient presents with:  Diabetes: DD Rm 6, DM F/U       HPI:  Pt presents with her dtr for follow up visit. Pt has not been seen in over a year due to concerns related to pandemic.   Pt has a caregiver that is with her 4 hours a day and her dtr cares for h Seizure ([facility] [address]     new onset on 2/26/19   • Shortness of breath    • Sputum production    • Stented coronary artery    • STROKE 2005    TIA   • Stroke syndrome 12/2005    CVA no residual effects   • Type II or unspecified type diabetes mellitus without ment by mouth 2 (two) times daily. 180 tablet 0   • baclofen 10 MG Oral Tab Take 1 tablet (10 mg total) by mouth 2 (two) times daily. 30 tablet 2   • levETIRAcetam 500 MG Oral Tab Take 1 tablet (500 mg total) by mouth 2 (two) times daily.  60 tablet 2   • Luis Guaman Cologard. Essential hypertension - controlled, continue current meds. High cholesterol - At end of visit was able to get lab results from [facility], see scanned copy. Chol is controlled.   Atherosclerosis of native coronary artery of native heart withou COLON CANCER SCREENING (EXTERNAL)  OPHTHALMOLOGY - INTERNAL    Return in about 3 months (around 7/15/2021) for Annual wellness visit. There are no Patient Instructions on file for this visit.     All questions were answered and the patient understands the

## 2021-04-21 ENCOUNTER — TELEPHONE (OUTPATIENT)
Dept: INTERNAL MEDICINE CLINIC | Facility: CLINIC | Age: 71
End: 2021-04-21

## 2021-04-26 ENCOUNTER — TELEPHONE (OUTPATIENT)
Dept: CARDIOLOGY | Age: 71
End: 2021-04-26

## 2021-04-28 DIAGNOSIS — Z98.890 HISTORY OF CAROTID ENDARTERECTOMY: ICD-10-CM

## 2021-04-28 DIAGNOSIS — I10 HYPERTENSION, BENIGN: ICD-10-CM

## 2021-04-28 DIAGNOSIS — I65.29 STENOSIS OF CAROTID ARTERY, UNSPECIFIED LATERALITY: Primary | ICD-10-CM

## 2021-04-30 RX ORDER — SPIRONOLACTONE 25 MG/1
TABLET ORAL
Qty: 45 TABLET | Refills: 0 | Status: SHIPPED | OUTPATIENT
Start: 2021-04-30 | End: 2021-06-14

## 2021-06-13 DIAGNOSIS — I10 HYPERTENSION, BENIGN: ICD-10-CM

## 2021-06-14 RX ORDER — SPIRONOLACTONE 25 MG/1
TABLET ORAL
Qty: 45 TABLET | Refills: 3 | Status: SHIPPED | OUTPATIENT
Start: 2021-06-14

## 2021-06-16 NOTE — TELEPHONE ENCOUNTER
Spoke with Hernan from the pharmacy who states the patient has refills remaining.  There was an error in the computer and to disregard the refill request.       Medication: SERTRALINE 100MG TABLETS    Date of last refill: 12/15/2020 (#90/3)  Date last filled p

## 2021-06-18 ENCOUNTER — HOSPITAL ENCOUNTER (OUTPATIENT)
Dept: CT IMAGING | Facility: HOSPITAL | Age: 71
Discharge: HOME | End: 2021-06-18
Attending: SURGERY
Payer: MEDICARE

## 2021-06-18 DIAGNOSIS — I63.9 CVA (CEREBRAL VASCULAR ACCIDENT) (HCC): ICD-10-CM

## 2021-06-18 PROCEDURE — 82565 ASSAY OF CREATININE: CPT

## 2021-06-18 PROCEDURE — 70498 CT ANGIOGRAPHY NECK: CPT | Performed by: SURGERY

## 2021-06-21 DIAGNOSIS — E11.65 TYPE 2 DIABETES MELLITUS WITH HYPERGLYCEMIA, WITH LONG-TERM CURRENT USE OF INSULIN (HCC): ICD-10-CM

## 2021-06-21 DIAGNOSIS — I69.151 HEMIPARESIS OF RIGHT DOMINANT SIDE AS LATE EFFECT OF NONTRAUMATIC INTRACEREBRAL HEMORRHAGE (HCC): ICD-10-CM

## 2021-06-21 DIAGNOSIS — I10 ESSENTIAL HYPERTENSION: ICD-10-CM

## 2021-06-21 DIAGNOSIS — Z79.4 TYPE 2 DIABETES MELLITUS WITH HYPERGLYCEMIA, WITH LONG-TERM CURRENT USE OF INSULIN (HCC): ICD-10-CM

## 2021-06-21 DIAGNOSIS — E78.00 HIGH CHOLESTEROL: ICD-10-CM

## 2021-06-21 RX ORDER — HYDRALAZINE HYDROCHLORIDE 100 MG/1
100 TABLET, FILM COATED ORAL 2 TIMES DAILY
Qty: 180 TABLET | Refills: 0 | Status: SHIPPED | OUTPATIENT
Start: 2021-06-21

## 2021-06-21 NOTE — TELEPHONE ENCOUNTER
Last VISIT 4/15/21 CB Skin Lesion    Last REFILL 4/15/21 Clopidogrel 90T 0R, Hydralazine 180T 0R, Ezetimibe 90T 0R, Lantus 9ML 0R    Last LABS 11/25/20 Lipid, CBC, TSH, HgA1c, CMP    Future Appointments   Date Time Provider Henok Zeng   7/16/2021  1

## 2021-06-22 RX ORDER — INSULIN GLARGINE 100 [IU]/ML
8 INJECTION, SOLUTION SUBCUTANEOUS EVERY MORNING
Qty: 9 ML | Refills: 0 | Status: SHIPPED | OUTPATIENT
Start: 2021-06-22

## 2021-06-22 RX ORDER — CLOPIDOGREL BISULFATE 75 MG/1
75 TABLET ORAL
Qty: 90 TABLET | Refills: 0 | Status: SHIPPED | OUTPATIENT
Start: 2021-06-22 | End: 2022-01-05

## 2021-06-22 RX ORDER — EZETIMIBE 10 MG/1
10 TABLET ORAL NIGHTLY
Qty: 90 TABLET | Refills: 0 | Status: SHIPPED | OUTPATIENT
Start: 2021-06-22 | End: 2021-09-03

## 2021-06-29 ENCOUNTER — TELEPHONE (OUTPATIENT)
Dept: INTERNAL MEDICINE CLINIC | Facility: CLINIC | Age: 71
End: 2021-06-29

## 2021-07-15 ENCOUNTER — TELEPHONE (OUTPATIENT)
Dept: INTERNAL MEDICINE CLINIC | Facility: CLINIC | Age: 71
End: 2021-07-15

## 2021-07-15 NOTE — TELEPHONE ENCOUNTER
During TS questions daughter stated to [facility]xl wellness for now she just had a cologuard and will take care of that first. aware AS next available is Oct, she stated that is fine and will call back to r,s     No need to route to .

## 2021-07-15 NOTE — TELEPHONE ENCOUNTER
FYI to AS. Not holding test results for upcoming appt. Please continue outreach to the patient to reschedule appointment.

## 2021-07-19 ENCOUNTER — TELEPHONE (OUTPATIENT)
Dept: INTERNAL MEDICINE CLINIC | Facility: CLINIC | Age: 71
End: 2021-07-19

## 2021-07-19 NOTE — TELEPHONE ENCOUNTER
Anitha Rodriguez indicates they will look into physician home care as patient refusing to be seen in ER/ICC or our office.      Daughter states they called the on call twice over the weekend, first time got family practice covering provider, second time did not rec

## 2021-07-19 NOTE — TELEPHONE ENCOUNTER
Viola Rose pt's dtr indicates pt feel out of her w/c on Friday and hit her head. Pt refuses to go to the ER. Dtr notified that would be our recommendation to go to the ER since pt is on blood thinners, bruising.  Dtr declines appt to come into the office be

## 2021-07-19 NOTE — TELEPHONE ENCOUNTER
Daughter Anitha Jennifer calling on [employer], patient had fall out of her wheelchair on Friday, hit head and refused to be taken to ER. Patient is on blood thinners and daughter feels she needs to be checked.   Daughter states it is getting harder to care for patien

## 2021-07-19 NOTE — TELEPHONE ENCOUNTER
I agree Thanh Cobb needs to be fully evaluated. I recommend IC/ER as previously discussed. If not, she should be seen in our office or have home visiting provider in the next 24-48 hours.

## 2021-07-23 ENCOUNTER — TELEPHONE (OUTPATIENT)
Dept: INTERNAL MEDICINE CLINIC | Facility: CLINIC | Age: 71
End: 2021-07-23

## 2021-07-23 NOTE — TELEPHONE ENCOUNTER
Message received from [employer] in regards to pt declining to complete testing.     FWD to CB, please advise  Placed notice in bin

## 2021-07-30 NOTE — TELEPHONE ENCOUNTER
LOV 4/15/21 with Suan Query, pt's dtr states pt is refusing to come out of the house for appts, states she would rather not leave the house. Dtr given Visiting Physicians and [facility] at Home information.   Asked [employer] to call us back if any questions or

## 2021-07-30 NOTE — TELEPHONE ENCOUNTER
Please have her daughter notify us once she has established with a home physician. Please hold to confirm this has happened in the next 4 weeks or will need to re visit the need to be seen here.

## 2021-07-30 NOTE — TELEPHONE ENCOUNTER
Caresse Hodgkin notified to let us know when she establishes with a Home Physician. Ian Carlisle understanding.

## 2021-07-30 NOTE — TELEPHONE ENCOUNTER
Pt's daughter Ernestine Henley calling back as she needs the names of the Home care physician groups and or websites that Jose Phelan had previously given her. She did state ok to leave  as she has conference calls today.

## 2021-08-11 DIAGNOSIS — R56.9 SEIZURE (HCC): ICD-10-CM

## 2021-08-11 RX ORDER — LEVETIRACETAM 500 MG/1
500 TABLET ORAL 2 TIMES DAILY
Qty: 180 TABLET | Refills: 0 | Status: SHIPPED | OUTPATIENT
Start: 2021-08-11 | End: 2021-09-13

## 2021-08-11 NOTE — TELEPHONE ENCOUNTER
Medication: Levetiracetam    Date of last refill: 3/18/21 (#60/2)  [place]     Last office visit: 12/15/2020  Due back to clinic per last office note:  Not noted  Date next office visit scheduled:    Future Appointments

## 2021-08-30 NOTE — TELEPHONE ENCOUNTER
Becky Medrano indicates a home physician(cannot remember name) came out to see pt and will be seeing her every 3 months, she is also arranging PT for her. Becky Medrano was asked to call us back if she has any questions or concerns.   Becky Medrano verbalizes understandi

## 2021-08-31 NOTE — TELEPHONE ENCOUNTER
TC to Alfredo Cedillo patients daughter regarding the below. Alfredo Cedillo will discuss with the visiting physician and then let Markus Bardales know if that provider will follow with patient.

## 2021-09-02 DIAGNOSIS — E78.00 HIGH CHOLESTEROL: ICD-10-CM

## 2021-09-02 NOTE — TELEPHONE ENCOUNTER
Been Following SD  Last OV 4/15/21  Last CPE 4/19/19  Last Labs 3/25/21 in media    Last Rx fill Ezetimibe 10mg #90 0R 6/22/21    Future Appointments   Date Time Provider Henok Zeng   9/13/2021  1:00 PM Ivan Claros MD [facility]   9/1

## 2021-09-03 RX ORDER — EZETIMIBE 10 MG/1
10 TABLET ORAL NIGHTLY
Qty: 30 TABLET | Refills: 0 | Status: SHIPPED | OUTPATIENT
Start: 2021-09-03

## 2021-09-03 NOTE — TELEPHONE ENCOUNTER
Approved for 30 days. Needs to determine who is her PCP. Home visiting physician or us. If us, needs to come to the office.

## 2021-09-03 NOTE — TELEPHONE ENCOUNTER
Spoke with daughter on HIPAA, states Rx filled by cardiology. Informed that this office filled Rx last two times, states she will contact her cardiologist to \"fix this matter\". Did not answer as to who PCP is.     FWD to SD

## 2021-09-13 ENCOUNTER — TELEPHONE (OUTPATIENT)
Dept: NEUROLOGY | Facility: CLINIC | Age: 71
End: 2021-09-13

## 2021-09-13 ENCOUNTER — OFFICE VISIT (OUTPATIENT)
Dept: NEUROLOGY | Facility: CLINIC | Age: 71
End: 2021-09-13
Payer: MEDICARE

## 2021-09-13 VITALS
RESPIRATION RATE: 16 BRPM | BODY MASS INDEX: 26 KG/M2 | WEIGHT: 155 LBS | DIASTOLIC BLOOD PRESSURE: 66 MMHG | SYSTOLIC BLOOD PRESSURE: 122 MMHG | HEART RATE: 70 BPM

## 2021-09-13 DIAGNOSIS — I69.30 HISTORY OF CVA WITH RESIDUAL DEFICIT: Primary | ICD-10-CM

## 2021-09-13 DIAGNOSIS — M62.422 CONTRACTURE OF MUSCLE, LEFT UPPER ARM: ICD-10-CM

## 2021-09-13 DIAGNOSIS — I69.359 SPASTIC HEMIPARESIS AS LATE EFFECT OF CEREBRAL INFARCTION (HCC): Primary | ICD-10-CM

## 2021-09-13 DIAGNOSIS — R56.9 SEIZURE (HCC): ICD-10-CM

## 2021-09-13 DIAGNOSIS — I69.359 SPASTIC HEMIPARESIS AS LATE EFFECT OF CEREBRAL INFARCTION (HCC): ICD-10-CM

## 2021-09-13 DIAGNOSIS — M62.838 MUSCLE SPASM OF LEFT LOWER EXTREMITY: ICD-10-CM

## 2021-09-13 PROCEDURE — 99214 OFFICE O/P EST MOD 30 MIN: CPT | Performed by: OTHER

## 2021-09-13 RX ORDER — BACLOFEN 10 MG/1
10 TABLET ORAL 2 TIMES DAILY PRN
Qty: 30 TABLET | Refills: 3 | Status: SHIPPED | OUTPATIENT
Start: 2021-09-13

## 2021-09-13 RX ORDER — LEVETIRACETAM 500 MG/1
500 TABLET ORAL 2 TIMES DAILY
Qty: 180 TABLET | Refills: 3 | Status: SHIPPED | OUTPATIENT
Start: 2021-09-13

## 2021-09-13 NOTE — TELEPHONE ENCOUNTER
Per 9/13/2021 O.V. note: Will apply to do botox injection here 300 units    Per provider, 300 U needed for L leg and arm spacticity.   CALOS Botox order placed, will route to PA team

## 2021-09-13 NOTE — PROGRESS NOTES
[address]   Neurology; follow up CLINIC VISIT  2021    Pavithra Lemus Patient Status:  No patient class for patient encounter    1950 MRN [id number]   Location [address] [place] [address], [address] [place] [address] PCP A mellitus ([facility] [address]    • Disorder of liver     \"fatty liver\"   • Fatigue    • HEART ATTACK 2/28/14    Acute mi and stents   • High blood pressure    • High cholesterol    • History of blood transfusion    • History of depression    • Leaking of urine    • Nig Application topically 2 (two) times daily. Apply every morning and evening. 90 g 11   • ketoconazole 2 % External Cream Apply 1 Application topically 2 (two) times daily.  90 g 11   • levETIRAcetam 500 MG Oral Tab Take 1 tablet (500 mg total) by mouth 2 (tw vomiting, constipation, diarrhea; no rectal bleeding; no heartburn  GENITAL/: no dysuria, urgency or frequency;  no hernias; no nocturia  GYNE/: no dysuria, urgency or frequency; no urinary incontinence  MUSCULOSKELETAL: no joint complaints in  upper o brace, able to move L. Leg, HF 3/5, KE 4/5, FE 3/5,   Sensory; decreased light touch, temperature, in her L. Face, arm and leg,  Normal proprioception;  DTRs;   Symmetric in both arms and legs, including biceps, triceps, knees, ankles,  Babinski sign is neg distribution. The curvilinear hyperdensity associated with this area is stable and is likely related to calcification. No acute intracranial hemorrhage.           Dictated by: Rajendra Rincon MD on 6/16/2019 at 15:25       Approved by: Rajendra Rincon, [address] [facility]  1. L biceps 75 u  2. L Pronator teres 25 u  3. L FCU 25 u  4.  L FCR 25 u  5. L gastroc 100 u (50 u to medial and 50 u to lateral heads.)  Total Units: 250 U       Will apply to do botox injection here 300 units     Return in about 3 weeks (around 10/

## 2021-09-14 NOTE — TELEPHONE ENCOUNTER
Patient has Medicare as primary insurance. No PA required. This is buy and bill    Medication can be ordered and once available patient can be called for first visit.  Please notify the PA team with the date of the appointment

## 2021-09-15 ENCOUNTER — TELEPHONE (OUTPATIENT)
Dept: NEUROLOGY | Facility: CLINIC | Age: 71
End: 2021-09-15

## 2021-09-15 DIAGNOSIS — I69.359 SPASTIC HEMIPARESIS AS LATE EFFECT OF CEREBRAL INFARCTION (HCC): Primary | ICD-10-CM

## 2021-09-15 NOTE — TELEPHONE ENCOUNTER
Gideon Cardona with [facility] states SOC will not be until 9/21/21; if Dr is ok with that please fax new orders to [phone]

## 2021-09-16 NOTE — TELEPHONE ENCOUNTER
Gina Alexander calling stating that the pt has 2 visits left with her current [address], but then will go forward with the [facility]. Pt needs 2-3 weeks until they are completed with the current [address] [facility].      Gina Alexander is stating that he will need a new order refl

## 2021-09-20 DIAGNOSIS — E78.00 HIGH CHOLESTEROL: ICD-10-CM

## 2021-09-20 NOTE — TELEPHONE ENCOUNTER
She was to be seeing a visiting home physician. Are they taking over manangement of her meds? If not , needs ov here.

## 2021-09-20 NOTE — TELEPHONE ENCOUNTER
Been Following SD  Last OV 4/15/21  Last CPE 4/19/19   Last Labs 3/25/21 in media    Last Rx fill Ezetimibe 10mg #30 0R 9/3/21    Future Appointments   Date Time Provider Henok Zeng   10/12/2021  2:20 PM Myles Coburn MD [facility]       Per

## 2021-09-21 RX ORDER — EZETIMIBE 10 MG/1
TABLET ORAL
Qty: 30 TABLET | Refills: 0 | OUTPATIENT
Start: 2021-09-21

## 2021-11-19 DIAGNOSIS — F32.0 CURRENT MILD EPISODE OF MAJOR DEPRESSIVE DISORDER, UNSPECIFIED WHETHER RECURRENT (HCC): ICD-10-CM

## 2021-11-19 RX ORDER — SERTRALINE HYDROCHLORIDE 100 MG/1
100 TABLET, FILM COATED ORAL DAILY
Qty: 90 TABLET | Refills: 0 | Status: SHIPPED | OUTPATIENT
Start: 2021-11-19

## 2021-11-19 NOTE — TELEPHONE ENCOUNTER
Medication: Sertraline     Date of last refill: 12/15/2020 (#90/3)  [place]      Last office visit: 9/13/2021  Due back to clinic per last office note:  return for Botox  Date next office visit scheduled:    No future ap

## 2021-11-23 DIAGNOSIS — Z79.4 TYPE 2 DIABETES MELLITUS WITH HYPERGLYCEMIA, WITH LONG-TERM CURRENT USE OF INSULIN (HCC): ICD-10-CM

## 2021-11-23 DIAGNOSIS — E11.65 TYPE 2 DIABETES MELLITUS WITH HYPERGLYCEMIA, WITH LONG-TERM CURRENT USE OF INSULIN (HCC): ICD-10-CM

## 2021-11-23 NOTE — TELEPHONE ENCOUNTER
Last VISIT - 4/15/21 DM f/u    Last CPE - 4/19/19    Last REFILL -   insulin glargine (LANTUS SOLOSTAR) 100 UNIT/ML Subcutaneous Solution Pen-injector 9 mL 0 6/22/2021     Last LABS -  3/25/21 cmp, lipid, cbc    No future appointments. Per PROTOCOL? None    Please Approve or Deny.

## 2021-11-24 NOTE — TELEPHONE ENCOUNTER
LM for Radha Prince, pts dtr asking if Visiting Physician is refilling all pt's meds and to call back to let us know.

## 2021-11-24 NOTE — TELEPHONE ENCOUNTER
#2 VM left for patients daughter. Wanting to confirm insulin is being addressed with visiting physician.  11/24/2021

## 2021-11-24 NOTE — TELEPHONE ENCOUNTER
I saw pt last but have not followed her regularly. Per TE dated 7/19/21 she has a home visiting physician. Please call dtr to verify.   If yes then refills will be per home visiting physician and we need to remove Schriedel as PCP and all from our office as care team.

## 2021-11-29 RX ORDER — INSULIN GLARGINE 100 [IU]/ML
INJECTION, SOLUTION SUBCUTANEOUS
Qty: 15 ML | Refills: 0 | OUTPATIENT
Start: 2021-11-29

## 2021-11-30 NOTE — TELEPHONE ENCOUNTER
Spoke to leela Amato's dtr, she indicates she will reach out to the Visiting Physician to refill or will call Dr Ishmael Rolle who has been refilling most of her medications. Brennan Bones understanding and will call back if any problems. FYI to GELA.

## 2022-01-05 ENCOUNTER — TELEPHONE (OUTPATIENT)
Dept: INTERNAL MEDICINE CLINIC | Facility: CLINIC | Age: 72
End: 2022-01-05

## 2022-01-05 ENCOUNTER — TELEPHONE (OUTPATIENT)
Dept: CARDIOLOGY | Age: 72
End: 2022-01-05

## 2022-01-05 DIAGNOSIS — I69.151 HEMIPARESIS OF RIGHT DOMINANT SIDE AS LATE EFFECT OF NONTRAUMATIC INTRACEREBRAL HEMORRHAGE (HCC): ICD-10-CM

## 2022-01-05 RX ORDER — CLOPIDOGREL BISULFATE 75 MG/1
TABLET ORAL
Qty: 90 TABLET | Refills: 0 | Status: SHIPPED | OUTPATIENT
Start: 2022-01-05 | End: 2022-06-22

## 2022-01-05 NOTE — TELEPHONE ENCOUNTER
LMTCB, all phone numbers are with leela Amato's daughter - on hipaa for all.   LMTCB to advise of cpe

## 2022-01-05 NOTE — TELEPHONE ENCOUNTER
Refill provided but pt well overdue for visit. Pt was scheduled with Schriedel in July for AWV but cancelled the appt. Last AWV was with SD in 2019. I only saw the pt the once in April.

## 2022-01-21 ENCOUNTER — TELEPHONE (OUTPATIENT)
Dept: INTERNAL MEDICINE CLINIC | Facility: CLINIC | Age: 72
End: 2022-01-21

## 2022-01-21 DIAGNOSIS — N18.30 STAGE 3 CHRONIC KIDNEY DISEASE, UNSPECIFIED WHETHER STAGE 3A OR 3B CKD (HCC): ICD-10-CM

## 2022-01-21 DIAGNOSIS — Z79.4 TYPE 2 DIABETES MELLITUS WITH HYPERGLYCEMIA, WITH LONG-TERM CURRENT USE OF INSULIN (HCC): ICD-10-CM

## 2022-01-21 DIAGNOSIS — I10 ESSENTIAL HYPERTENSION: ICD-10-CM

## 2022-01-21 DIAGNOSIS — Z00.00 ROUTINE GENERAL MEDICAL EXAMINATION AT A HEALTH CARE FACILITY: Primary | ICD-10-CM

## 2022-01-21 DIAGNOSIS — E11.65 TYPE 2 DIABETES MELLITUS WITH HYPERGLYCEMIA, WITH LONG-TERM CURRENT USE OF INSULIN (HCC): ICD-10-CM

## 2022-01-21 DIAGNOSIS — E78.00 HIGH CHOLESTEROL: ICD-10-CM

## 2022-01-21 NOTE — TELEPHONE ENCOUNTER
Future Appointments   Date Time Provider Henok Zeng   6/22/2022 10:20 AM Gilbert Aviles PA-C [facility] [address] [facility]     Orders to     [address]        aware must fast no call back required

## 2022-01-21 NOTE — TELEPHONE ENCOUNTER
LMTCB to call back, left message with daughter again advising to call back. Sending mychart letter.   JOHN

## 2022-02-09 RX ORDER — SERTRALINE HYDROCHLORIDE 100 MG/1
TABLET, FILM COATED ORAL
Qty: 90 TABLET | Refills: 0 | Status: SHIPPED | OUTPATIENT
Start: 2022-02-09

## 2022-04-27 RX ORDER — SERTRALINE HYDROCHLORIDE 100 MG/1
TABLET, FILM COATED ORAL
Qty: 30 TABLET | Refills: 0 | Status: SHIPPED | OUTPATIENT
Start: 2022-04-27

## 2022-06-20 DIAGNOSIS — F32.0 CURRENT MILD EPISODE OF MAJOR DEPRESSIVE DISORDER, UNSPECIFIED WHETHER RECURRENT (HCC): ICD-10-CM

## 2022-06-20 DIAGNOSIS — I69.151 HEMIPARESIS OF RIGHT DOMINANT SIDE AS LATE EFFECT OF NONTRAUMATIC INTRACEREBRAL HEMORRHAGE (HCC): ICD-10-CM

## 2022-06-20 RX ORDER — SERTRALINE HYDROCHLORIDE 100 MG/1
TABLET, FILM COATED ORAL
Qty: 30 TABLET | Refills: 0 | Status: SHIPPED | OUTPATIENT
Start: 2022-06-20

## 2022-06-20 RX ORDER — CLOPIDOGREL BISULFATE 75 MG/1
75 TABLET ORAL DAILY
Qty: 90 TABLET | Refills: 0 | OUTPATIENT
Start: 2022-06-20

## 2022-06-22 ENCOUNTER — APPOINTMENT (OUTPATIENT)
Dept: CT IMAGING | Facility: HOSPITAL | Age: 72
End: 2022-06-22
Attending: EMERGENCY MEDICINE
Payer: MEDICARE

## 2022-06-22 ENCOUNTER — APPOINTMENT (OUTPATIENT)
Dept: GENERAL RADIOLOGY | Facility: HOSPITAL | Age: 72
End: 2022-06-22
Attending: EMERGENCY MEDICINE
Payer: MEDICARE

## 2022-06-22 ENCOUNTER — HOSPITAL ENCOUNTER (INPATIENT)
Facility: HOSPITAL | Age: 72
LOS: 4 days | Discharge: NURSING, REHAB OR LONG-TERM CARE | End: 2022-06-28
Attending: EMERGENCY MEDICINE | Admitting: HOSPITALIST
Payer: MEDICARE

## 2022-06-22 DIAGNOSIS — N12 PYELONEPHRITIS: ICD-10-CM

## 2022-06-22 DIAGNOSIS — I50.9 ACUTE CONGESTIVE HEART FAILURE, UNSPECIFIED HEART FAILURE TYPE (HCC): Primary | ICD-10-CM

## 2022-06-22 DIAGNOSIS — R09.02 HYPOXIA: ICD-10-CM

## 2022-06-22 LAB
ALBUMIN SERPL-MCNC: 3.3 G/DL (ref 3.4–5)
ALBUMIN/GLOB SERPL: 0.7 {RATIO} (ref 1–2)
ALP LIVER SERPL-CCNC: 80 U/L
ALT SERPL-CCNC: 24 U/L
ANION GAP SERPL CALC-SCNC: 8 MMOL/L (ref 0–18)
AST SERPL-CCNC: 29 U/L (ref 15–37)
BASOPHILS # BLD AUTO: 0.03 X10(3) UL (ref 0–0.2)
BASOPHILS NFR BLD AUTO: 0.4 %
BILIRUB SERPL-MCNC: 0.5 MG/DL (ref 0.1–2)
BILIRUB UR QL STRIP.AUTO: NEGATIVE
BUN BLD-MCNC: 31 MG/DL (ref 7–18)
CALCIUM BLD-MCNC: 9.4 MG/DL (ref 8.5–10.1)
CHLORIDE SERPL-SCNC: 100 MMOL/L (ref 98–112)
CO2 SERPL-SCNC: 23 MMOL/L (ref 21–32)
COLOR UR AUTO: YELLOW
CREAT BLD-MCNC: 1.36 MG/DL
EOSINOPHIL # BLD AUTO: 0.08 X10(3) UL (ref 0–0.7)
EOSINOPHIL NFR BLD AUTO: 1 %
ERYTHROCYTE [DISTWIDTH] IN BLOOD BY AUTOMATED COUNT: 14.3 %
EST. AVERAGE GLUCOSE BLD GHB EST-MCNC: 312 MG/DL (ref 68–126)
GLOBULIN PLAS-MCNC: 4.5 G/DL (ref 2.8–4.4)
GLUCOSE BLD-MCNC: 342 MG/DL (ref 70–99)
GLUCOSE BLD-MCNC: 421 MG/DL (ref 70–99)
GLUCOSE UR STRIP.AUTO-MCNC: >=500 MG/DL
HBA1C MFR BLD: 12.5 % (ref ?–5.7)
HCT VFR BLD AUTO: 39.5 %
HGB BLD-MCNC: 12.8 G/DL
IMM GRANULOCYTES # BLD AUTO: 0.06 X10(3) UL (ref 0–1)
IMM GRANULOCYTES NFR BLD: 0.7 %
KETONES UR STRIP.AUTO-MCNC: NEGATIVE MG/DL
LIPASE SERPL-CCNC: 123 U/L (ref 73–393)
LYMPHOCYTES # BLD AUTO: 0.38 X10(3) UL (ref 1–4)
LYMPHOCYTES NFR BLD AUTO: 4.6 %
MCH RBC QN AUTO: 28.1 PG (ref 26–34)
MCHC RBC AUTO-ENTMCNC: 32.4 G/DL (ref 31–37)
MCV RBC AUTO: 86.6 FL
MONOCYTES # BLD AUTO: 0.05 X10(3) UL (ref 0.1–1)
MONOCYTES NFR BLD AUTO: 0.6 %
NEUTROPHILS # BLD AUTO: 7.6 X10 (3) UL (ref 1.5–7.7)
NEUTROPHILS # BLD AUTO: 7.6 X10(3) UL (ref 1.5–7.7)
NEUTROPHILS NFR BLD AUTO: 92.7 %
NITRITE UR QL STRIP.AUTO: POSITIVE
NT-PROBNP SERPL-MCNC: 1391 PG/ML (ref ?–125)
OSMOLALITY SERPL CALC.SUM OF ELEC: 296 MOSM/KG (ref 275–295)
PH UR STRIP.AUTO: 5 [PH] (ref 5–8)
PLATELET # BLD AUTO: 159 10(3)UL (ref 150–450)
POTASSIUM SERPL-SCNC: 4.6 MMOL/L (ref 3.5–5.1)
PROT SERPL-MCNC: 7.8 G/DL (ref 6.4–8.2)
PROT UR STRIP.AUTO-MCNC: 30 MG/DL
RBC # BLD AUTO: 4.56 X10(6)UL
SARS-COV-2 RNA RESP QL NAA+PROBE: NOT DETECTED
SODIUM SERPL-SCNC: 131 MMOL/L (ref 136–145)
SP GR UR STRIP.AUTO: 1.02 (ref 1–1.03)
TROPONIN I HIGH SENSITIVITY: 28 NG/L
UROBILINOGEN UR STRIP.AUTO-MCNC: <2 MG/DL
WBC # BLD AUTO: 8.2 X10(3) UL (ref 4–11)

## 2022-06-22 PROCEDURE — 99220 INITIAL OBSERVATION CARE,LEVL III: CPT | Performed by: HOSPITALIST

## 2022-06-22 PROCEDURE — 71045 X-RAY EXAM CHEST 1 VIEW: CPT | Performed by: EMERGENCY MEDICINE

## 2022-06-22 PROCEDURE — 74177 CT ABD & PELVIS W/CONTRAST: CPT | Performed by: EMERGENCY MEDICINE

## 2022-06-22 RX ORDER — LOSARTAN POTASSIUM 50 MG/1
50 TABLET ORAL 2 TIMES DAILY
Status: DISCONTINUED | OUTPATIENT
Start: 2022-06-22 | End: 2022-06-28

## 2022-06-22 RX ORDER — SERTRALINE HYDROCHLORIDE 100 MG/1
100 TABLET, FILM COATED ORAL DAILY
Status: DISCONTINUED | OUTPATIENT
Start: 2022-06-23 | End: 2022-06-28

## 2022-06-22 RX ORDER — FUROSEMIDE 10 MG/ML
40 INJECTION INTRAMUSCULAR; INTRAVENOUS ONCE
Status: COMPLETED | OUTPATIENT
Start: 2022-06-22 | End: 2022-06-22

## 2022-06-22 RX ORDER — CLOPIDOGREL BISULFATE 75 MG/1
75 TABLET ORAL DAILY
Status: DISCONTINUED | OUTPATIENT
Start: 2022-06-23 | End: 2022-06-28

## 2022-06-22 RX ORDER — SPIRONOLACTONE 25 MG/1
12.5 TABLET ORAL DAILY
Status: DISCONTINUED | OUTPATIENT
Start: 2022-06-23 | End: 2022-06-28

## 2022-06-22 RX ORDER — LEVETIRACETAM 500 MG/1
500 TABLET ORAL 2 TIMES DAILY
Status: DISCONTINUED | OUTPATIENT
Start: 2022-06-22 | End: 2022-06-28

## 2022-06-22 RX ORDER — INSULIN GLARGINE 100 [IU]/ML
10 INJECTION, SOLUTION SUBCUTANEOUS EVERY MORNING
COMMUNITY
End: 2022-06-28

## 2022-06-22 RX ORDER — ENOXAPARIN SODIUM 100 MG/ML
40 INJECTION SUBCUTANEOUS DAILY
Status: DISCONTINUED | OUTPATIENT
Start: 2022-06-23 | End: 2022-06-26

## 2022-06-22 RX ORDER — BACLOFEN 10 MG/1
10 TABLET ORAL 2 TIMES DAILY PRN
Status: DISCONTINUED | OUTPATIENT
Start: 2022-06-22 | End: 2022-06-28

## 2022-06-22 RX ORDER — ONDANSETRON 2 MG/ML
4 INJECTION INTRAMUSCULAR; INTRAVENOUS EVERY 6 HOURS PRN
Status: DISCONTINUED | OUTPATIENT
Start: 2022-06-22 | End: 2022-06-28

## 2022-06-22 RX ORDER — EZETIMIBE 10 MG/1
10 TABLET ORAL NIGHTLY
Status: DISCONTINUED | OUTPATIENT
Start: 2022-06-22 | End: 2022-06-28

## 2022-06-22 RX ORDER — ROSUVASTATIN CALCIUM 20 MG/1
40 TABLET, COATED ORAL DAILY
Status: DISCONTINUED | OUTPATIENT
Start: 2022-06-23 | End: 2022-06-28

## 2022-06-22 RX ORDER — NICOTINE POLACRILEX 4 MG
15 LOZENGE BUCCAL
Status: DISCONTINUED | OUTPATIENT
Start: 2022-06-22 | End: 2022-06-28

## 2022-06-22 RX ORDER — HYDRALAZINE HYDROCHLORIDE 50 MG/1
100 TABLET, FILM COATED ORAL 2 TIMES DAILY
Status: DISCONTINUED | OUTPATIENT
Start: 2022-06-22 | End: 2022-06-28

## 2022-06-22 RX ORDER — SERTRALINE HYDROCHLORIDE 100 MG/1
100 TABLET, FILM COATED ORAL DAILY
COMMUNITY

## 2022-06-22 RX ORDER — METOCLOPRAMIDE HYDROCHLORIDE 5 MG/ML
5 INJECTION INTRAMUSCULAR; INTRAVENOUS EVERY 8 HOURS PRN
Status: DISCONTINUED | OUTPATIENT
Start: 2022-06-22 | End: 2022-06-28

## 2022-06-22 RX ORDER — NICOTINE POLACRILEX 4 MG
30 LOZENGE BUCCAL
Status: DISCONTINUED | OUTPATIENT
Start: 2022-06-22 | End: 2022-06-28

## 2022-06-22 RX ORDER — DEXTROSE MONOHYDRATE 25 G/50ML
50 INJECTION, SOLUTION INTRAVENOUS
Status: DISCONTINUED | OUTPATIENT
Start: 2022-06-22 | End: 2022-06-28

## 2022-06-22 RX ORDER — ACETAMINOPHEN 500 MG
500 TABLET ORAL EVERY 4 HOURS PRN
Status: DISCONTINUED | OUTPATIENT
Start: 2022-06-22 | End: 2022-06-28

## 2022-06-22 RX ORDER — CLOPIDOGREL BISULFATE 75 MG/1
75 TABLET ORAL DAILY
COMMUNITY

## 2022-06-22 RX ORDER — ONDANSETRON 2 MG/ML
4 INJECTION INTRAMUSCULAR; INTRAVENOUS ONCE
Status: COMPLETED | OUTPATIENT
Start: 2022-06-22 | End: 2022-06-22

## 2022-06-22 RX ORDER — ACETAMINOPHEN 500 MG
1000 TABLET ORAL ONCE
Status: COMPLETED | OUTPATIENT
Start: 2022-06-22 | End: 2022-06-22

## 2022-06-22 RX ORDER — ASPIRIN 81 MG/1
81 TABLET ORAL DAILY
Status: DISCONTINUED | OUTPATIENT
Start: 2022-06-23 | End: 2022-06-28

## 2022-06-22 RX ORDER — AMLODIPINE BESYLATE 5 MG/1
10 TABLET ORAL DAILY
Status: DISCONTINUED | OUTPATIENT
Start: 2022-06-23 | End: 2022-06-28

## 2022-06-23 ENCOUNTER — APPOINTMENT (OUTPATIENT)
Dept: GENERAL RADIOLOGY | Facility: HOSPITAL | Age: 72
End: 2022-06-23
Attending: HOSPITALIST
Payer: MEDICARE

## 2022-06-23 LAB
ANION GAP SERPL CALC-SCNC: 8 MMOL/L (ref 0–18)
ATRIAL RATE: 71 BPM
BUN BLD-MCNC: 28 MG/DL (ref 7–18)
CALCIUM BLD-MCNC: 8.8 MG/DL (ref 8.5–10.1)
CHLORIDE SERPL-SCNC: 102 MMOL/L (ref 98–112)
CO2 SERPL-SCNC: 26 MMOL/L (ref 21–32)
CREAT BLD-MCNC: 1.37 MG/DL
GLUCOSE BLD-MCNC: 192 MG/DL (ref 70–99)
GLUCOSE BLD-MCNC: 263 MG/DL (ref 70–99)
GLUCOSE BLD-MCNC: 272 MG/DL (ref 70–99)
GLUCOSE BLD-MCNC: 274 MG/DL (ref 70–99)
GLUCOSE BLD-MCNC: 349 MG/DL (ref 70–99)
OSMOLALITY SERPL CALC.SUM OF ELEC: 297 MOSM/KG (ref 275–295)
P AXIS: 63 DEGREES
P-R INTERVAL: 200 MS
POTASSIUM SERPL-SCNC: 4 MMOL/L (ref 3.5–5.1)
Q-T INTERVAL: 420 MS
QRS DURATION: 90 MS
QTC CALCULATION (BEZET): 456 MS
R AXIS: -19 DEGREES
SODIUM SERPL-SCNC: 136 MMOL/L (ref 136–145)
T AXIS: 47 DEGREES
VENTRICULAR RATE: 71 BPM

## 2022-06-23 PROCEDURE — 73060 X-RAY EXAM OF HUMERUS: CPT | Performed by: HOSPITALIST

## 2022-06-23 PROCEDURE — 99225 SUBSEQUENT OBSERVATION CARE: CPT | Performed by: HOSPITALIST

## 2022-06-23 PROCEDURE — 73502 X-RAY EXAM HIP UNI 2-3 VIEWS: CPT | Performed by: HOSPITALIST

## 2022-06-23 RX ORDER — FUROSEMIDE 10 MG/ML
20 INJECTION INTRAMUSCULAR; INTRAVENOUS ONCE
Status: COMPLETED | OUTPATIENT
Start: 2022-06-23 | End: 2022-06-23

## 2022-06-23 NOTE — CM/SW NOTE
EMILIANA spoke with PT. They are recommending acute rehab. EMILIANA confirmed with Dr. Maria Alejandra Short to consult PMR. Order entered. Hoping PMR can see pt today. Await outcome to further plan for discharge.      GILDA Dorado, [facility]  Discharge [address] [facility].

## 2022-06-23 NOTE — PROGRESS NOTES
Pt admitted to floor, daughter at bedside. A/Ox4. Requiring 5L overnight to maintain O2 sats. NSR on tele. Some incontinence of urine. Voiding. Denies pain. Some abdominal discomfort as she feels she is maybe becoming constipated. Bowel sounds hypoactive. Did have small BM day prior. Pt has HX CVA with residual left side weakness, facial droop and left arm contracture. Uses stefani walker at home. Pt has home health aide 4 hours a day at home. All needs met overnight.

## 2022-06-23 NOTE — ED QUICK NOTES
Patient appears in NAD, denies any complaints at this time. RR even/NL, provided w/ water per request, call light within reach, daughter at bedside, bed in low and locked position, on continuous cardiac monitoring and purewick suction, wctm closely.

## 2022-06-23 NOTE — ED QUICK NOTES
Orders for admission, patient is aware of plan and ready to go upstairs. Any questions, please call ED RN Olamide at extension [phone].      Patient Covid vaccination status: Partially vaccinated     COVID Test Ordered in ED: Rapid SARS-CoV-2 by PCR    COVID Suspicion at Admission: negative    Running Infusions:  None    Mental Status/LOC at time of transport: A&Ox3    Other pertinent information: residual L sided weakness from previous cva    CIWA score: N/A   NIH score:  N/A

## 2022-06-23 NOTE — PLAN OF CARE
Assumed care of patient at 0730. Patient alert, oriented x4. Oxygen saturation maintained greater than 90% on 5L nasal cannula this AM. Pt weaned to 2L, oxygen saturation maintained greater than 90%. Tele: normal sinus rhythm. Pt did not note pain. Pt updated on plan of care. Questions answered.      Problem: Diabetes/Glucose Control  Goal: Glucose maintained within prescribed range  Description: INTERVENTIONS:  - Monitor Blood Glucose as ordered  - Assess for signs and symptoms of hyperglycemia and hypoglycemia  - Administer ordered medications to maintain glucose within target range  - Assess barriers to adequate nutritional intake and initiate nutrition consult as needed  - Instruct patient on self management of diabetes  Outcome: Progressing     Problem: Patient/Family Goals  Goal: Patient/Family Long Term Goal  Description: Patient's Long Term Goal: Discharge home    Interventions:  - Follow MD orders  - Medications as ordered  - See additional Care Plan goals for specific interventions  Outcome: Progressing  Goal: Patient/Family Short Term Goal  Description: Patient's Short Term Goal: Back to room air    Interventions:   - Follow MD orders  - Medications as ordered  - See additional Care Plan goals for specific interventions  Outcome: Progressing

## 2022-06-24 ENCOUNTER — APPOINTMENT (OUTPATIENT)
Dept: CV DIAGNOSTICS | Facility: HOSPITAL | Age: 72
End: 2022-06-24
Attending: HOSPITALIST
Payer: MEDICARE

## 2022-06-24 LAB
ALBUMIN SERPL-MCNC: 2.7 G/DL (ref 3.4–5)
ALBUMIN/GLOB SERPL: 0.7 {RATIO} (ref 1–2)
ALP LIVER SERPL-CCNC: 71 U/L
ALT SERPL-CCNC: 22 U/L
ANION GAP SERPL CALC-SCNC: 5 MMOL/L (ref 0–18)
AST SERPL-CCNC: 17 U/L (ref 15–37)
BASOPHILS # BLD AUTO: 0.04 X10(3) UL (ref 0–0.2)
BASOPHILS NFR BLD AUTO: 0.4 %
BILIRUB SERPL-MCNC: 0.4 MG/DL (ref 0.1–2)
BUN BLD-MCNC: 26 MG/DL (ref 7–18)
CALCIUM BLD-MCNC: 8.5 MG/DL (ref 8.5–10.1)
CHLORIDE SERPL-SCNC: 103 MMOL/L (ref 98–112)
CO2 SERPL-SCNC: 28 MMOL/L (ref 21–32)
CREAT BLD-MCNC: 1.32 MG/DL
EOSINOPHIL # BLD AUTO: 0.26 X10(3) UL (ref 0–0.7)
EOSINOPHIL NFR BLD AUTO: 2.6 %
ERYTHROCYTE [DISTWIDTH] IN BLOOD BY AUTOMATED COUNT: 14.6 %
GLOBULIN PLAS-MCNC: 4 G/DL (ref 2.8–4.4)
GLUCOSE BLD-MCNC: 138 MG/DL (ref 70–99)
GLUCOSE BLD-MCNC: 140 MG/DL (ref 70–99)
GLUCOSE BLD-MCNC: 147 MG/DL (ref 70–99)
GLUCOSE BLD-MCNC: 171 MG/DL (ref 70–99)
GLUCOSE BLD-MCNC: 242 MG/DL (ref 70–99)
HCT VFR BLD AUTO: 36.3 %
HGB BLD-MCNC: 11.6 G/DL
IMM GRANULOCYTES # BLD AUTO: 0.04 X10(3) UL (ref 0–1)
IMM GRANULOCYTES NFR BLD: 0.4 %
LYMPHOCYTES # BLD AUTO: 0.98 X10(3) UL (ref 1–4)
LYMPHOCYTES NFR BLD AUTO: 9.9 %
MCH RBC QN AUTO: 27.8 PG (ref 26–34)
MCHC RBC AUTO-ENTMCNC: 32 G/DL (ref 31–37)
MCV RBC AUTO: 87.1 FL
MONOCYTES # BLD AUTO: 0.93 X10(3) UL (ref 0.1–1)
MONOCYTES NFR BLD AUTO: 9.4 %
NEUTROPHILS # BLD AUTO: 7.65 X10 (3) UL (ref 1.5–7.7)
NEUTROPHILS # BLD AUTO: 7.65 X10(3) UL (ref 1.5–7.7)
NEUTROPHILS NFR BLD AUTO: 77.3 %
OSMOLALITY SERPL CALC.SUM OF ELEC: 289 MOSM/KG (ref 275–295)
PLATELET # BLD AUTO: 154 10(3)UL (ref 150–450)
POTASSIUM SERPL-SCNC: 3.5 MMOL/L (ref 3.5–5.1)
PROT SERPL-MCNC: 6.7 G/DL (ref 6.4–8.2)
RBC # BLD AUTO: 4.17 X10(6)UL
SODIUM SERPL-SCNC: 136 MMOL/L (ref 136–145)
WBC # BLD AUTO: 9.9 X10(3) UL (ref 4–11)

## 2022-06-24 PROCEDURE — 93306 TTE W/DOPPLER COMPLETE: CPT | Performed by: HOSPITALIST

## 2022-06-24 PROCEDURE — 99232 SBSQ HOSP IP/OBS MODERATE 35: CPT | Performed by: HOSPITALIST

## 2022-06-24 RX ORDER — FUROSEMIDE 10 MG/ML
20 INJECTION INTRAMUSCULAR; INTRAVENOUS DAILY
Status: DISCONTINUED | OUTPATIENT
Start: 2022-06-24 | End: 2022-06-28

## 2022-06-24 RX ORDER — BISACODYL 10 MG
10 SUPPOSITORY, RECTAL RECTAL
Status: DISCONTINUED | OUTPATIENT
Start: 2022-06-24 | End: 2022-06-28

## 2022-06-24 NOTE — CM/SW NOTE
Pt current [facility]. Reserved in [address].      Zeina Frazier MSW, [facility]  Discharge [address] [facility].

## 2022-06-24 NOTE — PLAN OF CARE
Assumed pt care at 0730. A&Ox4. 3L nasal cannula, pt denies any pain or shortness of breath, lung sounds diminished, vital signs stable, NSR on tele. Incontinent, purwick and brief on. Up with x2 and walker, pivot to chair. Last BM 6/22, pt states she feels she may have BM later. Plan of care: abx, possible suppository for constipation     Plan of care updated with patient. Questions answered, pt verbalized understanding. Call light is within reach, will continue to monitor.           Problem: Diabetes/Glucose Control  Goal: Glucose maintained within prescribed range  Description: INTERVENTIONS:  - Monitor Blood Glucose as ordered  - Assess for signs and symptoms of hyperglycemia and hypoglycemia  - Administer ordered medications to maintain glucose within target range  - Assess barriers to adequate nutritional intake and initiate nutrition consult as needed  - Instruct patient on self management of diabetes  Outcome: Progressing     Problem: Patient/Family Goals  Goal: Patient/Family Long Term Goal  Description: Patient's Long Term Goal: Discharge home    Interventions:  - Follow MD orders  - Medications as ordered  - See additional Care Plan goals for specific interventions  Outcome: Progressing  Goal: Patient/Family Short Term Goal  Description: Patient's Short Term Goal: Back to room air    Interventions:   - Follow MD orders  - Medications as ordered  - See additional Care Plan goals for specific interventions  Outcome: Progressing

## 2022-06-24 NOTE — PLAN OF CARE
Assumed care of patient at 1, daughter at bedsided. A/Ox4. On 3L O2 while sleeping. NSR. Vital signs stable. Voiding. Bowel sounds active, denies abdominal discomfort. No nausea or vomiting.        Problem: Diabetes/Glucose Control  Goal: Glucose maintained within prescribed range  Description: INTERVENTIONS:  - Monitor Blood Glucose as ordered  - Assess for signs and symptoms of hyperglycemia and hypoglycemia  - Administer ordered medications to maintain glucose within target range  - Assess barriers to adequate nutritional intake and initiate nutrition consult as needed  - Instruct patient on self management of diabetes  Outcome: Not Progressing     Problem: Patient/Family Goals  Goal: Patient/Family Long Term Goal  Description: Patient's Long Term Goal: Discharge home    Interventions:  - Follow MD orders  - Medications as ordered  - See additional Care Plan goals for specific interventions  Outcome: Not Progressing  Goal: Patient/Family Short Term Goal  Description: Patient's Short Term Goal: Back to room air    Interventions:   - Follow MD orders  - Medications as ordered  - See additional Care Plan goals for specific interventions  Outcome: Not Progressing

## 2022-06-25 LAB
GLUCOSE BLD-MCNC: 122 MG/DL (ref 70–99)
GLUCOSE BLD-MCNC: 127 MG/DL (ref 70–99)
GLUCOSE BLD-MCNC: 141 MG/DL (ref 70–99)
GLUCOSE BLD-MCNC: 153 MG/DL (ref 70–99)

## 2022-06-25 PROCEDURE — 99232 SBSQ HOSP IP/OBS MODERATE 35: CPT | Performed by: HOSPITALIST

## 2022-06-25 RX ORDER — DILTIAZEM HYDROCHLORIDE 5 MG/ML
10 INJECTION INTRAVENOUS ONCE
Status: COMPLETED | OUTPATIENT
Start: 2022-06-25 | End: 2022-06-25

## 2022-06-25 NOTE — PLAN OF CARE
Pt is A/Ox4. On 3L O2. NSR on tele. Voiding. Denies pain. Denies nausea. No BM overnight. All needs met overnight.        Problem: Diabetes/Glucose Control  Goal: Glucose maintained within prescribed range  Description: INTERVENTIONS:  - Monitor Blood Glucose as ordered  - Assess for signs and symptoms of hyperglycemia and hypoglycemia  - Administer ordered medications to maintain glucose within target range  - Assess barriers to adequate nutritional intake and initiate nutrition consult as needed  - Instruct patient on self management of diabetes  Outcome: Progressing     Problem: Patient/Family Goals  Goal: Patient/Family Long Term Goal  Description: Patient's Long Term Goal: Discharge home    Interventions:  - Follow MD orders  - Medications as ordered  - See additional Care Plan goals for specific interventions  Outcome: Progressing  Goal: Patient/Family Short Term Goal  Description: Patient's Short Term Goal: Back to room air    Interventions:   - Follow MD orders  - Medications as ordered  - See additional Care Plan goals for specific interventions  Outcome: Progressing

## 2022-06-26 LAB
ANION GAP SERPL CALC-SCNC: 8 MMOL/L (ref 0–18)
APTT PPP: 32.6 SECONDS (ref 23.3–35.6)
APTT PPP: 40.1 SECONDS (ref 23.3–35.6)
ATRIAL RATE: 110 BPM
ATRIAL RATE: 59 BPM
BUN BLD-MCNC: 18 MG/DL (ref 7–18)
CALCIUM BLD-MCNC: 9.1 MG/DL (ref 8.5–10.1)
CHLORIDE SERPL-SCNC: 102 MMOL/L (ref 98–112)
CO2 SERPL-SCNC: 25 MMOL/L (ref 21–32)
CREAT BLD-MCNC: 1.02 MG/DL
ERYTHROCYTE [DISTWIDTH] IN BLOOD BY AUTOMATED COUNT: 14.1 %
GLUCOSE BLD-MCNC: 113 MG/DL (ref 70–99)
GLUCOSE BLD-MCNC: 116 MG/DL (ref 70–99)
GLUCOSE BLD-MCNC: 122 MG/DL (ref 70–99)
GLUCOSE BLD-MCNC: 144 MG/DL (ref 70–99)
GLUCOSE BLD-MCNC: 161 MG/DL (ref 70–99)
HCT VFR BLD AUTO: 43.3 %
HGB BLD-MCNC: 13.6 G/DL
MCH RBC QN AUTO: 28 PG (ref 26–34)
MCHC RBC AUTO-ENTMCNC: 31.4 G/DL (ref 31–37)
MCV RBC AUTO: 89.3 FL
OSMOLALITY SERPL CALC.SUM OF ELEC: 284 MOSM/KG (ref 275–295)
P AXIS: 41 DEGREES
P-R INTERVAL: 186 MS
PLATELET # BLD AUTO: 219 10(3)UL (ref 150–450)
POTASSIUM SERPL-SCNC: 4.3 MMOL/L (ref 3.5–5.1)
Q-T INTERVAL: 346 MS
Q-T INTERVAL: 426 MS
QRS DURATION: 96 MS
QRS DURATION: 98 MS
QTC CALCULATION (BEZET): 421 MS
QTC CALCULATION (BEZET): 461 MS
R AXIS: -21 DEGREES
R AXIS: -9 DEGREES
RBC # BLD AUTO: 4.85 X10(6)UL
SODIUM SERPL-SCNC: 135 MMOL/L (ref 136–145)
T AXIS: 46 DEGREES
T AXIS: 83 DEGREES
VENTRICULAR RATE: 107 BPM
VENTRICULAR RATE: 59 BPM
WBC # BLD AUTO: 6.3 X10(3) UL (ref 4–11)

## 2022-06-26 PROCEDURE — 99233 SBSQ HOSP IP/OBS HIGH 50: CPT | Performed by: HOSPITALIST

## 2022-06-26 RX ORDER — HEPARIN SODIUM AND DEXTROSE 10000; 5 [USP'U]/100ML; G/100ML
12 INJECTION INTRAVENOUS ONCE
Status: COMPLETED | OUTPATIENT
Start: 2022-06-26 | End: 2022-06-26

## 2022-06-26 NOTE — PLAN OF CARE
Assumed care of pt at [address] [facility]. A/ox4, 3L NC (), afib on tele. No reports of pain. Left sided weakness from hx of CVA - slight left facial droop present, and left arm contracted. Breath sounds clear upon auscultation. Denies cough. Cardizem gtt infusing @ 5ml/hr. Seizure precautions also in place. Clear liquid diet. Discussed POC w/ pt.    8155: pt's BP currently 106/90 - supposed to receive PO hydralazine this evening, but pt is on diltiazem gtt w/ HR in 100s-110s. .. therefore rate needs to be increased.  Paged dr Inga Ireland regarding this, and she stated to hold PO hydralazine for tonight  2338: increased cardizem gtt rate to 10ml/hr  2358: converted to SR/SB, EKG obtained  0035: paged cardiology regarding pt converting to SR, asking if pt should remain on cardizem gtt or not (currently on norvasc and losartan)  0037: received callback from cardiology, ok to stop cardizem gtt    Problem: Diabetes/Glucose Control  Goal: Glucose maintained within prescribed range  Description: INTERVENTIONS:  - Monitor Blood Glucose as ordered  - Assess for signs and symptoms of hyperglycemia and hypoglycemia  - Administer ordered medications to maintain glucose within target range  - Assess barriers to adequate nutritional intake and initiate nutrition consult as needed  - Instruct patient on self management of diabetes  Outcome: Progressing     Problem: Patient/Family Goals  Goal: Patient/Family Long Term Goal  Description: Patient's Long Term Goal: Discharge home    Interventions:  - Follow MD orders  - Medications as ordered  - See additional Care Plan goals for specific interventions  Outcome: Progressing  Goal: Patient/Family Short Term Goal  Description: Patient's Short Term Goal: Back to room air    Interventions:   - Follow MD orders  - Medications as ordered  - See additional Care Plan goals for specific interventions  Outcome: Progressing

## 2022-06-26 NOTE — PLAN OF CARE
N: AOx4. Left arm contracture. Left leg with severe weakness. Difficulty dangling at the edge of bed (patient constantly leaning back on the head of the bed). HEENT: Glasses  R: CTA bilaterally. Room air. Room air during the day. C: NSR w/rare PACs. No edema. GI: Staff reports a small bowel movmenet 06/25/2022. : Incontinent. Problem: Diabetes/Glucose Control  Goal: Glucose maintained within prescribed range  Description: INTERVENTIONS:  - Monitor Blood Glucose as ordered  - Assess for signs and symptoms of hyperglycemia and hypoglycemia  - Administer ordered medications to maintain glucose within target range  - Assess barriers to adequate nutritional intake and initiate nutrition consult as needed  - Instruct patient on self management of diabetes  6/26/2022 1118 by Yemi Amado RN  Outcome: Progressing  6/26/2022 1118 by Yemi Amado RN  Outcome: Progressing     Problem: Patient/Family Goals  Goal: Patient/Family Long Term Goal  Description: Patient's Long Term Goal: Discharge home    Interventions:  - Follow MD orders  - Medications as ordered  - See additional Care Plan goals for specific interventions  Outcome: Progressing  Goal: Patient/Family Short Term Goal  Description: Patient's Short Term Goal: Back to room air    Interventions:   - Follow MD orders  - Medications as ordered  - See additional Care Plan goals for specific interventions  Outcome: Progressing     Problem: CARDIOVASCULAR - ADULT  Goal: Maintains optimal cardiac output and hemodynamic stability  Description: INTERVENTIONS:  - Monitor vital signs, rhythm, and trends  - Monitor for bleeding, hypotension and signs of decreased cardiac output  - Evaluate effectiveness of vasoactive medications to optimize hemodynamic stability  - Monitor arterial and/or venous puncture sites for bleeding and/or hematoma  - Assess quality of pulses, skin color and temperature  - Assess for signs of decreased coronary artery perfusion - ex.  Angina  - Evaluate fluid balance, assess for edema, trend weights  Outcome: Progressing  Goal: Absence of cardiac arrhythmias or at baseline  Description: INTERVENTIONS:  - Continuous cardiac monitoring, monitor vital signs, obtain 12 lead EKG if indicated  - Evaluate effectiveness of antiarrhythmic and heart rate control medications as ordered  - Initiate emergency measures for life threatening arrhythmias  - Monitor electrolytes and administer replacement therapy as ordered  Outcome: Progressing

## 2022-06-27 LAB
ANION GAP SERPL CALC-SCNC: 10 MMOL/L (ref 0–18)
APTT PPP: 59.3 SECONDS (ref 23.3–35.6)
BUN BLD-MCNC: 17 MG/DL (ref 7–18)
CALCIUM BLD-MCNC: 9.2 MG/DL (ref 8.5–10.1)
CHLORIDE SERPL-SCNC: 102 MMOL/L (ref 98–112)
CO2 SERPL-SCNC: 24 MMOL/L (ref 21–32)
CREAT BLD-MCNC: 1.05 MG/DL
ERYTHROCYTE [DISTWIDTH] IN BLOOD BY AUTOMATED COUNT: 13.9 %
GLUCOSE BLD-MCNC: 112 MG/DL (ref 70–99)
GLUCOSE BLD-MCNC: 117 MG/DL (ref 70–99)
GLUCOSE BLD-MCNC: 126 MG/DL (ref 70–99)
GLUCOSE BLD-MCNC: 165 MG/DL (ref 70–99)
GLUCOSE BLD-MCNC: 214 MG/DL (ref 70–99)
GLUCOSE BLD-MCNC: 268 MG/DL (ref 70–99)
HCT VFR BLD AUTO: 40.6 %
HGB BLD-MCNC: 13.1 G/DL
MCH RBC QN AUTO: 27.8 PG (ref 26–34)
MCHC RBC AUTO-ENTMCNC: 32.3 G/DL (ref 31–37)
MCV RBC AUTO: 86 FL
OSMOLALITY SERPL CALC.SUM OF ELEC: 285 MOSM/KG (ref 275–295)
PLATELET # BLD AUTO: 209 10(3)UL (ref 150–450)
PLATELET # BLD AUTO: 216 10(3)UL (ref 150–450)
POTASSIUM SERPL-SCNC: 4.3 MMOL/L (ref 3.5–5.1)
RBC # BLD AUTO: 4.72 X10(6)UL
SODIUM SERPL-SCNC: 136 MMOL/L (ref 136–145)
WBC # BLD AUTO: 7 X10(3) UL (ref 4–11)

## 2022-06-27 PROCEDURE — 99232 SBSQ HOSP IP/OBS MODERATE 35: CPT | Performed by: HOSPITALIST

## 2022-06-27 RX ORDER — METOPROLOL SUCCINATE 25 MG/1
12.5 TABLET, EXTENDED RELEASE ORAL
Qty: 30 TABLET | Refills: 3 | Status: SHIPPED | OUTPATIENT
Start: 2022-06-27

## 2022-06-27 RX ORDER — HEPARIN SODIUM AND DEXTROSE 10000; 5 [USP'U]/100ML; G/100ML
INJECTION INTRAVENOUS CONTINUOUS
Status: DISCONTINUED | OUTPATIENT
Start: 2022-06-27 | End: 2022-06-27

## 2022-06-27 RX ORDER — POLYETHYLENE GLYCOL 3350 17 G/17G
17 POWDER, FOR SOLUTION ORAL DAILY
Status: DISCONTINUED | OUTPATIENT
Start: 2022-06-27 | End: 2022-06-28

## 2022-06-27 RX ORDER — HEPARIN SODIUM AND DEXTROSE 10000; 5 [USP'U]/100ML; G/100ML
INJECTION INTRAVENOUS CONTINUOUS
Status: DISCONTINUED | OUTPATIENT
Start: 2022-06-27 | End: 2022-06-28

## 2022-06-27 RX ORDER — POLYETHYLENE GLYCOL 3350 17 G/17G
17 POWDER, FOR SOLUTION ORAL DAILY
Qty: 30 PACKET | Refills: 0 | Status: SHIPPED | OUTPATIENT
Start: 2022-06-27 | End: 2022-07-27

## 2022-06-27 NOTE — PLAN OF CARE
Assumed care of pt at [address] [facility]. A/ox4, began shift on rm air, then had to transition to 2L [facility]). SR on tele. No reports of pain. Left sided weakness from hx of CVA - slight left facial droop present, and left arm contracted. Breath sounds clear upon auscultation. Denies cough. Heparin gtt infusing. Seizure precautions in place. Clear liquid diet. Discussed POC w/ pt.     Problem: Diabetes/Glucose Control  Goal: Glucose maintained within prescribed range  Description: INTERVENTIONS:  - Monitor Blood Glucose as ordered  - Assess for signs and symptoms of hyperglycemia and hypoglycemia  - Administer ordered medications to maintain glucose within target range  - Assess barriers to adequate nutritional intake and initiate nutrition consult as needed  - Instruct patient on self management of diabetes  6/26/2022 2223 by Christi Bates RN  Outcome: Progressing  6/26/2022 2223 by Christi Bates RN  Outcome: Progressing     Problem: Patient/Family Goals  Goal: Patient/Family Long Term Goal  Description: Patient's Long Term Goal: Discharge home    Interventions:  - Follow MD orders  - Medications as ordered  - See additional Care Plan goals for specific interventions  6/26/2022 2223 by Christi Bates RN  Outcome: Progressing  6/26/2022 2223 by Christi Bates RN  Outcome: Progressing  Goal: Patient/Family Short Term Goal  Description: Patient's Short Term Goal: Back to room air    Interventions:   - Follow MD orders  - Medications as ordered  - See additional Care Plan goals for specific interventions  6/26/2022 2223 by Christi Bates RN  Outcome: Progressing  6/26/2022 2223 by Christi Bates RN  Outcome: Progressing     Problem: CARDIOVASCULAR - ADULT  Goal: Maintains optimal cardiac output and hemodynamic stability  Description: INTERVENTIONS:  - Monitor vital signs, rhythm, and trends  - Monitor for bleeding, hypotension and signs of decreased cardiac output  - Evaluate effectiveness of vasoactive medications to optimize hemodynamic stability  - Monitor arterial and/or venous puncture sites for bleeding and/or hematoma  - Assess quality of pulses, skin color and temperature  - Assess for signs of decreased coronary artery perfusion - ex.  Angina  - Evaluate fluid balance, assess for edema, trend weights  6/26/2022 2223 by Stephania Montana RN  Outcome: Progressing  6/26/2022 2223 by Stephania Montana RN  Outcome: Progressing  Goal: Absence of cardiac arrhythmias or at baseline  Description: INTERVENTIONS:  - Continuous cardiac monitoring, monitor vital signs, obtain 12 lead EKG if indicated  - Evaluate effectiveness of antiarrhythmic and heart rate control medications as ordered  - Initiate emergency measures for life threatening arrhythmias  - Monitor electrolytes and administer replacement therapy as ordered  6/26/2022 2223 by Stephania Montana RN  Outcome: Progressing  6/26/2022 2223 by Stephania Montana RN  Outcome: Progressing

## 2022-06-27 NOTE — CM/SW NOTE
Received order for overnight home o2. EMILIANA sent a referral to [facility] in [address] [facility]. Await approval.     Atif Leslie, MSW, LSW  Discharge Planner  [id number]    Addendum: Spoke with ekaterina at [facility], pt needs to have a sleep study with the PATRICIA diagnosis. EMILIANA informed MD and RN.

## 2022-06-27 NOTE — CM/SW NOTE
Awaiting PMR re-eval for discharge recommendations. SW sent AMY referrals in Aidin as back up. PASRR completed/uploaded in Aidin.      GILDA Flores, [facility]  Discharge [address] [facility].

## 2022-06-27 NOTE — CM/SW NOTE
SW received call from daughter, Hansel Zavala, requesting updates on discharge plans. SW met with pt at bedside to discuss prior to calling daughter. SW expressed that PT/OT were recommending acute rehab. Pt still refusing recommendation, but agreeable to having me call daughter. Spoke with daughter over the phone. Explained the recommendations of acute rehab. Explained that PT is going to work with pt today and may alter their discharge recommendation pending on the progress. Daughter stated that she is going to come to the hospital and talk w/ pt about possibly going to rehab at a SNF or at acute rehab. Daughter appreciative. SW sent message to PT to inform of above update.      GILDA Garay, [facility]  Discharge [address] [facility].

## 2022-06-27 NOTE — PLAN OF CARE
Received pt at 0700. Pt is A&Ox4, no complaints of pain. Pt is on room air, 2L at night, lungs are clear/diminished bilaterally with crackles in the L base, no coughing. Pt is in NSR, PVC's, no complaints of chest pain. Pt has left sided weakness- hx of stroke. Pt is incontinent of B&B, active bowel sounds, abdomen non-tender, last BM 6-26, purewick in place. Patient updated with plan of care. Problem: Patient/Family Goals  Goal: Patient/Family Long Term Goal  Description: Patient's Long Term Goal: Discharge home  \"get healthy\" 6-27    Interventions:  - Follow MD orders  - Medications as ordered      - See additional Care Plan goals for specific interventions  Outcome: Progressing  Goal: Patient/Family Short Term Goal  Description: Patient's Short Term Goal: Back to room air  \"sit up on side of bed\" 6-27    Interventions:   - lay bed flat  - put on R shoe  - sit up with nurse        - See additional Care Plan goals for specific interventions  Outcome: Progressing     Problem: CARDIOVASCULAR - ADULT  Goal: Maintains optimal cardiac output and hemodynamic stability  Description: INTERVENTIONS:  - Monitor vital signs, rhythm, and trends  - Monitor for bleeding, hypotension and signs of decreased cardiac output  - Evaluate effectiveness of vasoactive medications to optimize hemodynamic stability  - Monitor arterial and/or venous puncture sites for bleeding and/or hematoma  - Assess quality of pulses, skin color and temperature  - Assess for signs of decreased coronary artery perfusion - ex.  Angina  - Evaluate fluid balance, assess for edema, trend weights  Outcome: Progressing  Goal: Absence of cardiac arrhythmias or at baseline  Description: INTERVENTIONS:  - Continuous cardiac monitoring, monitor vital signs, obtain 12 lead EKG if indicated  - Evaluate effectiveness of antiarrhythmic and heart rate control medications as ordered  - Initiate emergency measures for life threatening arrhythmias  - Monitor electrolytes and administer replacement therapy as ordered  Outcome: Not Progressing     Problem: Diabetes/Glucose Control  Goal: Glucose maintained within prescribed range  Description: INTERVENTIONS:  - Monitor Blood Glucose as ordered  - Assess for signs and symptoms of hyperglycemia and hypoglycemia  - Administer ordered medications to maintain glucose within target range  - Assess barriers to adequate nutritional intake and initiate nutrition consult as needed  - Instruct patient on self management of diabetes  Outcome: Progressing

## 2022-06-27 NOTE — CM/SW NOTE
Spoke with Nasima Rincon at [facility], still acute rehab appropriate. Will plan for dc on Tuesday per MJ. RN updated. Will need to cancel the [facility] referrals.      GILDA Dorado, [facility]  Discharge [address] [facility].

## 2022-06-27 NOTE — CM/SW NOTE
EMILIANA spoke with Jeni Marshall at [facility] and requested to have one of the MD's re-review pt's progress and determine if pt is still acute rehab appropriate. PT evaluated pt today and are still recommending acute rehab.      Lorrie Hatchet, MSW, [facility]  Discharge [address] [facility].

## 2022-06-28 VITALS
WEIGHT: 172.81 LBS | RESPIRATION RATE: 18 BRPM | HEART RATE: 60 BPM | TEMPERATURE: 98 F | DIASTOLIC BLOOD PRESSURE: 55 MMHG | SYSTOLIC BLOOD PRESSURE: 117 MMHG | BODY MASS INDEX: 28.79 KG/M2 | HEIGHT: 64.96 IN | OXYGEN SATURATION: 92 %

## 2022-06-28 LAB
APTT PPP: 31 SECONDS (ref 23.3–35.6)
GLUCOSE BLD-MCNC: 148 MG/DL (ref 70–99)
GLUCOSE BLD-MCNC: 203 MG/DL (ref 70–99)
PLATELET # BLD AUTO: 227 10(3)UL (ref 150–450)
SARS-COV-2 RNA RESP QL NAA+PROBE: NOT DETECTED

## 2022-06-28 PROCEDURE — 99239 HOSP IP/OBS DSCHRG MGMT >30: CPT | Performed by: HOSPITALIST

## 2022-06-28 RX ORDER — HEPARIN SODIUM 1000 [USP'U]/ML
30 INJECTION, SOLUTION INTRAVENOUS; SUBCUTANEOUS ONCE
Status: COMPLETED | OUTPATIENT
Start: 2022-06-28 | End: 2022-06-28

## 2022-06-28 NOTE — PLAN OF CARE
Pt being discharged to [facility]. Report given to [employer]. Pt updated on plan. Informed of follow up needed with providers. AVS and transfer report sent with patient. PIV removed. Await [employer] for discharge.

## 2022-06-28 NOTE — CM/SW NOTE
SW spoke to Catalina from [facility]- waiting on bed assignment for today.     Emily Mathis LCSW  /Discharge Planner  [phone]

## 2022-06-28 NOTE — CM/SW NOTE
06/28/22 1156   Discharge disposition   Expected discharge disposition Rehab [address] Provider [facility]   Discharge transportation [facility]     Pt cleared for dc to [facility] for today. She will be going to Room 2205. Number to call report is [phone]. SW updated daughter José Luis Cali.   [facility] arranged for [address] [place] [address]  /Discharge Planner  [phone]

## 2022-06-28 NOTE — PLAN OF CARE
Alert and oriented x4. On 2L overnight. Denies any SOB or chest pain. NSR on tele. Continent of bowel and bladder. Denies pain. Up with 2 xwalker. QID accucheck. Problem: Diabetes/Glucose Control  Goal: Glucose maintained within prescribed range  Description: INTERVENTIONS:  - Monitor Blood Glucose as ordered  - Assess for signs and symptoms of hyperglycemia and hypoglycemia  - Administer ordered medications to maintain glucose within target range  - Assess barriers to adequate nutritional intake and initiate nutrition consult as needed  - Instruct patient on self management of diabetes  Outcome: Progressing     Problem: CARDIOVASCULAR - ADULT  Goal: Maintains optimal cardiac output and hemodynamic stability  Description: INTERVENTIONS:  - Monitor vital signs, rhythm, and trends  - Monitor for bleeding, hypotension and signs of decreased cardiac output  - Evaluate effectiveness of vasoactive medications to optimize hemodynamic stability  - Monitor arterial and/or venous puncture sites for bleeding and/or hematoma  - Assess quality of pulses, skin color and temperature  - Assess for signs of decreased coronary artery perfusion - ex.  Angina  - Evaluate fluid balance, assess for edema, trend weights  Outcome: Progressing  Goal: Absence of cardiac arrhythmias or at baseline  Description: INTERVENTIONS:  - Continuous cardiac monitoring, monitor vital signs, obtain 12 lead EKG if indicated  - Evaluate effectiveness of antiarrhythmic and heart rate control medications as ordered  - Initiate emergency measures for life threatening arrhythmias  - Monitor electrolytes and administer replacement therapy as ordered  Outcome: Progressing

## 2022-06-30 ENCOUNTER — TELEPHONE (OUTPATIENT)
Dept: INTERNAL MEDICINE CLINIC | Facility: CLINIC | Age: 72
End: 2022-06-30

## 2022-06-30 NOTE — TELEPHONE ENCOUNTER
Patients daughter Laurence Harper calling pt is at [facility]. Patients daughter is requesting pt be transferred to a skilled nursing facility. Patients  asked daughter to reach out to PCP to see if tests can be done to determine cognitive decline.

## 2022-06-30 NOTE — TELEPHONE ENCOUNTER
I'm not sure why Dr. Diana Buchanan name has been added back as PCP. Pt changed to a visiting physician. Orders should come from new PCP.

## 2022-06-30 NOTE — TELEPHONE ENCOUNTER
Pt not seen since 4/2021.  Seen by CB for DM f/u.     CB, unsure if this is something that can be discussed at cpe?

## 2022-07-01 NOTE — TELEPHONE ENCOUNTER
We need to clarify with Tl Anabela is her mom going to continue to be a patient here or continue to see home visiting physician. It is not safe for both but appears that is what is happening. Please clarify. If patient here, needs 40min hospital/ rehab follow up asap. If following with home visiting physician please remove us as PCP.

## 2022-07-01 NOTE — TELEPHONE ENCOUNTER
Noted.  [facility] as I have only seen pt once and want to make sure all aware.

## 2022-07-01 NOTE — TELEPHONE ENCOUNTER
Katja Prakash, pt's dtr indicates pt is at [facility] right now. She will speak with the hospitalist about pt's care and determine the next steps. Pt was seeing [facility] a couple of times. Katja Prakash indicates she will talk to the Home Physician and Hospitalist then call back if she needs anything from us. Dtr verbalizes understanding.   FYI to [facility]

## 2022-07-05 NOTE — TELEPHONE ENCOUNTER
Henna Steward, pt's dtr indicates she will be using [facility], she has been in contact with the NP from [facility] who indicates to call her when patient is discharged from [facility] and she will come out to see her. Henna Steward indicates she is not able to get pt out of the house to be seen so [facility] is the best option. Henna Steward notified we will be removing AS as the PCP. FYI to SD.      PSR:  Please remove AS as PCP. Pt will be having [facility] as PCP. Thank you.

## 2022-07-07 NOTE — TELEPHONE ENCOUNTER
Do not remove PCP as of yet. Pts daughter called and scheduled Hosp F/U here due to the recommendations of Lynne Lopez.      Pts daughter also wants a call backk from triage to discuss     Please advise

## 2022-07-11 NOTE — TELEPHONE ENCOUNTER
Spoke to [facility] again after discussion with Merly/SD. [facility] aware that [facility]  should assist with a safe place to go/ be cared for upon discharge. [facility] concerned they will release patient to her sister with addiction issues; advised to inform the  of this and all approp information prior to making decisions for care. Also advised cannot be seen in clinic when in admitted to [facility]. Will need to wait until discharge. Pt previously had a home visiting provider. [facility] is worried about the neuro assessment from this provider who has only seen the pt once. She called her \"sharp\", when she has actually declined cognitively recently. She doesn't feel the home provider seeing her for a brief appt can accurately depict her cognition. She would like to come in to either have a cognitive eval or be referred to someone that can do this. For this reason, [facility] wants to keep the appt on 7/18. She will be discharged from [facility] on 7/14.     Discussed all with SD.

## 2022-07-11 NOTE — TELEPHONE ENCOUNTER
PSRs- Quynh Medina will call back to cancel one of the appts that is scheduled- she's aware to call us right back and can cancel with front office. Spoke to Quynh Medina, on [employer]. Requesting sooner appt to determine patient's needs upon discharge from [facility]. States is declining cognitively and will likely need care in the home. Needs a plan/eval prior to being discharged from [facility]. Sooner appt provided. Pt confirming with Adrianne Serrano that she can bring pt to appt herson. Pt mentioned her sister is not involved in her mother's care and has alcohol issues. She would not step up to care for pt, so Quynh Medina moved here from [place] to do so. She feels her sister is up to something and went to see her mother the other day at [facility]. Advised is Quynh Medina, as her POA, she is the only one that can make decisions for the pt.

## 2022-07-15 ENCOUNTER — TELEPHONE (OUTPATIENT)
Dept: INTERNAL MEDICINE CLINIC | Facility: CLINIC | Age: 72
End: 2022-07-15

## 2022-07-15 NOTE — TELEPHONE ENCOUNTER
Has upcoming ov scheduled for Monday. Discharge summary reviewed from [facility] and states patient going to subacute rehab. Please confirm her appt on Monday?

## 2022-10-05 ENCOUNTER — TELEPHONE (OUTPATIENT)
Dept: INTERNAL MEDICINE CLINIC | Facility: CLINIC | Age: 72
End: 2022-10-05

## 2022-10-05 NOTE — TELEPHONE ENCOUNTER
LOV 4/15/21 with GELA.    CB, are we capable of completing a Psych Evaluation in this office for this patient?

## 2022-10-05 NOTE — TELEPHONE ENCOUNTER
Pt now has a visiting physician, he/she should do this or decide where pt should go to have this completed. Pt has not been seen in our office in over a year.

## 2022-10-06 NOTE — TELEPHONE ENCOUNTER
LOV-1/11/17 SC  FOV-none  LAST RX-12/07/16 1.5 0 refills  LAST LABS-12/06/16 a1c-14  PER PROTOCOL-to provider.
LVMTCB to schedule
LVMTCB to schedule
Pt has continuous glucose sensor was to return 1/25 and cancelled   Please call and have her reschedule asap and bring sensor   Thanks  Claudine HANDY,GEOVANNYE
Sent letter
PAST SURGICAL HISTORY:  History of surgery LEFT LOWER LOBECTOMY    History of surgery BILATERAL KNEE REPLACEMENT    History of surgery CATARACT RIGHT EYE    History of surgery TUBAL LIGATION    History of surgery CYST REMOVED  RIGHT BREAST    History of surgery CHOLECYSTECOMY    History of surgery RIGHT PARTIAL NEPHRECTOMY    History of surgery BILATERAL CATARACT SURGERY

## 2022-10-21 ENCOUNTER — APPOINTMENT (OUTPATIENT)
Dept: CT IMAGING | Facility: HOSPITAL | Age: 72
End: 2022-10-21
Attending: NURSE PRACTITIONER
Payer: MEDICARE

## 2022-10-21 ENCOUNTER — APPOINTMENT (OUTPATIENT)
Dept: GENERAL RADIOLOGY | Facility: HOSPITAL | Age: 72
End: 2022-10-21
Attending: EMERGENCY MEDICINE
Payer: MEDICARE

## 2022-10-21 ENCOUNTER — HOSPITAL ENCOUNTER (INPATIENT)
Facility: HOSPITAL | Age: 72
LOS: 4 days | Discharge: NURSING, REHAB OR LONG-TERM CARE | End: 2022-10-25
Attending: EMERGENCY MEDICINE | Admitting: HOSPITALIST
Payer: MEDICARE

## 2022-10-21 DIAGNOSIS — I10 ESSENTIAL HYPERTENSION: ICD-10-CM

## 2022-10-21 DIAGNOSIS — U07.1 COVID-19: ICD-10-CM

## 2022-10-21 DIAGNOSIS — I95.9 HYPOTENSION, UNSPECIFIED HYPOTENSION TYPE: Primary | ICD-10-CM

## 2022-10-21 DIAGNOSIS — R09.02 HYPOXIA: ICD-10-CM

## 2022-10-21 PROBLEM — E87.20 METABOLIC ACIDOSIS: Status: ACTIVE | Noted: 2022-10-21

## 2022-10-21 PROBLEM — N17.9 ACUTE RENAL FAILURE (ARF) (HCC): Status: ACTIVE | Noted: 2022-10-21

## 2022-10-21 PROBLEM — N17.9 ACUTE RENAL FAILURE (ARF): Status: ACTIVE | Noted: 2022-10-21

## 2022-10-21 PROBLEM — N17.9 ACUTE KIDNEY INJURY: Status: ACTIVE | Noted: 2022-10-21

## 2022-10-21 PROBLEM — N17.9 ACUTE KIDNEY INJURY (HCC): Status: ACTIVE | Noted: 2022-10-21

## 2022-10-21 PROBLEM — R73.9 HYPERGLYCEMIA: Status: ACTIVE | Noted: 2022-10-21

## 2022-10-21 PROBLEM — E87.1 HYPONATREMIA: Status: ACTIVE | Noted: 2022-10-21

## 2022-10-21 LAB
ALBUMIN SERPL-MCNC: 2.9 G/DL (ref 3.4–5)
ALBUMIN/GLOB SERPL: 0.6 {RATIO} (ref 1–2)
ALP LIVER SERPL-CCNC: 69 U/L
ALT SERPL-CCNC: 136 U/L
ANION GAP SERPL CALC-SCNC: 14 MMOL/L (ref 0–18)
APTT PPP: 53.2 SECONDS (ref 23.3–35.6)
AST SERPL-CCNC: 206 U/L (ref 15–37)
BASOPHILS # BLD AUTO: 0.03 X10(3) UL (ref 0–0.2)
BASOPHILS NFR BLD AUTO: 0.6 %
BILIRUB SERPL-MCNC: 0.4 MG/DL (ref 0.1–2)
BILIRUB UR QL STRIP.AUTO: NEGATIVE
BUN BLD-MCNC: 66 MG/DL (ref 7–18)
CALCIUM BLD-MCNC: 9.2 MG/DL (ref 8.5–10.1)
CHLORIDE SERPL-SCNC: 101 MMOL/L (ref 98–112)
CHOLEST SERPL-MCNC: 133 MG/DL (ref ?–200)
CK SERPL-CCNC: 1469 U/L
CO2 SERPL-SCNC: 18 MMOL/L (ref 21–32)
COLOR UR AUTO: YELLOW
CREAT BLD-MCNC: 3.32 MG/DL
CRP SERPL-MCNC: 3.26 MG/DL (ref ?–0.3)
D DIMER PPP FEU-MCNC: 3.07 UG/ML FEU (ref ?–0.73)
DEPRECATED HBV CORE AB SER IA-ACNC: 381.1 NG/ML
EOSINOPHIL # BLD AUTO: 0.01 X10(3) UL (ref 0–0.7)
EOSINOPHIL NFR BLD AUTO: 0.2 %
ERYTHROCYTE [DISTWIDTH] IN BLOOD BY AUTOMATED COUNT: 15.3 %
EST. AVERAGE GLUCOSE BLD GHB EST-MCNC: 148 MG/DL (ref 68–126)
GFR SERPLBLD BASED ON 1.73 SQ M-ARVRAT: 14 ML/MIN/1.73M2 (ref 60–?)
GLOBULIN PLAS-MCNC: 4.8 G/DL (ref 2.8–4.4)
GLUCOSE BLD-MCNC: 113 MG/DL (ref 70–99)
GLUCOSE UR STRIP.AUTO-MCNC: NEGATIVE MG/DL
HBA1C MFR BLD: 6.8 % (ref ?–5.7)
HCT VFR BLD AUTO: 33.2 %
HDLC SERPL-MCNC: 36 MG/DL (ref 40–59)
HGB BLD-MCNC: 11 G/DL
IMM GRANULOCYTES # BLD AUTO: 0.05 X10(3) UL (ref 0–1)
IMM GRANULOCYTES NFR BLD: 0.9 %
KETONES UR STRIP.AUTO-MCNC: NEGATIVE MG/DL
LACTATE SERPL-SCNC: 0.9 MMOL/L (ref 0.4–2)
LDH SERPL L TO P-CCNC: 323 U/L
LDLC SERPL CALC-MCNC: 72 MG/DL (ref ?–100)
LYMPHOCYTES # BLD AUTO: 0.76 X10(3) UL (ref 1–4)
LYMPHOCYTES NFR BLD AUTO: 13.9 %
MCH RBC QN AUTO: 30.2 PG (ref 26–34)
MCHC RBC AUTO-ENTMCNC: 33.1 G/DL (ref 31–37)
MCV RBC AUTO: 91.2 FL
MONOCYTES # BLD AUTO: 1.13 X10(3) UL (ref 0.1–1)
MONOCYTES NFR BLD AUTO: 20.7 %
NEUTROPHILS # BLD AUTO: 3.47 X10 (3) UL (ref 1.5–7.7)
NEUTROPHILS # BLD AUTO: 3.47 X10(3) UL (ref 1.5–7.7)
NEUTROPHILS NFR BLD AUTO: 63.7 %
NITRITE UR QL STRIP.AUTO: NEGATIVE
NONHDLC SERPL-MCNC: 97 MG/DL (ref ?–130)
NT-PROBNP SERPL-MCNC: ABNORMAL PG/ML (ref ?–125)
OSMOLALITY SERPL CALC.SUM OF ELEC: 296 MOSM/KG (ref 275–295)
PH UR STRIP.AUTO: 5 [PH] (ref 5–8)
PLATELET # BLD AUTO: 177 10(3)UL (ref 150–450)
POTASSIUM SERPL-SCNC: 4.3 MMOL/L (ref 3.5–5.1)
PROCALCITONIN SERPL-MCNC: 0.27 NG/ML (ref ?–0.16)
PROT SERPL-MCNC: 7.7 G/DL (ref 6.4–8.2)
PROT UR STRIP.AUTO-MCNC: 30 MG/DL
RBC # BLD AUTO: 3.64 X10(6)UL
SODIUM SERPL-SCNC: 133 MMOL/L (ref 136–145)
SP GR UR STRIP.AUTO: 1.01 (ref 1–1.03)
TRIGL SERPL-MCNC: 145 MG/DL (ref 30–149)
TROPONIN I HIGH SENSITIVITY: ABNORMAL NG/L
TROPONIN I HIGH SENSITIVITY: ABNORMAL NG/L
UROBILINOGEN UR STRIP.AUTO-MCNC: <2 MG/DL
VLDLC SERPL CALC-MCNC: 22 MG/DL (ref 0–30)
WBC # BLD AUTO: 5.5 X10(3) UL (ref 4–11)

## 2022-10-21 PROCEDURE — 99292 CRITICAL CARE ADDL 30 MIN: CPT | Performed by: HOSPITALIST

## 2022-10-21 PROCEDURE — 99291 CRITICAL CARE FIRST HOUR: CPT | Performed by: NURSE PRACTITIONER

## 2022-10-21 PROCEDURE — 99291 CRITICAL CARE FIRST HOUR: CPT | Performed by: HOSPITALIST

## 2022-10-21 PROCEDURE — 3E033XZ INTRODUCTION OF VASOPRESSOR INTO PERIPHERAL VEIN, PERCUTANEOUS APPROACH: ICD-10-PCS | Performed by: EMERGENCY MEDICINE

## 2022-10-21 PROCEDURE — XW033E5 INTRODUCTION OF REMDESIVIR ANTI-INFECTIVE INTO PERIPHERAL VEIN, PERCUTANEOUS APPROACH, NEW TECHNOLOGY GROUP 5: ICD-10-PCS | Performed by: INTERNAL MEDICINE

## 2022-10-21 PROCEDURE — 70450 CT HEAD/BRAIN W/O DYE: CPT | Performed by: NURSE PRACTITIONER

## 2022-10-21 PROCEDURE — 71045 X-RAY EXAM CHEST 1 VIEW: CPT | Performed by: EMERGENCY MEDICINE

## 2022-10-21 PROCEDURE — 05H533Z INSERTION OF INFUSION DEVICE INTO RIGHT SUBCLAVIAN VEIN, PERCUTANEOUS APPROACH: ICD-10-PCS | Performed by: EMERGENCY MEDICINE

## 2022-10-21 PROCEDURE — 3E0333Z INTRODUCTION OF ANTI-INFLAMMATORY INTO PERIPHERAL VEIN, PERCUTANEOUS APPROACH: ICD-10-PCS | Performed by: EMERGENCY MEDICINE

## 2022-10-21 RX ORDER — NICOTINE POLACRILEX 4 MG
30 LOZENGE BUCCAL
Status: DISCONTINUED | OUTPATIENT
Start: 2022-10-21 | End: 2022-10-25

## 2022-10-21 RX ORDER — SODIUM CHLORIDE 9 MG/ML
1000 INJECTION, SOLUTION INTRAVENOUS CONTINUOUS
Status: DISCONTINUED | OUTPATIENT
Start: 2022-10-21 | End: 2022-10-21

## 2022-10-21 RX ORDER — HEPARIN SODIUM AND DEXTROSE 10000; 5 [USP'U]/100ML; G/100ML
12 INJECTION INTRAVENOUS ONCE
Status: COMPLETED | OUTPATIENT
Start: 2022-10-21 | End: 2022-10-22

## 2022-10-21 RX ORDER — HEPARIN SODIUM AND DEXTROSE 10000; 5 [USP'U]/100ML; G/100ML
INJECTION INTRAVENOUS CONTINUOUS
Status: DISPENSED | OUTPATIENT
Start: 2022-10-22 | End: 2022-10-24

## 2022-10-21 RX ORDER — ALBUTEROL SULFATE 90 UG/1
4 AEROSOL, METERED RESPIRATORY (INHALATION) EVERY 4 HOURS PRN
Status: DISCONTINUED | OUTPATIENT
Start: 2022-10-21 | End: 2022-10-25

## 2022-10-21 RX ORDER — HEPARIN SODIUM 1000 [USP'U]/ML
60 INJECTION, SOLUTION INTRAVENOUS; SUBCUTANEOUS ONCE
Status: COMPLETED | OUTPATIENT
Start: 2022-10-21 | End: 2022-10-21

## 2022-10-21 RX ORDER — DEXAMETHASONE SODIUM PHOSPHATE 4 MG/ML
6 VIAL (ML) INJECTION EVERY 24 HOURS
Status: DISCONTINUED | OUTPATIENT
Start: 2022-10-21 | End: 2022-10-22

## 2022-10-21 RX ORDER — GUAIFENESIN 600 MG/1
600 TABLET, EXTENDED RELEASE ORAL 2 TIMES DAILY
Status: DISCONTINUED | OUTPATIENT
Start: 2022-10-21 | End: 2022-10-25

## 2022-10-21 RX ORDER — NICOTINE POLACRILEX 4 MG
15 LOZENGE BUCCAL
Status: DISCONTINUED | OUTPATIENT
Start: 2022-10-21 | End: 2022-10-25

## 2022-10-21 RX ORDER — SODIUM CHLORIDE 9 MG/ML
INJECTION, SOLUTION INTRAVENOUS CONTINUOUS
Status: DISCONTINUED | OUTPATIENT
Start: 2022-10-21 | End: 2022-10-22

## 2022-10-21 RX ORDER — DEXTROSE MONOHYDRATE 25 G/50ML
50 INJECTION, SOLUTION INTRAVENOUS
Status: DISCONTINUED | OUTPATIENT
Start: 2022-10-21 | End: 2022-10-25

## 2022-10-21 NOTE — ED INITIAL ASSESSMENT (HPI)
Patient bib ems from SNF for c/o increased lethary and AMS since last night    Patient normally A&Ox3, now is A&Ox2 per SNF staff.  Swabbed this morning and tested +Covid    Patient also requiring O2, SPO2 on RA 87%, improved to 96% on 3L NC    GRAYSON, skin w/d/i, RR even/NL

## 2022-10-21 NOTE — ED QUICK NOTES
Patient started on Levophed per MAR d/t unstable BPs s/p 2 fluid boluses. Central line cart at bedside for MD. Per MD VO, RN will call patient daughter to obtain consent for patient to receive central line.

## 2022-10-21 NOTE — ED QUICK NOTES
RN spoke to patient daughter Beverly Cowart regarding consent for central line insertion, patient daughter gave verbal consent to move forward w/ the procedure. Risks of the procedure explained and she verbalized understanding.

## 2022-10-22 LAB
ADENOVIRUS PCR:: NOT DETECTED
ALBUMIN SERPL-MCNC: 2.5 G/DL (ref 3.4–5)
ALBUMIN/GLOB SERPL: 0.5 {RATIO} (ref 1–2)
ALP LIVER SERPL-CCNC: 66 U/L
ALT SERPL-CCNC: 142 U/L
AMMONIA PLAS-MCNC: 14 UMOL/L (ref 11–32)
ANION GAP SERPL CALC-SCNC: 12 MMOL/L (ref 0–18)
APTT PPP: 150.3 SECONDS (ref 23.3–35.6)
APTT PPP: 86.7 SECONDS (ref 23.3–35.6)
APTT PPP: >240 SECONDS (ref 23.3–35.6)
APTT PPP: >240 SECONDS (ref 23.3–35.6)
AST SERPL-CCNC: 195 U/L (ref 15–37)
B PARAPERT DNA SPEC QL NAA+PROBE: NOT DETECTED
B PERT DNA SPEC QL NAA+PROBE: NOT DETECTED
BASOPHILS # BLD AUTO: 0.03 X10(3) UL (ref 0–0.2)
BASOPHILS NFR BLD AUTO: 0.4 %
BILIRUB SERPL-MCNC: 0.5 MG/DL (ref 0.1–2)
BUN BLD-MCNC: 53 MG/DL (ref 7–18)
C PNEUM DNA SPEC QL NAA+PROBE: NOT DETECTED
CALCIUM BLD-MCNC: 8.1 MG/DL (ref 8.5–10.1)
CHLORIDE SERPL-SCNC: 106 MMOL/L (ref 98–112)
CK SERPL-CCNC: 1366 U/L
CO2 SERPL-SCNC: 18 MMOL/L (ref 21–32)
CORONAVIRUS 229E PCR:: NOT DETECTED
CORONAVIRUS HKU1 PCR:: NOT DETECTED
CORONAVIRUS NL63 PCR:: NOT DETECTED
CORONAVIRUS OC43 PCR:: NOT DETECTED
CREAT BLD-MCNC: 2.49 MG/DL
CRP SERPL-MCNC: 3.17 MG/DL (ref ?–0.3)
D DIMER PPP FEU-MCNC: 3.05 UG/ML FEU (ref ?–0.73)
DEPRECATED HBV CORE AB SER IA-ACNC: 596.5 NG/ML
EOSINOPHIL # BLD AUTO: 0.01 X10(3) UL (ref 0–0.7)
EOSINOPHIL NFR BLD AUTO: 0.1 %
ERYTHROCYTE [DISTWIDTH] IN BLOOD BY AUTOMATED COUNT: 15.6 %
FLUAV RNA SPEC QL NAA+PROBE: NOT DETECTED
FLUBV RNA SPEC QL NAA+PROBE: NOT DETECTED
GFR SERPLBLD BASED ON 1.73 SQ M-ARVRAT: 20 ML/MIN/1.73M2 (ref 60–?)
GLOBULIN PLAS-MCNC: 4.7 G/DL (ref 2.8–4.4)
GLUCOSE BLD-MCNC: 142 MG/DL (ref 70–99)
GLUCOSE BLD-MCNC: 158 MG/DL (ref 70–99)
GLUCOSE BLD-MCNC: 169 MG/DL (ref 70–99)
GLUCOSE BLD-MCNC: 203 MG/DL (ref 70–99)
GLUCOSE BLD-MCNC: 215 MG/DL (ref 70–99)
GLUCOSE BLD-MCNC: 236 MG/DL (ref 70–99)
HCT VFR BLD AUTO: 33.5 %
HGB BLD-MCNC: 10.9 G/DL
IMM GRANULOCYTES # BLD AUTO: 0.06 X10(3) UL (ref 0–1)
IMM GRANULOCYTES NFR BLD: 0.9 %
L PNEUMO AG UR QL: NEGATIVE
LDH SERPL L TO P-CCNC: 345 U/L
LYMPHOCYTES # BLD AUTO: 0.67 X10(3) UL (ref 1–4)
LYMPHOCYTES NFR BLD AUTO: 9.9 %
MCH RBC QN AUTO: 29.9 PG (ref 26–34)
MCHC RBC AUTO-ENTMCNC: 32.5 G/DL (ref 31–37)
MCV RBC AUTO: 92 FL
METAPNEUMOVIRUS PCR:: NOT DETECTED
MONOCYTES # BLD AUTO: 0.34 X10(3) UL (ref 0.1–1)
MONOCYTES NFR BLD AUTO: 5 %
MYCOPLASMA PNEUMONIA PCR:: NOT DETECTED
NEUTROPHILS # BLD AUTO: 5.67 X10 (3) UL (ref 1.5–7.7)
NEUTROPHILS # BLD AUTO: 5.67 X10(3) UL (ref 1.5–7.7)
NEUTROPHILS NFR BLD AUTO: 83.7 %
NT-PROBNP SERPL-MCNC: ABNORMAL PG/ML (ref ?–125)
OSMOLALITY SERPL CALC.SUM OF ELEC: 304 MOSM/KG (ref 275–295)
PARAINFLUENZA 1 PCR:: NOT DETECTED
PARAINFLUENZA 2 PCR:: NOT DETECTED
PARAINFLUENZA 3 PCR:: NOT DETECTED
PARAINFLUENZA 4 PCR:: NOT DETECTED
PLATELET # BLD AUTO: 199 10(3)UL (ref 150–450)
POTASSIUM SERPL-SCNC: 3.9 MMOL/L (ref 3.5–5.1)
PROT SERPL-MCNC: 7.2 G/DL (ref 6.4–8.2)
RBC # BLD AUTO: 3.64 X10(6)UL
RHINOVIRUS/ENTERO PCR:: NOT DETECTED
RSV RNA SPEC QL NAA+PROBE: NOT DETECTED
SARS-COV-2 RNA NPH QL NAA+NON-PROBE: DETECTED
SODIUM SERPL-SCNC: 136 MMOL/L (ref 136–145)
STREP PNEUMO ANTIGEN, URINE: NEGATIVE
TROPONIN I HIGH SENSITIVITY: ABNORMAL NG/L
WBC # BLD AUTO: 6.8 X10(3) UL (ref 4–11)

## 2022-10-22 PROCEDURE — 99233 SBSQ HOSP IP/OBS HIGH 50: CPT | Performed by: INTERNAL MEDICINE

## 2022-10-22 RX ORDER — METOCLOPRAMIDE HYDROCHLORIDE 5 MG/ML
5 INJECTION INTRAMUSCULAR; INTRAVENOUS EVERY 8 HOURS PRN
Status: DISCONTINUED | OUTPATIENT
Start: 2022-10-22 | End: 2022-10-25

## 2022-10-22 RX ORDER — PANTOPRAZOLE SODIUM 40 MG/1
40 TABLET, DELAYED RELEASE ORAL
Status: DISCONTINUED | OUTPATIENT
Start: 2022-10-22 | End: 2022-10-25

## 2022-10-22 RX ORDER — SODIUM CHLORIDE, SODIUM LACTATE, POTASSIUM CHLORIDE, CALCIUM CHLORIDE 600; 310; 30; 20 MG/100ML; MG/100ML; MG/100ML; MG/100ML
INJECTION, SOLUTION INTRAVENOUS CONTINUOUS
Status: DISCONTINUED | OUTPATIENT
Start: 2022-10-22 | End: 2022-10-23

## 2022-10-22 RX ORDER — LEVETIRACETAM 500 MG/1
500 TABLET ORAL 2 TIMES DAILY
Status: DISCONTINUED | OUTPATIENT
Start: 2022-10-22 | End: 2022-10-25

## 2022-10-22 RX ORDER — ECHINACEA PURPUREA EXTRACT 125 MG
1 TABLET ORAL
Status: DISCONTINUED | OUTPATIENT
Start: 2022-10-22 | End: 2022-10-25

## 2022-10-22 RX ORDER — CLOPIDOGREL BISULFATE 75 MG/1
75 TABLET ORAL DAILY
Status: DISCONTINUED | OUTPATIENT
Start: 2022-10-22 | End: 2022-10-25

## 2022-10-22 RX ORDER — ROSUVASTATIN CALCIUM 20 MG/1
40 TABLET, COATED ORAL DAILY
Status: DISCONTINUED | OUTPATIENT
Start: 2022-10-22 | End: 2022-10-22

## 2022-10-22 RX ORDER — MELATONIN
3 NIGHTLY PRN
Status: DISCONTINUED | OUTPATIENT
Start: 2022-10-22 | End: 2022-10-25

## 2022-10-22 RX ORDER — ACETAMINOPHEN 500 MG
1000 TABLET ORAL EVERY 4 HOURS PRN
Status: DISCONTINUED | OUTPATIENT
Start: 2022-10-22 | End: 2022-10-25

## 2022-10-22 RX ORDER — SERTRALINE HYDROCHLORIDE 100 MG/1
100 TABLET, FILM COATED ORAL DAILY
Status: DISCONTINUED | OUTPATIENT
Start: 2022-10-22 | End: 2022-10-25

## 2022-10-22 RX ORDER — ONDANSETRON 2 MG/ML
4 INJECTION INTRAMUSCULAR; INTRAVENOUS EVERY 6 HOURS PRN
Status: DISCONTINUED | OUTPATIENT
Start: 2022-10-22 | End: 2022-10-25

## 2022-10-22 RX ORDER — ASPIRIN 81 MG/1
81 TABLET ORAL DAILY
Status: DISCONTINUED | OUTPATIENT
Start: 2022-10-22 | End: 2022-10-22

## 2022-10-22 RX ORDER — ASPIRIN 81 MG/1
81 TABLET ORAL DAILY
Status: DISCONTINUED | OUTPATIENT
Start: 2022-10-22 | End: 2022-10-25

## 2022-10-22 NOTE — PROGRESS NOTES
I discussed clinical findings with the patients daughter at bedside to clarify goals of care as per prior discussion with her cardiologist Dr Stephany Jimenez. She reiterated that patient does not want any invasive procedures or testing including coronary angiogram and was present when patient expressed these wishes with Dr Stephany Jimenez. As such will continue to treat medically with IV heparin, antibiotics, and pressors as needed. Obtain TTE in the morning.     Prince Owen MD  [address] [place] [address]

## 2022-10-22 NOTE — PROGRESS NOTES
[facility] Pharmacy Note:  Renal Adjustment for piperacillin/tazobactam (Kristina Mtz)    Charis Lao is a 67year old patient who has been prescribed piperacillin/tazobactam (ZOSYN) 3.375 g IVPB every 8 hrs. The estimated creatinine clearance is 13.6 mL/min (A) (based on SCr of 3.32 mg/dL (H)). The dose has been adjusted to piperacillin/tazobactam (ZOSYN) 3.375 g IVPB every 12 hrs per hospital renal dose adjustment protocol for treatment of sepsis. Pharmacy will follow and adjust dose as warranted for additional renal function changes.     Thank you,    Scarlet Mccracken, PharmD  10/21/2022  9:32 PM

## 2022-10-22 NOTE — PLAN OF CARE
Received pt AOX4, on 3L oxygen via nasal cannula. Pt with left arm and hand contracted with limited movement, right side full movement. Pt was taken for CT head without adverse events. First dose of Remdesivir given. Pt on levophed to maintain SBP > 90 or MAP > 65. PTT results > 200, heparin drip held for 1 hr and decrease rate by 200 per protocol. Keily Mule in place with excellent urine output,  Will continue to monitor and assess.          Problem: Diabetes/Glucose Control  Goal: Glucose maintained within prescribed range  Description: INTERVENTIONS:  - Monitor Blood Glucose as ordered  - Assess for signs and symptoms of hyperglycemia and hypoglycemia  - Administer ordered medications to maintain glucose within target range  - Assess barriers to adequate nutritional intake and initiate nutrition consult as needed  - Instruct patient on self management of diabetes  10/21/2022 2317 by Keaton Robbins RN  Outcome: Not Progressing  10/21/2022 2316 by Keaton Robbins RN  Outcome: Not Progressing     Problem: Patient/Family Goals  Goal: Patient/Family Long Term Goal  Description: Patient's Long Term Goal:     Interventions:  -  - See additional Care Plan goals for specific interventions  10/21/2022 2317 by Keaton Robbins RN  Outcome: Not Progressing  10/21/2022 2316 by Keaton Robbins RN  Outcome: Not Progressing  Goal: Patient/Family Short Term Goal  Description: Patient's Short Term Goal:     Interventions:   -  - See additional Care Plan goals for specific interventions  10/21/2022 2317 by Keaton Robbins RN  Outcome: Not Progressing  10/21/2022 2316 by Keaton Robbins RN  Outcome: Not Progressing     Problem: PAIN - ADULT  Goal: Verbalizes/displays adequate comfort level or patient's stated pain goal  Description: INTERVENTIONS:  - Encourage pt to monitor pain and request assistance  - Assess pain using appropriate pain scale  - Administer analgesics based on type and severity of pain and evaluate response  - Implement non-pharmacological measures as appropriate and evaluate response  - Consider cultural and social influences on pain and pain management  - Manage/alleviate anxiety  - Utilize distraction and/or relaxation techniques  - Monitor for opioid side effects  - Notify MD/LIP if interventions unsuccessful or patient reports new pain  - Anticipate increased pain with activity and pre-medicate as appropriate  Outcome: Not Progressing     Problem: RISK FOR INFECTION - ADULT  Goal: Absence of fever/infection during anticipated neutropenic period  Description: INTERVENTIONS  - Monitor WBC  - Administer growth factors as ordered  - Implement neutropenic guidelines  Outcome: Not Progressing     Problem: RISK FOR INFECTION - ADULT  Goal: Absence of fever/infection during anticipated neutropenic period  Description: INTERVENTIONS  - Monitor WBC  - Administer growth factors as ordered  - Implement neutropenic guidelines  Outcome: Not Progressing     Problem: SAFETY ADULT - FALL  Goal: Free from fall injury  Description: INTERVENTIONS:  - Assess pt frequently for physical needs  - Identify cognitive and physical deficits and behaviors that affect risk of falls.   - [place] fall precautions as indicated by assessment.  - Educate pt/family on patient safety including physical limitations  - Instruct pt to call for assistance with activity based on assessment  - Modify environment to reduce risk of injury  - Provide assistive devices as appropriate  - Consider OT/PT consult to assist with strengthening/mobility  - Encourage toileting schedule  Outcome: Not Progressing     Problem: DISCHARGE PLANNING  Goal: Discharge to home or other facility with appropriate resources  Description: INTERVENTIONS:  - Identify barriers to discharge w/pt and caregiver  - Include patient/family/discharge partner in discharge planning  - Arrange for needed discharge resources and transportation as appropriate  - Identify discharge learning needs (meds, wound care, etc)  - Arrange for interpreters to assist at discharge as needed  - Consider post-discharge preferences of patient/family/discharge partner  - Complete POLST form as appropriate  - Assess patient's ability to be responsible for managing their own health  - Refer to Case Management Department for coordinating discharge planning if the patient needs post-hospital services based on physician/LIP order or complex needs related to functional status, cognitive ability or social support system  Outcome: Not Progressing

## 2022-10-22 NOTE — HISTORICAL OFFICE NOTE
[facility]  [facility], 4th floor, [place], [address]  [phone]      Sandeep Martinez  Progress Note  Demographics:  Name: Sandeep Martinez YOB: 1950  Age: 67, Female Medical Record No: [id number]  Visited Date/Time: 09/06/2022 01:45 PM    Chief Complaints  Follow up CAD  History of Present Illness  Ms. Preston Ansari is a pleasant 76-year-old lady with history of myocardial infarction with coronary artery  disease and stenting. She had a redo bypass surgery with an ejection fraction of 50%. She has had  hypertension as well as COPD. She has a smoking history, diabetes and significant carotid disease  and has seen Dr. Milagro Andres with carotid endarterectomy. She had a CVA with hemiparesis. Currently,  she denies any angina or heart failure symptoms. She quit smoking. She definitely feels a lot better. She still has hemiparesis after her stroke. Carotid assessment is stable. She stated that she does not want to have any further cardiac surgery or any cardiac work-up or evaluation,  just intermittent blood work. We will respect her wishes. MCT  1. This is a poor quality study. 2. Predominant rhythm is sinus bradycardia. 3. The minimum heart rate recorded was 44 beats / minute. The maximum heart rate is 125 beats / minute. The mean heart rate is 58 beats / minute. 4. Afib burden 0%    We talked about a Watchman device but she does not want to pursure that. She had a burst of afib when she was in the hospital but has been [facility] since then. she has not wanted to be on coumadin/Xarelto/Eliquis. Cardiac risk factors Diabetes, Hypertension, Dyslipidemia, Physical inactivity and Former smoker  Risk equivalent CVA/Carotid Disease  Past Medical History  1.CAD (coronary artery disease)  2. Carotid artery stenosis  3. Family history of coronary arteriosclerosis  4. Pure hypercholesterolemia  5. Hypertension, benign  6. CHF (congestive heart failure)  7. CKD (chronic kidney disease) stage 3, GFR 30-59 ml/min  8. COPD (chronic obstructive pulmonary disease)  9. History of CVA with residual deficit  Past Surgical History  1. Hx of CABG  2. History of carotid endarterectomy  Family History  1. Father - CAD native (coronary artery disease)  2. Mother - CAD native (coronary artery disease)  3. Brother - CAD native (coronary artery disease)  Social History  Smoking status Former [id number] (End Date)  Tobacco usage - No (Current non-smoker (finding))  Alcohol usage - Yes (None)  Review of systems  Cardiovascular No history of Chest pain, KHAN, Palpitations, Syncope, PND, Orthopnea, Edema and Claudication  Genitourinary No history of Dysuria, Hematuria, Frequency and ED  Musculoskeletal No history of Myalgias and Arthritis  Respiratory SOB  No history of Wheezing and Sputum  Physical Examination  Vitals Right Arm Sitting  / 68 mmHg, Pulse rate 68 bpm, Height in 5' 5\", BMI: 26.6, Weight in 160 lbs (or) 73 kgs and BSA : 1.84 cc/m²  General Appearance No Acute Distress and Appropriate  Head/Eyes/Ears/Nose/Mouth/Throat Conjunctiva pink, Sclera Clear, Mucous membranes Moist and No pallor  Neck No carotid bruits and No JVD  Respiratory Lungs clear with normal breath sounds  Cardiovascular Intact distal pulses and Regular rhythm. Normal rate present. Normal and normal S1 and S2    Gastrointestinal Abdomen soft and Non-tender  Musculoskeletal generalized weakness post cva  Upper Extremities No edema  Lower Extremities has a brace. wheelchair bound. Skin Warm and dry  Neurologic / Psychiatric hemiparesis  Allergies  1.atorvastatin calcium - Ingredient(Reaction:Myalgia, Severity:Unknown)  2.bupropion - Ingredient(Reaction:Tremors, Severity:Unknown)  Medications (Info obtained by: List)  1.acetaminophen 325 mg tablet, Take 2 tablets orally every 4-6 hours as needed. 2.amLODIPine 10 mg tablet, Take 1 tablet orally once a day.   3.aspirin 81 MG EC tablet, Take 81 mg by mouth daily.  4.baclofen 5 mg tablet, Take 1 tablet orally 3 times a day. 5.bethanechol chloride 25 mg tablet, Take 1 tablet orally 3 times a day. 6.clopidogrel (PLAVIX) 75 MG tablet, Take 1 tablet by mouth daily. 7.ezetimibe (ZETIA) 10 MG tablet, TAKE 1 TABLET BY MOUTH NIGHTLY GENERIC EQUIVALENT FOR ZETIA  8.furosemide 20 mg tablet, Take 1 tablet orally once a day. 9.heparin (porcine) 5,000 unit/mL injection syringe, Syringe (injection) every 8 hours. 10.hydrALAZINE 100 mg tablet, Take 1 tablet orally 2 times a day. 11.insulin aspart (U-100) 100 unit/mL (3 mL) subcutaneous pen, As directed by physician orders  12. insulin glargine (LANTUS SOLOSTAR) 100 UNIT/ML pen-injector, INJECT 10 UNITS UNDER THE SKIN EVERY MORNING  13.levETIRAcetam (KEPPRA) 500 MG tablet, Take 500 mg by mouth 2 times daily. 14.losartan 50 mg tablet, Take 1 tablet orally 2 times a day. 15.rosuvastatin 40 mg tablet, Take 1 tablet orally once a day. 16.sertraline (ZOLOFT) 100 MG tablet, TAKE 1 TABLET BY MOUTH DAILY  17.spironolactone 25 mg tablet, Take one-half tablet orally once a day. 18.insulin glargine-yfgn (U-100) 100 unit/mL (3 mL) subcutaneous pen, Inject as directed by physician orders  19.levETIRAcetam 500 mg tablet, Take 1 tablet orally 2 times a day. 20.losartan 50 mg tablet, Take 1 tablet orally 2 times a day. 21.metoprolol succinate ER 25 mg tablet,extended release 24 hr, Take one-half tablet orally once a day. 22.Miralax 17 gram/dose oral powder, Take 17 gm (oral) once a day. 23.Protonix 40 mg tablet,delayed release, Take 1 tablet orally once a day. 24.rosuvastatin 40 mg tablet, Take 1 tablet orally once a day. 25.Senna Laxative 8.6 mg tablet, Take 2 tablets orally once in the evening. 26.spironolactone 25 mg tablet, Take one-half tablet orally once a day. 27.tamsulosin 0.4 mg capsule, Take 1 capsule orally once a day. Impression  1.CAD (coronary artery disease)  2. Carotid artery stenosis  3. Hx of CABG  4. Pure hypercholesterolemia  5. Hypertension, benign  6. CHF (congestive heart failure)  7. COPD (chronic obstructive pulmonary disease)  8. History of carotid endarterectomy  9. History of CVA with residual deficit  Assessment & Plan  Ms. Alie Bobby is a pleasant 71-year-old lady with a history of myocardial infarction with coronary artery disease and stenting. She also had a history of bypass surgery and he has an ejection fraction of 50%. She has COPD as well as hypertension and dyslipidemia as well as diabetes. She has had significant carotid disease and had a carotid endarterectomy with Dr. Magdy Puente. She also is mainly wheelchair-bound given CVA with hemiparesis. As stated earlier, she does not want to pursue any further cardiac work-up, evaluation or testing and no further cardiac intervention. We will respect her wishes. Her next blood work will be in 6 months (CBC, CMP, fasting lipid panel, TSH). She is mainly wheelchair-bound but would try to have rehab as best as she can at the center that she is living. Please return to clinic to see me in 6 months. Labs and Diagnostics ordered  1. CBC w/ Differential (6 Months)  2. CMP (6 Months)  3. Lipid panel, Fasting (6 Months)  4. TSH (Thyroid Stimulating Hormone) Panel (6 Months)  Future appointments  1. Referral Visit - María Glez ([email or web address]) : (Today)  2. Follow up visit - Mimi Sheppard (6 Months)  Nurses documentation  Refills:  Upcoming surgeries:  Use of assistive device:   No s/s of Covid-19     Patient instructions  Ms. Alie Bobby is a pleasant 71-year-old lady with a history of myocardial infarction with coronary artery disease and stenting. She also had a history of bypass surgery and he has an ejection fraction of 50%. She has COPD as well as hypertension and dyslipidemia as well as diabetes. She has had significant carotid disease and had a carotid endarterectomy with Dr. Magdy Puente.   She also is mainly wheelchair-bound given CVA with hemiparesis. As stated earlier, she does not want to pursue any further cardiac work-up, evaluation or testing and no further cardiac intervention. We will respect her wishes. Her next blood work will be in 6 months (CBC, CMP, fasting lipid panel, TSH). She is mainly wheelchair-bound but would try to have rehab as best as she can at the center that she is living. Please return to clinic to see me in 6 months. Labs Fasting: Your provider has ordered bloodwork of CBC,CMP, FLP and TSH to be drawn 1-2 weeks prior to your 6 month follow up with . You will need to be fasting, nothing to eat or drink after midnight prior to the blood work. You are allowed to take your medications with a sip of water. If you are on Insulin please TAKE/HOLD your Insulin. Follow up appointment scheduled: Your provider has requested for you to follow up in 6 months. An appointment will be made for you as you leave your visit. It is always important to bring all your medications including over the counter medications, vitamins and supplements to your doctor visits. This will assist in providing the best care for your condition. Please bring your insurance cards to your appointment. Diagnostics Details  ACTMonitoring 07/13/2022  1. This is a poor quality study. 2.Predominant rhythm is sinus bradycardia. 3.The minimum heart rate recorded was 44 beats / minute. The maximum heart rate is 125 beats / minute. The mean heart rate is 58 beats / minute. 4.Afib burden 0%    CPOE Orders carried out by: [facility] SUMAN Asher Providers: Comfort Kerr MD and [facility] SUMAN Schwarz  Electronically Authenticated by  Silke Uribe MD  09/06/2022 02:54:10 PM  Disclaimer: Components of this note were documented using voice recognition system and are subject to errors not corrected at proofreading. Contact the author of this note for any clarifications.

## 2022-10-22 NOTE — ED QUICK NOTES
Report endorsed to Will RN assuming patient care at this time. Patient and family updated on POC, call light within reach, on continuous cardiac monitoring. This RN attempted to call ICU for report on patient, no one available at this time, will attempt again shortly.

## 2022-10-22 NOTE — PLAN OF CARE
Pt remains on low dose levo to maintain MAP >65 per critical care. On 2L nc, attempted to wean but desats to <90%. On heparin gtt per afib protocol. afib w/ rates controlled in 90s-low 100s. Plan for echo today and possible vq scan Monday. Pt denies cp or sob. aox4 but forgetful. LR infusing, has good UO. Spoke w/ RN @ [facility] who stated pcr negative for COVID on 10/19,  notified. resp panel done and pt positive for COVID today.  updated- plan to cont current med regimen for COVID tx and IV abx. Katherine updated on poc.

## 2022-10-23 ENCOUNTER — APPOINTMENT (OUTPATIENT)
Dept: CV DIAGNOSTICS | Facility: HOSPITAL | Age: 72
End: 2022-10-23
Attending: NURSE PRACTITIONER
Payer: MEDICARE

## 2022-10-23 LAB
ALBUMIN SERPL-MCNC: 2.1 G/DL (ref 3.4–5)
ALBUMIN/GLOB SERPL: 0.5 {RATIO} (ref 1–2)
ALP LIVER SERPL-CCNC: 52 U/L
ALT SERPL-CCNC: 110 U/L
ANION GAP SERPL CALC-SCNC: 6 MMOL/L (ref 0–18)
APTT PPP: 70.9 SECONDS (ref 23.3–35.6)
AST SERPL-CCNC: 141 U/L (ref 15–37)
ATRIAL RATE: 125 BPM
ATRIAL RATE: 72 BPM
BASOPHILS # BLD AUTO: 0.02 X10(3) UL (ref 0–0.2)
BASOPHILS NFR BLD AUTO: 0.4 %
BILIRUB SERPL-MCNC: 0.3 MG/DL (ref 0.1–2)
BUN BLD-MCNC: 39 MG/DL (ref 7–18)
CALCIUM BLD-MCNC: 8.1 MG/DL (ref 8.5–10.1)
CHLORIDE SERPL-SCNC: 110 MMOL/L (ref 98–112)
CO2 SERPL-SCNC: 22 MMOL/L (ref 21–32)
CREAT BLD-MCNC: 1.77 MG/DL
CRP SERPL-MCNC: 2.11 MG/DL (ref ?–0.3)
D DIMER PPP FEU-MCNC: 2.14 UG/ML FEU (ref ?–0.73)
DEPRECATED HBV CORE AB SER IA-ACNC: 556.3 NG/ML
EOSINOPHIL # BLD AUTO: 0.02 X10(3) UL (ref 0–0.7)
EOSINOPHIL NFR BLD AUTO: 0.4 %
ERYTHROCYTE [DISTWIDTH] IN BLOOD BY AUTOMATED COUNT: 15.5 %
GFR SERPLBLD BASED ON 1.73 SQ M-ARVRAT: 30 ML/MIN/1.73M2 (ref 60–?)
GLOBULIN PLAS-MCNC: 4.1 G/DL (ref 2.8–4.4)
GLUCOSE BLD-MCNC: 105 MG/DL (ref 70–99)
GLUCOSE BLD-MCNC: 106 MG/DL (ref 70–99)
GLUCOSE BLD-MCNC: 76 MG/DL (ref 70–99)
GLUCOSE BLD-MCNC: 80 MG/DL (ref 70–99)
GLUCOSE BLD-MCNC: 96 MG/DL (ref 70–99)
HCT VFR BLD AUTO: 31.7 %
HGB BLD-MCNC: 10.3 G/DL
IMM GRANULOCYTES # BLD AUTO: 0.03 X10(3) UL (ref 0–1)
IMM GRANULOCYTES NFR BLD: 0.6 %
LDH SERPL L TO P-CCNC: 326 U/L
LYMPHOCYTES # BLD AUTO: 1.35 X10(3) UL (ref 1–4)
LYMPHOCYTES NFR BLD AUTO: 28.8 %
MAGNESIUM SERPL-MCNC: 1.6 MG/DL (ref 1.6–2.6)
MCH RBC QN AUTO: 29.8 PG (ref 26–34)
MCHC RBC AUTO-ENTMCNC: 32.5 G/DL (ref 31–37)
MCV RBC AUTO: 91.6 FL
MONOCYTES # BLD AUTO: 0.57 X10(3) UL (ref 0.1–1)
MONOCYTES NFR BLD AUTO: 12.2 %
NEUTROPHILS # BLD AUTO: 2.7 X10 (3) UL (ref 1.5–7.7)
NEUTROPHILS # BLD AUTO: 2.7 X10(3) UL (ref 1.5–7.7)
NEUTROPHILS NFR BLD AUTO: 57.6 %
OSMOLALITY SERPL CALC.SUM OF ELEC: 295 MOSM/KG (ref 275–295)
PHOSPHATE SERPL-MCNC: 3.3 MG/DL (ref 2.5–4.9)
PLATELET # BLD AUTO: 163 10(3)UL (ref 150–450)
POTASSIUM SERPL-SCNC: 3.8 MMOL/L (ref 3.5–5.1)
PROT SERPL-MCNC: 6.2 G/DL (ref 6.4–8.2)
Q-T INTERVAL: 366 MS
Q-T INTERVAL: 378 MS
QRS DURATION: 106 MS
QRS DURATION: 94 MS
QTC CALCULATION (BEZET): 469 MS
QTC CALCULATION (BEZET): 504 MS
R AXIS: -17 DEGREES
R AXIS: -32 DEGREES
RBC # BLD AUTO: 3.46 X10(6)UL
SODIUM SERPL-SCNC: 138 MMOL/L (ref 136–145)
T AXIS: 51 DEGREES
T AXIS: 90 DEGREES
VENTRICULAR RATE: 114 BPM
VENTRICULAR RATE: 93 BPM
WBC # BLD AUTO: 4.7 X10(3) UL (ref 4–11)

## 2022-10-23 PROCEDURE — 93306 TTE W/DOPPLER COMPLETE: CPT | Performed by: NURSE PRACTITIONER

## 2022-10-23 PROCEDURE — 99233 SBSQ HOSP IP/OBS HIGH 50: CPT | Performed by: INTERNAL MEDICINE

## 2022-10-23 RX ORDER — POLYETHYLENE GLYCOL 3350 17 G/17G
17 POWDER, FOR SOLUTION ORAL DAILY
Status: DISCONTINUED | OUTPATIENT
Start: 2022-10-23 | End: 2022-10-25

## 2022-10-23 RX ORDER — MIDODRINE HYDROCHLORIDE 5 MG/1
10 TABLET ORAL 3 TIMES DAILY
Status: DISCONTINUED | OUTPATIENT
Start: 2022-10-23 | End: 2022-10-25

## 2022-10-23 RX ORDER — MIDODRINE HYDROCHLORIDE 5 MG/1
5 TABLET ORAL 3 TIMES DAILY
Status: DISCONTINUED | OUTPATIENT
Start: 2022-10-23 | End: 2022-10-23

## 2022-10-23 RX ORDER — CEFAZOLIN SODIUM/WATER 2 G/20 ML
2 SYRINGE (ML) INTRAVENOUS EVERY 12 HOURS
Status: DISCONTINUED | OUTPATIENT
Start: 2022-10-23 | End: 2022-10-25

## 2022-10-23 NOTE — PLAN OF CARE
Received patient after report. Patient resting in bed on 2L NC. Patient removing NC from nose. Sats WNL. Levo shut off. See orders for BP parameters and midodrine. Purewick in place. Heparin gtt infusing, per labs/protocol, PTT therapeutic. Denies pain. Updated on plan of care.

## 2022-10-23 NOTE — PHYSICAL THERAPY NOTE
Order received chart reviewed and noted patient with VQ scan pending. Will defer PT services at present RN aware.

## 2022-10-23 NOTE — PLAN OF CARE
Problem: Diabetes/Glucose Control  Goal: Glucose maintained within prescribed range  Description: INTERVENTIONS:  - Monitor Blood Glucose as ordered  - Assess for signs and symptoms of hyperglycemia and hypoglycemia  - Administer ordered medications to maintain glucose within target range  - Assess barriers to adequate nutritional intake and initiate nutrition consult as needed  - Instruct patient on self management of diabetes  Outcome: Not Progressing     Problem: Patient/Family Goals  Goal: Patient/Family Long Term Goal  Description: Patient's Long Term Goal: return to rehab    Interventions:  - pt/ot  - have vs stabilize  - r/o pe, acs  - have afib rates controlled  - See additional Care Plan goals for specific interventions  Outcome: Not Progressing  Goal: Patient/Family Short Term Goal  Description: Patient's Short Term Goal: get out of icu    Interventions:   - wean off pressors  - vq to r/o PE  - have troponin trend down  - See additional Care Plan goals for specific interventions  Outcome: Not Progressing     Problem: RISK FOR INFECTION - ADULT  Goal: Absence of fever/infection during anticipated neutropenic period  Description: INTERVENTIONS  - Monitor WBC  - Administer growth factors as ordered  - Implement neutropenic guidelines  Outcome: Not Progressing     Problem: SAFETY ADULT - FALL  Goal: Free from fall injury  Description: INTERVENTIONS:  - Assess pt frequently for physical needs  - Identify cognitive and physical deficits and behaviors that affect risk of falls.   - Zap fall precautions as indicated by assessment.  - Educate pt/family on patient safety including physical limitations  - Instruct pt to call for assistance with activity based on assessment  - Modify environment to reduce risk of injury  - Provide assistive devices as appropriate  - Consider OT/PT consult to assist with strengthening/mobility  - Encourage toileting schedule  Outcome: Not Progressing     Problem: DISCHARGE PLANNING  Goal: Discharge to home or other facility with appropriate resources  Description: INTERVENTIONS:  - Identify barriers to discharge w/pt and caregiver  - Include patient/family/discharge partner in discharge planning  - Arrange for needed discharge resources and transportation as appropriate  - Identify discharge learning needs (meds, wound care, etc)  - Arrange for interpreters to assist at discharge as needed  - Consider post-discharge preferences of patient/family/discharge partner  - Complete POLST form as appropriate  - Assess patient's ability to be responsible for managing their own health  - Refer to Case Management Department for coordinating discharge planning if the patient needs post-hospital services based on physician/LIP order or complex needs related to functional status, cognitive ability or social support system  Outcome: Not Progressing

## 2022-10-23 NOTE — PLAN OF CARE
Received pt AOX4 with period of forgetfulness, pt on 2L oxygen via nasal cannula. Levophed titrated down but unable to turn off completely. APN notified, received order for midodrine. PO.  Will continue to monitor and assess. Problem: Patient/Family Goals  Goal: Patient/Family Long Term Goal  Description: Patient's Long Term Goal: return to rehab    Interventions:  - pt/ot  - have vs stabilize  - r/o pe, acs  - have afib rates controlled  - See additional Care Plan goals for specific interventions  10/22/2022 2047 by Liza Peraza RN  Outcome: Not Progressing  10/22/2022 2046 by Liza Peraza RN  Outcome: Not Progressing  Goal: Patient/Family Short Term Goal  Description: Patient's Short Term Goal: get out of icu    Interventions:   - wean off pressors  - vq to r/o PE  - have troponin trend down  - See additional Care Plan goals for specific interventions  10/22/2022 2047 by Liza Peraza RN  Outcome: Not Progressing  10/22/2022 2046 by Liza Peraza RN  Outcome: Not Progressing     Problem: RISK FOR INFECTION - ADULT  Goal: Absence of fever/infection during anticipated neutropenic period  Description: INTERVENTIONS  - Monitor WBC  - Administer growth factors as ordered  - Implement neutropenic guidelines  10/22/2022 2047 by Liza Peraza RN  Outcome: Not Progressing  10/22/2022 2046 by Liza Peraza RN  Outcome: Not Progressing     Problem: SAFETY ADULT - FALL  Goal: Free from fall injury  Description: INTERVENTIONS:  - Assess pt frequently for physical needs  - Identify cognitive and physical deficits and behaviors that affect risk of falls.   - Shelly fall precautions as indicated by assessment.  - Educate pt/family on patient safety including physical limitations  - Instruct pt to call for assistance with activity based on assessment  - Modify environment to reduce risk of injury  - Provide assistive devices as appropriate  - Consider OT/PT consult to assist with strengthening/mobility  - Encourage toileting schedule  10/22/2022 2047 by Nisa Amos RN  Outcome: Not Progressing  10/22/2022 2046 by Nisa Amos RN  Outcome: Not Progressing     Problem: SAFETY ADULT - FALL  Goal: Free from fall injury  Description: INTERVENTIONS:  - Assess pt frequently for physical needs  - Identify cognitive and physical deficits and behaviors that affect risk of falls.   - Plato fall precautions as indicated by assessment.  - Educate pt/family on patient safety including physical limitations  - Instruct pt to call for assistance with activity based on assessment  - Modify environment to reduce risk of injury  - Provide assistive devices as appropriate  - Consider OT/PT consult to assist with strengthening/mobility  - Encourage toileting schedule  10/22/2022 2047 by Nisa Amos RN  Outcome: Not Progressing  10/22/2022 2046 by Nisa Amos RN  Outcome: Not Progressing     Problem: DISCHARGE PLANNING  Goal: Discharge to home or other facility with appropriate resources  Description: INTERVENTIONS:  - Identify barriers to discharge w/pt and caregiver  - Include patient/family/discharge partner in discharge planning  - Arrange for needed discharge resources and transportation as appropriate  - Identify discharge learning needs (meds, wound care, etc)  - Arrange for interpreters to assist at discharge as needed  - Consider post-discharge preferences of patient/family/discharge partner  - Complete POLST form as appropriate  - Assess patient's ability to be responsible for managing their own health  - Refer to Case Management Department for coordinating discharge planning if the patient needs post-hospital services based on physician/LIP order or complex needs related to functional status, cognitive ability or social support system  10/22/2022 2047 by Nisa Amos RN  Outcome: Not Progressing  10/22/2022 2046 by Nisa Amos RN  Outcome: Not Progressing

## 2022-10-24 ENCOUNTER — APPOINTMENT (OUTPATIENT)
Dept: NUCLEAR MEDICINE | Facility: HOSPITAL | Age: 72
End: 2022-10-24
Attending: INTERNAL MEDICINE
Payer: MEDICARE

## 2022-10-24 ENCOUNTER — APPOINTMENT (OUTPATIENT)
Dept: GENERAL RADIOLOGY | Facility: HOSPITAL | Age: 72
End: 2022-10-24
Attending: INTERNAL MEDICINE
Payer: MEDICARE

## 2022-10-24 PROBLEM — Z51.5 PALLIATIVE CARE ENCOUNTER: Status: ACTIVE | Noted: 2022-10-24

## 2022-10-24 PROBLEM — Z71.89 GOALS OF CARE, COUNSELING/DISCUSSION: Status: ACTIVE | Noted: 2022-10-24

## 2022-10-24 LAB
ALBUMIN SERPL-MCNC: 2.3 G/DL (ref 3.4–5)
ALBUMIN/GLOB SERPL: 0.6 {RATIO} (ref 1–2)
ALP LIVER SERPL-CCNC: 56 U/L
ALT SERPL-CCNC: 68 U/L
ANION GAP SERPL CALC-SCNC: 6 MMOL/L (ref 0–18)
APTT PPP: 55 SECONDS (ref 23.3–35.6)
AST SERPL-CCNC: 109 U/L (ref 15–37)
BASOPHILS # BLD AUTO: 0.01 X10(3) UL (ref 0–0.2)
BASOPHILS NFR BLD AUTO: 0.2 %
BILIRUB SERPL-MCNC: 0.3 MG/DL (ref 0.1–2)
BUN BLD-MCNC: 33 MG/DL (ref 7–18)
CALCIUM BLD-MCNC: 8.2 MG/DL (ref 8.5–10.1)
CHLORIDE SERPL-SCNC: 109 MMOL/L (ref 98–112)
CO2 SERPL-SCNC: 24 MMOL/L (ref 21–32)
CREAT BLD-MCNC: 1.49 MG/DL
CRP SERPL-MCNC: 1.6 MG/DL (ref ?–0.3)
D DIMER PPP FEU-MCNC: 1.51 UG/ML FEU (ref ?–0.73)
DEPRECATED HBV CORE AB SER IA-ACNC: 501.5 NG/ML
EOSINOPHIL # BLD AUTO: 0.01 X10(3) UL (ref 0–0.7)
EOSINOPHIL NFR BLD AUTO: 0.2 %
ERYTHROCYTE [DISTWIDTH] IN BLOOD BY AUTOMATED COUNT: 15.7 %
GFR SERPLBLD BASED ON 1.73 SQ M-ARVRAT: 37 ML/MIN/1.73M2 (ref 60–?)
GLOBULIN PLAS-MCNC: 4.1 G/DL (ref 2.8–4.4)
GLUCOSE BLD-MCNC: 133 MG/DL (ref 70–99)
GLUCOSE BLD-MCNC: 152 MG/DL (ref 70–99)
GLUCOSE BLD-MCNC: 175 MG/DL (ref 70–99)
GLUCOSE BLD-MCNC: 182 MG/DL (ref 70–99)
GLUCOSE BLD-MCNC: 198 MG/DL (ref 70–99)
HCT VFR BLD AUTO: 32.1 %
HGB BLD-MCNC: 10.5 G/DL
IMM GRANULOCYTES # BLD AUTO: 0.05 X10(3) UL (ref 0–1)
IMM GRANULOCYTES NFR BLD: 1.1 %
LDH SERPL L TO P-CCNC: 311 U/L
LYMPHOCYTES # BLD AUTO: 0.46 X10(3) UL (ref 1–4)
LYMPHOCYTES NFR BLD AUTO: 10.1 %
MAGNESIUM SERPL-MCNC: 1.6 MG/DL (ref 1.6–2.6)
MCH RBC QN AUTO: 30.1 PG (ref 26–34)
MCHC RBC AUTO-ENTMCNC: 32.7 G/DL (ref 31–37)
MCV RBC AUTO: 92 FL
MONOCYTES # BLD AUTO: 0.19 X10(3) UL (ref 0.1–1)
MONOCYTES NFR BLD AUTO: 4.2 %
NEUTROPHILS # BLD AUTO: 3.83 X10 (3) UL (ref 1.5–7.7)
NEUTROPHILS # BLD AUTO: 3.83 X10(3) UL (ref 1.5–7.7)
NEUTROPHILS NFR BLD AUTO: 84.2 %
OSMOLALITY SERPL CALC.SUM OF ELEC: 297 MOSM/KG (ref 275–295)
PHOSPHATE SERPL-MCNC: 3.6 MG/DL (ref 2.5–4.9)
PLATELET # BLD AUTO: 146 10(3)UL (ref 150–450)
POTASSIUM SERPL-SCNC: 4 MMOL/L (ref 3.5–5.1)
PROT SERPL-MCNC: 6.4 G/DL (ref 6.4–8.2)
RBC # BLD AUTO: 3.49 X10(6)UL
SODIUM SERPL-SCNC: 139 MMOL/L (ref 136–145)
WBC # BLD AUTO: 4.6 X10(3) UL (ref 4–11)

## 2022-10-24 PROCEDURE — 99233 SBSQ HOSP IP/OBS HIGH 50: CPT | Performed by: INTERNAL MEDICINE

## 2022-10-24 PROCEDURE — 78580 LUNG PERFUSION IMAGING: CPT | Performed by: INTERNAL MEDICINE

## 2022-10-24 PROCEDURE — 71045 X-RAY EXAM CHEST 1 VIEW: CPT | Performed by: INTERNAL MEDICINE

## 2022-10-24 PROCEDURE — 99497 ADVNCD CARE PLAN 30 MIN: CPT | Performed by: CLINICAL NURSE SPECIALIST

## 2022-10-24 PROCEDURE — 99222 1ST HOSP IP/OBS MODERATE 55: CPT | Performed by: CLINICAL NURSE SPECIALIST

## 2022-10-24 NOTE — CM/SW NOTE
Pt is a 68 yo female admitted for hypotension. Pt came from [facility] where she was getting AMY - she has since run out of her medicare days and now family is paying privately for her stay at [facility]. Pt is +Covid on 10/22. Elvia Harman from [facility] met with dtr who says she will most likely have pt return to [facility] upon dc until she can get caregivers in place at home and then bring her home. [facility] - referrals sent in [facility] ([facility] not go to [facility]) - waiting for responses.   SW to f/u with setting up [address] at [facility] for pt.       10/24/22 1500   CM/SW Referral Data   Referral Source Physician   Reason for Referral Discharge planning   Informant Clinical Staff Member   Patient Info   Patient's Home Environment Subacute Rehab   Post Acute Care Provider Upon Admission Al Score   Discharge Needs   Anticipated D/C needs Subacute rehab   Services Requested   PASRR Level 1 Submitted No - Current within 90 days

## 2022-10-24 NOTE — PLAN OF CARE
Problem: Diabetes/Glucose Control  Goal: Glucose maintained within prescribed range  Description: INTERVENTIONS:  - Monitor Blood Glucose as ordered  - Assess for signs and symptoms of hyperglycemia and hypoglycemia  - Administer ordered medications to maintain glucose within target range  - Assess barriers to adequate nutritional intake and initiate nutrition consult as needed  - Instruct patient on self management of diabetes  Outcome: Progressing     Problem: Patient/Family Goals  Goal: Patient/Family Long Term Goal  Description: Patient's Long Term Goal: return to rehab    Interventions:  - pt/ot  - have vs stabilize  - r/o pe, acs  - have afib rates controlled  - See additional Care Plan goals for specific interventions  Outcome: Progressing  Goal: Patient/Family Short Term Goal  Description: Patient's Short Term Goal: get out of icu    Interventions:   - wean off pressors  - vq to r/o PE  - have troponin trend down  - See additional Care Plan goals for specific interventions  Outcome: Progressing     Problem: RISK FOR INFECTION - ADULT  Goal: Absence of fever/infection during anticipated neutropenic period  Description: INTERVENTIONS  - Monitor WBC  - Administer growth factors as ordered  - Implement neutropenic guidelines  Outcome: Progressing     Problem: SAFETY ADULT - FALL  Goal: Free from fall injury  Description: INTERVENTIONS:  - Assess pt frequently for physical needs  - Identify cognitive and physical deficits and behaviors that affect risk of falls.   - Timpson fall precautions as indicated by assessment.  - Educate pt/family on patient safety including physical limitations  - Instruct pt to call for assistance with activity based on assessment  - Modify environment to reduce risk of injury  - Provide assistive devices as appropriate  - Consider OT/PT consult to assist with strengthening/mobility  - Encourage toileting schedule  Outcome: Progressing     Problem: DISCHARGE PLANNING  Goal: Discharge to home or other facility with appropriate resources  Description: INTERVENTIONS:  - Identify barriers to discharge w/pt and caregiver  - Include patient/family/discharge partner in discharge planning  - Arrange for needed discharge resources and transportation as appropriate  - Identify discharge learning needs (meds, wound care, etc)  - Arrange for interpreters to assist at discharge as needed  - Consider post-discharge preferences of patient/family/discharge partner  - Complete POLST form as appropriate  - Assess patient's ability to be responsible for managing their own health  - Refer to Case Management Department for coordinating discharge planning if the patient needs post-hospital services based on physician/LIP order or complex needs related to functional status, cognitive ability or social support system  Outcome: Progressing

## 2022-10-24 NOTE — PROGRESS NOTES
NURSING ADMISSION NOTE      Patient admitted via Cart  Oriented to room. 519  Safety precautions initiated. Bed in low position. Call light in reach. Received Pt AOx4. L arm contracture, L sided wknss. Seizure precautions. VSS,afebrile. O2 sats >90% on 3L. Afib on tele, Hep gtt at 300 units/hr. Strict I/Os, DW.  CVC, IV SL. Incont x2, purewick briefed. Redness to perineal area, barrier cream. QID accucheck Pt updated on POC. Takes pills with water. Call light within reach on right side, safety precautions in place. No further pt needs at this time.

## 2022-10-24 NOTE — CM/SW NOTE
Department  notified of request for derek REYES referrals started. Assigned CM/SW to follow up with pt/family on further discharge planning.      Petr Cox Bransonsusu  [facility]

## 2022-10-25 VITALS
DIASTOLIC BLOOD PRESSURE: 64 MMHG | SYSTOLIC BLOOD PRESSURE: 147 MMHG | BODY MASS INDEX: 25.24 KG/M2 | WEIGHT: 151.5 LBS | HEIGHT: 65 IN | OXYGEN SATURATION: 96 % | RESPIRATION RATE: 18 BRPM | HEART RATE: 76 BPM | TEMPERATURE: 98 F

## 2022-10-25 LAB
ALBUMIN SERPL-MCNC: 2.4 G/DL (ref 3.4–5)
ALBUMIN/GLOB SERPL: 0.5 {RATIO} (ref 1–2)
ALP LIVER SERPL-CCNC: 61 U/L
ALT SERPL-CCNC: 30 U/L
ANION GAP SERPL CALC-SCNC: 7 MMOL/L (ref 0–18)
AST SERPL-CCNC: 68 U/L (ref 15–37)
BASOPHILS # BLD AUTO: 0 X10(3) UL (ref 0–0.2)
BASOPHILS NFR BLD AUTO: 0 %
BILIRUB SERPL-MCNC: 0.2 MG/DL (ref 0.1–2)
BUN BLD-MCNC: 36 MG/DL (ref 7–18)
CALCIUM BLD-MCNC: 8.6 MG/DL (ref 8.5–10.1)
CHLORIDE SERPL-SCNC: 108 MMOL/L (ref 98–112)
CO2 SERPL-SCNC: 23 MMOL/L (ref 21–32)
CREAT BLD-MCNC: 1.2 MG/DL
EOSINOPHIL # BLD AUTO: 0 X10(3) UL (ref 0–0.7)
EOSINOPHIL NFR BLD AUTO: 0 %
ERYTHROCYTE [DISTWIDTH] IN BLOOD BY AUTOMATED COUNT: 15.3 %
GFR SERPLBLD BASED ON 1.73 SQ M-ARVRAT: 48 ML/MIN/1.73M2 (ref 60–?)
GLOBULIN PLAS-MCNC: 4.4 G/DL (ref 2.8–4.4)
GLUCOSE BLD-MCNC: 189 MG/DL (ref 70–99)
GLUCOSE BLD-MCNC: 202 MG/DL (ref 70–99)
GLUCOSE BLD-MCNC: 232 MG/DL (ref 70–99)
HCT VFR BLD AUTO: 31.7 %
HGB BLD-MCNC: 10.7 G/DL
IMM GRANULOCYTES # BLD AUTO: 0.05 X10(3) UL (ref 0–1)
IMM GRANULOCYTES NFR BLD: 1.6 %
LYMPHOCYTES # BLD AUTO: 0.49 X10(3) UL (ref 1–4)
LYMPHOCYTES NFR BLD AUTO: 15.4 %
MAGNESIUM SERPL-MCNC: 1.9 MG/DL (ref 1.6–2.6)
MCH RBC QN AUTO: 30.7 PG (ref 26–34)
MCHC RBC AUTO-ENTMCNC: 33.8 G/DL (ref 31–37)
MCV RBC AUTO: 91.1 FL
MONOCYTES # BLD AUTO: 0.22 X10(3) UL (ref 0.1–1)
MONOCYTES NFR BLD AUTO: 6.9 %
NEUTROPHILS # BLD AUTO: 2.43 X10 (3) UL (ref 1.5–7.7)
NEUTROPHILS # BLD AUTO: 2.43 X10(3) UL (ref 1.5–7.7)
NEUTROPHILS NFR BLD AUTO: 76.1 %
OSMOLALITY SERPL CALC.SUM OF ELEC: 300 MOSM/KG (ref 275–295)
PHOSPHATE SERPL-MCNC: 2.4 MG/DL (ref 2.5–4.9)
PLATELET # BLD AUTO: 156 10(3)UL (ref 150–450)
POTASSIUM SERPL-SCNC: 4 MMOL/L (ref 3.5–5.1)
PROT SERPL-MCNC: 6.8 G/DL (ref 6.4–8.2)
RBC # BLD AUTO: 3.48 X10(6)UL
SODIUM SERPL-SCNC: 138 MMOL/L (ref 136–145)
WBC # BLD AUTO: 3.2 X10(3) UL (ref 4–11)

## 2022-10-25 PROCEDURE — 99239 HOSP IP/OBS DSCHRG MGMT >30: CPT | Performed by: INTERNAL MEDICINE

## 2022-10-25 RX ORDER — SPIRONOLACTONE 25 MG/1
12.5 TABLET ORAL DAILY
Status: DISCONTINUED | OUTPATIENT
Start: 2022-10-25 | End: 2022-10-25

## 2022-10-25 RX ORDER — CEFADROXIL 500 MG/1
500 CAPSULE ORAL 2 TIMES DAILY
Qty: 8 CAPSULE | Refills: 0 | Status: SHIPPED | OUTPATIENT
Start: 2022-10-25 | End: 2022-10-29

## 2022-10-25 RX ORDER — LOSARTAN POTASSIUM 50 MG/1
25 TABLET ORAL DAILY
Qty: 60 TABLET | Refills: 0 | Status: SHIPPED | OUTPATIENT
Start: 2022-10-25

## 2022-10-25 RX ORDER — CEFAZOLIN SODIUM/WATER 2 G/20 ML
2 SYRINGE (ML) INTRAVENOUS EVERY 8 HOURS
Status: DISCONTINUED | OUTPATIENT
Start: 2022-10-25 | End: 2022-10-25

## 2022-10-25 RX ORDER — POLYETHYLENE GLYCOL 3350 17 G/17G
17 POWDER, FOR SOLUTION ORAL DAILY
Qty: 30 PACKET | Refills: 0 | Status: SHIPPED | OUTPATIENT
Start: 2022-10-25 | End: 2022-11-24

## 2022-10-25 RX ORDER — LOSARTAN POTASSIUM 25 MG/1
25 TABLET ORAL DAILY
Status: DISCONTINUED | OUTPATIENT
Start: 2022-10-25 | End: 2022-10-25

## 2022-10-25 NOTE — CM/SW NOTE
Spoke with pt's dtr Sina Herrera who is aware of pt's dc back to [facility] at 5pm today.

## 2022-10-25 NOTE — PLAN OF CARE
Problem: Diabetes/Glucose Control  Goal: Glucose maintained within prescribed range  Description: INTERVENTIONS:  - Monitor Blood Glucose as ordered  - Assess for signs and symptoms of hyperglycemia and hypoglycemia  - Administer ordered medications to maintain glucose within target range  - Assess barriers to adequate nutritional intake and initiate nutrition consult as needed  - Instruct patient on self management of diabetes  Outcome: Progressing     Problem: Patient/Family Goals  Goal: Patient/Family Long Term Goal  Description: Patient's Long Term Goal: return to rehab    Interventions:  - pt/ot  - have vs stabilize  - r/o pe, acs  - have afib rates controlled  - See additional Care Plan goals for specific interventions  Outcome: Progressing  Goal: Patient/Family Short Term Goal  Description: Patient's Short Term Goal: get out of icu  10/24 AM: remain pain free  10/24NOC: receive Covid meds  transition to eliquis     Interventions:   - wean off pressors  - vq to r/o PE  - have troponin trend down  - See additional Care Plan goals for specific interventions  Outcome: Progressing     Problem: RISK FOR INFECTION - ADULT  Goal: Absence of fever/infection during anticipated neutropenic period  Description: INTERVENTIONS  - Monitor WBC  - Administer growth factors as ordered  - Implement neutropenic guidelines  Outcome: Progressing     Problem: SAFETY ADULT - FALL  Goal: Free from fall injury  Description: INTERVENTIONS:  - Assess pt frequently for physical needs  - Identify cognitive and physical deficits and behaviors that affect risk of falls.   - [place] fall precautions as indicated by assessment.  - Educate pt/family on patient safety including physical limitations  - Instruct pt to call for assistance with activity based on assessment  - Modify environment to reduce risk of injury  - Provide assistive devices as appropriate  - Consider OT/PT consult to assist with strengthening/mobility  - Encourage toileting schedule  Outcome: Progressing     Problem: DISCHARGE PLANNING  Goal: Discharge to home or other facility with appropriate resources  Description: INTERVENTIONS:  - Identify barriers to discharge w/pt and caregiver  - Include patient/family/discharge partner in discharge planning  - Arrange for needed discharge resources and transportation as appropriate  - Identify discharge learning needs (meds, wound care, etc)  - Arrange for interpreters to assist at discharge as needed  - Consider post-discharge preferences of patient/family/discharge partner  - Complete POLST form as appropriate  - Assess patient's ability to be responsible for managing their own health  - Refer to Case Management Department for coordinating discharge planning if the patient needs post-hospital services based on physician/LIP order or complex needs related to functional status, cognitive ability or social support system  Outcome: Progressing

## 2022-10-25 NOTE — PLAN OF CARE
Problem: Diabetes/Glucose Control  Goal: Glucose maintained within prescribed range  Description: INTERVENTIONS:  - Monitor Blood Glucose as ordered  - Assess for signs and symptoms of hyperglycemia and hypoglycemia  - Administer ordered medications to maintain glucose within target range  - Assess barriers to adequate nutritional intake and initiate nutrition consult as needed  - Instruct patient on self management of diabetes  Outcome: Progressing     Problem: Patient/Family Goals  Goal: Patient/Family Long Term Goal  Description: Patient's Long Term Goal: return to rehab    Interventions:  - pt/ot  - have vs stabilize  - r/o pe, acs  - have afib rates controlled  - See additional Care Plan goals for specific interventions  Outcome: Progressing  Goal: Patient/Family Short Term Goal  Description: Patient's Short Term Goal: get out of icu  10/24 AM: remain pain free  10/24NOC: receive Covid meds  transition to eliquis   10/25 AM: remain comfortable    Interventions:   - wean off pressors  - vq to r/o PE  - have troponin trend down  - See additional Care Plan goals for specific interventions  Outcome: Progressing     Problem: RISK FOR INFECTION - ADULT  Goal: Absence of fever/infection during anticipated neutropenic period  Description: INTERVENTIONS  - Monitor WBC  - Administer growth factors as ordered  - Implement neutropenic guidelines  Outcome: Progressing     Problem: SAFETY ADULT - FALL  Goal: Free from fall injury  Description: INTERVENTIONS:  - Assess pt frequently for physical needs  - Identify cognitive and physical deficits and behaviors that affect risk of falls.   - [place] fall precautions as indicated by assessment.  - Educate pt/family on patient safety including physical limitations  - Instruct pt to call for assistance with activity based on assessment  - Modify environment to reduce risk of injury  - Provide assistive devices as appropriate  - Consider OT/PT consult to assist with strengthening/mobility  - Encourage toileting schedule  Outcome: Progressing     Problem: DISCHARGE PLANNING  Goal: Discharge to home or other facility with appropriate resources  Description: INTERVENTIONS:  - Identify barriers to discharge w/pt and caregiver  - Include patient/family/discharge partner in discharge planning  - Arrange for needed discharge resources and transportation as appropriate  - Identify discharge learning needs (meds, wound care, etc)  - Arrange for interpreters to assist at discharge as needed  - Consider post-discharge preferences of patient/family/discharge partner  - Complete POLST form as appropriate  - Assess patient's ability to be responsible for managing their own health  - Refer to Case Management Department for coordinating discharge planning if the patient needs post-hospital services based on physician/LIP order or complex needs related to functional status, cognitive ability or social support system  Outcome: Progressing

## 2022-10-25 NOTE — PROGRESS NOTES
NURSING DISCHARGE NOTE    Discharged Rehab facility via Ambulance. Accompanied by Support staff  Belongings Taken by patient/family. Discharge instructions explained to patient. IV's removed. Patient's belongings taken by patient. No further questions at this time. Patient's daughter Sadie Grande updated on POC. RN tried to call report to Nico Sam, provided call back number twice.

## 2022-10-25 NOTE — PROGRESS NOTES
[facility] Pharmacy Note:  Renal Adjustment for cefazolin (ANCEF)    Judith Hernandez is a 67year old patient who has been prescribed cefazolin (ANCEF) 2 g every 12 hrs. The estimated creatinine clearance is 37.6 mL/min (A) (based on SCr of 1.2 mg/dL (H)). The dose has been adjusted to cefazolin (ANCEF) 2 g every 8 hrs per hospital renal dose adjustment protocol. Pharmacy will follow and adjust dose as warranted for additional renal function changes.     Thank you,    Luigi Buckner, PharmD  10/25/2022  11:47 AM

## 2022-10-26 ENCOUNTER — TELEPHONE (OUTPATIENT)
Dept: INTERNAL MEDICINE CLINIC | Facility: CLINIC | Age: 72
End: 2022-10-26

## 2022-10-26 NOTE — TELEPHONE ENCOUNTER
PSR:  Please notified the dtr that we will not be able to sign Home Care orders if pt needs Home Care unless she is seen in the office so if she cannot bring pt in for face to face Home Care orders cannot be signed. Please encourage Home Physician. Thank you.

## 2022-11-09 NOTE — TELEPHONE ENCOUNTER
Yes, there are also other open communications that SD has addressed lately. Though Syeda Savage is listed as PCP he is not since she had a visiting physician who has been her PCP.

## 2023-01-11 PROBLEM — N17.9 ACUTE RENAL FAILURE (ARF) (HCC): Status: RESOLVED | Noted: 2022-10-21 | Resolved: 2023-01-11

## 2023-01-11 PROBLEM — N17.9 ACUTE KIDNEY INJURY: Status: RESOLVED | Noted: 2022-10-21 | Resolved: 2023-01-11

## 2023-01-11 PROBLEM — N12 PYELONEPHRITIS: Status: RESOLVED | Noted: 2022-06-22 | Resolved: 2023-01-11

## 2023-01-11 PROBLEM — I50.9 ACUTE CONGESTIVE HEART FAILURE, UNSPECIFIED HEART FAILURE TYPE (HCC): Status: RESOLVED | Noted: 2022-06-22 | Resolved: 2023-01-11

## 2023-01-11 PROBLEM — U07.1 COVID-19: Status: RESOLVED | Noted: 2022-10-21 | Resolved: 2023-01-11

## 2023-01-11 PROBLEM — M62.838 MUSCLE SPASM OF LEFT LOWER EXTREMITY: Status: RESOLVED | Noted: 2021-09-13 | Resolved: 2023-01-11

## 2023-01-11 PROBLEM — E87.1 HYPONATREMIA: Status: RESOLVED | Noted: 2022-10-21 | Resolved: 2023-01-11

## 2023-01-11 PROBLEM — I95.9 HYPOTENSION: Status: RESOLVED | Noted: 2022-10-21 | Resolved: 2023-01-11

## 2023-01-11 PROBLEM — E87.20 METABOLIC ACIDOSIS: Status: RESOLVED | Noted: 2022-10-21 | Resolved: 2023-01-11

## 2023-01-11 PROBLEM — I95.9 HYPOTENSION, UNSPECIFIED HYPOTENSION TYPE: Status: RESOLVED | Noted: 2022-10-21 | Resolved: 2023-01-11

## 2023-01-11 PROBLEM — N17.9 ACUTE KIDNEY INJURY (HCC): Status: RESOLVED | Noted: 2022-10-21 | Resolved: 2023-01-11

## 2023-01-11 PROBLEM — N17.9 ACUTE RENAL FAILURE (ARF): Status: RESOLVED | Noted: 2022-10-21 | Resolved: 2023-01-11

## 2023-01-12 ENCOUNTER — TELEPHONE (OUTPATIENT)
Dept: INTERNAL MEDICINE CLINIC | Facility: CLINIC | Age: 73
End: 2023-01-12

## 2023-04-13 ENCOUNTER — TELEPHONE (OUTPATIENT)
Dept: INTERNAL MEDICINE CLINIC | Facility: CLINIC | Age: 73
End: 2023-04-13

## 2023-04-13 NOTE — TELEPHONE ENCOUNTER
Pt daughter Dong Amaral called stating she needs letter from [facility] or [facility] stating mom can no longer care for herself  needs it in paperwork so that she can access her accounts and pay for her care at nursing home would like a call back at [phone]

## 2023-04-13 NOTE — TELEPHONE ENCOUNTER
At last visit I sent pt for neuropsych testing. I cannot write the letter until I have the results of the neuropsych testing. Was that done? I have not seen results.

## 2023-04-13 NOTE — TELEPHONE ENCOUNTER
I understand the predicament but I cannot write a letter about the pt's decision making capacity without evidence that it is impared.

## 2023-04-13 NOTE — TELEPHONE ENCOUNTER
Spoke to [employer] on [facility]. States she she tried to schedule appointments for both of the doctors that CB referred her to, but her mother refused the one (neuropsych). The other, neurology, is scheduled for 5/1/23 and Abel Cline is not yet aware of the appt. [employer] isn't sure what to do if her mother continues to refuse these appointments; she states she cannot make her go. She states for the past few years Carla's savings has been depleted due to the home care she has needed and now that she is in a nursing home, it is going faster. States it's $11,000/month and if she cannot access the trust, she cannot pay for the nursing home bill.

## 2023-04-17 NOTE — TELEPHONE ENCOUNTER
Patients daughter, eben Ybarra per hipaa notified of CB message.  They are scheduled with neuro on 5/1

## 2023-09-11 NOTE — TELEPHONE ENCOUNTER
Called and spoke to pt's daughter, [facility] per HIPAA). She said she would call back to schedule HFU appt.
Called pt's daughter, Naheed [facility] per HIPAA) again. Scheduled TCM HFU for 6/21 at 1:30 pm with [facility]. She verbalized understanding.
Spoke to pt for TCM today. Pt does not have HFU appt scheduled at this time. TCM/HFU appt recommended by 6/23/2019 as pt is a high risk for readmission. Please advise.     BOOK BY DATE (last date for TCM): 6/30/2019    TRIAGE:  Please f/u with pt and try
diarrhea

## 2024-01-01 ENCOUNTER — HOSPITAL ENCOUNTER (INPATIENT)
Facility: HOSPITAL | Age: 74
LOS: 2 days | DRG: 871 | End: 2024-01-01
Attending: EMERGENCY MEDICINE | Admitting: HOSPITALIST
Payer: MEDICARE

## 2024-01-01 ENCOUNTER — APPOINTMENT (OUTPATIENT)
Dept: GENERAL RADIOLOGY | Facility: HOSPITAL | Age: 74
End: 2024-01-01
Attending: EMERGENCY MEDICINE
Payer: MEDICARE

## 2024-01-01 ENCOUNTER — APPOINTMENT (OUTPATIENT)
Dept: CT IMAGING | Facility: HOSPITAL | Age: 74
End: 2024-01-01
Attending: EMERGENCY MEDICINE
Payer: MEDICARE

## 2024-01-01 ENCOUNTER — APPOINTMENT (OUTPATIENT)
Dept: ULTRASOUND IMAGING | Facility: HOSPITAL | Age: 74
End: 2024-01-01
Payer: MEDICARE

## 2024-01-01 ENCOUNTER — APPOINTMENT (OUTPATIENT)
Dept: GENERAL RADIOLOGY | Facility: HOSPITAL | Age: 74
End: 2024-01-01
Payer: MEDICARE

## 2024-01-01 ENCOUNTER — APPOINTMENT (OUTPATIENT)
Dept: CV DIAGNOSTICS | Facility: HOSPITAL | Age: 74
End: 2024-01-01
Attending: HOSPITALIST
Payer: MEDICARE

## 2024-01-01 ENCOUNTER — APPOINTMENT (OUTPATIENT)
Dept: GENERAL RADIOLOGY | Facility: HOSPITAL | Age: 74
DRG: 871 | End: 2024-01-01
Payer: MEDICARE

## 2024-01-01 ENCOUNTER — APPOINTMENT (OUTPATIENT)
Dept: GENERAL RADIOLOGY | Facility: HOSPITAL | Age: 74
End: 2024-01-01
Attending: INTERNAL MEDICINE
Payer: MEDICARE

## 2024-01-01 ENCOUNTER — HOSPITAL ENCOUNTER (INPATIENT)
Facility: HOSPITAL | Age: 74
LOS: 2 days | End: 2024-01-01
Attending: EMERGENCY MEDICINE | Admitting: HOSPITALIST
Payer: MEDICARE

## 2024-01-01 ENCOUNTER — APPOINTMENT (OUTPATIENT)
Dept: CT IMAGING | Facility: HOSPITAL | Age: 74
DRG: 871 | End: 2024-01-01
Attending: EMERGENCY MEDICINE
Payer: MEDICARE

## 2024-01-01 ENCOUNTER — HOSPITAL ENCOUNTER (INPATIENT)
Facility: HOSPITAL | Age: 74
LOS: 12 days | Discharge: NURSING, REHAB OR LONG-TERM CARE | End: 2024-01-01
Attending: EMERGENCY MEDICINE | Admitting: HOSPITALIST
Payer: MEDICARE

## 2024-01-01 ENCOUNTER — APPOINTMENT (OUTPATIENT)
Dept: GENERAL RADIOLOGY | Facility: HOSPITAL | Age: 74
DRG: 871 | End: 2024-01-01
Attending: INTERNAL MEDICINE
Payer: MEDICARE

## 2024-01-01 ENCOUNTER — NURSE ONLY (OUTPATIENT)
Dept: ELECTROPHYSIOLOGY | Facility: HOSPITAL | Age: 74
End: 2024-01-01
Payer: MEDICARE

## 2024-01-01 ENCOUNTER — APPOINTMENT (OUTPATIENT)
Dept: CV DIAGNOSTICS | Facility: HOSPITAL | Age: 74
DRG: 871 | End: 2024-01-01
Attending: HOSPITALIST
Payer: MEDICARE

## 2024-01-01 ENCOUNTER — APPOINTMENT (OUTPATIENT)
Dept: GENERAL RADIOLOGY | Facility: HOSPITAL | Age: 74
End: 2024-01-01
Attending: STUDENT IN AN ORGANIZED HEALTH CARE EDUCATION/TRAINING PROGRAM
Payer: MEDICARE

## 2024-01-01 ENCOUNTER — APPOINTMENT (OUTPATIENT)
Dept: GENERAL RADIOLOGY | Facility: HOSPITAL | Age: 74
DRG: 871 | End: 2024-01-01
Attending: EMERGENCY MEDICINE
Payer: MEDICARE

## 2024-01-01 ENCOUNTER — APPOINTMENT (OUTPATIENT)
Dept: ULTRASOUND IMAGING | Facility: HOSPITAL | Age: 74
DRG: 871 | End: 2024-01-01
Payer: MEDICARE

## 2024-01-01 ENCOUNTER — APPOINTMENT (OUTPATIENT)
Dept: CV DIAGNOSTICS | Facility: HOSPITAL | Age: 74
End: 2024-01-01
Attending: EMERGENCY MEDICINE
Payer: MEDICARE

## 2024-01-01 ENCOUNTER — APPOINTMENT (OUTPATIENT)
Dept: GENERAL RADIOLOGY | Facility: HOSPITAL | Age: 74
End: 2024-01-01
Attending: HOSPITALIST
Payer: MEDICARE

## 2024-01-01 VITALS
DIASTOLIC BLOOD PRESSURE: 71 MMHG | HEART RATE: 89 BPM | OXYGEN SATURATION: 90 % | SYSTOLIC BLOOD PRESSURE: 102 MMHG | WEIGHT: 156 LBS | HEIGHT: 65 IN | BODY MASS INDEX: 25.99 KG/M2 | RESPIRATION RATE: 18 BRPM | TEMPERATURE: 98 F

## 2024-01-01 VITALS
BODY MASS INDEX: 24.42 KG/M2 | HEIGHT: 63 IN | DIASTOLIC BLOOD PRESSURE: 58 MMHG | TEMPERATURE: 100 F | OXYGEN SATURATION: 75 % | WEIGHT: 137.81 LBS | SYSTOLIC BLOOD PRESSURE: 100 MMHG

## 2024-01-01 DIAGNOSIS — N39.0 URINARY TRACT INFECTION WITHOUT HEMATURIA, SITE UNSPECIFIED: ICD-10-CM

## 2024-01-01 DIAGNOSIS — N28.9 ACUTE RENAL INSUFFICIENCY: Primary | ICD-10-CM

## 2024-01-01 DIAGNOSIS — I69.30 HISTORY OF CVA WITH RESIDUAL DEFICIT: ICD-10-CM

## 2024-01-01 DIAGNOSIS — R41.89 COGNITIVE DECLINE: ICD-10-CM

## 2024-01-01 DIAGNOSIS — N18.30 STAGE 3 CHRONIC KIDNEY DISEASE, UNSPECIFIED WHETHER STAGE 3A OR 3B CKD (HCC): ICD-10-CM

## 2024-01-01 DIAGNOSIS — I95.9 HYPOTENSION, UNSPECIFIED HYPOTENSION TYPE: Primary | ICD-10-CM

## 2024-01-01 DIAGNOSIS — I50.32 CHRONIC DIASTOLIC CONGESTIVE HEART FAILURE (HCC): ICD-10-CM

## 2024-01-01 DIAGNOSIS — R79.89 ELEVATED TROPONIN: ICD-10-CM

## 2024-01-01 DIAGNOSIS — I48.91 ATRIAL FIBRILLATION WITH RAPID VENTRICULAR RESPONSE (HCC): ICD-10-CM

## 2024-01-01 DIAGNOSIS — N17.9 ACUTE RENAL FAILURE, UNSPECIFIED ACUTE RENAL FAILURE TYPE (HCC): ICD-10-CM

## 2024-01-01 LAB
ADENOVIRUS PCR:: NOT DETECTED
ALBUMIN SERPL-MCNC: 2.9 G/DL (ref 3.2–4.8)
ALBUMIN SERPL-MCNC: 2.9 G/DL (ref 3.2–4.8)
ALBUMIN SERPL-MCNC: 3 G/DL (ref 3.2–4.8)
ALBUMIN SERPL-MCNC: 3 G/DL (ref 3.2–4.8)
ALBUMIN SERPL-MCNC: 3.1 G/DL (ref 3.2–4.8)
ALBUMIN SERPL-MCNC: 3.4 G/DL (ref 3.2–4.8)
ALBUMIN SERPL-MCNC: 3.7 G/DL (ref 3.2–4.8)
ALBUMIN SERPL-MCNC: 4 G/DL (ref 3.2–4.8)
ALBUMIN/GLOB SERPL: 0.8 {RATIO} (ref 1–2)
ALBUMIN/GLOB SERPL: 0.9 {RATIO} (ref 1–2)
ALBUMIN/GLOB SERPL: 1 {RATIO} (ref 1–2)
ALBUMIN/GLOB SERPL: 1.1 {RATIO} (ref 1–2)
ALP LIVER SERPL-CCNC: 100 U/L
ALP LIVER SERPL-CCNC: 101 U/L
ALP LIVER SERPL-CCNC: 106 U/L
ALP LIVER SERPL-CCNC: 134 U/L
ALP LIVER SERPL-CCNC: 68 U/L
ALP LIVER SERPL-CCNC: 70 U/L
ALP LIVER SERPL-CCNC: 74 U/L
ALP LIVER SERPL-CCNC: 84 U/L
ALP LIVER SERPL-CCNC: 84 U/L
ALP LIVER SERPL-CCNC: 91 U/L
ALP LIVER SERPL-CCNC: 93 U/L
ALP LIVER SERPL-CCNC: 96 U/L
ALT SERPL-CCNC: 1095 U/L
ALT SERPL-CCNC: 1106 U/L
ALT SERPL-CCNC: 1112 U/L
ALT SERPL-CCNC: 13 U/L
ALT SERPL-CCNC: 133 U/L
ALT SERPL-CCNC: 134 U/L
ALT SERPL-CCNC: 31 U/L
ALT SERPL-CCNC: 32 U/L
ALT SERPL-CCNC: 49 U/L
ALT SERPL-CCNC: 568 U/L
ALT SERPL-CCNC: 83 U/L
ALT SERPL-CCNC: 879 U/L
AMMONIA PLAS-MCNC: 32 UMOL/L (ref 11–32)
ANION GAP SERPL CALC-SCNC: 10 MMOL/L (ref 0–18)
ANION GAP SERPL CALC-SCNC: 10 MMOL/L (ref 0–18)
ANION GAP SERPL CALC-SCNC: 11 MMOL/L (ref 0–18)
ANION GAP SERPL CALC-SCNC: 12 MMOL/L (ref 0–18)
ANION GAP SERPL CALC-SCNC: 14 MMOL/L (ref 0–18)
ANION GAP SERPL CALC-SCNC: 14 MMOL/L (ref 0–18)
ANION GAP SERPL CALC-SCNC: 15 MMOL/L (ref 0–18)
ANION GAP SERPL CALC-SCNC: 16 MMOL/L (ref 0–18)
ANION GAP SERPL CALC-SCNC: 16 MMOL/L (ref 0–18)
ANION GAP SERPL CALC-SCNC: 18 MMOL/L (ref 0–18)
ANION GAP SERPL CALC-SCNC: 3 MMOL/L (ref 0–18)
ANION GAP SERPL CALC-SCNC: 5 MMOL/L (ref 0–18)
ANION GAP SERPL CALC-SCNC: 7 MMOL/L (ref 0–18)
ANION GAP SERPL CALC-SCNC: 7 MMOL/L (ref 0–18)
ANION GAP SERPL CALC-SCNC: 8 MMOL/L (ref 0–18)
ANION GAP SERPL CALC-SCNC: 9 MMOL/L (ref 0–18)
ANION GAP SERPL CALC-SCNC: 9 MMOL/L (ref 0–18)
ANTIBODY SCREEN: NEGATIVE
APAP SERPL-MCNC: <2 UG/ML (ref 10–20)
APTT PPP: 174.9 SECONDS (ref 23–36)
APTT PPP: 190 SECONDS (ref 23–36)
APTT PPP: 37.9 SECONDS (ref 23–36)
APTT PPP: 40.7 SECONDS (ref 23–36)
APTT PPP: >240 SECONDS (ref 23–36)
ARTERIAL PATENCY WRIST A: POSITIVE
AST SERPL-CCNC: 101 U/L (ref ?–34)
AST SERPL-CCNC: 1038 U/L (ref ?–34)
AST SERPL-CCNC: 1136 U/L (ref ?–34)
AST SERPL-CCNC: 120 U/L (ref ?–34)
AST SERPL-CCNC: 1244 U/L (ref ?–34)
AST SERPL-CCNC: 131 U/L (ref ?–34)
AST SERPL-CCNC: 143 U/L (ref ?–34)
AST SERPL-CCNC: 151 U/L (ref ?–34)
AST SERPL-CCNC: 212 U/L (ref ?–34)
AST SERPL-CCNC: 272 U/L (ref ?–34)
AST SERPL-CCNC: 550 U/L (ref ?–34)
AST SERPL-CCNC: 889 U/L (ref ?–34)
ATRIAL RATE: 88 BPM
B PARAPERT DNA SPEC QL NAA+PROBE: NOT DETECTED
B PERT DNA SPEC QL NAA+PROBE: NOT DETECTED
BASE EXCESS BLDA CALC-SCNC: -1.2 MMOL/L (ref ?–2)
BASE EXCESS BLDA CALC-SCNC: -3.9 MMOL/L (ref ?–2)
BASE EXCESS BLDA CALC-SCNC: 2 MMOL/L (ref ?–2)
BASE EXCESS BLDA CALC-SCNC: 3.1 MMOL/L (ref ?–2)
BASOPHILS # BLD AUTO: 0.02 X10(3) UL (ref 0–0.2)
BASOPHILS # BLD AUTO: 0.02 X10(3) UL (ref 0–0.2)
BASOPHILS # BLD AUTO: 0.03 X10(3) UL (ref 0–0.2)
BASOPHILS # BLD AUTO: 0.05 X10(3) UL (ref 0–0.2)
BASOPHILS # BLD AUTO: 0.06 X10(3) UL (ref 0–0.2)
BASOPHILS NFR BLD AUTO: 0.3 %
BASOPHILS NFR BLD AUTO: 0.4 %
BASOPHILS NFR BLD AUTO: 0.5 %
BILIRUB DIRECT SERPL-MCNC: 0.4 MG/DL (ref ?–0.3)
BILIRUB SERPL-MCNC: 0.5 MG/DL (ref 0.2–1.1)
BILIRUB SERPL-MCNC: 0.6 MG/DL (ref 0.2–1.1)
BILIRUB SERPL-MCNC: 0.6 MG/DL (ref 0.2–1.1)
BILIRUB SERPL-MCNC: 0.7 MG/DL (ref 0.2–1.1)
BILIRUB SERPL-MCNC: 0.7 MG/DL (ref 0.2–1.1)
BILIRUB SERPL-MCNC: 0.8 MG/DL (ref 0.2–1.1)
BILIRUB SERPL-MCNC: 0.9 MG/DL (ref 0.2–1.1)
BILIRUB UR QL STRIP.AUTO: NEGATIVE
BODY TEMPERATURE: 98.6 F
BUN BLD-MCNC: 36 MG/DL (ref 9–23)
BUN BLD-MCNC: 37 MG/DL (ref 9–23)
BUN BLD-MCNC: 38 MG/DL (ref 9–23)
BUN BLD-MCNC: 38 MG/DL (ref 9–23)
BUN BLD-MCNC: 39 MG/DL (ref 9–23)
BUN BLD-MCNC: 40 MG/DL (ref 9–23)
BUN BLD-MCNC: 43 MG/DL (ref 9–23)
BUN BLD-MCNC: 45 MG/DL (ref 9–23)
BUN BLD-MCNC: 47 MG/DL (ref 9–23)
BUN BLD-MCNC: 48 MG/DL (ref 9–23)
BUN BLD-MCNC: 52 MG/DL (ref 9–23)
BUN BLD-MCNC: 55 MG/DL (ref 9–23)
BUN BLD-MCNC: 58 MG/DL (ref 9–23)
BUN BLD-MCNC: 61 MG/DL (ref 9–23)
BUN BLD-MCNC: 62 MG/DL (ref 9–23)
BUN BLD-MCNC: 62 MG/DL (ref 9–23)
BUN BLD-MCNC: 66 MG/DL (ref 9–23)
BUN BLD-MCNC: 73 MG/DL (ref 9–23)
BUN BLD-MCNC: 79 MG/DL (ref 9–23)
C PNEUM DNA SPEC QL NAA+PROBE: NOT DETECTED
CA-I BLD-SCNC: 1.09 MMOL/L (ref 0.95–1.32)
CA-I BLD-SCNC: 1.1 MMOL/L (ref 0.95–1.32)
CA-I BLD-SCNC: 1.19 MMOL/L (ref 0.95–1.32)
CALCIUM BLD-MCNC: 7.5 MG/DL (ref 8.7–10.4)
CALCIUM BLD-MCNC: 7.7 MG/DL (ref 8.7–10.4)
CALCIUM BLD-MCNC: 8 MG/DL (ref 8.7–10.4)
CALCIUM BLD-MCNC: 8.2 MG/DL (ref 8.7–10.4)
CALCIUM BLD-MCNC: 8.3 MG/DL (ref 8.7–10.4)
CALCIUM BLD-MCNC: 8.4 MG/DL (ref 8.7–10.4)
CALCIUM BLD-MCNC: 8.5 MG/DL (ref 8.7–10.4)
CALCIUM BLD-MCNC: 8.7 MG/DL (ref 8.7–10.4)
CALCIUM BLD-MCNC: 8.8 MG/DL (ref 8.7–10.4)
CALCIUM BLD-MCNC: 8.8 MG/DL (ref 8.7–10.4)
CALCIUM BLD-MCNC: 9 MG/DL (ref 8.7–10.4)
CALCIUM BLD-MCNC: 9.3 MG/DL (ref 8.7–10.4)
CHLORIDE SERPL-SCNC: 103 MMOL/L (ref 98–112)
CHLORIDE SERPL-SCNC: 103 MMOL/L (ref 98–112)
CHLORIDE SERPL-SCNC: 104 MMOL/L (ref 98–112)
CHLORIDE SERPL-SCNC: 105 MMOL/L (ref 98–112)
CHLORIDE SERPL-SCNC: 107 MMOL/L (ref 98–112)
CHLORIDE SERPL-SCNC: 108 MMOL/L (ref 98–112)
CHLORIDE SERPL-SCNC: 109 MMOL/L (ref 98–112)
CHLORIDE SERPL-SCNC: 109 MMOL/L (ref 98–112)
CHLORIDE SERPL-SCNC: 111 MMOL/L (ref 98–112)
CHLORIDE SERPL-SCNC: 113 MMOL/L (ref 98–112)
CHLORIDE SERPL-SCNC: 113 MMOL/L (ref 98–112)
CHLORIDE SERPL-SCNC: 99 MMOL/L (ref 98–112)
CHLORIDE SERPL-SCNC: 99 MMOL/L (ref 98–112)
CHOLEST SERPL-MCNC: 144 MG/DL (ref ?–200)
CO2 SERPL-SCNC: 11 MMOL/L (ref 21–32)
CO2 SERPL-SCNC: 17 MMOL/L (ref 21–32)
CO2 SERPL-SCNC: 21 MMOL/L (ref 21–32)
CO2 SERPL-SCNC: 21 MMOL/L (ref 21–32)
CO2 SERPL-SCNC: 22 MMOL/L (ref 21–32)
CO2 SERPL-SCNC: 24 MMOL/L (ref 21–32)
CO2 SERPL-SCNC: 25 MMOL/L (ref 21–32)
CO2 SERPL-SCNC: 26 MMOL/L (ref 21–32)
CO2 SERPL-SCNC: 27 MMOL/L (ref 21–32)
CO2 SERPL-SCNC: 29 MMOL/L (ref 21–32)
COHGB MFR BLD: 1.6 % SAT (ref 0–3)
COHGB MFR BLD: 1.8 % SAT (ref 0–3)
COHGB MFR BLD: 1.9 % SAT (ref 0–3)
COHGB MFR BLD: 2.2 % SAT (ref 0–3)
COLOR UR AUTO: YELLOW
CORONAVIRUS 229E PCR:: NOT DETECTED
CORONAVIRUS HKU1 PCR:: NOT DETECTED
CORONAVIRUS NL63 PCR:: NOT DETECTED
CORONAVIRUS OC43 PCR:: NOT DETECTED
CREAT BLD-MCNC: 1.73 MG/DL
CREAT BLD-MCNC: 1.76 MG/DL
CREAT BLD-MCNC: 1.85 MG/DL
CREAT BLD-MCNC: 1.88 MG/DL
CREAT BLD-MCNC: 1.93 MG/DL
CREAT BLD-MCNC: 1.99 MG/DL
CREAT BLD-MCNC: 2.05 MG/DL
CREAT BLD-MCNC: 2.11 MG/DL
CREAT BLD-MCNC: 2.54 MG/DL
CREAT BLD-MCNC: 2.55 MG/DL
CREAT BLD-MCNC: 2.57 MG/DL
CREAT BLD-MCNC: 2.64 MG/DL
CREAT BLD-MCNC: 3.09 MG/DL
CREAT BLD-MCNC: 3.73 MG/DL
CREAT BLD-MCNC: 3.85 MG/DL
CREAT BLD-MCNC: 4.1 MG/DL
CREAT BLD-MCNC: 4.2 MG/DL
CREAT BLD-MCNC: 4.34 MG/DL
CREAT BLD-MCNC: 4.49 MG/DL
CREAT UR-SCNC: 44 MG/DL
EGFRCR SERPLBLD CKD-EPI 2021: 10 ML/MIN/1.73M2 (ref 60–?)
EGFRCR SERPLBLD CKD-EPI 2021: 11 ML/MIN/1.73M2 (ref 60–?)
EGFRCR SERPLBLD CKD-EPI 2021: 12 ML/MIN/1.73M2 (ref 60–?)
EGFRCR SERPLBLD CKD-EPI 2021: 12 ML/MIN/1.73M2 (ref 60–?)
EGFRCR SERPLBLD CKD-EPI 2021: 15 ML/MIN/1.73M2 (ref 60–?)
EGFRCR SERPLBLD CKD-EPI 2021: 18 ML/MIN/1.73M2 (ref 60–?)
EGFRCR SERPLBLD CKD-EPI 2021: 19 ML/MIN/1.73M2 (ref 60–?)
EGFRCR SERPLBLD CKD-EPI 2021: 24 ML/MIN/1.73M2 (ref 60–?)
EGFRCR SERPLBLD CKD-EPI 2021: 25 ML/MIN/1.73M2 (ref 60–?)
EGFRCR SERPLBLD CKD-EPI 2021: 26 ML/MIN/1.73M2 (ref 60–?)
EGFRCR SERPLBLD CKD-EPI 2021: 27 ML/MIN/1.73M2 (ref 60–?)
EGFRCR SERPLBLD CKD-EPI 2021: 28 ML/MIN/1.73M2 (ref 60–?)
EGFRCR SERPLBLD CKD-EPI 2021: 28 ML/MIN/1.73M2 (ref 60–?)
EGFRCR SERPLBLD CKD-EPI 2021: 30 ML/MIN/1.73M2 (ref 60–?)
EGFRCR SERPLBLD CKD-EPI 2021: 30 ML/MIN/1.73M2 (ref 60–?)
EOSINOPHIL # BLD AUTO: 0 X10(3) UL (ref 0–0.7)
EOSINOPHIL # BLD AUTO: 0.02 X10(3) UL (ref 0–0.7)
EOSINOPHIL # BLD AUTO: 0.02 X10(3) UL (ref 0–0.7)
EOSINOPHIL # BLD AUTO: 0.07 X10(3) UL (ref 0–0.7)
EOSINOPHIL # BLD AUTO: 0.16 X10(3) UL (ref 0–0.7)
EOSINOPHIL NFR BLD AUTO: 0 %
EOSINOPHIL NFR BLD AUTO: 0.2 %
EOSINOPHIL NFR BLD AUTO: 0.3 %
EOSINOPHIL NFR BLD AUTO: 1 %
EOSINOPHIL NFR BLD AUTO: 1.2 %
ERYTHROCYTE [DISTWIDTH] IN BLOOD BY AUTOMATED COUNT: 15.9 %
ERYTHROCYTE [DISTWIDTH] IN BLOOD BY AUTOMATED COUNT: 16.2 %
ERYTHROCYTE [DISTWIDTH] IN BLOOD BY AUTOMATED COUNT: 16.5 %
ERYTHROCYTE [DISTWIDTH] IN BLOOD BY AUTOMATED COUNT: 16.6 %
ERYTHROCYTE [DISTWIDTH] IN BLOOD BY AUTOMATED COUNT: 16.8 %
ERYTHROCYTE [DISTWIDTH] IN BLOOD BY AUTOMATED COUNT: 16.9 %
ERYTHROCYTE [DISTWIDTH] IN BLOOD BY AUTOMATED COUNT: 17 %
ERYTHROCYTE [DISTWIDTH] IN BLOOD BY AUTOMATED COUNT: 18.6 %
EST. AVERAGE GLUCOSE BLD GHB EST-MCNC: 140 MG/DL (ref 68–126)
EXPIRATORY PRESSURE: 8 CM H2O
EXPIRATORY PRESSURE: 8 CM H2O
FIO2: 50 %
FIO2: 50 %
FLUAV + FLUBV RNA SPEC NAA+PROBE: NEGATIVE
FLUAV RNA SPEC QL NAA+PROBE: NOT DETECTED
FLUBV RNA SPEC QL NAA+PROBE: NOT DETECTED
GLOBULIN PLAS-MCNC: 2.6 G/DL (ref 2–3.5)
GLOBULIN PLAS-MCNC: 2.7 G/DL (ref 2–3.5)
GLOBULIN PLAS-MCNC: 2.7 G/DL (ref 2–3.5)
GLOBULIN PLAS-MCNC: 2.8 G/DL (ref 2–3.5)
GLOBULIN PLAS-MCNC: 2.9 G/DL (ref 2–3.5)
GLOBULIN PLAS-MCNC: 3.1 G/DL (ref 2–3.5)
GLOBULIN PLAS-MCNC: 3.3 G/DL (ref 2–3.5)
GLOBULIN PLAS-MCNC: 3.3 G/DL (ref 2–3.5)
GLOBULIN PLAS-MCNC: 3.9 G/DL (ref 2–3.5)
GLOBULIN PLAS-MCNC: 4 G/DL (ref 2–3.5)
GLOBULIN PLAS-MCNC: 4.5 G/DL (ref 2–3.5)
GLUCOSE BLD-MCNC: 102 MG/DL (ref 70–99)
GLUCOSE BLD-MCNC: 103 MG/DL (ref 70–99)
GLUCOSE BLD-MCNC: 103 MG/DL (ref 70–99)
GLUCOSE BLD-MCNC: 104 MG/DL (ref 70–99)
GLUCOSE BLD-MCNC: 106 MG/DL (ref 70–99)
GLUCOSE BLD-MCNC: 107 MG/DL (ref 70–99)
GLUCOSE BLD-MCNC: 108 MG/DL (ref 70–99)
GLUCOSE BLD-MCNC: 109 MG/DL (ref 70–99)
GLUCOSE BLD-MCNC: 111 MG/DL (ref 70–99)
GLUCOSE BLD-MCNC: 114 MG/DL (ref 70–99)
GLUCOSE BLD-MCNC: 115 MG/DL (ref 70–99)
GLUCOSE BLD-MCNC: 116 MG/DL (ref 70–99)
GLUCOSE BLD-MCNC: 119 MG/DL (ref 70–99)
GLUCOSE BLD-MCNC: 120 MG/DL (ref 70–99)
GLUCOSE BLD-MCNC: 121 MG/DL (ref 70–99)
GLUCOSE BLD-MCNC: 122 MG/DL (ref 70–99)
GLUCOSE BLD-MCNC: 124 MG/DL (ref 70–99)
GLUCOSE BLD-MCNC: 126 MG/DL (ref 70–99)
GLUCOSE BLD-MCNC: 126 MG/DL (ref 70–99)
GLUCOSE BLD-MCNC: 129 MG/DL (ref 70–99)
GLUCOSE BLD-MCNC: 130 MG/DL (ref 70–99)
GLUCOSE BLD-MCNC: 132 MG/DL (ref 70–99)
GLUCOSE BLD-MCNC: 136 MG/DL (ref 70–99)
GLUCOSE BLD-MCNC: 137 MG/DL (ref 70–99)
GLUCOSE BLD-MCNC: 139 MG/DL (ref 70–99)
GLUCOSE BLD-MCNC: 141 MG/DL (ref 70–99)
GLUCOSE BLD-MCNC: 142 MG/DL (ref 70–99)
GLUCOSE BLD-MCNC: 143 MG/DL (ref 70–99)
GLUCOSE BLD-MCNC: 145 MG/DL (ref 70–99)
GLUCOSE BLD-MCNC: 146 MG/DL (ref 70–99)
GLUCOSE BLD-MCNC: 147 MG/DL (ref 70–99)
GLUCOSE BLD-MCNC: 148 MG/DL (ref 70–99)
GLUCOSE BLD-MCNC: 149 MG/DL (ref 70–99)
GLUCOSE BLD-MCNC: 149 MG/DL (ref 70–99)
GLUCOSE BLD-MCNC: 150 MG/DL (ref 70–99)
GLUCOSE BLD-MCNC: 151 MG/DL (ref 70–99)
GLUCOSE BLD-MCNC: 152 MG/DL (ref 70–99)
GLUCOSE BLD-MCNC: 153 MG/DL (ref 70–99)
GLUCOSE BLD-MCNC: 157 MG/DL (ref 70–99)
GLUCOSE BLD-MCNC: 159 MG/DL (ref 70–99)
GLUCOSE BLD-MCNC: 161 MG/DL (ref 70–99)
GLUCOSE BLD-MCNC: 164 MG/DL (ref 70–99)
GLUCOSE BLD-MCNC: 166 MG/DL (ref 70–99)
GLUCOSE BLD-MCNC: 166 MG/DL (ref 70–99)
GLUCOSE BLD-MCNC: 167 MG/DL (ref 70–99)
GLUCOSE BLD-MCNC: 168 MG/DL (ref 70–99)
GLUCOSE BLD-MCNC: 170 MG/DL (ref 70–99)
GLUCOSE BLD-MCNC: 176 MG/DL (ref 70–99)
GLUCOSE BLD-MCNC: 176 MG/DL (ref 70–99)
GLUCOSE BLD-MCNC: 180 MG/DL (ref 70–99)
GLUCOSE BLD-MCNC: 189 MG/DL (ref 70–99)
GLUCOSE BLD-MCNC: 190 MG/DL (ref 70–99)
GLUCOSE BLD-MCNC: 193 MG/DL (ref 70–99)
GLUCOSE BLD-MCNC: 193 MG/DL (ref 70–99)
GLUCOSE BLD-MCNC: 197 MG/DL (ref 70–99)
GLUCOSE BLD-MCNC: 198 MG/DL (ref 70–99)
GLUCOSE BLD-MCNC: 200 MG/DL (ref 70–99)
GLUCOSE BLD-MCNC: 201 MG/DL (ref 70–99)
GLUCOSE BLD-MCNC: 206 MG/DL (ref 70–99)
GLUCOSE BLD-MCNC: 224 MG/DL (ref 70–99)
GLUCOSE BLD-MCNC: 78 MG/DL (ref 70–99)
GLUCOSE BLD-MCNC: 83 MG/DL (ref 70–99)
GLUCOSE BLD-MCNC: 86 MG/DL (ref 70–99)
GLUCOSE BLD-MCNC: 91 MG/DL (ref 70–99)
GLUCOSE BLD-MCNC: 97 MG/DL (ref 70–99)
GLUCOSE BLD-MCNC: 99 MG/DL (ref 70–99)
GLUCOSE UR STRIP.AUTO-MCNC: NORMAL MG/DL
HAV IGM SER QL: NONREACTIVE
HBA1C MFR BLD: 6.5 % (ref ?–5.7)
HBV CORE IGM SER QL: NONREACTIVE
HBV SURFACE AG SERPL QL IA: NONREACTIVE
HCO3 BLDA-SCNC: 21.9 MEQ/L (ref 21–27)
HCO3 BLDA-SCNC: 24 MEQ/L (ref 21–27)
HCO3 BLDA-SCNC: 26.5 MEQ/L (ref 21–27)
HCO3 BLDA-SCNC: 27.1 MEQ/L (ref 21–27)
HCT VFR BLD AUTO: 30.5 %
HCT VFR BLD AUTO: 30.7 %
HCT VFR BLD AUTO: 31.4 %
HCT VFR BLD AUTO: 33.6 %
HCT VFR BLD AUTO: 34.2 %
HCT VFR BLD AUTO: 34.7 %
HCT VFR BLD AUTO: 38.9 %
HCT VFR BLD AUTO: 39.2 %
HCT VFR BLD AUTO: 39.7 %
HCT VFR BLD AUTO: 40 %
HCV AB SERPL QL IA: NONREACTIVE
HDLC SERPL-MCNC: 26 MG/DL (ref 40–59)
HGB BLD-MCNC: 10 G/DL
HGB BLD-MCNC: 10 G/DL
HGB BLD-MCNC: 10.1 G/DL
HGB BLD-MCNC: 10.4 G/DL
HGB BLD-MCNC: 10.6 G/DL
HGB BLD-MCNC: 10.7 G/DL
HGB BLD-MCNC: 11.2 G/DL
HGB BLD-MCNC: 11.6 G/DL
HGB BLD-MCNC: 11.7 G/DL
HGB BLD-MCNC: 12 G/DL
HGB BLD-MCNC: 12.2 G/DL
HGB BLD-MCNC: 12.5 G/DL
HGB BLD-MCNC: 12.6 G/DL
HGB BLD-MCNC: 13 G/DL
HYALINE CASTS #/AREA URNS AUTO: PRESENT /LPF
IMM GRANULOCYTES # BLD AUTO: 0.03 X10(3) UL (ref 0–1)
IMM GRANULOCYTES # BLD AUTO: 0.04 X10(3) UL (ref 0–1)
IMM GRANULOCYTES # BLD AUTO: 0.05 X10(3) UL (ref 0–1)
IMM GRANULOCYTES # BLD AUTO: 0.08 X10(3) UL (ref 0–1)
IMM GRANULOCYTES # BLD AUTO: 0.08 X10(3) UL (ref 0–1)
IMM GRANULOCYTES NFR BLD: 0.4 %
IMM GRANULOCYTES NFR BLD: 0.5 %
IMM GRANULOCYTES NFR BLD: 0.6 %
INR BLD: 1.62 (ref 0.8–1.2)
INR BLD: 2.15 (ref 0.8–1.2)
INR BLD: 2.77 (ref 0.8–1.2)
INSP PRESSURE: 12 CM H2O
INSP PRESSURE: 12 CM H2O
KETONES UR STRIP.AUTO-MCNC: NEGATIVE MG/DL
L PNEUMO AG UR QL: NEGATIVE
L PNEUMO AG UR QL: NEGATIVE
L/M: 6 L/MIN
L/M: 6 L/MIN
LACTATE BLD-SCNC: 0.8 MMOL/L (ref 0.5–2)
LACTATE BLD-SCNC: 1.4 MMOL/L (ref 0.5–2)
LACTATE BLD-SCNC: 1.6 MMOL/L (ref 0.5–2)
LACTATE BLD-SCNC: 2 MMOL/L (ref 0.5–2)
LACTATE SERPL-SCNC: 1 MMOL/L (ref 0.5–2)
LACTATE SERPL-SCNC: 1.7 MMOL/L (ref 0.5–2)
LDLC SERPL CALC-MCNC: 89 MG/DL (ref ?–100)
LEUKOCYTE ESTERASE UR QL STRIP.AUTO: NEGATIVE
LYMPHOCYTES # BLD AUTO: 0.67 X10(3) UL (ref 1–4)
LYMPHOCYTES # BLD AUTO: 0.89 X10(3) UL (ref 1–4)
LYMPHOCYTES # BLD AUTO: 1.45 X10(3) UL (ref 1–4)
LYMPHOCYTES NFR BLD AUTO: 11.4 %
LYMPHOCYTES NFR BLD AUTO: 11.9 %
LYMPHOCYTES NFR BLD AUTO: 6.5 %
LYMPHOCYTES NFR BLD AUTO: 9.4 %
LYMPHOCYTES NFR BLD AUTO: 9.7 %
MAGNESIUM SERPL-MCNC: 1.6 MG/DL (ref 1.6–2.6)
MAGNESIUM SERPL-MCNC: 1.7 MG/DL (ref 1.6–2.6)
MAGNESIUM SERPL-MCNC: 1.8 MG/DL (ref 1.6–2.6)
MAGNESIUM SERPL-MCNC: 1.9 MG/DL (ref 1.6–2.6)
MAGNESIUM SERPL-MCNC: 1.9 MG/DL (ref 1.6–2.6)
MAGNESIUM SERPL-MCNC: 2 MG/DL (ref 1.6–2.6)
MCH RBC QN AUTO: 28.5 PG (ref 26–34)
MCH RBC QN AUTO: 28.6 PG (ref 26–34)
MCH RBC QN AUTO: 28.6 PG (ref 26–34)
MCH RBC QN AUTO: 28.7 PG (ref 26–34)
MCH RBC QN AUTO: 28.7 PG (ref 26–34)
MCH RBC QN AUTO: 28.8 PG (ref 26–34)
MCH RBC QN AUTO: 28.9 PG (ref 26–34)
MCH RBC QN AUTO: 28.9 PG (ref 26–34)
MCH RBC QN AUTO: 29.1 PG (ref 26–34)
MCH RBC QN AUTO: 29.3 PG (ref 26–34)
MCHC RBC AUTO-ENTMCNC: 30.6 G/DL (ref 31–37)
MCHC RBC AUTO-ENTMCNC: 31.5 G/DL (ref 31–37)
MCHC RBC AUTO-ENTMCNC: 32.8 G/DL (ref 31–37)
MCHC RBC AUTO-ENTMCNC: 32.9 G/DL (ref 31–37)
MCHC RBC AUTO-ENTMCNC: 33.1 G/DL (ref 31–37)
MCHC RBC AUTO-ENTMCNC: 33.4 G/DL (ref 31–37)
MCHC RBC AUTO-ENTMCNC: 33.4 G/DL (ref 31–37)
MCHC RBC AUTO-ENTMCNC: 34.2 G/DL (ref 31–37)
MCV RBC AUTO: 83.8 FL
MCV RBC AUTO: 85.3 FL
MCV RBC AUTO: 86.4 FL
MCV RBC AUTO: 87 FL
MCV RBC AUTO: 87.1 FL
MCV RBC AUTO: 88.5 FL
MCV RBC AUTO: 91.3 FL
MCV RBC AUTO: 91.6 FL
MCV RBC AUTO: 93 FL
MCV RBC AUTO: 93.3 FL
METAPNEUMOVIRUS PCR:: NOT DETECTED
METHGB MFR BLD: 0.4 % SAT (ref 0.4–1.5)
METHGB MFR BLD: 0.7 % SAT (ref 0.4–1.5)
METHGB MFR BLD: 0.7 % SAT (ref 0.4–1.5)
METHGB MFR BLD: 0.9 % SAT (ref 0.4–1.5)
MONOCYTES # BLD AUTO: 0.63 X10(3) UL (ref 0.1–1)
MONOCYTES # BLD AUTO: 0.66 X10(3) UL (ref 0.1–1)
MONOCYTES # BLD AUTO: 0.75 X10(3) UL (ref 0.1–1)
MONOCYTES # BLD AUTO: 0.79 X10(3) UL (ref 0.1–1)
MONOCYTES # BLD AUTO: 0.79 X10(3) UL (ref 0.1–1)
MONOCYTES NFR BLD AUTO: 5.8 %
MONOCYTES NFR BLD AUTO: 5.9 %
MONOCYTES NFR BLD AUTO: 8.6 %
MONOCYTES NFR BLD AUTO: 8.8 %
MONOCYTES NFR BLD AUTO: 8.9 %
MRSA DNA SPEC QL NAA+PROBE: NEGATIVE
MRSA DNA SPEC QL NAA+PROBE: NEGATIVE
MYCOPLASMA PNEUMONIA PCR:: NOT DETECTED
NEUTROPHILS # BLD AUTO: 10.38 X10 (3) UL (ref 1.5–7.7)
NEUTROPHILS # BLD AUTO: 10.38 X10(3) UL (ref 1.5–7.7)
NEUTROPHILS # BLD AUTO: 11.73 X10 (3) UL (ref 1.5–7.7)
NEUTROPHILS # BLD AUTO: 11.73 X10(3) UL (ref 1.5–7.7)
NEUTROPHILS # BLD AUTO: 5.66 X10 (3) UL (ref 1.5–7.7)
NEUTROPHILS # BLD AUTO: 5.66 X10(3) UL (ref 1.5–7.7)
NEUTROPHILS # BLD AUTO: 5.86 X10 (3) UL (ref 1.5–7.7)
NEUTROPHILS # BLD AUTO: 5.86 X10(3) UL (ref 1.5–7.7)
NEUTROPHILS # BLD AUTO: 7.39 X10 (3) UL (ref 1.5–7.7)
NEUTROPHILS # BLD AUTO: 7.39 X10(3) UL (ref 1.5–7.7)
NEUTROPHILS NFR BLD AUTO: 78.3 %
NEUTROPHILS NFR BLD AUTO: 79.8 %
NEUTROPHILS NFR BLD AUTO: 80.7 %
NEUTROPHILS NFR BLD AUTO: 81.6 %
NEUTROPHILS NFR BLD AUTO: 85.5 %
NITRITE UR QL STRIP.AUTO: NEGATIVE
NONHDLC SERPL-MCNC: 118 MG/DL (ref ?–130)
NT-PROBNP SERPL-MCNC: ABNORMAL PG/ML (ref ?–450)
OSMOLALITY SERPL CALC.SUM OF ELEC: 289 MOSM/KG (ref 275–295)
OSMOLALITY SERPL CALC.SUM OF ELEC: 294 MOSM/KG (ref 275–295)
OSMOLALITY SERPL CALC.SUM OF ELEC: 296 MOSM/KG (ref 275–295)
OSMOLALITY SERPL CALC.SUM OF ELEC: 296 MOSM/KG (ref 275–295)
OSMOLALITY SERPL CALC.SUM OF ELEC: 297 MOSM/KG (ref 275–295)
OSMOLALITY SERPL CALC.SUM OF ELEC: 298 MOSM/KG (ref 275–295)
OSMOLALITY SERPL CALC.SUM OF ELEC: 300 MOSM/KG (ref 275–295)
OSMOLALITY SERPL CALC.SUM OF ELEC: 300 MOSM/KG (ref 275–295)
OSMOLALITY SERPL CALC.SUM OF ELEC: 301 MOSM/KG (ref 275–295)
OSMOLALITY SERPL CALC.SUM OF ELEC: 303 MOSM/KG (ref 275–295)
OSMOLALITY SERPL CALC.SUM OF ELEC: 304 MOSM/KG (ref 275–295)
OSMOLALITY SERPL CALC.SUM OF ELEC: 307 MOSM/KG (ref 275–295)
OSMOLALITY SERPL CALC.SUM OF ELEC: 308 MOSM/KG (ref 275–295)
OSMOLALITY SERPL CALC.SUM OF ELEC: 317 MOSM/KG (ref 275–295)
OSMOLALITY SERPL CALC.SUM OF ELEC: 318 MOSM/KG (ref 275–295)
OSMOLALITY SERPL CALC.SUM OF ELEC: 319 MOSM/KG (ref 275–295)
OSMOLALITY SERPL CALC.SUM OF ELEC: 326 MOSM/KG (ref 275–295)
OSMOLALITY SERPL CALC.SUM OF ELEC: 329 MOSM/KG (ref 275–295)
OSMOLALITY SERPL CALC.SUM OF ELEC: 333 MOSM/KG (ref 275–295)
OXYHGB MFR BLDA: 87.6 % (ref 92–100)
OXYHGB MFR BLDA: 95 % (ref 92–100)
OXYHGB MFR BLDA: 96.8 % (ref 92–100)
OXYHGB MFR BLDA: 97.2 % (ref 92–100)
PARAINFLUENZA 1 PCR:: NOT DETECTED
PARAINFLUENZA 2 PCR:: NOT DETECTED
PARAINFLUENZA 3 PCR:: NOT DETECTED
PARAINFLUENZA 4 PCR:: NOT DETECTED
PCO2 BLDA: 31 MM HG (ref 35–45)
PCO2 BLDA: 34 MM HG (ref 35–45)
PCO2 BLDA: 35 MM HG (ref 35–45)
PCO2 BLDA: 38 MM HG (ref 35–45)
PH BLDA: 7.38 [PH] (ref 7.35–7.45)
PH BLDA: 7.46 [PH] (ref 7.35–7.45)
PH BLDA: 7.46 [PH] (ref 7.35–7.45)
PH BLDA: 7.48 [PH] (ref 7.35–7.45)
PH UR STRIP.AUTO: 5.5 [PH] (ref 5–8)
PHOSPHATE SERPL-MCNC: 3.3 MG/DL (ref 2.4–5.1)
PHOSPHATE SERPL-MCNC: 3.7 MG/DL (ref 2.4–5.1)
PHOSPHATE SERPL-MCNC: 6.7 MG/DL (ref 2.4–5.1)
PLATELET # BLD AUTO: 121 10(3)UL (ref 150–450)
PLATELET # BLD AUTO: 123 10(3)UL (ref 150–450)
PLATELET # BLD AUTO: 128 10(3)UL (ref 150–450)
PLATELET # BLD AUTO: 134 10(3)UL (ref 150–450)
PLATELET # BLD AUTO: 145 10(3)UL (ref 150–450)
PLATELET # BLD AUTO: 155 10(3)UL (ref 150–450)
PLATELET # BLD AUTO: 165 10(3)UL (ref 150–450)
PLATELET # BLD AUTO: 328 10(3)UL (ref 150–450)
PLATELET # BLD AUTO: 328 10(3)UL (ref 150–450)
PLATELET # BLD AUTO: 416 10(3)UL (ref 150–450)
PLATELET # BLD AUTO: 512 10(3)UL (ref 150–450)
PO2 BLDA: 103 MM HG (ref 80–100)
PO2 BLDA: 103 MM HG (ref 80–100)
PO2 BLDA: 57 MM HG (ref 80–100)
PO2 BLDA: 75 MM HG (ref 80–100)
POTASSIUM BLD-SCNC: 3.3 MMOL/L (ref 3.6–5.1)
POTASSIUM BLD-SCNC: 3.6 MMOL/L (ref 3.6–5.1)
POTASSIUM BLD-SCNC: 4.3 MMOL/L (ref 3.6–5.1)
POTASSIUM SERPL-SCNC: 2.8 MMOL/L (ref 3.5–5.1)
POTASSIUM SERPL-SCNC: 2.8 MMOL/L (ref 3.5–5.1)
POTASSIUM SERPL-SCNC: 3.2 MMOL/L (ref 3.5–5.1)
POTASSIUM SERPL-SCNC: 3.3 MMOL/L (ref 3.5–5.1)
POTASSIUM SERPL-SCNC: 3.4 MMOL/L (ref 3.5–5.1)
POTASSIUM SERPL-SCNC: 3.5 MMOL/L (ref 3.5–5.1)
POTASSIUM SERPL-SCNC: 3.6 MMOL/L (ref 3.5–5.1)
POTASSIUM SERPL-SCNC: 3.7 MMOL/L (ref 3.5–5.1)
POTASSIUM SERPL-SCNC: 3.8 MMOL/L (ref 3.5–5.1)
POTASSIUM SERPL-SCNC: 3.8 MMOL/L (ref 3.5–5.1)
POTASSIUM SERPL-SCNC: 4 MMOL/L (ref 3.5–5.1)
POTASSIUM SERPL-SCNC: 4.1 MMOL/L (ref 3.5–5.1)
POTASSIUM SERPL-SCNC: 4.3 MMOL/L (ref 3.5–5.1)
POTASSIUM SERPL-SCNC: 4.8 MMOL/L (ref 3.5–5.1)
POTASSIUM SERPL-SCNC: 5.2 MMOL/L (ref 3.5–5.1)
PROCALCITONIN SERPL-MCNC: 0.91 NG/ML (ref ?–0.05)
PROT SERPL-MCNC: 5.5 G/DL (ref 5.7–8.2)
PROT SERPL-MCNC: 5.8 G/DL (ref 5.7–8.2)
PROT SERPL-MCNC: 6 G/DL (ref 5.7–8.2)
PROT SERPL-MCNC: 6.1 G/DL (ref 5.7–8.2)
PROT SERPL-MCNC: 6.2 G/DL (ref 5.7–8.2)
PROT SERPL-MCNC: 6.3 G/DL (ref 5.7–8.2)
PROT SERPL-MCNC: 7 G/DL (ref 5.7–8.2)
PROT SERPL-MCNC: 7.1 G/DL (ref 5.7–8.2)
PROT SERPL-MCNC: 7.3 G/DL (ref 5.7–8.2)
PROT SERPL-MCNC: 8.5 G/DL (ref 5.7–8.2)
PROT UR STRIP.AUTO-MCNC: 70 MG/DL
PROTHROMBIN TIME: 19.4 SECONDS (ref 11.6–14.8)
PROTHROMBIN TIME: 24.2 SECONDS (ref 11.6–14.8)
PROTHROMBIN TIME: 29.5 SECONDS (ref 11.6–14.8)
Q-T INTERVAL: 298 MS
Q-T INTERVAL: 302 MS
Q-T INTERVAL: 398 MS
QRS DURATION: 106 MS
QRS DURATION: 110 MS
QRS DURATION: 96 MS
QTC CALCULATION (BEZET): 415 MS
QTC CALCULATION (BEZET): 478 MS
QTC CALCULATION (BEZET): 500 MS
R AXIS: -23 DEGREES
R AXIS: -28 DEGREES
R AXIS: -46 DEGREES
RBC # BLD AUTO: 3.47 X10(6)UL
RBC # BLD AUTO: 3.5 X10(6)UL
RBC # BLD AUTO: 3.61 X10(6)UL
RBC # BLD AUTO: 3.67 X10(6)UL
RBC # BLD AUTO: 4.07 X10(6)UL
RBC # BLD AUTO: 4.08 X10(6)UL
RBC # BLD AUTO: 4.2 X10(6)UL
RBC # BLD AUTO: 4.3 X10(6)UL
RBC # BLD AUTO: 4.35 X10(6)UL
RBC # BLD AUTO: 4.5 X10(6)UL
RH BLOOD TYPE: POSITIVE
RHINOVIRUS/ENTERO PCR:: NOT DETECTED
RSV RNA SPEC NAA+PROBE: NEGATIVE
RSV RNA SPEC NAA+PROBE: NEGATIVE
RSV RNA SPEC QL NAA+PROBE: NOT DETECTED
SARS-COV-2 RNA NPH QL NAA+NON-PROBE: NOT DETECTED
SARS-COV-2 RNA RESP QL NAA+PROBE: NOT DETECTED
SARS-COV-2 RNA RESP QL NAA+PROBE: NOT DETECTED
SODIUM BLD-SCNC: 135 MMOL/L (ref 135–145)
SODIUM BLD-SCNC: 146 MMOL/L (ref 135–145)
SODIUM BLD-SCNC: 147 MMOL/L (ref 135–145)
SODIUM SERPL-SCNC: 129 MMOL/L
SODIUM SERPL-SCNC: 132 MMOL/L (ref 136–145)
SODIUM SERPL-SCNC: 133 MMOL/L (ref 136–145)
SODIUM SERPL-SCNC: 134 MMOL/L (ref 136–145)
SODIUM SERPL-SCNC: 136 MMOL/L (ref 136–145)
SODIUM SERPL-SCNC: 137 MMOL/L (ref 136–145)
SODIUM SERPL-SCNC: 138 MMOL/L (ref 136–145)
SODIUM SERPL-SCNC: 140 MMOL/L (ref 136–145)
SODIUM SERPL-SCNC: 140 MMOL/L (ref 136–145)
SODIUM SERPL-SCNC: 141 MMOL/L (ref 136–145)
SODIUM SERPL-SCNC: 142 MMOL/L (ref 136–145)
SODIUM SERPL-SCNC: 143 MMOL/L (ref 136–145)
SODIUM SERPL-SCNC: 143 MMOL/L (ref 136–145)
SODIUM SERPL-SCNC: 144 MMOL/L (ref 136–145)
SODIUM SERPL-SCNC: 146 MMOL/L (ref 136–145)
SODIUM SERPL-SCNC: 149 MMOL/L (ref 136–145)
SODIUM SERPL-SCNC: 149 MMOL/L (ref 136–145)
SODIUM SERPL-SCNC: 151 MMOL/L (ref 136–145)
SP GR UR REFRACTOMETRY: 1.01 (ref 1–1.03)
SP GR UR STRIP.AUTO: 1.01 (ref 1–1.03)
STREP PNEUMO ANTIGEN, URINE: NEGATIVE
STREP PNEUMO ANTIGEN, URINE: NEGATIVE
T AXIS: 124 DEGREES
T AXIS: 133 DEGREES
T AXIS: 24 DEGREES
TRIGL SERPL-MCNC: 167 MG/DL (ref 30–149)
TROPONIN I SERPL HS-MCNC: 28 NG/L
TROPONIN I SERPL HS-MCNC: 66 NG/L
TSI SER-ACNC: 1.83 UIU/ML (ref 0.55–4.78)
TSI SER-ACNC: 3.41 UIU/ML (ref 0.55–4.78)
UROBILINOGEN UR STRIP.AUTO-MCNC: NORMAL MG/DL
VENT RATE: 20 /MIN
VENTRICULAR RATE: 117 BPM
VENTRICULAR RATE: 151 BPM
VENTRICULAR RATE: 95 BPM
VLDLC SERPL CALC-MCNC: 27 MG/DL (ref 0–30)
WBC # BLD AUTO: 12.7 X10(3) UL (ref 4–11)
WBC # BLD AUTO: 13.3 X10(3) UL (ref 4–11)
WBC # BLD AUTO: 13.7 X10(3) UL (ref 4–11)
WBC # BLD AUTO: 6.2 X10(3) UL (ref 4–11)
WBC # BLD AUTO: 6.4 X10(3) UL (ref 4–11)
WBC # BLD AUTO: 7.1 X10(3) UL (ref 4–11)
WBC # BLD AUTO: 7.1 X10(3) UL (ref 4–11)
WBC # BLD AUTO: 7.3 X10(3) UL (ref 4–11)
WBC # BLD AUTO: 7.5 X10(3) UL (ref 4–11)
WBC # BLD AUTO: 9.2 X10(3) UL (ref 4–11)
WBC #/AREA URNS AUTO: >50 /HPF
WBC CLUMPS UR QL AUTO: PRESENT /HPF

## 2024-01-01 PROCEDURE — 99233 SBSQ HOSP IP/OBS HIGH 50: CPT | Performed by: INTERNAL MEDICINE

## 2024-01-01 PROCEDURE — 3E033XZ INTRODUCTION OF VASOPRESSOR INTO PERIPHERAL VEIN, PERCUTANEOUS APPROACH: ICD-10-PCS | Performed by: EMERGENCY MEDICINE

## 2024-01-01 PROCEDURE — 71045 X-RAY EXAM CHEST 1 VIEW: CPT | Performed by: EMERGENCY MEDICINE

## 2024-01-01 PROCEDURE — 5A0935A ASSISTANCE WITH RESPIRATORY VENTILATION, LESS THAN 24 CONSECUTIVE HOURS, HIGH NASAL FLOW/VELOCITY: ICD-10-PCS | Performed by: HOSPITALIST

## 2024-01-01 PROCEDURE — 99223 1ST HOSP IP/OBS HIGH 75: CPT | Performed by: INTERNAL MEDICINE

## 2024-01-01 PROCEDURE — 99232 SBSQ HOSP IP/OBS MODERATE 35: CPT | Performed by: HOSPITALIST

## 2024-01-01 PROCEDURE — 71250 CT THORAX DX C-: CPT | Performed by: EMERGENCY MEDICINE

## 2024-01-01 PROCEDURE — 99222 1ST HOSP IP/OBS MODERATE 55: CPT | Performed by: STUDENT IN AN ORGANIZED HEALTH CARE EDUCATION/TRAINING PROGRAM

## 2024-01-01 PROCEDURE — 4A133B1 MONITORING OF ARTERIAL PRESSURE, PERIPHERAL, PERCUTANEOUS APPROACH: ICD-10-PCS | Performed by: NURSE PRACTITIONER

## 2024-01-01 PROCEDURE — 99232 SBSQ HOSP IP/OBS MODERATE 35: CPT | Performed by: STUDENT IN AN ORGANIZED HEALTH CARE EDUCATION/TRAINING PROGRAM

## 2024-01-01 PROCEDURE — 36620 INSERTION CATHETER ARTERY: CPT | Performed by: NURSE PRACTITIONER

## 2024-01-01 PROCEDURE — 71045 X-RAY EXAM CHEST 1 VIEW: CPT

## 2024-01-01 PROCEDURE — 99231 SBSQ HOSP IP/OBS SF/LOW 25: CPT | Performed by: STUDENT IN AN ORGANIZED HEALTH CARE EDUCATION/TRAINING PROGRAM

## 2024-01-01 PROCEDURE — 99223 1ST HOSP IP/OBS HIGH 75: CPT | Performed by: STUDENT IN AN ORGANIZED HEALTH CARE EDUCATION/TRAINING PROGRAM

## 2024-01-01 PROCEDURE — 93306 TTE W/DOPPLER COMPLETE: CPT | Performed by: EMERGENCY MEDICINE

## 2024-01-01 PROCEDURE — 70450 CT HEAD/BRAIN W/O DYE: CPT | Performed by: EMERGENCY MEDICINE

## 2024-01-01 PROCEDURE — 93970 EXTREMITY STUDY: CPT

## 2024-01-01 PROCEDURE — 95816 EEG AWAKE AND DROWSY: CPT | Performed by: OTHER

## 2024-01-01 PROCEDURE — 99291 CRITICAL CARE FIRST HOUR: CPT | Performed by: HOSPITALIST

## 2024-01-01 PROCEDURE — 93325 DOPPLER ECHO COLOR FLOW MAPG: CPT | Performed by: HOSPITALIST

## 2024-01-01 PROCEDURE — 74230 X-RAY XM SWLNG FUNCJ C+: CPT | Performed by: HOSPITALIST

## 2024-01-01 PROCEDURE — 93308 TTE F-UP OR LMTD: CPT | Performed by: HOSPITALIST

## 2024-01-01 PROCEDURE — 99291 CRITICAL CARE FIRST HOUR: CPT | Performed by: EMERGENCY MEDICINE

## 2024-01-01 PROCEDURE — 71045 X-RAY EXAM CHEST 1 VIEW: CPT | Performed by: STUDENT IN AN ORGANIZED HEALTH CARE EDUCATION/TRAINING PROGRAM

## 2024-01-01 PROCEDURE — 99239 HOSP IP/OBS DSCHRG MGMT >30: CPT | Performed by: HOSPITALIST

## 2024-01-01 PROCEDURE — 99232 SBSQ HOSP IP/OBS MODERATE 35: CPT | Performed by: INTERNAL MEDICINE

## 2024-01-01 PROCEDURE — 93321 DOPPLER ECHO F-UP/LMTD STD: CPT | Performed by: HOSPITALIST

## 2024-01-01 PROCEDURE — 99233 SBSQ HOSP IP/OBS HIGH 50: CPT | Performed by: HOSPITALIST

## 2024-01-01 PROCEDURE — 99497 ADVNCD CARE PLAN 30 MIN: CPT | Performed by: STUDENT IN AN ORGANIZED HEALTH CARE EDUCATION/TRAINING PROGRAM

## 2024-01-01 PROCEDURE — 71045 X-RAY EXAM CHEST 1 VIEW: CPT | Performed by: INTERNAL MEDICINE

## 2024-01-01 PROCEDURE — 03HY32Z INSERTION OF MONITORING DEVICE INTO UPPER ARTERY, PERCUTANEOUS APPROACH: ICD-10-PCS | Performed by: NURSE PRACTITIONER

## 2024-01-01 PROCEDURE — 74176 CT ABD & PELVIS W/O CONTRAST: CPT | Performed by: EMERGENCY MEDICINE

## 2024-01-01 RX ORDER — METOPROLOL TARTRATE 1 MG/ML
5 INJECTION, SOLUTION INTRAVENOUS ONCE
Status: COMPLETED | OUTPATIENT
Start: 2024-01-01 | End: 2024-01-01

## 2024-01-01 RX ORDER — SCOPOLAMINE 1 MG/3D
1 PATCH, EXTENDED RELEASE TRANSDERMAL
Status: DISCONTINUED | OUTPATIENT
Start: 2024-01-01 | End: 2024-01-01

## 2024-01-01 RX ORDER — DIGOXIN 0.25 MG/ML
250 INJECTION INTRAMUSCULAR; INTRAVENOUS ONCE
Status: COMPLETED | OUTPATIENT
Start: 2024-01-01 | End: 2024-01-01

## 2024-01-01 RX ORDER — BACLOFEN 5 MG/1
5 TABLET ORAL 2 TIMES DAILY
Status: DISCONTINUED | OUTPATIENT
Start: 2024-01-01 | End: 2024-01-01

## 2024-01-01 RX ORDER — FUROSEMIDE 10 MG/ML
60 INJECTION INTRAMUSCULAR; INTRAVENOUS ONCE
Status: COMPLETED | OUTPATIENT
Start: 2024-01-01 | End: 2024-01-01

## 2024-01-01 RX ORDER — AMLODIPINE BESYLATE 5 MG/1
5 TABLET ORAL DAILY
COMMUNITY
End: 2024-01-01

## 2024-01-01 RX ORDER — BACLOFEN 5 MG/1
5 TABLET ORAL 3 TIMES DAILY
COMMUNITY

## 2024-01-01 RX ORDER — DILTIAZEM HYDROCHLORIDE 5 MG/ML
5 INJECTION INTRAVENOUS ONCE
Status: CANCELLED | OUTPATIENT
Start: 2024-01-01 | End: 2024-01-01

## 2024-01-01 RX ORDER — SENNOSIDES 8.6 MG
17.2 TABLET ORAL NIGHTLY
COMMUNITY

## 2024-01-01 RX ORDER — CARVEDILOL 3.12 MG/1
3.12 TABLET ORAL 2 TIMES DAILY WITH MEALS
COMMUNITY
End: 2024-01-01

## 2024-01-01 RX ORDER — MAGNESIUM OXIDE 400 MG/1
400 TABLET ORAL ONCE
Status: COMPLETED | OUTPATIENT
Start: 2024-01-01 | End: 2024-01-01

## 2024-01-01 RX ORDER — METOPROLOL SUCCINATE 25 MG/1
25 TABLET, EXTENDED RELEASE ORAL
Status: DISCONTINUED | OUTPATIENT
Start: 2024-01-01 | End: 2024-01-01

## 2024-01-01 RX ORDER — PANTOPRAZOLE SODIUM 40 MG/1
40 TABLET, DELAYED RELEASE ORAL EVERY MORNING
COMMUNITY

## 2024-01-01 RX ORDER — POTASSIUM CHLORIDE 1.5 G/1.58G
40 POWDER, FOR SOLUTION ORAL ONCE
Status: COMPLETED | OUTPATIENT
Start: 2024-01-01 | End: 2024-01-01

## 2024-01-01 RX ORDER — NICOTINE POLACRILEX 4 MG
15 LOZENGE BUCCAL
Status: DISCONTINUED | OUTPATIENT
Start: 2024-01-01 | End: 2024-01-01

## 2024-01-01 RX ORDER — DOXEPIN HYDROCHLORIDE 50 MG/1
1 CAPSULE ORAL DAILY
Status: DISCONTINUED | OUTPATIENT
Start: 2024-01-01 | End: 2024-01-01

## 2024-01-01 RX ORDER — ROSUVASTATIN CALCIUM 40 MG/1
40 TABLET, COATED ORAL NIGHTLY
COMMUNITY

## 2024-01-01 RX ORDER — SODIUM CHLORIDE 450 MG/100ML
INJECTION, SOLUTION INTRAVENOUS CONTINUOUS
Status: DISCONTINUED | OUTPATIENT
Start: 2024-01-01 | End: 2024-01-01

## 2024-01-01 RX ORDER — BACLOFEN 5 MG/1
5 TABLET ORAL 3 TIMES DAILY
Status: DISCONTINUED | OUTPATIENT
Start: 2024-01-01 | End: 2024-01-01

## 2024-01-01 RX ORDER — HEPARIN SODIUM AND DEXTROSE 10000; 5 [USP'U]/100ML; G/100ML
18 INJECTION INTRAVENOUS ONCE
Status: COMPLETED | OUTPATIENT
Start: 2024-01-01 | End: 2024-01-01

## 2024-01-01 RX ORDER — SODIUM CHLORIDE 9 MG/ML
125 INJECTION, SOLUTION INTRAVENOUS CONTINUOUS
Status: DISCONTINUED | OUTPATIENT
Start: 2024-01-01 | End: 2024-01-01

## 2024-01-01 RX ORDER — FUROSEMIDE 10 MG/ML
40 INJECTION INTRAMUSCULAR; INTRAVENOUS ONCE
Status: COMPLETED | OUTPATIENT
Start: 2024-01-01 | End: 2024-01-01

## 2024-01-01 RX ORDER — POLYETHYLENE GLYCOL 3350 17 G/17G
17 POWDER, FOR SOLUTION ORAL DAILY
COMMUNITY
End: 2024-01-01 | Stop reason: CLARIF

## 2024-01-01 RX ORDER — POTASSIUM CHLORIDE 1500 MG/1
40 TABLET, EXTENDED RELEASE ORAL EVERY 4 HOURS
Status: DISCONTINUED | OUTPATIENT
Start: 2024-01-01 | End: 2024-01-01

## 2024-01-01 RX ORDER — DEXTROSE MONOHYDRATE 25 G/50ML
50 INJECTION, SOLUTION INTRAVENOUS
Status: DISCONTINUED | OUTPATIENT
Start: 2024-01-01 | End: 2024-01-01

## 2024-01-01 RX ORDER — LEVETIRACETAM 500 MG/5ML
500 INJECTION, SOLUTION, CONCENTRATE INTRAVENOUS EVERY 12 HOURS SCHEDULED
Status: DISCONTINUED | OUTPATIENT
Start: 2024-01-01 | End: 2024-01-01

## 2024-01-01 RX ORDER — POTASSIUM CHLORIDE 1500 MG/1
20 TABLET, EXTENDED RELEASE ORAL ONCE
Status: COMPLETED | OUTPATIENT
Start: 2024-01-01 | End: 2024-01-01

## 2024-01-01 RX ORDER — POVIDONE-IODINE 0.01 ML/1
1 SWAB TOPICAL AS NEEDED
COMMUNITY

## 2024-01-01 RX ORDER — DILTIAZEM HYDROCHLORIDE 5 MG/ML
10 INJECTION INTRAVENOUS ONCE
Status: COMPLETED | OUTPATIENT
Start: 2024-01-01 | End: 2024-01-01

## 2024-01-01 RX ORDER — CARVEDILOL 3.12 MG/1
3.12 TABLET ORAL 2 TIMES DAILY WITH MEALS
Status: DISCONTINUED | OUTPATIENT
Start: 2024-01-01 | End: 2024-01-01

## 2024-01-01 RX ORDER — NICOTINE POLACRILEX 4 MG
30 LOZENGE BUCCAL
Status: DISCONTINUED | OUTPATIENT
Start: 2024-01-01 | End: 2024-01-01

## 2024-01-01 RX ORDER — LEVETIRACETAM 500 MG/1
500 TABLET ORAL 2 TIMES DAILY
Status: DISCONTINUED | OUTPATIENT
Start: 2024-01-01 | End: 2024-01-01

## 2024-01-01 RX ORDER — DILTIAZEM HYDROCHLORIDE 5 MG/ML
5 INJECTION INTRAVENOUS ONCE
Status: COMPLETED | OUTPATIENT
Start: 2024-01-01 | End: 2024-01-01

## 2024-01-01 RX ORDER — LORAZEPAM 2 MG/ML
2 INJECTION INTRAMUSCULAR EVERY 4 HOURS PRN
Status: DISCONTINUED | OUTPATIENT
Start: 2024-01-01 | End: 2024-01-01

## 2024-01-01 RX ORDER — IPRATROPIUM BROMIDE AND ALBUTEROL SULFATE 2.5; .5 MG/3ML; MG/3ML
SOLUTION RESPIRATORY (INHALATION)
Status: COMPLETED
Start: 2024-01-01 | End: 2024-01-01

## 2024-01-01 RX ORDER — POLYETHYLENE GLYCOL 3350 17 G/17G
17 POWDER, FOR SOLUTION ORAL DAILY
COMMUNITY

## 2024-01-01 RX ORDER — ACETAMINOPHEN 650 MG/1
650 SUPPOSITORY RECTAL ONCE
Status: COMPLETED | OUTPATIENT
Start: 2024-01-01 | End: 2024-01-01

## 2024-01-01 RX ORDER — POTASSIUM CHLORIDE 1500 MG/1
40 TABLET, EXTENDED RELEASE ORAL ONCE
Status: COMPLETED | OUTPATIENT
Start: 2024-01-01 | End: 2024-01-01

## 2024-01-01 RX ORDER — ONDANSETRON 4 MG/1
4 TABLET, FILM COATED ORAL EVERY 6 HOURS PRN
COMMUNITY

## 2024-01-01 RX ORDER — MORPHINE SULFATE 2 MG/ML
1 INJECTION, SOLUTION INTRAMUSCULAR; INTRAVENOUS
Status: DISCONTINUED | OUTPATIENT
Start: 2024-01-01 | End: 2024-01-01

## 2024-01-01 RX ORDER — SODIUM CHLORIDE FOR INHALATION 3 %
3 VIAL, NEBULIZER (ML) INHALATION
Status: DISCONTINUED | OUTPATIENT
Start: 2024-01-01 | End: 2024-01-01

## 2024-01-01 RX ORDER — LORAZEPAM 2 MG/ML
0.5 INJECTION INTRAMUSCULAR EVERY 4 HOURS PRN
Status: DISCONTINUED | OUTPATIENT
Start: 2024-01-01 | End: 2024-01-01

## 2024-01-01 RX ORDER — FUROSEMIDE 10 MG/ML
40 INJECTION INTRAMUSCULAR; INTRAVENOUS
Status: DISCONTINUED | OUTPATIENT
Start: 2024-01-01 | End: 2024-01-01

## 2024-01-01 RX ORDER — METOPROLOL TARTRATE 1 MG/ML
5 INJECTION, SOLUTION INTRAVENOUS EVERY 6 HOURS
Status: DISCONTINUED | OUTPATIENT
Start: 2024-01-01 | End: 2024-01-01

## 2024-01-01 RX ORDER — APIXABAN 5 MG/1
5 TABLET, FILM COATED ORAL EVERY 12 HOURS
COMMUNITY

## 2024-01-01 RX ORDER — FUROSEMIDE 10 MG/ML
20 INJECTION INTRAMUSCULAR; INTRAVENOUS ONCE
Status: COMPLETED | OUTPATIENT
Start: 2024-01-01 | End: 2024-01-01

## 2024-01-01 RX ORDER — DILTIAZEM HYDROCHLORIDE 120 MG/1
120 CAPSULE, EXTENDED RELEASE ORAL DAILY
Status: DISCONTINUED | OUTPATIENT
Start: 2024-01-01 | End: 2024-01-01

## 2024-01-01 RX ORDER — ACETAMINOPHEN 500 MG
500 TABLET ORAL EVERY 4 HOURS PRN
Status: DISCONTINUED | OUTPATIENT
Start: 2024-01-01 | End: 2024-01-01

## 2024-01-01 RX ORDER — LEVETIRACETAM 500 MG/1
500 TABLET ORAL EVERY 12 HOURS
COMMUNITY

## 2024-01-01 RX ORDER — HEPARIN SODIUM AND DEXTROSE 10000; 5 [USP'U]/100ML; G/100ML
INJECTION INTRAVENOUS CONTINUOUS
Status: DISCONTINUED | OUTPATIENT
Start: 2024-01-01 | End: 2024-01-01

## 2024-01-01 RX ORDER — METOPROLOL SUCCINATE 25 MG/1
25 TABLET, EXTENDED RELEASE ORAL
Qty: 60 TABLET | Refills: 0 | Status: SHIPPED | OUTPATIENT
Start: 2024-01-01 | End: 2024-12-26

## 2024-01-01 RX ORDER — TAMSULOSIN HYDROCHLORIDE 0.4 MG/1
0.4 CAPSULE ORAL NIGHTLY
COMMUNITY
End: 2024-01-01

## 2024-01-01 RX ORDER — HYDRALAZINE HYDROCHLORIDE 25 MG/1
25 TABLET, FILM COATED ORAL 3 TIMES DAILY PRN
COMMUNITY
End: 2024-01-01

## 2024-01-01 RX ORDER — ZINC OXIDE 20 %
1 OINTMENT (GRAM) TOPICAL AS NEEDED
COMMUNITY

## 2024-01-01 RX ORDER — POVIDONE-IODINE 0.01 ML/1
1 SWAB TOPICAL DAILY
COMMUNITY

## 2024-01-01 RX ORDER — BETHANECHOL CHLORIDE 25 MG/1
25 TABLET ORAL 3 TIMES DAILY
COMMUNITY
End: 2024-01-01

## 2024-01-01 RX ORDER — HEPARIN SODIUM 5000 [USP'U]/ML
5000 INJECTION, SOLUTION INTRAVENOUS; SUBCUTANEOUS EVERY 8 HOURS SCHEDULED
Status: DISCONTINUED | OUTPATIENT
Start: 2024-01-01 | End: 2024-01-01

## 2024-01-01 RX ORDER — SENNOSIDES 8.6 MG
17.2 TABLET ORAL NIGHTLY PRN
Status: DISCONTINUED | OUTPATIENT
Start: 2024-01-01 | End: 2024-01-01

## 2024-01-01 RX ORDER — FUROSEMIDE 10 MG/ML
60 INJECTION INTRAMUSCULAR; INTRAVENOUS
Status: DISCONTINUED | OUTPATIENT
Start: 2024-01-01 | End: 2024-01-01

## 2024-01-01 RX ORDER — SODIUM CHLORIDE, SODIUM LACTATE, POTASSIUM CHLORIDE, CALCIUM CHLORIDE 600; 310; 30; 20 MG/100ML; MG/100ML; MG/100ML; MG/100ML
INJECTION, SOLUTION INTRAVENOUS CONTINUOUS
Status: DISCONTINUED | OUTPATIENT
Start: 2024-01-01 | End: 2024-01-01

## 2024-01-01 RX ORDER — ALBUTEROL SULFATE 0.83 MG/ML
2.5 SOLUTION RESPIRATORY (INHALATION) EVERY 6 HOURS PRN
Status: DISCONTINUED | OUTPATIENT
Start: 2024-01-01 | End: 2024-01-01

## 2024-01-01 RX ORDER — TORSEMIDE 20 MG/1
20 TABLET ORAL DAILY
Qty: 30 TABLET | Refills: 11 | Status: SHIPPED | OUTPATIENT
Start: 2024-01-01 | End: 2024-12-26

## 2024-01-01 RX ORDER — SERTRALINE HYDROCHLORIDE 100 MG/1
100 TABLET, FILM COATED ORAL DAILY
Status: DISCONTINUED | OUTPATIENT
Start: 2024-01-01 | End: 2024-01-01

## 2024-01-01 RX ORDER — LORAZEPAM 2 MG/ML
1 INJECTION INTRAMUSCULAR EVERY 4 HOURS PRN
Status: DISCONTINUED | OUTPATIENT
Start: 2024-01-01 | End: 2024-01-01

## 2024-01-01 RX ORDER — IPRATROPIUM BROMIDE AND ALBUTEROL SULFATE 2.5; .5 MG/3ML; MG/3ML
SOLUTION RESPIRATORY (INHALATION)
Status: DISCONTINUED
Start: 2024-01-01 | End: 2024-01-01 | Stop reason: WASHOUT

## 2024-01-01 RX ORDER — MAGNESIUM SULFATE HEPTAHYDRATE 40 MG/ML
2 INJECTION, SOLUTION INTRAVENOUS ONCE
Status: COMPLETED | OUTPATIENT
Start: 2024-01-01 | End: 2024-01-01

## 2024-01-01 RX ORDER — SENNOSIDES 8.6 MG
8.6 TABLET ORAL NIGHTLY
Status: DISCONTINUED | OUTPATIENT
Start: 2024-01-01 | End: 2024-01-01

## 2024-01-01 RX ORDER — SODIUM PHOSPHATE, DIBASIC AND SODIUM PHOSPHATE, MONOBASIC 7; 19 G/230ML; G/230ML
1 ENEMA RECTAL ONCE AS NEEDED
Status: DISCONTINUED | OUTPATIENT
Start: 2024-01-01 | End: 2024-01-01

## 2024-01-01 RX ORDER — ACETAMINOPHEN 500 MG
1000 TABLET ORAL ONCE
Status: DISCONTINUED | OUTPATIENT
Start: 2024-01-01 | End: 2024-01-01

## 2024-01-01 RX ORDER — ZINC OXIDE 20 %
1 OINTMENT (GRAM) TOPICAL DAILY
COMMUNITY

## 2024-01-01 RX ORDER — POLYETHYLENE GLYCOL 3350 17 G/17G
17 POWDER, FOR SOLUTION ORAL DAILY
Status: DISCONTINUED | OUTPATIENT
Start: 2024-01-01 | End: 2024-01-01

## 2024-01-01 RX ORDER — POTASSIUM CHLORIDE 1500 MG/1
40 TABLET, EXTENDED RELEASE ORAL EVERY 4 HOURS
Status: COMPLETED | OUTPATIENT
Start: 2024-01-01 | End: 2024-01-01

## 2024-01-01 RX ORDER — POLYETHYLENE GLYCOL 3350 17 G/17G
17 POWDER, FOR SOLUTION ORAL DAILY PRN
Status: DISCONTINUED | OUTPATIENT
Start: 2024-01-01 | End: 2024-01-01

## 2024-01-01 RX ORDER — BISACODYL 10 MG
10 SUPPOSITORY, RECTAL RECTAL
Status: DISCONTINUED | OUTPATIENT
Start: 2024-01-01 | End: 2024-01-01

## 2024-01-01 RX ORDER — CLOPIDOGREL BISULFATE 75 MG/1
75 TABLET ORAL DAILY
Status: DISCONTINUED | OUTPATIENT
Start: 2024-01-01 | End: 2024-01-01

## 2024-03-22 NOTE — PLAN OF CARE
Problem: Impaired Activities of Daily Living  Goal: Achieve highest/safest level of independence in self care  Interventions:  - Assess ability and encourage patient to participate in ADLs to maximize function  - Promote sitting position while performing A Discharge Note:    Requested by Dr. Stone to facilitate d/c home.  Cleared by Cardiology with no further inpatient w/u.  V/s, labs, diagnostics reviewed, pt stable to d/c now.  Medication reconciliation reviewed, revised, and resolved with Dr. Stone who medically cleared w/ f/u as directed.   Please refer to d/c note for hospital details.    Gregoria Frias NP-c no change

## 2024-06-25 ENCOUNTER — TELEPHONE (OUTPATIENT)
Dept: INTERNAL MEDICINE CLINIC | Facility: CLINIC | Age: 74
End: 2024-06-25

## 2024-06-26 ENCOUNTER — PATIENT OUTREACH (OUTPATIENT)
Dept: CASE MANAGEMENT | Age: 74
End: 2024-06-26

## 2024-06-26 NOTE — PROCEDURES
The office order for PCP removal request is Approved and finalized on June 26, 2024.    Thanks,  [facility] Team
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
## 2024-08-05 NOTE — PROGRESS NOTES
06/27/22 0159 06/27/22 0200 06/27/22 0201   Oxygen Therapy   SpO2 (!) 81 % (!) 78 % (!) 80 %   O2 Device None (Room air) None (Room air) None (Room air)   O2 Flow Rate (L/min)  --   --   --    Pulse Oximetry Type Continuous Continuous Continuous      06/27/22 0202 06/27/22 0203 06/27/22 0204   Oxygen Therapy   SpO2 (!) 80 % (!) 82 % 94 %   O2 Device None (Room air) None (Room air) Nasal cannula   O2 Flow Rate (L/min)  --   --  2 L/min   Pulse Oximetry Type Continuous Continuous Continuous     Overnight oxygen study completed Veronica Williamson, was evaluated through a synchronous (real-time) audio-video encounter. The patient (or guardian if applicable) is aware that this is a billable service, which includes applicable co-pays. This Virtual Visit was conducted with patient's (and/or legal guardian's) consent. The visit was conducted pursuant to the emergency declaration under the [place] Act and the National Emergencies Act, 1135 waiver authority and the Coronavirus Preparedness and Response Supplemental Appropriations Act. Patient identification was verified, and a caregiver was present when appropriate. The patient was located in a state where the provider was licensed to provide care.     Dr. Roger and his scribe were in the office during the visit.     Below is a synopsis of what was covered during the phone/video call.      The patient was located at Home: [address] [place] [id number]   Provider was located at Facility (Appt Dept): [address] [place] [address]     RHEUMATOLOGY PROBLEM LIST AND CHIEF COMPLAINT  1. Recurrent parotitis - 4 episodes of right sided parotid swelling following infection    INTERVAL HISTORY  This is a 3 y.o.  male.  Today, the patient complains of recurrent parotitis.  Location: right side  Timing: episodic   Duration:  4 months  Modifying factors:   Context/Associated signs and symptoms: The patient is with his mom who provide history. He has had 1 episode of right sided parotid swelling since his last episode which started on 7/2/24. Mom gave Veronica ibuprofen 10 mg/kg x4 days which improved swelling and tenderness.     RHEUMATOLOGY REVIEW OF SYSTEMS   Positives as per history  Negatives as follows:  CONSTITUTlONAL:  Denies unexplained persistent fevers, weight change, chronic fatigue  HEAD/EYES:   Denies eye redness, blurry vision or sudden loss of vision, dry eyes, HA, temporal artery pain  ENT:    Denies oral/nasal ulcers, recurrent sinus infections, dry

## 2024-12-05 ENCOUNTER — HOSPITAL ENCOUNTER (INPATIENT)
Facility: HOSPITAL | Age: 74
LOS: 12 days | Discharge: NURSING, REHAB OR LONG-TERM CARE | End: 2024-12-18
Attending: EMERGENCY MEDICINE | Admitting: HOSPITALIST
Payer: MEDICARE

## 2024-12-06 ENCOUNTER — APPOINTMENT (OUTPATIENT)
Dept: GENERAL RADIOLOGY | Facility: HOSPITAL | Age: 74
End: 2024-12-06
Attending: EMERGENCY MEDICINE
Payer: MEDICARE

## 2024-12-06 ENCOUNTER — APPOINTMENT (OUTPATIENT)
Dept: CV DIAGNOSTICS | Facility: HOSPITAL | Age: 74
End: 2024-12-06
Attending: EMERGENCY MEDICINE
Payer: MEDICARE

## 2024-12-06 ENCOUNTER — APPOINTMENT (OUTPATIENT)
Dept: CT IMAGING | Facility: HOSPITAL | Age: 74
End: 2024-12-06
Attending: EMERGENCY MEDICINE
Payer: MEDICARE

## 2024-12-06 PROBLEM — N39.0 URINARY TRACT INFECTION WITHOUT HEMATURIA, SITE UNSPECIFIED: Status: ACTIVE | Noted: 2024-01-01

## 2024-12-06 PROBLEM — N17.9 AKI (ACUTE KIDNEY INJURY): Status: ACTIVE | Noted: 2024-01-01

## 2024-12-06 PROBLEM — Z51.5 PALLIATIVE CARE BY SPECIALIST: Status: ACTIVE | Noted: 2024-01-01

## 2024-12-06 PROBLEM — I95.9 HYPOTENSION, UNSPECIFIED HYPOTENSION TYPE: Status: ACTIVE | Noted: 2024-12-06

## 2024-12-06 PROBLEM — N17.9 AKI (ACUTE KIDNEY INJURY) (HCC): Status: ACTIVE | Noted: 2024-12-06

## 2024-12-06 PROBLEM — Z51.5 PALLIATIVE CARE BY SPECIALIST: Status: ACTIVE | Noted: 2024-12-06

## 2024-12-06 PROBLEM — I95.9 HYPOTENSION, UNSPECIFIED HYPOTENSION TYPE: Status: ACTIVE | Noted: 2024-01-01

## 2024-12-06 PROBLEM — N17.9 AKI (ACUTE KIDNEY INJURY) (HCC): Status: ACTIVE | Noted: 2024-01-01

## 2024-12-06 PROBLEM — N17.9 ACUTE RENAL FAILURE, UNSPECIFIED ACUTE RENAL FAILURE TYPE (HCC): Status: ACTIVE | Noted: 2024-01-01

## 2024-12-06 PROBLEM — N17.9 ACUTE RENAL FAILURE, UNSPECIFIED ACUTE RENAL FAILURE TYPE (HCC): Status: ACTIVE | Noted: 2024-12-06

## 2024-12-06 PROBLEM — N17.9 ACUTE RENAL FAILURE, UNSPECIFIED ACUTE RENAL FAILURE TYPE: Status: ACTIVE | Noted: 2024-01-01

## 2024-12-06 PROBLEM — N39.0 URINARY TRACT INFECTION WITHOUT HEMATURIA, SITE UNSPECIFIED: Status: ACTIVE | Noted: 2024-12-06

## 2024-12-06 NOTE — CONSULTS
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  The patient lives in a nursing home and she is not ambulatory but is able to be interactive with her family and is able to eat.    She presented to our emergency department hypotensive and in profound shock.  She had shock liver acute kidney injury with a creatinine of 4-1/2 at this time.    INR was also elevated at 2.7.    A CT scan of the chest abdomen and pelvis was performed in the emergency department which demonstrated signs of possible urinary tract infection with a thickened bladder.  No signs of intra-abdominal sepsis.  There was concern for cystitis and mild rectal wall thickening.    The colon wall was decompressed no signs of proctitis.  She had no abdominal pain on examination.    Labs were showing sodium of 134 potassium 3.8 bicarb of 11 creatinine of 4.2 AST and ALT markedly elevated.  Her white blood cell count was 9 hemoglobin was 12 and platelet count was 155,000.    In summary this is a 74-year-old female that presents to us with septic shock acute kidney injury acute renal failure and a marked non-anion gap metabolic acidosis    Problems  Non-anion gap metabolic acidosis  Acute kidney injury with acute tubular necrosis  Shock liver  Septic shock  Urinary tract infection  Stroke with hemiplegia  Nonambulatory  Hyperlipidemia  Depression  Hypertension baseline  History of seizure on Keppra  Multiple spasms on baclofen    Plan  Neuro  Continue Keppra for seizure disorder  She is on baclofen 5 3 times daily we do not want to have baclofen withdrawal she may need a lower dose given the renal insufficiency.    Cardiovascular  Septic shock  Levophed  Volume resuscitate  Patient did get 30 cc/kg IV fluid in the emergency department for  resuscitation  Lactic acid repeat is pending her last lactic acid was 1 at midnight.    Respiratory  She has no active respiratory issues    GI  Shock liver  LFTs are elevated  Coagulopathy in terms of INR  Will continue to follow    N.p.o. for now    Renal  Acute kidney injury  Non-anion gap metabolic acidosis  Start bicarb infusion  Fluid resuscitation has begun she has had over 3 L of volume  Exchange of the patient's Han catheter which was filled with sediment  Sitter nephrology consult    Infectious disease  Broad-spectrum antibiotics have been started  Blood cultures have been sent  CT showing some thickening of the rectum, thickening of the bladder remains on broad-spectrum antibiotics urinary is most likely source at this point.    Follow-up echocardiogram is pending as well    Endo  Send TSH  Blood sugars have been controlled    Tubes lines and drains  Remains on vasopressin service, Levophed is currently at 8  Consideration for if she needs a central line can be evaluated    Critical care attending    47 minutes of critical care time were spent at the bedside performing history, physical, complex data interpretation, radiographic interpretation, discussion with consultants, and creating a diagnostic and therapeutic treatment plan for this critically ill patient. Time spent was excluding time on procedures and resident teaching.    Date of Service  12/6/24

## 2024-12-06 NOTE — ED INITIAL ASSESSMENT (HPI)
Pt arrives with medics for generalized weakness and hypotension. Pt received 150cc bolus en route.   
resilient/elastic

## 2024-12-06 NOTE — DIETARY NOTE
[facility]   part of [facility]  NUTRITION ASSESSMENT    Pt does not meet malnutrition criteria at this time.    NUTRITION INTERVENTION:    Meal and Snacks - Continue Pureed diet as tolerated; monitor patient po intake. Encourage adequate po of appropriate diet.  Medical Food Supplements - RD added Ensure Plus HP chocolate BID. Rationale/use for oral supplements discussed.  Vitamin and Mineral Supplements - Recommend adding Multivitamin with minerals  Coordination of Nutrition Care - Recommend SLP consult prior to diet advancement.    PATIENT STATUS: 74 year old female admitted on 12/5 presents with hypotension. Pt screened d/t MST score 2. Visited pt at bedside. Pt very lethargic during visit and unable to provide any meaningful history. Requiring levophed. Per chart review, no recent wt loss recorded. SLP evaluated this AM d/t hx stroke - recommends pureed solids and thin liquids with 1:1 feeding support. No GI symptoms noted with last BM today. NKFA. No noticeable muscle wasting or fat loss. Offered ONS and she is agreeable to chocolate. Will continue to monitor and follow up as appropriate.    PMH: Anxiety, Atherosclerosis, COPD, HEART ATTACK, High cholesterol, depression, Paroxysmal atrial fibrillation, Peripheral vascular disease, Seizure, Stroke, Type II DM, essential hypertension, sleep apnea    ANTHROPOMETRICS:  Ht: 165.1 cm (5' 5\")  Wt: 68.8 kg (151 lb 10.8 oz).   BMI: Body mass index is 25.24 kg/m².  IBW: 56.8 kg    WEIGHT HISTORY:  Patient Weight(s) for the past 336 hrs:   Weight   12/06/24 0508 68.8 kg (151 lb 10.8 oz)   12/05/24 2327 68 kg (149 lb 14.6 oz)       Wt Readings from Last 10 Encounters:   12/06/24 68.8 kg (151 lb 10.8 oz)   01/11/23 68 kg (150 lb)   10/25/22 68.7 kg (151 lb 8 oz)   06/22/22 78.4 kg (172 lb 13.5 oz)   09/13/21 70.3 kg (155 lb)   07/01/20 70.3 kg (155 lb)   06/12/20 64 kg (141 lb)   10/10/19 64 kg (141 lb)   06/27/19 64 kg (141 lb)   06/16/19 64.4 kg (141 lb 14.4  oz)        NUTRITION:  Diet:       Procedures    Regular/General diet Fluid Consistency: Thin Liquids ; Texture Consistency: Pureed; Is Patient on Accuchecks? Yes; Is Patient on Suicide Precautions? No      Food Allergies: No  Cultural/Ethnic/Anabaptist Preferences Addressed: Yes    Percent Meals Eaten (last 3 days)       None            GI SYSTEM REVIEW: WNL; last BM 12/6  Skin/Wounds: sacral wound    NUTRITION RELATED PHYSICAL FINDINGS:     1. Body Fat/Muscle Mass: no wasting noted / well nourished     2. Fluid Accumulation: none per RN documentation    NUTRITION PRESCRIPTION:  68 kg Actual Body Weight  Calories: 9652-0370 calories/day (25-30 kcal/kg)  Protein:  grams protein/day (1.0-1.5 gm/kg)  Fluid: ~1 ml/kcal or per MD discretion    NUTRITION DIAGNOSIS/PROBLEM:  Inadequate oral intake related to insufficient appetite resulting in inadequate nutrition intake as evidenced by documented/reported insufficient oral intake    MONITOR AND EVALUATE/NUTRITION GOALS:  PO intake of 75% of meals TID - New  PO intake of 75% of oral nutrition supplement/s - New  Weight stable within 1 to 2 lbs during admission - New      MEDICATIONS:  Novolog, abx  Gtt: levophed at 4 mcg/min, Na bicarb at 100 ml/hr    LABS:  , elevated LFT's , Phos 6.7    Pt is at Moderate nutrition risk    Milli Fish RD, LDN, [facility]  Clinical Dietitian  Spectra: [phone]

## 2024-12-06 NOTE — SLP NOTE
ADULT SWALLOWING EVALUATION    ASSESSMENT    ASSESSMENT/OVERALL IMPRESSION:  Order received as per RN dysphagia screen, chart reviewed. Patient presented from facility with increased weakness and hypotension. Diagnosed with septic shock, TIP/ARF, and metabolic acidosis. No active respiratory issues.  Medical history significant for RCVA with left hemiplegia/paresis with contractures, UTI, heart failure with reduced EF, CAD.  Patient baseline diet was regular texture with thin liquids. Patient currently on 2L O2 via NC.  Patient remains in ICU and RN requested for SLP to proceed.     Patient received awake, alert, and pleasant. Vocal quality clear though suspect strained. Patient denied difficulty chewing or swallowing.  Observed missing upper left dentition. Oral motor exam functional for strength and ROM.  Patient able to follow directions however required constant cues for attention and follow-through with tasks.      Suspect mild to mild/moderate oral>pharyngeal dysphagia. Observed prolonged mastication with reduced oral efficiency, mild to moderate diffuse oral residuals after advanced solid texture trials, suspect delay in A-P initiation as well as delayed pharyngeal initiation of swallow.  No immediate or overt clinical s/s aspiration observed or suspected within controlled conditions of this evaluation however patient required 1:1 assist and verbal cues for attention to swallow.  Time spent with patient on diet texture analysis.      Recommend puree diet texture with thin liquids, 1:1 feeding support.  Aspiration precautions to include slow rate and controlled bolus volume - monitor pulmonary status during/after PO intake. Downgrade to NPO if s/s aspiration observed/suspected or respiratory status declines.   Video swallow study to be completed if CXR declines, increase in clinical signs of aspiration, and/or MD desires. SLP to follow    RECOMMENDATIONS   Diet Recommendations - Solids: Puree  Diet  Recommendations - Liquids: Thin Liquids  1:1 feeding approach  Compensatory Strategies Recommended: Alternate consistencies  Aspiration Precautions: Upright position;Slow rate;Small bites;Small sips;Cueing to swallow;1:1 feeding  Medication Administration Recommendations: Crushed in puree  Treatment Plan/Recommendations: Aspiration precautions;SLP to reassess    HISTORY   MEDICAL HISTORY  Reason for Referral: R/O aspiration    Problem List  Principal Problem:    Hypotension, unspecified hypotension type  Active Problems:    Essential hypertension    Esophageal reflux    CAD in native artery    Controlled type 2 diabetes mellitus with stage 3 chronic kidney disease, without long-term current use of insulin (HCC)    Hyperlipidemia associated with type 2 diabetes mellitus (HCC)    Seizure (HCC)    Acute cystitis without hematuria    Septic shock (HCC)    Urinary tract infection without hematuria, site unspecified    Acute renal failure, unspecified acute renal failure type (HCC)      Past Medical History  Past Medical History:    Abdominal distention    Anxiety    Atherosclerosis of coronary artery     2 surgeries for CABG X 4 1996 2012     Atherosclerosis of coronary artery    acute MI, CHF, Stents     Belching    Black stools    CONGESTIVE HEART FAILURE    EF 35-40 %    COPD    Diabetes mellitus (HCC)    Disorder of liver    \"fatty liver\"    Fatigue    HEART ATTACK    Acute mi and stents    High blood pressure    High cholesterol    History of blood transfusion    History of depression    Leaking of urine    Night sweats    Peripheral vascular disease (HCC)    Carotid Artery Blockage TIA    Seizure (HCC)    new onset on 2/26/19    Shortness of breath    Sputum production    Stented coronary artery    STROKE    TIA    Stroke syndrome    CVA no residual effects    Type II or unspecified type diabetes mellitus without mention of complication, not stated as uncontrolled    Uncontrolled diabetes mellitus    Unspecified  essential hypertension    Unspecified sleep apnea    Wears glasses    Wheezing       Prior Living Situation: Skilled nursing facility  Diet Prior to Admission: Regular;Thin liquids       Patient/Family Goals: did not state    SWALLOWING HISTORY  Current Diet Consistency: NPO    Dysphagia History: regular/thin at baseline per transfer paperwork from SNF    Imaging Results:   CT CHEST:  There are secretions present within the trachea and bilateral mainstem bronchi with areas of peribronchial thickening present.  Consider aspiration within differential.  No focal consolidation is seen at this time.  There may be mild volume overload present.     OBJECTIVE   ORAL MOTOR EXAMINATION  Dentition: Functional (missing upper left dentition)  Symmetry: Reduced left facial  Strength: Within Functional Limits  Tone: Within Functional Limits  Range of Motion: Within Functional Limits  Rate of Motion: Reduced    Voice Quality: Clear;Harsh/Strained  Respiratory Status: Nasal cannula  Consistencies Trialed: Thin liquids;Puree;Hard solid;Soft solid  Method of Presentation: Self presentation;Staff/Clinician assistance  Patient Positioned: Upright;Midline    Oral Phase of Swallow: Impaired  Bolus Retrieval: Intact  Bilabial Seal: Intact  Bolus Formation: Impaired  Bolus Propulsion: Impaired  Mastication: Impaired  Retention: Impaired    Pharyngeal Phase of Swallow: Appears Impaired  Laryngeal Elevation Timing: Appears impaired  Laryngeal Elevation Strength: Appears intact  Laryngeal Elevation Coordination: Appears intact  (Please note: Silent aspiration cannot be evaluated clinically. Videofluoroscopic Swallow Study is required to rule-out silent aspiration.)    Esophageal Phase of Swallow: No complaints consistent with possible esophageal involvement                GOALS  Goal #1 The patient will tolerate puree consistency and thin liquids without overt signs or symptoms of aspiration with 90 % accuracy over 2-3 session(s).  In  Progress   Goal #2 The patient/family/caregiver will demonstrate understanding and implementation of aspiration precautions and swallow strategies independently over 2-3 session(s).    In Progress   Goal #3 Assess need for VFSS within 1-2 visits In progress     FOLLOW UP  Treatment Plan/Recommendations: Aspiration precautions;SLP to reassess     Follow Up Needed (Documentation Required): Yes  SLP Follow-up Date: 12/09/24    Thank you for your referral.   If you have any questions, please contact Shivani Vincent, ED Vincent MS CCC-SLP/L, pager [phone]  Speech-LanguagePathologist

## 2024-12-06 NOTE — H&P
[facility]     Carla Kim Patient Status:  Emergency    1950 MRN [id number]   Location [facility] EMERGENCY DEPARTMENT Attending Saeed Garcia MD   Hosp Day # 0 PCP No primary care provider on file.     Chief Complaint: Weakness    Subjective:    History of Present Illness:     Carla Kim is a 74 year old female with history of hypertension, type 2 diabetes, anxiety, COPD, chronic heart failure, coronary artery disease presents the emergency room with generalized weakness that has been going on for the last couple days but then got worse last night.  Chronic left-sided weakness from a previous stroke but denies any new numbness or tingling or weakness in the extremities.  No fevers, chills, diarrhea, constipation.  No orthopnea or PND.    History/Other:    Past Medical History:  Past Medical History:    Abdominal distention    Anxiety    Atherosclerosis of coronary artery     2 surgeries for CABG X 4 1996     Atherosclerosis of coronary artery    acute MI, CHF, Stents     Belching    Black stools    CONGESTIVE HEART FAILURE    EF 35-40 %    COPD    Diabetes mellitus (HCC)    Disorder of liver    \"fatty liver\"    Fatigue    HEART ATTACK    Acute mi and stents    High blood pressure    High cholesterol    History of blood transfusion    History of depression    Leaking of urine    Night sweats    Peripheral vascular disease (HCC)    Carotid Artery Blockage TIA    Seizure (HCC)    new onset on 19    Shortness of breath    Sputum production    Stented coronary artery    STROKE    TIA    Stroke syndrome    CVA no residual effects    Type II or unspecified type diabetes mellitus without mention of complication, not stated as uncontrolled    Uncontrolled diabetes mellitus    Unspecified essential hypertension    Unspecified sleep apnea    Wears glasses    Wheezing     Past Surgical History:   Past Surgical History:   Procedure Laterality Date     Angiogram      Angioplasty (coronary)  2/28/14    stents acute mi    Appendectomy      Back surgery      Biopsy of skin lesion      hyperkeratosis    Bypass surgery  1996 and 2012    cabg x 4    Cabg  1996    x4    Cath bare metal stent (bms)      R carotid/coronary    Cholecystectomy  1987    Hc implant stent noncoronary temporary with delivery system [id number]      Laminectomy,lumbar  1984    Other surgical history  2005    Hx of transcath placement of intrarhoracic carotid artery      Family History:   Family History   Problem Relation Age of Onset    Other (Acute MI) Father     Other (Alzheimer's Disease) Mother      Social History:    reports that she quit smoking about 6 years ago. Her smoking use included cigarettes. She started smoking about 56 years ago. She has a 50 pack-year smoking history. She has never used smokeless tobacco. She reports that she does not drink alcohol and does not use drugs.     Allergies: Allergies[1]    Medications:  Medications Ordered Prior to Encounter[2]    Review of Systems:   A comprehensive review of systems was completed.    Pertinent positives and negatives noted in the HPI.    Objective:   Physical Exam:    /49   Pulse 81   Temp (!) 96.2 °F (35.7 °C) (Rectal)   Resp 22   Wt 149 lb 14.6 oz (68 kg)   SpO2 92%   BMI 24.95 kg/m²   General: No acute distress, Alert  Respiratory: No rhonchi, no wheezes  Cardiovascular: S1, S2. Regular rate and rhythm  Abdomen: Soft, Non-tender, non-distended, positive bowel sounds  Neuro: No new focal deficits  Extremities: No edema      Results:    Labs:      Labs Last 24 Hours:    Recent Labs   Lab 12/06/24  0007   RBC 4.50   HGB 13.0   HCT 38.9   MCV 86.4   MCH 28.9   MCHC 33.4   RDW 16.2   NEPRELIM 5.86   WBC 7.5   .0       Recent Labs   Lab 12/06/24  0007   GLU 78   BUN 79*   CREATSERUM 4.49*   EGFRCR 10*   CA 8.8   ALB 3.7   *   K 3.5   CL 99   CO2 17.0*   ALKPHO 101   AST 1,038*   ALT 1,095*   BILT 0.7   TP 7.0        Lab Results   Component Value Date    PT 13.4 02/28/2014    PT 13.9 02/27/2014    PT 20.2 (H) 10/22/2012    INR 2.77 (H) 12/06/2024    INR 1.08 02/26/2019    INR 1.06 09/05/2018       Recent Labs   Lab 12/06/24  0007   TROPHS 28       No results for input(s): \"TROP\", \"PBNP\" in the last 168 hours.    No results for input(s): \"PCT\" in the last 168 hours.    Imaging: Imaging data reviewed in Epic.    Assessment & Plan:      # Septic shock  - Secondary to UTI  - wean pressors as tolerated  - cultures pending    # UTI  - Will continue abx  - cultures pending    # Hypertension  -  blood pressure medications on hold while hypotensive    # Depression  -Will continue on SSRI.    # BPH  -Will continue on tamsulosin.    # Hyperlipidemia  -Will continue on statin therapy since.    # Type 2 diabetes  -Will place on hypoglycemia protocol with correction factor insulin.    # History of seizure  -Will continue on Keppra.    # CAD  -History of stent  -Will continue on statin therapy.  Beta-blocker, ARB on hold while hypotensive.    Plan of care discussed with patient at bedside.    Hira Moreno, DO    Supplementary Documentation:     The 21st Century Cures Act makes medical notes like these available to patients in the interest of transparency. Please be advised this is a medical document. Medical documents are intended to carry relevant information, facts as evident, and the clinical opinion of the practitioner. The medical note is intended as peer to peer communication and may appear blunt or direct. It is written in medical language and may contain abbreviations or verbiage that are unfamiliar.                                     [1]   Allergies  Allergen Reactions    Lipitor [Atorvastatin Calcium] MYALGIA     TABS    Wellbutrin [Bupropion Hcl]      TABS-tremor   [2]   No current facility-administered medications on file prior to encounter.     Current Outpatient Medications on File Prior to Encounter    Medication Sig Dispense Refill    baclofen 5 MG Oral Tab Take 1 tablet (5 mg total) by mouth 3 (three) times daily.      carvedilol 3.125 MG Oral Tab Take 1 tablet (3.125 mg total) by mouth 2 (two) times daily with meals.      hydrALAZINE 25 MG Oral Tab Take 1 tablet (25 mg total) by mouth 3 (three) times daily.      polyethylene glycol, PEG 3350, 17 g Oral Powd Pack Take 17 g by mouth daily.      tamsulosin 0.4 MG Oral Cap Take 1 capsule (0.4 mg total) by mouth.      Insulin Pen Needle 32G X 4 MM Does not apply Misc May substitute if not on insurance formulary 100 each 5    losartan 50 MG Oral Tab Take 0.5 tablets (25 mg total) by mouth daily. 60 tablet 0    apixaban 5 MG Oral Tab Take 1 tablet (5 mg total) by mouth 2 (two) times daily. 60 tablet 3    metoprolol succinate ER 25 MG Oral Tablet 24 Hr Take 0.5 tablets (12.5 mg total) by mouth Daily Beta Blocker. 30 tablet 3    clopidogrel 75 MG Oral Tab Take 75 mg by mouth daily.      sertraline 100 MG Oral Tab Take 100 mg by mouth daily.      hydrocortisone 2.5 % External Cream Apply 1 Application topically 2 (two) times daily. Apply every morning and evening. 90 g 11    ketoconazole 2 % External Cream Apply 1 Application topically 2 (two) times daily. 90 g 11    levETIRAcetam 500 MG Oral Tab Take 1 tablet (500 mg total) by mouth 2 (two) times daily. 180 tablet 3    ezetimibe 10 MG Oral Tab Take 1 tablet (10 mg total) by mouth nightly. 30 tablet 0    spironolactone 25 MG Oral Tab Take 0.5 tablets (12.5 mg total) by mouth daily. 15 tablet 0    Rosuvastatin Calcium 40 MG Oral Tab Take 1 tablet (40 mg total) by mouth daily. 30 tablet 0    acetaminophen 500 MG Oral Tab Take 500 mg by mouth every 4 (four) hours as needed for Pain.

## 2024-12-06 NOTE — ED PROVIDER NOTES
Patient Seen in: [facility] Emergency Department      History     Chief Complaint   Patient presents with    Fatigue     Stated Complaint: weakness, hypotension    Subjective:   HPI      Patient is a 74-year-old female history of coronary disease, COPD, stroke presents to ED for evaluation of generalized weakness and low blood pressure.  Patient presents from nursing facility.  Patient was brought in by EMS.  She states she started feeling generally weak tonight.  She has history of chronic left-sided weakness.  Patient [place] headache, chest pain, abdominal pain.  No vomiting or diarrhea.  She denies any urinary symptoms.  No fever, cough or cold symptoms.    Objective:     Past Medical History:    Abdominal distention    Anxiety    Atherosclerosis of coronary artery     2 surgeries for CABG X 4 1996 2012     Atherosclerosis of coronary artery    acute MI, CHF, Stents     Belching    Black stools    CONGESTIVE HEART FAILURE    EF 35-40 %    COPD    Diabetes mellitus (HCC)    Disorder of liver    \"fatty liver\"    Fatigue    HEART ATTACK    Acute mi and stents    High blood pressure    High cholesterol    History of blood transfusion    History of depression    Leaking of urine    Night sweats    Peripheral vascular disease (HCC)    Carotid Artery Blockage TIA    Seizure (HCC)    new onset on 2/26/19    Shortness of breath    Sputum production    Stented coronary artery    STROKE    TIA    Stroke syndrome    CVA no residual effects    Type II or unspecified type diabetes mellitus without mention of complication, not stated as uncontrolled    Uncontrolled diabetes mellitus    Unspecified essential hypertension    Unspecified sleep apnea    Wears glasses    Wheezing              Past Surgical History:   Procedure Laterality Date    Angiogram      Angioplasty (coronary)  2/28/14    stents acute mi    Appendectomy      Back surgery      Biopsy of skin lesion      hyperkeratosis    Bypass surgery  1996 and 2012     cabg x 4    Cabg  1996    x4    Cath bare metal stent (bms)      R carotid/coronary    Cholecystectomy       implant stent noncoronary temporary with delivery system [id number]      Laminectomy,lumbar  1984    Other surgical history  2005    Hx of transcath placement of intrarhoracic carotid artery                Social History     Socioeconomic History    Marital status:    Tobacco Use    Smoking status: Former     Current packs/day: 0.00     Average packs/day: 1 pack/day for 50.0 years (50.0 ttl pk-yrs)     Types: Cigarettes     Start date: 1968     Quit date: 2018     Years since quittin.6    Smokeless tobacco: Never   Vaping Use    Vaping status: Never Used   Substance and Sexual Activity    Alcohol use: No     Alcohol/week: 0.0 standard drinks of alcohol    Drug use: Never    Sexual activity: Never   Other Topics Concern    Caffeine Concern Yes     Comment: 1 cups of coffee daily    Exercise Yes     Comment: PT      Social Drivers of Health     Food Insecurity: Low Risk  (2022)    Received from [facility]    Food Insecurity     Have there been times that your food ran out, and you didn't have money to get more?: No     Are there times that you worry that this might happen?: No   Transportation Needs: High Risk (2022)    Received from [facility]    Transportation Needs     Do you have trouble getting transportation to medical appointments?: Yes     How do you normally get to and from your appointments?: Family/Friend   Physical Activity: Inactive (10/27/2020)    Received from [facility]    Exercise Vital Sign     Days of Exercise per Week: 0 days     Minutes of Exercise per Session: 0 min   Housing Stability: Low Risk  (2022)    Received from [facility]    Housing Stability     Are you concerned about having a safe and reliable place to live?: No                  Physical Exam     ED Triage Vitals    BP 12/06/24 0027 (!) 87/60   Pulse 12/06/24 0015 99   Resp 12/06/24 0015 20   Temp 12/06/24 0027 (!) 96.2 °F (35.7 °C)   Temp src 12/06/24 0027 Rectal   SpO2 12/06/24 0015 99 %   O2 Device 12/06/24 0015 None (Room air)       Current Vitals:   Vital Signs  BP: 104/48  Pulse: 92  Resp: 19  Temp: (!) 96.2 °F (35.7 °C) (warm blankets applied)  Temp src: Rectal  MAP (mmHg): 66    Oxygen Therapy  SpO2: 100 %  O2 Device: None (Room air)        Physical Exam  Constitutional: Elderly female appears chronically ill.  She is alert and oriented x 3  HEENT: Head normal cephalic/atraumatic.  Pupils equal round and reactive to light and accommodation, extraocular motion is intac, sclera white, conjunctiva pink.  Mucous membranes dry  Lungs: Clear  Cardiovascular: Regular rhythm, normal sinus  Abdominal: Soft nontender  Rectal: Hemoccult negative brown stool  Skin: Warm and dry.  Capillary refill less than 2 seconds.  Neurologic: Patient is able to move her right upper extremity.  She has chronic left-sided weakness but is able to move her left upper extremity at the shoulder and is able to lift her left leg slightly off the bed.  She is able to move her right leg           ED Course     Labs Reviewed   COMP METABOLIC PANEL (14) - Abnormal; Notable for the following components:       Result Value    Sodium 132 (*)     CO2 17.0 (*)     BUN 79 (*)     Creatinine 4.49 (*)     Calculated Osmolality 297 (*)     eGFR-Cr 10 (*)     AST 1,038 (*)     ALT 1,095 (*)     All other components within normal limits   CBC WITH DIFFERENTIAL WITH PLATELET - Abnormal; Notable for the following components:    Lymphocyte Absolute 0.89 (*)     All other components within normal limits   URINALYSIS WITH CULTURE REFLEX - Abnormal; Notable for the following components:    Urine Color Brown (*)     Clarity Urine Ex.Turbid (*)     WBC Urine >50 (*)     RBC Urine 6-10 (*)     Bacteria Urine 3+ (*)     Squamous Epi. Cells Few (*)     WBC Clump Present (*)      All other components within normal limits   PROTHROMBIN TIME (PT) - Abnormal; Notable for the following components:    PT 29.5 (*)     INR 2.77 (*)     All other components within normal limits   PTT, ACTIVATED - Abnormal; Notable for the following components:    PTT 40.7 (*)     All other components within normal limits   TSH W REFLEX TO FREE T4 - Normal   LACTIC ACID, PLASMA - Normal   TROPONIN I HIGH SENSITIVITY - Normal   POCT GLUCOSE - Normal   SARS-COV-2/FLU A AND B/RSV BY PCR (GENEXPERT) - Normal    Narrative:     This test is intended for the qualitative detection and differentiation of SARS-CoV-2, influenza A, influenza B, and respiratory syncytial virus (RSV) viral RNA in nasopharyngeal or nares swabs from individuals suspected of respiratory viral infection consistent with COVID-19 by their healthcare provider. Signs and symptoms of respiratory viral infection due to SARS-CoV-2, influenza, and RSV can be similar.    Test performed using the Xpert Xpress SARS-CoV-2/FLU/RSV (real time RT-PCR)  assay on the [employer] instrument, [address], [place] [address].   This test is being used under the Food and Drug Administration's Emergency Use Authorization.    The authorized Fact Sheet for Healthcare Providers for this assay is available upon request from the laboratory.   SPECIFIC GRAVITY, URINE   HEPATITIS PANEL, ACUTE (4)   RAINBOW DRAW GOLD   RAINBOW DRAW BLUE   BLOOD CULTURE   BLOOD CULTURE   URINE CULTURE, ROUTINE   Bicarb 17.  BUN 79.  Creatinine 4.49.  AST 1038.  ALT 1095.  Urinalysis is positive for infection.  EKG    Rate, intervals and axes as noted on EKG Report.  Rate: 95  Rhythm: Atrial Fibrillation  Reading: No acute changes.           I personally reviewed xray films of chest and independent interpretation shows no evidence for pneumonia.  I also reviewed formal xray report as read by radiology with findings below:  Xray of chest read by [facility] radiology shows new ill-defined opacity left  lower lung which may be related to atelectasis or pneumonia  CT of head, chest, abdomen and pelvis read by [employer] rad radiology shows CT head without acute process.  CT chest shows mild diffuse bronchial wall thickening.  Severe calcified coronary artery disease.  Normal thoracic aorta.  No concerning bone finding.  CT abdomen pelvis shows urinary bladder wall thickened.  Mild rectal wall thickening.  Normal appendix.  Mild L2 superior endplate compression deformity new from previous         Medications   norepinephrine (Levophed) 4 mg/250mL infusion premix (10 mcg/min Intravenous Rate/Dose Change 12/6/24 0312)   sodium chloride 0.9 % IV bolus 1,000 mL (0 mL Intravenous Stopped 12/6/24 0022)   sodium chloride 0.9 % IV bolus 1,000 mL (0 mL Intravenous Stopped 12/6/24 0115)   piperacillin-tazobactam (Zosyn) 4.5 g in dextrose 5% 100 mL IVPB-ADDV (0 g Intravenous Stopped 12/6/24 0206)   sodium chloride 0.9 % IV bolus 1,000 mL (1,000 mL Intravenous New Bag 12/6/24 0128)       MDM      Patient is a 74-year-old female presents to ED for evaluation of hypotension.  Differential sepsis, dehydration, cardiogenic shock, GI bleed.  Rectal exam shows brown stool.  No evidence for bleeding.  EKG shows atrial fibrillation, rate controlled.  Patient on Eliquis.  Patient had laboratory test performed showing acute renal failure with BUN of 79 and creatinine of 4.49.  Bicarbonate 17.  Patient has elevated AST and ALT of 1038 and 1095.  Urinalysis is positive for infection.  Patient was initially given 2 L of IV fluid satisfying 30 cc/kg fluid bolus.  Lactate was normal.  Blood cultures were sent.  Patient still hypotensive so started on Levophed after 2 L bolus with titration of Levophed to MAP greater than 65.  She was given IV Zosyn upon review of her labs.  Patient symptoms likely consistent with urosepsi and volume depletion. s Blood culture sent.  Chest x-ray showed potential pneumonia.  CT head obtained showed no acute process.   CT chest shows no acute abnormality or obvious pneumonia.  CT abdomen pelvis shows thickened bladder wall and potential new L2 compression deformity.  Patient case was discussed with hospitalist, Dr. Ramirez.  Also discussed case with pulmonary critical care, Dr. Jang who came to evaluate patient in ED who agrees with plan.  Critical care time was 90 minutes. This critical care time is exclusive of procedures critical care time includes monitoring of patient's cardiopulmonary and hemodynamic status, interpretation of laboratory values, and discussion of case with physician and consultants.    Admission disposition: 12/6/2024  2:31 AM         External and old record review was performed.  I reviewed echo 2022 showing ejection fraction 50-55%.      Medical Decision Making      Disposition and Plan     Clinical Impression:  1. Hypotension, unspecified hypotension type    2. Urinary tract infection without hematuria, site unspecified    3. Acute renal failure, unspecified acute renal failure type (HCC)    4.  Elevated transaminases  5.  Sepsis  6.  Atrial fibrillation, rate controlled    Disposition:  Admit  12/6/2024  2:31 am    Follow-up:  No follow-up provider specified.        Medications Prescribed:  Current Discharge Medication List              Supplementary Documentation:         Hospital Problems       Present on Admission  Date Reviewed: 1/11/2023            ICD-10-CM Noted POA    * (Principal) Hypotension, unspecified hypotension type I95.9 12/6/2024 Unknown                    [facility]   part of [facility]      Sepsis Reassessment Note    /49   Pulse 81   Temp (!) 96.2 °F (35.7 °C) (Rectal)   Resp 22   Wt 68 kg   SpO2 92%   BMI 24.95 kg/m²      I completed the sepsis reassessment at 3am    Cardiac:  Regularity: Irregular  Rate: Normal  Heart Sounds: No adventitious heart sounds    Lungs:   Right: Clear  Left: Clear    Peripheral Pulses:  Radial: Left 1+      Capillary  Refill:  <3 Secs    Skin:  Temp/Moisture: Dry  Color: Normal       Saeed Garcia MD  12/6/2024  3:25 AM

## 2024-12-06 NOTE — OCCUPATIONAL THERAPY NOTE
OCCUPATIONAL THERAPY EVALUATION - INPATIENT    Room Number: 462/462-A  Evaluation Date: 12/6/2024     Type of Evaluation: Initial  Presenting Problem: Weakness and fatigue, hypotension, sepsis, UTI    Physician Order: IP Consult to Occupational Therapy  Reason for Therapy:  ADL/IADL Dysfunction and Discharge Planning    OCCUPATIONAL THERAPY ASSESSMENT   Patient is a 74 year old female admitted on 12/5/2024 with Presenting Problem: Weakness and fatigue, hypotension, sepsis, UTI. Co-Morbidities : CVA with L hemiplegia, seizure disorder, CHF, HTN, DM, COPD, CAD  Patient is currently functioning near baseline with  all ADL .  Prior to admission, patient's baseline is per RN from [facility], total A with all ADL. Set-up for feeding. 2-person pivot transfer bed to chair. PT called Eugenio to confirm.  Patient met all OT goals at baseline level.  Patient reports no further questions/concerns at this time.     EVALUATION SESSION:  Patient at start of session: supine    FUNCTIONAL TRANSFER ASSESSMENT  Sit to Stand: Edge of Bed  Edge of Bed: Dependent (max A x 2)    BED MOBILITY  Supine to Sit : Dependent  Sit to Supine (OT): Dependent    COGNITION  Overall Cognitive Status:  WFL - within functional limits    COGNITION ASSESSMENTS     Upper Extremity:   ROM: within functional limits except for the following:  L UE contracture, L wrist/hand flexed  Strength: is within functional limits except for the following;  L UE contracted  Coordination:  Gross motor:   Fine motor: L hand no AROM    EDUCATION PROVIDED  Patient Education : Role of Occupational Therapy; Plan of Care; Functional Transfer Techniques  Patient's Response to Education: Verbalized Understanding    Equipment used: none  Therapist comments: max A x2 supine to sit.  L UE contracture with no AROM.  Min A static sitting balance.  Max A x 2 to stand, hand-held. Able to shift weight slightly, but not standing fully.   Max A x 2 sit to supine.  Arterial BP remained 112-116.      Patient End of Session: In bed;Needs met;Call light within reach;All patient questions and concerns addressed    OCCUPATIONAL PROFILE    HOME SITUATION  Type of Home: Skilled nursing facility  Home Layout: One level  Lives With: Staff 24 hours    Toilet and Equipment: Comfort height toilet (uses  briefs)     Other Equipment:  (wheelchair)          Drives: No       Prior Level of Function: RN from [facility], total A with all ADL. Set-up for feeding. 2-person pivot transfer bed to chair. Used briefs for all toileting needs. PT called Eugen[facility] to confirm.    PAIN ASSESSMENT  Rating: Unable to rate  Location: grimaced during PROM L wrist  Management Techniques: Repositioning    OBJECTIVE  Precautions: Bed/chair alarm  Fall Risk: High fall risk    WEIGHT BEARING RESTRICTION       AM-PAC ‘6-Clicks’ Inpatient Daily Activity Short Form  -   Putting on and taking off regular lower body clothing?: Total  -   Bathing (including washing, rinsing, drying)?: A Lot  -   Toileting, which includes using toilet, bedpan or urinal? : Total  -   Putting on and taking off regular upper body clothing?: A Lot  -   Taking care of personal grooming such as brushing teeth?: A Lot  -   Eating meals?: A Little    AM-PAC Score:  Score: 11  Approx Degree of Impairment: 70.42%  Standardized Score (AM-PAC Scale): 29.04    ADDITIONAL TESTS     NEUROLOGICAL FINDINGS      PLAN   Patient has been evaluated and presents with no skilled Occupational Therapy needs at this time.  Patient discharged from Occupational Therapy services.  Please re-order if a new functional limitation presents during this admission.         Patient Evaluation Complexity Level:   Occupational Profile/Medical History LOW - Brief history including review of medical or therapy records    Specific performance deficits impacting engagement in ADL/IADL LOW  1 - 3 performance deficits    Client Assessment/Performance Deficits LOW - No comorbidities nor modifications of tasks     Clinical Decision Making LOW - Analysis of occupational profile, problem-focused assessments, limited treatment options    Overall Complexity LOW     OT Session Time: 20 minutes

## 2024-12-06 NOTE — PHYSICAL THERAPY NOTE
PHYSICAL THERAPY EVALUATION - INPATIENT     Room Number: 462/462-A  Evaluation Date: 2024  Type of Evaluation: Initial  Physician Order: PT Eval and Treat    Presenting Problem: weakness, sepsis, UTI  Co-Morbidities : CVA with L hemiplegia, seizure disorder, CHF, HTN, DM, COPD, CAD  Reason for Therapy: Mobility Dysfunction and Discharge Planning    PHYSICAL THERAPY ASSESSMENT   Patient is a 74 year old female admitted 2024 for weakness and hypotension. Pt diagnosed with sepsis with hypotension secondary to UTI. Pt remains in levophed.     Patient is currently functioning at baseline with bed mobility and transfers. Prior to admission, patient's baseline is 2 person assist for bed mobility and transfers.       PLAN  Patient has been evaluated and presents with no skilled Physical Therapy needs at this time.  Patient discharged from Physical Therapy services.  Please re-order if a new functional limitation presents during this admission.         GOALS  Patient was able to achieve the following goals ...    Patient was able to transfer At previous, functional level   Patient able to ambulate on level surfaces Unable before admission     HOME SITUATION  Type of Home: Skilled nursing facility  Home Layout: One level                     Lives With: Staff 24 hours    Drives: No         Prior Level of [place]: Pt resides at [facility]. Staff at [place] called for accurate PLOF. Pt is a 2 person assist for OOB mobility and pivot transfer to chair. Pt has total care for ADL. Set up for eating.     SUBJECTIVE  \"It does feel good.\" Re sitting up     OBJECTIVE     Fall Risk: High fall risk    WEIGHT BEARING RESTRICTION     PAIN ASSESSMENT  Ratin          COGNITION  Orientation Level:  oriented to place, oriented to person, disoriented to time, and disoriented to situation  Following Commands:  follows one step commands with increased time and follows one step commands with repetition    RANGE OF  MOTION AND STRENGTH ASSESSMENT  Upper extremity ROM and strength - L side limited due to CVA    Lower extremity ROM is within functional limits     Lower extremity strength is within functional limits except for the following:    Right Knee extension  2+/5  Left Knee extension  2-/5  Right Dorsiflexion  3/5  Left Dorsiflexion  1/5    BALANCE  Static Sitting: Fair -  Dynamic Sitting: Poor +  Static Standing: Poor -  Dynamic Standing: Poor -    ADDITIONAL TESTS                                    ACTIVITY TOLERANCE                         O2 WALK       NEUROLOGICAL FINDINGS                        AM-PAC '6-Clicks' INPATIENT SHORT FORM - BASIC MOBILITY  How much difficulty does the patient currently have...  Patient Difficulty: Turning over in bed (including adjusting bedclothes, sheets and blankets)?: A Lot   Patient Difficulty: Sitting down on and standing up from a chair with arms (e.g., wheelchair, bedside commode, etc.): A Lot   Patient Difficulty: Moving from lying on back to sitting on the side of the bed?: A Lot   How much help from another person does the patient currently need...   Help from Another: Moving to and from a bed to a chair (including a wheelchair)?: A Lot   Help from Another: Need to walk in hospital room?: Total   Help from Another: Climbing 3-5 steps with a railing?: Total       AM-PAC Score:  Raw Score: 10   Approx Degree of Impairment: 76.75%   Standardized Score (AM-PAC Scale): 32.29   CMS Modifier (G-Code): CL    FUNCTIONAL ABILITY STATUS  Gait Assessment        Skilled Therapy Provided   AAROM performed in supine.   MAX assist x 2P for LE/trunk support with bed mobility.  Assisted with scooting EOB.   Static sitting variable supervision/occasional MIN assist for trunk support.   VC for midline positioning.   Sit-stand x 2P for force generation/balance.   Pt able to weight bear through LEs.   VC for posture.   Able to clear hips from bed but unable to stand fully upright.   Deferred transfer  to chair due to hypotension/pressor requirement.   Lateral transfer along bedside with MAX assist x 2P for force generation, balance, and guidance.   Returned to supine in bed with MAX assist x 2P for LE/trunk support.   Assisted with repositioning in bed.     Bed Mobility:  Rolling: MAX  Supine to sit: MAX x 2   Sit to supine: MAX x 2     Transfer Mobility:  Sit to stand: MAX x 2  Stand to sit: MAX x 2  Gait: NT    Therapist's comments: BP read via [place] and stable throughout session. Recommend 2 person pivot transfer to chair toward R side or total lift for OOB mobility.     Exercise/Education Provided:  Bed mobility  Energy conservation  Functional activity tolerated  ROM  Transfer training    Patient End of Session: In bed;Needs met;Call light within reach;RN aware of session/findings;All patient questions and concerns addressed;Hospital anti-slip socks    Patient Evaluation Complexity Level:  History High - 3 or more personal factors and/or co-morbidities   Examination of body systems Moderate - addressing a total of 3 or more elements   Clinical Presentation  Moderate - Evolving   Clinical Decision Making Moderate Complexity       PT Session Time: 25 minutes  Gait Training:  minutes  Therapeutic Activity: 15 minutes  Neuromuscular Re-education:  minutes  Therapeutic Exercise:  minutes

## 2024-12-06 NOTE — CONSULTS
[facility]  Report of Consultation    Carla Kim Patient Status:  Inpatient    1950 MRN [id number]   Location [facility] 4SW-A Attending Chitra Molina MD   Hosp Day # 0 PCP No primary care provider on file.     Reason for Consultation:  Sam     History of Present Illness:  Carla Kim is a a(n) 74 year old female with hx of CVA, recurrent UTIs, DM, CHF/CAD, seizure who is admitted to hospital for management of shock- secondary to urinary infection.   She presented to the hospital today w/ weakness and noted to be hypotensive.   ER evaluation c/w with septic shock due to UTI. She was started on pressors/fluids    Cr on admit 4.49 (basline <1.5)    Presently she is on pressors/fluids/abs  Daughter @ bedside  She feels her mother is somewhat confused compared to baseline, but improved from earlier today     History:  Past Medical History:    Abdominal distention    Anxiety    Atherosclerosis of coronary artery     2 surgeries for CABG X 4 1996     Atherosclerosis of coronary artery    acute MI, CHF, Stents     Belching    Black stools    CONGESTIVE HEART FAILURE    EF 35-40 %    COPD    Diabetes mellitus (HCC)    Disorder of liver    \"fatty liver\"    Fatigue    HEART ATTACK    Acute mi and stents    High blood pressure    High cholesterol    History of blood transfusion    History of depression    Leaking of urine    Night sweats    Paroxysmal atrial fibrillation (HCC)    Peripheral vascular disease (HCC)    Carotid Artery Blockage TIA    Seizure (HCC)    new onset on 19    Shortness of breath    Sputum production    Stented coronary artery    STROKE    TIA    Stroke syndrome    CVA no residual effects    Type II or unspecified type diabetes mellitus without mention of complication, not stated as uncontrolled    Uncontrolled diabetes mellitus    Unspecified essential hypertension    Unspecified sleep apnea    Wears glasses    Wheezing     Past Surgical History:   Procedure  Laterality Date    Angiogram      Angioplasty (coronary)  2/28/14    stents acute mi    Appendectomy      Back surgery      Biopsy of skin lesion      hyperkeratosis    Bypass surgery  1996 and 2012    cabg x 4    Cabg  1996    x4    Cath bare metal stent (bms)      R carotid/coronary    Cholecystectomy  1987    Hc implant stent noncoronary temporary with delivery system [id number]      Laminectomy,lumbar  1984    Other surgical history  2005    Hx of transcath placement of intrarhoracic carotid artery     Family History   Problem Relation Age of Onset    Other (Acute MI) Father     Other (Alzheimer's Disease) Mother       reports that she quit smoking about 6 years ago. Her smoking use included cigarettes. She started smoking about 56 years ago. She has a 50 pack-year smoking history. She has never used smokeless tobacco. She reports that she does not drink alcohol and does not use drugs.    Allergies:  Allergies[1]    Current Medications:    Current Facility-Administered Medications:     norepinephrine (Levophed) 4 mg/250mL infusion premix, 0.5-50 mcg/min, Intravenous, Continuous    sodium bicarbonate 150 mEq in dextrose 5% 1,000 mL infusion, 150 mEq, Intravenous, Continuous    piperacillin-tazobactam (Zosyn) 4.5 g in dextrose 5% 100 mL IVPB-ADDV, 4.5 g, Intravenous, q12h    heparin (Porcine) 5000 UNIT/ML injection 5,000 Units, 5,000 Units, Subcutaneous, Q8H AMADA    acetaminophen (Tylenol Extra Strength) tab 500 mg, 500 mg, Oral, Q4H PRN    melatonin tab 3 mg, 3 mg, Oral, Nightly PRN    polyethylene glycol (PEG 3350) (Miralax) 17 g oral packet 17 g, 17 g, Oral, Daily PRN    sennosides (Senokot) tab 17.2 mg, 17.2 mg, Oral, Nightly PRN    bisacodyl (Dulcolax) 10 MG rectal suppository 10 mg, 10 mg, Rectal, Daily PRN    fleet enema (Fleet) rectal enema 133 mL, 1 enema, Rectal, Once PRN    glucose (Dex4) 15 GM/59ML oral liquid 15 g, 15 g, Oral, Q15 Min PRN **OR** glucose (Glutose) 40% oral gel 15 g, 15 g, Oral, Q15 Min PRN  **OR** glucose-vitamin C (Dex-4) chewable tab 4 tablet, 4 tablet, Oral, Q15 Min PRN **OR** dextrose 50% injection 50 mL, 50 mL, Intravenous, Q15 Min PRN **OR** glucose (Dex4) 15 GM/59ML oral liquid 30 g, 30 g, Oral, Q15 Min PRN **OR** glucose (Glutose) 40% oral gel 30 g, 30 g, Oral, Q15 Min PRN **OR** glucose-vitamin C (Dex-4) chewable tab 8 tablet, 8 tablet, Oral, Q15 Min PRN    insulin aspart (NovoLOG) 100 Units/mL FlexPen 2-10 Units, 2-10 Units, Subcutaneous, TID AC and HS    clopidogrel (Plavix) tab 75 mg, 75 mg, Oral, Daily    levETIRAcetam (Keppra) tab 500 mg, 500 mg, Oral, BID    influenza virus trivalent high dose PF (Fluzone HD) 0.5 mL injection (ages >/= 65 years) 0.5 mL, 0.5 mL, Intramuscular, Prior to discharge    baclofen (Lioresal) tab 5 mg, 5 mg, Oral, BID    [START ON 12/7/2024] multivitamin (Tab-A-Odilon/Beta Carotene) tab 1 tablet, 1 tablet, Oral, Daily  Home Medications:  No current outpatient medications on file.       Review of Systems:  See HPI; A total of 12 systems reviewed and otherwise unremarkable.    Physical Exam:  Vital signs: Blood pressure 102/60, pulse 92, temperature 98.6 °F (37 °C), temperature source Temporal, resp. rate 17, height 5' 5\" (1.651 m), weight 151 lb 10.8 oz (68.8 kg), SpO2 95%, not currently breastfeeding.  General: No acute distress. Awake and alert; cooperartive   HEENT: Moist mucous membranes. EOM-I. PERRL  Neck: No lymphadenopathy.  No JVD. No carotid bruits.  Respiratory: Clear to auscultation bilaterally.  No wheezes. No rhonchi.  Cardiovascular: S1, S2.  Regular rate and rhythm.  No murmurs. Equal pulses   Abdomen: Soft, nontender, nondistended.  Positive bowel sounds. No rebound tenderness   Neurologic:  L sided deficits   Musculoskeletal: Full range of motion of all extremities.  No swelling noted.   Integument: No lesions. No erythema.  Psychiatric: Appropriate mood and affect.    Laboratory:  Lab Results   Component Value Date    WBC 9.2 12/06/2024    HGB  12.0 12/06/2024    HCT 39.2 12/06/2024    .0 12/06/2024    CREATSERUM 4.34 12/06/2024    BUN 73 12/06/2024     12/06/2024    K 2.8 12/06/2024     12/06/2024    CO2 21.0 12/06/2024     12/06/2024    CA 7.5 12/06/2024    ALB 3.1 12/06/2024    ALKPHO 84 12/06/2024    BILT 0.8 12/06/2024    TP 5.8 12/06/2024    AST 1,244 12/06/2024    ALT 1,112 12/06/2024    PTT 40.7 12/06/2024    INR 2.77 12/06/2024    PTP 29.5 12/06/2024    TSH 3.414 12/06/2024    MG 1.9 12/06/2024    PHOS 6.7 12/06/2024    PGLU 126 12/06/2024     Lab Results   Component Value Date    COLORUR Brown 12/06/2024    CLARITY Ex.Turbid 12/06/2024    SPECGRAVITY  12/06/2024      Comment:      Unable to perform dipstick due to color interference.    Refer to the specific gravity test for the specific gravitiy result.      SPECGRAVITY 1.011 12/06/2024    GLUUR  12/06/2024      Comment:      Unable to perform dipstick due to color interference.        BILUR  12/06/2024      Comment:      Unable to perform dipstick due to color interference.      KETUR  12/06/2024      Comment:      Unable to perform dipstick due to color interference.        BLOODURINE  12/06/2024      Comment:      Unable to perform dipstick due to color interference.        PHURINE  12/06/2024      Comment:      Unable to perform dipstick due to color interference.        PROUR  12/06/2024      Comment:      Unable to perform dipstick due to color interference.        UROBILINOGEN  12/06/2024      Comment:      Unable to perform dipstick due to color interference.        NITRITE  12/06/2024      Comment:      Unable to perform dipstick due to color interference.        LEUUR  12/06/2024      Comment:      Unable to perform dipstick due to color interference.        Joann/uL Interpretation   25          Trace   75          1+   250         2+   500         3+       BUN (mg/dL)   Date Value   12/06/2024 73 (H)   12/06/2024 61 (H)   12/06/2024 79 (H)     UREA NITROGEN  (BUN) (mg/dL)   Date Value   04/19/2017 15   01/07/2016 18   07/24/2014 22     CREATININE (mg/dL)   Date Value   03/25/2021 0.99   11/24/2020 1.09   04/19/2017 0.95   01/07/2016 0.95   07/24/2014 1.07 (H)     Creatinine (mg/dL)   Date Value   12/06/2024 4.34 (H)   12/06/2024 4.20 (H)   12/06/2024 4.49 (H)         Imaging:  Reviewed     Impression:  Septic shock - related to urinary infection  - pressors/fluids/abx  - wean support as tolerated  TIP/CKD- baseline Cr <1.5; possibly age/DM/HTN related nephrosclerosis  TIP in setting of shock  - supportive care for shock  - fluids- continue  - monitor bladder scans q shift  Hypokalemia- replace  Acidosis- on bicarb infusion; improving  Hx of DM/HTN  CAD  Hx of CHF- currently stable; monitor; off diuretics  Hx of CVA ; L hemiplegia + AMS- as above- slowly improved w/ improvement in hemodynamics      CC30m    Thank you for allowing me to participate in this patient's care.  Please feel free to call me with any questions or concerns.    Franck Ortiz MD  12/6/2024  4:48 PM         [1]   Allergies  Allergen Reactions    Lipitor [Atorvastatin Calcium] MYALGIA     TABS    Wellbutrin [Bupropion Hcl]      TABS-tremor

## 2024-12-06 NOTE — PROGRESS NOTES
Carla Kim Patient Status:  Inpatient    1950 MRN [id number]   Location [facility] 4SW-A Attending Chitra Molina MD   Hosp Day # 0 PCP No primary care provider on file.     Critical Care Progress Note          Subjective:  No acute events overnight.  Patient resting in bed, no complaints.    Objective:    Medications:   piperacillin-tazobactam  4.5 g Intravenous q12h    heparin  5,000 Units Subcutaneous Q8H AMADA    insulin aspart  2-10 Units Subcutaneous TID AC and HS    clopidogrel  75 mg Oral Daily    levETIRAcetam  500 mg Oral BID    baclofen  5 mg Oral BID            Intake/Output Summary (Last 24 hours) at 2024 0747  Last data filed at 2024 0706  Gross per 24 hour   Intake 3251.6 ml   Output 225 ml   Net 3026.6 ml       BP 96/43   Pulse 101   Temp 97.4 °F (36.3 °C) (Temporal)   Resp 19   Ht 165.1 cm (5' 5\")   Wt 151 lb 10.8 oz (68.8 kg)   SpO2 99%   BMI 25.24 kg/m²   Physical Exam:    General Appearance: Alert, cooperative, no distress, appears stated age  Neck: No JVD, neck supple, no adenopathy, trachea midline  Lungs: Course to auscultation bilaterally, respirations unlabored  Heart: Regular rate and rhythm, S1 and S2 normal, no murmur, rub or gallop  Abdomen: Soft, non-tender, bowel sounds active all four quadrants, no masses, no organomegaly  Extremities: Extremities normal, atraumatic, no cyanosis or edema,capillary refill <3 sec.    Pulses: 2+ and symmetric all extremities  Skin: Skin color, texture, turgor normal for ethnicity, no rashes or lesions, warm and dry  Neurologic: CNII-XII intact, normal strength    Recent Labs   Lab 24  0522   RBC 4.20   HGB 12.0   HCT 39.2   MCV 93.3   MCH 28.6   MCHC 30.6*   RDW 16.5   NEPRELIM 7.39   WBC 9.2   .0     Recent Labs   Lab 24  0007 24  0523   GLU 78 114*   BUN 79* 61*   CREATSERUM 4.49* 4.20*   CA 8.8 7.7*   ALB 3.7 3.0*   * 134*   K 3.5 3.8   CL 99 105   CO2 17.0* 11.0*   ALKPHO 101 84   AST  1,038* 889*   ALT 1,095* 879*   BILT 0.7 0.7   TP 7.0 5.8     No results for input(s): \"ABGPHT\", \"GHTSLQ7E\", \"KQLWK4C\", \"ABGHCO3\", \"ABGBE\", \"TEMP\", \"ALLY\", \"SITE\", \"DEV\", \"THGB\" in the last 168 hours.    Invalid input(s): \"RXZ38SOH\", \"CHOB\"  No results for input(s): \"BNP\" in the last 168 hours.  No results for input(s): \"TROP\", \"CK\" in the last 168 hours.          Assessment/Plan:  Sepsis, UTI with hypotension, metabolic acidosis  -Vasopressors to keep SBP >90 or MAP >65, currently on levophed  -Blood cultures pending  -Urine cultures pending  -Continue Zosyn (12/6-)  -Continue bicarb drip for metabolic acidosis- repeat CMP this afternoon  -Elevated LFTs- could be 2/2 shock    TIP  -trend labs and urine output  -Continue bicarb drip  -avoid nephrotoxins  -consider renal consult if no improvement  -tate exchanged    Heart failure with reduced EF  -repeat ECHO pending  -continue home medications when BP allows    Seizures  -continue home Keppra  -seizure precautions    CVA  -continue home medications  -Baclofen at lower dose 2/2 TIP    F/E/N  -IVF  -replete electrolytes as needed  -SLP    Proph  -Plavix  -Heparin subcutaneous    Dispo  -Full code- daughter would like to talk to palliative care  -ICU for risk of deterioration    Plan of care discussed with intensivist on-call, Dr. Ho.    Marie Carrero, [facility]  Critical Care NP  Phone [phone]      INTENSIVIST ADDENDUM      I agree with critical care APN note above. I have independently seen & evaluated the patient.  I agree with the management plan outlined above with the following changes/additions:     Patient was admitted with septic shock from UTI. She also has TIP and NAGMA    Plan:  -wean pressors as able  -IV Zosyn  -bicarb drip for acidosis, repeat labs this afternoon  -follow up echo  -ICU, patient is critically ill      35 min critical care time     Jason Ho M.D.  Pulmonary/Critical Care   On call Intensivist 12/06/24

## 2024-12-06 NOTE — ED QUICK NOTES
Accompanied patient to CT with PCT and RN hooked to monitor. Patient tolerated procedure well.   
ICU MD at bedside  
Nurse to nurse [place] to [place] at [id number], advised that the room is still dirty  
Rounding Completed    Plan of Care reviewed. Waiting for bed placement.  Elimination needs assessed.  Daughter at bedside., vitals rechecked and relayed to ED MD    Bed is locked and in lowest position. Call light within reach.  
Rounding Completed    Plan of Care reviewed. Waiting for bed placement.  Elimination needs assessed.  Vitals rechecked and recorded /56    Bed is locked and in lowest position. Call light within reach.  
Attending Discharge Physical Examination:

## 2024-12-06 NOTE — PLAN OF CARE
Received the patient from the ER on room air. Alert and oriented. Lethargic and slow to respond. Levo gtt. Bicarb gtt. Patient desaturated and placed on a nasal cannula. [place] placed. Purewick in place. See flowsheets and MAR for more information.

## 2024-12-06 NOTE — CONSULTS
[facility]   part of [facility]  Palliative Care Initial Consult Note    Carla Kim Patient Status:  Inpatient    1950 MRN [id number]   Location [facility] 4SW-A Attending Chitra Molina MD   Hosp Day # 0 PCP No primary care provider on file.     Date of Consult: 2024  Patient seen at: [facility]    Reason for Consultation: Consult ordered by:: Marie Carrero APRN for evaluation of Palliative Care needs and Goals of care discussion;Advance care planning / HPOA;Establish palliative care.    Subjective     History of Present Illness: Carla Kim is a 74 year old female with PVD, DM, Hx of CVA with residual left sided weakness, COPD, CAD, Afib, CHF who was admitted on 2024 for weakness and hypotension.  Pt is from [facility]. Work up in our hospital revealed UTI with sepsis and TPI. In ICU on vasopressor support.  History was obtained from [facility] and patient and daughter. In brief, last hospitalization per [facility] records 10/2022.     Today is day #0 of hospitalization.     When I entered the room, the patient was sleeping but easy to wake, lying in bed, and snoring . Daughter present at bedside.   See summary of discussion below.   Pt denies complaints.      Review of Systems:   Symptoms(s): Cough;Drowsiness;Confusion  Bowel Movement    No data found in the last 1 encounters.       Wt Readings from Last 6 Encounters:   24 151 lb 10.8 oz (68.8 kg)   23 150 lb (68 kg)   10/25/22 151 lb 8 oz (68.7 kg)   22 172 lb 13.5 oz (78.4 kg)   21 155 lb (70.3 kg)   20 155 lb (70.3 kg)        Palliative Care Social History:   Marital Status:   Children: Yes  Living Situation Prior to Admit: LTC  Is Patient Confused: No    Substance History:   reports that she quit smoking about 6 years ago. Her smoking use included cigarettes. She started smoking about 56 years ago. She has a 50 pack-year smoking history. She has never used smokeless  tobacco.  reports no history of alcohol use.  reports no history of drug use.    Spiritual Assessment:   [facility] Not Listed    Past Medical History/Past Surgical History:     Medical History: obtained from [facility]  Past Medical History:    Abdominal distention    Anxiety    Atherosclerosis of coronary artery     2 surgeries for CABG X 4 1996 2012     Atherosclerosis of coronary artery    acute MI, CHF, Stents     Belching    Black stools    CONGESTIVE HEART FAILURE    EF 35-40 %    COPD    Diabetes mellitus (HCC)    Disorder of liver    \"fatty liver\"    Fatigue    HEART ATTACK    Acute mi and stents    High blood pressure    High cholesterol    History of blood transfusion    History of depression    Leaking of urine    Night sweats    Paroxysmal atrial fibrillation (HCC)    Peripheral vascular disease (HCC)    Carotid Artery Blockage TIA    Seizure (HCC)    new onset on 2/26/19    Shortness of breath    Sputum production    Stented coronary artery    STROKE    TIA    Stroke syndrome    CVA no residual effects    Type II or unspecified type diabetes mellitus without mention of complication, not stated as uncontrolled    Uncontrolled diabetes mellitus    Unspecified essential hypertension    Unspecified sleep apnea    Wears glasses    Wheezing     Past Surgical History:   Procedure Laterality Date    Angiogram      Angioplasty (coronary)  2/28/14    stents acute mi    Appendectomy      Back surgery      Biopsy of skin lesion      hyperkeratosis    Bypass surgery  1996 and 2012    cabg x 4    Cabg  1996    x4    Cath bare metal stent (bms)      R carotid/coronary    Cholecystectomy  1987     implant stent noncoronary temporary with delivery system [id number]      Laminectomy,lumbar  1984    Other surgical history  2005    Hx of transcath placement of intrarhoracic carotid artery       Family History: obtained from [facility]  Family History   Problem Relation Age of Onset    Other (Acute MI) Father      Other (Alzheimer's Disease) Mother        Allergies:  Allergies[1]    Medications:     Current Facility-Administered Medications:     norepinephrine (Levophed) 4 mg/250mL infusion premix, 0.5-50 mcg/min, Intravenous, Continuous    sodium bicarbonate 150 mEq in dextrose 5% 1,000 mL infusion, 150 mEq, Intravenous, Continuous    piperacillin-tazobactam (Zosyn) 4.5 g in dextrose 5% 100 mL IVPB-ADDV, 4.5 g, Intravenous, q12h    heparin (Porcine) 5000 UNIT/ML injection 5,000 Units, 5,000 Units, Subcutaneous, Q8H AMADA    acetaminophen (Tylenol Extra Strength) tab 500 mg, 500 mg, Oral, Q4H PRN    melatonin tab 3 mg, 3 mg, Oral, Nightly PRN    polyethylene glycol (PEG 3350) (Miralax) 17 g oral packet 17 g, 17 g, Oral, Daily PRN    sennosides (Senokot) tab 17.2 mg, 17.2 mg, Oral, Nightly PRN    bisacodyl (Dulcolax) 10 MG rectal suppository 10 mg, 10 mg, Rectal, Daily PRN    fleet enema (Fleet) rectal enema 133 mL, 1 enema, Rectal, Once PRN    glucose (Dex4) 15 GM/59ML oral liquid 15 g, 15 g, Oral, Q15 Min PRN **OR** glucose (Glutose) 40% oral gel 15 g, 15 g, Oral, Q15 Min PRN **OR** glucose-vitamin C (Dex-4) chewable tab 4 tablet, 4 tablet, Oral, Q15 Min PRN **OR** dextrose 50% injection 50 mL, 50 mL, Intravenous, Q15 Min PRN **OR** glucose (Dex4) 15 GM/59ML oral liquid 30 g, 30 g, Oral, Q15 Min PRN **OR** glucose (Glutose) 40% oral gel 30 g, 30 g, Oral, Q15 Min PRN **OR** glucose-vitamin C (Dex-4) chewable tab 8 tablet, 8 tablet, Oral, Q15 Min PRN    insulin aspart (NovoLOG) 100 Units/mL FlexPen 2-10 Units, 2-10 Units, Subcutaneous, TID AC and HS    clopidogrel (Plavix) tab 75 mg, 75 mg, Oral, Daily    levETIRAcetam (Keppra) tab 500 mg, 500 mg, Oral, BID    influenza virus trivalent high dose PF (Fluzone HD) 0.5 mL injection (ages >/= 65 years) 0.5 mL, 0.5 mL, Intramuscular, Prior to discharge    baclofen (Lioresal) tab 5 mg, 5 mg, Oral, BID    [START ON 12/7/2024] multivitamin (Tab-A-Odilon/Beta Carotene) tab 1 tablet, 1  tablet, Oral, Daily    Functional Status History:  ADLs: bathing or showering, dressing, getting in and out of bed or a chair, walking, using the toilet, and eating  - HIGH  5+ performance deficits   IADLs: use the phone, shop for groceries, meal preparation, manage medicines, clean living area, use transportation by self, manage money  - HIGH  5+ performance deficits   DME: Hospital bed  Recurrent falls: No    Palliative Performance Scale:   Prior to admission Palliative performance scale PPSv2 (%): 35 (pt/family reported)   Current Palliative performance scale PPSv2 (%): 30   % Ambulation Activity Level Self-Care Intake Consciousness   100 Full  Normal  No Disease Full Normal Full   90 Full  Normal  Some Disease Full Normal Full   80 Full  Normal w/effort  Some Disease Full Normal or reduced Full   70 Reduced  Can't Perform Job  Some Disease Full Normal or reduced Full   60 Reduced  Can't Perform Hobby   Significant Disease Occ Assist Normal or reduced Full or confused   50 Mainly sit/lie Can't do any work  Extensive Disease Partial Assist Normal or reduced Full or confused   40 Mainly in bed Can't do any work  Extensive Disease Mainly Assist Normal or reduced Full or confused   30 Bed Bound Can't do any work  Extensive Disease Max Assist  Total Care Reduced  Drowsy/confused   20 Bed Bound Can't do any work  Extensive Disease Max Assist  Total Care Minimal  Drowsy/confused   10 Bed Bound Can't do any work  Extensive Disease Max Assist  Total Care Mouth Care  Drowsy/confused   0 Death        Objective      Vital Signs:  Blood pressure 123/63, pulse 103, temperature 98.6 °F (37 °C), temperature source Temporal, resp. rate 22, height 5' 5\" (1.651 m), weight 151 lb 10.8 oz (68.8 kg), SpO2 92%, not currently breastfeeding.  Body mass index is 25.24 kg/m².  Present Level of pain: denies  Non-verbal signs of pain present: No    Physical Exam:  General: Sleeping but woke easily. In no apparent respiratory distress. Body  habitus BMI WNL   HEENT: left facial droop.   Cardiac: tachycardia  Lungs: Normal effort on NC  Neurologic: Alert and oriented to person, place, and time but not situation. Very weak on left side.   Skin: Warm and dry.    Hematology:  Lab Results   Component Value Date    WBC 9.2 12/06/2024    HGB 12.0 12/06/2024    HCT 39.2 12/06/2024    .0 12/06/2024       Coags:  Lab Results   Component Value Date    PT 13.4 02/28/2014    INR 2.77 (H) 12/06/2024    PTT 40.7 (H) 12/06/2024       Chemistry:  Lab Results   Component Value Date    CREATSERUM 4.20 (H) 12/06/2024    BUN 61 (H) 12/06/2024     (L) 12/06/2024    K 3.8 12/06/2024     12/06/2024    CO2 11.0 (L) 12/06/2024     (H) 12/06/2024    CA 7.7 (L) 12/06/2024    ALB 3.0 (L) 12/06/2024    ALKPHO 84 12/06/2024    BILT 0.7 12/06/2024    TP 5.8 12/06/2024     (H) 12/06/2024     (H) 12/06/2024    DDIMER 1.51 (H) 10/24/2022    BNP 2,098 (H) 02/28/2014    MG 1.9 12/06/2024    PHOS 6.7 (H) 12/06/2024    TROP <0.045 02/26/2019       Imaging:  XR CHEST AP PORTABLE  (CPT=71045)    Result Date: 12/6/2024  CONCLUSION:  Preliminary reading provided by radiologist from [facility].  Cardiomegaly.  No CHF.  Lower lobe atelectasis or infiltrate. No sizable effusion, but small effusion difficult to exclude on portable chest x-ray. No evidence for pneumothorax.  Consider upright PA and lateral examination of the chest, when tolerated.   LOCATION:  [facility]      Dictated by (CST): Manjinder Caba MD on 12/06/2024 at 8:14 AM     Finalized by (CST): Manjinder Caba MD on 12/06/2024 at 8:15 AM       CT ABDOMEN+PELVIS(CPT=74176)    Result Date: 12/6/2024  CONCLUSION:  There is prominent circumferential mural thickening of the urinary bladder which may represent cystitis.  Clinical correlation is suggested.  Age-indeterminate compression deformity of the L2 vertebral body.  Correlation for symptoms in this region is suggested.  If there is persistent  concern then consider follow-up imaging including MRI.  Mild ectasia of the infrarenal abdominal aorta.   LOCATION:  [facility]   Dictated by (CST): Francisco Ramirez MD on 12/06/2024 at 7:42 AM     Finalized by (CST): Francisco Ramierz MD on 12/06/2024 at 7:45 AM       CT CHEST (HYD=25355)    Result Date: 12/6/2024  CONCLUSION:  There are secretions present within the trachea and bilateral mainstem bronchi with areas of peribronchial thickening present.  Consider aspiration within differential.  No focal consolidation is seen at this time.  There may be mild volume overload present.  A preliminary report was provided by [facility].     LOCATION:  [facility]   Dictated by (CST): Francisco Ramirez MD on 12/06/2024 at 7:38 AM     Finalized by (CST): Francisco Ramirez MD on 12/06/2024 at 7:42 AM       CT BRAIN OR HEAD (CPT=70450)    Result Date: 12/6/2024  CONCLUSION:  No significant midline shift or mass effect.  No acute intracranial hemorrhage.  An area of encephalomalacia in the right frontal parietal lobe is again noted.  If there is persistent clinical concern then consider MRI.  A preliminary report was provided by [facility].    LOCATION:  [facility]   Dictated by (CST): Francisco Ramirez MD on 12/06/2024 at 6:01 AM     Finalized by (CST): Francisco Ramirez MD on 12/06/2024 at 6:05 AM        Summary of Discussion      I discussed reason for palliative care consultation with patient and daughter.    I differentiated the palliative treatment-focus model versus the hospice comfort-focused philosophy of care. I informed the patient/family that having palliative care support does not limit medical treatment options or decisions to those who wish to continue curative or restorative medical therapies. I discussed the benefits of palliative care to include assistance with arising symptom management needs, an extra layer of support, to ensure GOC are respected throughout healthcare continuum, and assist with transition to hospice care when appropriate.       Outpatient/Community Palliative Care Services:  Usually visit once per 4 weeks  Focus on GOC and symptom management   Palliative Care criteria:  Not altered by prognosis   Does not limit curative or restorative therapies      Outpatient Hospice services:  24/7 phone triage services   RN visit one or more times per week depending on need  Home health aide to assist in ADLs/hygiene   Hospice criteria:  Less than six-month prognosis   Must forego most life-prolonging  measures/treatments   Focus solely on comfort   Must sign onto hospice benefit with agency     I provided brief overview of Medicare hospice benefit along with hospice philosophy and answered all questions. Carla Kim's condition does not qualify for hospice services under dementia criteria at this time.    Prognostic awareness/understanding: Fair  Lengthy discussion with daughter at bedside. Notes cognitive difficulties over time along with functional challenges since stroke in 2018. Discussed current condition at length. Once Carla awoke, she was also debriefed. Questions addressed.     Hopes/goals:   Tara would like some clarity on the situation at [facility] given financial difficulties with the facility.   Tara wishes to respect her mother's wishes as much as possible.     Fears/concerns:   Tara describes a significant change in her mother's demeanor since the stroke with quick to anger, demanding, and upset by her debility. She worries that her mother is suffering from worsening cognitive decline, which Eugenio has not addressed to her satisfaction. Tara is also concerned about the financial POA she has and Eugenio's reported aggressive/unprofessional interactions with her.     Provided emotional support to Daughter.    Advance Care Planning counseling and discussion:   HC POA Documentation Completed--Document in Epic.   Daughter Tara is POA   A voluntarily discussion and explanation regarding Advance Care Planning (ACP) took place today with  patient and daughter. Items addressed: patient preferences , code status , Health Care Power of  (HCPOA) , POLST, change in health status , and end-of-life care plan. This resulted in a better understanding of ACP and change in code status. Tara believes that prolongation of life by mechanical support (Intubation and CPR) would be too aggressive for her mom. Carla agreed to DNAR but would like all attempts at life sustaining measures as of today. Tara and Carla verbalized agreement and understanding of DNAR order. Total time dedicated to ACP >16 minutes.   POLST FORM Not completed & Form provided  Full Code -->DANR/Full tx    Assessment and Recommendations        Principal Problem:    Hypotension, unspecified hypotension type  Active Problems:    Essential hypertension    Esophageal reflux    CAD in native artery    Controlled type 2 diabetes mellitus with stage 3 chronic kidney disease, without long-term current use of insulin (HCC)    Hyperlipidemia associated with type 2 diabetes mellitus (HCC)    Seizure (HCC)    Acute cystitis without hematuria    Septic shock (HCC)    Urinary tract infection without hematuria, site unspecified    Acute renal failure, unspecified acute renal failure type (HCC)    Palliative Care encounter    Goals of care: established and treatment focused   Carla and Tara are aware of UTI, hypotension, and significant TIP. They would like a few days to see improvement, if possible.   Tara appreciative of my visit. The PC service will follow up on Monday.   Code Status: DNAR/Full Treatment - Will need POLST completed   SW/CM to address concerns with [facility] facility (reports of stealing lotions and financial constraints)     Discussed today's visit with Family, RN, and SW/CM.    Palliative Care Follow Up: Palliative care team will Continue to follow while inpatient.  Palliative care follow up outpatient: is indicated but not currently enrolled in palliative care program.    Thank you  for allowing Palliative Care services to participate in the care of Carla Kim.    A total of  100  minutes were spent on this consult, which included all of the following: chart review, direct face to face contact, history taking, physical examination, counseling and coordinating care, and documentation. >16 minutes spent on ACP    Nakia Pereira MD  12/6/2024  3:46 PM  Palliative Care Services    The 21st Century Cures Act makes medical notes like these available to patients in the interest of transparency. Please be advised this is a medical document. Medical documents are intended to carry relevant information, facts as evident, and the clinical opinion of the practitioner. The medical note is intended as peer to peer communication and may appear blunt or direct. It is written in medical language and may contain abbreviations or verbiage that are unfamiliar.          [1]   Allergies  Allergen Reactions    Lipitor [Atorvastatin Calcium] MYALGIA     TABS    Wellbutrin [Bupropion Hcl]      TABS-tremor

## 2024-12-06 NOTE — PROCEDURES
Arterial Line  Performed by: JANELL Tripathi     General Information and Staff     Procedure Start: 0610  Patient Location:  ICU  Indication: continuous blood pressure monitoring and blood sampling needed    Site Identification: real time ultrasound guided, sterile technique used     Procedure Details     Catheter Size:  20 G  Catheter Length:  1 and 3/4 inchCatheter Type:  Arrow  Seldinger Technique?: Yes    Laterality:  right  Site: radial    Site Prep: chlorhexidine  Line Secured:  Tape and Tegaderm     Assessment: Beka's test negative, Good flash, guidewire and catheter advanced without difficulty, pulsatile blood flow noted.    Events: patient tolerated procedure well with no complications

## 2024-12-07 ENCOUNTER — APPOINTMENT (OUTPATIENT)
Dept: GENERAL RADIOLOGY | Facility: HOSPITAL | Age: 74
End: 2024-12-07
Payer: MEDICARE

## 2024-12-07 PROBLEM — R41.82 ALTERED MENTAL STATUS: Status: ACTIVE | Noted: 2024-12-07

## 2024-12-07 PROBLEM — E87.6 HYPOKALEMIA: Status: ACTIVE | Noted: 2024-01-01

## 2024-12-07 PROBLEM — E87.20 ACIDOSIS: Status: ACTIVE | Noted: 2022-10-21

## 2024-12-07 PROBLEM — E87.6 HYPOKALEMIA: Status: ACTIVE | Noted: 2024-12-07

## 2024-12-07 PROBLEM — R41.82 ALTERED MENTAL STATUS: Status: ACTIVE | Noted: 2024-01-01

## 2024-12-07 NOTE — PLAN OF CARE
Received at change of shift. A/O x3-4, follows commands, PERRLA.. Residual L facial droop + extremity weakness due to prior CVA. 2 L NC. Afebrile. Tele NSR w/ frequent PVCs. R ART. Levo gtt off. Purewick. LBM 12/6. No c/o pain. Bicarb gtt. Family updated on plan of care. See flowsheets for further info.

## 2024-12-07 NOTE — PLAN OF CARE
Alert and oriented x 3-4, left side body weakness, on O2 at 2L/NC, has period of desaturation from the night shift. Levophed drip at low dose ongoing for blood pressure support. Bicarb drip ongoing at 100 ml/hr.  Able to wean O2 before noon,no desaturation noted. Seen by Speech this morning, patient fed 1:1 with pureed diet, meds crushed and given with apple sauce. Repeat Labs CMP done this afternoon, high Crea noted, consulted Renal and seen patient, K+ replaced PO, crushed and given with apple sauce, meds well tolerated.   PT seen patient today. Daughter came and talked to SW and Palliative care team, daughter updated of POC.

## 2024-12-07 NOTE — PROGRESS NOTES
Carla Kim Patient Status:  Inpatient    1950 MRN [id number]   Location [facility] 4SW-A Attending Cihtra Molina MD   Hosp Day # 1 PCP No primary care provider on file.     Critical Care Progress Note          Subjective:  Weaned of vasopressors overnight.    Objective:    Medications:   piperacillin-tazobactam  4.5 g Intravenous q12h    heparin  5,000 Units Subcutaneous Q8H AMADA    insulin aspart  2-10 Units Subcutaneous TID AC and HS    clopidogrel  75 mg Oral Daily    levETIRAcetam  500 mg Oral BID    baclofen  5 mg Oral BID    multivitamin  1 tablet Oral Daily              Intake/Output Summary (Last 24 hours) at 2024 0753  Last data filed at 2024 0600  Gross per 24 hour   Intake 3362.7 ml   Output 1710 ml   Net 1652.7 ml       BP 96/58 (BP Location: Left arm)   Pulse 95   Temp 97.5 °F (36.4 °C) (Temporal)   Resp 21   Ht 165.1 cm (5' 5\")   Wt 155 lb 6.8 oz (70.5 kg)   SpO2 96%   BMI 25.86 kg/m²     Physical Exam:   General Appearance: Alert, cooperative, no distress, appears stated age  Neck: No JVD, neck supple, no adenopathy, trachea midline  Lungs: Clear to auscultation bilaterally, respirations unlabored  Heart: Regular rate and rhythm, S1 and S2 normal, no murmur, rub or gallop  Abdomen: Soft, non-tender, bowel sounds active all four quadrants, no masses, no organomegaly  Extremities: Extremities normal, atraumatic, no cyanosis or edema,capillary refill <3 sec.    Pulses: 2+ and symmetric all extremities  Skin: Skin color, texture, turgor normal for ethnicity, no rashes or lesions, warm and dry      Recent Labs   Lab 24  0522   RBC 4.20   HGB 12.0   HCT 39.2   MCV 93.3   MCH 28.6   MCHC 30.6*   RDW 16.5   NEPRELIM 7.39   WBC 9.2   .0     Recent Labs   Lab 24  0007 24  0523 24  1510   GLU 78 114* 161*   BUN 79* 61* 73*   CREATSERUM 4.49* 4.20* 4.34*   CA 8.8 7.7* 7.5*   ALB 3.7 3.0* 3.1*   * 134* 136   K 3.5 3.8 2.8*   CL 99 105 103    CO2 17.0* 11.0* 21.0   ALKPHO 101 84 84   AST 1,038* 889* 1,244*   ALT 1,095* 879* 1,112*   BILT 0.7 0.7 0.8   TP 7.0 5.8 5.8     No results for input(s): \"ABGPHT\", \"KUVOOD2X\", \"ICPSC6V\", \"ABGHCO3\", \"ABGBE\", \"TEMP\", \"ALLY\", \"SITE\", \"DEV\", \"THGB\" in the last 168 hours.    Invalid input(s): \"GQC56XPL\", \"CHOB\"  No results for input(s): \"BNP\" in the last 168 hours.  No results for input(s): \"TROP\", \"CK\" in the last 168 hours.    No results found.       Assessment/Plan:    Septic shock 2/2 UTI in a patient with a chronic tate catheter  -Vasopressors weaned off overnight  - Lactic acid normal, procal elevated however unreliable in the setting of TIP.  -Blood cultures NGTD  -Urine cultures pending  - CT A/P withh thickened bladder, concern for cystitis, mild rectal wall thickening  -Continue Zosyn (12/6-)  -Continue bicarb drip for metabolic acidosis- repeat CMP this afternoon  - TTE 12/6 LVEF 45%     NAGMA 2/2 TIP on CKD?  - Cr elevated 4.49 on admission  - Bicarb drip started 12/6- stop today  - Strict I/O- 1900cc urine output x 24 hours.  - Avoid nephrotoxins  - Renal following    Acute hypoxic respiratory failure, possible aspiration pneumonia  - Requiring 2L/NC- baseline room air  - MRSA nares negative  - Check strep/legionella urine Ag  - Chest sputum culture  - Check chest xray today  - Net + 4.6L since admit. Stop IVF. May need to diurese if BP allows  - Wean O2 as tolerated  - Encourage IS  - Antibiotics as above    Transaminitis  - AST/ALT 1244/1112 today  - 2/2 to shock liver  - Daily CMP    Coagulopathy  - 2/2 to above  - No s/s of bleeding  - Daily INR     HFrEF  CAD  HTN  Afib   - TTE as above  - PTA meds on hold in the setting of shock (hydralazine, coreg, losartan, metoprolol, spironolactone  - Resume apixaban     History of seizures  - continue home Keppra  - seizure precautions     CVA  - Chronic left sided weakness  - continue home medications  - Baclofen at lower dose 2/2 TIP    Depression  -  Sertraline     COPD  - Not in acute exacerbation  - Monitor    DM2  - A1c 6.5  - Accuchecks  - SSI     F/E/N  -IVF  -replete electrolytes as needed  - ADAT     Proph  - Plavix  - resume apixaban  - Non-ambulatory baseline  - PT/OT/ST     Dispo  - DNAR full treatment  - Palliative care following  - ICU monitoring     Plan of care discussed with intensivist on-call, Dr. Ho.    Shante [facility]  Critical Care  [id number]      INTENSIVIST ADDENDUM      I agree with critical care APN note above. I have independently seen & evaluated the patient.  I agree with the management plan outlined above with the following changes/additions:     The patient is off vasopressors. Bicarb drip was discontinued.  No other new issues.    Plan:  -continue IV Zosyn  -monitor renal function; nephrology is also following  -wean O2 as able  -resume home apixaban for afib  -eventually resume home bp meds  -patient is stable for transfer to the floor.     Jason Ho M.D.  Pulmonary/Critical Care   On call Intensivist 12/07/24

## 2024-12-08 NOTE — PROGRESS NOTES
[facility]   part of [facility]     Hospitalist Progress Note     Carla Kim Patient Status:  Inpatient    1950 MRN [id number]   Location [facility] 4SW-A Attending Chitra Molina MD   Hosp Day # 2 PCP No primary care provider on file.     Chief Complaint: septic shock    Subjective:     Patient awake, alert    Objective:    Review of Systems:   A comprehensive review of systems was completed; pertinent positive and negatives stated in subjective.    Vital signs:  Temp:  [97.2 °F (36.2 °C)-98.2 °F (36.8 °C)] 98.2 °F (36.8 °C)  Pulse:  [] 101  Resp:  [18-25] 23  BP: (101-120)/(54-78) 116/64  SpO2:  [90 %-97 %] 92 %    Physical Exam:    General: No acute distress  Respiratory: No wheezes, no rhonchi  Cardiovascular: S1, S2, regular rate and rhythm  Abdomen: Soft, Non-tender, non-distended, positive bowel sounds  Neuro: No new focal deficits.   Extremities: No edema      Diagnostic Data:    Labs:  Recent Labs   Lab 24  0007 24  0522 24  0637 24  0543   WBC 7.5 9.2 7.1 6.2   HGB 13.0 12.0 11.7* 11.6*   MCV 86.4 93.3 83.8 85.3   .0 155.0 128.0* 121.0*   INR 2.77*  --  2.15*  --        Recent Labs   Lab 24  0523 24  1510 24  0637 24  1904 24  0543   * 161* 149* 132* 115*   BUN 61* 73* 66* 62* 58*   CREATSERUM 4.20* 4.34* 4.10* 3.85* 3.73*   CA 7.7* 7.5* 8.0* 8.3* 8.4*   ALB 3.0* 3.1* 3.0*  --   --    * 136 143 144 146*   K 3.8 2.8* 4.3 5.2* 4.1    103 105 108 109   CO2 11.0* 21.0 26.0 22.0 27.0   ALKPHO 84 84 100  --   --    * 1,244* 1,136*  --   --    * 1,112* 1,106*  --   --    BILT 0.7 0.8 0.9  --   --    TP 5.8 5.8 6.1  --   --        Estimated Creatinine Clearance: 11.7 mL/min (A) (based on SCr of 3.73 mg/dL (H)).    Recent Labs   Lab 24   TROPHS 28       Recent Labs   Lab 24  0637   PTP 29.5* 24.2*   INR 2.77* 2.15*                  \[facility]\""    Hospital  Encounter on 12/05/24   1. Blood Culture     Status: None (Preliminary result)    Collection Time: 12/06/24 12:10 AM    Specimen: Blood,peripheral   Result Value Ref Range    Blood Culture Result No Growth 2 Days N/A   2. Urine Culture, Routine     Status: Abnormal    Collection Time: 12/06/24 12:06 AM    Specimen: Urine, clean catch   Result Value Ref Range    Urine Culture >100,000 CFU/ML Klebsiella pneumoniae (A) N/A       Susceptibility    Klebsiella pneumoniae -  (no method available)     Ampicillin  Resistant      Ampicillin + Sulbactam 4 Sensitive      Cefazolin <=4 Sensitive      Ciprofloxacin <=0.25 Sensitive      Gentamicin <=1 Sensitive      Meropenem <=0.25 Sensitive      Levofloxacin 0.25 Sensitive      Nitrofurantoin 64 Intermediate      Piperacillin + Tazobactam <=4 Sensitive      Trimethoprim/Sulfa <=20 Sensitive          Imaging: Reviewed in Epic.    Medications:    ceFAZolin  1 g Intravenous Q12H    apixaban  5 mg Oral BID    sertraline  100 mg Oral Daily    insulin aspart  2-10 Units Subcutaneous TID AC and HS    clopidogrel  75 mg Oral Daily    levETIRAcetam  500 mg Oral BID    baclofen  5 mg Oral BID    multivitamin  1 tablet Oral Daily       Assessment & Plan:      #Septic shock   #UTI in setting of chronic tate -POA  -off pressors  -continue Abx  -Cx with klebsiella    #TIP on CKD  -cr improving    #Depression  -SSRI    #Hyperlipidemia  -statin    #DM type II  -hyperglycemia protocol    #Prior CVA-chronic left sided weakness  #Seizures  -continue keppra    #A.fib  -continue eliquis    Chitra Molina MD    Supplementary Documentation:     Quality:  DVT Mechanical Prophylaxis:   SCDs,    DVT Pharmacologic Prophylaxis   Medication    apixaban (Eliquis) tab 5 mg                Code Status: DNAR/Full Treatment  Tate: External urinary catheter in place  Tate Duration (in days):   Central line:    DEEDEE:     Discharge is dependent on: progress  At this point Ms. Kim is expected to be discharge  to: TBD    The 21st Century Cures Act makes medical notes like these available to patients in the interest of transparency. Please be advised this is a medical document. Medical documents are intended to carry relevant information, facts as evident, and the clinical opinion of the practitioner. The medical note is intended as peer to peer communication and may appear blunt or direct. It is written in medical language and may contain abbreviations or verbiage that are unfamiliar.              **Certification      PHYSICIAN Certification of Need for Inpatient Hospitalization - Initial Certification    Patient will require inpatient services that will reasonably be expected to span two midnight's based on the clinical documentation in H+P.   Based on patients current state of illness, I anticipate that, after discharge, patient will require TBD.

## 2024-12-08 NOTE — PLAN OF CARE
Received this morning, mental status at baseline, oriented  x 3, left side body weakness noted. O2 at 2L/NC, unable to wean off, desaturates to 86 to 88% when in room air, O2 maintained at 2l/NC, able to cough out scant white secretions, suctioned mouth with Yankauer. High risk for aspiration, on pureed diet with thin liquids, feed 1:1, aspiration precautions observed, meds crushed and gave it with apple sauce. ICU rounding done this morning, POC discussed, A line discontinued,CXR done,  downgrade status ordered, daughter updated. Urine unable to collect because of incontinence, placed another pure wick to collect urine, sputum also need to be collected, sputum is very scant at this time. Seen by renal this morning, Bicarb drip discontinued, IVF changed to Plain LR at 75 ml/hr. High risk for skin breakdown, noted with wound in sacral area, kept skin clean and dry, Mepilex applied after cleaning with incontinent urine, repositioned every 2 hours to prevent skin breakdown.

## 2024-12-08 NOTE — PROGRESS NOTES
Carla Kim Patient Status:  Inpatient    1950 MRN [id number]   Location [facility] 4SW-A Attending Chitra Molina MD   Hosp Day # 2 PCP No primary care provider on file.     Critical Care Progress Note          Subjective:  No acute events overnight.  Alert and oriented to self.    Objective:    Medications:   apixaban  5 mg Oral BID    sertraline  100 mg Oral Daily    piperacillin-tazobactam  4.5 g Intravenous q12h    insulin aspart  2-10 Units Subcutaneous TID AC and HS    clopidogrel  75 mg Oral Daily    levETIRAcetam  500 mg Oral BID    baclofen  5 mg Oral BID    multivitamin  1 tablet Oral Daily              Intake/Output Summary (Last 24 hours) at 2024 0659  Last data filed at 2024 0553  Gross per 24 hour   Intake 2473.65 ml   Output 450 ml   Net 2023.65 ml       /65 (BP Location: Left arm)   Pulse 96   Temp 98 °F (36.7 °C) (Temporal)   Resp 22   Ht 165.1 cm (5' 5\")   Wt 155 lb 6.8 oz (70.5 kg)   SpO2 96%   BMI 25.86 kg/m²     Physical Exam:   General Appearance: Alert, cooperative, no distress, appears stated age  Neck: No JVD, neck supple, no adenopathy, trachea midline  Lungs: Course to auscultation bilaterally, respirations unlabored  Heart: Regular rate and rhythm, S1 and S2 normal, no murmur, rub or gallop  Abdomen: Soft, non-tender, bowel sounds active all four quadrants, no masses, no organomegaly  Extremities: Extremities normal, atraumatic, no cyanosis or edema,capillary refill <3 sec.    Pulses: 2+ and symmetric all extremities  Skin: Skin color, texture, turgor normal for ethnicity, no rashes or lesions, warm and dry      Recent Labs   Lab 24  0637 24  0543   RBC 4.08 4.07   HGB 11.7* 11.6*   HCT 34.2* 34.7*   MCV 83.8 85.3   MCH 28.7 28.5   MCHC 34.2 33.4   RDW 15.9 16.6   NEPRELIM 5.66  --    WBC 7.1 6.2   .0* 121.0*     Recent Labs   Lab 24  0523 24  1510 24  0637 24  1904 24  0543   * 161* 149*  132* 115*   BUN 61* 73* 66* 62* 58*   CREATSERUM 4.20* 4.34* 4.10* 3.85* 3.73*   CA 7.7* 7.5* 8.0* 8.3* 8.4*   ALB 3.0* 3.1* 3.0*  --   --    * 136 143 144 146*   K 3.8 2.8* 4.3 5.2* 4.1    103 105 108 109   CO2 11.0* 21.0 26.0 22.0 27.0   ALKPHO 84 84 100  --   --    * 1,244* 1,136*  --   --    * 1,112* 1,106*  --   --    BILT 0.7 0.8 0.9  --   --    TP 5.8 5.8 6.1  --   --      No results for input(s): \"ABGPHT\", \"SWGKPF1B\", \"XFING5I\", \"ABGHCO3\", \"ABGBE\", \"TEMP\", \"ALLY\", \"SITE\", \"DEV\", \"THGB\" in the last 168 hours.    Invalid input(s): \"WTP49DSH\", \"CHOB\"  No results for input(s): \"BNP\" in the last 168 hours.  No results for input(s): \"TROP\", \"CK\" in the last 168 hours.    XR CHEST AP/PA (1 VIEW) (CPT=71045)    Result Date: 12/7/2024  CONCLUSION:  No acute disease.    LOCATION:  [place]      Dictated by (CST): Aaron Holm MD on 12/07/2024 at 12:44 PM     Finalized by (CST): Aaron Holm MD on 12/07/2024 at 12:45 PM           Assessment/Plan:    Septic shock 2/2 UTI in a patient with a chronic tate catheter  -Vasopressors weaned off 12/6  - Lactic acid normal, procal elevated however unreliable in the setting of TIP.  -Blood cultures NGTD  -Urine cultures positive klebsiella  - CT A/P withh thickened bladder, concern for cystitis, mild rectal wall thickening  -Continue Zosyn (12/6-)  - TTE 12/6 LVEF 45%     NAGMA 2/2 TIP on CKD?  - Cr elevated 4.49 on admission  - Strict I/O- 1900cc urine output x 24 hours.  - Avoid nephrotoxins  - Renal following    Acute hypoxic respiratory failure, possible aspiration pneumonia  - Requiring 2L/NC- baseline room air  - MRSA nares negative  - strep/legionella urine Ag negative  - Wean O2 as tolerated  - Encourage IS  - Antibiotics as above    Transaminitis  - AST/ALT 1244/1112 today  - 2/2 to shock liver  - Daily CMP    Coagulopathy  - 2/2 to above  - No s/s of bleeding  - Daily INR     HFrEF  CAD  HTN  Afib   - TTE as above  - PTA meds on hold in the  setting of shock (hydralazine, coreg, losartan, metoprolol, spironolactone  - Resume apixaban     History of seizures  - continue home Keppra  - seizure precautions     CVA  - Chronic left sided weakness  - continue home medications  - Baclofen at lower dose 2/2 TIP    Depression  - Sertraline     COPD  - Not in acute exacerbation  - Monitor    DM2  - A1c 6.5  - Accuchecks  - SSI     F/E/N  -IVF  -replete electrolytes as needed  - ADAT     Proph  - Plavix  - apixaban  - Non-ambulatory baseline  - PT/OT/ST     Dispo  - DNAR full treatment  - Palliative care following  - may transfer to floor     Plan of care discussed with intensivist on-call, Dr. Ho.    PROSPER PondSOPHIE-BC  Critical Care NP  Phone [phone]      INTENSIVIST ADDENDUM      I agree with critical care APN note above. I have independently seen & evaluated the patient.  I agree with the management plan outlined above with the following changes/additions:     Patient is doing ok, she is off pressors. No new events.     Plan:  -change Zosyn to ancef  -stable for transfer to floor    Jason Ho M.D.  Pulmonary/Critical Care   On call Intensivist 12/08/24

## 2024-12-08 NOTE — PLAN OF CARE
Assumed care of patient at 0700  A/Ox3, forgetful/confused at time. 2LNC, unable to wean off oxygen  Afib on tele, frequent PVCs. Carvedilol restarted  Denies pain  Daily Miralax and senna ordered  Urine output WNL  IVF modified to 0.45  Mg replaced  Poor appetite, needs encouragement to eat  Patient resting in bed, transfer orders in place. Awaiting bed availability              Problem: Patient/Family Goals  Goal: Patient/Family Long Term Goal  Description: Patient's Long Term Goal:     Interventions:  -   - See additional Care Plan goals for specific interventions  Outcome: Progressing  Goal: Patient/Family Short Term Goal  Description: Patient's Short Term Goal:     Interventions:   -   - See additional Care Plan goals for specific interventions  Outcome: Progressing     Problem: CARDIOVASCULAR - ADULT  Goal: Maintains optimal cardiac output and hemodynamic stability  Description: INTERVENTIONS:  - Monitor vital signs, rhythm, and trends  - Monitor for bleeding, hypotension and signs of decreased cardiac output  - Evaluate effectiveness of vasoactive medications to optimize hemodynamic stability  - Monitor arterial and/or venous puncture sites for bleeding and/or hematoma  - Assess quality of pulses, skin color and temperature  - Assess for signs of decreased coronary artery perfusion - ex. Angina  - Evaluate fluid balance, assess for edema, trend weights  Outcome: Progressing     Problem: RESPIRATORY - ADULT  Goal: Achieves optimal ventilation and oxygenation  Description: INTERVENTIONS:  - Assess for changes in respiratory status  - Assess for changes in mentation and behavior  - Position to facilitate oxygenation and minimize respiratory effort  - Oxygen supplementation based on oxygen saturation or ABGs  - Provide Smoking Cessation handout, if applicable  - Encourage broncho-pulmonary hygiene including cough, deep breathe, Incentive Spirometry  - Assess the need for suctioning and perform as needed  - Assess  and instruct to report SOB or any respiratory difficulty  - Respiratory Therapy support as indicated  - Manage/alleviate anxiety  - Monitor for signs/symptoms of CO2 retention  Outcome: Progressing     Problem: Diabetes/Glucose Control  Goal: Glucose maintained within prescribed range  Description: INTERVENTIONS:  - Monitor Blood Glucose as ordered  - Assess for signs and symptoms of hyperglycemia and hypoglycemia  - Administer ordered medications to maintain glucose within target range  - Assess barriers to adequate nutritional intake and initiate nutrition consult as needed  - Instruct patient on self management of diabetes  Outcome: Progressing     Problem: RISK FOR INFECTION - ADULT  Goal: Absence of fever/infection during anticipated neutropenic period  Description: INTERVENTIONS  - Monitor WBC  - Administer growth factors as ordered  - Implement neutropenic guidelines  Outcome: Progressing

## 2024-12-08 NOTE — PROGRESS NOTES
[facility]  Progress Note    Carla Kim Patient Status:  Inpatient    1950 MRN [id number]   [facility] 4SW-A Attending Chitra Molina MD   Hosp Day # 2 PCP No primary care provider on file.     No acute events    Current Medications:    Current Facility-Administered Medications:     lactated ringers infusion, , Intravenous, Continuous    apixaban (Eliquis) tab 5 mg, 5 mg, Oral, BID    sertraline (Zoloft) tab 100 mg, 100 mg, Oral, Daily    piperacillin-tazobactam (Zosyn) 4.5 g in dextrose 5% 100 mL IVPB-ADDV, 4.5 g, Intravenous, q12h    acetaminophen (Tylenol Extra Strength) tab 500 mg, 500 mg, Oral, Q4H PRN    melatonin tab 3 mg, 3 mg, Oral, Nightly PRN    polyethylene glycol (PEG 3350) (Miralax) 17 g oral packet 17 g, 17 g, Oral, Daily PRN    sennosides (Senokot) tab 17.2 mg, 17.2 mg, Oral, Nightly PRN    bisacodyl (Dulcolax) 10 MG rectal suppository 10 mg, 10 mg, Rectal, Daily PRN    fleet enema (Fleet) rectal enema 133 mL, 1 enema, Rectal, Once PRN    glucose (Dex4) 15 GM/59ML oral liquid 15 g, 15 g, Oral, Q15 Min PRN **OR** glucose (Glutose) 40% oral gel 15 g, 15 g, Oral, Q15 Min PRN **OR** glucose-vitamin C (Dex-4) chewable tab 4 tablet, 4 tablet, Oral, Q15 Min PRN **OR** dextrose 50% injection 50 mL, 50 mL, Intravenous, Q15 Min PRN **OR** glucose (Dex4) 15 GM/59ML oral liquid 30 g, 30 g, Oral, Q15 Min PRN **OR** glucose (Glutose) 40% oral gel 30 g, 30 g, Oral, Q15 Min PRN **OR** glucose-vitamin C (Dex-4) chewable tab 8 tablet, 8 tablet, Oral, Q15 Min PRN    insulin aspart (NovoLOG) 100 Units/mL FlexPen 2-10 Units, 2-10 Units, Subcutaneous, TID AC and HS    clopidogrel (Plavix) tab 75 mg, 75 mg, Oral, Daily    levETIRAcetam (Keppra) tab 500 mg, 500 mg, Oral, BID    influenza virus trivalent high dose PF (Fluzone HD) 0.5 mL injection (ages >/= 65 years) 0.5 mL, 0.5 mL, Intramuscular, Prior to discharge    baclofen (Lioresal) tab 5 mg, 5 mg, Oral, BID    multivitamin  (Tab-A-Odilon/Beta Carotene) tab 1 tablet, 1 tablet, Oral, Daily  Home Medications:  No current outpatient medications on file.       Review of Systems:  See HPI; A total of 12 systems reviewed and otherwise unremarkable.    Physical Exam:  Vital signs: Blood pressure 101/54, pulse 97, temperature 97.3 °F (36.3 °C), temperature source Temporal, resp. rate 23, height 5' 5\" (1.651 m), weight 151 lb 0.2 oz (68.5 kg), SpO2 95%, not currently breastfeeding.  General: No acute distress. Awake and alert; cooperartive ; orientedx3  HEENT: Moist mucous membranes. EOM-I. PERRL  Neck: No lymphadenopathy.  No JVD. No carotid bruits.  Respiratory: Clear to auscultation bilaterally.  No wheezes. No rhonchi.  Cardiovascular: S1, S2.  Regular rate and rhythm.  No murmurs. Equal pulses   Abdomen: Soft, nontender, nondistended.  Positive bowel sounds. No rebound tenderness   Neurologic:  L sided deficits   Musculoskeletal: Full range of motion of all extremities.  No swelling noted.   Integument: No lesions. No erythema.  Psychiatric: Appropriate mood and affect.    Recent Labs     12/06/24  0007 12/06/24  0522 12/07/24  0637 12/08/24  0543   WBC 7.5 9.2 7.1 6.2   HGB 13.0 12.0 11.7* 11.6*   MCV 86.4 93.3 83.8 85.3   .0 155.0 128.0* 121.0*   INR 2.77*  --  2.15*  --        Recent Labs     12/06/24  0523 12/06/24  1510 12/07/24  0637 12/07/24  1904 12/08/24  0543   * 136 143 144 146*   K 3.8 2.8* 4.3 5.2* 4.1    103 105 108 109   CO2 11.0* 21.0 26.0 22.0 27.0   BUN 61* 73* 66* 62* 58*   CREATSERUM 4.20* 4.34* 4.10* 3.85* 3.73*   CA 7.7* 7.5* 8.0* 8.3* 8.4*   MG 1.9  --  1.7  --  1.6   PHOS 6.7*  --  3.3  --   --        Recent Labs     12/06/24  0007 12/06/24  0523 12/06/24  1510 12/07/24  0637   ALT 1,095* 879* 1,112* 1,106*   AST 1,038* 889* 1,244* 1,136*   ALB 3.7 3.0* 3.1* 3.0*       Impression:  Septic shock - related to urinary infection  - fluids (adjusted to 0.45 NS); abx  - wean support as tolerated-  remains  off pressors   TIP/CKD- baseline Cr <1.5; possibly age/DM/HTN related nephrosclerosis  TIP in setting of shock  Good UOP; Cr improving   - supportive care   - fluids- continue  - monitor bladder scans q shift  Hypokalemia- improved   Replace Mg  Acidosis-improved; lactate nl  Hx of DM/HTN  CAD  Hx of CHF- currently stable; monitor;    Hx of CVA ; L hemiplegia + AMS- as above- slowly improved w/ improvement in hemodynamics       CC30m    Thank you for allowing me to participate in this patient's care.  Please feel free to call me with any questions or concerns.    Franck Ortiz MD  12/8/2024

## 2024-12-08 NOTE — PLAN OF CARE
Received at change of shift. A/O x3-4, follows commands, PERRLA. Residual L facial droop + extremity weakness due to prior CVA. 2 L NC. Afebrile. Tele NSR/ST w/ frequent PVCs. Purewick. LBM 12/6. No c/o pain. LR gtt. Transfer orders in - awaiting bed availability. See flowsheets for further info.

## 2024-12-09 PROBLEM — N17.0 ATN (ACUTE TUBULAR NECROSIS) (HCC): Status: ACTIVE | Noted: 2024-12-09

## 2024-12-09 PROBLEM — N17.0 ATN (ACUTE TUBULAR NECROSIS) (HCC): Status: ACTIVE | Noted: 2024-01-01

## 2024-12-09 PROBLEM — N17.0 ATN (ACUTE TUBULAR NECROSIS): Status: ACTIVE | Noted: 2024-01-01

## 2024-12-09 NOTE — PLAN OF CARE
Received pt at shift change alert and oriented X 2-3, periods of confusion. Up in chair via elen lift. Medications given per mar, documentation per flowsheet. Waiting floor bed.

## 2024-12-09 NOTE — PLAN OF CARE
Received at change of shift. A/O x3-4, follows commands, PERRLA. Intermittently confused/forgetful. Residual L facial droop + extremity weakness due to prior CVA. 2 L NC. Afebrile. Tele A-fib w/ frequent PVCs. Purewick. BM x1. No c/o pain. 0.45 NS gtt. Transfer orders in - awaiting bed availability. See flowsheets for further info.

## 2024-12-09 NOTE — PROGRESS NOTES
Carla Kim Patient Status:  Inpatient    1950 MRN [id number]   Location [facility] 4SW-A Attending Chitra Molina MD   Hosp Day # 3 PCP No primary care provider on file.     Critical Care Progress Note          Subjective:  No acute events overnight.  Alert and oriented to self.    Objective:    Medications:   ceFAZolin  1 g Intravenous Q12H    sennosides  8.6 mg Oral Nightly    polyethylene glycol (PEG 3350)  17 g Oral Daily    carvedilol  3.125 mg Oral BID with meals    apixaban  5 mg Oral BID    sertraline  100 mg Oral Daily    insulin aspart  2-10 Units Subcutaneous TID AC and HS    clopidogrel  75 mg Oral Daily    levETIRAcetam  500 mg Oral BID    baclofen  5 mg Oral BID    multivitamin  1 tablet Oral Daily              Intake/Output Summary (Last 24 hours) at 2024 0656  Last data filed at 2024 0500  Gross per 24 hour   Intake 3219.4 ml   Output 1420 ml   Net 1799.4 ml       BP 90/56 (BP Location: Left arm)   Pulse 83   Temp 97.5 °F (36.4 °C) (Temporal)   Resp 20   Ht 165.1 cm (5' 5\")   Wt 155 lb 3.3 oz (70.4 kg)   SpO2 95%   BMI 25.83 kg/m²     Physical Exam:   General Appearance: Alert, cooperative, no distress, appears stated age  Neck: No JVD, neck supple, no adenopathy, trachea midline  Lungs: Course to auscultation bilaterally, respirations unlabored  Heart: Regular rate and rhythm, S1 and S2 normal, no murmur, rub or gallop  Abdomen: Soft, non-tender, bowel sounds active all four quadrants, no masses, no organomegaly  Extremities: Extremities normal, atraumatic, no cyanosis or edema,capillary refill <3 sec.    Pulses: 2+ and symmetric all extremities  Skin: Skin color, texture, turgor normal for ethnicity, no rashes or lesions, warm and dry      Recent Labs   Lab 24  0637 24  0543 24  0323   RBC 4.08   < > 3.61*   HGB 11.7*   < > 10.4*   HCT 34.2*   < > 31.4*   MCV 83.8   < > 87.0   MCH 28.7   < > 28.8   MCHC 34.2   < > 33.1   RDW 15.9   < > 16.9    NEPRELIM 5.66  --   --    WBC 7.1   < > 6.4   .0*   < > 123.0*    < > = values in this interval not displayed.     Recent Labs   Lab 12/06/24  1510 12/07/24  0637 12/07/24  1904 12/08/24  0543 12/09/24  0323   * 149* 132* 115* 107*   BUN 73* 66* 62* 58* 47*   CREATSERUM 4.34* 4.10* 3.85* 3.73* 3.09*   CA 7.5* 8.0* 8.3* 8.4* 8.2*   ALB 3.1* 3.0*  --   --  2.9*    143 144 146* 140   K 2.8* 4.3 5.2* 4.1 4.0    105 108 109 109   CO2 21.0 26.0 22.0 27.0 24.0   ALKPHO 84 100  --   --  93   AST 1,244* 1,136*  --   --  550*   ALT 1,112* 1,106*  --   --  568*   BILT 0.8 0.9  --   --  0.5   TP 5.8 6.1  --   --  5.5*     No results for input(s): \"ABGPHT\", \"KTVQZI0R\", \"WLOTD3U\", \"ABGHCO3\", \"ABGBE\", \"TEMP\", \"ALLY\", \"SITE\", \"DEV\", \"THGB\" in the last 168 hours.    Invalid input(s): \"UBR93ZNS\", \"CHOB\"  No results for input(s): \"BNP\" in the last 168 hours.  No results for input(s): \"TROP\", \"CK\" in the last 168 hours.    No results found.       Assessment/Plan:    Septic shock 2/2 UTI in a patient with a chronic tate catheter  -Vasopressors weaned off 12/6  - Lactic acid normal, procal elevated however unreliable in the setting of TIP.  -Blood cultures NGTD  -Urine cultures positive klebsiella  - CT A/P withh thickened bladder, concern for cystitis, mild rectal wall thickening  -Continue Ancef (12-8)  - TTE 12/6 LVEF 45%     NAGMA 2/2 TIP on CKD?  - Cr elevated 4.49 on admission,improving  - Avoid nephrotoxins  - Renal following    Acute hypoxic respiratory failure, possible aspiration pneumonia  - Requiring 2L/NC- baseline room air  - MRSA nares negative  - strep/legionella urine Ag negative  - Wean O2 as tolerated  - Encourage IS  - Antibiotics as above    Transaminitis  - AST/ALT improving  - 2/2 to shock liver  - Daily CMP    Coagulopathy  - 2/2 to above  - No s/s of bleeding  - Daily INR     HFrEF  CAD  HTN  Afib   - TTE as above  -Coreg started yesterday  - PTA meds on hold in the setting of shock  (hydralazine, losartan, metoprolol, spironolactone)  - Resume apixaban     History of seizures  - continue home Keppra  - seizure precautions     CVA  - Chronic left sided weakness  - continue home medications  - Baclofen at lower dose 2/2 TIP    Depression  - Sertraline     COPD  - Not in acute exacerbation  - Monitor    DM2  - A1c 6.5  - Accuchecks  - SSI     F/E/N  -IVF  -replete electrolytes as needed  - ADAT     Proph  - Plavix  - apixaban  - Non-ambulatory baseline  - PT/OT/ST     Dispo  - DNAR full treatment  - Palliative care following  - may transfer to floor     Plan of care discussed with intensivist on-call, Dr. Nicole Carrero, [facility]  Critical Care NP  Phone [phone]    ICU attending addendum:  Pt seen and examined with ICU APN.  Agree with A/P as detailed above.  - pt with septic shock secondary to klebsiella UTI in setting of chronic tate.  Abx adjusted based on sensitivity pattern.  Has TIP on CKD and seems to be responding favorably to IVFs; renal following.  Pt remains confused but able to answer simple questions.  Clinically improving and awaiting transfer to floor.    David Rivera MD

## 2024-12-09 NOTE — CM/SW NOTE
Late entry:  CM met w/ pt's dtr Friday,12/6 at 3:15 pm, as dtr requested, to discuss pt's dc planning. Pt had a CVA in 2018. At this time, pt's dtr Tara moved from out of state to care for pt. Pt was able to stay at home w/ dtr and caregiver support for several years, but her care needs progressed and she has been a LTC resident at [facility] since 2022.      Pt's dtr Tara reports she has been pt's POA for both healthcare and finances. She reports that her financial POA was only active if pt was deemed incompetent. Tara feels pt's cognitive abilities have declined over the years. Dtr reports pt has periods of \"paranoia and thinks dtr is stealing her money.\" Tara reports she manages pt's finances as she pays her bills and has to maintain pt's house.     Due to perceived cognitive decline, Tara has requested several times for [facility] to have a neuropsych eval completed. Per Tara, the facility's MD has not felt it is necessary and continues to feel pt is decisional. Pt's dtr reports that facility has \"brought in someone to assist pt in removing dtr as her financial POA.\" Tara reports feeling \"both she and Carla are harassed about her bill.\"    Dtr reports she has made numerous attempts of contacting facility's management team and has contacted the local Ombudsman regarding her experience. Eugenio Henley Liaison contacted in attempt to get pt's dtr in touch with someone that can address pts/family's concerns.     CM/SW will remain available for DC planning and/or support.     JANET NuñezN, CMSRN    [id number]

## 2024-12-09 NOTE — SLP NOTE
SPEECH DAILY NOTE - INPATIENT    ASSESSMENT & PLAN   ASSESSMENT  Pt seen for dysphagia tx to assess tolerance with recommended diet, ensure proper utilization of aspiration precautions and provide pt/family education.  Patient alert, but minimally communicative, in bed. She reported good tolerance of PO intake. Strong nonproductive cough noted prior to PO trials provided. PO trials of thin liquids and pureed solids provided to assess diet tolerance. Minimal anterior bolus loss noted. Patient also remained in a downward facing position throughout. No overt s/s of aspiration observed. PO trials of soft solids provided for possible diet advancement. Mastication was prolonged and incomplete with anterior bolus loss observed during mastication. Scattered oral residue also seen which required removal with toothette. Therefore, recommend patient continue a pureed diet and thin liquids with precautions listed below. SLP will continue to follow to monitor diet tolerance and adjust as appropriate. Education provided re: results and recommendations.     Diet Recommendations - Solids: Puree  Diet Recommendations - Liquids: Thin Liquids    Compensatory Strategies Recommended: Alternate consistencies  Aspiration Precautions: Upright position;Slow rate;Small bites;Small sips;Cueing to swallow;1:1 feeding  Medication Administration Recommendations: Crushed in puree    Patient Experiencing Pain: No                Treatment Plan  Treatment Plan/Recommendations: Aspiration precautions;SLP to reassess    Interdisciplinary Communication: Discussed with RN            GOALS  Goal #1 The patient will tolerate puree consistency and thin liquids without overt signs or symptoms of aspiration with 90 % accuracy over 2-3 session(s).  In Progress   Goal #2 The patient/family/caregiver will demonstrate understanding and implementation of aspiration precautions and swallow strategies independently over 2-3 session(s).     In Progress   Goal #3  Assess need for VFSS within 1-2 visits In progress        FOLLOW UP  Follow Up Needed (Documentation Required): Yes  SLP Follow-up Date: 12/10/24       Session: 1    If you have any questions, please contact ED Newman

## 2024-12-09 NOTE — CM/SW NOTE
Acknowledged order for POLST, PC following and will follow up with dtr today. Pt admitted from [place], updates sent via [facility]. Per previous SW notes, pt has been paying privately as pt is out of Medicare days. LM truman Henley at [facility] for more details.      Silvia Rodriguez, MAGALI RN, CM  [phone]

## 2024-12-09 NOTE — PROGRESS NOTES
[facility]   part of [facility]     Hospitalist Progress Note     Carla Kim Patient Status:  Inpatient    1950 MRN [id number]   Location [facility] 4SW-A Attending Chitra Molina MD   Hosp Day # 3 PCP No primary care provider on file.     Chief Complaint: septic shock    Subjective:     Patient awake, alert, no complaints    Objective:    Review of Systems:   A comprehensive review of systems was completed; pertinent positive and negatives stated in subjective.    Vital signs:  Temp:  [97.1 °F (36.2 °C)-98.2 °F (36.8 °C)] 97.1 °F (36.2 °C)  Pulse:  [] 104  Resp:  [19-26] 21  BP: ()/(56-73) 115/65  SpO2:  [86 %-95 %] 94 %    Physical Exam:    General: No acute distress  Respiratory: No wheezes, no rhonchi  Cardiovascular: S1, S2, regular rate and rhythm  Abdomen: Soft, Non-tender, non-distended, positive bowel sounds  Extremities: No edema      Diagnostic Data:    Labs:  Recent Labs   Lab 24  0007 24  0522 24  0637 24  0543 24  0323   WBC 7.5 9.2 7.1 6.2 6.4   HGB 13.0 12.0 11.7* 11.6* 10.4*   MCV 86.4 93.3 83.8 85.3 87.0   .0 155.0 128.0* 121.0* 123.0*   INR 2.77*  --  2.15*  --   --        Recent Labs   Lab 24  1510 24  0637 24  1904 24  0543 24  0323   * 149* 132* 115* 107*   BUN 73* 66* 62* 58* 47*   CREATSERUM 4.34* 4.10* 3.85* 3.73* 3.09*   CA 7.5* 8.0* 8.3* 8.4* 8.2*   ALB 3.1* 3.0*  --   --  2.9*    143 144 146* 140   K 2.8* 4.3 5.2* 4.1 4.0    105 108 109 109   CO2 21.0 26.0 22.0 27.0 24.0   ALKPHO 84 100  --   --  93   AST 1,244* 1,136*  --   --  550*   ALT 1,112* 1,106*  --   --  568*   BILT 0.8 0.9  --   --  0.5   TP 5.8 6.1  --   --  5.5*       Estimated Creatinine Clearance: 14.2 mL/min (A) (based on SCr of 3.09 mg/dL (H)).    Recent Labs   Lab 24   TROPHS 28       Recent Labs   Lab 24  0637   PTP 29.5* 24.2*   INR 2.77* 2.15*                   Microbiology    Hospital Encounter on 12/05/24   1. Blood Culture     Status: None (Preliminary result)    Collection Time: 12/06/24 12:10 AM    Specimen: Blood,peripheral   Result Value Ref Range    Blood Culture Result No Growth 3 Days N/A   2. Urine Culture, Routine     Status: Abnormal    Collection Time: 12/06/24 12:06 AM    Specimen: Urine, clean catch   Result Value Ref Range    Urine Culture >100,000 CFU/ML Klebsiella pneumoniae (A) N/A       Susceptibility    Klebsiella pneumoniae -  (no method available)     Ampicillin  Resistant      Ampicillin + Sulbactam 4 Sensitive      Cefazolin <=4 Sensitive      Ciprofloxacin <=0.25 Sensitive      Gentamicin <=1 Sensitive      Meropenem <=0.25 Sensitive      Levofloxacin 0.25 Sensitive      Nitrofurantoin 64 Intermediate      Piperacillin + Tazobactam <=4 Sensitive      Trimethoprim/Sulfa <=20 Sensitive          Imaging: Reviewed in Epic.    Medications:    ceFAZolin  1 g Intravenous Q12H    sennosides  8.6 mg Oral Nightly    polyethylene glycol (PEG 3350)  17 g Oral Daily    carvedilol  3.125 mg Oral BID with meals    apixaban  5 mg Oral BID    sertraline  100 mg Oral Daily    insulin aspart  2-10 Units Subcutaneous TID AC and HS    clopidogrel  75 mg Oral Daily    levETIRAcetam  500 mg Oral BID    baclofen  5 mg Oral BID    multivitamin  1 tablet Oral Daily       Assessment & Plan:      #Septic shock   #UTI in setting of chronic tate -POA  -off pressors  -continue Abx  -Cx with klebsiella    #TIP on CKD  -cr improving    #Depression  -SSRI    #Transaminitis  -due to shock liver  -improving    #Hyperlipidemia  -statin    #DM type II  -hyperglycemia protocol    #Prior CVA-chronic left sided weakness  #Seizures  -continue keppra    #A.fib  -continue loyquis      Chitra Molina MD    Supplementary Documentation:     Quality:  DVT Mechanical Prophylaxis:   SCDs,    DVT Pharmacologic Prophylaxis   Medication    apixaban (Eliquis) tab 5 mg                Code  Status: DNAR/Full Treatment  Han: External urinary catheter in place  Han Duration (in days):   Central line:    DEEDEE:     Discharge is dependent on: progress  At this point Ms. Kim is expected to be discharge to: TBD    The 21st Century Cures Act makes medical notes like these available to patients in the interest of transparency. Please be advised this is a medical document. Medical documents are intended to carry relevant information, facts as evident, and the clinical opinion of the practitioner. The medical note is intended as peer to peer communication and may appear blunt or direct. It is written in medical language and may contain abbreviations or verbiage that are unfamiliar.              **Certification      PHYSICIAN Certification of Need for Inpatient Hospitalization - Initial Certification    Patient will require inpatient services that will reasonably be expected to span two midnight's based on the clinical documentation in H+P.   Based on patients current state of illness, I anticipate that, after discharge, patient will require TBD.

## 2024-12-09 NOTE — CDS QUERY
Dear Dr. Molina,     Please clarify the relationship, if any, between the diagnosis of URINARY TRACT INFECTION (UTI) and chronic indwelling tate catheter:    [ x  ] UTI related to chronic indwelling tate catheter   [   ] UTI not related to chronic indwelling tate catheter   [   ] Clinically unable to determine      CLINICAL INFORMATION FROM THE MEDICAL RECORD    Risk Factors:  Non-ambulatory status due to previous CVA with chronic indwelling tate catheter  PMH of UTI    Clinical Indicators:   Pt to ED with generalized weakness  UA: color brown, clarity turbid, leukocytes BETSY, WBC > 50, RBC 6-10, bacteria 3+  Urine culture: > 100K CFU/ml Klebsiella pneumoniae  CT A/P significant for urinary bladder thickening  Hospitalist Assessment (12/9/24): # UTI in setting of chronic tate - POA     Treatment:   Tate catheter exchange  IVF  Zosyn  Urine culture  CBC and BMP  Monitor urine output     Use of terms such as suspected, possible, or probable (associated with a specific diagnosis that is being evaluated, monitored, or treated as if it exists) are acceptable and can be coded in the inpatient setting, when documented at the time of discharge.    For questions regarding this query, please contact Clinical : Leticia Marshall RN at #[phone].    THIS FORM IS A PERMANENT PART OF THE MEDICAL RECORD

## 2024-12-09 NOTE — PROGRESS NOTES
[facility]   part of [facility]  Palliative Care Progress Note    Carla Kim Patient Status:  Inpatient    1950 MRN [id number]   [facility] 4SW-A Attending Chitra Molina MD   Hosp Day # 3 PCP No primary care provider on file.     History/Other:      Carla Kim is a 74 year old female with PVD, DM, Hx of CVA with residual left sided weakness, COPD, CAD, Afib, CHF who was admitted on 2024 for weakness and hypotension.  Pt is from [facility]. Work up in our hospital revealed UTI with sepsis and TIP.     Consult ordered by:: Marie Carrero APRN for evaluation of Palliative Care needs and Goals of care discussion;Advance care planning / HPOA;Establish palliative care.    Allergies:  Allergies[1]  Medications:     Current Facility-Administered Medications:     sodium chloride 0.45% infusion, , Intravenous, Continuous    ceFAZolin (Ancef) 1 g in dextrose 5% 100mL IVPB-ADD, 1 g, Intravenous, Q12H    sennosides (Senokot) tab 8.6 mg, 8.6 mg, Oral, Nightly    polyethylene glycol (PEG 3350) (Miralax) 17 g oral packet 17 g, 17 g, Oral, Daily    carvedilol (Coreg) tab 3.125 mg, 3.125 mg, Oral, BID with meals    apixaban (Eliquis) tab 5 mg, 5 mg, Oral, BID    sertraline (Zoloft) tab 100 mg, 100 mg, Oral, Daily    acetaminophen (Tylenol Extra Strength) tab 500 mg, 500 mg, Oral, Q4H PRN    melatonin tab 3 mg, 3 mg, Oral, Nightly PRN    polyethylene glycol (PEG 3350) (Miralax) 17 g oral packet 17 g, 17 g, Oral, Daily PRN    sennosides (Senokot) tab 17.2 mg, 17.2 mg, Oral, Nightly PRN    bisacodyl (Dulcolax) 10 MG rectal suppository 10 mg, 10 mg, Rectal, Daily PRN    fleet enema (Fleet) rectal enema 133 mL, 1 enema, Rectal, Once PRN    glucose (Dex4) 15 GM/59ML oral liquid 15 g, 15 g, Oral, Q15 Min PRN **OR** glucose (Glutose) 40% oral gel 15 g, 15 g, Oral, Q15 Min PRN **OR** glucose-vitamin C (Dex-4) chewable tab 4 tablet, 4 tablet, Oral, Q15 Min PRN **OR** dextrose 50% injection  50 mL, 50 mL, Intravenous, Q15 Min PRN **OR** glucose (Dex4) 15 GM/59ML oral liquid 30 g, 30 g, Oral, Q15 Min PRN **OR** glucose (Glutose) 40% oral gel 30 g, 30 g, Oral, Q15 Min PRN **OR** glucose-vitamin C (Dex-4) chewable tab 8 tablet, 8 tablet, Oral, Q15 Min PRN    insulin aspart (NovoLOG) 100 Units/mL FlexPen 2-10 Units, 2-10 Units, Subcutaneous, TID AC and HS    clopidogrel (Plavix) tab 75 mg, 75 mg, Oral, Daily    levETIRAcetam (Keppra) tab 500 mg, 500 mg, Oral, BID    influenza virus trivalent high dose PF (Fluzone HD) 0.5 mL injection (ages >/= 65 years) 0.5 mL, 0.5 mL, Intramuscular, Prior to discharge    baclofen (Lioresal) tab 5 mg, 5 mg, Oral, BID    multivitamin (Tab-A-Odilon/Beta Carotene) tab 1 tablet, 1 tablet, Oral, Daily    Subjective      Interval events:  Cr slowly improving; nephrology following.     When I entered the room, the patient was sleeping but easy to wake and sitting up in bed. No family present at bedside.   Patient woke briefly and would answer questions in 1-3 words. She is aware of where she is but not to her situation.      See summary of discussion below.     Review of Systems:  Denies all complaints   Bowel Movement         12/9/2024  0200             Stool Count Calculated for I/O: 1            Objective:     Vital Signs:  Blood pressure 116/69, pulse 96, temperature 97.4 °F (36.3 °C), temperature source Temporal, resp. rate 25, height 5' 5\" (1.651 m), weight 155 lb 3.3 oz (70.4 kg), SpO2 93%, not currently breastfeeding.  Body mass index is 25.83 kg/m².  Non-verbal signs of pain present: No  Current Palliative performance scale PPSv2 (%): 30    Physical Exam:  General: Sleeping but woke easily. In no apparent respiratory distress. Body habitus BMI WNL   HEENT: left facial droop.   Lungs: Normal effort on NC  Neurologic: Alert and oriented to person, place, and time but not situation. Very weak on left side.   Skin: Warm and dry.        Results:     Hematology:  Lab Results    Component Value Date    WBC 6.4 12/09/2024    HGB 10.4 (L) 12/09/2024    HCT 31.4 (L) 12/09/2024    .0 (L) 12/09/2024     Coags:  Lab Results   Component Value Date    PT 13.4 02/28/2014    INR 2.15 (H) 12/07/2024    PTT 40.7 (H) 12/06/2024     Chemistry:  Lab Results   Component Value Date    CREATSERUM 3.09 (H) 12/09/2024    BUN 47 (H) 12/09/2024     12/09/2024    K 4.0 12/09/2024     12/09/2024    CO2 24.0 12/09/2024     (H) 12/09/2024    CA 8.2 (L) 12/09/2024    ALB 2.9 (L) 12/09/2024    ALKPHO 93 12/09/2024    BILT 0.5 12/09/2024    TP 5.5 (L) 12/09/2024     (H) 12/09/2024     (H) 12/09/2024    DDIMER 1.51 (H) 10/24/2022    BNP 2,098 (H) 02/28/2014    MG 2.0 12/09/2024    PHOS 3.3 12/07/2024    TROP <0.045 02/26/2019     Imaging:  No results found.       Summary of Discussion     Called Tara. She had her family continue to discuss options and POLST form privately. Encouraged ongoing discussions. Gave brief medical update as well. Will have CPC follow. They are curious if pt is hospice appropriate. Per dementia/cognitive decline criteria, does not appear to qualify and encouraged reevaluation in the coming days if no further cognitive improvement.     Advance Care Planning counseling and discussion:   HC POA Documentation Completed--Document in Epic.     We voluntarily discussed the risks vs benefits of life sustaining treatments in the setting of comorbid medical conditions with daughter.  POLST FORM Not completed & Form provided  DNAR/Full Treatment    Assessment and Recommendations       Principal Problem:    Hypotension, unspecified hypotension type  Active Problems:    Essential hypertension    Esophageal reflux    CAD in native artery    Controlled type 2 diabetes mellitus with stage 3 chronic kidney disease, without long-term current use of insulin (HCC)    Hyperlipidemia associated with type 2 diabetes mellitus (HCC)    Hypernatremia    Seizure (HCC)    Acute  cystitis without hematuria    Acidosis    Septic shock (HCC)    Urinary tract infection without hematuria, site unspecified    Acute renal failure, unspecified acute renal failure type (HCC)    Palliative care by specialist    TIP (acute kidney injury) (HCC)    Altered mental status    Hypokalemia    ATN (acute tubular necrosis) (HCC)    Goals of care: ongoing   Tara aware of UTI and significant TIP. They would like a few days to see improvement, if possible.   Code Status: DNAR/Full Treatment - POLST not completed as discussions are ongoing privately    Tara is medical POA   Will order CPC now. IP team will follow up in the next few days.     Discussed today's visit with POA, RN, and SW/CM.    Palliative Care Follow Up: Palliative care team will Continue to follow while inpatient.  Palliative care follow up outpatient: is indicated and community palliative care ordered.    Thank you for allowing Palliative Care services to participate in the care of Carla Kim.    A total of 25 minutes were spent on this consult, which included all of the following: chart review, direct face to face contact, history taking, physical examination, counseling and coordinating care, and documentation.     Nakia Pereira MD  12/9/2024  1:40 PM  Palliative Care Services    The 21st Century Cures Act makes medical notes like these available to patients in the interest of transparency. Please be advised this is a medical document. Medical documents are intended to carry relevant information, facts as evident, and the clinical opinion of the practitioner. The medical note is intended as peer to peer communication and may appear blunt or direct. It is written in medical language and may contain abbreviations or verbiage that are unfamiliar.          [1]   Allergies  Allergen Reactions    Lipitor [Atorvastatin Calcium] MYALGIA     TABS    Wellbutrin [Bupropion Hcl]      TABS-tremor

## 2024-12-09 NOTE — PROGRESS NOTES
[facility]  Nephrology Progress Note    Carla Kim Attending:  Chitra Molina MD       Assessment and Plan:    1) TIP- due to septic shock / dehydration; imaging without obstruction / meds benign. Improving- adjust hypotonic IVF    2) CKD 3- baseline Cr < 1.5 mg/dl due to DM / HTN    3) Septic shock / Klebsiella urosepsis- in setting of chronic Han; CT noted- on zosyn     4) Transaminitis + coagulopathy- c/w shock liver; labs improving    5) h/o CVA with L sided weakness + Sz on keppra    6) HFrEF / ischemic CM EF 45%- compensated    7) HTN- normally on amlodipine / losartan / prn POWERS    8) DM 2      Subjective:  Awake weak not saying much     Physical Exam:   /65   Pulse 104   Temp 97.1 °F (36.2 °C) (Temporal)   Resp 21   Ht 5' 5\" (1.651 m)   Wt 155 lb 3.3 oz (70.4 kg)   SpO2 94%   BMI 25.83 kg/m²   Temp (24hrs), Av.6 °F (36.4 °C), Min:97.1 °F (36.2 °C), Max:98.2 °F (36.8 °C)       Intake/Output Summary (Last 24 hours) at 2024 1049  Last data filed at 2024 0900  Gross per 24 hour   Intake 3196.4 ml   Output 1220 ml   Net 1976.4 ml     Wt Readings from Last 3 Encounters:   24 155 lb 3.3 oz (70.4 kg)   23 150 lb (68 kg)   10/25/22 151 lb 8 oz (68.7 kg)     General: awake   HEENT: No scleral icterus, MMM  Neck: Supple, no MEGHANA or thyromegaly  Cardiac: Regular rate and rhythm, S1, S2 normal, no murmur or tub  Lungs: Decreased BS at bases bilaterally   Abdomen: Soft, non-tender. + bowel sounds, no palpable organomegaly  Extremities: Without clubbing, cyanosis; no edema  Neurologic: Cranial nerves grossly intact, moving all extremities  Skin: Warm and dry, no rashes       Labs:   Lab Results   Component Value Date    WBC 6.4 2024    HGB 10.4 2024    HCT 31.4 2024    .0 2024    CREATSERUM 3.09 2024    BUN 47 2024     2024    K 4.0 2024     2024    CO2 24.0 2024     2024    CA 8.2  12/09/2024    ALB 2.9 12/09/2024    ALKPHO 93 12/09/2024    BILT 0.5 12/09/2024    TP 5.5 12/09/2024     12/09/2024     12/09/2024    MG 2.0 12/09/2024    PGLU 99 12/09/2024       Imaging:  All imaging studies reviewed.    Meds:   Current Facility-Administered Medications   Medication Dose Route Frequency    sodium chloride 0.45% infusion   Intravenous Continuous    ceFAZolin (Ancef) 1 g in dextrose 5% 100mL IVPB-ADD  1 g Intravenous Q12H    sennosides (Senokot) tab 8.6 mg  8.6 mg Oral Nightly    polyethylene glycol (PEG 3350) (Miralax) 17 g oral packet 17 g  17 g Oral Daily    carvedilol (Coreg) tab 3.125 mg  3.125 mg Oral BID with meals    apixaban (Eliquis) tab 5 mg  5 mg Oral BID    sertraline (Zoloft) tab 100 mg  100 mg Oral Daily    acetaminophen (Tylenol Extra Strength) tab 500 mg  500 mg Oral Q4H PRN    melatonin tab 3 mg  3 mg Oral Nightly PRN    polyethylene glycol (PEG 3350) (Miralax) 17 g oral packet 17 g  17 g Oral Daily PRN    sennosides (Senokot) tab 17.2 mg  17.2 mg Oral Nightly PRN    bisacodyl (Dulcolax) 10 MG rectal suppository 10 mg  10 mg Rectal Daily PRN    fleet enema (Fleet) rectal enema 133 mL  1 enema Rectal Once PRN    glucose (Dex4) 15 GM/59ML oral liquid 15 g  15 g Oral Q15 Min PRN    Or    glucose (Glutose) 40% oral gel 15 g  15 g Oral Q15 Min PRN    Or    glucose-vitamin C (Dex-4) chewable tab 4 tablet  4 tablet Oral Q15 Min PRN    Or    dextrose 50% injection 50 mL  50 mL Intravenous Q15 Min PRN    Or    glucose (Dex4) 15 GM/59ML oral liquid 30 g  30 g Oral Q15 Min PRN    Or    glucose (Glutose) 40% oral gel 30 g  30 g Oral Q15 Min PRN    Or    glucose-vitamin C (Dex-4) chewable tab 8 tablet  8 tablet Oral Q15 Min PRN    insulin aspart (NovoLOG) 100 Units/mL FlexPen 2-10 Units  2-10 Units Subcutaneous TID AC and HS    clopidogrel (Plavix) tab 75 mg  75 mg Oral Daily    levETIRAcetam (Keppra) tab 500 mg  500 mg Oral BID    influenza virus trivalent high dose PF (Fluzone  HD) 0.5 mL injection (ages >/= 65 years) 0.5 mL  0.5 mL Intramuscular Prior to discharge    baclofen (Lioresal) tab 5 mg  5 mg Oral BID    multivitamin (Tab-A-Odilon/Beta Carotene) tab 1 tablet  1 tablet Oral Daily         Questions/concerns were discussed with patient and/or family by bedside.          Vicky Perez MD  12/9/2024  10:49 AM

## 2024-12-10 NOTE — PROGRESS NOTES
[facility]  Nephrology Progress Note    Carla Kim Attending:  Chitra Molina MD       Assessment and Plan:    1) TIP- due to septic shock / dehydration; imaging without obstruction / meds benign. Improving- adjust hypotonic IVF    2) CKD 3- baseline Cr < 1.5 mg/dl due to DM / HTN    3) Septic shock / Klebsiella urosepsis- in setting of chronic Han; CT noted- on ancef     4) Transaminitis + coagulopathy- c/w shock liver; labs improving    5) h/o CVA with L sided weakness + Sz on keppra    6) HFrEF / ischemic CM EF 45%- compensated    7) HTN- normally on amlodipine / losartan / prn POWERS    8) DM 2      Subjective:  Awake weak not saying much     Physical Exam:   /55 (BP Location: Left arm)   Pulse 101   Temp 97.8 °F (36.6 °C) (Tympanic)   Resp 26   Ht 5' 5\" (1.651 m)   Wt 156 lb 1.4 oz (70.8 kg)   SpO2 95%   BMI 25.97 kg/m²   Temp (24hrs), Av.8 °F (36.6 °C), Min:97.1 °F (36.2 °C), Max:98.4 °F (36.9 °C)       Intake/Output Summary (Last 24 hours) at 12/10/2024 1207  Last data filed at 12/10/2024 0900  Gross per 24 hour   Intake 2180.7 ml   Output 1050 ml   Net 1130.7 ml     Wt Readings from Last 3 Encounters:   12/10/24 156 lb 1.4 oz (70.8 kg)   23 150 lb (68 kg)   10/25/22 151 lb 8 oz (68.7 kg)     General: awake   HEENT: No scleral icterus, MMM  Neck: Supple, no MEGHANA or thyromegaly  Cardiac: Regular rate and rhythm, S1, S2 normal, no murmur or tub  Lungs: Decreased BS at bases bilaterally   Abdomen: Soft, non-tender. + bowel sounds, no palpable organomegaly  Extremities: Without clubbing, cyanosis; no edema  Neurologic: Cranial nerves grossly intact, moving all extremities  Skin: Warm and dry, no rashes       Labs:   Lab Results   Component Value Date    WBC 7.3 12/10/2024    HGB 10.1 12/10/2024    HCT 30.7 12/10/2024    .0 12/10/2024    CREATSERUM 2.55 12/10/2024    BUN 45 12/10/2024     12/10/2024    K 4.8 12/10/2024     12/10/2024    CO2 25.0 12/10/2024      12/10/2024    CA 8.2 12/10/2024    ALB 3.1 12/10/2024    ALKPHO 74 12/10/2024    BILT 0.5 12/10/2024    TP 5.8 12/10/2024     12/10/2024     12/10/2024    PGLU 129 12/10/2024       Imaging:  All imaging studies reviewed.    Meds:   Current Facility-Administered Medications   Medication Dose Route Frequency    ipratropium-albuterol (Duoneb) 0.5-2.5 (3) MG/3ML inhalation solution        sodium chloride 3 % nebulizer solution 3 mL  3 mL Nebulization TID    sodium chloride 0.45% infusion   Intravenous Continuous    ceFAZolin (Ancef) 1 g in dextrose 5% 100mL IVPB-ADD  1 g Intravenous Q12H    sennosides (Senokot) tab 8.6 mg  8.6 mg Oral Nightly    polyethylene glycol (PEG 3350) (Miralax) 17 g oral packet 17 g  17 g Oral Daily    carvedilol (Coreg) tab 3.125 mg  3.125 mg Oral BID with meals    apixaban (Eliquis) tab 5 mg  5 mg Oral BID    sertraline (Zoloft) tab 100 mg  100 mg Oral Daily    acetaminophen (Tylenol Extra Strength) tab 500 mg  500 mg Oral Q4H PRN    melatonin tab 3 mg  3 mg Oral Nightly PRN    polyethylene glycol (PEG 3350) (Miralax) 17 g oral packet 17 g  17 g Oral Daily PRN    sennosides (Senokot) tab 17.2 mg  17.2 mg Oral Nightly PRN    bisacodyl (Dulcolax) 10 MG rectal suppository 10 mg  10 mg Rectal Daily PRN    fleet enema (Fleet) rectal enema 133 mL  1 enema Rectal Once PRN    glucose (Dex4) 15 GM/59ML oral liquid 15 g  15 g Oral Q15 Min PRN    Or    glucose (Glutose) 40% oral gel 15 g  15 g Oral Q15 Min PRN    Or    glucose-vitamin C (Dex-4) chewable tab 4 tablet  4 tablet Oral Q15 Min PRN    Or    dextrose 50% injection 50 mL  50 mL Intravenous Q15 Min PRN    Or    glucose (Dex4) 15 GM/59ML oral liquid 30 g  30 g Oral Q15 Min PRN    Or    glucose (Glutose) 40% oral gel 30 g  30 g Oral Q15 Min PRN    Or    glucose-vitamin C (Dex-4) chewable tab 8 tablet  8 tablet Oral Q15 Min PRN    insulin aspart (NovoLOG) 100 Units/mL FlexPen 2-10 Units  2-10 Units Subcutaneous TID AC and HS     clopidogrel (Plavix) tab 75 mg  75 mg Oral Daily    levETIRAcetam (Keppra) tab 500 mg  500 mg Oral BID    influenza virus trivalent high dose PF (Fluzone HD) 0.5 mL injection (ages >/= 65 years) 0.5 mL  0.5 mL Intramuscular Prior to discharge    baclofen (Lioresal) tab 5 mg  5 mg Oral BID    multivitamin (Tab-A-Odilon/Beta Carotene) tab 1 tablet  1 tablet Oral Daily         Questions/concerns were discussed with patient and/or family by bedside.          Vicky Peerz MD  12/10/2024  1208 PM

## 2024-12-10 NOTE — SLP NOTE
SPEECH DAILY NOTE - INPATIENT    ASSESSMENT & PLAN   ASSESSMENT  Pt seen for dysphagia tx to assess tolerance with recommended diet, ensure proper utilization of aspiration precautions and provide pt/family education.  Patient received drowsy, but arousable, in bed. She reported good tolerance of PO intake without overt s/s of aspiration. Patient agreeable to PO trials for possible diet advancement. PO trials of thin liquids, puree, chopped, and soft solids provided. Mild anterior bolus loss noted with thin liquids. Scattered oral residue observed with chewable solids, but was cleared with liquid wash. Mastication was mildly prolonged, but thorough. Discussed with patient possibility of diet advancement, but she would prefer to stay with pureed solids at this time. SLP will continue to follow to monitor diet tolerance and attempt upgrade at a later date if patient agreeable.    Diet Recommendations - Solids: Puree  Diet Recommendations - Liquids: Thin Liquids    Compensatory Strategies Recommended: Alternate consistencies  Aspiration Precautions: Upright position;Slow rate;Small bites;Small sips;Cueing to swallow;1:1 feeding  Medication Administration Recommendations: Crushed in puree    Patient Experiencing Pain: No                Treatment Plan  Treatment Plan/Recommendations: Aspiration precautions;SLP to reassess    Interdisciplinary Communication: Discussed with RN            GOALS  Goal #1 The patient will tolerate puree consistency and thin liquids without overt signs or symptoms of aspiration with 90 % accuracy over 2-3 session(s).  In Progress   Goal #2 The patient/family/caregiver will demonstrate understanding and implementation of aspiration precautions and swallow strategies independently over 2-3 session(s).     In Progress   Goal #3 Assess need for VFSS within 1-2 visits Hold        FOLLOW UP  Follow Up Needed (Documentation Required): Yes  SLP Follow-up Date: 12/12/24       Session: 2    If you have  any questions, please contact Tramaine Darby, SLP

## 2024-12-10 NOTE — PLAN OF CARE
Received patient alert and oriented x3-4. Up in chair via elen lift. No signs of distress. Documentation per flowsheets, medication given per MAR.

## 2024-12-10 NOTE — CM/SW NOTE
Call received from Lory Luna. Lory will either call pt's dtr or have the appropriate person from [place] call to follow up re: concerns at [facility]. Covering Silvia LIGHT, rita and will inform pt/dtr.    CM/SW will remain available for DC planning and/or support.     VIPUL Nuñez, CMSRN    [phone]

## 2024-12-10 NOTE — CM/SW NOTE
Received a call from [facility] stating they are in contract with Eugenio arguello [place]. Ashwin # [phone] will reach out to family to discuss services.     MAGALI Mcdonnell RN, CM  [phone]

## 2024-12-10 NOTE — PLAN OF CARE
A&Ox3. 1L NC. Afib. Bps WDL. Incontinent of urine, purewick in place. 1 small BM. IVF infusing per orders. Transfer orders remain in place.

## 2024-12-10 NOTE — PROGRESS NOTES
[facility]   part of [facility]     Hospitalist Progress Note     Carla Kim Patient Status:  Inpatient    1950 MRN [id number]   Location [facility] 4SW-A Attending Chitra Molina MD   Hosp Day # 4 PCP No primary care provider on file.     Chief Complaint: septic shock    Subjective:     Patient awake, alert, no complaints    Objective:    Review of Systems:   A comprehensive review of systems was completed; pertinent positive and negatives stated in subjective.    Vital signs:  Temp:  [97.1 °F (36.2 °C)-98.4 °F (36.9 °C)] 97.8 °F (36.6 °C)  Pulse:  [] 101  Resp:  [21-27] 26  BP: (111-128)/(47-76) 128/55  SpO2:  [90 %-95 %] 95 %    Physical Exam:    General: No acute distress  Respiratory: No wheezes, no rhonchi  Cardiovascular: S1, S2, regular rate and rhythm  Abdomen: Soft, Non-tender, non-distended, positive bowel sounds  Extremities: No edema      Diagnostic Data:    Labs:  Recent Labs   Lab 24  0007 24  0522 24  0637 24  0543 24  0323 12/10/24  0505   WBC 7.5 9.2 7.1 6.2 6.4 7.3   HGB 13.0 12.0 11.7* 11.6* 10.4* 10.1*   MCV 86.4 93.3 83.8 85.3 87.0 88.5   .0 155.0 128.0* 121.0* 123.0* 134.0*   INR 2.77*  --  2.15*  --   --   --        Recent Labs   Lab 24  0637 24  1904 24  0543 24  0323 12/10/24  0505   *   < > 115* 107* 102*   BUN 66*   < > 58* 47* 45*   CREATSERUM 4.10*   < > 3.73* 3.09* 2.55*   CA 8.0*   < > 8.4* 8.2* 8.2*   ALB 3.0*  --   --  2.9* 3.1*      < > 146* 140 137   K 4.3   < > 4.1 4.0 4.8      < > 109 109 107   CO2 26.0   < > 27.0 24.0 25.0   ALKPHO 100  --   --  93 74   AST 1,136*  --   --  550* 272*   ALT 1,106*  --   --  568* 133*   BILT 0.9  --   --  0.5 0.5   TP 6.1  --   --  5.5* 5.8    < > = values in this interval not displayed.       Estimated Creatinine Clearance: 17.2 mL/min (A) (based on SCr of 2.55 mg/dL (H)).    Recent Labs   Lab 24  0007   TROPHS 28       Recent  Labs   Lab 12/06/24  0007 12/07/24  0637   PTP 29.5* 24.2*   INR 2.77* 2.15*                  Microbiology    Hospital Encounter on 12/05/24   1. Blood Culture     Status: None (Preliminary result)    Collection Time: 12/06/24 12:10 AM    Specimen: Blood,peripheral   Result Value Ref Range    Blood Culture Result No Growth 4 Days N/A   2. Urine Culture, Routine     Status: Abnormal    Collection Time: 12/06/24 12:06 AM    Specimen: Urine, clean catch   Result Value Ref Range    Urine Culture >100,000 CFU/ML Klebsiella pneumoniae (A) N/A       Susceptibility    Klebsiella pneumoniae -  (no method available)     Ampicillin  Resistant      Ampicillin + Sulbactam 4 Sensitive      Cefazolin <=4 Sensitive      Ciprofloxacin <=0.25 Sensitive      Gentamicin <=1 Sensitive      Meropenem <=0.25 Sensitive      Levofloxacin 0.25 Sensitive      Nitrofurantoin 64 Intermediate      Piperacillin + Tazobactam <=4 Sensitive      Trimethoprim/Sulfa <=20 Sensitive          Imaging: Reviewed in Epic.    Medications:    sodium chloride  3 mL Nebulization TID    ceFAZolin  1 g Intravenous Q12H    sennosides  8.6 mg Oral Nightly    polyethylene glycol (PEG 3350)  17 g Oral Daily    carvedilol  3.125 mg Oral BID with meals    apixaban  5 mg Oral BID    sertraline  100 mg Oral Daily    insulin aspart  2-10 Units Subcutaneous TID AC and HS    clopidogrel  75 mg Oral Daily    levETIRAcetam  500 mg Oral BID    baclofen  5 mg Oral BID    multivitamin  1 tablet Oral Daily       Assessment & Plan:      #Septic shock   #UTI in setting of chronic tate -POA  -off pressors  -continue Abx  -Cx with klebsiella    #TIP on CKD  -cr improving    #Depression  -SSRI    #Transaminitis  -due to shock liver  -improving    #Hyperlipidemia  -statin    #DM type II  -hyperglycemia protocol    #Prior CVA-chronic left sided weakness  #Seizures  -continue keppra    #A.fib  -continue eliquis    Transfer to floors, anticipate dc in next 24-48 hours, continues to wean  o2 as able    Chitra Molina MD    Supplementary Documentation:     Quality:  DVT Mechanical Prophylaxis:   SCDs,    DVT Pharmacologic Prophylaxis   Medication    apixaban (Eliquis) tab 5 mg                Code Status: DNAR/Full Treatment  Han: External urinary catheter in place  Han Duration (in days):   Central line:    DEEDEE:     Discharge is dependent on: progress  At this point Ms. Kim is expected to be discharge to: TBD    The 21st Century Cures Act makes medical notes like these available to patients in the interest of transparency. Please be advised this is a medical document. Medical documents are intended to carry relevant information, facts as evident, and the clinical opinion of the practitioner. The medical note is intended as peer to peer communication and may appear blunt or direct. It is written in medical language and may contain abbreviations or verbiage that are unfamiliar.              **Certification      PHYSICIAN Certification of Need for Inpatient Hospitalization - Initial Certification    Patient will require inpatient services that will reasonably be expected to span two midnight's based on the clinical documentation in H+P.   Based on patients current state of illness, I anticipate that, after discharge, patient will require TBD.

## 2024-12-10 NOTE — PHYSICAL THERAPY NOTE
PT re-eval order received, chart reviewed, and assessment attempted. Pt declined therapy after education and encouragement provided requesting to use total lift for transfers. Pt stating she does NOT want to work towards standing as a goal. Dtr present during attempt and also providing encouragement - PT will re-attempt tomorrow if pt agreeable, if pt declines will sign off. Dtr understands plan.    Per daughter pt has been using total lift for transfers since the Spring? Which is different than report from staff at [place] documented as a 2 person pivot transfer? Confirmed is total care for ADLs which are performed weekly in the bed. Able to feed herself with set up - demonstrated adequate elbow flexion when cued to mimic.

## 2024-12-10 NOTE — PROGRESS NOTES
Carla Kim Patient Status:  Inpatient    1950 MRN [id number]   Location [facility] 4SW-A Attending Chitra Molina MD   Hosp Day # 4 PCP No primary care provider on file.     Critical Care Progress Note      Subjective: No issues overnight. Sitting up in chair this morning. On 1 L NC.      Objective:    Medications:   sodium chloride  3 mL Nebulization TID    ceFAZolin  1 g Intravenous Q12H    sennosides  8.6 mg Oral Nightly    polyethylene glycol (PEG 3350)  17 g Oral Daily    carvedilol  3.125 mg Oral BID with meals    apixaban  5 mg Oral BID    sertraline  100 mg Oral Daily    insulin aspart  2-10 Units Subcutaneous TID AC and HS    clopidogrel  75 mg Oral Daily    levETIRAcetam  500 mg Oral BID    baclofen  5 mg Oral BID    multivitamin  1 tablet Oral Daily              Intake/Output Summary (Last 24 hours) at 12/10/2024 1018  Last data filed at 12/10/2024 0900  Gross per 24 hour   Intake 2330.7 ml   Output 1050 ml   Net 1280.7 ml       /55 (BP Location: Left arm)   Pulse 101   Temp 97.8 °F (36.6 °C) (Tympanic)   Resp 26   Ht 165.1 cm (5' 5\")   Wt 156 lb 1.4 oz (70.8 kg)   SpO2 95%   BMI 25.97 kg/m²     Physical Exam:  General Appearance: Alert, cooperative, no distress sitting up in chair  Neck: No JVD  Lungs: Course to auscultation bilaterally, respirations unlabored  Heart: Regular- tachycardic rate and irregular rhythm, S1 and S2 normal, no murmur, rub or gallop  Abdomen: Soft, non-tender, bowel sounds active  Extremities: Extremities normal, atraumatic, no cyanosis or edema,capillary refill <3 sec.    Pulses: 2+ and symmetric all extremities  Skin: Skin color, texture, turgor normal for ethnicity, no rashes or lesions, warm and dry      Recent Labs   Lab 24  0637 24  0543 12/10/24  0505   RBC 4.08   < > 3.47*   HGB 11.7*   < > 10.1*   HCT 34.2*   < > 30.7*   MCV 83.8   < > 88.5   MCH 28.7   < > 29.1   MCHC 34.2   < > 32.9   RDW 15.9   < > 16.8   NEPRELIM 5.66  --    --    WBC 7.1   < > 7.3   .0*   < > 134.0*    < > = values in this interval not displayed.     Recent Labs   Lab 12/07/24  0637 12/07/24  1904 12/08/24  0543 12/09/24  0323 12/10/24  0505   *   < > 115* 107* 102*   BUN 66*   < > 58* 47* 45*   CREATSERUM 4.10*   < > 3.73* 3.09* 2.55*   CA 8.0*   < > 8.4* 8.2* 8.2*   ALB 3.0*  --   --  2.9* 3.1*      < > 146* 140 137   K 4.3   < > 4.1 4.0 4.8      < > 109 109 107   CO2 26.0   < > 27.0 24.0 25.0   ALKPHO 100  --   --  93 74   AST 1,136*  --   --  550* 272*   ALT 1,106*  --   --  568* 133*   BILT 0.9  --   --  0.5 0.5   TP 6.1  --   --  5.5* 5.8    < > = values in this interval not displayed.         Assessment/Plan:      Acute hypoxic respiratory failure:  May be due to possible aspiration pneumonia, +/-  increased/thickened mucous production. Underlying COPD, but does not appear to be in exacerbation   - Requiring 1L/NC- Wean as able  -CT chest with increased secretions and peribronchial thickening, possible aspiration  - MRSA nares negative  - strep/legionella urine Ag negative  - Encourage IS, OOB to chair  - Antibiotics as below  - Will start CPT, 3% nebs for airway clearance    Septic shock: 2/2 UTI with chronic tate catheter  -Vasopressors weaned off 12/6  - Lactic acid normal, procal elevated however unreliable in the setting of TIP.  -Blood cultures NGTD  -Urine cultures positive klebsiella  - CT A/P withh thickened bladder, concern for cystitis, mild rectal wall thickening  -Continue Ancef (12-8)    TIP on CKD stage 3: Due to septic shock, dehydration with underlying HTN, DM  -BL Cr- ~1.5  -Cr- 4.49 on admit, down-trending daily  -Strict I/Os  -Avoid nephrotoxins  -Renal US without obstruction  -IVFs  -Daily BMP  -Renal following    Transaminitis: Likely 2/2 shock liver  - AST/ALT improving  - Daily CMP    Coagulopathy: Likely 2/2 to sepsis, shock liver  - No s/s of bleeding     HFrEF, Ischemic CM--appears compensated  -TTE with EF  45% (Prior 50-55%  -Coreg    CAD  -BB  -Holding statin due to LFTs     HTN  -BB  - PTA meds on hold in the setting of shock ( losartan, spironolactone)    Afib   -Coreg restarted  - Apixaban  -monitor on tele     History of seizures  - continue home Keppra  - seizure precautions     CVA with Chronic left sided weakness  - continue PTA Plavix  - Holding statin due to LFTs  - Baclofen at lower dose 2/2 TIP    Depression  - Sertraline     COPD  - Not in acute exacerbation  - Monitor    DM2  - A1c 6.5  - Accuchecks  - SSI     F/E/N  -IVFs  -replete electrolytes as needed  -ADAT--1:1 feeder, appetite poor     Proph  - apixaban  - Non-ambulatory baseline  - PT/OT/ST--baseline is 2-step pivoting with assist     Dispo  - DNAR full treatment  - Palliative care following  - stable to transfer to floor     Plan of care discussed with intensivist on-call, Dr. Nicole Holden, [facility]  ICU  Phone  [phone]   Pager [phone]    ICU Attending addendum:  - pt seen and examined with ICU APN.  Agree with A/P as detailed above.  - sepsis from UTI related to chronic tate- improving on abx.  Resp status greatly improved, nearly off oxygen; no signs of AECOPD.  TIP improving with IVFs which will continue for now.  Pt is stable for transfer to floor.    David Rivera MD

## 2024-12-11 NOTE — OCCUPATIONAL THERAPY NOTE
Skilled OT order received, chart reviewed. Spoke with pt's daughter Jaye on the phone. Jaye states that at [facility], pt has not gotten out of bed or gotten dressed in quite some time. Pt has staff assist for all ADL. Jaye feels as if the pt is \"giving up a little\" and she states that she has worked with her therapist to come to terms with this. Pt is not motivated to work with therapy services and prefers to stay in bed. Please re-order PT/OT should the GOC change.     Thank you for allowing me to care for this patient,   Sheridan PEACOCK, OTR/L   Occupational Therapist   [facility]

## 2024-12-11 NOTE — OCCUPATIONAL THERAPY NOTE
OT was re-consulted due to reported increased weakness from baseline. EMR reviewed. Patient has been LTC resident at [facility] since 2022. Patient was received in chair (total lift sling in place ) and daughter was also present. Daughter reported that patient has not been pivoting at SNF as was reported by [facility] nursing staff per therapy notes. Daughter stated that patient used to be assisted with 2 people but has been using a lift for transfers since spring of 2024. Patient was offered assistance to attempt to  prep for ADLs. She refused. Patient and daughter were educated about therapy role in hospital setting. Patient stated her goal is \"to get better\" . Daughter was suggesting to patient that she try to stand if she wants to work on getting better. Patient continued to refuse.  Nurse updated. Will follow up as appropriate.

## 2024-12-11 NOTE — CM/SW NOTE
CM followed up w/ pt re: issues with contact at [facility]. Eugenio Henley was going to contact Jaye to put her in contact with someone at facility. Pt alone in her room but agrees for CM to contact Jaye for follow up. Pt confirms that Jaye remains her HCPOA.     Jaye called but was unavailable, VM left with CM's contact information.     CM/SW will remain available for DC planning and/or support.     VIPUL Nuñez, CMSRN    [phone]

## 2024-12-11 NOTE — PLAN OF CARE
Patient arrived to room 383 via bed. Patient Alert and oriented x3. VSS. O2 2L NC. No signs of distress. Tele. 2 person skin check done with Theo PCT. Left sided weakness noted with contracture to LUE. Fall precautions in place. Call light in reach.

## 2024-12-11 NOTE — PLAN OF CARE
Assumed care of patient after report. Alert and oriented except confused to situation. Baseline L sided weakness noted with baseline contracture to LUE. Controlled A fib on tele monitor. Denies SOB and chest pain. On 2L NC. External cath in place. Adequate UOP. Floor bed became available in room 383. Report given to SUKHJINDER Garcia. Pt informed of move to new room. Pt requested to wait until morning to inform daughter of move to new room. All belonging, medications, and chart packed and sent with patient. See MAR and flowsheets for additional information.

## 2024-12-11 NOTE — PHYSICAL THERAPY NOTE
New order received for PT eval and chart reviewed. PT eval completed 12/6/24. Pt discharged from skilled IP PT services, as patient was near baseline level of function. Staff at [facility] reported on 12/6/24 that patient is a 2 person assist for bed mobility and transfers.     PT/OT spoke with patients daughter yesterday. Pts daughter reports patient has not been out of bed without the total lift since the spring.     OT spoke with daughter today who again stated patient has not been out of bed for some time. Daughter reports Pt does not have goals to work towards standing and prefers to stay in bed.   PT will sign off. Please re-order, as appropriate.

## 2024-12-11 NOTE — PROGRESS NOTES
[facility]  Nephrology Progress Note    Carla Kim Attending:  Chitra Molina MD       Assessment and Plan:    1) TIP- due to septic shock / dehydration; imaging without obstruction / meds benign. Improving- taper IVF    2) CKD 3- baseline Cr < 1.5 mg/dl due to DM / HTN    3) Septic shock / Klebsiella urosepsis- in setting of chronic Han; CT noted- on ancef     4) Transaminitis + coagulopathy- c/w shock liver; improving radpily     5) h/o CVA with L sided weakness + Sz on keppra    6) HFrEF / ischemic CM EF 45%- compensated    7) HTN- normally on amlodipine / losartan / prn POWERS (all held)    8) DM 2      Subjective:  Awake looks a little better waiting for breakfast    Physical Exam:   /77 (BP Location: Right arm)   Pulse (!) 128   Temp 97.7 °F (36.5 °C) (Oral)   Resp 16   Ht 5' 5\" (1.651 m)   Wt 156 lb 1.4 oz (70.8 kg)   SpO2 (!) 89%   BMI 25.97 kg/m²   Temp (24hrs), Av.3 °F (36.8 °C), Min:97.7 °F (36.5 °C), Max:99.4 °F (37.4 °C)       Intake/Output Summary (Last 24 hours) at 2024 0933  Last data filed at 2024 0541  Gross per 24 hour   Intake 1854.4 ml   Output 750 ml   Net 1104.4 ml     Wt Readings from Last 3 Encounters:   12/10/24 156 lb 1.4 oz (70.8 kg)   23 150 lb (68 kg)   10/25/22 151 lb 8 oz (68.7 kg)     General: awake   HEENT: No scleral icterus, MMM  Neck: Supple, no MEGHANA or thyromegaly  Cardiac: Regular rate and rhythm, S1, S2 normal, no murmur or tub  Lungs: Decreased BS at bases bilaterally   Abdomen: Soft, non-tender. + bowel sounds, no palpable organomegaly  Extremities: Without clubbing, cyanosis; no edema  Neurologic: Cranial nerves grossly intact, moving all extremities  Skin: Warm and dry, no rashes       Labs:   Lab Results   Component Value Date    WBC 7.1 2024    HGB 10.0 2024    HCT 30.5 2024    .0 2024    CREATSERUM 2.11 2024    BUN 39 2024     2024    K 3.6 2024      12/11/2024    CO2 22.0 12/11/2024     12/11/2024    CA 8.2 12/11/2024    ALB 2.9 12/11/2024    ALKPHO 68 12/11/2024    BILT 0.5 12/11/2024    TP 6.2 12/11/2024     12/11/2024    ALT 31 12/11/2024    INR 1.62 12/11/2024    PTP 19.4 12/11/2024    PGLU 103 12/11/2024       Imaging:  All imaging studies reviewed.    Meds:   Current Facility-Administered Medications   Medication Dose Route Frequency    sodium chloride 3 % nebulizer solution 3 mL  3 mL Nebulization TID    sodium chloride 0.45% infusion   Intravenous Continuous    ceFAZolin (Ancef) 1 g in dextrose 5% 100mL IVPB-ADD  1 g Intravenous Q12H    sennosides (Senokot) tab 8.6 mg  8.6 mg Oral Nightly    polyethylene glycol (PEG 3350) (Miralax) 17 g oral packet 17 g  17 g Oral Daily    carvedilol (Coreg) tab 3.125 mg  3.125 mg Oral BID with meals    apixaban (Eliquis) tab 5 mg  5 mg Oral BID    sertraline (Zoloft) tab 100 mg  100 mg Oral Daily    acetaminophen (Tylenol Extra Strength) tab 500 mg  500 mg Oral Q4H PRN    melatonin tab 3 mg  3 mg Oral Nightly PRN    polyethylene glycol (PEG 3350) (Miralax) 17 g oral packet 17 g  17 g Oral Daily PRN    sennosides (Senokot) tab 17.2 mg  17.2 mg Oral Nightly PRN    bisacodyl (Dulcolax) 10 MG rectal suppository 10 mg  10 mg Rectal Daily PRN    fleet enema (Fleet) rectal enema 133 mL  1 enema Rectal Once PRN    glucose (Dex4) 15 GM/59ML oral liquid 15 g  15 g Oral Q15 Min PRN    Or    glucose (Glutose) 40% oral gel 15 g  15 g Oral Q15 Min PRN    Or    glucose-vitamin C (Dex-4) chewable tab 4 tablet  4 tablet Oral Q15 Min PRN    Or    dextrose 50% injection 50 mL  50 mL Intravenous Q15 Min PRN    Or    glucose (Dex4) 15 GM/59ML oral liquid 30 g  30 g Oral Q15 Min PRN    Or    glucose (Glutose) 40% oral gel 30 g  30 g Oral Q15 Min PRN    Or    glucose-vitamin C (Dex-4) chewable tab 8 tablet  8 tablet Oral Q15 Min PRN    insulin aspart (NovoLOG) 100 Units/mL FlexPen 2-10 Units  2-10 Units Subcutaneous TID AC and  HS    clopidogrel (Plavix) tab 75 mg  75 mg Oral Daily    levETIRAcetam (Keppra) tab 500 mg  500 mg Oral BID    influenza virus trivalent high dose PF (Fluzone HD) 0.5 mL injection (ages >/= 65 years) 0.5 mL  0.5 mL Intramuscular Prior to discharge    baclofen (Lioresal) tab 5 mg  5 mg Oral BID    multivitamin (Tab-A-Odilon/Beta Carotene) tab 1 tablet  1 tablet Oral Daily         Questions/concerns were discussed with patient and/or family by bedside.          Vicky Perez MD  12/11/2024  933 AM

## 2024-12-11 NOTE — PROGRESS NOTES
[facility]   part of [facility]  Palliative Care Progress Note    Carla Kim Patient Status:  Inpatient    1950 MRN [id number]   [facility] 4SW-A Attending Chitra Molina MD   Hosp Day # 5 PCP No primary care provider on file.     History/Other:      Carla Kim is a 74 year old female with PVD, DM, Hx of CVA with residual left sided weakness, COPD, CAD, Afib, CHF who was admitted on 2024 for weakness and hypotension.  Pt is from [facility]. Work up in our hospital revealed UTI with sepsis, shock liver, and TIP.     Consult ordered by:: Marie Carrero APRN for evaluation of Palliative Care needs and Goals of care discussion;Advance care planning / HPOA;Establish palliative care.    Allergies:  Allergies[1]  Medications:     Current Facility-Administered Medications:     sodium chloride 3 % nebulizer solution 3 mL, 3 mL, Nebulization, TID    sodium chloride 0.45% infusion, , Intravenous, Continuous    ceFAZolin (Ancef) 1 g in dextrose 5% 100mL IVPB-ADD, 1 g, Intravenous, Q12H    sennosides (Senokot) tab 8.6 mg, 8.6 mg, Oral, Nightly    polyethylene glycol (PEG 3350) (Miralax) 17 g oral packet 17 g, 17 g, Oral, Daily    carvedilol (Coreg) tab 3.125 mg, 3.125 mg, Oral, BID with meals    apixaban (Eliquis) tab 5 mg, 5 mg, Oral, BID    sertraline (Zoloft) tab 100 mg, 100 mg, Oral, Daily    acetaminophen (Tylenol Extra Strength) tab 500 mg, 500 mg, Oral, Q4H PRN    melatonin tab 3 mg, 3 mg, Oral, Nightly PRN    polyethylene glycol (PEG 3350) (Miralax) 17 g oral packet 17 g, 17 g, Oral, Daily PRN    sennosides (Senokot) tab 17.2 mg, 17.2 mg, Oral, Nightly PRN    bisacodyl (Dulcolax) 10 MG rectal suppository 10 mg, 10 mg, Rectal, Daily PRN    fleet enema (Fleet) rectal enema 133 mL, 1 enema, Rectal, Once PRN    glucose (Dex4) 15 GM/59ML oral liquid 15 g, 15 g, Oral, Q15 Min PRN **OR** glucose (Glutose) 40% oral gel 15 g, 15 g, Oral, Q15 Min PRN **OR** glucose-vitamin C  (Dex-4) chewable tab 4 tablet, 4 tablet, Oral, Q15 Min PRN **OR** dextrose 50% injection 50 mL, 50 mL, Intravenous, Q15 Min PRN **OR** glucose (Dex4) 15 GM/59ML oral liquid 30 g, 30 g, Oral, Q15 Min PRN **OR** glucose (Glutose) 40% oral gel 30 g, 30 g, Oral, Q15 Min PRN **OR** glucose-vitamin C (Dex-4) chewable tab 8 tablet, 8 tablet, Oral, Q15 Min PRN    insulin aspart (NovoLOG) 100 Units/mL FlexPen 2-10 Units, 2-10 Units, Subcutaneous, TID AC and HS    clopidogrel (Plavix) tab 75 mg, 75 mg, Oral, Daily    levETIRAcetam (Keppra) tab 500 mg, 500 mg, Oral, BID    influenza virus trivalent high dose PF (Fluzone HD) 0.5 mL injection (ages >/= 65 years) 0.5 mL, 0.5 mL, Intramuscular, Prior to discharge    baclofen (Lioresal) tab 5 mg, 5 mg, Oral, BID    multivitamin (Tab-A-Odilon/Beta Carotene) tab 1 tablet, 1 tablet, Oral, Daily    Subjective      Interval events:  Cr slowly improving; nephrology following. Not willing to work with PT/OT.      When I entered the room, the patient was sleeping but easy to wake and sitting up in bed. No family present at bedside.   Patient woke briefly and would answer questions in 1-3 words. She is aware of where she is but not to her situation.      See summary of discussion below.     Review of Systems:  Denies all complaints of pain, SOB.   Bowel Movement         12/11/2024  0500             Stool Count Calculated for I/O: 1          Objective:     Vital Signs:  Blood pressure 119/59, pulse 102, temperature 97.6 °F (36.4 °C), temperature source Oral, resp. rate 16, height 5' 5\" (1.651 m), weight 156 lb 1.4 oz (70.8 kg), SpO2 90%, not currently breastfeeding.  Body mass index is 25.97 kg/m².  Non-verbal signs of pain present: No  Current Palliative performance scale PPSv2 (%): 30    Physical Exam:  General: Sleeping but woke easily; lethargic.   HEENT: left facial droop.   Lungs: tachypnea on NC (RR ~30 during my visit)   Neurologic: Aox person and place, but not time nor situation.    Skin: Warm and dry.        Results:     Hematology:  Lab Results   Component Value Date    WBC 7.1 12/11/2024    HGB 10.0 (L) 12/11/2024    HCT 30.5 (L) 12/11/2024    .0 (L) 12/11/2024     Coags:  Lab Results   Component Value Date    PT 13.4 02/28/2014    INR 1.62 (H) 12/11/2024    PTT 40.7 (H) 12/06/2024     Chemistry:  Lab Results   Component Value Date    CREATSERUM 2.11 (H) 12/11/2024    BUN 39 (H) 12/11/2024     12/11/2024    K 3.6 12/11/2024     12/11/2024    CO2 22.0 12/11/2024     (H) 12/11/2024    CA 8.2 (L) 12/11/2024    ALB 2.9 (L) 12/11/2024    ALKPHO 68 12/11/2024    BILT 0.5 12/11/2024    TP 6.2 12/11/2024     (H) 12/11/2024    ALT 31 12/11/2024    DDIMER 1.51 (H) 10/24/2022    BNP 2,098 (H) 02/28/2014    MG 2.0 12/09/2024    PHOS 3.3 12/07/2024    TROP <0.045 02/26/2019     Imaging:  No results found.       Summary of Discussion     Pt is unaware of her medical situation, which has been consistent since my first visit with her. She is baseline forgetful per daughter and is still recovering from sepsis d/t UTI with severe TIP and shock liver.   Attempted to call Anne Marie; LVM.     Advance Care Planning counseling and discussion:   HC POA Documentation Completed--Document in Epic.  Anne Marie (daughter) is POA.    POLST FORM Not completed & Form provided  DNAR/Full Treatment    Assessment and Recommendations       Principal Problem:    Hypotension, unspecified hypotension type  Active Problems:    Essential hypertension    Esophageal reflux    CAD in native artery    Controlled type 2 diabetes mellitus with stage 3 chronic kidney disease, without long-term current use of insulin (HCC)    Hyperlipidemia associated with type 2 diabetes mellitus (HCC)    Hypernatremia    Seizure (HCC)    Acute cystitis without hematuria    Acidosis    Septic shock (HCC)    Urinary tract infection without hematuria, site unspecified    Acute renal failure, unspecified acute renal failure type (HCC)     Palliative care by specialist    TIP (acute kidney injury) ([facility])    Altered mental status    Hypokalemia    ATN (acute tubular necrosis) ([facility])    Goals of care: ongoing   Per previous conversation with POA on 12/9, she requested a few days to see if pt improved.   Code Status: DNAR/Full Treatment - POLST not completed as discussions are ongoing privately    Tara is medical POA   At this time, pt does not exhibit ability to comprehend her medical situation or understand her choices and their consequences. I do not believe that pt has complex decision making capacity at this time, but this can change.   CPC ordered.     Discussed today's visit with Care Team .    Palliative Care Follow Up: Palliative care team will Continue to follow while inpatient.  Palliative care follow up outpatient: is indicated and community palliative care ordered.    Thank you for allowing Palliative Care services to participate in the care of Carla Kim.    A total of 25 minutes were spent on this consult, which included all of the following: chart review, direct face to face contact, history taking, physical examination, counseling and coordinating care, and documentation.     Nakia Pereira MD  12/11/2024  2:52 PM  Palliative Care Services    The 21st Century Cures Act makes medical notes like these available to patients in the interest of transparency. Please be advised this is a medical document. Medical documents are intended to carry relevant information, facts as evident, and the clinical opinion of the practitioner. The medical note is intended as peer to peer communication and may appear blunt or direct. It is written in medical language and may contain abbreviations or verbiage that are unfamiliar.          [1]   Allergies  Allergen Reactions    Lipitor [Atorvastatin Calcium] MYALGIA     TABS    Wellbutrin [Bupropion Hcl]      TABS-tremor

## 2024-12-12 ENCOUNTER — APPOINTMENT (OUTPATIENT)
Dept: GENERAL RADIOLOGY | Facility: HOSPITAL | Age: 74
End: 2024-12-12
Attending: STUDENT IN AN ORGANIZED HEALTH CARE EDUCATION/TRAINING PROGRAM
Payer: MEDICARE

## 2024-12-12 NOTE — PROGRESS NOTES
[facility]   part of [facility]  Palliative Care Progress Note    Carla Kim Patient Status:  Inpatient    1950 MRN [id number]   [facility] 4SW-A Attending Chitra Molina MD   Hosp Day # 6 PCP No primary care provider on file.     History/Other:      Carla Kim is a 74 year old female with PVD, DM, Hx of CVA with residual left sided weakness, COPD, CAD, Afib, CHF who was admitted on 2024 for weakness and hypotension.  Pt is from [facility]. Work up in our hospital revealed UTI with sepsis, shock liver, and TIP.     Consult ordered by:: Marie Carrero APRN for evaluation of Palliative Care needs and Goals of care discussion;Advance care planning / HPOA;Establish palliative care.    Allergies:  Allergies[1]  Medications:     Current Facility-Administered Medications:     sodium chloride 3 % nebulizer solution 3 mL, 3 mL, Nebulization, TID    ceFAZolin (Ancef) 1 g in dextrose 5% 100mL IVPB-ADD, 1 g, Intravenous, Q12H    sennosides (Senokot) tab 8.6 mg, 8.6 mg, Oral, Nightly    polyethylene glycol (PEG 3350) (Miralax) 17 g oral packet 17 g, 17 g, Oral, Daily    carvedilol (Coreg) tab 3.125 mg, 3.125 mg, Oral, BID with meals    apixaban (Eliquis) tab 5 mg, 5 mg, Oral, BID    sertraline (Zoloft) tab 100 mg, 100 mg, Oral, Daily    acetaminophen (Tylenol Extra Strength) tab 500 mg, 500 mg, Oral, Q4H PRN    melatonin tab 3 mg, 3 mg, Oral, Nightly PRN    polyethylene glycol (PEG 3350) (Miralax) 17 g oral packet 17 g, 17 g, Oral, Daily PRN    sennosides (Senokot) tab 17.2 mg, 17.2 mg, Oral, Nightly PRN    bisacodyl (Dulcolax) 10 MG rectal suppository 10 mg, 10 mg, Rectal, Daily PRN    fleet enema (Fleet) rectal enema 133 mL, 1 enema, Rectal, Once PRN    glucose (Dex4) 15 GM/59ML oral liquid 15 g, 15 g, Oral, Q15 Min PRN **OR** glucose (Glutose) 40% oral gel 15 g, 15 g, Oral, Q15 Min PRN **OR** glucose-vitamin C (Dex-4) chewable tab 4 tablet, 4 tablet, Oral, Q15 Min PRN  **OR** dextrose 50% injection 50 mL, 50 mL, Intravenous, Q15 Min PRN **OR** glucose (Dex4) 15 GM/59ML oral liquid 30 g, 30 g, Oral, Q15 Min PRN **OR** glucose (Glutose) 40% oral gel 30 g, 30 g, Oral, Q15 Min PRN **OR** glucose-vitamin C (Dex-4) chewable tab 8 tablet, 8 tablet, Oral, Q15 Min PRN    insulin aspart (NovoLOG) 100 Units/mL FlexPen 2-10 Units, 2-10 Units, Subcutaneous, TID AC and HS    clopidogrel (Plavix) tab 75 mg, 75 mg, Oral, Daily    levETIRAcetam (Keppra) tab 500 mg, 500 mg, Oral, BID    influenza virus trivalent high dose PF (Fluzone HD) 0.5 mL injection (ages >/= 65 years) 0.5 mL, 0.5 mL, Intramuscular, Prior to discharge    baclofen (Lioresal) tab 5 mg, 5 mg, Oral, BID    multivitamin (Tab-A-Odilon/Beta Carotene) tab 1 tablet, 1 tablet, Oral, Daily    Subjective      Interval events:  slow Cr improvement and LFTs.     Attempted to see earlier today, but pt asleep. No family at that time either.     When I entered the room, the patient was awake, alert, sitting up in bed, and receiving bedside care . No family present at bedside.   Pt does not recognize this provider. She is unaware of why she is hospitalized, states \"A fib.\" More capable of answering questions today.     See summary of discussion below.     Review of Systems:  Denies all complaints of pain, SOB.   Bowel Movement         12/11/2024  0500             Stool Count Calculated for I/O: 1          Objective:     Vital Signs:  Blood pressure 121/57, pulse 108, temperature 97.6 °F (36.4 °C), temperature source Oral, resp. rate 20, height 5' 5\" (1.651 m), weight 156 lb 1.4 oz (70.8 kg), SpO2 90%, not currently breastfeeding.  Body mass index is 25.97 kg/m².  Non-verbal signs of pain present: No  Current Palliative performance scale PPSv2 (%): 30    Physical Exam:  General: Awake during meal time.   HEENT: left facial droop.   Lungs:some increased WOB on NC with cough.   Neurologic: Aox2-3 today but not situation   Extremities: left arm  contracted         Results:     Hematology:  Lab Results   Component Value Date    WBC 7.1 12/11/2024    HGB 10.0 (L) 12/11/2024    HCT 30.5 (L) 12/11/2024    .0 (L) 12/11/2024     Coags:  Lab Results   Component Value Date    PT 13.4 02/28/2014    INR 1.62 (H) 12/11/2024    PTT 40.7 (H) 12/06/2024     Chemistry:  Lab Results   Component Value Date    CREATSERUM 2.05 (H) 12/12/2024    BUN 36 (H) 12/12/2024     12/12/2024    K 3.7 12/12/2024     12/12/2024    CO2 22.0 12/12/2024     (H) 12/12/2024    CA 8.4 (L) 12/12/2024    ALB 3.1 (L) 12/12/2024    ALKPHO 70 12/12/2024    BILT 0.6 12/12/2024    TP 6.0 12/12/2024     (H) 12/12/2024    ALT 13 12/12/2024    DDIMER 1.51 (H) 10/24/2022    BNP 2,098 (H) 02/28/2014    MG 2.0 12/09/2024    PHOS 3.3 12/07/2024    TROP <0.045 02/26/2019     Imaging:  XR CHEST AP PORTABLE  (CPT=71045)    Result Date: 12/12/2024  CONCLUSION:  Postsurgical changes of median sternotomy.  There is cardiomegaly.  There are new prominent interstitial opacities in the lung periphery and perihilar regions extending into the lung bases bilaterally.  This may represent pulmonary edema versus an infectious process.  More focal airspace disease/atelectasis retrocardiac left lung base with blunting of the costophrenic angle.   LOCATION:  [facility]      Dictated by (CST): Saima Almaguer MD on 12/12/2024 at 7:18 AM     Finalized by (CST): Saima Almaguer MD on 12/12/2024 at 7:19 AM           Summary of Discussion     Pt is unaware of her medical situation, which has been consistent since my first visit with her. She is baseline forgetful but not to this extent per daughter and is still recovering from sepsis d/t UTI with severe TIP and shock liver.     Called Anne Marie. Addressed concerns of cognitive and functional decline. Anne Marie recalls her mom being very argumentative about her cognitive and functional decline, like she always had a point to prove. Now, using the PT/OT as an example,  feels that her mom has more of an acceptance with down mood about her overall health.   Anne Marie is feeling overwhelmed by [place], admission, and personal needs, which is concerning for caregiver burnout. She is taking steps with therapy and finds [facility] very supportive to her and her mom.     Advance Care Planning counseling and discussion:   HC POA Documentation Completed--Document in Epic.  Anne Marie (daughter) is POA.    POLST FORM Not completed & Form provided - Anne Marie would like to defer this form until her and her mother speak privately. She will contact me if/when they are ready to complete it.   DNAR/Full Treatment    Assessment and Recommendations       Principal Problem:    Hypotension, unspecified hypotension type  Active Problems:    Essential hypertension    Esophageal reflux    CAD in native artery    Controlled type 2 diabetes mellitus with stage 3 chronic kidney disease, without long-term current use of insulin (HCC)    Hyperlipidemia associated with type 2 diabetes mellitus (HCC)    Hypernatremia    Seizure (HCC)    Acute cystitis without hematuria    Acidosis    Septic shock (HCC)    Urinary tract infection without hematuria, site unspecified    Acute renal failure, unspecified acute renal failure type ([facility])    Palliative care by specialist    TIP (acute kidney injury) ([facility])    Altered mental status    Hypokalemia    ATN (acute tubular necrosis) ([facility])    Goals of care: ongoing   Code Status: DNAR/Full Treatment - POLST not completed as discussions are ongoing privately. Anne Marie will contact me if/when they are ready to complete it.    Tara is medical POA   At this time, pt does not exhibit ability to comprehend her medical situation or understand her choices and their consequences. I do not believe that pt has complex decision making capacity at this time, but this can change.   CPC ordered. Anne Marie did receive a call, but has not called back to confirm.     Discussed today's visit with POA and  RN.    Palliative Care Follow Up: Palliative care team will follow peripherally and see patient as needed. Please contact provider with any questions, concerns, or if a care conference is to be held.  Palliative care follow up outpatient: is indicated and community palliative care ordered.    Thank you for allowing Palliative Care services to participate in the care of Carla Kim.    A total of 35 minutes were spent on this consult, which included all of the following: chart review, direct face to face contact, history taking, physical examination, counseling and coordinating care, and documentation.     Nakia Pereira MD  12/12/2024  4:15 PM  Palliative Care Services    The 21st Century Cures Act makes medical notes like these available to patients in the interest of transparency. Please be advised this is a medical document. Medical documents are intended to carry relevant information, facts as evident, and the clinical opinion of the practitioner. The medical note is intended as peer to peer communication and may appear blunt or direct. It is written in medical language and may contain abbreviations or verbiage that are unfamiliar.          [1]   Allergies  Allergen Reactions    Lipitor [Atorvastatin Calcium] MYALGIA     TABS    Wellbutrin [Bupropion Hcl]      TABS-tremor

## 2024-12-12 NOTE — SLP NOTE
SPEECH DAILY NOTE - INPATIENT    ASSESSMENT & PLAN   ASSESSMENT  Pt seen for dysphagia tx to assess tolerance with recommended diet, ensure proper utilization of aspiration precautions and provide pt/family education. No visitors present. Clinical d/w RN and chart review completed.  Patient with hypoxia overnight and started on 2 L O2 via NC.  CXR completed and reported pulm edema versus infectious process.  Patient received awake, alert, and watching TV.  Patient with lunch meal tray present. Patient reported preference for puree texture 2/2 \"It's easier.\"  Patient noted with anterior spillage out of right and left labial commissure.  Patient tolerated multiple single cup and tspn sips of thin liquid with prandial cough noted x1.  Patient tolerated puree texture trials with slow bolus formation and A-P transit.  Noted reduced bolus drive with minimal/mild diffuse oral residuals.  Patient utilized liquid wash to clear.  Patient did benefit from 1:1 feeding assist/supervision.  D/W RN.  Recommend con't to monitor closely.  May consider VFSS if s/s aspiration observed or suspected.  SLP will follow.        Diet Recommendations - Solids: Puree  Diet Recommendations - Liquids: Thin Liquids  1:1 feeding assistance  Compensatory Strategies Recommended: Alternate consistencies  Aspiration Precautions: Upright position;Slow rate;Small bites;Small sips;Cueing to swallow;1:1 feeding  Medication Administration Recommendations: Crushed in puree    Patient Experiencing Pain: No     Treatment Plan  Treatment Plan/Recommendations: Aspiration precautions;SLP to reassess    Interdisciplinary Communication: Discussed with RN            GOALS  Goal #1 The patient will tolerate puree consistency and thin liquids without overt signs or symptoms of aspiration with 90 % accuracy over 3-5 session(s). Revised   Goal #2 The patient/family/caregiver will demonstrate understanding and implementation of aspiration precautions and swallow  strategies independently over 3-5 session(s).    Revised   Goal #3 Assess need for VFSS within 1-2 visits Will con't to monitor     FOLLOW UP  Follow Up Needed (Documentation Required): Yes  SLP Follow-up Date: 12/13/24       Session: 3    If you have any questions, please contact ED Ledbetter, MS CCC-SLP/L, pager [phone]  Speech-LanguagePathologist

## 2024-12-12 NOTE — PROGRESS NOTES
[facility]   part of [facility]     Hospitalist Progress Note     Carla Kim Patient Status:  Inpatient    1950 MRN [id number]   Location [facility] 4SW-A Attending Chitra Molina MD   Hosp Day # 6 PCP No primary care provider on file.     Chief Complaint: septic shock    Subjective:     Patient seen and examined. Overnight events reviewed - developed increased O2 needs with CXR showing pulmonary edema.     Objective:    Review of Systems:   A comprehensive review of systems was completed; pertinent positive and negatives stated in subjective.    Vital signs:  Temp:  [97.3 °F (36.3 °C)-98.2 °F (36.8 °C)] 97.6 °F (36.4 °C)  Pulse:  [] 108  Resp:  [15-34] 20  BP: (110-139)/(54-72) 121/57  SpO2:  [90 %-95 %] 90 %    Physical Exam:    General: No acute distress  Respiratory: No wheezes, no rhonchi  Cardiovascular: S1, S2, regular rate and rhythm  Abdomen: Soft, Non-tender, non-distended, positive bowel sounds  Extremities: No edema      Diagnostic Data:    Labs:  Recent Labs   Lab 24  0007 24  0522 24  0637 24  0543 24  0323 12/10/24  0505 24  0454   WBC 7.5   < > 7.1 6.2 6.4 7.3 7.1   HGB 13.0   < > 11.7* 11.6* 10.4* 10.1* 10.0*   MCV 86.4   < > 83.8 85.3 87.0 88.5 87.1   .0   < > 128.0* 121.0* 123.0* 134.0* 145.0*   INR 2.77*  --  2.15*  --   --   --  1.62*    < > = values in this interval not displayed.       Recent Labs   Lab 12/10/24  0505 24  0454 24  0500   * 108* 106*   BUN 45* 39* 36*   CREATSERUM 2.55* 2.11* 2.05*   CA 8.2* 8.2* 8.4*   ALB 3.1* 2.9* 3.1*    137 141   K 4.8 3.6 3.7    105 107   CO2 25.0 22.0 22.0   ALKPHO 74 68 70   * 143* 120*   * 31 13   BILT 0.5 0.5 0.6   TP 5.8 6.2 6.0       Estimated Creatinine Clearance: 21.3 mL/min (A) (based on SCr of 2.05 mg/dL (H)).    Recent Labs   Lab 24  0007   TROPHS 28       Recent Labs   Lab 24  0007 24  0637 24  0454    PTP 29.5* 24.2* 19.4*   INR 2.77* 2.15* 1.62*                  Microbiology    Hospital Encounter on 12/05/24   1. Blood Culture     Status: None    Collection Time: 12/06/24 12:10 AM    Specimen: Blood,peripheral   Result Value Ref Range    Blood Culture Result No Growth 5 Days N/A   2. Urine Culture, Routine     Status: Abnormal    Collection Time: 12/06/24 12:06 AM    Specimen: Urine, clean catch   Result Value Ref Range    Urine Culture >100,000 CFU/ML Klebsiella pneumoniae (A) N/A       Susceptibility    Klebsiella pneumoniae -  (no method available)     Ampicillin  Resistant      Ampicillin + Sulbactam 4 Sensitive      Cefazolin <=4 Sensitive      Ciprofloxacin <=0.25 Sensitive      Gentamicin <=1 Sensitive      Meropenem <=0.25 Sensitive      Levofloxacin 0.25 Sensitive      Nitrofurantoin 64 Intermediate      Piperacillin + Tazobactam <=4 Sensitive      Trimethoprim/Sulfa <=20 Sensitive          Imaging: Reviewed in Epic.    Medications:    sodium chloride  3 mL Nebulization TID    ceFAZolin  1 g Intravenous Q12H    sennosides  8.6 mg Oral Nightly    polyethylene glycol (PEG 3350)  17 g Oral Daily    carvedilol  3.125 mg Oral BID with meals    apixaban  5 mg Oral BID    sertraline  100 mg Oral Daily    insulin aspart  2-10 Units Subcutaneous TID AC and HS    clopidogrel  75 mg Oral Daily    levETIRAcetam  500 mg Oral BID    baclofen  5 mg Oral BID    multivitamin  1 tablet Oral Daily       Assessment & Plan:      #Septic shock   #UTI in setting of chronic tate -POA  -Shock resolved, off pressors  -continue Abx  -Cx with klebsiella    #TIP on CKD  -TIP improving  -Nephrology following     #Non-cardiogenic pulmonary edema in setting of chronic systolic heart failure  -Likely due to IVF administration - now stopped  -Diuresis per renal     #Normocytic anemia  #Thrombocytopenia  -Monitor     #Depression  -SSRI    #Transaminitis  -due to shock liver  -Resolved     #Hyperlipidemia  -statin    #DM type  II  -hyperglycemia protocol    #Prior CVA-chronic left sided weakness  #Seizures  -keppra    #A.fib  -eliquis/BB    Rafa Chandler,       Supplementary Documentation:     Quality:  DVT Mechanical Prophylaxis:   SCDs,    DVT Pharmacologic Prophylaxis   Medication    apixaban (Eliquis) tab 5 mg                Code Status: DNAR/Full Treatment  Han: External urinary catheter in place  Han Duration (in days):   Central line:    DEEDEE:     Discharge is dependent on: progress  At this point Ms. Kim is expected to be discharge to: TBD    The 21st Century Cures Act makes medical notes like these available to patients in the interest of transparency. Please be advised this is a medical document. Medical documents are intended to carry relevant information, facts as evident, and the clinical opinion of the practitioner. The medical note is intended as peer to peer communication and may appear blunt or direct. It is written in medical language and may contain abbreviations or verbiage that are unfamiliar.

## 2024-12-12 NOTE — PLAN OF CARE
Problem: RESPIRATORY - ADULT  Goal: Achieves optimal ventilation and oxygenation  Description: INTERVENTIONS:  - Assess for changes in respiratory status  - Assess for changes in mentation and behavior  - Position to facilitate oxygenation and minimize respiratory effort  - Oxygen supplementation based on oxygen saturation or ABGs  - Provide Smoking Cessation handout, if applicable  - Encourage broncho-pulmonary hygiene including cough, deep breathe, Incentive Spirometry  - Assess the need for suctioning and perform as needed  - Assess and instruct to report SOB or any respiratory difficulty  - Respiratory Therapy support as indicated  - Manage/alleviate anxiety  - Monitor for signs/symptoms of CO2 retention  Outcome: Progressing     Problem: Diabetes/Glucose Control  Goal: Glucose maintained within prescribed range  Description: INTERVENTIONS:  - Monitor Blood Glucose as ordered  - Assess for signs and symptoms of hyperglycemia and hypoglycemia  - Administer ordered medications to maintain glucose within target range  - Assess barriers to adequate nutritional intake and initiate nutrition consult as needed  - Instruct patient on self management of diabetes  Outcome: Progressing

## 2024-12-12 NOTE — PLAN OF CARE
A/o x3 can be forgetful of situation at times. Have had to increase oxygen from 2L to 5L. Pt tachypneic denies CP. Tele: afib with RVR confirmed with EKG.MD made aware of all. Lopressor x1, Cardizem x1, Stat CXR and Abgs ordered. MD at bedside to assess pt. IVF infusing. POC TBD pending CXR and Abg results.

## 2024-12-12 NOTE — DIETARY NOTE
[facility]   part of [facility]  NUTRITION ASSESSMENT    Unable to diagnose malnutrition criteria at this time.    NUTRITION INTERVENTION:    Meal and Snacks - monitor patient po intake. Encourage adequate po of appropriate diet. Pureed w/ Thin Liquids per SLP.  Medical Food Supplements - Ben Ensure Plus High Protein BID; at breakfast and lunch and ben Magic Cup at dinner (provides: 990 kcal, 49 gm protein).  Vitamin and Mineral Supplements - continue Multivitamin with minerals  Coordination of Nutrition Care - Recommend SLP consult prior to diet advancement.    PATIENT STATUS:     12/12- Pt sound asleep at time of visit. Pt developed hypoxia over night. On 2 L NC. SLP re-evaluated pt w/ continued diet recs for Puree with Thin Liquids. Spoke w/ PCT who noted pt w/ poor PO intakes. Only took a few bites of oatmeal and a couple sips of milk for breakfast this AM. Did not drink Ensure. Pt stated she was not hungry for lunch yet. Recorded PO intakes vary:0-75% w/ 50% or less most meals. ONS documented at 50%. Wt appears to be trending up. Receiving Lasix. Palliative Care is following for ongoing GOC discussions. If PO intakes do not improve, may need to consider EN if aggressive measures wish to be taken.     [birth year]-74 year old female admitted on 12/5 presents with hypotension. Pt screened d/t MST score 2. Visited pt at bedside. Pt very lethargic during visit and unable to provide any meaningful history. Requiring levophed. Per chart review, no recent wt loss recorded. SLP evaluated this AM d/t hx stroke - recommends pureed solids and thin liquids with 1:1 feeding support. No GI symptoms noted with last BM today. NKFA. No noticeable muscle wasting or fat loss. Offered ONS and she is agreeable to chocolate. Will continue to monitor and follow up as appropriate.    PMH: Anxiety, Atherosclerosis, COPD, HEART ATTACK, High cholesterol, depression, Paroxysmal atrial fibrillation, Peripheral vascular disease,  Seizure, Stroke, Type II DM, essential hypertension, sleep apnea    ANTHROPOMETRICS:  Ht: 165.1 cm (5' 5\")  Wt: 70.8 kg (156 lb 1.4 oz).   BMI: Body mass index is 25.97 kg/m².  IBW: 56.8 kg    WEIGHT HISTORY:  Patient Weight(s) for the past 336 hrs:   Weight   12/10/24 0500 70.8 kg (156 lb 1.4 oz)   12/09/24 0200 70.4 kg (155 lb 3.3 oz)   12/08/24 0400 68.5 kg (151 lb 0.2 oz)   12/07/24 0600 70.5 kg (155 lb 6.8 oz)   12/06/24 0508 68.8 kg (151 lb 10.8 oz)   12/05/24 2327 68 kg (149 lb 14.6 oz)       Wt Readings from Last 10 Encounters:   12/10/24 70.8 kg (156 lb 1.4 oz)   01/11/23 68 kg (150 lb)   10/25/22 68.7 kg (151 lb 8 oz)   06/22/22 78.4 kg (172 lb 13.5 oz)   09/13/21 70.3 kg (155 lb)   07/01/20 70.3 kg (155 lb)   06/12/20 64 kg (141 lb)   10/10/19 64 kg (141 lb)   06/27/19 64 kg (141 lb)   06/16/19 64.4 kg (141 lb 14.4 oz)        NUTRITION:  Diet:       Procedures    Carbohydrate controlled diet 1800 kcal/60 grams; Fluid Consistency: Thin Liquids ; Texture Consistency: Pureed; Is Patient on Accuchecks? Yes; Is Patient on Suicide Precautions? No      Food Allergies: No  Cultural/Ethnic/Sikh Preferences Addressed: Yes    Percent Meals Eaten (last 3 days)       Date/Time Percent Meals Eaten (%)    12/09/24 0900 --     Percent Meals Eaten (%): had 2 bites of Kinyarwanda toast. at 12/09/24 0900    12/09/24 1200 --     Percent Meals Eaten (%): refused at 12/09/24 1200    12/09/24 1830 20 %    12/10/24 0900 60 %    12/10/24 1237 20 %    12/10/24 1830 75 %    12/11/24 1030 10 %    12/11/24 1400 25 %          GI SYSTEM REVIEW: Last BM: 12/11  Skin/Wounds: redness to sacrum    NUTRITION RELATED PHYSICAL FINDINGS:     1. Body Fat/Muscle Mass:  no wasting noted     2. Fluid Accumulation: none per RN documentation    NUTRITION PRESCRIPTION:  68.5 kg-151 lb (12/8)- Actual Body Weight  Calories: 9411-3808 calories/day (25-30 kcal/kg)  Protein: 70-90 grams protein/day ( 1.0-1.3  gm/kg)  Fluid: ~1 ml/kcal or per MD  discretion    NUTRITION DIAGNOSIS/PROBLEM:  Inadequate oral intake related to insufficient appetite resulting in inadequate nutrition intake as evidenced by documented/reported insufficient oral intake (continues).    MONITOR AND EVALUATE/NUTRITION GOALS:  PO intake of 75% of meals TID - Not met, Continues  PO intake of 75% of oral nutrition supplement/s - Not met, Continues  Weight stable within 1 to 2 lbs during admission - Ongoing      MEDICATIONS:  IV abx, Lasix, Keppra, MVI, Kcl:20 meq, Miralax, Senokot    LABS:  POC Glu:103-146, Glu:106, K+:3.7, BUN:36, Cr:2.05, GFR:25    Pt is at High nutrition risk    Trish Fernando MS, RD, LDN  Clinical Dietitian  Ext:[phone]

## 2024-12-12 NOTE — PROGRESS NOTES
[facility]  Nephrology Progress Note    Carla Kim Attending:  Chitra Molina MD       Assessment and Plan:    1) TIP- due to septic shock / dehydration; imaging without obstruction / meds benign. Improving    2) CKD 3- baseline Cr < 1.5 mg/dl due to DM / HTN    3) Septic shock / Klebsiella urosepsis- in setting of chronic Han; CT noted- on ancef     4) Transaminitis + coagulopathy- c/w shock liver; improving rapidly    5) h/o CVA with L sided weakness + Sz on keppra    6) HFrEF / ischemic CM EF 45%- hypoxic overnight with pulm vasc congestion -> IVF dc'ed / redose IV lasix    7) HTN- normally on amlodipine / losartan / prn POWERS (all held); on low dose coreg    8) DM 2      Subjective:  Awake looks about the same; O2 sats dropped overnight + AF / RVR    Physical Exam:   /72 (BP Location: Right arm)   Pulse 108   Temp 97.3 °F (36.3 °C) (Oral)   Resp 18   Ht 5' 5\" (1.651 m)   Wt 156 lb 1.4 oz (70.8 kg)   SpO2 92%   BMI 25.97 kg/m²   Temp (24hrs), Av.7 °F (36.5 °C), Min:97.3 °F (36.3 °C), Max:98.2 °F (36.8 °C)       Intake/Output Summary (Last 24 hours) at 2024 0836  Last data filed at 2024 0623  Gross per 24 hour   Intake 240 ml   Output 2275 ml   Net -2035 ml     Wt Readings from Last 3 Encounters:   12/10/24 156 lb 1.4 oz (70.8 kg)   23 150 lb (68 kg)   10/25/22 151 lb 8 oz (68.7 kg)     General: awake   HEENT: No scleral icterus, MMM  Neck: Supple, no MEGHANA or thyromegaly  Cardiac: Regular rate and rhythm, S1, S2 normal, no murmur or tub  Lungs: Decreased BS at bases bilaterally   Abdomen: Soft, non-tender. + bowel sounds, no palpable organomegaly  Extremities: Without clubbing, cyanosis; no edema  Neurologic: Cranial nerves grossly intact, moving all extremities  Skin: Warm and dry, no rashes       Labs:   Lab Results   Component Value Date    CREATSERUM 2.05 2024    BUN 36 2024     2024    K 3.7 2024     2024    CO2 22.0  12/12/2024     12/12/2024    CA 8.4 12/12/2024    ALB 3.1 12/12/2024    ALKPHO 70 12/12/2024    BILT 0.6 12/12/2024    TP 6.0 12/12/2024     12/12/2024    ALT 13 12/12/2024    PGLU 103 12/12/2024       Imaging:  All imaging studies reviewed.    Meds:   Current Facility-Administered Medications   Medication Dose Route Frequency    sodium chloride 3 % nebulizer solution 3 mL  3 mL Nebulization TID    ceFAZolin (Ancef) 1 g in dextrose 5% 100mL IVPB-ADD  1 g Intravenous Q12H    sennosides (Senokot) tab 8.6 mg  8.6 mg Oral Nightly    polyethylene glycol (PEG 3350) (Miralax) 17 g oral packet 17 g  17 g Oral Daily    carvedilol (Coreg) tab 3.125 mg  3.125 mg Oral BID with meals    apixaban (Eliquis) tab 5 mg  5 mg Oral BID    sertraline (Zoloft) tab 100 mg  100 mg Oral Daily    acetaminophen (Tylenol Extra Strength) tab 500 mg  500 mg Oral Q4H PRN    melatonin tab 3 mg  3 mg Oral Nightly PRN    polyethylene glycol (PEG 3350) (Miralax) 17 g oral packet 17 g  17 g Oral Daily PRN    sennosides (Senokot) tab 17.2 mg  17.2 mg Oral Nightly PRN    bisacodyl (Dulcolax) 10 MG rectal suppository 10 mg  10 mg Rectal Daily PRN    fleet enema (Fleet) rectal enema 133 mL  1 enema Rectal Once PRN    glucose (Dex4) 15 GM/59ML oral liquid 15 g  15 g Oral Q15 Min PRN    Or    glucose (Glutose) 40% oral gel 15 g  15 g Oral Q15 Min PRN    Or    glucose-vitamin C (Dex-4) chewable tab 4 tablet  4 tablet Oral Q15 Min PRN    Or    dextrose 50% injection 50 mL  50 mL Intravenous Q15 Min PRN    Or    glucose (Dex4) 15 GM/59ML oral liquid 30 g  30 g Oral Q15 Min PRN    Or    glucose (Glutose) 40% oral gel 30 g  30 g Oral Q15 Min PRN    Or    glucose-vitamin C (Dex-4) chewable tab 8 tablet  8 tablet Oral Q15 Min PRN    insulin aspart (NovoLOG) 100 Units/mL FlexPen 2-10 Units  2-10 Units Subcutaneous TID AC and HS    clopidogrel (Plavix) tab 75 mg  75 mg Oral Daily    levETIRAcetam (Keppra) tab 500 mg  500 mg Oral BID    influenza virus  trivalent high dose PF (Fluzone HD) 0.5 mL injection (ages >/= 65 years) 0.5 mL  0.5 mL Intramuscular Prior to discharge    baclofen (Lioresal) tab 5 mg  5 mg Oral BID    multivitamin (Tab-A-Odilon/Beta Carotene) tab 1 tablet  1 tablet Oral Daily         Questions/concerns were discussed with patient and/or family by bedside.          Vicky Perez MD  12/12/2024  836 AM

## 2024-12-12 NOTE — PROGRESS NOTES
Alert and oriented x 3, can be forgetful.  Patient with left facial droop and left sided weakness r/t old CVA.  Received with oxygen on at 2L/NC. Saturation at 89-91%, Telemetry with Afib reading. Shortness of breath noted when being turned and repositioned, lungs with decreased breath sounds on both bases.  Dr. Chandler and Dr. Perez seen and examined patient this morning, order received to given dose of Lasix 20 mg IV x 1 dose.  Will continue to monitor.

## 2024-12-13 ENCOUNTER — APPOINTMENT (OUTPATIENT)
Dept: GENERAL RADIOLOGY | Facility: HOSPITAL | Age: 74
End: 2024-12-13
Attending: HOSPITALIST
Payer: MEDICARE

## 2024-12-13 PROBLEM — N18.31 CKD STAGE 3A, GFR 45-59 ML/MIN (HCC): Status: ACTIVE | Noted: 2019-03-04

## 2024-12-13 NOTE — SLP NOTE
Chief Complaint  Establish Care (Last PCP with 2024 private practice ), ADHD (Out of state - 2020 referral to med [facility] ), Anxiety (Private practice ), Rash (Skin irritation with cuts on ears, bumps on back), Migraine, and Foot Pain (Previous plantar warts - referral to podiatry )     Subjective:      History of Present Illness {CC  Problem List  Visit  Diagnosis   Encounters  Notes  Medications  Labs  Result Review Imaging  Media :23}     Estella Reed presents to [facility] for:    Patient or patient representative verbalized consent for the use of Ambient Listening during the visit with  AMARI Varma for chart documentation. 12/13/2024  13:48 EST     History of Present Illness          The patient is a 28-year-old female who presents for evaluation of ADHD, anxiety, rash on her back, migraines, plantar wart, and anemia.    She has been diagnosed with ADHD since the age of 5 and has been under the care of [facility] for this condition. She prefers telehealth consultations.    She was introduced to the concept of anxiety post-college graduation, during which she experienced a breakdown. She reports engaging in unusual behaviors such as picking at her fingers and other body parts, which are exacerbated by her anxiety. However, She does not engage in hair pulling. She has not sought pharmacological treatment for her anxiety. She uses marijuana to manage her anxiety and vapes nicotine. She has tried CBD oil without success.    She has been experiencing non-itchy rashes on her back for several months, which occasionally become itchy and present as bumps. She suspects these rashes may be related to her IUD. She has not used any topical treatments for these rashes. She engages in physical activities that induce sweating and heat on her back. She also drives hot cars. She has a known allergy to certain types of toilet paper, which cause skin breakouts. She has a  Order received for swallow eval.  Spoke with RN, Steve Matson. Pt reportedly exhibited stroke symptoms this am (slurred speech and left weakness). Pt currently in xray dept for stat CT. SLP will f/u when pt returns to unit as appropriate. Mao Lovell, history of allergies but has not undergone allergy testing. She does not have eczema.    She experiences migraines, which were previously undiagnosed due to their infrequency. These migraines are severe enough to cause vomiting and are accompanied by light sensitivity. She has not taken any migraine-specific medications in the past. She reports that ibuprofen induces vomiting. She has not tried Excedrin or Tylenol Extra Strength. She manages her migraines by sleeping in a dark room.    She has a plantar wart on her right foot, for which she has attempted over-the-counter treatments and freezing therapy. However, her insurance coverage for the freezing therapy lapsed, leading to an infection that required emergency room treatment. The wart has since recurred.    She has a history of iron deficiency anemia, which was managed by her OB-GYN. She reports that her IUD has significantly improved her condition, although it has not addressed her iron deficiency. She recalls a period of prolonged bleeding lasting 9 months following the initiation of birth control, during which her hemoglobin levels dropped to 4. She continued to engage in physical activities such as basketball during this period. She was prescribed postpartum pills and depot shots, and experienced episodes of heavy bleeding with large blood clots. She has received iron infusions in the past. She frequently feels cold and requires a space heater or car heater for comfort.    SOCIAL HISTORY  The patient does not smoke but admits to smoking weed for anxiety. She does not use other drugs. She vapes nicotine. She works as a teacher.    FAMILY HISTORY  The patient's mother had postpartum depression and pancreatic cancer. Her maternal grandfather had a stroke due to blood clots. Her great-grandmother also had pancreatic cancer. No family history of cardiac issues.    ALLERGIES  The patient is allergic to BAD TOILET PAPER.    IMMUNIZATIONS  The patient is due for a  Tdap vaccine in .     Past Medical History:   Diagnosis Date    Anemia     receives iron infusions    Arthritis     knees    Asthma     inhalers     Family History   Problem Relation Age of Onset    Mental illness Mother         post par    Cancer Maternal Grandmother         pancreatic    Stroke Maternal Grandfather     Malig Hyperthermia Neg Hx      Social History     Tobacco Use   Smoking Status Former    Types: Cigars    Start date: 2018    Quit date: 2019    Years since quittin.0    Passive exposure: Past   Smokeless Tobacco Never   Tobacco Comments    tobacco product     Social History     Substance and Sexual Activity   Alcohol Use Not Currently    Comment: 2 monthly         I have reviewed patient's medical history, any new submitted information provided by patient or medical assistant and updated medical record.      Objective:      Physical Exam  Vitals reviewed.   Constitutional:       General: She is awake. She is not in acute distress.     Appearance: Normal appearance. She is well-groomed. She is not ill-appearing, toxic-appearing or diaphoretic.   HENT:      Head: Normocephalic.      Right Ear: Hearing normal.      Left Ear: Hearing normal.      Nose: Nose normal.      Mouth/Throat:      Lips: Pink.      Mouth: Mucous membranes are moist.   Eyes:      General: Lids are normal. Vision grossly intact.      Conjunctiva/sclera: Conjunctivae normal.   Cardiovascular:      Rate and Rhythm: Normal rate and regular rhythm.      Pulses: Normal pulses.      Heart sounds: Normal heart sounds, S1 normal and S2 normal.   Pulmonary:      Effort: Pulmonary effort is normal.      Breath sounds: Normal breath sounds.   Musculoskeletal:      Cervical back: Full passive range of motion without pain.      Right lower leg: No edema.      Left lower leg: No edema.   Skin:     General: Skin is warm and dry.      Capillary Refill: Capillary refill takes less than 2 seconds.      Findings: Acne and rash  "present. No abrasion, abscess, bruising, burn, erythema, laceration, lesion, petechiae or wound. Rash is papular and vesicular. Rash is not crusting, macular, nodular, purpuric, pustular, scaling or urticarial.          Neurological:      General: No focal deficit present.      Mental Status: She is alert and oriented to person, place, and time. Mental status is at baseline.      Motor: Motor function is intact.      Coordination: Coordination is intact.      Gait: Gait is intact.   Psychiatric:         Attention and Perception: Attention and perception normal.         Mood and Affect: Mood and affect normal.         Speech: Speech normal.         Behavior: Behavior normal. Behavior is cooperative.         Cognition and Memory: Cognition and memory normal.         Judgment: Judgment normal.        Result Review  Data Reviewed:{ Labs  Result Review  Imaging  Med Tab  Media :23}     Results                    Vital Signs:   /82 (BP Location: Left arm, Patient Position: Sitting, Cuff Size: Adult)   Pulse 53   Temp 96.9 °F (36.1 °C) (Temporal)   Ht 175.3 cm (69\")   Wt 92.1 kg (203 lb)   SpO2 98%   BMI 29.98 kg/m²                  Requested Prescriptions      No prescriptions requested or ordered in this encounter       Routine medications provided by this office will also be refilled via pharmacy request.       Current Outpatient Medications:     levonorgestrel (MIRENA) 20 MCG/24HR IUD, to be inserted one time by prescriber, route intrauterine, Disp: 1 each, Rfl: 0    meloxicam (MOBIC) 15 MG tablet, Take 1 tablet by mouth Daily., Disp: , Rfl:     metroNIDAZOLE (METROGEL) 0.75 % vaginal gel, INSERT 1 APPLICATORFUL VAGINALLY EVERY NIGHT FOR 5 DAYS, Disp: , Rfl:      Assessment and Plan:      Assessment and Plan {CC Problem List  Visit Diagnosis  ROS  Review (Popup)  [facility] Maintenance  Quality  BestPractice  Medications  SmartSets  SnapShot Encounters  Media :23}     Problem List Items " Addressed This Visit    None           1. Attention Deficit Hyperactivity Disorder (ADHD).  A referral to psychiatry will be made for the management of ADHD.    2. Anxiety.  A referral to psychiatry will be made for the management of anxiety.    3. Rash on her back.  The rash appears to be consistent with a heat rash or potentially hives induced by anxiety. A prescription for Atarax 25 mg, to be taken up to three times daily, will be provided. She is advised to start with one tablet due to its sedative effects. If the itching persists, she may increase the dosage to 50 mg. In case of severe itching, she can take Pepcid over-the-counter in conjunction with an antihistamine. She is also advised to monitor her use of lotions and detergents, and consider switching to a sensitive skin detergent. A referral to dermatology will be made for further evaluation.    4. Migraines.  A prescription for Imitrex 50 mg, to be taken once at the onset of a migraine, will be provided. She is advised not to exceed one dose within a 24-hour period. A referral to neurology will be made for further evaluation.    5. Plantar wart on her right foot.  A referral to podiatry will be made for further evaluation and treatment of the plantar wart.    6. Anemia.  Lab work will be ordered today to assess her iron levels and stores. A discussion regarding new iron medications on the market will be held if necessary.    7. Health Maintenance.  Lab work will be ordered today, including a CBC, CMP, cholesterol panel, and hepatitis C screening. She is due for a Tdap vaccine in 2026. An annual physical exam will be scheduled for a future date.     Today we have placed a referral or ordered further testing:     A team member from [facility] will contact you within the next 3-5 business days to schedule your tests or referral.    If you have not heard them within this time frame, they can be contacted at [phone]    If it was an  outside referral: contact our office if you have not been contacted for appointment after 7-10 business days.      Please review added information under the Patient Instructions portion of your print out.    Thank you for allowing us to care for you,  AMARI Varma    Follow Up {Instructions Charge Capture  Follow-up Communications :23}     No follow-ups on file.      Patient was given instructions and counseling regarding her condition or for health maintenance advice. Please see specific information pulled into the AVS if appropriate.        Dragon disclaimer:   Much of this encounter note is an electronic transcription/translation of spoken language to printed text. The electronic translation of spoken language may permit erroneous, or at times, nonsensical words or phrases to be inadvertently transcribed; Although I have reviewed the note for such errors, some may still exist.     Additional Patient Counseling:       There are no Patient Instructions on file for this visit.

## 2024-12-13 NOTE — PROGRESS NOTES
More alert and coherent this morning.  Has 25% of her pureed oatmeal and finish a carton of milk and Ensure for breakfast. Occasionally coughing with thin liquids.  Speech therapist saw patient, plan for Video swallow today.

## 2024-12-13 NOTE — PROGRESS NOTES
[facility]   part of [facility]     Hospitalist Progress Note     Carla Kim Patient Status:  Inpatient    1950 MRN [id number]   Location [facility] 4SW-A Attending Chitra Molina MD   Hosp Day # 7 PCP No primary care provider on file.     Chief Complaint: septic shock    Subjective:     Patient seen and examined. Denies CP/SOB/N/V.     Objective:    Review of Systems:   A comprehensive review of systems was completed; pertinent positive and negatives stated in subjective.    Vital signs:  Temp:  [98.2 °F (36.8 °C)-99.2 °F (37.3 °C)] 98.3 °F (36.8 °C)  Pulse:  [] 97  Resp:  [18-20] 18  BP: ()/(36-73) 108/53  SpO2:  [86 %-92 %] 90 %    Physical Exam:    General: No acute distress  Respiratory: Diminished bilaterally.   Cardiovascular: S1, S2, regular rate and rhythm  Abdomen: Soft, Non-tender, non-distended, positive bowel sounds  Extremities: No edema      Diagnostic Data:    Labs:  Recent Labs   Lab 24  0637 24  0543 24  0323 12/10/24  0505 24  0454   WBC 7.1 6.2 6.4 7.3 7.1   HGB 11.7* 11.6* 10.4* 10.1* 10.0*   MCV 83.8 85.3 87.0 88.5 87.1   .0* 121.0* 123.0* 134.0* 145.0*   INR 2.15*  --   --   --  1.62*       Recent Labs   Lab 12/10/24  0505 24  0454 24  0500 24  0548   * 108* 106* 122*   BUN 45* 39* 36* 37*   CREATSERUM 2.55* 2.11* 2.05* 1.93*   CA 8.2* 8.2* 8.4* 8.7   ALB 3.1* 2.9* 3.1*  --     137 141 142   K 4.8 3.6 3.7 3.3*    105 107 107   CO2 25.0 22.0 22.0 24.0   ALKPHO 74 68 70  --    * 143* 120*  --    * 31 13  --    BILT 0.5 0.5 0.6  --    TP 5.8 6.2 6.0  --        Estimated Creatinine Clearance: 22.7 mL/min (A) (based on SCr of 1.93 mg/dL (H)).    No results for input(s): \"TROP\", \"TROPHS\", \"CK\" in the last 168 hours.      Recent Labs   Lab 24  0637 24  0454   PTP 24.2* 19.4*   INR 2.15* 1.62*                  Microbiology    Hospital Encounter on 24   1. Blood  Culture     Status: None    Collection Time: 12/06/24 12:10 AM    Specimen: Blood,peripheral   Result Value Ref Range    Blood Culture Result No Growth 5 Days N/A   2. Urine Culture, Routine     Status: Abnormal    Collection Time: 12/06/24 12:06 AM    Specimen: Urine, clean catch   Result Value Ref Range    Urine Culture >100,000 CFU/ML Klebsiella pneumoniae (A) N/A       Susceptibility    Klebsiella pneumoniae -  (no method available)     Ampicillin  Resistant      Ampicillin + Sulbactam 4 Sensitive      Cefazolin <=4 Sensitive      Ciprofloxacin <=0.25 Sensitive      Gentamicin <=1 Sensitive      Meropenem <=0.25 Sensitive      Levofloxacin 0.25 Sensitive      Nitrofurantoin 64 Intermediate      Piperacillin + Tazobactam <=4 Sensitive      Trimethoprim/Sulfa <=20 Sensitive          Imaging: Reviewed in Epic.    Medications:    sodium chloride  3 mL Nebulization TID    sennosides  8.6 mg Oral Nightly    polyethylene glycol (PEG 3350)  17 g Oral Daily    carvedilol  3.125 mg Oral BID with meals    apixaban  5 mg Oral BID    sertraline  100 mg Oral Daily    insulin aspart  2-10 Units Subcutaneous TID AC and HS    clopidogrel  75 mg Oral Daily    levETIRAcetam  500 mg Oral BID    baclofen  5 mg Oral BID    multivitamin  1 tablet Oral Daily       Assessment & Plan:      #Septic shock   #UTI in setting of chronic tate -POA  -Shock resolved, off pressors  -continue Abx  -Cx with klebsiella    #TIP on CKD  -TIP improving  -Nephrology following     #Non-cardiogenic acute pulmonary edema in setting of chronic systolic heart failure  -Likely due to IVF administration - now stopped  -Diuresis per renal     #Dysphagia  -VFSS today     #Normocytic anemia  #Thrombocytopenia  -Monitor     #Depression  -SSRI    #Transaminitis  -due to shock liver  -Resolved     #Hyperlipidemia  -statin    #DM type II  -hyperglycemia protocol    #Prior CVA-chronic left sided weakness  #Seizures  -keppra    #A.fib  -eliquis/BB    Rafa Chandler  DO      Supplementary Documentation:     Quality:  DVT Mechanical Prophylaxis:   SCDs,    DVT Pharmacologic Prophylaxis   Medication    apixaban (Eliquis) tab 5 mg                Code Status: DNAR/Full Treatment  Han: External urinary catheter in place  Han Duration (in days):   Central line:    DEEDEE:     Discharge is dependent on: progress  At this point Ms. Kim is expected to be discharge to: TBD    The 21st Century Cures Act makes medical notes like these available to patients in the interest of transparency. Please be advised this is a medical document. Medical documents are intended to carry relevant information, facts as evident, and the clinical opinion of the practitioner. The medical note is intended as peer to peer communication and may appear blunt or direct. It is written in medical language and may contain abbreviations or verbiage that are unfamiliar.

## 2024-12-13 NOTE — SLP NOTE
SPEECH DAILY NOTE - INPATIENT    ASSESSMENT & PLAN   ASSESSMENT  Pt seen for dysphagia tx to assess tolerance with recommended diet, ensure proper utilization of aspiration precautions and provide pt/family education. Patient with breakfast tray. Remains on 2-3 L O2 via NC.  Noted congested cough with intermittent prandial cough with PO intake.  Discussed VFSS with patient and rationale to r/o aspiration.  Patient expressed understanding and agreement.  D/W RN.  Will schedule VFSS for today to further assess oropharyngeal swallow and r/o aspiration.        Diet Recommendations - Solids: Puree  Diet Recommendations - Liquids: Thin Liquids  Compensatory Strategies Recommended: Alternate consistencies  Aspiration Precautions: Upright position;Slow rate;Small bites;Small sips;Cueing to swallow;1:1 feeding  Medication Administration Recommendations: Crushed in puree    Patient Experiencing Pain: No                Treatment Plan  Treatment Plan/Recommendations: Videofluoroscopic swallow study    Interdisciplinary Communication: Discussed with RN            GOALS  Goal #1 The patient will tolerate puree consistency and thin liquids without overt signs or symptoms of aspiration with 90 % accuracy over 3-5 session(s). Revised   Goal #2 The patient/family/caregiver will demonstrate understanding and implementation of aspiration precautions and swallow strategies independently over 3-5 session(s).    Revised   Goal #3 Assess need for VFSS within 1-2 visits MET   Goal #4 Complete VFSS to further assess oropharyngeal swallow and assist with dysphagia recommendations NEW       FOLLOW UP  Follow Up Needed (Documentation Required): Yes  SLP Follow-up Date: 12/13/24       Session: 4    If you have any questions, please contact ED Ledbetter, MS CCC-SLP/L, pager [phone]  Speech-LanguagePathologist

## 2024-12-13 NOTE — PROGRESS NOTES
[facility]   part of [facility]     Nephrology Progress Note    Carla Kim Patient Status:  Inpatient    1950 MRN [id number]   Location [facility] 3SW-A Attending Rafa Chandler,    Hosp Day # 7 PCP No primary care provider on file.       SUBJECTIVE:  S/p video swallow, cont to require O2        Physical Exam:   /53 (BP Location: Left arm)   Pulse 97   Temp 98.3 °F (36.8 °C) (Axillary)   Resp 18   Ht 5' 5\" (1.651 m)   Wt 156 lb 1.4 oz (70.8 kg)   SpO2 90%   BMI 25.97 kg/m²   Temp (24hrs), Av.5 °F (36.9 °C), Min:98.2 °F (36.8 °C), Max:99.2 °F (37.3 °C)       Intake/Output Summary (Last 24 hours) at 2024 1052  Last data filed at 2024 0918  Gross per 24 hour   Intake 360 ml   Output 2300 ml   Net -1940 ml     Last 3 Weights   12/10/24 0500 156 lb 1.4 oz (70.8 kg)   24 0200 155 lb 3.3 oz (70.4 kg)   24 0400 151 lb 0.2 oz (68.5 kg)   24 0600 155 lb 6.8 oz (70.5 kg)   24 0508 151 lb 10.8 oz (68.8 kg)   24 2327 149 lb 14.6 oz (68 kg)   23 0952 150 lb (68 kg)   10/25/22 0524 151 lb 8 oz (68.7 kg)   10/24/22 0623 156 lb 1.6 oz (70.8 kg)   10/23/22 0300 151 lb 14.4 oz (68.9 kg)   10/21/22 2108 149 lb 7.6 oz (67.8 kg)   22 2228 172 lb 13.5 oz (78.4 kg)   22 1604 154 lb 5.2 oz (70 kg)   21 1402 155 lb (70.3 kg)     General: Alert and in no apparent distress.  HEENT: No scleral icterus, MMM  Neck: Supple, no MEGHANA or thyromegaly  Cardiac: Regular rate and rhythm, S1, S2 normal, no murmur or rub  Lungs: decr BS bases B    Abdomen: Soft, non-tender. + bowel sounds, no palpable organomegaly  Extremities: Without clubbing, cyanosis or edema.  Neurologic:  moving all extremities  Skin: Warm and dry, no rash        Labs:     Recent Labs   Lab 24  0637 24  0543 24  0323 12/10/24  0505 24  0454   WBC 7.1 6.2 6.4 7.3 7.1   HGB 11.7* 11.6* 10.4* 10.1* 10.0*   MCV 83.8 85.3 87.0 88.5 87.1   .0* 121.0*  123.0* 134.0* 145.0*   INR 2.15*  --   --   --  1.62*       Recent Labs   Lab 12/07/24  0637 12/07/24  1904 12/08/24  0543 12/09/24  0323 12/10/24  0505 12/11/24  0454 12/12/24  0500 12/13/24  0548      < > 146* 140 137 137 141 142   K 4.3   < > 4.1 4.0 4.8 3.6 3.7 3.3*      < > 109 109 107 105 107 107   CO2 26.0   < > 27.0 24.0 25.0 22.0 22.0 24.0   BUN 66*   < > 58* 47* 45* 39* 36* 37*   CREATSERUM 4.10*   < > 3.73* 3.09* 2.55* 2.11* 2.05* 1.93*   CA 8.0*   < > 8.4* 8.2* 8.2* 8.2* 8.4* 8.7   MG 1.7  --  1.6 2.0  --   --   --   --    PHOS 3.3  --   --   --   --   --   --   --    *   < > 115* 107* 102* 108* 106* 122*    < > = values in this interval not displayed.       Recent Labs   Lab 12/07/24  0637 12/09/24  0323 12/10/24  0505 12/11/24  0454 12/12/24  0500   ALT 1,106* 568* 133* 31 13   AST 1,136* 550* 272* 143* 120*   ALB 3.0* 2.9* 3.1* 2.9* 3.1*       Recent Labs   Lab 12/12/24  0526 12/12/24  1205 12/12/24  1753 12/12/24  2132 12/13/24  0538   PGLU 103* 146* 193* 120* 124*       Meds:   Current Facility-Administered Medications   Medication Dose Route Frequency    sodium chloride 3 % nebulizer solution 3 mL  3 mL Nebulization TID    sennosides (Senokot) tab 8.6 mg  8.6 mg Oral Nightly    polyethylene glycol (PEG 3350) (Miralax) 17 g oral packet 17 g  17 g Oral Daily    carvedilol (Coreg) tab 3.125 mg  3.125 mg Oral BID with meals    apixaban (Eliquis) tab 5 mg  5 mg Oral BID    sertraline (Zoloft) tab 100 mg  100 mg Oral Daily    acetaminophen (Tylenol Extra Strength) tab 500 mg  500 mg Oral Q4H PRN    melatonin tab 3 mg  3 mg Oral Nightly PRN    polyethylene glycol (PEG 3350) (Miralax) 17 g oral packet 17 g  17 g Oral Daily PRN    sennosides (Senokot) tab 17.2 mg  17.2 mg Oral Nightly PRN    bisacodyl (Dulcolax) 10 MG rectal suppository 10 mg  10 mg Rectal Daily PRN    fleet enema (Fleet) rectal enema 133 mL  1 enema Rectal Once PRN    glucose (Dex4) 15 GM/59ML oral liquid 15 g  15 g Oral  Q15 Min PRN    Or    glucose (Glutose) 40% oral gel 15 g  15 g Oral Q15 Min PRN    Or    glucose-vitamin C (Dex-4) chewable tab 4 tablet  4 tablet Oral Q15 Min PRN    Or    dextrose 50% injection 50 mL  50 mL Intravenous Q15 Min PRN    Or    glucose (Dex4) 15 GM/59ML oral liquid 30 g  30 g Oral Q15 Min PRN    Or    glucose (Glutose) 40% oral gel 30 g  30 g Oral Q15 Min PRN    Or    glucose-vitamin C (Dex-4) chewable tab 8 tablet  8 tablet Oral Q15 Min PRN    insulin aspart (NovoLOG) 100 Units/mL FlexPen 2-10 Units  2-10 Units Subcutaneous TID AC and HS    clopidogrel (Plavix) tab 75 mg  75 mg Oral Daily    levETIRAcetam (Keppra) tab 500 mg  500 mg Oral BID    influenza virus trivalent high dose PF (Fluzone HD) 0.5 mL injection (ages >/= 65 years) 0.5 mL  0.5 mL Intramuscular Prior to discharge    baclofen (Lioresal) tab 5 mg  5 mg Oral BID    multivitamin (Tab-A-Odilon/Beta Carotene) tab 1 tablet  1 tablet Oral Daily         Impression/Plan:      1) TIP- due to septic shock / dehydration; imaging without obstruction / meds benign. Improving steadily     2) CKD 3- baseline Cr approx 1.5 mg/dl due to DM / HTN     3) Septic shock / Klebsiella urosepsis- in setting of chronic Han; doing well overall.  on ancef      4) Transaminitis + coagulopathy- c/w shock liver; improving rapidly     5) h/o CVA with L sided weakness + Sz on keppra     6) HFrEF / ischemic CM EF 45%- diuretics prn (does not appear that she was on a loop diuretic pta).  IV lasix again today     7) HTN- normally on amlodipine / losartan / prn POWERS (all held); on low dose coreg       Questions/concerns were discussed with patient and/or family by bedside.          Ankit Werner MD  12/13/2024  10:52 AM

## 2024-12-13 NOTE — PLAN OF CARE
Alert and Oriented x4. On RA. VSS. Tele-Afib. Patient presents with residual Lt sided facial droop and left sided weakness from hx of cva. Denies pain at this time. Denies N/T. Voiding freely. Tolerating diet. Denies N/V. Call light within reach at this time.    Plan: Monitoring labs, wean off O2

## 2024-12-13 NOTE — VIDEO SWALLOW STUDY NOTE
ADULT VIDEOFLUOROSCOPIC SWALLOWING STUDY    Admission Date: 12/5/2024  Evaluation Date: 12/13/24  Radiologist: Dr Almaguer    RECOMMENDATIONS   Diet Recommendations - Solids: Puree    Diet Recommendations - Liquids: Thin Liquids (no straws;  may consider downgrade to NTL if clinical difficulty observed with thin liquids)    Recommend 1:1 feeding assistance to ensure swallow safety and efficiency    Further Follow-up:  Follow Up Needed (Documentation Required): Yes  SLP Follow-up Date: 12/16/24    Compensatory Strategies Recommended: Alternate consistencies    Aspiration Precautions: Upright position;Small bites;Small sips;1:1 feeding    Medication Administration Recommendations: Crushed in puree    Treatment Plan/Recommendations: Aspiration precautions    HISTORY     Problem List  Principal Problem:    Hypotension, unspecified hypotension type  Active Problems:    Essential hypertension    Esophageal reflux    CAD in native artery    Controlled type 2 diabetes mellitus with stage 3 chronic kidney disease, without long-term current use of insulin (HCC)    Hyperlipidemia associated with type 2 diabetes mellitus (HCC)    Hypernatremia    CKD stage 3a, GFR 45-59 ml/min (HCC)    Seizure (HCC)    Acute cystitis without hematuria    Acidosis    Septic shock (HCC)    Urinary tract infection without hematuria, site unspecified    Acute renal failure, unspecified acute renal failure type (HCC)    Palliative care by specialist    TIP (acute kidney injury) (HCC)    Altered mental status    Hypokalemia    ATN (acute tubular necrosis) (HCC)      Past Medical History  Past Medical History:    Abdominal distention    Anxiety    Atherosclerosis of coronary artery     2 surgeries for CABG X 4 1996 2012     Atherosclerosis of coronary artery    acute MI, CHF, Stents     Belching    Black stools    CONGESTIVE HEART FAILURE    EF 35-40 %    COPD    Diabetes mellitus (HCC)    Disorder of liver    \"fatty liver\"    Fatigue    HEART ATTACK     Acute mi and stents    High blood pressure    High cholesterol    History of blood transfusion    History of depression    Leaking of urine    Night sweats    Paroxysmal atrial fibrillation (HCC)    Peripheral vascular disease (HCC)    Carotid Artery Blockage TIA    Seizure (HCC)    new onset on 2/26/19    Shortness of breath    Sputum production    Stented coronary artery    STROKE    TIA    Stroke syndrome    CVA no residual effects    Type II or unspecified type diabetes mellitus without mention of complication, not stated as uncontrolled    Uncontrolled diabetes mellitus    Unspecified essential hypertension    Unspecified sleep apnea    Wears glasses    Wheezing       Current Diet Consistency: Puree;Thin liquids  Prior Level of Function: Dependent  Prior Living Situation: Skilled nursing facility  History of Recent: Pneumonia     Imaging results:  CXR yesterday:   Postsurgical changes of median sternotomy.   There is cardiomegaly.   There are new prominent interstitial opacities in the lung periphery and perihilar regions extending into the lung bases bilaterally.  This may represent pulmonary edema versus an infectious process.   More focal airspace disease/atelectasis retrocardiac left lung base with blunting of the costophrenic angle.     Reason for Referral: R/O aspiration      Family/Patient Goals: \"do what I have to do\"    ASSESSMENT   DYSPHAGIA ASSESSMENT  Test completed in conjunction with Radiologist.  Patient Positioned: Upright;Midline;[facility] chair.  Patient Viewed: Lateral.  Patient Alertness: Fully alert.  Consistencies Presented: Thin liquids;Nectar thick liquids/ Mildly thick;Puree to assess oropharyngeal swallow function and assess for compensatory strategies to improve safe swallow function.    THIN LIQUIDS  Method of Presentation: Teaspoon;Cup  Oral Phase of Swallow (VFSS - Thin Liquids): Impaired  Bolus Retrieval (VFSS - Thin Liquids): Impaired  Bilabial Seal (VFSS - Thin Liquids):  Impaired  Bolus Formation (VFSS - Thin Liquids): Impaired  Bolus Propulsion (VFSS - Thin Liquids): Impaired  Retention (VFSS - Thin Liquids): Impaired  Premature Spillage to: Pyriform sinuses  Residue Severity, Location: Mild/Mod;Valleculae;Pyriform sinuses  Cleared/Reduced with: Secondary swallow  Laryngeal Penetration: After the swallow;Trace  Tracheal Aspiration: None  Cough/Throat Clear Response: No  NECTAR THICK LIQUIDS/ MILDLY THICK  Method of Presentation: Teaspoon  Oral Phase of Swallow (VFSS - Nectar Thick Liquids): Impaired  Bolus Retrieval (VFSS - Nectar Thick Liquids): Impaired  Bilabial Seal (VFSS - Nectar Thick Liquids): Intact  Bolus Formation (VFSS - Nectar Thick Liquids): Impaired  Bolus Propulsion (VFSS - Nectar Thick Liquids): Impaired  Retention (VFSS - Nectar Thick Liquids): Impaired  Premature Spillage to: Pyriform sinuses  Residue Severity, Location: Mild/Mod;Valleculae;Pyriform sinuses  Cleared/Reduced with: Secondary swallow  Laryngeal Penetration: None  Tracheal Aspiration: None     PUREE  Oral Phase of Swallow (VFSS - Puree): Impaired  Bolus Retrieval (VFSS - Puree): Intact  Bilabial Seal (VFSS - Puree): Intact  Bolus Formation (VFSS - Puree): Impaired  Bolus Propulsion (VFSS - Puree): Impaired  Retention (VFSS - Puree): Impaired  Residue Severity, Location:  (NONE)  Laryngeal Penetration: None  Tracheal Aspiration: None        Penetration Aspiration Scale Score: Score 2: Material enters the airway, remains above the vocal folds, and is ejected from the airway       Overall Impression:    Moderate oropharyngeal dysphagia 2/2 decreased labial strength and ROM, reduced lingual strength/ROM/coordination, reduced bolus drive and A-P transit, decreased base of tongue strength/retraction, reduced sensory awareness, delayed initiation of pharyngeal swallow response and reduced pharyngeal contraction/squeeze.  Observed significant pooling of bolus prior to swallow response within hypopharynx as  well as residuals with thin and nectar thick liquids.  Patient exhibited difficulty eliciting volitional swallow response for residuals clearance.  Upon use of puree texture for liquid residuals clearance, it was completely effective.  Mild lingual residuals after puree texture however no hypopharyngeal residuals observed with that.  Noted trace/mild laryngeal penetration x1 occurrence only, after the swallow with thin liquid trials however cleared with force of secondary swallow.  No evidence of tracheal aspiration observed within scope of this study however patient deemed at risk for aspiration before/during/after the swallow.      Recommend con't puree diet, thin liquids upon collaboration with patient.  Recommend small sip at a time and alternate between thin and puree texture.  Recommend SLP follow-up and education/training in swallow strategies and diet texture analysis.               GOALS  Goal #1 The patient will tolerate puree consistency and thin liquids without overt signs or symptoms of aspiration with 90 % accuracy over 3-5 session(s). Revised   Goal #2 The patient/family/caregiver will demonstrate understanding and implementation of aspiration precautions and swallow strategies independently over 3-5 session(s).    Revised   Goal #3 Assess need for VFSS within 1-2 visits MET   Goal #4 Complete VFSS to further assess oropharyngeal swallow and assist with dysphagia recommendations MET         PLAN: continue skilled SLP for education/training, ongoing collaboration, and diet texture analysis.     EDUCATION/INSTRUCTION  Reviewed results and recommendations with patient/family/caregiver.  Agreement/Understanding verbalized and all questions answered to their apparent satisfaction.    INTERDISCIPLINARY COMMUNICATION  Reviewed results with Radiologist; agreement verbalized.    Patient Experiencing Pain: No        FOLLOW UP  Treatment Plan/Recommendations: Aspiration precautions         Thank you for your  referral.   If you have any questions, please contact Shivani Vincent, SLP  Shivani Vincent, MS CCC-SLP/L, pager [phone]  Speech-LanguagePathologist

## 2024-12-14 PROBLEM — I50.20 HFREF (HEART FAILURE WITH REDUCED EJECTION FRACTION) (HCC): Status: ACTIVE | Noted: 2024-12-14

## 2024-12-14 PROBLEM — N18.30 STAGE 3 CHRONIC KIDNEY DISEASE (HCC): Status: ACTIVE | Noted: 2019-03-04

## 2024-12-14 PROBLEM — I50.20 HFREF (HEART FAILURE WITH REDUCED EJECTION FRACTION) (HCC): Status: ACTIVE | Noted: 2024-01-01

## 2024-12-14 NOTE — CONSULTS
[facility]  Consultation    Carla Kim Patient Status:  Inpatient    1950 MRN [id number]   Location [facility] 8NE-A Attending Rafa Chandler,    Hosp Day # 8 PCP No primary care provider on file.     Consults    Reason for Consultation:  Persistent recurrent AF with RVR.     History of Present Illness:  Carla Kim is a a(n) 74 year old female with PMH significant for: CAD/ Hx remote CABG x 4:  and HUGH LM and LCX- 3/15/14, HFrEF- newly diminished, Hx R ICA stent, Hx CVA, HTN, dyslipidemia, T2 DM, CKD stage 3, seizure Hx, COPD and PATRICIA. Initially came to the ER with weakness and was discovered to have Klebsiella Urosepsis.   Admitted on 24 with AF and had RVR this afternoon and Cardiology was consulted.  IV Diltiazem Gtt was started for rate control. History of persistent recurrent AF. Last documented SR was 2024.        History:  Past Medical History:    Abdominal distention    Anxiety    Atherosclerosis of coronary artery     2 surgeries for CABG X 4 1996     Atherosclerosis of coronary artery    acute MI, CHF, Stents     Belching    Black stools    CONGESTIVE HEART FAILURE    EF 35-40 %    COPD    Diabetes mellitus (HCC)    Disorder of liver    \"fatty liver\"    Fatigue    HEART ATTACK    Acute mi and stents    High blood pressure    High cholesterol    History of blood transfusion    History of depression    Leaking of urine    Night sweats    Paroxysmal atrial fibrillation (HCC)    Peripheral vascular disease (HCC)    Carotid Artery Blockage TIA    Seizure (HCC)    new onset on 19    Shortness of breath    Sputum production    Stented coronary artery    STROKE    TIA    Stroke syndrome    CVA no residual effects    Type II or unspecified type diabetes mellitus without mention of complication, not stated as uncontrolled    Uncontrolled diabetes mellitus    Unspecified essential hypertension    Unspecified sleep apnea    Wears glasses    Wheezing     Past  Surgical History:   Procedure Laterality Date    Angiogram      Angioplasty (coronary)  2/28/14    stents acute mi    Appendectomy      Back surgery      Biopsy of skin lesion      hyperkeratosis    Bypass surgery  1996 and 2012    cabg x 4    Cabg  1996    x4    Cath bare metal stent (bms)      R carotid/coronary    Cholecystectomy  1987    Hc implant stent noncoronary temporary with delivery system [id number]      Laminectomy,lumbar  1984    Other surgical history  2005    Hx of transcath placement of intrarhoracic carotid artery     Family History   Problem Relation Age of Onset    Other (Acute MI) Father     Other (Alzheimer's Disease) Mother       reports that she quit smoking about 6 years ago. Her smoking use included cigarettes. She started smoking about 56 years ago. She has a 50 pack-year smoking history. She has never used smokeless tobacco. She reports that she does not drink alcohol and does not use drugs.    Allergies:  Allergies[1]    Medications:    Current Facility-Administered Medications:     dilTIAZem (cardIZEM) 100 mg in sodium chloride 0.9% 100 mL IVPB-ADDV, 2.5-20 mg/hr, Intravenous, Continuous    sodium chloride 3 % nebulizer solution 3 mL, 3 mL, Nebulization, TID    sennosides (Senokot) tab 8.6 mg, 8.6 mg, Oral, Nightly    polyethylene glycol (PEG 3350) (Miralax) 17 g oral packet 17 g, 17 g, Oral, Daily    apixaban (Eliquis) tab 5 mg, 5 mg, Oral, BID    sertraline (Zoloft) tab 100 mg, 100 mg, Oral, Daily    acetaminophen (Tylenol Extra Strength) tab 500 mg, 500 mg, Oral, Q4H PRN    melatonin tab 3 mg, 3 mg, Oral, Nightly PRN    polyethylene glycol (PEG 3350) (Miralax) 17 g oral packet 17 g, 17 g, Oral, Daily PRN    sennosides (Senokot) tab 17.2 mg, 17.2 mg, Oral, Nightly PRN    bisacodyl (Dulcolax) 10 MG rectal suppository 10 mg, 10 mg, Rectal, Daily PRN    fleet enema (Fleet) rectal enema 133 mL, 1 enema, Rectal, Once PRN    glucose (Dex4) 15 GM/59ML oral liquid 15 g, 15 g, Oral, Q15 Min PRN  **OR** glucose (Glutose) 40% oral gel 15 g, 15 g, Oral, Q15 Min PRN **OR** glucose-vitamin C (Dex-4) chewable tab 4 tablet, 4 tablet, Oral, Q15 Min PRN **OR** dextrose 50% injection 50 mL, 50 mL, Intravenous, Q15 Min PRN **OR** glucose (Dex4) 15 GM/59ML oral liquid 30 g, 30 g, Oral, Q15 Min PRN **OR** glucose (Glutose) 40% oral gel 30 g, 30 g, Oral, Q15 Min PRN **OR** glucose-vitamin C (Dex-4) chewable tab 8 tablet, 8 tablet, Oral, Q15 Min PRN    insulin aspart (NovoLOG) 100 Units/mL FlexPen 2-10 Units, 2-10 Units, Subcutaneous, TID AC and HS    clopidogrel (Plavix) tab 75 mg, 75 mg, Oral, Daily    levETIRAcetam (Keppra) tab 500 mg, 500 mg, Oral, BID    influenza virus trivalent high dose PF (Fluzone HD) 0.5 mL injection (ages >/= 65 years) 0.5 mL, 0.5 mL, Intramuscular, Prior to discharge    baclofen (Lioresal) tab 5 mg, 5 mg, Oral, BID    multivitamin (Tab-A-Odilon/Beta Carotene) tab 1 tablet, 1 tablet, Oral, Daily    Review of Systems:   Constitutional: No fevers, chills, fatigue or night sweats.  ENT: No mouth pain, neck pain, running nose, headaches or swollen glands.  Skin: No rashes, pruritus or skin changes,  Respiratory: Positive KHAN.   CV: Denies chest pain, palpitations, orthopnea, PND or dizziness.  Musculoskeletal: No joint pain, stiffness or swelling.  GI: No nausea, vomiting or diarrhea. No blood in stools.  Neurologic: No seizures, tremors, weakness or numbness.         OBJECTIVE  Blood pressure 109/61, pulse 115, temperature 98.4 °F (36.9 °C), temperature source Oral, resp. rate 20, height 5' 5\" (1.651 m), weight 156 lb 1.4 oz (70.8 kg), SpO2 96%, not currently breastfeeding.  Temp (24hrs), Av.1 °F (36.7 °C), Min:97.9 °F (36.6 °C), Max:98.4 °F (36.9 °C)    Wt Readings from Last 3 Encounters:   12/10/24 156 lb 1.4 oz (70.8 kg)   23 150 lb (68 kg)   10/25/22 151 lb 8 oz (68.7 kg)       Telemetry: AF 92 BPM.     Physical Exam:  Gen: alert, oriented x 3, NAD  Heent: pupils equal, reactive.  Mucous membranes moist.   Neck: no jvd  Cardiac: Irregular rate and rhythm, normal S1,S2, no murmru, gallop or rub.   Lungs: Clear upper Dim bases.   Abd: soft, NT/ND +bs  Ext: no edema  Skin: Warm, dry  Neuro: No focal deficits      Diagnostics History:  12/11/24:  EKG:   BPM, QRS:  106 ms, QTc:  415 ms.   1210/24:  Echo:   1. Left ventricle: The cavity size was normal. Wall thickness was normal.      Systolic function was mildly reduced. The estimated ejection fraction was      45%, by visual assessment. Unable to assess LV diastolic function due to      heart rhythm. There was no evidence of a thrombus revealed by acoustic contrast opacification.   2. Left ventricle: There is hypokinesis of the basal-mid inferoseptal and      basal-mid inferior walls.   3. Left atrium: The atrium was moderately dilated.   4. Mitral valve: There was mild regurgitation.   5. Pulmonary arteries: Systolic pressure was mildly to moderately increased.      The peak systolic pressure is 35mm Hg.   6. Inferior vena cava: The IVC was normally collapsible and normal-sized.   *     Carotid Doppler: 4/15/21:  1.  50 to 70% in-stent stenosis in the right internal carotid artery.  2.  Left carotid endarterectomy with 16 to 49% stenosis.  3.  Greater than 70% right external carotid artery stenosis but part of elevated velocity are likely due to jailing from adjacent stent.  4.  Elevated velocities in bilateral subclavian arteries suggest stenosis but unlikely critical since there is biphasic waveform and no Doppler evidence of subclavian steal physiology.  5.  Patent vertebral arteries with antegrade flow bilaterally.  6.  Compared to previous study from January 26, 2018, there has been reintervention in the right internal carotid artery with overall improvement in the in-stent restenosis of the right internal carotid artery.  There is probably no significant change in degree of stenosis of the left carotid endarterectomy.  There was  previously noted elevated velocities in the right external carotid artery and bilateral subclavian arteries.     Results:   Recent Labs   Lab 12/12/24  0500 12/13/24  0548 12/14/24  0505   * 122* 143*   BUN 36* 37* 38*   CREATSERUM 2.05* 1.93* 1.99*   EGFRCR 25* 27* 26*   CA 8.4* 8.7 8.7    142 143   K 3.7 3.3* 3.3*  3.3*    107 107   CO2 22.0 24.0 27.0     Recent Labs   Lab 12/09/24  0323 12/10/24  0505 12/11/24  0454   RBC 3.61* 3.47* 3.50*   HGB 10.4* 10.1* 10.0*   HCT 31.4* 30.7* 30.5*   MCV 87.0 88.5 87.1   MCH 28.8 29.1 28.6   MCHC 33.1 32.9 32.8   RDW 16.9 16.8 17.0   WBC 6.4 7.3 7.1   .0* 134.0* 145.0*         No results for input(s): \"BNP\" in the last 168 hours.  Lab Results   Component Value Date    PT 13.4 02/28/2014    PT 13.9 02/27/2014    PT 20.2 (H) 10/22/2012    INR 1.62 (H) 12/11/2024    INR 2.15 (H) 12/07/2024    INR 2.77 (H) 12/06/2024     Lab Results   Component Value Date    TROP <0.045 02/26/2019    TROP 0.097 (HH) 12/14/2018    TROP 0.218 (HH) 12/13/2018         No results found.       Assessment:  Urosepsis:  Adm Dx.  Completed Abx.   Persistent recurrent AF:  Appears she  has been in AF since June 2024.   AF with RVR this afternoon.   IV Diltiazem bolus and Gtt were started for rate control.   Chronic Eliquis 5 mg Bid.   CAD:    Hx CABG x 4 '96- All SV grafts occluded.  Patent LIMA-LAD,  2014:   HUGH LM and LCx.   No anginal symptoms.   Plavix.   Diminished LVEF:  EF:  45 % with hypokinesis basal inferior septal region. Mild MR, PAP02: 35 mmHg.   Volume up 12. Liters.   IV Lasix 40 mg today.   Hx R ICA stent: '21- 50-70 % ICA stenosis. Plavix.   HTN: Controlled.   Dyslipidemia: Historically Crestor 40 mg every day.   Type 2 DM: Insulin.   TIP/CKD stage 3:  CR:  1.99- improving from > 3.0 recently-  GFR:  23.  Baseline Cr: 1.75- In July,    Dysphagia  Mild Transaminitis- shock liver- improving.   Anemia/Thrombocytopenia: Hgb: 10.0  Plates: 145.  Chronic stable.        Plan:  Initially admitted with Sepsis secondary to UTI.   Persistent recurrent persistent AF- with RVR this afternoon.   Continue IV Diltiazem Gtt and Eliquis. 1 X IV Dig 0.25 mcg now.   Will transition to po tomorrow am.   Future consideration to rhythm control.   Abnormal echo with diminished EF with Hypokinesis basal inferior lateral region. Will require future ischemic work up when appropriate.         Trini Sommer MD  12/14/2024  2:51 PM  [phone]            [1]   Allergies  Allergen Reactions    Lipitor [Atorvastatin Calcium] MYALGIA     TABS    Wellbutrin [Bupropion Hcl]      TABS-tremor

## 2024-12-14 NOTE — PROGRESS NOTES
[facility]   part of [facility]     Hospitalist Progress Note     Carla Kim Patient Status:  Inpatient    1950 MRN [id number]   Location [facility] 4SW-A Attending Chitra Molina MD   Hosp Day # 8 PCP No primary care provider on file.     Chief Complaint: septic shock    Subjective:     Patient seen and examined. Denies CP/palpitations. Breathing feels about the same.     Objective:    Review of Systems:   A comprehensive review of systems was completed; pertinent positive and negatives stated in subjective.    Vital signs:  Temp:  [97.9 °F (36.6 °C)-98.3 °F (36.8 °C)] 98.2 °F (36.8 °C)  Pulse:  [] 108  Resp:  [16-20] 20  BP: (104-134)/(61-88) 113/73  SpO2:  [89 %-92 %] 92 %    Physical Exam:    General: No acute distress  Respiratory: Diminished bilaterally.   Cardiovascular: Irregularly irregular. Tachy.   Abdomen: Soft, Non-tender, non-distended, positive bowel sounds  Extremities: No edema      Diagnostic Data:    Labs:  Recent Labs   Lab 24  0543 24  0323 12/10/24  0505 24  0454   WBC 6.2 6.4 7.3 7.1   HGB 11.6* 10.4* 10.1* 10.0*   MCV 85.3 87.0 88.5 87.1   .0* 123.0* 134.0* 145.0*   INR  --   --   --  1.62*       Recent Labs   Lab 12/10/24  0505 24  0454 24  0500 24  0548 24  0505   * 108* 106* 122* 143*   BUN 45* 39* 36* 37* 38*   CREATSERUM 2.55* 2.11* 2.05* 1.93* 1.99*   CA 8.2* 8.2* 8.4* 8.7 8.7   ALB 3.1* 2.9* 3.1*  --   --     137 141 142 143   K 4.8 3.6 3.7 3.3* 3.3*  3.3*    105 107 107 107   CO2 25.0 22.0 22.0 24.0 27.0   ALKPHO 74 68 70  --   --    * 143* 120*  --   --    * 31 13  --   --    BILT 0.5 0.5 0.6  --   --    TP 5.8 6.2 6.0  --   --        Estimated Creatinine Clearance: 22 mL/min (A) (based on SCr of 1.99 mg/dL (H)).    No results for input(s): \"TROP\", \"TROPHS\", \"CK\" in the last 168 hours.      Recent Labs   Lab 24  0454   PTP 19.4*   INR 1.62*                   Microbiology    Hospital Encounter on 12/05/24   1. Blood Culture     Status: None    Collection Time: 12/06/24 12:10 AM    Specimen: Blood,peripheral   Result Value Ref Range    Blood Culture Result No Growth 5 Days N/A   2. Urine Culture, Routine     Status: Abnormal    Collection Time: 12/06/24 12:06 AM    Specimen: Urine, clean catch   Result Value Ref Range    Urine Culture >100,000 CFU/ML Klebsiella pneumoniae (A) N/A       Susceptibility    Klebsiella pneumoniae -  (no method available)     Ampicillin  Resistant      Ampicillin + Sulbactam 4 Sensitive      Cefazolin <=4 Sensitive      Ciprofloxacin <=0.25 Sensitive      Gentamicin <=1 Sensitive      Meropenem <=0.25 Sensitive      Levofloxacin 0.25 Sensitive      Nitrofurantoin 64 Intermediate      Piperacillin + Tazobactam <=4 Sensitive      Trimethoprim/Sulfa <=20 Sensitive          Imaging: Reviewed in Epic.    Medications:    sodium chloride  3 mL Nebulization TID    sennosides  8.6 mg Oral Nightly    polyethylene glycol (PEG 3350)  17 g Oral Daily    carvedilol  3.125 mg Oral BID with meals    apixaban  5 mg Oral BID    sertraline  100 mg Oral Daily    insulin aspart  2-10 Units Subcutaneous TID AC and HS    clopidogrel  75 mg Oral Daily    levETIRAcetam  500 mg Oral BID    baclofen  5 mg Oral BID    multivitamin  1 tablet Oral Daily       Assessment & Plan:      #Septic shock   #UTI in setting of chronic tate -POA  -Shock resolved, off pressors  -continue Abx  -Cx with klebsiella    #TIP on CKD  -TIP improving  -Nephrology following     #Non-cardiogenic acute pulmonary edema in setting of chronic systolic heart failure  -Likely due to IVF administration - now stopped  -Diuresis per renal     #A.fib with RVR  -Minimal response to cardizem push  -Start cardizem gtt, will stop BB for now  -Transfer to CTU  -Cardiology consulted  -Tele     #Hypoxia due to above  -Wean oxygen as able     #Dysphagia  -VFSS reviewed  -Modified diet per  SLP    #Normocytic anemia  #Thrombocytopenia  -Monitor     #Depression  -SSRI    #Transaminitis  -due to shock liver  -Resolved     #Hyperlipidemia  -statin    #DM type II  -hyperglycemia protocol    #Prior CVA-chronic left sided weakness  -Plavix/statin    #Seizure disorder   -Asia Chandler,       Supplementary Documentation:     Quality:  DVT Mechanical Prophylaxis:   SCDs,    DVT Pharmacologic Prophylaxis   Medication    apixaban (Eliquis) tab 5 mg                Code Status: DNAR/Full Treatment  Han: External urinary catheter in place  Han Duration (in days):   Central line:    DEEDEE:     Discharge is dependent on: progress  At this point Ms. Kim is expected to be discharge to: TBD    The 21st Century Cures Act makes medical notes like these available to patients in the interest of transparency. Please be advised this is a medical document. Medical documents are intended to carry relevant information, facts as evident, and the clinical opinion of the practitioner. The medical note is intended as peer to peer communication and may appear blunt or direct. It is written in medical language and may contain abbreviations or verbiage that are unfamiliar.

## 2024-12-14 NOTE — PLAN OF CARE
ED admit 12/5 generalized weakness, UA+, low BP, Pt is AAOX2-3, HR tachy, on TELE, 2L O2, encouraged flutter and IS use, garbled speech at times, Lt sided residual from prior CVA, on Sz precautions and aspiration precautions, 1-1 feed, glucose monitored and treated per orders, up MAX assist, Q2 turns, plan to wean oxygen use and discharge back to [place] when medically ready, Pt doing well, all needs met, all safety measures in place, call light within reach, will CTM.    Update: Paged joanne MACHUCA to notify that Pt's HR was maintaining 110's-130's, orders received and followed. HR continues to be elevated MD made aware, possible orders for transfer with Cardizem gtt.

## 2024-12-14 NOTE — PROGRESS NOTES
[facility]  Nephrology Progress Note    Carla Kim Attending:  Chitra Molina MD       Assessment and Plan:    1) TIP- due to septic shock / dehydration; imaging without obstruction / meds benign. Improving    2) CKD 3- baseline Cr < 1.5 mg/dl due to DM / HTN    3) Septic shock / Klebsiella urosepsis- in setting of chronic Han; CT noted- on ancef     4) Transaminitis + coagulopathy- c/w shock liver; improving rapidly    5) h/o CVA with L sided weakness + Sz on keppra    6) HFrEF / ischemic CM EF 45%- hypoxic overnight with pulm vasc congestion -> IVF dc'ed / redose IV lasix    7) HTN- normally on amlodipine / losartan / prn POWERS (all held); on low dose coreg    8) DM 2      Subjective:  Awake looks about the same; O2 sats dropped overnight + AF / RVR    Physical Exam:   /73 (BP Location: Left arm)   Pulse 99   Temp 98 °F (36.7 °C) (Oral)   Resp 16   Ht 5' 5\" (1.651 m)   Wt 156 lb 1.4 oz (70.8 kg)   SpO2 90%   BMI 25.97 kg/m²   Temp (24hrs), Av.1 °F (36.7 °C), Min:97.9 °F (36.6 °C), Max:98.3 °F (36.8 °C)       Intake/Output Summary (Last 24 hours) at 2024 0854  Last data filed at 2024 0230  Gross per 24 hour   Intake 480 ml   Output 700 ml   Net -220 ml     Wt Readings from Last 3 Encounters:   12/10/24 156 lb 1.4 oz (70.8 kg)   23 150 lb (68 kg)   10/25/22 151 lb 8 oz (68.7 kg)     General: awake   HEENT: No scleral icterus, MMM  Neck: Supple, no MEGHANA or thyromegaly  Cardiac: Regular rate and rhythm, S1, S2 normal, no murmur or tub  Lungs: Decreased BS at bases bilaterally   Abdomen: Soft, non-tender. + bowel sounds, no palpable organomegaly  Extremities: Without clubbing, cyanosis; no edema  Neurologic: Cranial nerves grossly intact, moving all extremities  Skin: Warm and dry, no rashes       Labs:   Lab Results   Component Value Date    CREATSERUM 1.99 2024    BUN 38 2024     2024    K 3.3 2024    K 3.3 2024     2024     CO2 27.0 12/14/2024     12/14/2024    CA 8.7 12/14/2024    PGLU 147 12/14/2024       Imaging:  All imaging studies reviewed.    Meds:   Current Facility-Administered Medications   Medication Dose Route Frequency    dilTIAZem (cardIZEM) 25 mg/5mL injection 5 mg  5 mg Intravenous Once    sodium chloride 3 % nebulizer solution 3 mL  3 mL Nebulization TID    sennosides (Senokot) tab 8.6 mg  8.6 mg Oral Nightly    polyethylene glycol (PEG 3350) (Miralax) 17 g oral packet 17 g  17 g Oral Daily    carvedilol (Coreg) tab 3.125 mg  3.125 mg Oral BID with meals    apixaban (Eliquis) tab 5 mg  5 mg Oral BID    sertraline (Zoloft) tab 100 mg  100 mg Oral Daily    acetaminophen (Tylenol Extra Strength) tab 500 mg  500 mg Oral Q4H PRN    melatonin tab 3 mg  3 mg Oral Nightly PRN    polyethylene glycol (PEG 3350) (Miralax) 17 g oral packet 17 g  17 g Oral Daily PRN    sennosides (Senokot) tab 17.2 mg  17.2 mg Oral Nightly PRN    bisacodyl (Dulcolax) 10 MG rectal suppository 10 mg  10 mg Rectal Daily PRN    fleet enema (Fleet) rectal enema 133 mL  1 enema Rectal Once PRN    glucose (Dex4) 15 GM/59ML oral liquid 15 g  15 g Oral Q15 Min PRN    Or    glucose (Glutose) 40% oral gel 15 g  15 g Oral Q15 Min PRN    Or    glucose-vitamin C (Dex-4) chewable tab 4 tablet  4 tablet Oral Q15 Min PRN    Or    dextrose 50% injection 50 mL  50 mL Intravenous Q15 Min PRN    Or    glucose (Dex4) 15 GM/59ML oral liquid 30 g  30 g Oral Q15 Min PRN    Or    glucose (Glutose) 40% oral gel 30 g  30 g Oral Q15 Min PRN    Or    glucose-vitamin C (Dex-4) chewable tab 8 tablet  8 tablet Oral Q15 Min PRN    insulin aspart (NovoLOG) 100 Units/mL FlexPen 2-10 Units  2-10 Units Subcutaneous TID AC and HS    clopidogrel (Plavix) tab 75 mg  75 mg Oral Daily    levETIRAcetam (Keppra) tab 500 mg  500 mg Oral BID    influenza virus trivalent high dose PF (Fluzone HD) 0.5 mL injection (ages >/= 65 years) 0.5 mL  0.5 mL Intramuscular Prior to discharge     baclofen (Lioresal) tab 5 mg  5 mg Oral BID    multivitamin (Tab-A-Odilon/Beta Carotene) tab 1 tablet  1 tablet Oral Daily         Questions/concerns were discussed with patient and/or family by bedside.          Vicky Perez MD  12/14/2024  854 AM

## 2024-12-14 NOTE — PLAN OF CARE
Problem: CARDIOVASCULAR - ADULT  Goal: Maintains optimal cardiac output and hemodynamic stability  Description: INTERVENTIONS:  - Monitor vital signs, rhythm, and trends  - Monitor for bleeding, hypotension and signs of decreased cardiac output  - Evaluate effectiveness of vasoactive medications to optimize hemodynamic stability  - Monitor arterial and/or venous puncture sites for bleeding and/or hematoma  - Assess quality of pulses, skin color and temperature  - Assess for signs of decreased coronary artery perfusion - ex. Angina  - Evaluate fluid balance, assess for edema, trend weights  Outcome: Progressing     Problem: RESPIRATORY - ADULT  Goal: Achieves optimal ventilation and oxygenation  Description: INTERVENTIONS:  - Assess for changes in respiratory status  - Assess for changes in mentation and behavior  - Position to facilitate oxygenation and minimize respiratory effort  - Oxygen supplementation based on oxygen saturation or ABGs  - Provide Smoking Cessation handout, if applicable  - Encourage broncho-pulmonary hygiene including cough, deep breathe, Incentive Spirometry  - Assess the need for suctioning and perform as needed  - Assess and instruct to report SOB or any respiratory difficulty  - Respiratory Therapy support as indicated  - Manage/alleviate anxiety  - Monitor for signs/symptoms of CO2 retention  Outcome: Progressing     Problem: Diabetes/Glucose Control  Goal: Glucose maintained within prescribed range  Description: INTERVENTIONS:  - Monitor Blood Glucose as ordered  - Assess for signs and symptoms of hyperglycemia and hypoglycemia  - Administer ordered medications to maintain glucose within target range  - Assess barriers to adequate nutritional intake and initiate nutrition consult as needed  - Instruct patient on self management of diabetes  Outcome: Progressing

## 2024-12-14 NOTE — PROGRESS NOTES
Admission Note:     Patient arrived at 1330, via bed from [facility].   Report received from Sarthak SLAUGHTER.   Patient alert and oriented x3, unable to recall why she is in hospital or treatment plan. Her speech is slurred and she can be confused at times. Belongings at the bedside.   Patient on 2L nasal cannula, room air is her baseline, pulse ox and tele applied.  Denies any cardiac symptoms, Afib RVR with HR in the 110-120s on tele. Currently with Cardizem gtts infusing.   Incontinent of bowel and bladder. Briefed, purewick in place. Had BM on arrival to floor.   Skin assessment completed with PCT Jeremiah, documented under flowsheets.   Total care assist, L sided weakness from previos CVA with L arm contracted.  Call light within reach, tolerating care.       POC:  - HR control with cardizem gtts  - Daily weight   - Wean O2 as able

## 2024-12-14 NOTE — PLAN OF CARE
A&Ox3, can be forgetful.  L side facial droop and L side weakness/contracture d/t hx of CVA.  Oxygen saturation maintained between 89-91% on 2L O2 via NC, COPD hx.  Bilateral lung sounds diminished.  Congestion and non-productive cough present. Telemetry monitoring: afib.  LBM 12/13, voiding without difficulty via external catheter.  Pureed diet, taking meds crushed with applesauce.    Monitoring labs, plan to dc to [facility] when medically cleared.    Safety precautions in place.  Patient encouraged to call for assistance, call light within reach.

## 2024-12-15 NOTE — PLAN OF CARE
Patient alert and oriented x 3. Total assist, frequent repositioning and turns. On oxygen per nasal cannula. A fib on tele. Incontinent of bowel and bladder. One to one feeder, poor appetite this AM. No complaints of pain, shortness of breath, or chest pain/discomfort. POC discussed with patient. Fall precautions in place. Call light within reach.     Problem: Patient/Family Goals  Goal: Patient/Family Long Term Goal  Description: Patient's Long Term Goal: Go home with little to no complications    Interventions:  - Follow nursing/physician recommendations  - See additional Care Plan goals for specific interventions  Outcome: Progressing  Goal: Patient/Family Short Term Goal  Description: Patient's Short Term Goal: Get through hospital stay    Interventions:   - Follow nursing/physician recommendations  - See additional Care Plan goals for specific interventions  Outcome: Progressing     Problem: CARDIOVASCULAR - ADULT  Goal: Maintains optimal cardiac output and hemodynamic stability  Description: INTERVENTIONS:  - Monitor vital signs, rhythm, and trends  - Monitor for bleeding, hypotension and signs of decreased cardiac output  - Evaluate effectiveness of vasoactive medications to optimize hemodynamic stability  - Monitor arterial and/or venous puncture sites for bleeding and/or hematoma  - Assess quality of pulses, skin color and temperature  - Assess for signs of decreased coronary artery perfusion - ex. Angina  - Evaluate fluid balance, assess for edema, trend weights  Outcome: Progressing     Problem: RESPIRATORY - ADULT  Goal: Achieves optimal ventilation and oxygenation  Description: INTERVENTIONS:  - Assess for changes in respiratory status  - Assess for changes in mentation and behavior  - Position to facilitate oxygenation and minimize respiratory effort  - Oxygen supplementation based on oxygen saturation or ABGs  - Provide Smoking Cessation handout, if applicable  - Encourage broncho-pulmonary hygiene  including cough, deep breathe, Incentive Spirometry  - Assess the need for suctioning and perform as needed  - Assess and instruct to report SOB or any respiratory difficulty  - Respiratory Therapy support as indicated  - Manage/alleviate anxiety  - Monitor for signs/symptoms of CO2 retention  Outcome: Progressing     Problem: Diabetes/Glucose Control  Goal: Glucose maintained within prescribed range  Description: INTERVENTIONS:  - Monitor Blood Glucose as ordered  - Assess for signs and symptoms of hyperglycemia and hypoglycemia  - Administer ordered medications to maintain glucose within target range  - Assess barriers to adequate nutritional intake and initiate nutrition consult as needed  - Instruct patient on self management of diabetes  Outcome: Progressing     Problem: RISK FOR INFECTION - ADULT  Goal: Absence of fever/infection during anticipated neutropenic period  Description: INTERVENTIONS  - Monitor WBC  - Administer growth factors as ordered  - Implement neutropenic guidelines  Outcome: Progressing

## 2024-12-15 NOTE — PLAN OF CARE
Assumed patient care at 1930.   Resting in bed. Drowsy, easily aroused , responds appropriately to questions.  Vital signs are stable  Telemetry monitoring in progress,  Diltiazem gtt in progress 15 mg per hour  -108. Denies dizziness , chest pain or palpitations.  Denies shortness of breath  Taking pills crushed with applesauce.  No apparent distress at this time.  Problem: Patient/Family Goals  Goal: Patient/Family Long Term Goal  Description: Patient's Long Term Goal: Go home with little to no complications    Interventions:  - Follow nursing/physician recommendations  - See additional Care Plan goals for specific interventions  Outcome: Progressing  Goal: Patient/Family Short Term Goal  Description: Patient's Short Term Goal: Get through hospital stay    Interventions:   - Follow nursing/physician recommendations  - See additional Care Plan goals for specific interventions  Outcome: Progressing     Problem: CARDIOVASCULAR - ADULT  Goal: Maintains optimal cardiac output and hemodynamic stability  Description: INTERVENTIONS:  - Monitor vital signs, rhythm, and trends  - Monitor for bleeding, hypotension and signs of decreased cardiac output  - Evaluate effectiveness of vasoactive medications to optimize hemodynamic stability  - Monitor arterial and/or venous puncture sites for bleeding and/or hematoma  - Assess quality of pulses, skin color and temperature  - Assess for signs of decreased coronary artery perfusion - ex. Angina  - Evaluate fluid balance, assess for edema, trend weights  Outcome: Progressing     Problem: RESPIRATORY - ADULT  Goal: Achieves optimal ventilation and oxygenation  Description: INTERVENTIONS:  - Assess for changes in respiratory status  - Assess for changes in mentation and behavior  - Position to facilitate oxygenation and minimize respiratory effort  - Oxygen supplementation based on oxygen saturation or ABGs  - Provide Smoking Cessation handout, if applicable  - Encourage  broncho-pulmonary hygiene including cough, deep breathe, Incentive Spirometry  - Assess the need for suctioning and perform as needed  - Assess and instruct to report SOB or any respiratory difficulty  - Respiratory Therapy support as indicated  - Manage/alleviate anxiety  - Monitor for signs/symptoms of CO2 retention  Outcome: Progressing     Problem: Diabetes/Glucose Control  Goal: Glucose maintained within prescribed range  Description: INTERVENTIONS:  - Monitor Blood Glucose as ordered  - Assess for signs and symptoms of hyperglycemia and hypoglycemia  - Administer ordered medications to maintain glucose within target range  - Assess barriers to adequate nutritional intake and initiate nutrition consult as needed  - Instruct patient on self management of diabetes  Outcome: Progressing     Problem: RISK FOR INFECTION - ADULT  Goal: Absence of fever/infection during anticipated neutropenic period  Description: INTERVENTIONS  - Monitor WBC  - Administer growth factors as ordered  - Implement neutropenic guidelines  Outcome: Progressing

## 2024-12-15 NOTE — PROGRESS NOTES
[facility]   part of [facility]     Hospitalist Progress Note     Carla Kim Patient Status:  Inpatient    1950 MRN [id number]   Location [facility] 4SW-A Attending Chitra Molina MD   Hosp Day # 9 PCP No primary care provider on file.     Chief Complaint: septic shock    Subjective:     Patient seen and examined. No acute complaints overnight or this morning.      Objective:    Review of Systems:   A comprehensive review of systems was completed; pertinent positive and negatives stated in subjective.    Vital signs:  Temp:  [97.5 °F (36.4 °C)-99.6 °F (37.6 °C)] 97.5 °F (36.4 °C)  Pulse:  [] 92  Resp:  [16-20] 20  BP: (104-118)/(44-99) 109/45  SpO2:  [83 %-97 %] 91 %    Physical Exam:    General: No acute distress  Respiratory: Diminished bilaterally.   Cardiovascular: Irregularly irregular.   Abdomen: Soft, Non-tender, non-distended, positive bowel sounds  Extremities: No edema      Diagnostic Data:    Labs:  Recent Labs   Lab 24  0323 12/10/24  0505 24  0454   WBC 6.4 7.3 7.1   HGB 10.4* 10.1* 10.0*   MCV 87.0 88.5 87.1   .0* 134.0* 145.0*   INR  --   --  1.62*       Recent Labs   Lab 12/10/24  0505 24  0454 24  0500 24  0548 24  0505 12/15/24  0526   * 108* 106* 122* 143* 116*   BUN 45* 39* 36* 37* 38* 38*   CREATSERUM 2.55* 2.11* 2.05* 1.93* 1.99* 1.85*   CA 8.2* 8.2* 8.4* 8.7 8.7 8.5*   ALB 3.1* 2.9* 3.1*  --   --   --     137 141 142 143 140   K 4.8 3.6 3.7 3.3* 3.3*  3.3* 3.8    105 107 107 107 105   CO2 25.0 22.0 22.0 24.0 27.0 27.0   ALKPHO 74 68 70  --   --   --    * 143* 120*  --   --   --    * 31 13  --   --   --    BILT 0.5 0.5 0.6  --   --   --    TP 5.8 6.2 6.0  --   --   --        Estimated Creatinine Clearance: 23.6 mL/min (A) (based on SCr of 1.85 mg/dL (H)).    No results for input(s): \"TROP\", \"TROPHS\", \"CK\" in the last 168 hours.      Recent Labs   Lab 24  0454   PTP 19.4*   INR  1.62*                  Microbiology    Hospital Encounter on 12/05/24   1. Blood Culture     Status: None    Collection Time: 12/06/24 12:10 AM    Specimen: Blood,peripheral   Result Value Ref Range    Blood Culture Result No Growth 5 Days N/A   2. Urine Culture, Routine     Status: Abnormal    Collection Time: 12/06/24 12:06 AM    Specimen: Urine, clean catch   Result Value Ref Range    Urine Culture >100,000 CFU/ML Klebsiella pneumoniae (A) N/A       Susceptibility    Klebsiella pneumoniae -  (no method available)     Ampicillin  Resistant      Ampicillin + Sulbactam 4 Sensitive      Cefazolin <=4 Sensitive      Ciprofloxacin <=0.25 Sensitive      Gentamicin <=1 Sensitive      Meropenem <=0.25 Sensitive      Levofloxacin 0.25 Sensitive      Nitrofurantoin 64 Intermediate      Piperacillin + Tazobactam <=4 Sensitive      Trimethoprim/Sulfa <=20 Sensitive          Imaging: Reviewed in Epic.    Medications:    dilTIAZem ER  120 mg Oral Daily    sodium chloride  3 mL Nebulization TID    sennosides  8.6 mg Oral Nightly    polyethylene glycol (PEG 3350)  17 g Oral Daily    apixaban  5 mg Oral BID    sertraline  100 mg Oral Daily    insulin aspart  2-10 Units Subcutaneous TID AC and HS    clopidogrel  75 mg Oral Daily    levETIRAcetam  500 mg Oral BID    baclofen  5 mg Oral BID    multivitamin  1 tablet Oral Daily       Assessment & Plan:      #Septic shock   #UTI in setting of chronic tate -POA  -Shock resolved, off pressors  -continue Abx  -Cx with klebsiella    #TIP on CKD  -TIP improving  -Nephrology following     #Non-cardiogenic acute pulmonary edema in setting of chronic systolic heart failure  -Likely due to IVF administration - now stopped  -Diuresis per renal     #A.fib with RVR  -Start cardizem gtt transitioned to PO  -Cardiology following  -Tele     #Hypoxia due to above  -Wean oxygen as able     #Dysphagia  -VFSS reviewed  -Modified diet per SLP    #Normocytic anemia  #Thrombocytopenia  -Monitor      #Depression  -SSRI    #Transaminitis  -due to shock liver  -Resolved     #Hyperlipidemia  -statin    #DM type II  -hyperglycemia protocol    #Prior CVA-chronic left sided weakness  -Plavix/statin    #Seizure disorder   -Asia Chandler DO      Supplementary Documentation:     Quality:  DVT Mechanical Prophylaxis:   SCDs,    DVT Pharmacologic Prophylaxis   Medication    apixaban (Eliquis) tab 5 mg                Code Status: DNAR/Full Treatment  Han: External urinary catheter in place  Han Duration (in days):   Central line:    DEEDEE:     Discharge is dependent on: progress  At this point Ms. Kim is expected to be discharge to: TBD    The 21st Century Cures Act makes medical notes like these available to patients in the interest of transparency. Please be advised this is a medical document. Medical documents are intended to carry relevant information, facts as evident, and the clinical opinion of the practitioner. The medical note is intended as peer to peer communication and may appear blunt or direct. It is written in medical language and may contain abbreviations or verbiage that are unfamiliar.

## 2024-12-15 NOTE — PROGRESS NOTES
[facility]  Nephrology Progress Note    Carla Kim Attending:  Chitra Molina MD       Assessment and Plan:    1) TIP- due to septic shock / dehydration; imaging without obstruction / meds benign. Improving    2) CKD 3- baseline Cr < 1.5 mg/dl due to DM / HTN    3) Septic shock / Klebsiella urosepsis- in setting of chronic Han; completed abx (ancef)    4) Transaminitis + coagulopathy- c/w shock liver; improving rapidly    5) h/o CVA with L sided weakness + Sz on keppra    6) HFrEF / ischemic CM EF 45%- diuresed / compensated    7) HTN- normally on amlodipine / losartan / prn POWERS (all held)    8) DM 2    9) AF with RVR- cardizem / digoxin per cards    O2 sats 90% on RA lying flat- diuresed well and does not appear volume up- dc diuretics      Subjective:  Awake looks a bit better -120    Physical Exam:   /58 (BP Location: Left arm)   Pulse 83   Temp 98.8 °F (37.1 °C) (Oral)   Resp 16   Ht 5' 5\" (1.651 m)   Wt 146 lb 6.2 oz (66.4 kg)   SpO2 97%   BMI 24.36 kg/m²   Temp (24hrs), Av.4 °F (36.9 °C), Min:97.7 °F (36.5 °C), Max:99.6 °F (37.6 °C)       Intake/Output Summary (Last 24 hours) at 12/15/2024 0651  Last data filed at 12/15/2024 0600  Gross per 24 hour   Intake 149.17 ml   Output 1080 ml   Net -930.83 ml     Wt Readings from Last 3 Encounters:   12/15/24 146 lb 6.2 oz (66.4 kg)   23 150 lb (68 kg)   10/25/22 151 lb 8 oz (68.7 kg)     General: awake   HEENT: No scleral icterus, MMM  Neck: Supple, no MEGHANA or thyromegaly  Cardiac: Regular rate and rhythm, S1, S2 normal, no murmur or tub  Lungs: Decreased BS at bases bilaterally   Abdomen: Soft, non-tender. + bowel sounds, no palpable organomegaly  Extremities: Without clubbing, cyanosis; no edema  Neurologic: Cranial nerves grossly intact, moving all extremities  Skin: Warm and dry, no rashes       Labs:   Lab Results   Component Value Date    CREATSERUM 1.85 12/15/2024    BUN 38 12/15/2024     12/15/2024    K 3.8  12/15/2024     12/15/2024    CO2 27.0 12/15/2024     12/15/2024    CA 8.5 12/15/2024    PGLU 111 12/15/2024       Imaging:  All imaging studies reviewed.    Meds:   Current Facility-Administered Medications   Medication Dose Route Frequency    dilTIAZem (cardIZEM) 100 mg in sodium chloride 0.9% 100 mL IVPB-ADDV  2.5-20 mg/hr Intravenous Continuous    sodium chloride 3 % nebulizer solution 3 mL  3 mL Nebulization TID    sennosides (Senokot) tab 8.6 mg  8.6 mg Oral Nightly    polyethylene glycol (PEG 3350) (Miralax) 17 g oral packet 17 g  17 g Oral Daily    apixaban (Eliquis) tab 5 mg  5 mg Oral BID    sertraline (Zoloft) tab 100 mg  100 mg Oral Daily    acetaminophen (Tylenol Extra Strength) tab 500 mg  500 mg Oral Q4H PRN    melatonin tab 3 mg  3 mg Oral Nightly PRN    polyethylene glycol (PEG 3350) (Miralax) 17 g oral packet 17 g  17 g Oral Daily PRN    sennosides (Senokot) tab 17.2 mg  17.2 mg Oral Nightly PRN    bisacodyl (Dulcolax) 10 MG rectal suppository 10 mg  10 mg Rectal Daily PRN    fleet enema (Fleet) rectal enema 133 mL  1 enema Rectal Once PRN    glucose (Dex4) 15 GM/59ML oral liquid 15 g  15 g Oral Q15 Min PRN    Or    glucose (Glutose) 40% oral gel 15 g  15 g Oral Q15 Min PRN    Or    glucose-vitamin C (Dex-4) chewable tab 4 tablet  4 tablet Oral Q15 Min PRN    Or    dextrose 50% injection 50 mL  50 mL Intravenous Q15 Min PRN    Or    glucose (Dex4) 15 GM/59ML oral liquid 30 g  30 g Oral Q15 Min PRN    Or    glucose (Glutose) 40% oral gel 30 g  30 g Oral Q15 Min PRN    Or    glucose-vitamin C (Dex-4) chewable tab 8 tablet  8 tablet Oral Q15 Min PRN    insulin aspart (NovoLOG) 100 Units/mL FlexPen 2-10 Units  2-10 Units Subcutaneous TID AC and HS    clopidogrel (Plavix) tab 75 mg  75 mg Oral Daily    levETIRAcetam (Keppra) tab 500 mg  500 mg Oral BID    influenza virus trivalent high dose PF (Fluzone HD) 0.5 mL injection (ages >/= 65 years) 0.5 mL  0.5 mL Intramuscular Prior to discharge     baclofen (Lioresal) tab 5 mg  5 mg Oral BID    multivitamin (Tab-A-Odilon/Beta Carotene) tab 1 tablet  1 tablet Oral Daily         Questions/concerns were discussed with patient and/or family by bedside.          Vicky Perez MD  12/15/2024  651 AM

## 2024-12-15 NOTE — PROGRESS NOTES
[facility]  Cardiology Progress Note    Carla Kim Patient Status:  Inpatient    1950 MRN [id number]   Location [facility] 8NE-A Attending Rafa Chandler DO   Hosp Day # 9 PCP No primary care provider on file.       Subjective: in bed, no events overnight, resting well.  No complaints at this time    Objective:   Temp: 98.8 °F (37.1 °C)  Pulse: 83  Resp: 16  BP: 118/58    Intake/Output:     Intake/Output Summary (Last 24 hours) at 12/15/2024 0739  Last data filed at 12/15/2024 0600  Gross per 24 hour   Intake 149.17 ml   Output 1080 ml   Net -930.83 ml       Last 3 Weights   12/15/24 0403 146 lb 6.2 oz (66.4 kg)   12/10/24 0500 156 lb 1.4 oz (70.8 kg)   24 0200 155 lb 3.3 oz (70.4 kg)   24 0400 151 lb 0.2 oz (68.5 kg)   24 0600 155 lb 6.8 oz (70.5 kg)   24 0508 151 lb 10.8 oz (68.8 kg)   24 2327 149 lb 14.6 oz (68 kg)   23 0952 150 lb (68 kg)   10/25/22 0524 151 lb 8 oz (68.7 kg)   10/24/22 0623 156 lb 1.6 oz (70.8 kg)   10/23/22 0300 151 lb 14.4 oz (68.9 kg)   10/21/22 2108 149 lb 7.6 oz (67.8 kg)       Tele: rate controlled A Fib    Physical Exam:     General: Alert and oriented x 3. No apparent distress. No respiratory or constitutional distress.  HEENT: Normocephalic, anicteric sclera, neck supple.  Neck: No JVD, carotids 2+, no bruits.  Cardiac: Regular rate and rhythm. S1, S2 normal. No murmur, pericardial rub, S3.  Lungs: Clear without wheezes, rales, rhonchi or dullness.  Normal excursions and effort.  Abdomen: Soft, non-tender. BS-present.  Extremities: Without clubbing, cyanosis or edema.  Peripheral pulses are 2+.  Neurologic: Alert and oriented, normal affect.  Skin: Warm and dry.     Laboratory/Data:    Labs:         Recent Labs   Lab 24  0323 12/10/24  0505 24  0454   WBC 6.4 7.3 7.1   HGB 10.4* 10.1* 10.0*   MCV 87.0 88.5 87.1   .0* 134.0* 145.0*   INR  --   --  1.62*       Recent Labs   Lab 243 12/10/24  0505  12/11/24  0454 12/12/24  0500 12/13/24  0548 12/14/24  0505 12/15/24  0526      < > 137 141 142 143 140   K 4.0   < > 3.6 3.7 3.3* 3.3*  3.3* 3.8      < > 105 107 107 107 105   CO2 24.0   < > 22.0 22.0 24.0 27.0 27.0   BUN 47*   < > 39* 36* 37* 38* 38*   CREATSERUM 3.09*   < > 2.11* 2.05* 1.93* 1.99* 1.85*   CA 8.2*   < > 8.2* 8.4* 8.7 8.7 8.5*   MG 2.0  --   --   --   --   --   --    *   < > 108* 106* 122* 143* 116*    < > = values in this interval not displayed.       Recent Labs   Lab 12/09/24  0323 12/10/24  0505 12/11/24 0454 12/12/24  0500   * 133* 31 13   * 272* 143* 120*   ALB 2.9* 3.1* 2.9* 3.1*       No results for input(s): \"TROP\" in the last 168 hours.    Allergies:   Allergies[1]    Medications:  Current Facility-Administered Medications   Medication Dose Route Frequency    potassium chloride (Klor-Con M20) tab 20 mEq  20 mEq Oral Once    dilTIAZem (cardIZEM) 100 mg in sodium chloride 0.9% 100 mL IVPB-ADDV  2.5-20 mg/hr Intravenous Continuous    sodium chloride 3 % nebulizer solution 3 mL  3 mL Nebulization TID    sennosides (Senokot) tab 8.6 mg  8.6 mg Oral Nightly    polyethylene glycol (PEG 3350) (Miralax) 17 g oral packet 17 g  17 g Oral Daily    apixaban (Eliquis) tab 5 mg  5 mg Oral BID    sertraline (Zoloft) tab 100 mg  100 mg Oral Daily    acetaminophen (Tylenol Extra Strength) tab 500 mg  500 mg Oral Q4H PRN    melatonin tab 3 mg  3 mg Oral Nightly PRN    polyethylene glycol (PEG 3350) (Miralax) 17 g oral packet 17 g  17 g Oral Daily PRN    sennosides (Senokot) tab 17.2 mg  17.2 mg Oral Nightly PRN    bisacodyl (Dulcolax) 10 MG rectal suppository 10 mg  10 mg Rectal Daily PRN    fleet enema (Fleet) rectal enema 133 mL  1 enema Rectal Once PRN    glucose (Dex4) 15 GM/59ML oral liquid 15 g  15 g Oral Q15 Min PRN    Or    glucose (Glutose) 40% oral gel 15 g  15 g Oral Q15 Min PRN    Or    glucose-vitamin C (Dex-4) chewable tab 4 tablet  4 tablet Oral Q15 Min PRN     Or    dextrose 50% injection 50 mL  50 mL Intravenous Q15 Min PRN    Or    glucose (Dex4) 15 GM/59ML oral liquid 30 g  30 g Oral Q15 Min PRN    Or    glucose (Glutose) 40% oral gel 30 g  30 g Oral Q15 Min PRN    Or    glucose-vitamin C (Dex-4) chewable tab 8 tablet  8 tablet Oral Q15 Min PRN    insulin aspart (NovoLOG) 100 Units/mL FlexPen 2-10 Units  2-10 Units Subcutaneous TID AC and HS    clopidogrel (Plavix) tab 75 mg  75 mg Oral Daily    levETIRAcetam (Keppra) tab 500 mg  500 mg Oral BID    influenza virus trivalent high dose PF (Fluzone HD) 0.5 mL injection (ages >/= 65 years) 0.5 mL  0.5 mL Intramuscular Prior to discharge    baclofen (Lioresal) tab 5 mg  5 mg Oral BID    multivitamin (Tab-A-Odilon/Beta Carotene) tab 1 tablet  1 tablet Oral Daily         Assessment:  Sepsis secondary to UTI:  Adm Dx.  Completed Abx.   Persistent recurrent AF:  Appears she  has been in AF since June 2024.   AF rate controlled this morning   Will start cardizem 120mg XR today and wean off IV  Chronic Eliquis 5 mg Bid.   CAD:    Hx CABG x 4 '96- All SV grafts occluded.  Patent LIMA-LAD,  2014:   HUGH LM and LCx.   No anginal symptoms.   Plavix.   Ischemic CM:  EF:  45 % with hypokinesis basal inferior septal region. Mild MR, PAP02: 35 mmHg.   Volume up 12. Liters.   IV Lasix 40 mg daily, reassess daily for need  Hx R ICA stent: '21- 50-70 % ICA stenosis. Plavix.   HTN: Controlled.   Dyslipidemia: Historically Crestor 40 mg every day.   Type 2 DM: Insulin.   TIP/CKD stage 3:  CR:  1.99- improving from > 3.0 recently-  GFR:  23.  Baseline Cr: 1.75- In July,    Dysphagia  Mild Transaminitis- shock liver- improving.   Anemia/Thrombocytopenia: Hgb: 10.0  Plates: 145.  Chronic stable.         Plan:  Initially admitted with Sepsis secondary to UTI.   Persistent recurrent persistent AF- with RVR this afternoon.   Switch to Cardizem PO 120mg daily and Eliquis. 1 X IV Dig 0.25 mcg now.   Future consideration to rhythm control as  outpatient   Abnormal echo with diminished EF with Hypokinesis basal inferior lateral region. Will require future ischemic work up when appropriate.       Trini Sommer MD  12/15/2024  7:39 AM       [1]   Allergies  Allergen Reactions    Lipitor [Atorvastatin Calcium] MYALGIA     TABS    Wellbutrin [Bupropion Hcl]      TABS-tremor

## 2024-12-16 ENCOUNTER — APPOINTMENT (OUTPATIENT)
Dept: GENERAL RADIOLOGY | Facility: HOSPITAL | Age: 74
End: 2024-12-16
Payer: MEDICARE

## 2024-12-16 NOTE — PROGRESS NOTES
[facility]   part of [facility]     Hospitalist Progress Note     Carla Kim Patient Status:  Inpatient    1950 MRN [id number]   Location [facility] 4SW-A Attending Chitra Molina MD   Hosp Day # 10 PCP No primary care provider on file.     Chief Complaint: septic shock    Subjective:     Patient seen and examined. Denies CP/N/V/D.     Objective:    Review of Systems:   A comprehensive review of systems was completed; pertinent positive and negatives stated in subjective.    Vital signs:  Temp:  [97.9 °F (36.6 °C)-99.3 °F (37.4 °C)] 97.9 °F (36.6 °C)  Pulse:  [] 83  Resp:  [20-22] 20  BP: (103-117)/(45-77) 114/52  SpO2:  [82 %-94 %] 93 %    Physical Exam:    General: No acute distress  Respiratory: Diminished bilaterally.   Cardiovascular: Irregularly irregular.   Abdomen: Soft, Non-tender, non-distended, positive bowel sounds  Extremities: No edema      Diagnostic Data:    Labs:  Recent Labs   Lab 12/10/24  0505 24  0454   WBC 7.3 7.1   HGB 10.1* 10.0*   MCV 88.5 87.1   .0* 145.0*   INR  --  1.62*       Recent Labs   Lab 24  0454 24  0500 24  0548 24  0505 12/15/24  0526 24  0514   * 106*   < > 143* 116* 130*   BUN 39* 36*   < > 38* 38* 40*   CREATSERUM 2.11* 2.05*   < > 1.99* 1.85* 1.76*   CA 8.2* 8.4*   < > 8.7 8.5* 8.4*   ALB 2.9* 3.1*  --   --   --  3.1*    141   < > 143 140 138   K 3.6 3.7   < > 3.3*  3.3* 3.8 3.4*    107   < > 107 105 104   CO2 22.0 22.0   < > 27.0 27.0 27.0   ALKPHO 68 70  --   --   --  91   * 120*  --   --   --  131*   ALT 31 13  --   --   --  32   BILT 0.5 0.6  --   --   --  0.5   TP 6.2 6.0  --   --   --  6.3    < > = values in this interval not displayed.       Estimated Creatinine Clearance: 24.9 mL/min (A) (based on SCr of 1.76 mg/dL (H)).    No results for input(s): \"TROP\", \"TROPHS\", \"CK\" in the last 168 hours.      Recent Labs   Lab 24  0454   PTP 19.4*   INR 1.62*         Subjective   Patient ID [id number]   Joanna Headley is a 28 y.o.  at 12w2d with a working estimated date of delivery of 10/28/2024, by Last Menstrual Period who presents for a routine prenatal visit.       Objective   Physical Exam  Weight: 73.5 kg (162 lb), [place] BMI: 28.53  BP: 110/64  Fetal Heart Rate: 155               Prenatal Labs  Urine dip:  Results for orders placed or performed in visit on 24   POCT UA Automated manually resulted   Result Value Ref Range    POC Color, Urine Yellow Straw, Yellow, Light-Yellow    POC Appearance, Urine Clear Clear    POC Glucose, Urine NEGATIVE NEGATIVE mg/dl    POC Bilirubin, Urine NEGATIVE NEGATIVE    POC Ketones, Urine TRACE (A) NEGATIVE mg/dl    POC Specific Gravity, Urine >=1.030 1.005 - 1.035    POC Blood, Urine NEGATIVE NEGATIVE    POC PH, Urine 6.0 No Reference Range Established PH    POC Protein, Urine NEGATIVE NEGATIVE, 30 (1+) mg/dl    POC Urobilinogen, Urine 0.2 0.2, 1.0 EU/DL    Poc Nitrite, Urine NEGATIVE NEGATIVE    POC Leukocytes, Urine SMALL (1+) (A) NEGATIVE         Assessment/Plan   Diagnoses and all orders for this visit:  Encounter for supervision of normal first pregnancy in first trimester ([facility])  13 weeks gestation of pregnancy ([facility])  -     POCT UA Automated manually resulted    Continue prenatal vitamin.  Labs reviewed.      Follow up in 4 weeks for a routine prenatal visit.             Microbiology    Hospital Encounter on 12/05/24   1. Blood Culture     Status: None    Collection Time: 12/06/24 12:10 AM    Specimen: Blood,peripheral   Result Value Ref Range    Blood Culture Result No Growth 5 Days N/A   2. Urine Culture, Routine     Status: Abnormal    Collection Time: 12/06/24 12:06 AM    Specimen: Urine, clean catch   Result Value Ref Range    Urine Culture >100,000 CFU/ML Klebsiella pneumoniae (A) N/A       Susceptibility    Klebsiella pneumoniae -  (no method available)     Ampicillin  Resistant      Ampicillin + Sulbactam 4 Sensitive      Cefazolin <=4 Sensitive      Ciprofloxacin <=0.25 Sensitive      Gentamicin <=1 Sensitive      Meropenem <=0.25 Sensitive      Levofloxacin 0.25 Sensitive      Nitrofurantoin 64 Intermediate      Piperacillin + Tazobactam <=4 Sensitive      Trimethoprim/Sulfa <=20 Sensitive          Imaging: Reviewed in Epic.    Medications:    metoprolol succinate  25 mg Oral 2x Daily(Beta Blocker)    sodium chloride  3 mL Nebulization TID    sennosides  8.6 mg Oral Nightly    polyethylene glycol (PEG 3350)  17 g Oral Daily    apixaban  5 mg Oral BID    sertraline  100 mg Oral Daily    insulin aspart  2-10 Units Subcutaneous TID AC and HS    clopidogrel  75 mg Oral Daily    levETIRAcetam  500 mg Oral BID    baclofen  5 mg Oral BID    multivitamin  1 tablet Oral Daily       Assessment & Plan:      #Septic shock   #UTI in setting of chronic tate -POA  -Shock resolved, off pressors  -continue Abx  -Cx with klebsiella    #TIP on CKD  -TIP improving  -Nephrology following     #Non-cardiogenic acute pulmonary edema in setting of chronic systolic heart failure  -Likely due to IVF administration - now stopped  -CXR still showing pulmonary edema    -Diuresis per renal     #A.fib with RVR  -Start cardizem gtt transitioned to PO BB  -DOAC  -Cardiology following  -Tele     #Hypoxia due to above  -Wean oxygen as able     #Dysphagia  -VFSS reviewed  -Modified diet per  SLP    #Normocytic anemia  #Thrombocytopenia  -Monitor     #Depression  -SSRI    #Transaminitis  -due to shock liver  -Resolved     #Hyperlipidemia  -statin    #DM type II  -hyperglycemia protocol    #Prior CVA-chronic left sided weakness  -Plavix/statin    #Seizure disorder   -Asia Chandler,       Supplementary Documentation:     Quality:  DVT Mechanical Prophylaxis:   SCDs,    DVT Pharmacologic Prophylaxis   Medication    apixaban (Eliquis) tab 5 mg                Code Status: DNAR/Full Treatment  Han: External urinary catheter in place  Han Duration (in days):   Central line:    DEEDEE:     Discharge is dependent on: progress  At this point Ms. Kim is expected to be discharge to: TBD    The 21st Century Cures Act makes medical notes like these available to patients in the interest of transparency. Please be advised this is a medical document. Medical documents are intended to carry relevant information, facts as evident, and the clinical opinion of the practitioner. The medical note is intended as peer to peer communication and may appear blunt or direct. It is written in medical language and may contain abbreviations or verbiage that are unfamiliar.

## 2024-12-16 NOTE — SLP NOTE
SPEECH DAILY NOTE - INPATIENT    ASSESSMENT & PLAN   ASSESSMENT  Pt seen for dysphagia tx to assess tolerance with recommended diet, ensure appropriate utilization of aspiration precautions and provide pt/family education. Pt found lying down in bed sleeping; easily awakened and participated in therapeutic tasks adequately. Reviewed video swallow study results with good recall by patient observed. Head of bed elevated close to 90 degrees and patient assisted with po trials of thin liquids via single sips from cup. Good tolerance, clear vocal quality and no overt clinical s/s of aspiration observed. Pt politely refused po trials of pureed at this time. Reviewed and reiterated importance of aspiration precautions with good understanding reported. Will continue to follow as per plan.     Diet Recommendations - Solids: Puree  Diet Recommendations - Liquids: Thin Liquids    Compensatory Strategies Recommended: Alternate consistencies  Aspiration Precautions: Upright position;Small bites;Small sips;1:1 feeding  Medication Administration Recommendations: Crushed in puree    Patient Experiencing Pain: No                Treatment Plan  Treatment Plan/Recommendations: Dysphagia therapy;Aspiration precautions                 GOALS  Goal #1 The patient will tolerate puree consistency and thin liquids without overt signs or symptoms of aspiration with 90 % accuracy over 3-5 session(s). In progress   Goal #2 The patient/family/caregiver will demonstrate understanding and implementation of aspiration precautions and swallow strategies independently over 3-5 session(s).    In progress   Goal #3 Assess need for VFSS within 1-2 visits MET   Goal #4 Complete VFSS to further assess oropharyngeal swallow and assist with dysphagia recommendations MET       FOLLOW UP  Follow Up Needed (Documentation Required): Yes  SLP Follow-up Date: 12/17/24  Duration: 1 week    Session: 1    If you have any questions, please contact Jaye BARRIOS  Manuel Jackson MA, CCC-SLP  Pager [phone]

## 2024-12-16 NOTE — PLAN OF CARE
Pt A&Ox3. Pt with L arm contracture and L facial drop form known CVA. Pt requiring 3 L NC at this time.  in place. Lungs dim with crackles. Chest xray ordered. Afib with frequent pvc's on tele. Pt had Incont bm today. Mepilex on sacrum replaced. Incont of urine. Purewick in place. Pt requires total assistance. 1:1 feed for meals. K replaced per renal. QID gluc. Repositioned q2. Seizure precautions and aspiration precautions in place.          Problem: Patient/Family Goals  Goal: Patient/Family Long Term Goal  Description: Patient's Long Term Goal: Go home with little to no complications    Interventions:  - Follow nursing/physician recommendations  - See additional Care Plan goals for specific interventions  Outcome: Progressing  Goal: Patient/Family Short Term Goal  Description: Patient's Short Term Goal: Get through hospital stay    Interventions:   - Follow nursing/physician recommendations  - See additional Care Plan goals for specific interventions  Outcome: Progressing     Problem: CARDIOVASCULAR - ADULT  Goal: Maintains optimal cardiac output and hemodynamic stability  Description: INTERVENTIONS:  - Monitor vital signs, rhythm, and trends  - Monitor for bleeding, hypotension and signs of decreased cardiac output  - Evaluate effectiveness of vasoactive medications to optimize hemodynamic stability  - Monitor arterial and/or venous puncture sites for bleeding and/or hematoma  - Assess quality of pulses, skin color and temperature  - Assess for signs of decreased coronary artery perfusion - ex. Angina  - Evaluate fluid balance, assess for edema, trend weights  Outcome: Progressing     Problem: RESPIRATORY - ADULT  Goal: Achieves optimal ventilation and oxygenation  Description: INTERVENTIONS:  - Assess for changes in respiratory status  - Assess for changes in mentation and behavior  - Position to facilitate oxygenation and minimize respiratory effort  - Oxygen supplementation based on oxygen saturation or  ABGs  - Provide Smoking Cessation handout, if applicable  - Encourage broncho-pulmonary hygiene including cough, deep breathe, Incentive Spirometry  - Assess the need for suctioning and perform as needed  - Assess and instruct to report SOB or any respiratory difficulty  - Respiratory Therapy support as indicated  - Manage/alleviate anxiety  - Monitor for signs/symptoms of CO2 retention  Outcome: Progressing     Problem: Diabetes/Glucose Control  Goal: Glucose maintained within prescribed range  Description: INTERVENTIONS:  - Monitor Blood Glucose as ordered  - Assess for signs and symptoms of hyperglycemia and hypoglycemia  - Administer ordered medications to maintain glucose within target range  - Assess barriers to adequate nutritional intake and initiate nutrition consult as needed  - Instruct patient on self management of diabetes  Outcome: Progressing     Problem: RISK FOR INFECTION - ADULT  Goal: Absence of fever/infection during anticipated neutropenic period  Description: INTERVENTIONS  - Monitor WBC  - Administer growth factors as ordered  - Implement neutropenic guidelines  Outcome: Progressing

## 2024-12-16 NOTE — PROGRESS NOTES
[facility]  Nephrology Progress Note    Carla Kim Attending:  Chitra Molina MD       Assessment and Plan:    1) TIP- due to septic shock / dehydration; imaging without obstruction / meds benign. Improving    2) CKD 3- baseline Cr < 1.5 mg/dl due to DM / HTN    3) Septic shock / Klebsiella urosepsis- in setting of chronic Han; completed abx (ancef)    4) Transaminitis + coagulopathy- c/w shock liver; resolved    5) h/o CVA with L sided weakness + Sz on keppra    6) HFrEF / ischemic CM EF 45%- does not appear volume up by exam but remains hypoxic and requiring supplemental O2    7) HTN- normally on amlodipine / losartan / prn POWERS (all held)    8) DM 2    9) AF with RVR- rate control per cards    CXR c/w pulm edema -> diurese      Subjective:  Awake on 2-3 L NC O2 HR 90s    Physical Exam:   /77   Pulse 91   Temp 99.3 °F (37.4 °C) (Oral)   Resp 22   Ht 5' 5\" (1.651 m)   Wt 153 lb 9.6 oz (69.7 kg)   SpO2 92%   BMI 25.56 kg/m²   Temp (24hrs), Av.3 °F (36.8 °C), Min:97.5 °F (36.4 °C), Max:99.3 °F (37.4 °C)       Intake/Output Summary (Last 24 hours) at 2024 0807  Last data filed at 2024 0411  Gross per 24 hour   Intake 1370 ml   Output 450 ml   Net 920 ml     Wt Readings from Last 3 Encounters:   24 153 lb 9.6 oz (69.7 kg)   23 150 lb (68 kg)   10/25/22 151 lb 8 oz (68.7 kg)     General: awake   HEENT: No scleral icterus, MMM  Neck: Supple, no MEGHANA or thyromegaly  Cardiac: Regular rate and rhythm, S1, S2 normal, no murmur or tub  Lungs: Decreased BS at bases bilaterally   Abdomen: Soft, non-tender. + bowel sounds, no palpable organomegaly  Extremities: Without clubbing, cyanosis; no edema  Neurologic: Cranial nerves grossly intact, moving all extremities  Skin: Warm and dry, no rashes       Labs:   Lab Results   Component Value Date    CREATSERUM 1.76 2024    BUN 40 2024     2024    K 3.4 2024     2024    CO2 27.0 2024      12/16/2024    CA 8.4 12/16/2024    ALB 3.1 12/16/2024    ALKPHO 91 12/16/2024    BILT 0.5 12/16/2024    TP 6.3 12/16/2024     12/16/2024    ALT 32 12/16/2024    PGLU 142 12/16/2024       Imaging:  All imaging studies reviewed.    Meds:   Current Facility-Administered Medications   Medication Dose Route Frequency    metoprolol succinate ER (Toprol XL) 24 hr tab 25 mg  25 mg Oral 2x Daily(Beta Blocker)    sodium chloride 3 % nebulizer solution 3 mL  3 mL Nebulization TID    sennosides (Senokot) tab 8.6 mg  8.6 mg Oral Nightly    polyethylene glycol (PEG 3350) (Miralax) 17 g oral packet 17 g  17 g Oral Daily    apixaban (Eliquis) tab 5 mg  5 mg Oral BID    sertraline (Zoloft) tab 100 mg  100 mg Oral Daily    acetaminophen (Tylenol Extra Strength) tab 500 mg  500 mg Oral Q4H PRN    melatonin tab 3 mg  3 mg Oral Nightly PRN    polyethylene glycol (PEG 3350) (Miralax) 17 g oral packet 17 g  17 g Oral Daily PRN    sennosides (Senokot) tab 17.2 mg  17.2 mg Oral Nightly PRN    bisacodyl (Dulcolax) 10 MG rectal suppository 10 mg  10 mg Rectal Daily PRN    fleet enema (Fleet) rectal enema 133 mL  1 enema Rectal Once PRN    glucose (Dex4) 15 GM/59ML oral liquid 15 g  15 g Oral Q15 Min PRN    Or    glucose (Glutose) 40% oral gel 15 g  15 g Oral Q15 Min PRN    Or    glucose-vitamin C (Dex-4) chewable tab 4 tablet  4 tablet Oral Q15 Min PRN    Or    dextrose 50% injection 50 mL  50 mL Intravenous Q15 Min PRN    Or    glucose (Dex4) 15 GM/59ML oral liquid 30 g  30 g Oral Q15 Min PRN    Or    glucose (Glutose) 40% oral gel 30 g  30 g Oral Q15 Min PRN    Or    glucose-vitamin C (Dex-4) chewable tab 8 tablet  8 tablet Oral Q15 Min PRN    insulin aspart (NovoLOG) 100 Units/mL FlexPen 2-10 Units  2-10 Units Subcutaneous TID AC and HS    clopidogrel (Plavix) tab 75 mg  75 mg Oral Daily    levETIRAcetam (Keppra) tab 500 mg  500 mg Oral BID    influenza virus trivalent high dose PF (Fluzone HD) 0.5 mL injection (ages >/=  65 years) 0.5 mL  0.5 mL Intramuscular Prior to discharge    baclofen (Lioresal) tab 5 mg  5 mg Oral BID    multivitamin (Tab-A-Odilon/Beta Carotene) tab 1 tablet  1 tablet Oral Daily         Questions/concerns were discussed with patient and/or family by bedside.          Vicky Perez MD  12/16/2024  807 AM

## 2024-12-16 NOTE — PROGRESS NOTES
Progress Note  Carla Kim Patient Status:  Inpatient    1950 MRN [id number]   Location [facility] 8NE-A Attending Rafa Chandler DO   Hosp Day # 10 PCP No primary care provider on file.     Subjective:  She is laying flat comfortably. Denies shortness of breath. Reportedly has been on 3 L while awake during the day without baseline oxygen requirements. She denies chest pain.     Objective:  /54 (BP Location: Left arm)   Pulse 109   Temp 99.3 °F (37.4 °C) (Oral)   Resp 22   Ht 5' 5\" (1.651 m)   Wt 153 lb 9.6 oz (69.7 kg)   SpO2 94%   BMI 25.56 kg/m²     Intake/Output:    Intake/Output Summary (Last 24 hours) at 2024 0616  Last data filed at 2024 0411  Gross per 24 hour   Intake 1370 ml   Output 450 ml   Net 920 ml       Last 3 Weights   24 0411 153 lb 9.6 oz (69.7 kg)   12/15/24 0403 146 lb 6.2 oz (66.4 kg)   12/10/24 0500 156 lb 1.4 oz (70.8 kg)   24 0200 155 lb 3.3 oz (70.4 kg)   24 0400 151 lb 0.2 oz (68.5 kg)   24 0600 155 lb 6.8 oz (70.5 kg)   24 0508 151 lb 10.8 oz (68.8 kg)   24 2327 149 lb 14.6 oz (68 kg)   23 0952 150 lb (68 kg)   10/25/22 0524 151 lb 8 oz (68.7 kg)   10/24/22 0623 156 lb 1.6 oz (70.8 kg)   10/23/22 0300 151 lb 14.4 oz (68.9 kg)   10/21/22 2108 149 lb 7.6 oz (67.8 kg)       Labs:  Recent Labs   Lab 24  0505 12/15/24  0526 24  0514   * 116* 130*   BUN 38* 38* 40*   CREATSERUM 1.99* 1.85* 1.76*   EGFRCR 26* 28* 30*   CA 8.7 8.5* 8.4*    140 138   K 3.3*  3.3* 3.8 3.4*    105 104   CO2 27.0 27.0 27.0     Recent Labs   Lab 12/10/24  0505 24  0454   RBC 3.47* 3.50*   HGB 10.1* 10.0*   HCT 30.7* 30.5*   MCV 88.5 87.1   MCH 29.1 28.6   MCHC 32.9 32.8   RDW 16.8 17.0   WBC 7.3 7.1   .0* 145.0*         No results for input(s): \"TROP\", \"TROPHS\", \"CK\" in the last 168 hours.    Diagnostics:   Telemetry: AFIB rates controlled, PVCs    TTE 24  1. Left ventricle: The  cavity size was normal. Wall thickness was normal.      Systolic function was mildly reduced. The estimated ejection fraction was      45%, by visual assessment. Unable to assess LV diastolic function due to      heart rhythm. There was no evidence of a thrombus revealed by acoustic      contrast opacification.   2. Left ventricle: There is hypokinesis of the basal-mid inferoseptal and      basal-mid inferior walls.   3. Left atrium: The atrium was moderately dilated.   4. Mitral valve: There was mild regurgitation.   5. Pulmonary arteries: Systolic pressure was mildly to moderately increased.      The peak systolic pressure is 35mm Hg.   6. Inferior vena cava: The IVC was normally collapsible and normal-sized.     Review of Systems   Cardiovascular:  Negative for chest pain.   Respiratory:  Negative for shortness of breath.        Physical Exam:  General: Alert and oriented in no apparent distress.  HEENT: Pupils equal. Mucous membranes moist.   Neck: No JVD  Cardiac:  Normal S1 S2, irreg irreg. No murmur  Lungs: Clear without wheezes or crackles    Abdomen: Soft, non-tender, ND  Extremities: Without clubbing, cyanosis or edema.    Neurologic: No focal deficits. Normal affect.  Skin: Warm and dry,    Medications:   dilTIAZem ER  120 mg Oral Daily    sodium chloride  3 mL Nebulization TID    sennosides  8.6 mg Oral Nightly    polyethylene glycol (PEG 3350)  17 g Oral Daily    apixaban  5 mg Oral BID    sertraline  100 mg Oral Daily    insulin aspart  2-10 Units Subcutaneous TID AC and HS    clopidogrel  75 mg Oral Daily    levETIRAcetam  500 mg Oral BID    baclofen  5 mg Oral BID    multivitamin  1 tablet Oral Daily      dilTIAZem Stopped (12/15/24 1640)       Assessment:    Septic shock secondary UTI- Completed antibiotics. Off pressors.   Persistent AFIB  She has been in AF since ~6/2024  AF RVR on presentation in setting of sepsis. Has been on diltiazem gtt. Rates now reasonably controlled on PO diltiazem 120 mg  daily. Transition to Toprol-XL 25 mg BID given ICM/mildly reduced EF  Anticoagulated on Eliquis 5 mg BID  Consideration for rhythm control as outpatient at follow-up  TIP on CKD - improving, down-trending back near baseline 1.5, due to septic shock  Acute hypoxic respiratory failure   Currently on 3 L NC. Documented on 3 L awake and room air awake yesterday. RN to attempt to wean to room air. If unable, can repeat CXR  CAD history of CABG x 4 (1996), patent LIMA-LAD, all SVG occluded, prior HUGH to LM and Lcx  Denies angina  Plavix, eliquis, statin held with elevated LFTs  New cardiomyopathy, LVEF 45%  Appears euvolemic. IV lasix was discontinued yesterday  LVEF down 45% (preserved EF on prior 2022). Possibly ICM with CAD and occluded SVG history vs NICM with sepsis/AF RVR presentation  GDMT: Start Toprol-XL. Can consider eventual MRA/ARB/ARNI, not today with resolving TIP. Would avoid SGLT2 with urosepsis presentation and chronic tate  Recommend eventual ischemic evaluation in outpatient setting  Hx of R ICA stent 2021  HTN - controlled  T2DM  HLD - statin held with elevated LFTs  Dysphagia  Mild transaminits - improving  Anemia, tcp - stable, chronic    Plan:    Currently on 3 L NC. Documented on 3 L awake and room air awake yesterday. RN to attempt to wean to room air. If unable, can repeat CXR later this morning. Appears relatively euvolemic clinically. IV lasix was discontinued yesterday by nephrology. She is net positive this admission but felt to be dehydrated on presentation.  Change PO diltiazem 120 mg daily to Toprol-XL 25 mg BID given mildly reduced EF/CAD history  Home statin held with transaminitis. Repeat LFTs and will resume statin today if normalized  Renal function continues to improve  Anticipate nearing discharge, possibly tomorrow from CV standpoint.    Plan of care discussed with patient, RN.    YU Patterson  12/16/2024  6:16 AM

## 2024-12-16 NOTE — HISTORICAL OFFICE NOTE
Facility Logo [facility]  [address], 4th floor, [place] [address]  [phone]      Carla Kim  Progress Note  Demographics:  Name: Carla Kim YOB: 1950  Age: 72, Female Medical Record No: [id number]  Visited Date/Time: 09/06/2022 01:45 PM    Chief Complaints  Follow up CAD    History of Present Illness  Ms. Kim is a pleasant 72-year-old lady with history of myocardial infarction with coronary artery  disease and stenting. She had a redo bypass surgery with an ejection fraction of 50%. She has had  hypertension as well as COPD. She has a smoking history, diabetes and significant carotid disease  and has seen Dr. Mchugh with carotid endarterectomy. She had a CVA with hemiparesis. Currently,  she denies any angina or heart failure symptoms.  She quit smoking.  She definitely feels a lot better.  She still has hemiparesis after her stroke.  Carotid assessment is stable.  She stated that she does not want to have any further cardiac surgery or any cardiac work-up or evaluation,  just intermittent blood work. We will respect her wishes.    MCT  1. This is a poor quality study.   2. Predominant rhythm is sinus bradycardia.   3. The minimum heart rate recorded was 44 beats / minute. The maximum heart rate is 125 beats / minute. The mean heart rate is 58 beats / minute.   4. Afib burden 0%    We talked about a Watchman device but she does not want to pursure that.   She had a burst of afib when she was in the hospital but has been in SR since then.   she has not wanted to be on coumadin/Xarelto/Eliquis.  Cardiac risk factors Diabetes, Hypertension, Dyslipidemia, Physical inactivity and Former smoker  Risk equivalent CVA/Carotid Disease  Past Medical History  1.Carotid artery stenosis  2.Family history of coronary arteriosclerosis  3.Pure hypercholesterolemia  4.Hypertension, benign  5.CHF (congestive heart failure)  6.CKD (chronic kidney disease) stage 3,  GFR 30-59 ml/min  7.COPD (chronic obstructive pulmonary disease)  8.History of CVA with residual deficit  9.CAD (coronary artery disease)  Past Surgical History  1.Hx of CABG  2.History of carotid endarterectomy  Family History  1. Father - CAD native (coronary artery disease)  2. Mother - CAD native (coronary artery disease)  3. Brother - CAD native (coronary artery disease)  Social History  Smoking status Former [id number] (End Date)  Tobacco usage - No (Current non-smoker (finding))  Alcohol usage - Yes (None)  Review of systems  Cardiovascular No history of Chest pain, KHAN, Palpitations, Syncope, PND, Orthopnea, Edema and Claudication  Genitourinary No history of Dysuria, Hematuria, Frequency and ED  Musculoskeletal No history of Myalgias and Arthritis  Respiratory SOB  No history of Wheezing and Sputum  Physical Examination  Vitals Right Arm Sitting  / 68 mmHg, Pulse rate 68 bpm, Height in 5' 5\", BMI: 26.6, Weight in 160 lbs (or) 73 kgs and BSA : 1.84 cc/m²  General Appearance No Acute Distress and Appropriate  Head/Eyes/Ears/Nose/Mouth/Throat Conjunctiva pink, Sclera Clear, Mucous membranes Moist and No pallor  Neck No carotid bruits and No JVD  Respiratory Lungs clear with normal breath sounds  Cardiovascular Intact distal pulses and Regular rhythm. Normal rate present. Normal and normal S1 and S2    Gastrointestinal Abdomen soft and Non-tender  Musculoskeletal generalized weakness post cva  Upper Extremities No edema  Lower Extremities has a brace. wheelchair bound.  Skin Warm and dry  Neurologic / Psychiatric hemiparesis  Allergies  1.atorvastatin calcium - Ingredient(Reaction:Myalgia, Severity:Unknown)  2.bupropion - Ingredient(Reaction:Tremors, Severity:Unknown)  Medications (Info obtained by: List)  1.acetaminophen 325 mg tablet, Take 2 tablets orally every 4-6 hours as needed.  2.amLODIPine 10 mg tablet, Take 1 tablet orally once a day.  3.aspirin 81 MG EC tablet, Take 81 mg by mouth  daily.  4.baclofen 5 mg tablet, Take 1 tablet orally 3 times a day.  5.bethanechol chloride 25 mg tablet, Take 1 tablet orally 3 times a day.  6.clopidogrel (PLAVIX) 75 MG tablet, Take 1 tablet by mouth daily.  7.ezetimibe (ZETIA) 10 MG tablet, TAKE 1 TABLET BY MOUTH NIGHTLY GENERIC EQUIVALENT FOR ZETIA  8.furosemide 20 mg tablet, Take 1 tablet orally once a day.  9.heparin (porcine) 5,000 unit/mL injection syringe, Syringe (injection) every 8 hours.  10.hydrALAZINE 100 mg tablet, Take 1 tablet orally 2 times a day.  11.insulin aspart (U-100) 100 unit/mL (3 mL) subcutaneous pen, As directed by physician orders  12.insulin glargine (LANTUS SOLOSTAR) 100 UNIT/ML pen-injector, INJECT 10 UNITS UNDER THE SKIN EVERY MORNING  13.levETIRAcetam (KEPPRA) 500 MG tablet, Take 500 mg by mouth 2 times daily.  14.losartan 50 mg tablet, Take 1 tablet orally 2 times a day.  15.rosuvastatin 40 mg tablet, Take 1 tablet orally once a day.  16.sertraline (ZOLOFT) 100 MG tablet, TAKE 1 TABLET BY MOUTH DAILY  17.spironolactone 25 mg tablet, Take one-half tablet orally once a day.  18.insulin glargine-yfgn (U-100) 100 unit/mL (3 mL) subcutaneous pen, Inject as directed by physician orders  19.levETIRAcetam 500 mg tablet, Take 1 tablet orally 2 times a day.  20.losartan 50 mg tablet, Take 1 tablet orally 2 times a day.  21.metoprolol succinate ER 25 mg tablet,extended release 24 hr, Take one-half tablet orally once a day.  22.Miralax 17 gram/dose oral powder, Take 17 gm (oral) once a day.  23.Protonix 40 mg tablet,delayed release, Take 1 tablet orally once a day.  24.rosuvastatin 40 mg tablet, Take 1 tablet orally once a day.  25.Senna Laxative 8.6 mg tablet, Take 2 tablets orally once in the evening.  26.spironolactone 25 mg tablet, Take one-half tablet orally once a day.  27.tamsulosin 0.4 mg capsule, Take 1 capsule orally once a day.  Impression  1.Carotid artery stenosis  2.Hx of CABG  3.Pure hypercholesterolemia  4.Hypertension,  benign  5.CHF (congestive heart failure)  6.COPD (chronic obstructive pulmonary disease)  7.History of carotid endarterectomy  8.History of CVA with residual deficit  9.CAD (coronary artery disease)  Assessment & Plan  Ms. Kim is a pleasant 72-year-old lady with a history of myocardial infarction with coronary artery disease and stenting.  She also had a history of bypass surgery and he has an ejection fraction of 50%.  She has COPD as well as hypertension and dyslipidemia as well as diabetes.  She has had significant carotid disease and had a carotid endarterectomy with Dr. Mchugh.  She also is mainly wheelchair-bound given CVA with hemiparesis.  As stated earlier, she does not want to pursue any further cardiac work-up, evaluation or testing and no further cardiac intervention.  We will respect her wishes.  Her next blood work will be in 6 months (CBC, CMP, fasting lipid panel, TSH).  She is mainly wheelchair-bound but would try to have rehab as best as she can at the center that she is living.  Please return to clinic to see me in 6 months.  Labs and Diagnostics ordered  1.CBC w/ Differential (6 Months)  2.CMP (6 Months)  3.Lipid panel, Fasting (6 Months)  4.TSH (Thyroid Stimulating Hormone) Panel (6 Months)  Future appointments  1.Referral Visit - Adam Schriedel ([email or web address]) : (Today)  2.Follow up visit - Lorin Linares (6 Months)  Nurses documentation  Refills:  Upcoming surgeries:  Use of assistive device:   No s/s of Covid-19     Patient instructions  Ms. Kim is a pleasant 72-year-old lady with a history of myocardial infarction with coronary artery disease and stenting.  She also had a history of bypass surgery and he has an ejection fraction of 50%.  She has COPD as well as hypertension and dyslipidemia as well as diabetes.  She has had significant carotid disease and had a carotid endarterectomy with Dr. Mchugh.  She also is mainly wheelchair-bound given CVA with hemiparesis.   As stated earlier, she does not want to pursue any further cardiac work-up, evaluation or testing and no further cardiac intervention.  We will respect her wishes.  Her next blood work will be in 6 months (CBC, CMP, fasting lipid panel, TSH).  She is mainly wheelchair-bound but would try to have rehab as best as she can at the center that she is living.  Please return to clinic to see me in 6 months.    Labs Fasting:   Your provider has ordered bloodwork of CBC,CMP, FLP and TSH to be drawn 1-2 weeks prior to your 6 month follow up with .  You will need to be fasting, nothing to eat or drink after midnight prior to the blood work. You are allowed to take your medications with a sip of water. If you are on Insulin please TAKE/HOLD your Insulin.     Follow up appointment scheduled:   Your provider has requested for you to follow up in 6 months.  An appointment will be made for you as you leave your visit.  It is always important to bring all your medications including over the counter medications, vitamins and supplements to your doctor visits. This will assist in providing the best care for your condition. Please bring your insurance cards to your appointment.        Diagnostics Details  ACTMonitoring 07/13/2022  1.This is a poor quality study.    2.Predominant rhythm is sinus bradycardia.    3.The minimum heart rate recorded was 44 beats / minute. The maximum heart rate is 125 beats / minute. The mean heart rate is 58 beats / minute.    4.Afib burden 0%    CPOE Orders carried out by: Promise MARTINEZ  Care Providers: Lorin Linares MD and Promise MARTINEZ  Electronically Authenticated by  Lorin Linares MD  09/06/2022 02:54:10 PM  Disclaimer: Components of this note were documented using voice recognition system and are subject to errors not corrected at proofreading. Contact the author of this note for any clarifications.

## 2024-12-16 NOTE — PLAN OF CARE
Patient is alert & oriented x 3, confused at times. On 4L nasal cannula at night. Total assist, q2 turns. Afib on tele monitor. Rates controlled. Incontinent of bowel and bladder. Purewick changed and in place. Mepilex on sacrum changed. Patient denies any pain, shortness of breath, or chest pain/discomfort. Updated on plan of care. Fall precautions in place. Call light in reach.    Problem: CARDIOVASCULAR - ADULT  Goal: Maintains optimal cardiac output and hemodynamic stability  Description: INTERVENTIONS:  - Monitor vital signs, rhythm, and trends  - Monitor for bleeding, hypotension and signs of decreased cardiac output  - Evaluate effectiveness of vasoactive medications to optimize hemodynamic stability  - Monitor arterial and/or venous puncture sites for bleeding and/or hematoma  - Assess quality of pulses, skin color and temperature  - Assess for signs of decreased coronary artery perfusion - ex. Angina  - Evaluate fluid balance, assess for edema, trend weights  Outcome: Progressing    Problem: RESPIRATORY - ADULT  Goal: Achieves optimal ventilation and oxygenation  Description: INTERVENTIONS:  - Assess for changes in respiratory status  - Assess for changes in mentation and behavior  - Position to facilitate oxygenation and minimize respiratory effort  - Oxygen supplementation based on oxygen saturation or ABGs  - Provide Smoking Cessation handout, if applicable  - Encourage broncho-pulmonary hygiene including cough, deep breathe, Incentive Spirometry  - Assess the need for suctioning and perform as needed  - Assess and instruct to report SOB or any respiratory difficulty  - Respiratory Therapy support as indicated  - Manage/alleviate anxiety  - Monitor for signs/symptoms of CO2 retention  Outcome: Progressing     Problem: Diabetes/Glucose Control  Goal: Glucose maintained within prescribed range  Description: INTERVENTIONS:  - Monitor Blood Glucose as ordered  - Assess for signs and symptoms of hyperglycemia  and hypoglycemia  - Administer ordered medications to maintain glucose within target range  - Assess barriers to adequate nutritional intake and initiate nutrition consult as needed  - Instruct patient on self management of diabetes  Outcome: Progressing     Problem: RISK FOR INFECTION - ADULT  Goal: Absence of fever/infection during anticipated neutropenic period  Description: INTERVENTIONS  - Monitor WBC  - Administer growth factors as ordered  - Implement neutropenic guidelines  Outcome: Progressing

## 2024-12-16 NOTE — DIETARY NOTE
[facility]   part of [facility]  NUTRITION ASSESSMENT    Unable to diagnose malnutrition criteria at this time.    NUTRITION INTERVENTION:    Meal and Snacks - monitor patient po intake. Encourage adequate po of appropriate diet. Pureed w/ Thin Liquids per SLP.  Medical Food Supplements - Ben Ensure Plus High Protein BID; at breakfast and lunch and ben Magic Cup at dinner (provides: 990 kcal, 49 gm protein).  Vitamin and Mineral Supplements - continue Multivitamin with minerals  Coordination of Nutrition Care - Recommend SLP consult prior to diet advancement.    PATIENT STATUS:   Late entry, pt seen 12/16. Pt reported okay appetite. Per RN today, pt only took a few bites of yogurt and some ensure. Intake overall variable:5-95% over last few days. Still requiring puree consistency, 1;1 feeding. Nonpitting generalized edema. PI to sacrum. Continue to encourage. +BM    12/12- Pt sound asleep at time of visit. Pt developed hypoxia over night. On 2 L NC. SLP re-evaluated pt w/ continued diet recs for Puree with Thin Liquids. Spoke w/ PCT who noted pt w/ poor PO intakes. Only took a few bites of oatmeal and a couple sips of milk for breakfast this AM. Did not drink Ensure. Pt stated she was not hungry for lunch yet. Recorded PO intakes vary:0-75% w/ 50% or less most meals. ONS documented at 50%. Wt appears to be trending up. Receiving Lasix. Palliative Care is following for ongoing GOC discussions. If PO intakes do not improve, may need to consider EN if aggressive measures wish to be taken.     [birth year]74 year old female admitted on 12/5 presents with hypotension. Pt screened d/t MST score 2. Visited pt at bedside. Pt very lethargic during visit and unable to provide any meaningful history. Requiring levophed. Per chart review, no recent wt loss recorded. SLP evaluated this AM d/t hx stroke - recommends pureed solids and thin liquids with 1:1 feeding support. No GI symptoms noted with last BM today. NKFA. No  noticeable muscle wasting or fat loss. Offered ONS and she is agreeable to chocolate. Will continue to monitor and follow up as appropriate.    PMH: Anxiety, Atherosclerosis, COPD, HEART ATTACK, High cholesterol, depression, Paroxysmal atrial fibrillation, Peripheral vascular disease, Seizure, Stroke, Type II DM, essential hypertension, sleep apnea    ANTHROPOMETRICS:  Ht: 165.1 cm (5' 5\")  Wt: 69.7 kg (153 lb 9.6 oz).   BMI: Body mass index is 25.56 kg/m².  IBW: 56.8 kg    WEIGHT HISTORY:  Patient Weight(s) for the past 336 hrs:   Weight   12/16/24 0411 69.7 kg (153 lb 9.6 oz)   12/15/24 0403 66.4 kg (146 lb 6.2 oz)   12/10/24 0500 70.8 kg (156 lb 1.4 oz)   12/09/24 0200 70.4 kg (155 lb 3.3 oz)   12/08/24 0400 68.5 kg (151 lb 0.2 oz)   12/07/24 0600 70.5 kg (155 lb 6.8 oz)   12/06/24 0508 68.8 kg (151 lb 10.8 oz)   12/05/24 2327 68 kg (149 lb 14.6 oz)       Wt Readings from Last 10 Encounters:   12/16/24 69.7 kg (153 lb 9.6 oz)   01/11/23 68 kg (150 lb)   10/25/22 68.7 kg (151 lb 8 oz)   06/22/22 78.4 kg (172 lb 13.5 oz)   09/13/21 70.3 kg (155 lb)   07/01/20 70.3 kg (155 lb)   06/12/20 64 kg (141 lb)   10/10/19 64 kg (141 lb)   06/27/19 64 kg (141 lb)   06/16/19 64.4 kg (141 lb 14.4 oz)        NUTRITION:  Diet:       Procedures    Carbohydrate controlled diet 1800 kcal/60 grams; Fluid Consistency: Thin Liquids ; Texture Consistency: Pureed; Is Patient on Accuchecks? Yes; Is Patient on Suicide Precautions? No      Food Allergies: No  Cultural/Ethnic/Christian Preferences Addressed: Yes    Percent Meals Eaten (last 3 days)       Date/Time Percent Meals Eaten (%)    12/13/24 0918 25 %    12/13/24 1553 25 %    12/14/24 0954 5 %    12/14/24 1147 5 %    12/15/24 1046 25 %    12/15/24 1506 5 %    12/15/24 1717 95 %    12/16/24 0820 20 %          GI SYSTEM REVIEW: Last BM: 12/16    NUTRITION PRESCRIPTION:  70.4 kg Actual Body Weight  Calories: 1750--2100 calories/day (25-30 kcal/kg)  Protein: 70-90 grams protein/day (  1.0-1.3  gm/kg)  Fluid: ~1 ml/kcal or per MD discretion    NUTRITION DIAGNOSIS/PROBLEM:  Inadequate oral intake related to insufficient appetite resulting in inadequate nutrition intake as evidenced by documented/reported insufficient oral intake (continues).    MONITOR AND EVALUATE/NUTRITION GOALS:  PO intake of 75% of meals TID - Not met, Continues  PO intake of 75% of oral nutrition supplement/s - Not met, Continues  Weight stable within 1 to 2 lbs during admission - Ongoing      MEDICATIONS:   Lasix, Keppra, MVI, Kcl:20 meq, Miralax, Senokot    LABS:  POC Glu:132-190    Pt is at Moderate nutrition risk    Dory Peña RD, LDN  Clinical Nutrition  [id number]

## 2024-12-17 NOTE — PROGRESS NOTES
[facility]  Nephrology Progress Note    Carla Kim Attending:  Chitra Molina MD       Assessment and Plan:    1) TIP- due to septic shock / dehydration; imaging without obstruction / meds benign. Improving    2) CKD 3- baseline Cr < 1.5 mg/dl due to DM / HTN    3) Septic shock / Klebsiella urosepsis- in setting of chronic Han; completed abx (ancef)    4) Transaminitis + coagulopathy- c/w shock liver; resolved    5) h/o CVA with L sided weakness + Sz on keppra    6) HFrEF / ischemic CM EF 45%- excellent UO overnight, now on 1 L NC O2- continue IV lasix x 24 hrs and repeat CXR in AM    7) HTN- normally on amlodipine / losartan / prn POWERS (all held)    8) DM 2    9) AF with RVR- rate control per cards      Subjective:  Awake on 1 L NC O2 HR 90s; 2.1 L UO overnight    Physical Exam:   BP 92/53 (BP Location: Left arm)   Pulse 84   Temp 98.9 °F (37.2 °C) (Oral)   Resp 21   Ht 5' 5\" (1.651 m)   Wt 155 lb 4.8 oz (70.4 kg)   SpO2 90%   BMI 25.84 kg/m²   Temp (24hrs), Av.6 °F (37 °C), Min:98.4 °F (36.9 °C), Max:98.9 °F (37.2 °C)       Intake/Output Summary (Last 24 hours) at 2024 0908  Last data filed at 2024 0855  Gross per 24 hour   Intake 1100 ml   Output 2600 ml   Net -1500 ml     Wt Readings from Last 3 Encounters:   24 155 lb 4.8 oz (70.4 kg)   23 150 lb (68 kg)   10/25/22 151 lb 8 oz (68.7 kg)     General: awake   HEENT: No scleral icterus, MMM  Neck: Supple, no MEGHANA or thyromegaly  Cardiac: Regular rate and rhythm, S1, S2 normal, no murmur or tub  Lungs: Decreased BS at bases bilaterally   Abdomen: Soft, non-tender. + bowel sounds, no palpable organomegaly  Extremities: Without clubbing, cyanosis; no edema  Neurologic: Cranial nerves grossly intact, moving all extremities  Skin: Warm and dry, no rashes       Labs:   Lab Results   Component Value Date    CREATSERUM 1.73 2024    BUN 43 2024     2024    K 3.2 2024     2024    CO2  27.0 12/17/2024     12/17/2024    CA 9.0 12/17/2024    ALB 3.1 12/17/2024    ALKPHO 106 12/17/2024    BILT 0.6 12/17/2024    TP 7.1 12/17/2024     12/17/2024    ALT 49 12/17/2024    PGLU 132 12/17/2024       Imaging:  All imaging studies reviewed.    Meds:   Current Facility-Administered Medications   Medication Dose Route Frequency    potassium chloride (Klor-Con M20) tab 40 mEq  40 mEq Oral Once    sodium chloride 3 % nebulizer solution 3 mL  3 mL Nebulization 2 times daily    furosemide (Lasix) 10 mg/mL injection 40 mg  40 mg Intravenous BID (Diuretic)    metoprolol succinate ER (Toprol XL) 24 hr tab 25 mg  25 mg Oral 2x Daily(Beta Blocker)    albuterol (Ventolin) (2.5 MG/3ML) 0.083% nebulizer solution 2.5 mg  2.5 mg Nebulization Q6H PRN    sennosides (Senokot) tab 8.6 mg  8.6 mg Oral Nightly    polyethylene glycol (PEG 3350) (Miralax) 17 g oral packet 17 g  17 g Oral Daily    apixaban (Eliquis) tab 5 mg  5 mg Oral BID    sertraline (Zoloft) tab 100 mg  100 mg Oral Daily    acetaminophen (Tylenol Extra Strength) tab 500 mg  500 mg Oral Q4H PRN    melatonin tab 3 mg  3 mg Oral Nightly PRN    polyethylene glycol (PEG 3350) (Miralax) 17 g oral packet 17 g  17 g Oral Daily PRN    sennosides (Senokot) tab 17.2 mg  17.2 mg Oral Nightly PRN    bisacodyl (Dulcolax) 10 MG rectal suppository 10 mg  10 mg Rectal Daily PRN    fleet enema (Fleet) rectal enema 133 mL  1 enema Rectal Once PRN    glucose (Dex4) 15 GM/59ML oral liquid 15 g  15 g Oral Q15 Min PRN    Or    glucose (Glutose) 40% oral gel 15 g  15 g Oral Q15 Min PRN    Or    glucose-vitamin C (Dex-4) chewable tab 4 tablet  4 tablet Oral Q15 Min PRN    Or    dextrose 50% injection 50 mL  50 mL Intravenous Q15 Min PRN    Or    glucose (Dex4) 15 GM/59ML oral liquid 30 g  30 g Oral Q15 Min PRN    Or    glucose (Glutose) 40% oral gel 30 g  30 g Oral Q15 Min PRN    Or    glucose-vitamin C (Dex-4) chewable tab 8 tablet  8 tablet Oral Q15 Min PRN    insulin  aspart (NovoLOG) 100 Units/mL FlexPen 2-10 Units  2-10 Units Subcutaneous TID AC and HS    clopidogrel (Plavix) tab 75 mg  75 mg Oral Daily    levETIRAcetam (Keppra) tab 500 mg  500 mg Oral BID    influenza virus trivalent high dose PF (Fluzone HD) 0.5 mL injection (ages >/= 65 years) 0.5 mL  0.5 mL Intramuscular Prior to discharge    baclofen (Lioresal) tab 5 mg  5 mg Oral BID    multivitamin (Tab-A-Odilon/Beta Carotene) tab 1 tablet  1 tablet Oral Daily         Questions/concerns were discussed with patient and/or family by bedside.          Vicky Perez MD  12/17/2024  908 AM

## 2024-12-17 NOTE — PROGRESS NOTES
[facility]   part of [facility]     Hospitalist Progress Note     Carla Kim Patient Status:  Inpatient    1950 MRN [id number]   Location [facility] 4SW-A Attending Chitra Molina MD   Hosp Day # 11 PCP No primary care provider on file.     Chief Complaint: septic shock    Subjective:     Patient seen and examined. No acute complaints overnight or this morning.     Objective:    Review of Systems:   A comprehensive review of systems was completed; pertinent positive and negatives stated in subjective.    Vital signs:  Temp:  [98.2 °F (36.8 °C)-98.9 °F (37.2 °C)] 98.2 °F (36.8 °C)  Pulse:  [] 89  Resp:  [18-22] 22  BP: ()/(34-70) 112/70  SpO2:  [90 %-96 %] 93 %    Physical Exam:    General: No acute distress  Respiratory: Diminished bilaterally.   Cardiovascular: Irregularly irregular.   Abdomen: Soft, Non-tender, non-distended, positive bowel sounds  Extremities: No edema      Diagnostic Data:    Labs:  Recent Labs   Lab 24  0454   WBC 7.1   HGB 10.0*   MCV 87.1   .0*   INR 1.62*       Recent Labs   Lab 24  0500 24  0548 12/15/24  0526 24  0514 24  0433   *   < > 116* 130* 132*   BUN 36*   < > 38* 40* 43*   CREATSERUM 2.05*   < > 1.85* 1.76* 1.73*   CA 8.4*   < > 8.5* 8.4* 9.0   ALB 3.1*  --   --  3.1* 3.1*      < > 140 138 133*   K 3.7   < > 3.8 3.4* 3.2*      < > 105 104 103   CO2 22.0   < > 27.0 27.0 27.0   ALKPHO 70  --   --  91 106   *  --   --  131* 151*   ALT 13  --   --  32 49   BILT 0.6  --   --  0.5 0.6   TP 6.0  --   --  6.3 7.1    < > = values in this interval not displayed.       Estimated Creatinine Clearance: 25.3 mL/min (A) (based on SCr of 1.73 mg/dL (H)).    No results for input(s): \"TROP\", \"TROPHS\", \"CK\" in the last 168 hours.      Recent Labs   Lab 24  0454   PTP 19.4*   INR 1.62*                  Microbiology    Hospital Encounter on 24   1. Blood Culture     Status: None     Collection Time: 12/06/24 12:10 AM    Specimen: Blood,peripheral   Result Value Ref Range    Blood Culture Result No Growth 5 Days N/A   2. Urine Culture, Routine     Status: Abnormal    Collection Time: 12/06/24 12:06 AM    Specimen: Urine, clean catch   Result Value Ref Range    Urine Culture >100,000 CFU/ML Klebsiella pneumoniae (A) N/A       Susceptibility    Klebsiella pneumoniae -  (no method available)     Ampicillin  Resistant      Ampicillin + Sulbactam 4 Sensitive      Cefazolin <=4 Sensitive      Ciprofloxacin <=0.25 Sensitive      Gentamicin <=1 Sensitive      Meropenem <=0.25 Sensitive      Levofloxacin 0.25 Sensitive      Nitrofurantoin 64 Intermediate      Piperacillin + Tazobactam <=4 Sensitive      Trimethoprim/Sulfa <=20 Sensitive          Imaging: Reviewed in Epic.    Medications:    sodium chloride  3 mL Nebulization 2 times daily    furosemide  40 mg Intravenous BID (Diuretic)    metoprolol succinate  25 mg Oral 2x Daily(Beta Blocker)    sennosides  8.6 mg Oral Nightly    polyethylene glycol (PEG 3350)  17 g Oral Daily    apixaban  5 mg Oral BID    sertraline  100 mg Oral Daily    insulin aspart  2-10 Units Subcutaneous TID AC and HS    clopidogrel  75 mg Oral Daily    levETIRAcetam  500 mg Oral BID    baclofen  5 mg Oral BID    multivitamin  1 tablet Oral Daily       Assessment & Plan:      #Septic shock   #UTI in setting of chronic tate -POA  -Shock resolved, off pressors  -continue Abx  -Cx with klebsiella    #TIP on CKD  -TIP improving  -Nephrology following     #Non-cardiogenic acute pulmonary edema in setting of chronic systolic heart failure  -Likely due to IVF administration - now stopped  -CXR still showing pulmonary edema    -Diuresis per renal     #A.fib with RVR  -Start cardizem gtt transitioned to PO BB  -DOAC  -Cardiology following  -Tele     #Hypoxia due to above  -Wean oxygen as able     #Dysphagia  -VFSS reviewed  -Modified diet per SLP    #Normocytic  anemia  #Thrombocytopenia  -Monitor     #Depression  -SSRI    #Transaminitis  -due to shock liver  -Resolved     #Hyperlipidemia  -statin    #DM type II  -hyperglycemia protocol    #Prior CVA-chronic left sided weakness  -Plavix/statin    #Seizure disorder   -Asia Chandler,       Supplementary Documentation:     Quality:  DVT Mechanical Prophylaxis:   SCDs,    DVT Pharmacologic Prophylaxis   Medication    apixaban (Eliquis) tab 5 mg                Code Status: DNAR/Full Treatment  Han: External urinary catheter in place  Han Duration (in days):   Central line:    DEEDEE:     Discharge is dependent on: progress  At this point Ms. Kim is expected to be discharge to: TBD    The 21st Century Cures Act makes medical notes like these available to patients in the interest of transparency. Please be advised this is a medical document. Medical documents are intended to carry relevant information, facts as evident, and the clinical opinion of the practitioner. The medical note is intended as peer to peer communication and may appear blunt or direct. It is written in medical language and may contain abbreviations or verbiage that are unfamiliar.

## 2024-12-17 NOTE — PLAN OF CARE
Patient is alert & oriented x 3, forgetful at times, on 3L nasal cannula. Left sided facial droop and left arm contracture from previous CVA. Total assist, q2 turns. Afib on tele monitor. Incontinent of bowel and bladder. Mepilex on sacrum changed. Purewick changed and in place. Patient denies any shortness of breath, or chest pain/discomfort. Pain managed with medication. IS encouraged, IV lasix, DW, wean O2. Seizure and aspiration precautions in place. Updated on plan of care and verbalized understanding. Fall precautions in place. Call light in reach.    Problem: CARDIOVASCULAR - ADULT  Goal: Maintains optimal cardiac output and hemodynamic stability  Description: INTERVENTIONS:  - Monitor vital signs, rhythm, and trends  - Monitor for bleeding, hypotension and signs of decreased cardiac output  - Evaluate effectiveness of vasoactive medications to optimize hemodynamic stability  - Monitor arterial and/or venous puncture sites for bleeding and/or hematoma  - Assess quality of pulses, skin color and temperature  - Assess for signs of decreased coronary artery perfusion - ex. Angina  - Evaluate fluid balance, assess for edema, trend weights  Outcome: Progressing     Problem: RESPIRATORY - ADULT  Goal: Achieves optimal ventilation and oxygenation  Description: INTERVENTIONS:  - Assess for changes in respiratory status  - Assess for changes in mentation and behavior  - Position to facilitate oxygenation and minimize respiratory effort  - Oxygen supplementation based on oxygen saturation or ABGs  - Provide Smoking Cessation handout, if applicable  - Encourage broncho-pulmonary hygiene including cough, deep breathe, Incentive Spirometry  - Assess the need for suctioning and perform as needed  - Assess and instruct to report SOB or any respiratory difficulty  - Respiratory Therapy support as indicated  - Manage/alleviate anxiety  - Monitor for signs/symptoms of CO2 retention  Outcome: Progressing     Problem:  Diabetes/Glucose Control  Goal: Glucose maintained within prescribed range  Description: INTERVENTIONS:  - Monitor Blood Glucose as ordered  - Assess for signs and symptoms of hyperglycemia and hypoglycemia  - Administer ordered medications to maintain glucose within target range  - Assess barriers to adequate nutritional intake and initiate nutrition consult as needed  - Instruct patient on self management of diabetes  Outcome: Progressing     Problem: RISK FOR INFECTION - ADULT  Goal: Absence of fever/infection during anticipated neutropenic period  Description: INTERVENTIONS  - Monitor WBC  - Administer growth factors as ordered  - Implement neutropenic guidelines  Outcome: Progressing

## 2024-12-17 NOTE — SLP NOTE
SPEECH DAILY NOTE - INPATIENT    ASSESSMENT & PLAN   ASSESSMENT  Pt seen for dysphagia tx to assess tolerance with recommended diet, ensure proper utilization of aspiration precautions and provide pt/family education.  Patient alert and up in bed. Her breathing appears unlabored. She recalled participating in VFSS but did not recall recommendations for no straw, etc. She was agreeable to trial solids to advance diet as her baseline diet is regular consistency solids and thin liquids. She was able to consume multiple trials of hard solids presented in bite size pieces by SLP. Mastication appeared adequate with mild residue though likely at least partially related to dry nature of trial and xerostomia. This reduced with liquid assist. There were no overt signs of aspiration.  Discussed recommendation to advance to soft/easy to chew solids and thin liquids  and continue thin liquids observing aspiration precautions. Given LUE impairment from previous stroke, recommend precut to allow for improved independence in self feeding. Updated RN. SLP will continue to follow.     Diet Recommendations - Solids: Soft/ Easy to chew (pre cut)  Diet Recommendations - Liquids: Thin Liquids    Compensatory Strategies Recommended: Alternate consistencies  Aspiration Precautions: Upright position;Small bites;Small sips;1:1 feeding  Medication Administration Recommendations: One pill at a time;Whole in puree;Crushed in puree (as tolerated)    Patient Experiencing Pain: No                Treatment Plan  Treatment Plan/Recommendations: Dysphagia therapy;Aspiration precautions    Interdisciplinary Communication: Discussed with RN            GOALS  Goal #1 The patient will tolerate soft/easy to chew (precut) consistency and thin liquids without overt signs or symptoms of aspiration with 90 % accuracy over 3-5 session(s). In progress  -updated 12/17   Goal #2 The patient/family/caregiver will demonstrate understanding and implementation of  aspiration precautions and swallow strategies independently over 3-5 session(s).    In progress   Goal #3 Assess need for VFSS within 1-2 visits MET   Goal #4 Complete VFSS to further assess oropharyngeal swallow and assist with dysphagia recommendations MET     FOLLOW UP  Follow Up Needed (Documentation Required): Yes  SLP Follow-up Date: 12/18/24  Duration: 1 week    Session: 2    If you have any questions, please contact Daruis Morgan MS [facility]-SLP  Spectra [phone]

## 2024-12-17 NOTE — PROGRESS NOTES
12/16/24 7320   Oxygen Utilization   $ RT Standby Charge (per 15 min) 1   O2 Device (S)  None (Room air)  (found the patient off oxygen, SpO2 above 92% and not in distress)

## 2024-12-17 NOTE — PROGRESS NOTES
Progress Note  Carla Kim Patient Status:  Inpatient    1950 MRN [id number]   Location [facility] 8NE-A Attending Rafa Chandler DO   Hosp Day # 11 PCP No primary care provider on file.     Subjective:  She is laying flat comfortably. Denies shortness of breath. On stable oxygen requirements but improving from prior. Denies cough, CP.     Objective:  BP 92/53 (BP Location: Left arm)   Pulse 85   Temp 98.9 °F (37.2 °C) (Oral)   Resp 21   Ht 5' 5\" (1.651 m)   Wt 155 lb 4.8 oz (70.4 kg)   SpO2 94%   BMI 25.84 kg/m²     Intake/Output:    Intake/Output Summary (Last 24 hours) at 2024 1028  Last data filed at 2024 1018  Gross per 24 hour   Intake 1100 ml   Output 2900 ml   Net -1800 ml       Last 3 Weights   24 0415 155 lb 4.8 oz (70.4 kg)   24 0411 153 lb 9.6 oz (69.7 kg)   12/15/24 0403 146 lb 6.2 oz (66.4 kg)   12/10/24 0500 156 lb 1.4 oz (70.8 kg)   24 0200 155 lb 3.3 oz (70.4 kg)   24 0400 151 lb 0.2 oz (68.5 kg)   24 0600 155 lb 6.8 oz (70.5 kg)   24 0508 151 lb 10.8 oz (68.8 kg)   24 2327 149 lb 14.6 oz (68 kg)   23 0952 150 lb (68 kg)   10/25/22 0524 151 lb 8 oz (68.7 kg)   10/24/22 0623 156 lb 1.6 oz (70.8 kg)   10/23/22 0300 151 lb 14.4 oz (68.9 kg)   10/21/22 2108 149 lb 7.6 oz (67.8 kg)       Labs:  Recent Labs   Lab 12/15/24  0526 24  0514 24  0433   * 130* 132*   BUN 38* 40* 43*   CREATSERUM 1.85* 1.76* 1.73*   EGFRCR 28* 30* 30*   CA 8.5* 8.4* 9.0    138 133*   K 3.8 3.4* 3.2*    104 103   CO2 27.0 27.0 27.0     Recent Labs   Lab 24  0454   RBC 3.50*   HGB 10.0*   HCT 30.5*   MCV 87.1   MCH 28.6   MCHC 32.8   RDW 17.0   WBC 7.1   .0*         No results for input(s): \"TROP\", \"TROPHS\", \"CK\" in the last 168 hours.    Diagnostics:   Telemetry: AFIB rates controlled, PVCs    TTE 24  1. Left ventricle: The cavity size was normal. Wall thickness was normal.      Systolic function  was mildly reduced. The estimated ejection fraction was      45%, by visual assessment. Unable to assess LV diastolic function due to      heart rhythm. There was no evidence of a thrombus revealed by acoustic      contrast opacification.   2. Left ventricle: There is hypokinesis of the basal-mid inferoseptal and      basal-mid inferior walls.   3. Left atrium: The atrium was moderately dilated.   4. Mitral valve: There was mild regurgitation.   5. Pulmonary arteries: Systolic pressure was mildly to moderately increased.      The peak systolic pressure is 35mm Hg.   6. Inferior vena cava: The IVC was normally collapsible and normal-sized.     Review of Systems   Cardiovascular:  Negative for chest pain.   Respiratory:  Negative for shortness of breath.        Physical Exam:  General: Alert and oriented in no apparent distress.  HEENT: Pupils equal. Mucous membranes moist.   Neck: No JVD  Cardiac:  Normal S1 S2, irreg irreg. No murmur  Lungs: Clear without wheezes or crackles , on oxygen.   Abdomen: Soft, non-tender, ND  Extremities: Without clubbing, cyanosis or edema.    Neurologic: No focal deficits. Normal affect.  Skin: Warm and dry,    Medications:   sodium chloride  3 mL Nebulization 2 times daily    furosemide  40 mg Intravenous BID (Diuretic)    metoprolol succinate  25 mg Oral 2x Daily(Beta Blocker)    sennosides  8.6 mg Oral Nightly    polyethylene glycol (PEG 3350)  17 g Oral Daily    apixaban  5 mg Oral BID    sertraline  100 mg Oral Daily    insulin aspart  2-10 Units Subcutaneous TID AC and HS    clopidogrel  75 mg Oral Daily    levETIRAcetam  500 mg Oral BID    baclofen  5 mg Oral BID    multivitamin  1 tablet Oral Daily           Assessment:    Septic shock secondary UTI- Completed antibiotics. Off pressors.   Persistent AFIB  She has been in AF since ~6/2024  AF RVR on presentation in setting of sepsis. Has been on diltiazem gtt. Rates now reasonably controlled on PO diltiazem 120 mg daily. And now  on oral BB therapy and AC  Anticoagulated on Eliquis 5 mg BID  Consideration for rhythm control as outpatient at follow-up  TIP on CKD - improving, down-trending back near baseline 1.5, due to septic shock / CRS. Diuresis as below.   Acute hypoxic respiratory failure   Currently on 1L NC. repeat CXR tomorrow.  CAD history of CABG x 4 (1996), patent LIMA-LAD, all SVG occluded, prior HUGH to LM and Lcx  Denies angina  Plavix, eliquis, statin held with elevated LFTs, resume when able.   New cardiomyopathy, LVEF 45%  Slightly hypervolemic, continue IV diuretics.   LVEF down 45% (preserved EF on prior 2022). Possibly ICM with CAD and occluded SVG history vs NICM with sepsis/AF RVR presentation  GDMT: Start Toprol-XL. Can consider eventual MRA/ARB/ARNI, not today with resolving TIP. Would avoid SGLT2 with urosepsis presentation and chronic tate  Recommend eventual ischemic evaluation in outpatient setting  Hx of R ICA stent 2021  HTN - controlled  T2DM  HLD - statin held with elevated LFTs  Dysphagia  Mild transaminits - improving  Anemia, tcp - stable, chronic    Plan:    Currently on 1 L NC. can repeat CXR tomorrow. Appears relatively euvolemic clinically. IV lasix for another 24 hours and then transition to oral diuretics as per nephrology.   Toprol-XL 25 mg BID given mildly reduced EF/CAD history  Home statin held with transaminitis. Resume when LFTs improve.   Renal function continues to improve  Resume losartan as BP tolerates, if not, will discuss with patient as clinic setting.     Overall, no further recommendations. We will sign off. Please call with any questions or concerns. Our office will arrange follow up.     Regulo Way MD   Advanced Heart Failure and Transplant Cardiology   [facility] ([facility])     L3

## 2024-12-17 NOTE — CM/SW NOTE
Care Progression Note:  Length of stay: 11  GMLOS: 4.9  Avoidable Delays:   Code Status: DNAR/ Full    Acute Medical Issue/Factors:   Hypotension, unspecified hypotension type   Septic shock 2/2 to UTI  TIP on CKD  Acute hypoxic respiratory failure    Discharge Barriers: Slightly hypervolemic, continue IV diuretics. CXR in AM. Plan to transition to oral diuretics tomorrow and possibly discharge.     Expected discharge date: 1-2 days     Expected next site of care: Long Term Care. Return to [facility] with [facility].     / available for discharge planning.

## 2024-12-18 ENCOUNTER — APPOINTMENT (OUTPATIENT)
Dept: GENERAL RADIOLOGY | Facility: HOSPITAL | Age: 74
End: 2024-12-18
Attending: INTERNAL MEDICINE
Payer: MEDICARE

## 2024-12-18 NOTE — PLAN OF CARE
Shift Note:  Assumed care of patient. Patient alert and oriented x3, resting comfortably, feels well today.  Still requiring 2L O2, will desaturate to 88-89% on room air, denies difficulty breathing, lung sounds clear/diminished.   Denies any cardiac symptoms, Afib with PVC's on tele. Denies pain at this time.   Incontinent of bowel and bladder, last BM 12/18.   Patient is a total, has hx of CVA with left sided weakness, call light within reach, tolerating care well.     POC:  - Wean O2 as able   - IV Lasix BID // Daily weight   - DC planning       Problem: Patient/Family Goals  Goal: Patient/Family Long Term Goal  Description: Patient's Long Term Goal: Go home with little to no complications    Interventions:  - Follow nursing/physician recommendations  - See additional Care Plan goals for specific interventions  Outcome: Progressing  Goal: Patient/Family Short Term Goal  Description: Patient's Short Term Goal: Get through hospital stay    Interventions:   - Follow nursing/physician recommendations  - See additional Care Plan goals for specific interventions  Outcome: Progressing     Problem: CARDIOVASCULAR - ADULT  Goal: Maintains optimal cardiac output and hemodynamic stability  Description: INTERVENTIONS:  - Monitor vital signs, rhythm, and trends  - Monitor for bleeding, hypotension and signs of decreased cardiac output  - Evaluate effectiveness of vasoactive medications to optimize hemodynamic stability  - Monitor arterial and/or venous puncture sites for bleeding and/or hematoma  - Assess quality of pulses, skin color and temperature  - Assess for signs of decreased coronary artery perfusion - ex. Angina  - Evaluate fluid balance, assess for edema, trend weights  Outcome: Progressing     Problem: RESPIRATORY - ADULT  Goal: Achieves optimal ventilation and oxygenation  Description: INTERVENTIONS:  - Assess for changes in respiratory status  - Assess for changes in mentation and behavior  - Position to  facilitate oxygenation and minimize respiratory effort  - Oxygen supplementation based on oxygen saturation or ABGs  - Provide Smoking Cessation handout, if applicable  - Encourage broncho-pulmonary hygiene including cough, deep breathe, Incentive Spirometry  - Assess the need for suctioning and perform as needed  - Assess and instruct to report SOB or any respiratory difficulty  - Respiratory Therapy support as indicated  - Manage/alleviate anxiety  - Monitor for signs/symptoms of CO2 retention  Outcome: Progressing     Problem: Diabetes/Glucose Control  Goal: Glucose maintained within prescribed range  Description: INTERVENTIONS:  - Monitor Blood Glucose as ordered  - Assess for signs and symptoms of hyperglycemia and hypoglycemia  - Administer ordered medications to maintain glucose within target range  - Assess barriers to adequate nutritional intake and initiate nutrition consult as needed  - Instruct patient on self management of diabetes  Outcome: Progressing     Problem: RISK FOR INFECTION - ADULT  Goal: Absence of fever/infection during anticipated neutropenic period  Description: INTERVENTIONS  - Monitor WBC  - Administer growth factors as ordered  - Implement neutropenic guidelines  Outcome: Progressing

## 2024-12-18 NOTE — PROGRESS NOTES
Discharge Note:     Patient tele discontinued, IV discontinued with catheter intact.  Patient discharge instructions reviewed with daughter, verbalize understanding.   Patient escorted via stretcher with EMS to SNF.  Report given to Radha SLAUGHTER.    Discharge paperwork given to EMS staff with printed scripts.

## 2024-12-18 NOTE — PROGRESS NOTES
[facility] Cardiology Progress Note    Carla Kim Patient Status:  Inpatient    1950 MRN [id number]   Location [facility] 8NE-A Attending Rafa Chandler DO   Hosp Day # 12 PCP No primary care provider on file.     Subjective:  ***    Objective:  /63 (BP Location: Left arm)   Pulse 80   Temp 97.6 °F (36.4 °C) (Oral)   Resp 20   Ht 5' 5\" (1.651 m)   Wt 156 lb (70.8 kg)   SpO2 93%   BMI 25.96 kg/m²     Telemetry: ***      Intake/Output:    Intake/Output Summary (Last 24 hours) at 2024 0858  Last data filed at 2024 2350  Gross per 24 hour   Intake 50 ml   Output 1500 ml   Net -1450 ml       Last 3 Weights   24 0545 156 lb (70.8 kg)   24 0415 155 lb 4.8 oz (70.4 kg)   24 0411 153 lb 9.6 oz (69.7 kg)   12/15/24 0403 146 lb 6.2 oz (66.4 kg)   12/10/24 0500 156 lb 1.4 oz (70.8 kg)   24 0200 155 lb 3.3 oz (70.4 kg)   24 0400 151 lb 0.2 oz (68.5 kg)   24 0600 155 lb 6.8 oz (70.5 kg)   24 0508 151 lb 10.8 oz (68.8 kg)   24 2327 149 lb 14.6 oz (68 kg)   23 0952 150 lb (68 kg)   10/25/22 0524 151 lb 8 oz (68.7 kg)   10/24/22 0623 156 lb 1.6 oz (70.8 kg)   10/23/22 0300 151 lb 14.4 oz (68.9 kg)   10/21/22 2108 149 lb 7.6 oz (67.8 kg)       Labs:  Recent Labs   Lab 24  0514 24  0433 24  0506   * 132* 136*   BUN 40* 43* 52*   CREATSERUM 1.76* 1.73* 1.88*   EGFRCR 30* 30* 28*   CA 8.4* 9.0 9.3    133* 137   K 3.4* 3.2* 3.6  3.6    103 99   CO2 27.0 27.0 29.0     No results for input(s): \"RBC\", \"HGB\", \"HCT\", \"MCV\", \"MCH\", \"MCHC\", \"RDW\", \"NEPRELIM\", \"WBC\", \"PLT\" in the last 168 hours.      No results for input(s): \"TROP\", \"TROPHS\", \"CK\" in the last 168 hours.    Diagnostics:             Physical Exam:    Physical Exam    Medications:   sodium chloride  3 mL Nebulization 2 times daily    furosemide  40 mg Intravenous BID (Diuretic)    metoprolol succinate  25 mg Oral 2x Daily(Beta Blocker)     sennosides  8.6 mg Oral Nightly    polyethylene glycol (PEG 3350)  17 g Oral Daily    apixaban  5 mg Oral BID    sertraline  100 mg Oral Daily    insulin aspart  2-10 Units Subcutaneous TID AC and HS    clopidogrel  75 mg Oral Daily    levETIRAcetam  500 mg Oral BID    baclofen  5 mg Oral BID    multivitamin  1 tablet Oral Daily         Assessment:   73yo presenting with weakness, found to have a UTI and Afib with RVR.     Persistant AF  RVR on presentation with sepsis, likely in Afib since June 2024  Rate controlled on oral diltiazem and metoprolol  Outpt rhythm control strategy   On Eliquis  Acute HFmrEF  New EF 45% with hypokinetic basal-inferoseptal wall  ? NICM from Afib/sepsis, vs ischemic component   Avoiding SGLT-2 with chronic tate and urosepsis  CAD   s/p CABG 1996 (patent LIMA-LAD but other grafts occluded) , s/p HUGH-LM, HUGH-Lcx 2014, patent   No angina  H/o CVA  Carotid Stenosis  S/p R ICA stent 2021, plavix   HTN  HL  Statin held with septic transaminitis  DM2  CKD 3  Baseline Cr 1.75  Urosepsis/ Septic Shock  Chronic tate   Hemodynamically stable  Abx completed   COPD/PATRICIA      Plan:        Ange Olivarez, APRN  12/18/2024  8:58 AM  Ph [phone] ([place])  Ph [phone] ([place])

## 2024-12-18 NOTE — PROGRESS NOTES
[facility]  Nephrology Progress Note    Carla Kim Attending:  Chitra Molina MD       Assessment and Plan:    1) TIP- due to septic shock / dehydration; imaging without obstruction / meds benign. Improved.     2) CKD 3- baseline Cr < 1.5 mg/dl due to DM / HTN    3) Septic shock / Klebsiella urosepsis- in setting of chronic Han; completed abx (ancef)    4) Transaminitis + coagulopathy- c/w shock liver; resolved    5) h/o CVA with L sided weakness + Sz on keppra    6) HFrEF / ischemic CM EF 45%- diuresed well, CXR improved -> transition to PO diuretics    7) HTN- normally on amlodipine / losartan / prn POWERS (all held)    8) DM 2    9) AF with RVR- rate control per cards    Agree with dc to SNF. Given lower BPs since admit- DC amlodipine / hydralazine / losartan. DC aldactone; start loop diuretic (torsemide 20 mg daily) and recheck labs in 1 week    D/W daughter at bedside.       Subjective:  Awake pretty alert in 1-2 L NC O2    Physical Exam:   /63 (BP Location: Left arm)   Pulse 80   Temp 97.6 °F (36.4 °C) (Oral)   Resp 20   Ht 5' 5\" (1.651 m)   Wt 156 lb (70.8 kg)   SpO2 93%   BMI 25.96 kg/m²   Temp (24hrs), Av.8 °F (36.6 °C), Min:97.5 °F (36.4 °C), Max:98.2 °F (36.8 °C)       Intake/Output Summary (Last 24 hours) at 2024 0918  Last data filed at 2024 2350  Gross per 24 hour   Intake 50 ml   Output 1500 ml   Net -1450 ml     Wt Readings from Last 3 Encounters:   24 156 lb (70.8 kg)   23 150 lb (68 kg)   10/25/22 151 lb 8 oz (68.7 kg)     General: awake   HEENT: No scleral icterus, MMM  Neck: Supple, no MEGHANA or thyromegaly  Cardiac: Regular rate and rhythm, S1, S2 normal, no murmur or tub  Lungs: Decreased BS at bases bilaterally   Abdomen: Soft, non-tender. + bowel sounds, no palpable organomegaly  Extremities: Without clubbing, cyanosis; no edema  Neurologic: Cranial nerves grossly intact, moving all extremities  Skin: Warm and dry, no rashes       Labs:   Lab  Results   Component Value Date    CREATSERUM 1.88 12/18/2024    BUN 52 12/18/2024     12/18/2024    K 3.6 12/18/2024    K 3.6 12/18/2024    CL 99 12/18/2024    CO2 29.0 12/18/2024     12/18/2024    CA 9.3 12/18/2024    PGLU 151 12/18/2024       Imaging:  All imaging studies reviewed.    Meds:   Current Facility-Administered Medications   Medication Dose Route Frequency    sodium chloride 3 % nebulizer solution 3 mL  3 mL Nebulization 2 times daily    furosemide (Lasix) 10 mg/mL injection 40 mg  40 mg Intravenous BID (Diuretic)    metoprolol succinate ER (Toprol XL) 24 hr tab 25 mg  25 mg Oral 2x Daily(Beta Blocker)    albuterol (Ventolin) (2.5 MG/3ML) 0.083% nebulizer solution 2.5 mg  2.5 mg Nebulization Q6H PRN    sennosides (Senokot) tab 8.6 mg  8.6 mg Oral Nightly    polyethylene glycol (PEG 3350) (Miralax) 17 g oral packet 17 g  17 g Oral Daily    apixaban (Eliquis) tab 5 mg  5 mg Oral BID    sertraline (Zoloft) tab 100 mg  100 mg Oral Daily    acetaminophen (Tylenol Extra Strength) tab 500 mg  500 mg Oral Q4H PRN    melatonin tab 3 mg  3 mg Oral Nightly PRN    polyethylene glycol (PEG 3350) (Miralax) 17 g oral packet 17 g  17 g Oral Daily PRN    sennosides (Senokot) tab 17.2 mg  17.2 mg Oral Nightly PRN    bisacodyl (Dulcolax) 10 MG rectal suppository 10 mg  10 mg Rectal Daily PRN    fleet enema (Fleet) rectal enema 133 mL  1 enema Rectal Once PRN    glucose (Dex4) 15 GM/59ML oral liquid 15 g  15 g Oral Q15 Min PRN    Or    glucose (Glutose) 40% oral gel 15 g  15 g Oral Q15 Min PRN    Or    glucose-vitamin C (Dex-4) chewable tab 4 tablet  4 tablet Oral Q15 Min PRN    Or    dextrose 50% injection 50 mL  50 mL Intravenous Q15 Min PRN    Or    glucose (Dex4) 15 GM/59ML oral liquid 30 g  30 g Oral Q15 Min PRN    Or    glucose (Glutose) 40% oral gel 30 g  30 g Oral Q15 Min PRN    Or    glucose-vitamin C (Dex-4) chewable tab 8 tablet  8 tablet Oral Q15 Min PRN    insulin aspart (NovoLOG) 100 Units/mL  FlexPen 2-10 Units  2-10 Units Subcutaneous TID AC and HS    clopidogrel (Plavix) tab 75 mg  75 mg Oral Daily    levETIRAcetam (Keppra) tab 500 mg  500 mg Oral BID    influenza virus trivalent high dose PF (Fluzone HD) 0.5 mL injection (ages >/= 65 years) 0.5 mL  0.5 mL Intramuscular Prior to discharge    baclofen (Lioresal) tab 5 mg  5 mg Oral BID    multivitamin (Tab-A-Odilon/Beta Carotene) tab 1 tablet  1 tablet Oral Daily         Questions/concerns were discussed with patient and/or family by bedside.          Vicky Perez MD  12/18/2024  918 AM

## 2024-12-18 NOTE — PLAN OF CARE
PT A/0 X3-4, LT SIDED WEAKNESS, 92% ON 2L, SLIGHT CRACKLES TO LUNGS, NEBS, AFIB, AFEBRILE, INCONTINENT, PURE WICK IN PLACE, MEPILEX TO SACRUM CHANGED AFTER SMALL BM, TOLERATING LIQUIDS, TURNED, LASIX BID, PLAN FOR LABS, CXR IN AM, INSTRUCTED PT ON POC    Problem: CARDIOVASCULAR - ADULT  Goal: Maintains optimal cardiac output and hemodynamic stability  Description: INTERVENTIONS:  - Monitor vital signs, rhythm, and trends  - Monitor for bleeding, hypotension and signs of decreased cardiac output  - Evaluate effectiveness of vasoactive medications to optimize hemodynamic stability  - Monitor arterial and/or venous puncture sites for bleeding and/or hematoma  - Assess quality of pulses, skin color and temperature  - Assess for signs of decreased coronary artery perfusion - ex. Angina  - Evaluate fluid balance, assess for edema, trend weights  Outcome: Progressing     Problem: RESPIRATORY - ADULT  Goal: Achieves optimal ventilation and oxygenation  Description: INTERVENTIONS:  - Assess for changes in respiratory status  - Assess for changes in mentation and behavior  - Position to facilitate oxygenation and minimize respiratory effort  - Oxygen supplementation based on oxygen saturation or ABGs  - Provide Smoking Cessation handout, if applicable  - Encourage broncho-pulmonary hygiene including cough, deep breathe, Incentive Spirometry  - Assess the need for suctioning and perform as needed  - Assess and instruct to report SOB or any respiratory difficulty  - Respiratory Therapy support as indicated  - Manage/alleviate anxiety  - Monitor for signs/symptoms of CO2 retention  Outcome: Progressing

## 2024-12-18 NOTE — DISCHARGE INSTRUCTIONS
Given lower BPs since admit: Discontinue amlodipine / hydralazine / losartan.  Can also discontinue aldactone; And start loop diuretic (torsemide 20 mg daily) and recheck labs in 1 week

## 2024-12-18 NOTE — DISCHARGE SUMMARY
[facility]  DISCHARGE SUMMARY     Carla Kim Patient Status:  Inpatient    1950 MRN [id number]   Location [facility] 8NE-A Attending Rafa Chandler DO   Hosp Day # 12 PCP No primary care provider on file.     Date of Admission: 2024  Date of Discharge:   2024    Discharge Disposition: SNF Subacute Rehab    Discharge Diagnosis:  Septic shock secondary to UTI in setting of chronic tate present PTA  TIP on CKDIII  Non-cardiogenic acute pulmonary edema in setting of chronic systolic heart failure  Afib with RVR  Hypoxia due to above  Dysphagia   Normocytic anemia  Thrombocytopenia  Depression  Transaminitis  Dyslipidemia   DMII  Prior CVA with residual left-sided weakness  Seizure disorder     History of Present Illness: Carla Kim is a 74 year old female with history of hypertension, type 2 diabetes, anxiety, COPD, chronic heart failure, coronary artery disease presents the emergency room with generalized weakness that has been going on for the last couple days but then got worse last night.  Chronic left-sided weakness from a previous stroke but denies any new numbness or tingling or weakness in the extremities.  No fevers, chills, diarrhea, constipation.  No orthopnea or PND.     Brief Synopsis:     #Septic shock   #UTI in setting of chronic tate -POA  -Shock resolved, off pressors  -Completed course of ancef   -Cx with klebsiella     #TIP on CKDIII  -TIP resolved  -Nephrology following      #Non-cardiogenic acute pulmonary edema in setting of chronic systolic heart failure  -Likely due to IVF administration - now stopped  -Diuresis per renal      #A.fib with RVR  -Start cardizem gtt transitioned to PO BB  -DOAC  -Cardiology following  -Tele      #Hypoxia due to above  -Wean oxygen as able      #Dysphagia  -VFSS reviewed  -Modified diet per SLP     #Normocytic anemia  #Thrombocytopenia  -Monitor      #Depression  -SSRI     #Transaminitis  -due to shock liver  -Resolved       #Hyperlipidemia  -statin     #DM type II  -hyperglycemia protocol     #Prior CVA-chronic left sided weakness  -Plavix/statin     #Seizure disorder   -Keppra    #Disposition  -Stable for DC to SNF    Lace+ Score: 73  59-90 High Risk  29-58 Medium Risk  0-28   Low Risk       TCM Follow-Up Recommendation:  LACE > 58: High Risk of readmission after discharge from the hospital.      Procedures during hospitalization:   None    Incidental or significant findings and recommendations (brief descriptions):  None    Lab/Test results pending at Discharge:   None    Consultants:  Nephrology  Cardiology   Critical care  Palliative care    Discharge Medication List:     Discharge Medications        START taking these medications        Instructions Prescription details   insulin aspart 100 Units/mL Sopn  Commonly known as: NovoLOG      Inject 2-10 Units into the skin 4 (four) times daily before meals and nightly. CORRECTION FACTOR - COLUMN MEDIUM DOSE. Continue to give correction insulin (Novolog/aspart) even if NPO;Give 2 unit if blood glucose 141-180 mg/dL Give 3 units if blood glucose 181-220 mg/dL Give 6 units if blood glucose 221-260 mg/dL Give 8 units if blood glucose 261-300 mg/dL Give 10 units if blood glucose 301-350 mg/dL   Quantity: 3 mL  Refills: 0     metoprolol succinate ER 25 MG Tb24  Commonly known as: Toprol XL      Take 1 tablet (25 mg total) by mouth 2x Daily(Beta Blocker).   Stop taking on: January 17, 2025  Quantity: 60 tablet  Refills: 0            CONTINUE taking these medications        Instructions Prescription details   acetaminophen 325 MG Tabs  Commonly known as: Tylenol      Take 2 tablets (650 mg total) by mouth every 6 (six) hours as needed for Pain.   Refills: 0     apixaban 5 MG Tabs  Commonly known as: Eliquis      Take 1 tablet (5 mg total) by mouth 2 (two) times daily.   Quantity: 60 tablet  Refills: 3     baclofen 5 MG Tabs  Commonly known as: Lioresal      Take 1 tablet (5 mg total) by  mouth 3 (three) times daily.   Refills: 0     Cholecalciferol 1.25 MG (93998 UT) Tabs      Take 50,000 Units by mouth Every Monday.   Refills: 0     clopidogrel 75 MG Tabs  Commonly known as: Plavix      Take 1 tablet (75 mg total) by mouth daily.   Refills: 0     ezetimibe 10 MG Tabs  Commonly known as: Zetia      Take 1 tablet (10 mg total) by mouth nightly.   Quantity: 30 tablet  Refills: 0     levETIRAcetam 500 MG Tabs  Commonly known as: Keppra      Take 1 tablet (500 mg total) by mouth 2 (two) times daily.   Quantity: 180 tablet  Refills: 3     ondansetron 4 mg tablet  Commonly known as: Zofran      Take 1 tablet (4 mg total) by mouth every 6 (six) hours as needed for Nausea.   Refills: 0     pantoprazole 40 MG Tbec  Commonly known as: Protonix      Take 1 tablet (40 mg total) by mouth every morning before breakfast.   Refills: 0     polyethylene glycol (PEG 3350) 17 g Pack  Commonly known as: Miralax      Take 17 g by mouth daily.   Refills: 0     rosuvastatin 40 MG Tabs  Commonly known as: Crestor      Take 1 tablet (40 mg total) by mouth daily.   Quantity: 30 tablet  Refills: 0     sennosides 8.6 MG Tabs  Commonly known as: Senokot      Take 2 tablets (17.2 mg total) by mouth at bedtime.   Refills: 0     sertraline 100 MG Tabs  Commonly known as: Zoloft      Take 1 tablet (100 mg total) by mouth daily.   Refills: 0            STOP taking these medications      amLODIPine 5 MG Tabs  Commonly known as: Norvasc        bethanechol 25 MG Tabs  Commonly known as: Urecholine        carvedilol 3.125 MG Tabs  Commonly known as: Coreg        insulin glargine 100 UNIT/ML Sopn        tamsulosin 0.4 MG Caps  Commonly known as: Flomax               ASK your doctor about these medications        Instructions Prescription details   hydrALAZINE 25 MG Tabs  Commonly known as: Apresoline      Take 1 tablet (25 mg total) by mouth 3 (three) times daily as needed (for SBP > 160).   Refills: 0     losartan 50 MG Tabs  Commonly  known as: Cozaar      Take 0.5 tablets (25 mg total) by mouth daily.   Quantity: 60 tablet  Refills: 0     spironolactone 25 MG Tabs  Commonly known as: Aldactone      Take 0.5 tablets (12.5 mg total) by mouth daily.   Quantity: 15 tablet  Refills: 0               Where to Get Your Medications        Please  your prescriptions at the location directed by your doctor or nurse    Bring a paper prescription for each of these medications  insulin aspart 100 Units/mL Sopn  metoprolol succinate ER 25 MG Tb24         [place]    Follow-up appointment:   Lorin Linares MD  [address] [address]  [phone]    Follow up in 3 week(s)  office will call to schedule follow up in Afib clinic, as well as with Dr. Lorin Linares or Dr. Trini Sommer regarding possible cardiac cath needs.    Your primary care doctor    Schedule an appointment as soon as possible for a visit in 1 week(s)      Appointments for Next 30 Days 2024 - 2025      None            Vital signs:  Temp:  [97.5 °F (36.4 °C)-98 °F (36.7 °C)] 98 °F (36.7 °C)  Pulse:  [] 90  Resp:  [18-22] 18  BP: (104-125)/(33-87) 125/58  SpO2:  [85 %-98 %] 96 %    Physical Exam:    General: No acute distress   Lungs: clear to auscultation  Cardiovascular: Irregularly irregular.   Abdomen: Soft    -----------------------------------------------------------------------------------------------  PATIENT DISCHARGE INSTRUCTIONS: See electronic chart    Rafa Chandler DO    Total time spent on discharge plannin minutes     The  Cures Act makes medical notes like these available to patients in the interest of transparency. Please be advised this is a medical document. Medical documents are intended to carry relevant information, facts as evident, and the clinical opinion of the practitioner. The medical note is intended as peer to peer communication and may appear blunt or direct. It is written in medical language  and may contain abbreviations or verbiage that are unfamiliar.

## 2024-12-18 NOTE — CM/SW NOTE
12/18/24 1413   Discharge disposition   Expected discharge disposition [facility]   Post Acute Care Provider [facility]   Discharge transportation [facility]       Pt cleared to return to [facility] today. BLS set up for 5:30pm. PCS form completed/printed. RN updated. RN to call report at PA. Will update daughter.     Daughter updated on the discharge plan.    RN to call report: [facility]  P:[phone]      [facility]: [phone]    Judi VO, LSW  Discharge Planner

## 2024-12-18 NOTE — SLP NOTE
SPEECH DAILY NOTE - INPATIENT    ASSESSMENT & PLAN   ASSESSMENT  Pt seen for dysphagia tx to assess tolerance with recommended diet, ensure proper utilization of aspiration precautions and provide pt/family education.  Patient alert and up in bed eating lunch. Daughter present at bedside and supportive. Patient feeding herself lunch consisting of thin liquids, pre-cut chicken and mashed potatoes. Patient with prolonged but adequate mastication of solids though she feeds herself at an appropriate pace. No overt signs of aspiration noted throughout. Current diet is appropriate. Anticipate patient will be appropriate to advance to baseline diet of regular consistency solids and thin liquids soon, though patient is planned for discharge this afternoon. Recommend SLP evaluation to assess to advance to baseline diet upon return to SNF. Discussed with patient and daughter. RN present throughout.     Diet Recommendations - Solids: Soft/ Easy to chew (pre cut)  Diet Recommendations - Liquids: Thin Liquids    Compensatory Strategies Recommended: Alternate consistencies  Aspiration Precautions: Upright position;Small bites;Small sips;1:1 feeding  Medication Administration Recommendations: One pill at a time;Whole in puree;Crushed in puree (as tolerated)    Patient Experiencing Pain: No                Treatment Plan  Treatment Plan/Recommendations: No further inpatient SLP service warranted    Interdisciplinary Communication: Discussed with RN            GOALS  Goal #1 The patient will tolerate soft/easy to chew (precut) consistency and thin liquids without overt signs or symptoms of aspiration with 90 % accuracy over 3-5 session(s). MET   Goal #2 The patient/family/caregiver will demonstrate understanding and implementation of aspiration precautions and swallow strategies independently over 3-5 session(s).    MET   Goal #3 Assess need for VFSS within 1-2 visits MET   Goal #4 Complete VFSS to further assess oropharyngeal  swallow and assist with dysphagia recommendations MET      FOLLOW UP  Follow Up Needed (Documentation Required): No (planned discharge later today)  SLP Follow-up Date: 12/18/24  Duration: 1 week    Session: 3    If you have any questions, please contact Darius Morgan MS [facility]-SLP  Spectra [phone]

## 2024-12-19 NOTE — CDS QUERY
Dear Dr. Chandler,    Please clarify the relationship, if any, between the diagnosis of URINARY TRACT INFECTION (UTI) and chronic indwelling tate catheter:    [  x ] UTI related to chronic indwelling tate catheter   [   ] UTI not related to chronic indwelling tate catheter   [   ] Clinically unable to determine      CLINICAL INFORMATION FROM THE MEDICAL RECORD    Risk Factors:  Non-ambulatory status due to previous CVA with chronic indwelling tate catheter  PMH of UTI    Clinical Indicators:   Pt to ED with generalized weakness  UA: color brown, clarity turbid, leukocytes BESTY, WBC > 50, RBC 6-10, bacteria 3+  Urine culture: > 100K CFU/ml Klebsiella pneumoniae  CT A/P significant for urinary bladder thickening  Hospitalist Assessment (12/9/24): # UTI in setting of chronic tate - POA     Treatment:   Tate catheter exchange  IVF  Zosyn  Urine culture  CBC and BMP  Monitor urine output     Use of terms such as suspected, possible, or probable (associated with a specific diagnosis that is being evaluated, monitored, or treated as if it exists) are acceptable and can be coded in the inpatient setting, when documented at the time of discharge.    For questions regarding this query, please contact Clinical : Leticia Marshall RN at #[phone].    THIS FORM IS A PERMANENT PART OF THE MEDICAL RECORD

## 2024-12-23 PROBLEM — R79.89 ELEVATED TROPONIN: Status: ACTIVE | Noted: 2024-01-01

## 2024-12-23 PROBLEM — N18.32 CKD STAGE 3B, GFR 30-44 ML/MIN (HCC): Status: ACTIVE | Noted: 2019-03-04

## 2024-12-23 PROBLEM — N28.9 ACUTE RENAL INSUFFICIENCY: Status: ACTIVE | Noted: 2024-12-23

## 2024-12-23 PROBLEM — I48.91 ATRIAL FIBRILLATION WITH RAPID VENTRICULAR RESPONSE (HCC): Status: ACTIVE | Noted: 2024-01-01

## 2024-12-23 PROBLEM — R79.89 ELEVATED TROPONIN: Status: ACTIVE | Noted: 2024-12-23

## 2024-12-23 PROBLEM — I48.91 ATRIAL FIBRILLATION WITH RAPID VENTRICULAR RESPONSE (HCC): Status: ACTIVE | Noted: 2024-12-23

## 2024-12-23 PROBLEM — N28.9 ACUTE RENAL INSUFFICIENCY: Status: ACTIVE | Noted: 2024-01-01

## 2024-12-23 NOTE — ED QUICK NOTES
Orders for admission, patient is aware of plan and ready to go upstairs. Any questions, please call ED RN Renny at extension [phone].     Patient Covid vaccination status: Fully vaccinated     COVID Test Ordered in ED: SARS-CoV-2/Flu A and B/RSV by PCR (GeneXpert)    COVID Suspicion at Admission: N/A    Running Infusions:    dilTIAZem 10 mg/hr (12/23/24 1117)    sodium chloride 125 mL/hr (12/23/24 1118)        Mental Status/LOC at time of transport: Alert    Other pertinent information:   CIWA score: N/A   NIH score:  N/A

## 2024-12-23 NOTE — IMAGING NOTE
[facility] 7/2022  1. This is a poor quality study.   2. Predominant rhythm is sinus bradycardia.   3. The minimum heart rate recorded was 44 beats / minute. The maximum heart rate is 125 beats / minute. The mean heart rate is 58 beats / minute.   4. Afib burden 0%

## 2024-12-23 NOTE — CONSULTS
[facility]  Report of Consultation    Carla Kim Patient Status:  Inpatient    1950 MRN [id number]   Location [facility] 4SW-A Attending Keri Way, DO   Hosp Day # 0 PCP PHYSICIAN NONSTAFF     Reason for Consultation:  Hypoxic respiratory failure    History of Present Illness:  Carla Kim is a a(n) 74 year old female.  Ex-smoker x 6 years though long history of known COPD, prior stroke rendering her nonambulatory, chronic A-fib on Eliquis coronary disease CABG PCI's with mild LV dysfunction and peripheral vascular disease.  She had been admitted at [facility]  through  after presenting with UTI chronic Han and septic shock-course complicated by acute kidney injury on a background of chronic kidney disease transaminitis.  Echo at that time documented EF 45%.  During her hospital stay she had seen speech therapy recommended precautions/soft foods.  Patient was discharged to rehab on 2 L of O2-according to records she was maintained on thin liquids soft diet.  Today patient was transferred unit with high fevers, significant lethargy and confusion and progressive hypoxia.  In the emergency room she required BiPAP to maintain saturation currently on 80%.  She currently is awake though minimally verbal daughter states she recognizes her but is unable to state her name.  There is been no significant coughing no complaints of chest pain there is been no known nausea vomiting or diarrhea.  Temp to 102.0 on presentation    History:  Past Medical History:    Abdominal distention    Anxiety    Atherosclerosis of coronary artery     2 surgeries for CABG X 4 1996     Atherosclerosis of coronary artery    acute MI, CHF, Stents     Belching    Black stools    CONGESTIVE HEART FAILURE    EF 35-40 %    COPD    Diabetes mellitus (HCC)    Disorder of liver    \"fatty liver\"    Fatigue    HEART ATTACK    Acute mi and stents    High blood pressure    High cholesterol    History of blood  transfusion    History of depression    Leaking of urine    Night sweats    Paroxysmal atrial fibrillation (HCC)    Peripheral vascular disease (HCC)    Carotid Artery Blockage TIA    Seizure (HCC)    new onset on 2/26/19    Shortness of breath    Sputum production    Stented coronary artery    STROKE    TIA    Stroke syndrome    CVA no residual effects    Type II or unspecified type diabetes mellitus without mention of complication, not stated as uncontrolled    Uncontrolled diabetes mellitus    Unspecified essential hypertension    Unspecified sleep apnea    Wears glasses    Wheezing     Family History   Problem Relation Age of Onset    Other (Acute MI) Father     Other (Alzheimer's Disease) Mother       reports that she quit smoking about 6 years ago. Her smoking use included cigarettes. She started smoking about 56 years ago. She has a 50 pack-year smoking history. She has never used smokeless tobacco. She reports that she does not drink alcohol and does not use drugs.  Daughter reports she used to follow with Dr. [facility]    Allergies:  Allergies[1]    Medications:  Prescriptions Prior to Admission[2]    Review of Systems:-Predominantly through the daughter    Constitutional: Daughter believes she has been losing weight  Eyes: Denies specific changes vision changes  Ears, nose, mouth, throat, and face: Choking and coughing while eating at times  Respiratory: As above  Cardiovascular: Palpitations or chest pain  Gastrointestinal: Nausea vomiting or diarrhea otherwise unknown  Musculoskeletal: Daughter unaware of any ongoing lower extremity edema  Neurological: Lethargy baseline has significantly worsened as has some confusion     All other review of systems are negative.    Vital signs in last 24 hours:  Patient Vitals for the past 24 hrs:   BP Temp Temp src Pulse Resp SpO2 Height Weight   12/23/24 1525 96/75 99.5 °F (37.5 °C) Temporal 115 (!) 38 96 % -- 136 lb 0.4 oz (61.7 kg)   12/23/24 1515 -- -- --  119 (!) 34 96 % -- --   12/23/24 1400 116/61 98.2 °F (36.8 °C) Temporal 104 (!) 30 92 % -- --   12/23/24 1333 -- -- -- -- -- 97 % -- --   12/23/24 1308 -- -- -- -- -- 98 % -- --   12/23/24 1300 127/67 -- -- 117 25 -- -- --   12/23/24 1206 -- -- -- -- -- 92 % -- --   12/23/24 1200 (!) 124/97 -- -- 103 24 91 % -- --   12/23/24 1115 140/81 -- -- (!) 128 (!) 31 94 % -- --   12/23/24 1015 -- -- -- -- -- 95 % -- --   12/23/24 1006 -- (!) 102 °F (38.9 °C) Rectal -- -- -- -- --   12/23/24 1000 (!) 144/123 -- -- 103 23 95 % -- --   12/23/24 0942 156/86 -- -- (!) 152 (!) 28 93 % 5' 3\" (1.6 m) 156 lb 8.4 oz (71 kg)         Physical Exam:   General: alert, cooperative-follows some commands minimally verbal on fullface mask no respiratory distress.   Head: Normocephalic, without obvious abnormality, atraumatic.   Eyes/Nose: Pupils equal   Throat: Lips, mucosa, and tongue normal.  No thrush noted.  On very limited exam   Neck: trachea midline, no adenopathy, no thyromegaly. No JVD.  45 degrees   Lungs:  sense of rhonchi bilaterally rales left greater than right   Chest wall: No tenderness or deformity.   Heart: Distant tones regular rate and rhythm   Abdomen: soft, non-distended, no masses, no guarding, no     Rebound.  No obvious hepatosplenomegaly or ascites it seems nontender   Extremity: Trace to +1 edema most   Skin: No rashes or lesions.   Neurological: Eyes open follows commands some minimally verbal    Lab Data Review:  Lab Results   Component Value Date    WBC 13.3 12/23/2024    HGB 12.6 12/23/2024    HCT 40.0 12/23/2024    .0 12/23/2024    CREATSERUM 2.64 12/23/2024    BUN 48 12/23/2024     12/23/2024    K 3.3 12/23/2024     12/23/2024    CO2 21.0 12/23/2024     12/23/2024    CA 8.7 12/23/2024    ALB 4.0 12/23/2024    ALKPHO 134 12/23/2024    BILT 0.5 12/23/2024    TP 8.5 12/23/2024     12/23/2024     12/23/2024    PTT 37.9 12/23/2024    PGLU 189 12/23/2024     Recent Labs   Lab  12/23/24  1015   ABGPHT 7.46*   PHMKCB5R 38   CBSWM4L 57*   ABGHCO3 27.1*   ABGBE 3.1*   TEMP 98.6   ALLY Positive   SITE Left Brachial   DEV Nasal cannula   THGB 12.2           Cultures:   Expanded viral negative; blood cultures pending negative MRSA  UA with few white cells  Radiology:  CT BRAIN OR HEAD (CPT=70450)    Result Date: 12/23/2024  CONCLUSION:  No acute intracranial abnormality.    LOCATION:  [facility]   Dictated by (CST): Rogelio Farah MD on 12/23/2024 at 1:30 PM     Finalized by (CST): Rogelio Farah MD on 12/23/2024 at 1:33 PM       XR CHEST AP PORTABLE  (CPT=71045)    Result Date: 12/23/2024  CONCLUSION:  Minimal left basilar atelectasis.   LOCATION:  [facility]      Dictated by (CST): Romelia Delgado MD on 12/23/2024 at 10:46 AM     Finalized by (CST): Romelia Delgado MD on 12/23/2024 at 10:47 AM      Chest x-ray with slight plate of atelectasis left base suspected posterior no evidence of evolving other infiltrates or effusions  CT chest 12/6 with retained secretions trachea mainstem's    Assessment and Plan:  Patient Active Problem List   Diagnosis    Essential hypertension    Carotid arterial disease (HCC)    Esophageal reflux    Osteoarthritis    High cholesterol    Anxiety state    CAD in native artery    Hx of CABG    CHF (congestive heart failure) (HCC)    COPD (chronic obstructive pulmonary disease) (HCC)    History of carotid endarterectomy    Controlled type 2 diabetes mellitus with stage 3 chronic kidney disease, without long-term current use of insulin (HCC)    Mild aortic sclerosis    Hyperlipidemia associated with type 2 diabetes mellitus (HCC)    Hypernatremia    Acute respiratory failure with hypoxia (HCC)    Hemiparesis as late effect of nontraumatic intracerebral hemorrhage (HCC)    Smoker    CKD stage 3b, GFR 30-44 ml/min (HCC)    History of CVA with residual deficit    Spastic hemiparesis as late effect of cerebral infarction (HCC)    Seizure (HCC)    Fatty liver    Acute cystitis  without hematuria    Anemia    Gastrostomy status ([facility])    Current mild episode of major depressive disorder without prior episode ([facility])    Paroxysmal atrial fibrillation ([facility])    Contracture of muscle, left upper arm    Hypoxia    Hyperglycemia    Acidosis    Septic shock ([facility])    Palliative care encounter    Goals of care, counseling/discussion    Hypotension, unspecified hypotension type    Urinary tract infection without hematuria, site unspecified    Acute renal failure, unspecified acute renal failure type ([facility])    Palliative care by specialist    TIP (acute kidney injury) ([facility])    Altered mental status    Hypokalemia    ATN (acute tubular necrosis) ([facility])    HFrEF (heart failure with reduced ejection fraction) ([facility])    Acute renal insufficiency    Elevated troponin    Atrial fibrillation with rapid ventricular response ([facility])       Assessment:  Febrile illness-suspected recurrent aspiration with chest x-ray lagging  Acute on chronic hypoxic respiratory failure-doubt PE suspect evolving pneumonia plans to follow carefully on broad-spectrum antibiotic coverage  Acute encephalopathy-unremarkable plain brain suspect toxic metabolic  Known HFrEF EF 45% background of remote coronary disease CABG /PCI's  Chronic A-fib on Eliquis  Acute on chronic kidney disease-had recovered well- now  worsening  History of COPD smoker  Aspiration risk ongoing-had seen speech prior admission-speech to follow once off BiPAP    Plan:  Plan to continue broad-spectrum antibiotic coverage  Multiple cultures are pending  Continue NIPPV at this time-plan to wean if able to continue to wean FiO2--baseline 2 L reportedly  Repeat echo pending  Repeat chest x-ray in a.m.-may need plain CT pending those results  Bladder scanning    Discussed at length with the patient's daughter at bedside-patient had requested DNR select-will plan to continue NIPPV at this time-    Martha Campbell MD  12/23/2024  5:20 PM         [1]   Allergies  Allergen  Reactions    Lipitor [Atorvastatin Calcium] MYALGIA     TABS    Wellbutrin [Bupropion Hcl]      TABS-tremor   [2]   Medications Prior to Admission   Medication Sig Dispense Refill Last Dose/Taking    ELIQUIS 5 MG Oral Tab Take 1 tablet (5 mg total) by mouth every 12 (twelve) hours.   12/23/2024 at  8:00 AM    levETIRAcetam 500 MG Oral Tab Take 1 tablet (500 mg total) by mouth every 12 (twelve) hours.   12/23/2024 at  8:00 AM    polyethylene glycol, PEG 3350, (MIRALAX) 17 GM/SCOOP Oral Powder Take 17 g by mouth daily.   12/23/2024 at  8:00 AM    rosuvastatin 40 MG Oral Tab Take 1 tablet (40 mg total) by mouth at bedtime.   12/22/2024 at  9:00 PM    zinc oxide 20 % External Ointment Apply 1 Application topically as needed for Dry Skin (Apply to buttocks and/or sacrum).   Taking As Needed    zinc oxide 20 % External Ointment Apply 1 Application topically daily. Apply to buttocks and sacrum   12/23/2024 at  8:00 AM    BETADINE SWABSTICKS 10 % External Swab Apply 1 Application topically daily. Apply to left heel and right heel   12/23/2024    BETADINE SWABSTICKS 10 % External Swab Apply 1 Application topically as needed (Apply to left heel and/or right heel).   Taking As Needed    metoprolol succinate ER 25 MG Oral Tablet 24 Hr Take 1 tablet (25 mg total) by mouth 2x Daily(Beta Blocker). 60 tablet 0 12/23/2024 at  8:00 AM    insulin aspart 100 Units/mL Subcutaneous Solution Pen-injector Inject 2-10 Units into the skin 4 (four) times daily before meals and nightly. CORRECTION FACTOR - COLUMN MEDIUM DOSE. Continue to give correction insulin (Novolog/aspart) even if NPO;Give 2 unit if blood glucose 141-180 mg/dL Give 3 units if blood glucose 181-220 mg/dL Give 6 units if blood glucose 221-260 mg/dL Give 8 units if blood glucose 261-300 mg/dL Give 10 units if blood glucose 301-350 mg/dL 3 mL 0 12/23/2024 at  6:00 AM    torsemide 20 MG Oral Tab Take 1 tablet (20 mg total) by mouth daily. 30 tablet 11 12/23/2024 at  8:00 AM     ondansetron (ZOFRAN) 4 mg tablet Take 1 tablet (4 mg total) by mouth every 6 (six) hours as needed (nausea/vomiting).   Taking As Needed    pantoprazole 40 MG Oral Tab EC Take 1 tablet (40 mg total) by mouth every morning.   12/22/2024 Evening    sennosides 8.6 MG Oral Tab Take 2 tablets (17.2 mg total) by mouth at bedtime.   12/22/2024 at  9:00 PM    Cholecalciferol 1.25 MG (30899 UT) Oral Tab Take 50,000 Units by mouth Every Monday.   12/23/2024 at  8:00 AM    baclofen 5 MG Oral Tab Take 1 tablet (5 mg total) by mouth 3 (three) times daily.   12/22/2024 at  9:00 PM    clopidogrel 75 MG Oral Tab Take 1 tablet (75 mg total) by mouth daily.   12/23/2024 at  8:00 AM    sertraline 100 MG Oral Tab Take 1 tablet (100 mg total) by mouth daily.   12/23/2024 at  8:00 AM    ezetimibe 10 MG Oral Tab Take 1 tablet (10 mg total) by mouth nightly. 30 tablet 0 12/22/2024 at  9:00 PM    acetaminophen 325 MG Oral Tab Take 2 tablets (650 mg total) by mouth every 6 (six) hours as needed for Pain or Fever (Not to exceed 3 GM APAP/DAY from all sources).   Taking As Needed

## 2024-12-23 NOTE — CONSULTS
Cardiology Consultation    Carla Kim Patient Status:  Emergency    1950 MRN [id number]   [facility] EMERGENCY DEPARTMENT Attending Damon Lee MD   Hosp Day # 0 PCP PHYSICIAN NONSTAFF     Reason for Consultation:  hypoxia, afib with RVR      History of Present Illness:  Carla Kim is a a(n) 74 year old female. Chronically ill woman with prior stroke, chronic afib, remote CABG and subsequent PCI's, mild LV dysfx, carotid stent, sz d/o, and HL.  Discharged  after 12 day admission for urosepsis.  She is now readmitted from NH after found to be hypoxic and less interactive.  She provides no history.  In the ER, she is requiring high flow O2.   bpm, normal lactic, troponin 66, pBNP 18,600.  CXR relatively clear.    History:  Past Medical History:    Abdominal distention    Anxiety    Atherosclerosis of coronary artery     2 surgeries for CABG X 4 1996     Atherosclerosis of coronary artery    acute MI, CHF, Stents     Belching    Black stools    CONGESTIVE HEART FAILURE    EF 35-40 %    COPD    Diabetes mellitus (HCC)    Disorder of liver    \"fatty liver\"    Fatigue    HEART ATTACK    Acute mi and stents    High blood pressure    High cholesterol    History of blood transfusion    History of depression    Leaking of urine    Night sweats    Paroxysmal atrial fibrillation (HCC)    Peripheral vascular disease (HCC)    Carotid Artery Blockage TIA    Seizure (HCC)    new onset on 19    Shortness of breath    Sputum production    Stented coronary artery    STROKE    TIA    Stroke syndrome    CVA no residual effects    Type II or unspecified type diabetes mellitus without mention of complication, not stated as uncontrolled    Uncontrolled diabetes mellitus    Unspecified essential hypertension    Unspecified sleep apnea    Wears glasses    Wheezing     Past Surgical History:   Procedure Laterality Date    Angiogram      Angioplasty (coronary)  13    stents  acute mi    Appendectomy      Back surgery      Biopsy of skin lesion      hyperkeratosis    Bypass surgery  1996 and 2012    cabg x 4    Cabg  1996    x4    Cath bare metal stent (bms)      R carotid/coronary    Cholecystectomy  1987    Hc implant stent noncoronary temporary with delivery system [id number]      Laminectomy,lumbar  1984    Other surgical history  2005    Hx of transcath placement of intrarhoracic carotid artery     Family History   Problem Relation Age of Onset    Other (Acute MI) Father     Other (Alzheimer's Disease) Mother          Allergies:  Allergies[1]    Medications:   piperacillin-tazobactam  4.5 g Intravenous Once       Continuous Infusions:   dilTIAZem 10 mg/hr (12/23/24 1117)    sodium chloride 125 mL/hr (12/23/24 1118)       Social History:   reports that she quit smoking about 6 years ago. Her smoking use included cigarettes. She started smoking about 56 years ago. She has a 50 pack-year smoking history. She has never used smokeless tobacco. She reports that she does not drink alcohol and does not use drugs.    Review of Systems:  All systems were reviewed and are negative except as described above in HPI.    Physical Exam:      Temp:  [102 °F (38.9 °C)] 102 °F (38.9 °C)  Pulse:  [103-152] 103  Resp:  [23-31] 24  BP: (124-156)/() 124/97  SpO2:  [91 %-95 %] 92 %  FiO2 (%):  [100 %] 100 %    Last 3 Weights   12/23/24 0942 156 lb 8.4 oz (71 kg)   12/18/24 0545 156 lb (70.8 kg)   12/17/24 0415 155 lb 4.8 oz (70.4 kg)   12/16/24 0411 153 lb 9.6 oz (69.7 kg)   12/15/24 0403 146 lb 6.2 oz (66.4 kg)   12/10/24 0500 156 lb 1.4 oz (70.8 kg)   12/09/24 0200 155 lb 3.3 oz (70.4 kg)   12/08/24 0400 151 lb 0.2 oz (68.5 kg)   12/07/24 0600 155 lb 6.8 oz (70.5 kg)   12/06/24 0508 151 lb 10.8 oz (68.8 kg)   12/05/24 2327 149 lb 14.6 oz (68 kg)   01/11/23 0952 150 lb (68 kg)           General: No apparent distress  HEENT: No focal deficits.  Neck: supple. JVP normal  Cardiac: Regular rhythm, S1, S2  normal,   No rub or gallop.  No murmur.  Lungs: Coarse  Abdomen: Soft, non-tender.   Extremities: No edema  Neurologic: not speaking.  Skin: Warm and dry.          Telemetry: afib    Laboratories and Data:  Diagnostics:    EKG, 12/23/2024:  afib, RVR. Lateral ST depression.    CXR, 12/23/2024:  reviewed    Labs:   HEM:  Recent Labs   Lab 12/23/24  0951   WBC 12.7*   HGB 12.5   .0*       Chem:  Recent Labs   Lab 12/17/24  0433 12/18/24  0506 12/23/24  0951   * 137 151*   K 3.2* 3.6  3.6 3.4*    99 111   CO2 27.0 29.0 24.0   BUN 43* 52* 62*   CREATSERUM 1.73* 1.88* 2.57*   CA 9.0 9.3 8.8   MG  --  1.9  --    * 136* 167*       Recent Labs   Lab 12/17/24  0433 12/23/24  0951   ALT 49 134*   * 212*   ALB 3.1* 4.0       No results for input(s): \"PTP\", \"INR\" in the last 168 hours.    No results for input(s): \"TROP\", \"CK\" in the last 168 hours.      Impression:   Acute hypoxic respiratory failure - CXR relatively clear.  Has been on eliquis so unlikely PE.  Element of heart failure and possible aspiration.  Chronic afib - tachy in setting of acute illness.  Acute on CRI - looks more \"prerenal\" on today's labs.  Acute on chronic systolic CHF, EF 45% 12/6/24.  CAD with remote CABG, left main and Lc xPCI.  Prior stroke with h/o carotid stenting.  Now non verbal today and AMS.  Long smoking history with COPD.  Diabetes  Sz disorder.    Plan:  Sick woman who is acutely ill.  40 mg IV lasix and Abx in ER.  Rate control with IV diltiazem as indicated.  Begin IV heparin as swallowing needs to be sorted out with AMS.  CT brain.  Goals of care discussions appropriate.      Antonio Roa MD  12/23/2024  12:38 PM  C5       [1]   Allergies  Allergen Reactions    Lipitor [Atorvastatin Calcium] MYALGIA     TABS    Wellbutrin [Bupropion Hcl]      TABS-tremor

## 2024-12-23 NOTE — ED PROVIDER NOTES
Patient Seen in: [facility] Emergency Department      History     Chief Complaint   Patient presents with    Altered Mental Status     Stated Complaint:     Subjective:   HPI      74-year-old female nursing home resident brought by ambulance for evaluation of shortness of breath and lethargy.  She has history including hypertension, atrial fibrillation, CHF, COPD, residual left-sided weakness from prior stroke.  She is on Eliquis.  Reportedly more short of breath than usual today, she was on her usual 2 L by nasal cannula but found to be hypoxic in the mid 80s.  Per facility less interactive than usual but this has apparently been going on for several days.  She was febrile on arrival here.    Objective:     Past Medical History:    Abdominal distention    Anxiety    Atherosclerosis of coronary artery     2 surgeries for CABG X 4 1996 2012     Atherosclerosis of coronary artery    acute MI, CHF, Stents     Belching    Black stools    CONGESTIVE HEART FAILURE    EF 35-40 %    COPD    Diabetes mellitus (HCC)    Disorder of liver    \"fatty liver\"    Fatigue    HEART ATTACK    Acute mi and stents    High blood pressure    High cholesterol    History of blood transfusion    History of depression    Leaking of urine    Night sweats    Paroxysmal atrial fibrillation (HCC)    Peripheral vascular disease (HCC)    Carotid Artery Blockage TIA    Seizure (HCC)    new onset on 2/26/19    Shortness of breath    Sputum production    Stented coronary artery    STROKE    TIA    Stroke syndrome    CVA no residual effects    Type II or unspecified type diabetes mellitus without mention of complication, not stated as uncontrolled    Uncontrolled diabetes mellitus    Unspecified essential hypertension    Unspecified sleep apnea    Wears glasses    Wheezing              Past Surgical History:   Procedure Laterality Date    Angiogram      Angioplasty (coronary)  2/28/14    stents acute mi    Appendectomy      Back surgery       Biopsy of skin lesion      hyperkeratosis    Bypass surgery   and     cabg x 4    Cabg  1996    x4    Cath bare metal stent (bms)      R carotid/coronary    Cholecystectomy  1987    Hc implant stent noncoronary temporary with delivery system [id number]      Laminectomy,lumbar  1984    Other surgical history  2005    Hx of transcath placement of intrarhoracic carotid artery                Social History     Socioeconomic History    Marital status:    Tobacco Use    Smoking status: Former     Current packs/day: 0.00     Average packs/day: 1 pack/day for 50.0 years (50.0 ttl pk-yrs)     Types: Cigarettes     Start date: 1968     Quit date: 2018     Years since quittin.6    Smokeless tobacco: Never   Vaping Use    Vaping status: Never Used   Substance and Sexual Activity    Alcohol use: No     Alcohol/week: 0.0 standard drinks of alcohol    Drug use: Never    Sexual activity: Never   Other Topics Concern    Caffeine Concern Yes     Comment: 1 cups of coffee daily    Exercise Yes     Comment: PT      Social Drivers of Health     Food Insecurity: No Food Insecurity (2024)    Food Insecurity     Food Insecurity: Never true   Transportation Needs: No Transportation Needs (2024)    Transportation Needs     Lack of Transportation: No   Physical Activity: Inactive (10/27/2020)    Received from [facility]    Exercise Vital Sign     Days of Exercise per Week: 0 days     Minutes of Exercise per Session: 0 min   Housing Stability: Low Risk  (2024)    Housing Stability     Housing Instability: No                  Physical Exam     ED Triage Vitals   BP 24 0942 156/86   Pulse 24 0942 (!) 152   Resp 24 0942 (!) 28   Temp 24 1006 (!) 102 °F (38.9 °C)   Temp src 24 1006 Rectal   SpO2 24 0942 93 %   O2 Device 24 0942 Non-rebreather mask       Current Vitals:   Vital Signs  BP: (!) 124/97  Pulse: 103  Resp: 24  Temp: (!) 102 °F (38.9 °C)  Temp  src: Rectal  MAP (mmHg): (!) 106    Oxygen Therapy  SpO2: 92 %  O2 Device: High flow/High humidity  FiO2 (%): 100 %  O2 Flow Rate (L/min): 40 L/min        Physical Exam  Vitals and nursing note reviewed.   Constitutional:       Appearance: She is well-developed.      Comments: Somnolent.  Will look toward voice and mumble answers to questions.  No verbal response otherwise.   HENT:      Head: Normocephalic and atraumatic.   Eyes:      Conjunctiva/sclera: Conjunctivae normal.      Pupils: Pupils are equal, round, and reactive to light.   Cardiovascular:      Rate and Rhythm: Tachycardia present. Rhythm irregular.      Heart sounds: Normal heart sounds.   Pulmonary:      Effort: Pulmonary effort is normal.      Breath sounds: Normal breath sounds.      Comments: Tachypneic with bibasilar rales.  Aeration is fair.  Abdominal:      General: Bowel sounds are normal.      Palpations: Abdomen is soft.   Musculoskeletal:         General: Normal range of motion.      Cervical back: Normal range of motion and neck supple.   Skin:     General: Skin is warm and dry.   Neurological:      Mental Status: She is oriented to person, place, and time.             ED Course     Labs Reviewed   URINALYSIS, ROUTINE - Abnormal; Notable for the following components:       Result Value    Clarity Urine Turbid (*)     Blood Urine 2+ (*)     Protein Urine 70 (*)     WBC Urine 6-10 (*)     RBC Urine 6-10 (*)     Squamous Epi. Cells Moderate (*)     Hyaline Casts Present (*)     All other components within normal limits   CBC WITH DIFFERENTIAL WITH PLATELET - Abnormal; Notable for the following components:    WBC 12.7 (*)     .0 (*)     Neutrophil Absolute Prelim 10.38 (*)     Neutrophil Absolute 10.38 (*)     All other components within normal limits   COMP METABOLIC PANEL (14) - Abnormal; Notable for the following components:    Glucose 167 (*)     Sodium 151 (*)     Potassium 3.4 (*)     BUN 62 (*)     Creatinine 2.57 (*)      Calculated Osmolality 333 (*)     eGFR-Cr 19 (*)      (*)      (*)     Total Protein 8.5 (*)     Globulin  4.5 (*)     A/G Ratio 0.9 (*)     All other components within normal limits   ABG PANEL W ELECT AND LACTATE - Abnormal; Notable for the following components:    ABG pH 7.46 (*)     ABG pO2 57 (*)     ABG HCO3 27.1 (*)     ABG Base Excess 3.1 (*)     Arterial Blood Gas O2Hb 87.6 (*)     Sodium Blood Gas 147 (*)     All other components within normal limits   PRO BETA NATRIURETIC PEPTIDE - Abnormal; Notable for the following components:    Pro-Beta Natriuretic Peptide 18,567 (*)     All other components within normal limits   TROPONIN I HIGH SENSITIVITY - Abnormal; Notable for the following components:    Troponin I (High Sensitivity) 66 (*)     All other components within normal limits   LIPID PANEL - Abnormal; Notable for the following components:    HDL Cholesterol 26 (*)     Triglycerides 167 (*)     All other components within normal limits   LACTIC ACID, PLASMA - Normal   SARS-COV-2/FLU A AND B/RSV BY PCR ([facility]) - Normal    Narrative:     This test is intended for the qualitative detection and differentiation of SARS-CoV-2, influenza A, influenza B, and respiratory syncytial virus (RSV) viral RNA in nasopharyngeal or nares swabs from individuals suspected of respiratory viral infection consistent with COVID-19 by their healthcare provider. Signs and symptoms of respiratory viral infection due to SARS-CoV-2, influenza, and RSV can be similar.    Test performed using the Xpert Xpress SARS-CoV-2/FLU/RSV (real time RT-PCR)  assay on the [employer] instrument, [employer], [place] [address].   This test is being used under the Food and Drug Administration's Emergency Use Authorization.    The authorized Fact Sheet for Healthcare Providers for this assay is available upon request from the laboratory.   SCAN SLIDE   RAINBOW DRAW LAVENDER   RAINBOW DRAW LIGHT GREEN   RAINBOW DRAW BLUE   RAINBOW DRAW  GOLD   BLOOD CULTURE   BLOOD CULTURE     EKG    Rate, intervals and axes as noted on EKG Report.  Rate: 151  Rhythm: Atrial Fibrillation  Reading: Atrial fibrillation with RVR.  Left axis deviation.  LVH.  1 PVC.  No old immediately available for comparison.                XR CHEST AP PORTABLE  (CPT=71045)    Result Date: 12/23/2024  PROCEDURE:  XR CHEST AP PORTABLE  (CPT=71045)  TECHNIQUE:  AP chest radiograph was obtained.  COMPARISON:  [facility] , XR, XR CHEST AP PORTABLE  (CPT=71045), 12/18/2024, 4:30 AM.  [facility] , XR, XR CHEST AP PORTABLE  (CPT=71045), 12/16/2024, 10:11 AM.  INDICATIONS:  SAURAV  PATIENT STATED HISTORY: (As transcribed by Technologist)  Patient offered no additional history at this time.    FINDINGS:  Stable cardiac size.  Median sternotomy approximated by wire sutures.  Mild interstitial opacities.  Minimal left basilar atelectasis.            CONCLUSION:  Minimal left basilar atelectasis.   LOCATION:  [facility]      Dictated by (CST): Romelia Delgado MD on 12/23/2024 at 10:46 AM     Finalized by (CST): Romelia Delgado MD on 12/23/2024 at 10:47 AM       XR CHEST AP PORTABLE  (CPT=71045)    Result Date: 12/18/2024  PROCEDURE:  XR CHEST AP PORTABLE  (CPT=71045)  TECHNIQUE:  AP chest radiograph was obtained.  COMPARISON:  [facility] , XR, XR CHEST AP PORTABLE  (CPT=71045), 12/16/2024, 10:11 AM.  INDICATIONS:  hypoxia, pulm edema  PATIENT STATED HISTORY: (As transcribed by Technologist)     FINDINGS:   Cardiac silhouette is mildly prominent in size.  Pulmonary vasculature demonstrates mild apical redistribution of pulmonary vasculature.  Patchy consolidations in the lungs are mildly improved.  No pneumothorax.  Calcified granuloma left lung apex is stable.            CONCLUSION:  Patchy consolidations in the lungs are mildly improved.   LOCATION:  [id number]      Dictated by (CST): Dario Villaseñor MD on 12/18/2024 at 8:07 AM     Finalized by (CST): Dario Villaseñor MD on 12/18/2024 at 8:07 AM       XR  CHEST AP PORTABLE  (CPT=71045)    Result Date: 12/16/2024  PROCEDURE:  XR CHEST AP PORTABLE  (CPT=71045)  TECHNIQUE:  AP chest radiograph was obtained.  COMPARISON:  [facility] , XR, XR CHEST AP PORTABLE  (CPT=71045), 12/12/2024, 1:22 AM.  INDICATIONS:  hypoxia  PATIENT STATED HISTORY: (As transcribed by Technologist)  Patient offered no additional history at this time.                CONCLUSION:   Postoperative changes of cardiothoracic surgery with stable cardiac and mediastinal contours.  Moderate/severe pulmonary edema with interstitial and alveolar components.  A small right pleural effusion is suspected.  No appreciable pneumothorax.   LOCATION:  [facility]      Dictated by (CST): Flako Morales MD on 12/16/2024 at 11:02 AM     Finalized by (CST): Flako Morales MD on 12/16/2024 at 11:03 AM       XR VIDEO SWALLOW (CPT=74230)    Result Date: 12/13/2024            PROCEDURE:  XR VIDEO SWALLOW (CPT=74230)  TECHNIQUE:  Video fluoroscopic swallowing study was performed in cooperation with the speech pathologist.  Barium of varying consistencies was administered orally with patient in lateral projection.  COMPARISON:  None.  INDICATIONS:  r/o aspiration; increased hypoxia; h/o prior stroke  PATIENT STATED HISTORY: (As transcribed by Technologist)  Patient offered no additional history at this time.   CINE CAPTURES:  5 FLUORSCOPY TIME:  4 minutes 43 seconds RADIATION DOSE (AIR KERMA PRODUCT):  322.9 uGy/m2   FINDINGS:  PHARYNGEAL PHASE:  Large amount of pooling in the vallecula and piriform sinuses. ASPIRATION:  None. PENETRATION:  Penetration with thin barium consistency  PLEASE SEE SPEECH PATHOLOGIST REPORT FOR FULL ASSESSMENT OF SWALLOWING MECHANISM.   LOCATION:  [facility]    Dictated by (CST): Saima Almaguer MD on 12/13/2024 at 11:39 AM     Finalized by (CST): Saima Almaguer MD on 12/13/2024 at 11:39 AM       XR CHEST AP PORTABLE  (CPT=71045)    Result Date: 12/12/2024  PROCEDURE:  XR CHEST AP PORTABLE  (CPT=71045)  TECHNIQUE:   AP chest radiograph was obtained.  COMPARISON:  [facility] , XR, XR CHEST AP/PA (1 VIEW) (CPT=71045), 2024, 12:06 PM.  INDICATIONS:  new hypoxia  PATIENT STATED HISTORY: (As transcribed by Technologist)  new hypoxia              CONCLUSION:  Postsurgical changes of median sternotomy.  There is cardiomegaly.  There are new prominent interstitial opacities in the lung periphery and perihilar regions extending into the lung bases bilaterally.  This may represent pulmonary edema versus an infectious process.  More focal airspace disease/atelectasis retrocardiac left lung base with blunting of the costophrenic angle.   LOCATION:  [facility]      Dictated by (CST): Saima Almaguer MD on 2024 at 7:18 AM     Finalized by (CST): Saima Almaguer MD on 2024 at 7:19 AM       XR CHEST AP/PA (1 VIEW) (CPT=71045)    Result Date: 2024  PROCEDURE:  XR CHEST AP/PA (1 VIEW) (CPT=71045)  TECHNIQUE:  AP chest radiograph was obtained.  COMPARISON:  [facility] , CT, CT ABDOMEN+PELVIS(CPT=74176), 2024, 1:44 AM.  [facility] , XR, XR CHEST AP PORTABLE  (CPT=71045), 2024, 0:27 AM.  INDICATIONS:  hypoxia  PATIENT STATED HISTORY: (As transcribed by Technologist)  Patient offered no additional history at this time.    FINDINGS:  The heart is normal in size.  The lungs are clear.  There are no pleural effusions.  The bones are unremarkable.  Median sternotomy wires.            CONCLUSION:  No acute disease.    LOCATION:  [facility]      Dictated by (CST): Aaron Holm MD on 2024 at 12:44 PM     Finalized by (CST): Aaron Holm MD on 2024 at 12:45 PM       CARD ECHO 2D DOPPLER CONTRAST (CPT=93306)    Result Date: 2024  Transthoracic Echocardiogram Name:Carla Kim Date: 2024 :  1950 Ht:  (65in)  BP: 123 / 63 MRN:  [id number]    Age:  74years    Wt:  (151lb) HR: 91bpm Loc:  [facility]       Gndr: F          BSA: 1.76m^2 Sonographer: Alphonso MCKEE AE, PE Ordering:    Ankit Long Consulting:  Ana  Vicky ---------------------------------------------------------------------------- History/Indications:   Coronary artery disease. Septic shock.  Coronary artery bypass grafting. ---------------------------------------------------------------------------- Procedure information:  A transthoracic complete 2D study was performed. Additional evaluation included M-mode, complete spectral Doppler, and color Doppler.  Patient status:  Inpatient.  Location:  Bedside.    Comparison was made to the study of 09/23/2022.    This was a routine study. Transthoracic echocardiography for ventricular function evaluation. Image quality was adequate. Intravenous contrast (Definity) was administered to evaluate left ventricular function. ECG rhythm:   Atrial fibrillation with PVC's.  Study completion:  There were no complications. ---------------------------------------------------------------------------- Conclusions: 1. Left ventricle: The cavity size was normal. Wall thickness was normal.    Systolic function was mildly reduced. The estimated ejection fraction was    45%, by visual assessment. Unable to assess LV diastolic function due to    heart rhythm. There was no evidence of a thrombus revealed by acoustic    contrast opacification. 2. Left ventricle: There is hypokinesis of the basal-mid inferoseptal and    basal-mid inferior walls. 3. Left atrium: The atrium was moderately dilated. 4. Mitral valve: There was mild regurgitation. 5. Pulmonary arteries: Systolic pressure was mildly to moderately increased.    The peak systolic pressure is 35mm Hg. 6. Inferior vena cava: The IVC was normally collapsible and normal-sized. * ---------------------------------------------------------------------------- * Findings: Left ventricle:  The cavity size was normal. Wall thickness was normal. Systolic function was mildly reduced. The estimated ejection fraction was 45%, by visual assessment.  There was no evidence of a thrombus revealed  by acoustic contrast opacification. Regional wall motion:   There is hypokinesis of the basal-mid inferoseptal and basal-mid inferior walls.   Unable to assess LV diastolic function due to heart rhythm. Ventricular septum:   Thickness was normal. Left atrium:  The atrium was moderately dilated. Right ventricle:  The cavity size was normal. Systolic function was normal. Systolic pressure was mildly to moderately increased. Right atrium:  The atrium was normal in size. Mitral valve:  The annulus was mildly calcified. Leaflet separation was normal.  Doppler:  Transvalvular velocity was within the normal range. There was no evidence for stenosis. There was mild regurgitation. Aortic valve:   The valve was trileaflet. The leaflets were mildly thickened and mildly calcified. Cusp separation was normal.  Doppler:  Transvalvular velocity was within the normal range. There was no evidence for stenosis. There was no significant regurgitation. Tricuspid valve:  The valve is structurally normal. Leaflet separation was normal.  Doppler:  Transvalvular velocity was within the normal range. There was no evidence for stenosis. There was trivial regurgitation. Pulmonic valve:    There was no significant valve disease.    Doppler: Transvalvular velocity was within the normal range. There was no evidence for stenosis. There was trivial regurgitation. Pericardium:   There was no pericardial effusion. Aorta: Aortic root: The aortic root was normal-sized. Pulmonary arteries: Systolic pressure was mildly to moderately increased. Systemic veins: Inferior vena cava: The IVC was normally collapsible and normal-sized. ---------------------------------------------------------------------------- Measurements  Left ventricle         Value        Ref       10/23/2022  IVS thickness, ED,     0.9   cm     0.6 - 0.9 1.3  PLAX  LV ID, ED, PLAX        4.8   cm     3.8 - 5.2 4.0  LV ID, ES, PLAX    (H) 3.8   cm     2.2 - 3.5 3.2  LV PW thickness,   (H)  1.0   cm     0.6 - 0.9 1.3  ED, PLAX  IVS/LV PW ratio,       0.97         --------- 0.99  ED, PLAX  LV PW/LV ID ratio,     0.2          --------- ----------  ED, PLAX  LV ejection        (L) 45    %      54 - 74   42  fraction  LV e', average         8.0   cm/sec --------- ----------  LV E/e', average       12           <=14      ----------  LVOT                   Value        Ref       10/23/2022  LVOT ID                2     cm     --------- ----------  Aortic root            Value        Ref       10/23/2022  Aortic root ID, ED     3.0   cm     2.5 - 3.9 ----------  Left atrium            Value        Ref       10/23/2022  LA ID, A-P, ES     (H) 5.1   cm     2.7 - 3.8 4.0  LA volume, ES, 1-p (H) 108   ml     22 - 52   84  A4C  LA/aortic root         1.7          --------- ----------  ratio  Mitral valve           Value        Ref       10/23/2022  Mitral E-wave peak     1     m/sec  --------- ----------  velocity  Mitral peak            4     mm Hg  --------- ----------  gradient, D  Pulmonary artery       Value        Ref       10/23/2022  PA pressure, S, DP     35    mm Hg  --------- ----------  Tricuspid valve        Value        Ref       10/23/2022  Tricuspid regurg       2.35  m/sec  <=2.8     2.14  peak velocity  Tricuspid peak         32    mm Hg  --------- 21  RV-RA gradient  Systemic veins         Value        Ref       10/23/2022  Estimated CVP          3     mm Hg  --------- 3  Right ventricle        Value        Ref       10/23/2022  RV pressure, S, DP     35    mm Hg  --------- 24 Legend: (L)  and  (H)  leno values outside specified reference range. ---------------------------------------------------------------------------- Prepared and electronically signed by Jacki Padilla 12/06/2024 17:32     XR CHEST AP PORTABLE  (CPT=71045)    Result Date: 12/6/2024  PROCEDURE:  XR CHEST AP PORTABLE  (CPT=71045)  TECHNIQUE:  AP chest radiograph was obtained.  COMPARISON:  [facility] XR, XR CHEST AP/PA (1 VIEW)  (CPT=71045), 10/24/2022, 8:41 AM.  INDICATIONS:  weakness, hypotension  PATIENT STATED HISTORY: (As transcribed by Technologist)  weakness, hypotension              CONCLUSION:  Preliminary reading provided [facility].  Cardiomegaly.  No CHF.  Lower lobe atelectasis or infiltrate. No sizable effusion, but small effusion difficult to exclude on portable chest x-ray. No evidence for pneumothorax.  Consider upright PA and lateral examination of the chest, when tolerated.   LOCATION:  [facility]      Dictated by (CST): Manjinder Caba MD on 12/06/2024 at 8:14 AM     Finalized by (CST): Manjinder Caba MD on 12/06/2024 at 8:15 AM       CT ABDOMEN+PELVIS(CPT=74176)    Result Date: 12/6/2024  PROCEDURE:  CT ABDOMEN+PELVIS (CPT=74176)  COMPARISON:  [facility] , CT, CT ABDOMEN PELVIS IV CONTRAST, NO ORAL (ER), 6/22/2022, 6:28 PM.  INDICATIONS:  weakness, hypotension  TECHNIQUE:  Unenhanced multislice CT scanning was performed from the dome of the diaphragm to the pubic symphysis.  Dose reduction techniques were used. Dose information is transmitted to the ACR (American College of Radiology) NRDR (National Radiology Data Registry) which includes the Dose Index Registry.  PATIENT STATED HISTORY: (As transcribed by Technologist)  Pt arrives with medics for generalized weakness and hypotension. Pt received 150cc bolus en route.     FINDINGS:  Please note the exam is somewhat limited without intravenous or oral contrast.  LIVER:  No enlargement, atrophy, abnormal density, or significant focal lesion.  BILIARY:  Postsurgical changes of cholecystectomy are noted. PANCREAS:  No lesion, fluid collection, ductal dilatation, or atrophy.  SPLEEN:  No enlargement or focal lesion.  KIDNEYS:  2.6 x 2.7 cm left renal cyst is present.  This has an overall simple appearance.  No specific further follow-up is recommended. ADRENALS:  No mass or enlargement.  AORTA/VASCULAR:    Mild calcific atherosclerosis of the abdominal  aorta is present.  There is mild ectasia of the infrarenal abdominal aorta measuring up to 2.7 cm. RETROPERITONEUM:  No mass or adenopathy.  BOWEL/MESENTERY:  No dilated loops of small bowel are seen.  Portions of the bowel are decompressed, limiting evaluation.  No evidence of obstruction.  No free fluid is seen.  Lack of oral contrast limits assessment of the bowel. ABDOMINAL WALL:  No mass or hernia.  URINARY BLADDER:  There is prominent circumferential mural thickening of the urinary bladder.  This is suspicious for cystitis. PELVIC NODES:  No adenopathy.  PELVIC ORGANS:  No visible mass.  Pelvic organs appropriate for patient age.  BONES:  Mild compression deformity involving the superior endplate of L2.  This is age indeterminate. LUNG BASES:  No visible pulmonary or pleural disease.  OTHER:  Negative.             CONCLUSION:  There is prominent circumferential mural thickening of the urinary bladder which may represent cystitis.  Clinical correlation is suggested.  Age-indeterminate compression deformity of the L2 vertebral body.  Correlation for symptoms in this region is suggested.  If there is persistent concern then consider follow-up imaging including MRI.  Mild ectasia of the infrarenal abdominal aorta.   LOCATION:  [facility]   Dictated by (CST): Francisco Ramirez MD on 12/06/2024 at 7:42 AM     Finalized by (CST): Francisco Ramirez MD on 12/06/2024 at 7:45 AM       CT CHEST (LQJ=11978)    Result Date: 12/6/2024  PROCEDURE:  CT CHEST (CPT=71250)  COMPARISON:  [facility] , CT, CT ANGIOGRAPHY, CHEST (CPT=71275), 5/31/2018, 9:55 AM.  INDICATIONS:  weakness, hypotension  TECHNIQUE:  Unenhanced multislice CT scanning is performed through the chest.  Dose reduction techniques were used. Dose information is transmitted to the ACR (American College of Radiology) NRDR (National Radiology Data Registry) which includes the Dose  Index Registry.  PATIENT STATED HISTORY: (As transcribed by Technologist)  Pt arrives with medics for  generalized weakness and hypotension. Pt received 150cc bolus en route.     FINDINGS:  Please note that evaluation the chest is somewhat limited without intravenous contrast. LUNGS:  There are secretions present within the trachea extending into the right greater than left mainstem bronchi.  Peribronchial thickening is present within the lungs.  Motion artifact limits assessment.  No focal consolidation is seen.  Mild basilar  atelectasis is present.  There is some interlobular septal thickening present.  Mild volume overload may be possible.  VASCULATURE:  Pulmonary vessels are unremarkable within the limits of a noncontrast CT.  TAMERA:  No mass or adenopathy.  MEDIASTINUM:  Prevascular space node measures 1.7 x 1.1 cm. CARDIAC:  Severe calcific atherosclerosis of the coronary arteries is present. PLEURA:  No mass or effusion.  THORACIC AORTA:  Unremarkable as seen on non-contrast imaging. CHEST WALL:  No mass or axillary adenopathy.  LIMITED ABDOMEN:  2 mm nonobstructing stone of the right kidney is present.  Probable simple cyst of the left kidney measures up to 2.2 x 2.7 cm.  No specific further follow-up is recommended on the basis of this exam. BONES:  No bony lesion or fracture.             CONCLUSION:  There are secretions present within the trachea and bilateral mainstem bronchi with areas of peribronchial thickening present.  Consider aspiration within differential.  No focal consolidation is seen at this time.  There may be mild volume overload present.  A preliminary report was provided by [employer].     LOCATION:  [place]   Dictated by (CST): Francisco Ramirez MD on 12/06/2024 at 7:38 AM     Finalized by (CST): Francisco Ramirez MD on 12/06/2024 at 7:42 AM       CT BRAIN OR HEAD (CPT=70450)    Result Date: 12/6/2024  PROCEDURE:  CT BRAIN OR HEAD ([id number])  COMPARISON:  [facility] , CT, CT BRAIN OR HEAD ([id number]), 10/21/2022, 9:44 PM.  INDICATIONS:  weakness, hypotension  TECHNIQUE:  Noncontrast CT scanning is performed  through the brain. Dose reduction techniques were used. Dose information is transmitted to the ACR (American College of Radiology) NRDR (National Radiology Data Registry) which includes the Dose Index Registry.  PATIENT STATED HISTORY: (As transcribed by Technologist)  Pt arrives with medics for generalized weakness and hypotension. Pt received 150cc bolus en route.     FINDINGS:  VENTRICLES/SULCI:  Ventricles and sulci are normal in size.  INTRACRANIAL:  No significant midline shift or mass effect is seen.  Chronic appearing encephalomalacia is noted within the right posterior frontal and parietal lobe.  A background of moderate chronic microvascular ischemic changes present within the white matter. SINUSES:           No sign of acute sinusitis.  MASTOIDS:          No sign of acute inflammation. SKULL:             No evidence for fracture or osseous abnormality. OTHER:             None.            CONCLUSION:  No significant midline shift or mass effect.  No acute intracranial hemorrhage.  An area of encephalomalacia in the right frontal parietal lobe is again noted.  If there is persistent clinical concern then consider MRI.  A preliminary report was provided by [employer].    LOCATION:  [facility]   Dictated by (CST): Francisco Ramirez MD on 12/06/2024 at 6:01 AM     Finalized by (CST): Francisco Ramirez MD on 12/06/2024 at 6:05 AM             MDM      74-year-old female nursing home resident presenting for evaluation of shortness of breath and lethargy as detailed above.  Patient somnolent here, will briefly look towards voice and mumbles some answers to questions.  Bibasilar rales concerning for infection versus volume overload.  Mild to moderate respiratory distress with some tachypnea and diminished breath sounds as well as she is febrile on arrival as well.  Labs ordered including blood cultures and lactic acid.  Will get an ABG.      Update 11:15 AM.  Patient has a slight leukocytosis of 12.7.  Lactic acid is normal.   No definite pneumonia on chest x-ray.  No definite UTI.  She does have TIP with elevated creatinine of 2.57, looks like her baseline is 1.7-1.8.  I do think she needs hydration although would hydrate her gently with an hourly rate due to her history of CHF.  She is more awake and alert now although still not back to baseline.  Heart rate has come down a bit but still in the 120s I think she should be on Cardizem for rate control.  She will need to be admitted for further treatment.      Update at 12:45 PM.  Patient with increasing oxygen requirements here.  Initially on nasal cannula, transition to high flow and now on Vapotherm at 40 L and 100%.  She is not particularly tachypneic but is still 94 to 96% on those settings.  Evaluated by hospitalist Dr. Way.  He is concerned as this is about the highest settings patient can be on on the floor.  He recommends ICU admission for close observation.      A total of 35 minutes of critical care time (exclusive of billable procedures) was administered to manage the patient's critical lab values due to her TIP, but atrial fibrillation.  This involved direct patient intervention, complex decision making, and/or extensive discussions with the patient, family, and clinical staff.    Admission disposition: 12/23/2024 11:41 AM           Past Medical History-CAD, atrial fibrillation, CHF, COPD, diabetes    Differential diagnosis before testing included pneumonia, UTI, volume overload    Co-morbidities that add to the complexity of management include: None    Testing ordered during this visit included labs, chest x-ray    Radiographic images  I personally reviewed the radiographs and my individual interpretation shows no infiltrates or pneumothorax  I also reviewed the official reports that showed no infiltrate or pneumothorax    External chart review showed her summary from recent admission        Discussion of management with hospitalist, nephrologist      Medications Provided:  Hospitalist, nephrology            Disposition:    Admission  I have discussed with the patient the results of test, differential diagnosis, and treatment plan. They expressed clear understanding of these instructions and agrees to the plan provided.         Medical Decision Making      Disposition and Plan     Clinical Impression:  1. Acute renal insufficiency    2. Elevated troponin    3. Atrial fibrillation with rapid ventricular response (HCC)         Disposition:  Admit  12/23/2024 11:41 am    Follow-up:  No follow-up provider specified.        Medications Prescribed:  Current Discharge Medication List              Supplementary Documentation:         Hospital Problems       Present on Admission  Date Reviewed: 1/11/2023            ICD-10-CM Noted POA    * (Principal) Acute renal insufficiency N28.9 12/23/2024 Unknown    CKD stage 3b, GFR 30-44 ml/min (HCC) N18.32 3/4/2019 Yes

## 2024-12-23 NOTE — CONSULTS
[facility]  Report of Consultation    Carla Kim Patient Status:  Emergency    1950 MRN [id number]   [facility] EMERGENCY DEPARTMENT Attending Damon Lee MD   Hosp Day # 0 PCP PHYSICIAN NONSTAFF     Reason for Consultation:  TIP/CKD 3b/hypernatremia    History of Present Illness:  Carla Kim is a a(n) 74 year old woman known to our service with mult med probs incl CKD due to DM/HTN with most recent creat 1.7 mg/dl or so discharged to NH  after admit due to klebsiella urosepsis sent to ED for eval of AMS.  In ED noted to have TIP/hyperna.  Also afib/RVR. Pt cannot provide any history currently.  Dtr is at bedside    History:  Past Medical History:    Abdominal distention    Anxiety    Atherosclerosis of coronary artery     2 surgeries for CABG X 4 1996     Atherosclerosis of coronary artery    acute MI, CHF, Stents     Belching    Black stools    CONGESTIVE HEART FAILURE    EF 35-40 %    COPD    Diabetes mellitus (HCC)    Disorder of liver    \"fatty liver\"    Fatigue    HEART ATTACK    Acute mi and stents    High blood pressure    High cholesterol    History of blood transfusion    History of depression    Leaking of urine    Night sweats    Paroxysmal atrial fibrillation (HCC)    Peripheral vascular disease (HCC)    Carotid Artery Blockage TIA    Seizure (HCC)    new onset on 19    Shortness of breath    Sputum production    Stented coronary artery    STROKE    TIA    Stroke syndrome    CVA no residual effects    Type II or unspecified type diabetes mellitus without mention of complication, not stated as uncontrolled    Uncontrolled diabetes mellitus    Unspecified essential hypertension    Unspecified sleep apnea    Wears glasses    Wheezing     Past Surgical History:   Procedure Laterality Date    Angiogram      Angioplasty (coronary)  13    stents acute mi    Appendectomy      Back surgery      Biopsy of skin lesion      hyperkeratosis    Bypass  surgery  1996 and 2012    cabg x 4    Cabg  1996    x4    Cath bare metal stent (bms)      R carotid/coronary    Cholecystectomy  1987     implant stent noncoronary temporary with delivery system [id number]      Laminectomy,lumbar  1984    Other surgical history  2005    Hx of transcath placement of intrarhoracic carotid artery     Family History   Problem Relation Age of Onset    Other (Acute MI) Father     Other (Alzheimer's Disease) Mother       reports that she quit smoking about 6 years ago. Her smoking use included cigarettes. She started smoking about 56 years ago. She has a 50 pack-year smoking history. She has never used smokeless tobacco. She reports that she does not drink alcohol and does not use drugs.    Allergies:  Allergies[1]    Medications:    Current Facility-Administered Medications:     dilTIAZem 10 mg BOLUS FROM BAG infusion, 10 mg, Intravenous, Q1H PRN    dilTIAZem (cardIZEM) 100 mg in sodium chloride 0.9% 100 mL IVPB-ADDV, 2.5-20 mg/hr, Intravenous, Continuous    sodium chloride 0.9% infusion, 125 mL/hr, Intravenous, Continuous  Prior to Admission Medications   Medication Sig    metoprolol succinate ER 25 MG Oral Tablet 24 Hr Take 1 tablet (25 mg total) by mouth 2x Daily(Beta Blocker).    insulin aspart 100 Units/mL Subcutaneous Solution Pen-injector Inject 2-10 Units into the skin 4 (four) times daily before meals and nightly. CORRECTION FACTOR - COLUMN MEDIUM DOSE. Continue to give correction insulin (Novolog/aspart) even if NPO;Give 2 unit if blood glucose 141-180 mg/dL Give 3 units if blood glucose 181-220 mg/dL Give 6 units if blood glucose 221-260 mg/dL Give 8 units if blood glucose 261-300 mg/dL Give 10 units if blood glucose 301-350 mg/dL    torsemide 20 MG Oral Tab Take 1 tablet (20 mg total) by mouth daily.    ondansetron (ZOFRAN) 4 mg tablet Take 1 tablet (4 mg total) by mouth every 6 (six) hours as needed for Nausea.    pantoprazole 40 MG Oral Tab EC Take 1 tablet (40 mg total) by  mouth every morning before breakfast.    sennosides 8.6 MG Oral Tab Take 2 tablets (17.2 mg total) by mouth at bedtime.    Cholecalciferol 1.25 MG (84483 UT) Oral Tab Take 50,000 Units by mouth Every Monday.    baclofen 5 MG Oral Tab Take 1 tablet (5 mg total) by mouth 3 (three) times daily.    polyethylene glycol, PEG 3350, 17 g Oral Powd Pack Take 17 g by mouth daily.    apixaban 5 MG Oral Tab Take 1 tablet (5 mg total) by mouth 2 (two) times daily.    clopidogrel 75 MG Oral Tab Take 1 tablet (75 mg total) by mouth daily.    sertraline 100 MG Oral Tab Take 1 tablet (100 mg total) by mouth daily.    levETIRAcetam 500 MG Oral Tab Take 1 tablet (500 mg total) by mouth 2 (two) times daily.    ezetimibe 10 MG Oral Tab Take 1 tablet (10 mg total) by mouth nightly.    Rosuvastatin Calcium 40 MG Oral Tab Take 1 tablet (40 mg total) by mouth daily.    acetaminophen 325 MG Oral Tab Take 2 tablets (650 mg total) by mouth every 6 (six) hours as needed for Pain.       Review of Systems:  Pt cannot provide      Physical Exam:   /81   Pulse (!) 128   Temp (!) 102 °F (38.9 °C) (Rectal)   Resp (!) 31   Ht 5' 3\" (1.6 m)   Wt 156 lb 8.4 oz (71 kg)   SpO2 94%   BMI 27.73 kg/m²   Temp (24hrs), Av °F (38.9 °C), Min:102 °F (38.9 °C), Max:102 °F (38.9 °C)     No intake or output data in the 24 hours ending 24 1147  Last 3 Weights   24 0942 156 lb 8.4 oz (71 kg)   24 0545 156 lb (70.8 kg)   24 0415 155 lb 4.8 oz (70.4 kg)   24 0411 153 lb 9.6 oz (69.7 kg)   12/15/24 0403 146 lb 6.2 oz (66.4 kg)   12/10/24 0500 156 lb 1.4 oz (70.8 kg)   24 0200 155 lb 3.3 oz (70.4 kg)   24 0400 151 lb 0.2 oz (68.5 kg)   24 0600 155 lb 6.8 oz (70.5 kg)   24 0508 151 lb 10.8 oz (68.8 kg)   24 2327 149 lb 14.6 oz (68 kg)   23 0952 150 lb (68 kg)   10/25/22 0524 151 lb 8 oz (68.7 kg)   10/24/22 0623 156 lb 1.6 oz (70.8 kg)   10/23/22 0300 151 lb 14.4 oz (68.9 kg)   10/21/22  2108 149 lb 7.6 oz (67.8 kg)   06/22/22 2228 172 lb 13.5 oz (78.4 kg)   06/22/22 1604 154 lb 5.2 oz (70 kg)     General: awake but not interactive in no apparent distress.  HEENT: No scleral icterus, dry MM  Neck: Supple, no MEGHANA or thyromegaly  Cardiac: tachy, irreg/irreg, S1, S2 normal, no murmur or rub  Lungs: Clear without wheezes, rales, rhonchi.    Abdomen: Soft, non-tender. + bowel sounds, no palpable organomegaly  Extremities: Without clubbing, cyanosis or edema.  Neurologic: L side contracted  Skin: Warm and dry, no rashes      Laboratory Data:  Lab Results   Component Value Date    WBC 12.7 12/23/2024    HGB 12.5 12/23/2024    HCT 39.7 12/23/2024    .0 12/23/2024    CREATSERUM 2.57 12/23/2024    BUN 62 12/23/2024     12/23/2024    K 3.4 12/23/2024     12/23/2024    CO2 24.0 12/23/2024     12/23/2024    CA 8.8 12/23/2024    ALB 4.0 12/23/2024    ALKPHO 134 12/23/2024    BILT 0.5 12/23/2024    TP 8.5 12/23/2024     12/23/2024     12/23/2024       BUN (mg/dL)   Date Value   12/23/2024 62 (H)   12/18/2024 52 (H)   12/17/2024 43 (H)     UREA NITROGEN (BUN) (mg/dL)   Date Value   04/19/2017 15   01/07/2016 18   07/24/2014 22     CREATININE (mg/dL)   Date Value   03/25/2021 0.99   11/24/2020 1.09   04/19/2017 0.95   01/07/2016 0.95   07/24/2014 1.07 (H)     Creatinine (mg/dL)   Date Value   12/23/2024 2.57 (H)   12/18/2024 1.88 (H)   12/17/2024 1.73 (H)       Malb/Cre Calc   Date Value Ref Range Status   04/17/2018 113.6 (H) <=30.0 ug/mg Final   12/06/2016 44.4 (H) <=30.0 ug/mg Final       Recent Labs   Lab 12/23/24  0951   WBC 12.7*   HGB 12.5   MCV 91.3   .0*       Recent Labs   Lab 12/17/24  0433 12/18/24  0506 12/23/24  0951   * 137 151*   K 3.2* 3.6  3.6 3.4*    99 111   CO2 27.0 29.0 24.0   BUN 43* 52* 62*   CREATSERUM 1.73* 1.88* 2.57*   CA 9.0 9.3 8.8   MG  --  1.9  --    * 136* 167*       Recent Labs   Lab 12/17/24  0433 12/23/24  0951    ALT 49 134*   * 212*   ALB 3.1* 4.0       Recent Labs   Lab 12/17/24  1229 12/17/24  1649 12/17/24  2144 12/18/24  0509 12/18/24  1259   PGLU 159* 224* 137* 151* 197*           Imaging:  Cxr reviewed    Impression/Plan:    #1.  TIP/CKD III- b/l creat appears to be close to 1.7 mg/dl or so in setting of longstanding DM/HTN.  TIP appears to be due to decr renal perfusion- afib/RVR  as well as likely prerenal azotemia as she appears volume depleted.  Start IVF    #2.  Hypernatremia- due to free water deficit, suspect poor intake.  Hypotonic IVF    #3.  AMS- in setting of above. Recent admit due to klebsiella urosepsis.      #4.  Afib/RVR- per cards    Thank you for allowing me to participate in the care of your patient. Please do not hesitate to call with any questions or concerns.       Ankit Werner MD  12/23/2024  11:47 AM         [1]   Allergies  Allergen Reactions    Lipitor [Atorvastatin Calcium] MYALGIA     TABS    Wellbutrin [Bupropion Hcl]      TABS-tremor

## 2024-12-23 NOTE — CONSULTS
Palliative Care Brief Note    Carla Kim Patient Status:  Inpatient    1950 MRN [id number]   Location [facility] 4SW-A Attending [address],    Hosp Day # 0 PCP PHYSICIAN NONSTAFF     Date of Consult: 2024  [facility] Inpatient    Reason for Consultation: Consult ordered by:: Shante Elizalde for evaluation of Palliative Care needs and Goals of care discussion.    History of Present Illness: Carla Kim is a 74 year old female with Cognitive dysfunction, PVD, DM, Hx of CVA with residual left sided weakness, COPD, CAD, Afib, CHF who was admitted on 2024 for AMS and hypoxia. Pt is from [facility]. Recently admitted - for UTI and sepsis, discharged to [facility]. Work up in our hospital revealed AMS, hypoxia, hypernatremia, TIP, elevated BNP.  History was obtained from [facility] and daughter  This is the 2nd hospitalization in the past 1 months. I met with pt and her daughter during the last admission.     Received call from daughter Anne Marie regarding admission and wanting to discuss plan. Consult then appeared. Called back at 3:17 PM. Pt in ED then transferred to ICU. I did not see pt as she is not oriented and undergoing ICU admission cares.     I spoke with daughter at length by phone regarding her mother's care and readmission. She expressed worry over the care she received at [place] and the lack of notification from facility to her about EMS call or transfer to the hospital. This is an ongoing issue per Anne Marie.   Anne Marie wanted to make note that in ED, she and Yeny RN were actively pulling out a brown food that pt appeared to pocket.   Advance Care Planning   A voluntary discussion regarding current condition and LST took place. Regarding pt's overall POC, Anne Marie understands that her mom is again very ill and requires critical care monitoring. She does not want to set any further limits as she feels it is akin to playing God. We discussed the value of life and the difference of  QoL vs being alive. She would like to speak with her therapist about this tonight. She also hopes her mom will wake and be able to contribute to this decision as she did last admission. Anne Marie is aware that she is currently on vapotherm and the steps we take if her mom were to decompensate further from a respiratory standpoint. Although she does not believe intubation would positively impact her, she needs time to consider the alternative. Anne Marie is worried that her mom is declining at a rapid rate. De escalation of care and comfort were briefly mentioned.     >16 minutes spent on ACP.     Plan:   Will plan to meet with pt and daughter tomorrow around 9:30 AM.   Reached out to CM regarding facility concerns    Discussed with POA and SW/CM.    Time spent on phone was 20 minutes focused on ACP and critical care status.     Nakia Pereira MD  12/23/2024 3:54 PM  Palliative Care Services    The 21st Century Cures Act makes medical notes like these available to patients in the interest of transparency. Please be advised this is a medical document. Medical documents are intended to carry relevant information, facts as evident, and the clinical opinion of the practitioner. The medical note is intended as peer to peer communication and may appear blunt or direct. It is written in medical language and may contain abbreviations or verbiage that are unfamiliar.     Addendum: received call back at 4:20 pm - 4:24 PM. Anne Marie states that she would not want intubation after discussing with her friend/therapist. Code status updated. Meeting confirmed for 9:30 AM tomorrow. Anne Marie asked about medications for restlessness/anxiety. Message sent to Shante Elizalde to address. Nakia Pereira MD, 12/23/24, 4:27 PM

## 2024-12-23 NOTE — H&P
[facility]     Carla Kim Patient Status:  Emergency    1950 MRN [id number]   Location [facility] EMERGENCY DEPARTMENT Attending Damon Lee MD   Hosp Day # 0 PCP PHYSICIAN NONSTAFF     Chief Complaint: I have a fever    Subjective:    History of Present Illness:     Carla Kim is a 74 year old female with multiple medical problems, hospitalized earlier this month and [place]'ed 5 days s/p 13 day stay for septic shock 2/2 uti. Pt presents today for AMS and febrile illness. Daughter at the bedside. Pt unable to provide much hpi on her own. Daughter last saw her a few days ago. Reportedly pt had fever and more sob in the last 24h w/ associated weakness and concerns of lethargy.     History/Other:    Past Medical History:  Past Medical History:    Abdominal distention    Anxiety    Atherosclerosis of coronary artery     2 surgeries for CABG X 4 1996     Atherosclerosis of coronary artery    acute MI, CHF, Stents     Belching    Black stools    CONGESTIVE HEART FAILURE    EF 35-40 %    COPD    Diabetes mellitus (HCC)    Disorder of liver    \"fatty liver\"    Fatigue    HEART ATTACK    Acute mi and stents    High blood pressure    High cholesterol    History of blood transfusion    History of depression    Leaking of urine    Night sweats    Paroxysmal atrial fibrillation (HCC)    Peripheral vascular disease (HCC)    Carotid Artery Blockage TIA    Seizure (HCC)    new onset on 19    Shortness of breath    Sputum production    Stented coronary artery    STROKE    TIA    Stroke syndrome    CVA no residual effects    Type II or unspecified type diabetes mellitus without mention of complication, not stated as uncontrolled    Uncontrolled diabetes mellitus    Unspecified essential hypertension    Unspecified sleep apnea    Wears glasses    Wheezing     Past Surgical History:   Past Surgical History:   Procedure Laterality Date    Angiogram      Angioplasty  (coronary)  2/28/14    stents acute mi    Appendectomy      Back surgery      Biopsy of skin lesion      hyperkeratosis    Bypass surgery  1996 and 2012    cabg x 4    Cabg  1996    x4    Cath bare metal stent (bms)      R carotid/coronary    Cholecystectomy  1987    Hc implant stent noncoronary temporary with delivery system [id number]      Laminectomy,lumbar  1984    Other surgical history  2005    Hx of transcath placement of intrarhoracic carotid artery      Family History:   Family History   Problem Relation Age of Onset    Other (Acute MI) Father     Other (Alzheimer's Disease) Mother      Social History:    reports that she quit smoking about 6 years ago. Her smoking use included cigarettes. She started smoking about 56 years ago. She has a 50 pack-year smoking history. She has never used smokeless tobacco. She reports that she does not drink alcohol and does not use drugs.     Allergies: Allergies[1]    Medications:  Medications Ordered Prior to Encounter[2]    Review of Systems:   A comprehensive review of systems was completed.    Pertinent positives and negatives noted in the HPI.    Objective:   Physical Exam:    BP (!) 124/97   Pulse 103   Temp (!) 102 °F (38.9 °C) (Rectal)   Resp 24   Ht 5' 3\" (1.6 m)   Wt 156 lb 8.4 oz (71 kg)   SpO2 92%   BMI 27.73 kg/m²   General: No acute distress, Axox1  Respiratory: positive rhonchi, no wheezes  Cardiovascular: S1, S2. Regular rate and rhythm  Abdomen: Soft, Non-tender, non-distended, positive bowel sounds  Neuro: No new focal deficits  Extremities: No edema      Results:    Labs:      Labs Last 24 Hours:    Recent Labs   Lab 12/23/24  0951   RBC 4.35   HGB 12.5   HCT 39.7   MCV 91.3   MCH 28.7   MCHC 31.5   RDW 18.6   NEPRELIM 10.38*   WBC 12.7*   .0*       Recent Labs   Lab 12/17/24  0433 12/18/24  0506 12/23/24  0951   * 136* 167*   BUN 43* 52* 62*   CREATSERUM 1.73* 1.88* 2.57*   EGFRCR 30* 28* 19*   CA 9.0 9.3 8.8   ALB 3.1*  --  4.0   NA  133* 137 151*   K 3.2* 3.6  3.6 3.4*    99 111   CO2 27.0 29.0 24.0   ALKPHO 106  --  134   *  --  212*   ALT 49  --  134*   BILT 0.6  --  0.5   TP 7.1  --  8.5*       Lab Results   Component Value Date    PT 13.4 02/28/2014    PT 13.9 02/27/2014    PT 20.2 (H) 10/22/2012    INR 1.62 (H) 12/11/2024    INR 2.15 (H) 12/07/2024    INR 2.77 (H) 12/06/2024       Recent Labs   Lab 12/23/24  0951   TROPHS 66*       Recent Labs   Lab 12/23/24  0951   PBNP 18,567*       No results for input(s): \"PCT\" in the last 168 hours.    Imaging: Imaging data reviewed in Epic.    Assessment & Plan:      #Acute on chronic hypoxemic respiratory failure  -PTA: on 2L, currently on heated high flow w/ fio2 100% @ 40L/min w/ spo2 96%; given severe hypoxemia, discussed w/ er physician for icu admission evaluation   -pt on eliquis PTA, risk of PE low, unable to get CTA chest given ckd  -will consider vq scan  -given AMS, will start heparin gtt  -echo  -cxr personally reviewed, it does not fully explain degree of hypoxemia     #AMS  -febrile illness  -CT brain ordered, discussed w/ er physician  -abg w/o hypercapnia    #CAP  #Suspect aspiration pneumonia  -iv zosyn (12/23-)  -sputum cultures, Bcx  -ST to see    #HFrEF   #TIP on CKD3  #Hypernatremia  -cards and renal to see to discuss diuresis vs ivf  -echo    #CAD s/p CABG  #Pafib  #HLD    #hx of CVA    #COPD    #DM2  -A1c 6.5 as of Dec 2024  -ISS for now    #Seizure disorder    #ACP  -palliative care team to see    #Recent hospitalization  -dec 2024 admitted for septic shock 2/2 uti    Plan of care discussed with ER physician, cards, patient, patient's daugther   CCT 35 mins    Keri Way DO    Supplementary Documentation:     The 21st Century Cures Act makes medical notes like these available to patients in the interest of transparency. Please be advised this is a medical document. Medical documents are intended to carry relevant information, facts as evident, and the clinical  opinion of the practitioner. The medical note is intended as peer to peer communication and may appear blunt or direct. It is written in medical language and may contain abbreviations or verbiage that are unfamiliar.                                       [1]   Allergies  Allergen Reactions    Lipitor [Atorvastatin Calcium] MYALGIA     TABS    Wellbutrin [Bupropion Hcl]      TABS-tremor   [2]   Current Facility-Administered Medications on File Prior to Encounter   Medication Dose Route Frequency Provider Last Rate Last Admin    [COMPLETED] potassium chloride (Klor-Con M20) tab 40 mEq  40 mEq Oral Q4H Rafa Chandler DO   40 mEq at 12/18/24 1354    [COMPLETED] potassium chloride (Klor-Con M20) tab 40 mEq  40 mEq Oral Once Rafa Chandler DO   40 mEq at 12/17/24 1001    [COMPLETED] potassium chloride (Klor-Con M20) tab 20 mEq  20 mEq Oral Once Vicky Perez MD   20 mEq at 12/16/24 0912    [COMPLETED] furosemide (Lasix) 10 mg/mL injection 60 mg  60 mg Intravenous Once Vicky Perez MD   60 mg at 12/16/24 2134    [COMPLETED] potassium chloride (Klor-Con M20) tab 20 mEq  20 mEq Oral Once Vicky Perez MD   20 mEq at 12/15/24 0911    [COMPLETED] furosemide (Lasix) 10 mg/mL injection 40 mg  40 mg Intravenous Once Trini Sommer MD   40 mg at 12/15/24 0908    [COMPLETED] ipratropium-albuterol (Duoneb) 0.5-2.5 (3) MG/3ML inhalation solution        3 mL at 12/15/24 2003    [COMPLETED] dilTIAZem (cardIZEM) 25 mg/5mL injection 5 mg  5 mg Intravenous Once Rafa Chandler DO   5 mg at 12/14/24 0923    [COMPLETED] furosemide (Lasix) 10 mg/mL injection 40 mg  40 mg Intravenous Once Vicky Perez MD   40 mg at 12/14/24 0922    [COMPLETED] potassium chloride (Klor-Con) 20 MEQ oral powder 40 mEq  40 mEq Oral Once Vicky Perez MD   40 mEq at 12/14/24 0922    [COMPLETED] digoxin (Lanoxin) 250 MCG/ML injection 250 mcg  250 mcg Intravenous Once Rosario Astudillo APRN   250 mcg at 12/14/24 1646    [COMPLETED] furosemide (Lasix) 10 mg/mL  injection 60 mg  60 mg Intravenous Once Ankit Werner MD   60 mg at 12/13/24 1245    [COMPLETED] potassium chloride (Klor-Con) 20 MEQ oral powder 40 mEq  40 mEq Oral Once Rafa Chandler, DO   40 mEq at 12/13/24 1738    [COMPLETED] furosemide (Lasix) 10 mg/mL injection 20 mg  20 mg Intravenous Once Rosalinda Seymour, DO   20 mg at 12/12/24 0147    [COMPLETED] potassium chloride (Klor-Con M20) tab 20 mEq  20 mEq Oral Once Vicky Perez MD   20 mEq at 12/12/24 0926    [COMPLETED] furosemide (Lasix) 10 mg/mL injection 20 mg  20 mg Intravenous Once Vicky Perez MD   20 mg at 12/12/24 1133    [COMPLETED] potassium chloride (Klor-Con M20) tab 20 mEq  20 mEq Oral Once Vicky Perez MD   20 mEq at 12/11/24 0945    [COMPLETED] metoprolol (Lopressor) 5 mg/5mL injection 5 mg  5 mg Intravenous Once Rosalinda Seymour, DO   5 mg at 12/11/24 2302    [COMPLETED] dilTIAZem (cardIZEM) 25 mg/5mL injection 10 mg  10 mg Intravenous Once Rosalinda Seymour, DO   10 mg at 12/11/24 2346    [COMPLETED] ipratropium-albuterol (Duoneb) 0.5-2.5 (3) MG/3ML inhalation solution        3 mL at 12/10/24 1209    [COMPLETED] lactated ringers IV bolus 250 mL  250 mL Intravenous Once Dayanna Hay APRN 250 mL/hr at 12/08/24 0553 250 mL at 12/08/24 0553    [COMPLETED] magnesium oxide (Mag-Ox) tab 400 mg  400 mg Oral Once Dayanna Hay APRN   400 mg at 12/08/24 0828    [COMPLETED] magnesium sulfate in sterile water for injection 2 g/50mL IVPB premix 2 g  2 g Intravenous Once Franck Ortiz MD 50 mL/hr at 12/08/24 1048 2 g at 12/08/24 1048    [COMPLETED] ceFAZolin (Ancef) 1 g in dextrose 5% 100mL IVPB-ADD  1 g Intravenous Q12H Jason Ho  mL/hr at 12/12/24 2320 1 g at 12/12/24 2320    [COMPLETED] sodium chloride 0.9 % IV bolus 1,000 mL  1,000 mL Intravenous Once Saeed Garcia MD   Stopped at 12/06/24 0115    [COMPLETED] piperacillin-tazobactam (Zosyn) 4.5 g in dextrose 5% 100 mL IVPB-ADDV  4.5 g Intravenous Once Saeed Garcia,  MD   Stopped at 12/06/24 0206    [COMPLETED] sodium chloride 0.9 % IV bolus 1,000 mL  1,000 mL Intravenous Once Saeed Garcia MD   Stopped at 12/06/24 0346    [COMPLETED] influenza virus trivalent high dose PF (Fluzone HD) 0.5 mL injection (ages >/= 65 years) 0.5 mL  0.5 mL Intramuscular Prior to discharge Hira Moreno DO   0.5 mL at 12/18/24 1711    [COMPLETED] Perflutren Lipid Microsphere (DEFINITY) 6.52 MG/ML injection 1.5 mL  1.5 mL Intravenous ONCE PRN Chitra Molina MD   1.5 mL at 12/06/24 1714    [COMPLETED] potassium chloride 40 mEq in 250mL sodium chloride 0.9% IVPB premix  40 mEq Intravenous Once Breann Farooq MD 62.5 mL/hr at 12/06/24 2244 40 mEq at 12/06/24 2244    [COMPLETED] potassium chloride 40 mEq in 250mL sodium chloride 0.9% IVPB premix  40 mEq Intravenous Once Breann Farooq MD 62.5 mL/hr at 12/07/24 0350 40 mEq at 12/07/24 0350    [COMPLETED] sodium chloride 0.9 % IV bolus 1,000 mL  1,000 mL Intravenous Once Saeed Garcia MD   Stopped at 12/06/24 0022     Current Outpatient Medications on File Prior to Encounter   Medication Sig Dispense Refill    metoprolol succinate ER 25 MG Oral Tablet 24 Hr Take 1 tablet (25 mg total) by mouth 2x Daily(Beta Blocker). 60 tablet 0    insulin aspart 100 Units/mL Subcutaneous Solution Pen-injector Inject 2-10 Units into the skin 4 (four) times daily before meals and nightly. CORRECTION FACTOR - COLUMN MEDIUM DOSE. Continue to give correction insulin (Novolog/aspart) even if NPO;Give 2 unit if blood glucose 141-180 mg/dL Give 3 units if blood glucose 181-220 mg/dL Give 6 units if blood glucose 221-260 mg/dL Give 8 units if blood glucose 261-300 mg/dL Give 10 units if blood glucose 301-350 mg/dL 3 mL 0    torsemide 20 MG Oral Tab Take 1 tablet (20 mg total) by mouth daily. 30 tablet 11    ondansetron (ZOFRAN) 4 mg tablet Take 1 tablet (4 mg total) by mouth every 6 (six) hours as needed for Nausea.      pantoprazole 40 MG  Oral Tab EC Take 1 tablet (40 mg total) by mouth every morning before breakfast.      sennosides 8.6 MG Oral Tab Take 2 tablets (17.2 mg total) by mouth at bedtime.      Cholecalciferol 1.25 MG (25341 UT) Oral Tab Take 50,000 Units by mouth Every Monday.      baclofen 5 MG Oral Tab Take 1 tablet (5 mg total) by mouth 3 (three) times daily.      polyethylene glycol, PEG 3350, 17 g Oral Powd Pack Take 17 g by mouth daily.      apixaban 5 MG Oral Tab Take 1 tablet (5 mg total) by mouth 2 (two) times daily. 60 tablet 3    clopidogrel 75 MG Oral Tab Take 1 tablet (75 mg total) by mouth daily.      sertraline 100 MG Oral Tab Take 1 tablet (100 mg total) by mouth daily.      levETIRAcetam 500 MG Oral Tab Take 1 tablet (500 mg total) by mouth 2 (two) times daily. 180 tablet 3    ezetimibe 10 MG Oral Tab Take 1 tablet (10 mg total) by mouth nightly. 30 tablet 0    Rosuvastatin Calcium 40 MG Oral Tab Take 1 tablet (40 mg total) by mouth daily. 30 tablet 0    acetaminophen 325 MG Oral Tab Take 2 tablets (650 mg total) by mouth every 6 (six) hours as needed for Pain.

## 2024-12-23 NOTE — ED INITIAL ASSESSMENT (HPI)
Patient to ED via EMS from [facility]. EMS was called for shortness of breath. Patient is typically on O2 2L/NC and was found with spO2 of 85%, patient arrives on NRB mask 15L, spO2 94%. Per EMS nursing staff at facility states that patient is typically AOx4 but over the last week has been alert to self only. Patient arrives not speaking. Temp 101.6F en route.

## 2024-12-23 NOTE — HISTORICAL OFFICE NOTE
[facility]  [address], 4th floor, [place] [address]  [phone]      Carla Kim  Progress Note  Demographics:  Name: Carla Kim YOB: 1950  Age: 72, Female Medical Record No: [id number]  Visited Date/Time: 09/06/2022 01:45 PM    Chief Complaints  Follow up CAD  History of Present Illness  Ms. Kim is a pleasant 72-year-old lady with history of myocardial infarction with coronary artery  disease and stenting. She had a redo bypass surgery with an ejection fraction of 50%. She has had  hypertension as well as COPD. She has a smoking history, diabetes and significant carotid disease  and has seen Dr. Mchugh with carotid endarterectomy. She had a CVA with hemiparesis. Currently,  she denies any angina or heart failure symptoms.  She quit smoking.  She definitely feels a lot better.  She still has hemiparesis after her stroke.  Carotid assessment is stable.  She stated that she does not want to have any further cardiac surgery or any cardiac work-up or evaluation,  just intermittent blood work. We will respect her wishes.    MCT  1. This is a poor quality study.   2. Predominant rhythm is sinus bradycardia.   3. The minimum heart rate recorded was 44 beats / minute. The maximum heart rate is 125 beats / minute. The mean heart rate is 58 beats / minute.   4. Afib burden 0%    We talked about a Watchman device but she does not want to pursure that.   She had a burst of afib when she was in the hospital but has been in SR since then.   she has not wanted to be on coumadin/Xarelto/Eliquis.  Cardiac risk factors Diabetes, Hypertension, Dyslipidemia, Physical inactivity and Former smoker  Risk equivalent CVA/Carotid Disease  Past Medical History  1.Carotid artery stenosis  2.Family history of coronary arteriosclerosis  3.Pure hypercholesterolemia  4.Hypertension, benign  5.CHF (congestive heart failure)  6.CKD (chronic kidney disease) stage 3, GFR  30-59 ml/min  7.COPD (chronic obstructive pulmonary disease)  8.History of CVA with residual deficit  9.CAD (coronary artery disease)  Past Surgical History  1.Hx of CABG  2.History of carotid endarterectomy  Family History  1. Father - CAD native (coronary artery disease)  2. Mother - CAD native (coronary artery disease)  3. Brother - CAD native (coronary artery disease)  Social History  Smoking status Former [id number] (End Date)  Tobacco usage - No (Current non-smoker (finding))  Alcohol usage - Yes (None)  Review of systems  Cardiovascular No history of Chest pain, KHAN, Palpitations, Syncope, PND, Orthopnea, Edema and Claudication  Genitourinary No history of Dysuria, Hematuria, Frequency and ED  Musculoskeletal No history of Myalgias and Arthritis  Respiratory SOB  No history of Wheezing and Sputum  Physical Examination  Vitals Right Arm Sitting  / 68 mmHg, Pulse rate 68 bpm, Height in 5' 5\", BMI: 26.6, Weight in 160 lbs (or) 73 kgs and BSA : 1.84 cc/m²  General Appearance No Acute Distress and Appropriate  Head/Eyes/Ears/Nose/Mouth/Throat Conjunctiva pink, Sclera Clear, Mucous membranes Moist and No pallor  Neck No carotid bruits and No JVD  Respiratory Lungs clear with normal breath sounds  Cardiovascular Intact distal pulses and Regular rhythm. Normal rate present. Normal and normal S1 and S2    Gastrointestinal Abdomen soft and Non-tender  Musculoskeletal generalized weakness post cva  Upper Extremities No edema  Lower Extremities has a brace. wheelchair bound.  Skin Warm and dry  Neurologic / Psychiatric hemiparesis  Allergies  1.atorvastatin calcium - Ingredient(Reaction:Myalgia, Severity:Unknown)  2.bupropion - Ingredient(Reaction:Tremors, Severity:Unknown)  Medications (Info obtained by: List)  1.acetaminophen 325 mg tablet, Take 2 tablets orally every 4-6 hours as needed.  2.amLODIPine 10 mg tablet, Take 1 tablet orally once a day.  3.aspirin 81 MG EC tablet, Take 81 mg by mouth  daily.  4.baclofen 5 mg tablet, Take 1 tablet orally 3 times a day.  5.bethanechol chloride 25 mg tablet, Take 1 tablet orally 3 times a day.  6.clopidogrel (PLAVIX) 75 MG tablet, Take 1 tablet by mouth daily.  7.ezetimibe (ZETIA) 10 MG tablet, TAKE 1 TABLET BY MOUTH NIGHTLY GENERIC EQUIVALENT FOR ZETIA  8.furosemide 20 mg tablet, Take 1 tablet orally once a day.  9.heparin (porcine) 5,000 unit/mL injection syringe, Syringe (injection) every 8 hours.  10.hydrALAZINE 100 mg tablet, Take 1 tablet orally 2 times a day.  11.insulin aspart (U-100) 100 unit/mL (3 mL) subcutaneous pen, As directed by physician orders  12.insulin glargine (LANTUS SOLOSTAR) 100 UNIT/ML pen-injector, INJECT 10 UNITS UNDER THE SKIN EVERY MORNING  13.levETIRAcetam (KEPPRA) 500 MG tablet, Take 500 mg by mouth 2 times daily.  14.losartan 50 mg tablet, Take 1 tablet orally 2 times a day.  15.rosuvastatin 40 mg tablet, Take 1 tablet orally once a day.  16.sertraline (ZOLOFT) 100 MG tablet, TAKE 1 TABLET BY MOUTH DAILY  17.spironolactone 25 mg tablet, Take one-half tablet orally once a day.  18.insulin glargine-yfgn (U-100) 100 unit/mL (3 mL) subcutaneous pen, Inject as directed by physician orders  19.levETIRAcetam 500 mg tablet, Take 1 tablet orally 2 times a day.  20.losartan 50 mg tablet, Take 1 tablet orally 2 times a day.  21.metoprolol succinate ER 25 mg tablet,extended release 24 hr, Take one-half tablet orally once a day.  22.Miralax 17 gram/dose oral powder, Take 17 gm (oral) once a day.  23.Protonix 40 mg tablet,delayed release, Take 1 tablet orally once a day.  24.rosuvastatin 40 mg tablet, Take 1 tablet orally once a day.  25.Senna Laxative 8.6 mg tablet, Take 2 tablets orally once in the evening.  26.spironolactone 25 mg tablet, Take one-half tablet orally once a day.  27.tamsulosin 0.4 mg capsule, Take 1 capsule orally once a day.  Impression  1.Carotid artery stenosis  2.Hx of CABG  3.Pure hypercholesterolemia  4.Hypertension,  benign  5.CHF (congestive heart failure)  6.COPD (chronic obstructive pulmonary disease)  7.History of carotid endarterectomy  8.History of CVA with residual deficit  9.CAD (coronary artery disease)  Assessment & Plan  Ms. Kim is a pleasant 72-year-old lady with a history of myocardial infarction with coronary artery disease and stenting.  She also had a history of bypass surgery and he has an ejection fraction of 50%.  She has COPD as well as hypertension and dyslipidemia as well as diabetes.  She has had significant carotid disease and had a carotid endarterectomy with Dr. Mchugh.  She also is mainly wheelchair-bound given CVA with hemiparesis.  As stated earlier, she does not want to pursue any further cardiac work-up, evaluation or testing and no further cardiac intervention.  We will respect her wishes.  Her next blood work will be in 6 months (CBC, CMP, fasting lipid panel, TSH).  She is mainly wheelchair-bound but would try to have rehab as best as she can at the center that she is living.  Please return to clinic to see me in 6 months.  Labs and Diagnostics ordered  1.CBC w/ Differential (6 Months)  2.CMP (6 Months)  3.Lipid panel, Fasting (6 Months)  4.TSH (Thyroid Stimulating Hormone) Panel (6 Months)  Future appointments  1.Referral Visit - Adam Schriedel ([email or web address]) : (Today)  2.Follow up visit - Lorin Linares (6 Months)  Nurses documentation  Refills:  Upcoming surgeries:  Use of assistive device:   No s/s of Covid-19     Patient instructions  Ms. Kim is a pleasant 72-year-old lady with a history of myocardial infarction with coronary artery disease and stenting.  She also had a history of bypass surgery and he has an ejection fraction of 50%.  She has COPD as well as hypertension and dyslipidemia as well as diabetes.  She has had significant carotid disease and had a carotid endarterectomy with Dr. Mchugh.  She also is mainly wheelchair-bound given CVA with hemiparesis.   As stated earlier, she does not want to pursue any further cardiac work-up, evaluation or testing and no further cardiac intervention.  We will respect her wishes.  Her next blood work will be in 6 months (CBC, CMP, fasting lipid panel, TSH).  She is mainly wheelchair-bound but would try to have rehab as best as she can at the center that she is living.  Please return to clinic to see me in 6 months.    Labs Fasting:   Your provider has ordered bloodwork of CBC,CMP, FLP and TSH to be drawn 1-2 weeks prior to your 6 month follow up with .  You will need to be fasting, nothing to eat or drink after midnight prior to the blood work. You are allowed to take your medications with a sip of water. If you are on Insulin please TAKE/HOLD your Insulin.     Follow up appointment scheduled:   Your provider has requested for you to follow up in 6 months.  An appointment will be made for you as you leave your visit.  It is always important to bring all your medications including over the counter medications, vitamins and supplements to your doctor visits. This will assist in providing the best care for your condition. Please bring your insurance cards to your appointment.        Diagnostics Details  ACTMonitoring 07/13/2022  1.This is a poor quality study.    2.Predominant rhythm is sinus bradycardia.    3.The minimum heart rate recorded was 44 beats / minute. The maximum heart rate is 125 beats / minute. The mean heart rate is 58 beats / minute.    4.Afib burden 0%    CPOE Orders carried out by: Promise MARTINEZ  Care Providers: Lorin Linares MD and Promise MARTINEZ  Electronically Authenticated by  Lorin Linares MD  09/06/2022 02:54:10 PM  Disclaimer: Components of this note were documented using voice recognition system and are subject to errors not corrected at proofreading. Contact the author of this note for any clarifications.

## 2024-12-23 NOTE — CONSULTS
ICU  Critical Care APRN Consult Note    NAME: Carla Kim - ROOM: B1/ - MRN: [id number] - Age: 74 year old - :1950    History Of Present Illness:  Carla Kim is a 74 year old female with PMHx significant for HTN, DM2, anxiety, COPD, HFrEF, CAD, afib (on apixaban), CVA with residual left sided weakness who was recently admitted from - for septic shock  to klebsiella UTI. She presented to the ED from [facility] on  after staff noticed to be more lethargic, febrile and hypoxic. In the ED she was noted to be febrile to 102 and hypoxic with O2 sats mid 80's. She was placed on vapotherm requiring 100%/40L. Labs notable for WBC 12,000, Sodium 151, Cr 2.57, BUN 62, AST//134, trop 66, ProBNP 18,000. ABG without hypercapnia, however Po2 mid 50's. CTA chest to r/o PE was unable to be obtained due to TIP, LE dopplers and TTE ordered and she as placed on a heparin drip. She was given lasix IVP x1. Cardiology, Nephrology and Pulmonology were consulted. ICU was called for further management of acute hypoxic respiratory failure.    PMH:  Past Medical History:    Abdominal distention    Anxiety    Atherosclerosis of coronary artery     2 surgeries for CABG X 4 1996     Atherosclerosis of coronary artery    acute MI, CHF, Stents     Belching    Black stools    CONGESTIVE HEART FAILURE    EF 35-40 %    COPD    Diabetes mellitus (HCC)    Disorder of liver    \"fatty liver\"    Fatigue    HEART ATTACK    Acute mi and stents    High blood pressure    High cholesterol    History of blood transfusion    History of depression    Leaking of urine    Night sweats    Paroxysmal atrial fibrillation (HCC)    Peripheral vascular disease (HCC)    Carotid Artery Blockage TIA    Seizure (HCC)    new onset on 19    Shortness of breath    Sputum production    Stented coronary artery    STROKE    TIA    Stroke syndrome    CVA no residual effects    Type II or unspecified type diabetes  mellitus without mention of complication, not stated as uncontrolled    Uncontrolled diabetes mellitus    Unspecified essential hypertension    Unspecified sleep apnea    Wears glasses    Wheezing       Social Hx:  Social History     Socioeconomic History    Marital status:    Tobacco Use    Smoking status: Former     Current packs/day: 0.00     Average packs/day: 1 pack/day for 50.0 years (50.0 ttl pk-yrs)     Types: Cigarettes     Start date: 1968     Quit date: 2018     Years since quittin.6    Smokeless tobacco: Never   Vaping Use    Vaping status: Never Used   Substance and Sexual Activity    Alcohol use: No     Alcohol/week: 0.0 standard drinks of alcohol    Drug use: Never    Sexual activity: Never   Other Topics Concern    Caffeine Concern Yes     Comment: 1 cups of coffee daily    Exercise Yes     Comment: PT      Social Drivers of Health     Food Insecurity: No Food Insecurity (2024)    Food Insecurity     Food Insecurity: Never true   Transportation Needs: No Transportation Needs (2024)    Transportation Needs     Lack of Transportation: No   Physical Activity: Inactive (10/27/2020)    Received from [facility]    Exercise Vital Sign     Days of Exercise per Week: 0 days     Minutes of Exercise per Session: 0 min   Housing Stability: Low Risk  (2024)    Housing Stability     Housing Instability: No       Family Hx:  Family History   Problem Relation Age of Onset    Other (Acute MI) Father     Other (Alzheimer's Disease) Mother          Review of Systems:   A comprehensive 10 point review of systems was completed.  Pertinent positives and negatives noted in the HPI.    Current Facility-Administered Medications   Medication Dose Route Frequency    dilTIAZem 10 mg BOLUS FROM BAG infusion  10 mg Intravenous Q1H PRN    dilTIAZem (cardIZEM) 100 mg in sodium chloride 0.9% 100 mL IVPB-ADDV  2.5-20 mg/hr Intravenous Continuous    sodium chloride 0.9% infusion  125  mL/hr Intravenous Continuous       OBJECTIVE  Vitals:  /61   Pulse 104   Temp 98.2 °F (36.8 °C) (Temporal)   Resp (!) 30   Ht 160 cm (5' 3\")   Wt 156 lb 8.4 oz (71 kg)   SpO2 92%   BMI 27.73 kg/m²                Physical Exam:    General Appearance: Alert, lethargic, following commands  Neck: No JVD, neck supple, no adenopathy, trachea midline  Lungs: Clear to auscultation bilaterally, respirations unlabored  Heart: Irregular rate and rhythm, S1 and S2 normal, no murmur, rub or gallop  Abdomen: Soft, non-tender, bowel sounds active all four quadrants, no masses, no organomegaly  Extremities: Extremities normal, atraumatic, no cyanosis or edema, capillary refill <3 sec.    Pulses: 2+ and symmetric all extremities  Skin: Skin color pale      Data this admission:  CT BRAIN OR HEAD (CPT=70450)    Result Date: 12/23/2024  CONCLUSION:  No acute intracranial abnormality.    LOCATION:  [facility]   Dictated by (CST): Rogelio Farah MD on 12/23/2024 at 1:30 PM     Finalized by (CST): Rogelio Farah MD on 12/23/2024 at 1:33 PM       XR CHEST AP PORTABLE  (CPT=71045)    Result Date: 12/23/2024  CONCLUSION:  Minimal left basilar atelectasis.   LOCATION:  [facility]      Dictated by (CST): Romelia Delgado MD on 12/23/2024 at 10:46 AM     Finalized by (CST): Romelia Delgado MD on 12/23/2024 at 10:47 AM       XR CHEST AP PORTABLE  (CPT=71045)    Result Date: 12/18/2024  CONCLUSION:  Patchy consolidations in the lungs are mildly improved.   LOCATION:  [id number]      Dictated by (CST): Dario Villaseñor MD on 12/18/2024 at 8:07 AM     Finalized by (CST): Dario Villaseñor MD on 12/18/2024 at 8:07 AM       XR CHEST AP PORTABLE  (CPT=71045)    Result Date: 12/16/2024  CONCLUSION:   Postoperative changes of cardiothoracic surgery with stable cardiac and mediastinal contours.  Moderate/severe pulmonary edema with interstitial and alveolar components.  A small right pleural effusion is suspected.  No appreciable pneumothorax.    LOCATION:  [facility]      Dictated by (CST): Flako Morales MD on 12/16/2024 at 11:02 AM     Finalized by (CST): Flako Morales MD on 12/16/2024 at 11:03 AM       XR VIDEO SWALLOW (CPT=74230)    Result Date: 12/13/2024  PROCEDURE:  XR VIDEO SWALLOW (CPT=74230)  TECHNIQUE:  Video fluoroscopic swallowing study was performed in cooperation with the speech pathologist.  Barium of varying consistencies was administered orally with patient in lateral projection.  COMPARISON:  None.  INDICATIONS:  r/o aspiration; increased hypoxia; h/o prior stroke  PATIENT STATED HISTORY: (As transcribed by Technologist)  Patient offered no additional history at this time.   CINE CAPTURES:  5 FLUORSCOPY TIME:  4 minutes 43 seconds RADIATION DOSE (AIR KERMA PRODUCT):  322.9 uGy/m2   FINDINGS:  PHARYNGEAL PHASE:  Large amount of pooling in the vallecula and piriform sinuses. ASPIRATION:  None. PENETRATION:  Penetration with thin barium consistency  PLEASE SEE SPEECH PATHOLOGIST REPORT FOR FULL ASSESSMENT OF SWALLOWING MECHANISM.   LOCATION:  [facility]    Dictated by (CST): Saima Almaguer MD on 12/13/2024 at 11:39 AM     Finalized by (CST): Saima Almaguer MD on 12/13/2024 at 11:39 AM       XR CHEST AP PORTABLE  (CPT=71045)    Result Date: 12/12/2024  CONCLUSION:  Postsurgical changes of median sternotomy.  There is cardiomegaly.  There are new prominent interstitial opacities in the lung periphery and perihilar regions extending into the lung bases bilaterally.  This may represent pulmonary edema versus an infectious process.  More focal airspace disease/atelectasis retrocardiac left lung base with blunting of the costophrenic angle.   LOCATION:  [facility]      Dictated by (CST): Saima Almaguer MD on 12/12/2024 at 7:18 AM     Finalized by (CST): Saima Almaguer MD on 12/12/2024 at 7:19 AM       XR CHEST AP/PA (1 VIEW) (CPT=71045)    Result Date: 12/7/2024  CONCLUSION:  No acute disease.    LOCATION:  [facility]      Dictated by (CST): Aaron Holm MD on  12/07/2024 at 12:44 PM     Finalized by (CST): Aaron Holm MD on 12/07/2024 at 12:45 PM       XR CHEST AP PORTABLE  (CPT=71045)    Result Date: 12/6/2024  CONCLUSION:  Preliminary reading provided by radiologist from [facility].  Cardiomegaly.  No CHF.  Lower lobe atelectasis or infiltrate. No sizable effusion, but small effusion difficult to exclude on portable chest x-ray. No evidence for pneumothorax.  Consider upright PA and lateral examination of the chest, when tolerated.   LOCATION:  [facility]      Dictated by (CST): Manjinder Caba MD on 12/06/2024 at 8:14 AM     Finalized by (CST): Manjinder Caba MD on 12/06/2024 at 8:15 AM       CT ABDOMEN+PELVIS(CPT=74176)    Result Date: 12/6/2024  CONCLUSION:  There is prominent circumferential mural thickening of the urinary bladder which may represent cystitis.  Clinical correlation is suggested.  Age-indeterminate compression deformity of the L2 vertebral body.  Correlation for symptoms in this region is suggested.  If there is persistent concern then consider follow-up imaging including MRI.  Mild ectasia of the infrarenal abdominal aorta.   LOCATION:  [facility]   Dictated by (CST): Francisco Ramirez MD on 12/06/2024 at 7:42 AM     Finalized by (CST): Francisco Ramirez MD on 12/06/2024 at 7:45 AM       CT CHEST (ZDW=76904)    Result Date: 12/6/2024  CONCLUSION:  There are secretions present within the trachea and bilateral mainstem bronchi with areas of peribronchial thickening present.  Consider aspiration within differential.  No focal consolidation is seen at this time.  There may be mild volume overload present.  A preliminary report was provided by [facility].     LOCATION:  [facility]   Dictated by (CST): Francisco Ramirez MD on 12/06/2024 at 7:38 AM     Finalized by (CST): Francisco Ramirez MD on 12/06/2024 at 7:42 AM       CT BRAIN OR HEAD (CPT=70450)    Result Date: 12/6/2024  CONCLUSION:  No significant midline shift or mass effect.  No acute intracranial hemorrhage.  An area of  encephalomalacia in the right frontal parietal lobe is again noted.  If there is persistent clinical concern then consider MRI.  A preliminary report was provided by [facility].    LOCATION:  [facility]   Dictated by (CST): Francisco Ramirez MD on 12/06/2024 at 6:01 AM     Finalized by (CST): Francisco Ramirez MD on 12/06/2024 at 6:05 AM         Labs:  Lab Results   Component Value Date    WBC 12.7 12/23/2024    HGB 12.5 12/23/2024    HCT 39.7 12/23/2024    .0 12/23/2024    CREATSERUM 2.57 12/23/2024    BUN 62 12/23/2024     12/23/2024    K 3.4 12/23/2024     12/23/2024    CO2 24.0 12/23/2024     12/23/2024    CA 8.8 12/23/2024    ALB 4.0 12/23/2024    ALKPHO 134 12/23/2024    BILT 0.5 12/23/2024    TP 8.5 12/23/2024     12/23/2024     12/23/2024           Assessment/Plan:    Acute hypoxic respiratory failure- etiology unclear. Possible aspiration pneumonia, CHF exacerbation. History of COPD, does not appear to be in acute exacerbation  - Baseline O2 2L/NC  - Requiring vapotherm 100%, 40L- wean as able  - PE unlikely (on apixaban PTA). Unable to obtain CTA chest due to TIP, will check LE dopplers and TTE.  Consider VQ scan.  - Heparin drip started 12/23, PO apixaban on hold  - Chest xray without focal consolidation, minimal left basilar atelectasis  - Check RVP  - MRSA nares pending  - Check Strep/legionella urine Ag  - Check sputum culture if able  - Blood cultures pending  - ProBNP- 18,000. IVP lasix given in ED  - Encourage IS   - Zosyn (12/23- )  - Will consult [facility] pulmonology    Leukocytosis  Febrile illness  - Tmax 102  - Chest xray without focal consolidation  - RVP pending  - Abdominal exam benign   - History of klebsiella UTI however UA now appears clean  - Blood cultures pending  - lactic acid normal  - Acetaminophen PRN  - Antibiotics as above    Acute encephalopathy, likely multifactorial in the setting of hypoxia, uremia  - CT brain negative for acute intracranial  findings  - ABG without hypercapnia  - Check ammonia  - Check EEG (history of seizures)  - Recent admit for klebsielle UTI- UA clean this admission  - Consider neurology consult if no improvement    TIP  - Baseline Cr 1.7  - Strict I/O  - IV lasix given in ED however appears volume depleted on exam.  - IVF per renal  - Avoid nephrotoxins  - Daily BMP  - Consider renal consult if no improvement    Hypernatermia  - Likely 2/2 to poor PO intake  - IVF per renal  - May need to insert DHT/TF    Transaminitis  - History of elevated AST/ALT, now down to 212/134  - Check ammonia  - Diuretics as below  - Daily CMP    HFrEF  CAD s/p CABG, PCI left main and Lc  HTN  Afib  - ProBNP chronically elevated, 18,000 today.  - TTE 12/6 LVEF 45%, repeat TTE pending  - PTA meds (torsemide metoprolol, apixaban, plavix)  - s/p IV lasix in ED  - Diltiazem drip  - Cardiology consulted    History of seizures  - PTA keppra  - Seizure precautions  - Check EEG given AMS  - Consider neuro consult as above    Histrory of CVA  - Chronic left sided weakness  - PTA medications (Keppra, baclofen,)  - renal dose baclofen    Depression  - Sertraline    COPD  - Does not appear to be in acute exacerbation  - Titrate O2 to maintain sat >88%  - [facility] consulted    DM2  - A1c 6.5  - Accuchecks  - SSI    F/E/N  - Replete electrolytes per protocol  - NPO pending swallow eval  - Speech therapy consult    Ppx  - Heparin drip  - SCDs  - Non-ambulatory baseline    Dispo  - DNAR/full treatment  - Palliative care consult  - ICU monitoring      Plan of care discussed with intensivist on-call, Dr. Ba.      [facility]  Critical Care  [id number]    Agree with assessment and plan as above    74-year-old female recently admitted for Klebsiella UTI now [place] presents with hypoxia from the nursing home with altered mental status    The patient is a DNR select  No signs of shock.  Patient been noncompliant with her Eliquis as an outpatient likely  therefore we will start a heparin drip low suspicion for pulmonary embolism that is clinically significant as she is normotensive  Will treat for aspiration pneumonia although x-ray looks fairly benign at this point.  Full workup pending    35 minutes critical care time spent with this patient with specked to management of impending respiratory failure date of service 12/23/2024

## 2024-12-24 PROBLEM — F09 COGNITIVE DYSFUNCTION: Status: ACTIVE | Noted: 2024-01-01

## 2024-12-24 NOTE — PLAN OF CARE
Patient remains AO x 1, agitated, follows simple commands. Unable to wean from BiPAP support.  Family met with Palliative care and Critical Care NP, decided to make patient comfort care. Patient family came and spent time with patient.  Daughter at bedside.  Discussed plan of comfort care with daughter.  Currently waiting for comfort meds.  Once administered and comfortable BiPAP support will be stopped, and gtts with be terminated.

## 2024-12-24 NOTE — SPIRITUAL CARE NOTE
Spiritual Care Visit Note    Patient Name: Carla Kim Date of Spiritual Care Visit: 24   : 1950 Primary Dx: Acute renal insufficiency       Referred By:      Spiritual Care Taxonomy:    Intended Effects: Demonstrate caring and concern    Methods: Collaborate with care team member;Explore spiritual/Sikhism beliefs    Interventions: Active listening;Ask guided questions;Explain  role;Prayer for healing    Visit Type/Summary:     - Spiritual Care: Responded to a request for spiritual care and assessed the patient for spiritual care needs. Consulted with RN prior to visit. Offered empathic listening and emotional support. Patient and family expressed appreciation for  visit. Provided information regarding how to contact Spiritual Care and left a Spiritual Care information card.  remains available as needed for follow up. Patient is unable to communicate with the  at this time, patient's daughter is at bedside. Patient's daughter stated that her mother has been attending [employer] for a very long time. The patient has family member who are minster's and pastors.Patient's daughter stated that her mother is a Lutheran and has enjoyed attend the [employer].  Patient's daughter expressed her gratitude for the  and requested prayer for her mother.     Spiritual Care support can be requested via an Epic consult. For urgent/immediate needs, please contact the On Call  at: Kael: ext [phone]     Chaplain Resident Luli Doran MA

## 2024-12-24 NOTE — PLAN OF CARE
Assumed care of pt following shift report. Pt is alert but unable to assess orientation. Pt not answering questions. Has non-purposeful movement to right extremities and is restless. Applied soft restraint to RUE. Pt intermittently follows commands to right side. No observable signs of pain. Afebrile and normotensive. Tele monitor shows A-fib w/ PVC's. Cardizem gtt infusing per orders. Heparin gtt infusing per DVT/PE protocol. On BiPAP 60% FiO2. Pt remains NPO until speech eval. No BM for shift. Purewick in place.    Lab has not completed timed PTT this morning. Called lab and tech is to come up now and draw morning labs and PTT.

## 2024-12-24 NOTE — PROGRESS NOTES
[facility]   part of [facility]     Hospitalist Progress Note     Carla Kim Patient Status:  Inpatient    1950 MRN [id number]   Location [facility] 4SW-A Attending Keri Way DO   Hosp Day # 1 PCP PHYSICIAN NONSTAFF     Chief Complaint: sob    Subjective:     Patient remains confused and currently on cont bipap.     Objective:    Review of Systems:   A comprehensive review of systems was completed; pertinent positive and negatives stated in subjective.    Vital signs:  Temp:  [98.2 °F (36.8 °C)-99.8 °F (37.7 °C)] 99.3 °F (37.4 °C)  Pulse:  [100-126] 123  Resp:  [20-38] 34  BP: ()/() 149/81  SpO2:  [87 %-98 %] 94 %  FiO2 (%):  [50 %-100 %] 50 %    Physical Exam:    General: mild acute distress  Respiratory: No wheezes, positive rhonchi  Cardiovascular: S1, S2, regular rate and rhythm  Abdomen: Soft, Non-tender, non-distended, positive bowel sounds  Neuro: No new focal deficits.   Extremities: No edema      Diagnostic Data:    Labs:  Recent Labs   Lab 24  0951 24  1547 24  0739   WBC 12.7* 13.3* 13.7*   HGB 12.5 12.6 10.6*   MCV 91.3 93.0 91.6   .0* 416.0 328.0  328.0       Recent Labs   Lab 24  0951 24  1547 24  0747   * 198* 200*   BUN 62* 48* 55*   CREATSERUM 2.57* 2.64* 2.54*   CA 8.8 8.7 8.2*   ALB 4.0  --  3.4   * 149* 149*   K 3.4* 3.3* 2.8*    113* 113*   CO2 24.0 21.0 22.0   ALKPHO 134  --  96   *  --  101*   *  --  83*   BILT 0.5  --  0.5   TP 8.5*  --  7.3       Estimated Glomerular Filtration Rate: 19.2 mL/min/1.73m2 (A) (by CKD-EPI based on SCr of 2.54 mg/dL (H)).    Recent Labs   Lab 24  0951   TROPHS 66*       No results for input(s): \"PTP\", \"INR\" in the last 168 hours.               Microbiology    Hospital Encounter on 24   1. Blood Culture     Status: Abnormal (Preliminary result)    Collection Time: 24  9:51 AM    Specimen: Blood,peripheral   Result Value Ref  Range    Blood Culture Result Positive N/A    Blood Smear Positive Blood Culture (A) N/A    Blood Smear Gram positive cocci in clusters (A) N/A         Imaging: Reviewed in Epic.    Medications:    [Held by provider] furosemide  40 mg Intravenous BID (Diuretic)    potassium chloride  40 mEq Intravenous Once    levETIRAcetam  500 mg Intravenous Q12H    pantoprazole  40 mg Intravenous Daily    piperacillin-tazobactam  3.375 g Intravenous Q12H    insulin aspart  1-10 Units Subcutaneous q6h       Assessment & Plan:        #Acute on chronic hypoxemic respiratory failure  #Aspiration pneumonia  -PTA: on 2L, now on cont bipap @ 12/8 w/ fio2 50%  -pt on eliquis PTA, risk of PE low, unable to get CTA chest given ckd  now on heparin gtt  -will consider vq scan  -echo reviewed, RV manjit    #AMS  -febrile illness  -CT brain neg  -abg w/o hypercapnia  -likely multifactorial      #CAP  #Suspect aspiration pneumonia  #Bacteremia   -iv zosyn (12/23-)  -sputum cultures, Bcx  -ST to see     #HFrEF   #TIP on CKD3  #Hypernatremia  -cards and renal evals noted, discussed w/ critical care, ivf for now, hold lasix  -echo reviewed     #CAD s/p CABG  #Pafib  #HLD    #hx of CVA     #COPD     #DM2  -A1c 6.5 as of Dec 2024  -ISS for now     #Seizure disorder  -keppra    #ACP  -palliative care team to see     #Recent hospitalization  -dec 2024 admitted for septic shock 2/2 uti      Keri Way DO    Supplementary Documentation:     Quality:  DVT Mechanical Prophylaxis:        DVT Pharmacologic Prophylaxis   Medication    heparin (Porcine) 82595 units/250mL infusion PE/DVT/THROMBUS CONTINUOUS                Code Status: DNAR/Selective Treatment  Han: External urinary catheter in place  Han Duration (in days):   Central line:    DEEDEE:     Discharge is dependent on: clinical improvement   At this point Ms. Kim is expected to be discharge to: tbd    The 21st Century Cures Act makes medical notes like these available to patients in the  interest of transparency. Please be advised this is a medical document. Medical documents are intended to carry relevant information, facts as evident, and the clinical opinion of the practitioner. The medical note is intended as peer to peer communication and may appear blunt or direct. It is written in medical language and may contain abbreviations or verbiage that are unfamiliar.              **Certification      PHYSICIAN Certification of Need for Inpatient Hospitalization - Initial Certification    Patient will require inpatient services that will reasonably be expected to span two midnight's based on the clinical documentation in H+P.   Based on patients current state of illness, I anticipate that, after discharge, patient will require TBD.

## 2024-12-24 NOTE — SLP NOTE
ADULT SWALLOWING EVALUATION    ASSESSMENT    ASSESSMENT/OVERALL IMPRESSION:  Patient is a 75 y/o female admitted with AMS and PMHx significant for CVA and recent hospitalization for septic shock. Patient known to this service for prior dysphagia assessment and management. VFSS completed 12/13/24 and recommended a pureed diet and thin liquids. Penetration, but no aspiration, with thin liquids noted on that exam. Patient was later advanced to a soft diet and thin liquids prior to discharge. Today, patient received drowsy and nonverbal. She was requiring Vapotherm support at time of my visit. Oral cavity noted to have a moderate amount of dried bloody secretions. Oral care provided prior to PO trials.    Patient with limited ability to participate with PO trials at time of my visit. PO trials of thin liquids attempted via straw dropper. Majority of boluses were lost anteriorly. Occasional reflexive pharyngeal swallow initiated but appeared weak. Further PO trials were discontinued d/t patient's inability to adequately participate.    Recommend patient remain NPO until mentation improves. SLP will continue to follow and reassess as medically appropriate. Education provided re: results and recommendations. Discussed with RN at bedside.         RECOMMENDATIONS   Diet Recommendations - Solids: NPO  Diet Recommendations - Liquids: NPO                              Medication Administration Recommendations: Non-oral  Treatment Plan/Recommendations: SLP to reassess    HISTORY   MEDICAL HISTORY  Reason for Referral: RN dysphagia screen    Problem List  Principal Problem:    Acute renal insufficiency  Active Problems:    Acute respiratory failure with hypoxia (HCC)    CKD stage 3b, GFR 30-44 ml/min ([facility])    Elevated troponin    Atrial fibrillation with rapid ventricular response ([facility])      Past Medical History  Past Medical History:    Abdominal distention    Anxiety    Atherosclerosis of coronary artery     2 surgeries for CABG  X 4 1996 2012     Atherosclerosis of coronary artery    acute MI, CHF, Stents     Belching    Black stools    CONGESTIVE HEART FAILURE    EF 35-40 %    COPD    Diabetes mellitus (HCC)    Disorder of liver    \"fatty liver\"    Fatigue    HEART ATTACK    Acute mi and stents    High blood pressure    High cholesterol    History of blood transfusion    History of depression    Leaking of urine    Night sweats    Paroxysmal atrial fibrillation (HCC)    Peripheral vascular disease (HCC)    Carotid Artery Blockage TIA    Seizure (HCC)    new onset on 2/26/19    Shortness of breath    Sputum production    Stented coronary artery    STROKE    TIA    Stroke syndrome    CVA no residual effects    Type II or unspecified type diabetes mellitus without mention of complication, not stated as uncontrolled    Uncontrolled diabetes mellitus    Unspecified essential hypertension    Unspecified sleep apnea    Wears glasses    Wheezing       Prior Living Situation: Skilled nursing facility  Diet Prior to Admission: Soft/ Easy to Chew;Thin liquids  Precautions: Aspiration    Patient/Family Goals: none stated    SWALLOWING HISTORY  Current Diet Consistency: NPO  Dysphagia History:   VFSS 12/13/24  Overall Impression:    Moderate oropharyngeal dysphagia 2/2 decreased labial strength and ROM, reduced lingual strength/ROM/coordination, reduced bolus drive and A-P transit, decreased base of tongue strength/retraction, reduced sensory awareness, delayed initiation of pharyngeal swallow response and reduced pharyngeal contraction/squeeze.  Observed significant pooling of bolus prior to swallow response within hypopharynx as well as residuals with thin and nectar thick liquids.  Patient exhibited difficulty eliciting volitional swallow response for residuals clearance.  Upon use of puree texture for liquid residuals clearance, it was completely effective.  Mild lingual residuals after puree texture however no hypopharyngeal residuals observed  with that.  Noted trace/mild laryngeal penetration x1 occurrence only, after the swallow with thin liquid trials however cleared with force of secondary swallow.  No evidence of tracheal aspiration observed within scope of this study however patient deemed at risk for aspiration before/during/after the swallow.      Imaging Results:   CXR 12/24/24  CONCLUSION:  New bibasilar alveolar opacities consistent with pneumonia.         LOCATION:  [facility]                  Dictated by (CST): Atif Gonzalez MD on 12/24/2024 at 8:30 AM       Finalized by (CST): Atif Gonzalez MD on 12/24/2024 at 8:31 AM     SUBJECTIVE       OBJECTIVE   ORAL MOTOR EXAMINATION  Dentition: Functional  Symmetry: Unable to assess  Strength: Unable to assess     Range of Motion: Unable to assess       Voice Quality:  (nonverbal throughout visit)  Respiratory Status: Vapotherm  Consistencies Trialed: Thin liquids  Method of Presentation: Staff/Clinician assistance  Patient Positioned: Upright;Midline    Oral Phase of Swallow: Impaired  Bolus Retrieval: Impaired  Bilabial Seal: Impaired  Bolus Formation: Impaired  Bolus Propulsion: Impaired          Pharyngeal Phase of Swallow:  (unable to assess)           (Please note: Silent aspiration cannot be evaluated clinically. Videofluoroscopic Swallow Study is required to rule-out silent aspiration.)       Comments: d/w RN              GOALS  Goal #1 SLP to reassess  In Progress   Goal #2     Goal #3     Goal #4     Goal #5     Goal #6     Goal #7     Goal #8       FOLLOW UP  Treatment Plan/Recommendations: SLP to reassess  Duration: 2 weeks  Follow Up Needed (Documentation Required): Yes  SLP Follow-up Date: 12/25/24    Thank you for your referral.   If you have any questions, please contact [facility]

## 2024-12-24 NOTE — PROCEDURES
EEG REPORT;     Reason for Examination: Encephalopathy     Technical Summary:   18 Channels of EEG and 1 Channel of EKG was performed utilizing internation 10/20 method.          Background Activity:   The background activity consisted of 6Hz waveforms, reactive to eye opening/ external stimulation.     Abnormality:  Throughout the recording low to medium voltage, polymorphic, 2 to 4 Hz slow activity was noted diffusely over both hemispheres        Activation:     Hyperventilation:   Not Performed.     Photic Stimulation:  Driving response seen.No        Sleep:  Stage I sleep seen.         Impression:  This is a Abnormal EEG. No focal, lateralized or generalized epileptiform activity seen.   Marked diffuse slowing into delta and theta range was noted.  This constellation of findings can be seen in encephalopathy due to metabolic/toxic etiology, medication effects or diffuse cerebral injury.  Clinical correlation is recommended.           Donnie Fair MD  [facility].

## 2024-12-24 NOTE — PROGRESS NOTES
Carla Kim Patient Status:  Inpatient    1950 MRN [id number]   Location [facility] 4SW-A Attending Keri Way DO   Hosp Day # 1 PCP PHYSICIAN NONSTAFF     Critical Care Progress Note          Subjective:  Required avaps overnight, weaned to vapotherm this AM 50%/40L    Objective:    Medications:   furosemide  40 mg Intravenous BID (Diuretic)    piperacillin-tazobactam  3.375 g Intravenous Q12H    insulin aspart  1-10 Units Subcutaneous q6h       FiO2 (%):  [50 %-100 %] 50 %      Intake/Output Summary (Last 24 hours) at 2024 0816  Last data filed at 2024 0632  Gross per 24 hour   Intake 2365.1 ml   Output 450 ml   Net 1915.1 ml       /72   Pulse 111   Temp 98.9 °F (37.2 °C) (Temporal)   Resp (!) 30   Ht 160 cm (5' 3\")   Wt 137 lb 12.6 oz (62.5 kg)   SpO2 93%   BMI 24.41 kg/m²     Physical Exam:     General Appearance: Alert, following commands  Neck: No JVD, neck supple, no adenopathy, trachea midline  Lungs: Clear to auscultation bilaterally, respirations unlabored  Heart: Irregular rate and rhythm, S1 and S2 normal, no murmur, rub or gallop  Abdomen: Soft, non-tender, bowel sounds active all four quadrants, no masses, no organomegaly  Extremities: Extremities normal, atraumatic, no cyanosis or edema, capillary refill <3 sec.    Pulses: 2+ and symmetric all extremities  Skin: Skin color pale      Recent Labs   Lab 24  0739   RBC 3.67*   HGB 10.6*   HCT 33.6*   MCV 91.6   MCH 28.9   MCHC 31.5   RDW 18.6   NEPRELIM 11.73*   WBC 13.7*   .0  328.0     Recent Labs   Lab 24  0506 24  0951 24  1547   * 167* 198*   BUN 52* 62* 48*   CREATSERUM 1.88* 2.57* 2.64*   CA 9.3 8.8 8.7   ALB  --  4.0  --     151* 149*   K 3.6  3.6 3.4* 3.3*   CL 99 111 113*   CO2 29.0 24.0 21.0   ALKPHO  --  134  --    AST  --  212*  --    ALT  --  134*  --    BILT  --  0.5  --    TP  --  8.5*  --      Recent Labs   Lab 24  0735   ABGP 7.48*    NITWCT0D 34*   KMQXD2K 103*   ABGHCO3 26.5   ABGBE 2.0   TEMP 98.6   ALLY Positive   SITE Right Radial   DEV Bi-PAP   THGB 11.2*     No results for input(s): \"BNP\" in the last 168 hours.  No results for input(s): \"TROP\", \"CK\" in the last 168 hours.    US VENOUS DOPPLER LEG BILAT - DIAG IMG (CPT=93970)    Result Date: 12/23/2024  CONCLUSION:  No direct evidence for DVT in either lower extremity.  Portions of the proximal calf posterior tibial veins are poorly visualized bilaterally because of body habitus.   LOCATION:  [facility]   Dictated by (CST): Xavi Ca MD on 12/23/2024 at 6:56 PM     Finalized by (CST): Xavi Ca MD on 12/23/2024 at 7:03 PM       CT BRAIN OR HEAD (CPT=70450)    Result Date: 12/23/2024  CONCLUSION:  No acute intracranial abnormality.    LOCATION:  [facility]   Dictated by (CST): Rogelio Farah MD on 12/23/2024 at 1:30 PM     Finalized by (CST): Rogelio Farah MD on 12/23/2024 at 1:33 PM       XR CHEST AP PORTABLE  (CPT=71045)    Result Date: 12/23/2024  CONCLUSION:  Minimal left basilar atelectasis.   LOCATION:  [facility]      Dictated by (CST): Romelia Delgado MD on 12/23/2024 at 10:46 AM     Finalized by (CST): Romelia Delgado MD on 12/23/2024 at 10:47 AM           Assessment/Plan:    Acute hypoxic respiratory failure 2/2 to pneumonia. History of COPD, CHF, does not appear to be in acute exacerbation  - Baseline O2 2L/NC- requiring BiPAP 100% overnight.  - Weaned to vapotherm 50%/40L  - PE unlikely (on apixaban PTA). Unable to obtain CTA chest due to TIP. BLE dopplers negative for DVT, TTE pending  - Heparin drip started 12/23, PO apixaban on hold  - Chest xray without focal consolidation, minimal left basilar atelectasis- repeat chest xray today with new bibasilar opacities  - RVP negative  - MRSA nares negative  - Strep/legionella urine Ag negative  - Check sputum culture if able  - Blood cultures pending  - ProBNP- 18,000. IVP lasix given in ED, however appears intravascularly dry  on exam, will hold further diuresis.  - Encourage IS  - Zosyn (12/23- )  - [facility] pulm following     Leukocytosis  Febrile illness 2/2 to pneumonia   - Tmax in   - Chest xray today with new bibasilar opacities  - RVP negative  - Abdominal exam benign   - History of klebsiella UTI however UA now appears clean  - Blood cultures pending  - lactic acid normal  - Acetaminophen PRN  - Antibiotics as above     Acute encephalopathy, likely multifactorial in the setting of hypoxia, uremia  - CT brain negative for acute intracranial findings  - ABG without hypercapnia  - Ammonia normal  - Check EEG (history of seizures)  - Recent admit for klebsiella UTI- UA clean this admission  - Consider neurology consult if no improvement     TIP  - Baseline Cr 1.7  - Strict I/O- 450cc urine output overnight.  - IV lasix given in ED however appears volume depleted on exam- will hold further diuresis  - IVF per renal  - Avoid nephrotoxins  - Daily BMP  - Renal following     Hypernatermia  - Likely 2/2 to poor PO intake  - IVF per renal  - May need to insert DHT/TF  - Renal following     Transaminitis  - Now downtrending  - Daily CMP     HFrEF  CAD s/p CABG, PCI left main and Lc  HTN  Afib  - ProBNP chronically elevated, 18,000 today.  - TTE 12/6 LVEF 45%, repeat TTE pending  - PTA meds (torsemide metoprolol, apixaban, plavix)  - s/p IV lasix in ED, now with scheduled lasix BID per cardiology, however markedly collapsible IVC noted with respiration on bedside ultrasound. Will discuss diuretics vs fluids with renal/cards  - Diltiazem drip  - Cardiology consulted     History of seizures  - PTA keppra  - Seizure precautions  - Check EEG given AMS  - Consider neuro consult as above     Histrory of CVA  - Chronic left sided weakness  - PTA medications (Keppra, baclofen,)  - renal dose baclofen     Depression  - Sertraline     COPD  - Does not appear to be in acute exacerbation  - Titrate O2 to maintain sat >88%  - [facility] pulm to see      DM2  - A1c 6.5  - Accuchecks  - SSI     F/E/N  - Replete electrolytes per protocol  - NPO pending swallow eval  - Speech therapy consult     Ppx  - Heparin drip- resume apixaban as able  - SCDs  - Non-ambulatory baseline     Dispo  - DNAR/select treatment  - Palliative care following  - Will discuss with daughter at bedside  - ICU monitoring        Plan of care discussed with intensivist on-call, Dr. Ba.       Shante Elizalde [facility]  Critical Care  [id number]    Agree with assessment and plan as above    Had a long conversation last evening with the patient's power of  and in her own words the patient is \"tired\".  She has refused pacemaker in the past she has refused multiple procedures and her coming back to the intensive care unit and to the hospital in general is not within her current goals of care.  We are unable to have this discussion with the patient given her altered mental status but given her chronic comorbidities the healthcare team feels that her recidivism is extremely high and so I suggested DNR full comfort she had previously been DNR select.  The daughter who is the power of  is also undergoing a great deal of stress which I commiserated with.  She was going to think about it overnight and on follow-up conversation with the APN today full conversation to be documented in the medical record she is amenable to comfort measures which I think is appropriate palliative care has been seeing the patient and concurs it appears  We will proceed towards comfort measures in the evening after family comes in    35 minutes critical care time spent with this patient with respect to impending hypoxemic respiratory failure goals of care discussion now proceeding towards full comfort

## 2024-12-24 NOTE — SPIRITUAL CARE NOTE
Spiritual Care Visit Note    Patient Name: Carla Kim Date of Spiritual Care Visit: 24   : 1950 Primary Dx: Acute renal insufficiency       Referred By:      Spiritual Care Taxonomy:    Intended Effects: Demonstrate caring and concern    Methods: Collaborate with care team member;Offer emotional support;Encourage sharing of feelings    Interventions: Active listening;Ask guided questions;Explain  role    Visit Type/Summary:     - Spiritual Care: Responded to a request for spiritual care and assessed the patient for spiritual care needs. Consulted with RN prior to visit. Offered empathic listening and emotional support. Provided information regarding how to contact Spiritual Care and left a Spiritual Care information card.  remains available as needed for follow up.Patient unable to communicate with the , patient's daughter is at bedside. Patient's daughter stated that she was in denial about her mothers health but she has come to accept what is happening to her mother. Patient stated that her mother will be going to hospice care. Patient's daughter expressed her gratitude for returning back to support her and her mother.     Spiritual Care support can be requested via an Epic consult. For urgent/immediate needs, please contact the On Call  at: [facility]: ext [phone]     Chaplain Resident Luli Doran MA

## 2024-12-24 NOTE — PROGRESS NOTES
Goals of care discussed at bedside with daughter, Jaye who is inquiring about making her Mother comfort measures. We discussed her inability to wean from AVAPS respiratory support as well as her new findings today of a developing pneumonia. We discussed her deterioration in her mental ability and Jaye believes at this point in time her Mother is suffering. She is requesting to make her comfort measures only and inquiring about possible hospice. She states that he sister is going to come and see Carla this evening and they will notify us when they are ready to proceed with comfort measures. Discussed with bedside RN, intensivist Dr. Ba, pulmonologist Dr. Campbell, hospitalist Dr. Way who are all in agreement with plan of care.    Shante Elizalde [facility]  Critical Care NP  [id number]

## 2024-12-24 NOTE — CM/SW NOTE
12/24/24 0900   CM/SW Referral Data   Referral Source Physician   Reason for Referral Discharge planning   Informant EMR   Patient Info   Patient's Home Environment   ([facility])     Order received for Home Health Eval  Pt is a 74 year old female admitted from [facility] with shortness of breath, hypoxia, AMS. Pt recently admitted 12/5-12/18/24 for UTI and sepsis.  Palliative Care consulted and following. Pt is DNAR/Select    Updates sent to [facility], but will f/u with daughter.  Chart review from last admission w/notes about her concerns about the facility.    Will await pt progress for further planning.    / to remain available for support and/or discharge planning.     Melvi Li MBA MSN, RN CTL/  [id number]

## 2024-12-24 NOTE — CM/SW NOTE
Order received for Hospice Consult.  Called [facility] phone and left voice mail and sent Epic message to Hospice RNMayuri and she will follow up w/daughter    / to remain available for support and/or discharge planning.     Melvi Li MBA MSN, RN CTL/  [id number]

## 2024-12-24 NOTE — CONSULTS
[facility]   part of [facility]  Palliative Care Initial Consult Note    Carla Kim Patient Status:  Inpatient    1950 MRN [id number]   Location [facility] 4SW-A Attending [address],    Hosp Day # 1 PCP PHYSICIAN NONSTAFF     Date of Consult: 2024  Patient seen at: [facility] Inpatient    Reason for Consultation: Consult ordered by:: Shante Elizalde for evaluation of Palliative Care needs and Goals of care discussion.    Subjective     History of Present Illness: Carla Kim is a 74 year old female with Cognitive dysfunction, PVD, DM, Hx of CVA with residual left sided weakness, COPD, CAD, Afib, CHF who was admitted on 2024 for AMS and hypoxia. Pt is from [facility]. Recently admitted - for UTI and sepsis, discharged to [facility]. Work up in our hospital revealed AMS, hypoxia, hypernatremia, TIP, elevated BNP.  History was obtained from Epic and daughter  This is the 2nd hospitalization in the past 1 months. I met with pt and her daughter during the last admission.     Today is day #1 of hospitalization.     When I entered the room, the patient was sleeping but easy to wake and lying in bed. No family present at bedside.   Pt woke to voice but did not respond to commands.      Called and spoke to daughter. See summary of discussion below.     Review of Systems:    Raven   Bowel Movement         2024  1600             Stool Count Calculated for I/O: 1          Wt Readings from Last 6 Encounters:   24 137 lb 12.6 oz (62.5 kg)   24 156 lb (70.8 kg)   23 150 lb (68 kg)   10/25/22 151 lb 8 oz (68.7 kg)   22 172 lb 13.5 oz (78.4 kg)   21 155 lb (70.3 kg)        Palliative Care Social History:   Marital Status:   Children: Yes  Living Situation Prior to Admit: SNF  Is Patient Confused: Yes    Substance History:   reports that she quit smoking about 6 years ago. Her smoking use included cigarettes. She started smoking  about 56 years ago. She has a 50 pack-year smoking history. She has never used smokeless tobacco.  reports no history of alcohol use.  reports no history of drug use.    Spiritual Assessment:   [facility] Not Listed    Past Medical History/Past Surgical History:     Medical History: obtained from [facility]  Past Medical History:    Abdominal distention    Anxiety    Atherosclerosis of coronary artery     2 surgeries for CABG X 4 1996 2012     Atherosclerosis of coronary artery    acute MI, CHF, Stents     Belching    Black stools    CONGESTIVE HEART FAILURE    EF 35-40 %    COPD    Diabetes mellitus (HCC)    Disorder of liver    \"fatty liver\"    Fatigue    HEART ATTACK    Acute mi and stents    High blood pressure    High cholesterol    History of blood transfusion    History of depression    Leaking of urine    Night sweats    Paroxysmal atrial fibrillation (HCC)    Peripheral vascular disease (HCC)    Carotid Artery Blockage TIA    Seizure (HCC)    new onset on 2/26/19    Shortness of breath    Sputum production    Stented coronary artery    STROKE    TIA    Stroke syndrome    CVA no residual effects    Type II or unspecified type diabetes mellitus without mention of complication, not stated as uncontrolled    Uncontrolled diabetes mellitus    Unspecified essential hypertension    Unspecified sleep apnea    Wears glasses    Wheezing     Past Surgical History:   Procedure Laterality Date    Angiogram      Angioplasty (coronary)  2/28/14    stents acute mi    Appendectomy      Back surgery      Biopsy of skin lesion      hyperkeratosis    Bypass surgery  1996 and 2012    cabg x 4    Cabg  1996    x4    Cath bare metal stent (bms)      R carotid/coronary    Cholecystectomy  1987     implant stent noncoronary temporary with delivery system [id number]      Laminectomy,lumbar  1984    Other surgical history  2005    Hx of transcath placement of intrarhoracic carotid artery       Family History: obtained  from EPIC  Family History   Problem Relation Age of Onset    Other (Acute MI) Father     Other (Alzheimer's Disease) Mother        Allergies:  Allergies[1]    Medications:     Current Facility-Administered Medications:     [Held by provider] furosemide (Lasix) 10 mg/mL injection 40 mg, 40 mg, Intravenous, BID (Diuretic)    potassium chloride 40 mEq in 250mL sodium chloride 0.9% IVPB premix, 40 mEq, Intravenous, Once    levETIRAcetam (Keppra) 500 mg/5mL injection 500 mg, 500 mg, Intravenous, Q12H    pantoprazole (Protonix) 40 mg in sodium chloride 0.9% PF 10 mL IV push, 40 mg, Intravenous, Daily    dilTIAZem (cardIZEM) 100 mg in sodium chloride 0.9% 100 mL IVPB-ADDV, 2.5-20 mg/hr, Intravenous, Continuous    sodium chloride 0.45% infusion, , Intravenous, Continuous    piperacillin-tazobactam (Zosyn) 3.375 g in dextrose 5% 100 mL IVPB-ADDV, 3.375 g, Intravenous, Q12H    glucose (Dex4) 15 GM/59ML oral liquid 15 g, 15 g, Oral, Q15 Min PRN **OR** glucose (Glutose) 40% oral gel 15 g, 15 g, Oral, Q15 Min PRN **OR** glucose-vitamin C (Dex-4) chewable tab 4 tablet, 4 tablet, Oral, Q15 Min PRN **OR** dextrose 50% injection 50 mL, 50 mL, Intravenous, Q15 Min PRN **OR** glucose (Dex4) 15 GM/59ML oral liquid 30 g, 30 g, Oral, Q15 Min PRN **OR** glucose (Glutose) 40% oral gel 30 g, 30 g, Oral, Q15 Min PRN **OR** glucose-vitamin C (Dex-4) chewable tab 8 tablet, 8 tablet, Oral, Q15 Min PRN    heparin (Porcine) 92880 units/250mL infusion PE/DVT/THROMBUS CONTINUOUS, 200-3,000 Units/hr, Intravenous, Continuous    insulin aspart (NovoLOG) 100 Units/mL FlexPen 1-10 Units, 1-10 Units, Subcutaneous, q6h    Functional Status History:  ADLs: bathing or showering, dressing, getting in and out of bed or a chair, walking, using the toilet, and eating  - HIGH  5+ performance deficits   IADLs: use the phone, shop for groceries, meal preparation, manage medicines, clean living area, use transportation by self, manage money  - HIGH  5+ performance  deficits     Palliative Performance Scale:   Prior to admission Palliative performance scale PPSv2 (%): 30 (pt/family reported)   Current Palliative performance scale PPSv2 (%): 20   % Ambulation Activity Level Self-Care Intake Consciousness   100 Full  Normal  No Disease Full Normal Full   90 Full  Normal  Some Disease Full Normal Full   80 Full  Normal w/effort  Some Disease Full Normal or reduced Full   70 Reduced  Can't Perform Job  Some Disease Full Normal or reduced Full   60 Reduced  Can't Perform Hobby   Significant Disease Occ Assist Normal or reduced Full or confused   50 Mainly sit/lie Can't do any work  Extensive Disease Partial Assist Normal or reduced Full or confused   40 Mainly in bed Can't do any work  Extensive Disease Mainly Assist Normal or reduced Full or confused   30 Bed Bound Can't do any work  Extensive Disease Max Assist  Total Care Reduced  Drowsy/confused   20 Bed Bound Can't do any work  Extensive Disease Max Assist  Total Care Minimal  Drowsy/confused   10 Bed Bound Can't do any work  Extensive Disease Max Assist  Total Care Mouth Care  Drowsy/confused   0 Death        Objective      Vital Signs:  Blood pressure 149/81, pulse (!) 123, temperature 99.3 °F (37.4 °C), temperature source Temporal, resp. rate (!) 34, height 5' 3\" (1.6 m), weight 137 lb 12.6 oz (62.5 kg), SpO2 94%, not currently breastfeeding.  Body mass index is 24.41 kg/m².  Non-verbal signs of pain present: No    Physical Exam:  General:  awake but not able to interact meaningfully with provider . In mild respiratory distress.    HEENT: Dry MM. Dried blood around lips.   Lungs: vapotherm   Extremities: left side w/ residual weakness/contracture. Right wrist in restraint.   Neurologic: not oriented   Psychiatric: delirious. Forcefully pulling against right wrist restraint    Skin: Warm and dry. Pale.     Hematology:  Lab Results   Component Value Date    WBC 13.7 (H) 12/24/2024    HGB 10.6 (L) 12/24/2024    HCT 33.6 (L)  12/24/2024    .0 12/24/2024    .0 12/24/2024       Coags:  Lab Results   Component Value Date    PT 13.4 02/28/2014    INR 1.62 (H) 12/11/2024    .9 (H) 12/24/2024       Chemistry:  Lab Results   Component Value Date    CREATSERUM 2.54 (H) 12/24/2024    BUN 55 (H) 12/24/2024     (H) 12/24/2024    K 2.8 (LL) 12/24/2024     (H) 12/24/2024    CO2 22.0 12/24/2024     (H) 12/24/2024    CA 8.2 (L) 12/24/2024    ALB 3.4 12/24/2024    ALKPHO 96 12/24/2024    BILT 0.5 12/24/2024    TP 7.3 12/24/2024     (H) 12/24/2024    ALT 83 (H) 12/24/2024    DDIMER 1.51 (H) 10/24/2022    BNP 2,098 (H) 02/28/2014    MG 1.9 12/18/2024    PHOS 3.7 12/24/2024    TROP <0.045 02/26/2019       Imaging:  XR CHEST AP PORTABLE  (CPT=71045)    Result Date: 12/24/2024  CONCLUSION:  New bibasilar alveolar opacities consistent with pneumonia.   LOCATION:  [id number]      Dictated by (CST): Atif Gonzalez MD on 12/24/2024 at 8:30 AM     Finalized by (CST): Atif Gonzalez MD on 12/24/2024 at 8:31 AM       US VENOUS DOPPLER LEG BILAT - DIAG IMG (CPT=93970)    Result Date: 12/23/2024  CONCLUSION:  No direct evidence for DVT in either lower extremity.  Portions of the proximal calf posterior tibial veins are poorly visualized bilaterally because of body habitus.   LOCATION:  [facility]   Dictated by (CST): Xavi Ca MD on 12/23/2024 at 6:56 PM     Finalized by (CST): Xavi Ca MD on 12/23/2024 at 7:03 PM       CT BRAIN OR HEAD (CPT=70450)    Result Date: 12/23/2024  CONCLUSION:  No acute intracranial abnormality.    LOCATION:  [facility]   Dictated by (CST): Rogelio Farah MD on 12/23/2024 at 1:30 PM     Finalized by (CST): Rogelio Farah MD on 12/23/2024 at 1:33 PM       XR CHEST AP PORTABLE  (CPT=71045)    Result Date: 12/23/2024  CONCLUSION:  Minimal left basilar atelectasis.   LOCATION:  [facility]      Dictated by (CST): Romelia Delgado MD on 12/23/2024 at 10:46 AM     Finalized by (CST): Romelia Delgado MD  on 12/23/2024 at 10:47 AM        Summary of Discussion      Anne Marie is aware of our services from last admission and discussion yesterday by phone.     I called Anne Marie to discuss her mother's care. She states that she had to go home this morning after staying with Carla overnight.   We discussed current situation and decline in mental and physical ability over the past few months, but rapidly this month. Anne Marie does not feel that ongoing treatment and readmissions would be in line with her mom's QoL goals. She reflected on our conversation yesterday and ICU physician's conversation as well. Support provided. I encouraged her to reach out those that support her to aid her in decision making. She will talk with her sister and Aunt prior to making any further changes. She is aware that she has time and there is no pressure to decide today.     Advance Care Planning counseling and discussion:   HC POA Documentation Completed--Document in Epic.   Anne Marie (daughter) is HCPOA   POLST FORM Not completed  DNAR/Selective Treatment    Assessment and Recommendations        Principal Problem:    Acute renal insufficiency  Active Problems:    Acute respiratory failure with hypoxia (HCC)    CKD stage 3b, GFR 30-44 ml/min (HCC)    Elevated troponin    Atrial fibrillation with rapid ventricular response (HCC)    Palliative Care encounter    Goals of care: ongoing   Anne Marie is seeking  from her familial support regarding hospice discussions.   Continue current treatment plan at this time.   Code Status: DNAR/Selective Treatment   Anne Marie is acting POA if/until pt regains her decision making capacity.   Our service will follow up on 12/26.     Discussed today's visit with POA, RN, and Care Team .    Palliative Care Follow Up: Palliative care team will Continue to follow while inpatient.  The Palliative Care Service does not round on national holidays weekends but providers are available by [facility], if needed.     Thank you for allowing  Palliative Care services to participate in the care of Carla Kim.    A total of  60  minutes were spent on this consult, which included all of the following: chart review, direct face to face contact, history taking, physical examination, counseling and coordinating care, and documentation.     Nakia Pereira MD  12/24/2024  11:44 AM  Palliative Care Services    The 21st Century Cures Act makes medical notes like these available to patients in the interest of transparency. Please be advised this is a medical document. Medical documents are intended to carry relevant information, facts as evident, and the clinical opinion of the practitioner. The medical note is intended as peer to peer communication and may appear blunt or direct. It is written in medical language and may contain abbreviations or verbiage that are unfamiliar.          [1]   Allergies  Allergen Reactions    Lipitor [Atorvastatin Calcium] MYALGIA     TABS    Wellbutrin [Bupropion Hcl]      TABS-tremor

## 2024-12-24 NOTE — PROGRESS NOTES
Reviewed overnight course and current status with Dr. Ba    Sleeping upright on pressure support ventilation    Tele atrial fibrillation - rate elevated on IV cardizem and IV heparin    Tmax 102 - now afebrile    128/72  110 irregular    HEENT: mask in place  Lungs clear anteriorly  Ht irregular  Abd soft  Ext protective boots on legs    CXR with patchy ASD bilateral lower lobes centrally    Echo: LVEF 45% on 12/6/24 - normal RV function    No new labs yet today    Yesterday 48/2.6   K+ 3.3   Pro-bnp > 18K    Low urine output        A/P: Compensated CAD s/p CABG with permanent atrial fibrillation, mild LV dysfunction, cerebrovascular disease with prior stroke, advanced COPD, CKD, PVOD and poor baseline functional status.  Admitted now with probable aspiration pneumonia.    Continue IV heparin and IV cardizem - adjust dose as needed for rate control - acceptable at present given the clinical situation    IV lasix again today given CKD, elevated Pro-BNP, mild LV dysfunction and low urine output    Ongoing antibiotics and respiratory support            L3

## 2024-12-24 NOTE — PROGRESS NOTES
12/24/24 0409   BiPAP   Device V60   BiPAP / CPAP CE# V60-12   BiPAP bacteria filter Yes   BiPAP Pre-use check ok? Yes   BIPAP plugged into main power? Yes   Mode Spontaneous/Timed   Interface Full face mask   Mask Size Medium   Control Settings   Set Rate 20 breaths/min   Set IPAP 12   Set EPAP 8   Oxygen Percent 60 %   Inspiratory time 0.9   Insp rise time 3   BiPAP/CPAP Alarm Settings   Hi Rate 45   Low Rate 10   Hi VT 1200   Low    Hi Pressure 20   Low Pressure 8   Low Pressure Delay 20   Low MV 2   BiPAP/CPAP Monitored Parameters   Pulse 100   SpO2 97 %   PIP 13   Total Rate 30 breaths/min   Minute Volume 16   Tidal Volume 538   Total Leak 20   Trigger % 100   Ti/Ttot % 34   IPAP 12   EPAP 8   Toleration Well     Received patient on continuous bipap 12/8, 80%. FIO2 weaned overnight. Currently at 50%. No further changes made.

## 2024-12-24 NOTE — HOSPICE RN NOTE
[facility] consult order received. Called daughter Jaye to schedule hospice meeting. Hospice will meet with Jaye tomorrow morning. Please call [facility] with any questions or concerns.    Mayuri Canas RN, [facility]  [facility] Liaison  [phone] after hours

## 2024-12-24 NOTE — PROGRESS NOTES
[facility]  Progress Note    Carla Kim Patient Status:  Inpatient    1950 MRN [id number]   Location [facility] 4SW-A Attending [address],    Hosp Day # 1 PCP PHYSICIAN NONSTAFF     Subjective:  Carla Kim is a(n) 74 year old female low-grade temps seem to be trending down 99.3 this a.m.  No acute events overnight  Trial of Vapotherm at 50%-mild desaturation though noted significant increase in work of breathing now back on NIPPV/BiPAP   50%  Remains on heparin    Objective:  /81 (BP Location: Right arm)   Pulse (!) 123   Temp 99.3 °F (37.4 °C) (Temporal)   Resp (!) 34   Ht 5' 3\" (1.6 m)   Wt 137 lb 12.6 oz (62.5 kg)   SpO2 94%   BMI 24.41 kg/m² 50%      Temp (24hrs), Av °F (37.2 °C), Min:98.2 °F (36.8 °C), Max:99.8 °F (37.7 °C)      Intake/Output:    Intake/Output Summary (Last 24 hours) at 2024 1141  Last data filed at 2024 0632  Gross per 24 hour   Intake 2365.1 ml   Output 450 ml   Net 1915.1 ml       Physical Exam:   General:  appears less responsive seems to try to follow some commands remains on full facemask   Head: Normocephalic, without obvious abnormality, atraumatic.   Throat: No obvious lesion or bleeding on limited exam   Neck: trachea midline, no adenopathy, no thyromegaly. No JVD.  Difficult to examine   Lungs: Diminished tones bilaterally rare sense of rhonchi rales at the bases   Chest wall: No tenderness or deformity.   Heart: Irregular mildly tachycardia 110 distant murmur   Abdomen: soft, non-distended, no masses, no guarding, no     Rebound.  Protuberant but seems nontender   Extremity: Mild chronic edema seems nontender   Skin: No rashes or lesions.   Neurological: Lethargic but arousable at times though not connected rarely following commands    Lab Data Review:  Recent Labs     24  0951 24  1547 24  0739   WBC 12.7* 13.3* 13.7*   HGB 12.5 12.6 10.6*   .0* 416.0 328.0  328.0     Recent Labs      12/23/24  0951 12/23/24  1547 12/24/24  0747   * 149* 149*   K 3.4* 3.3* 2.8*    113* 113*   CO2 24.0 21.0 22.0   BUN 62* 48* 55*   CREATSERUM 2.57* 2.64* 2.54*     Recent Labs   Lab 12/23/24  1625 12/23/24  2253 12/24/24  0739   PTT 37.9* >240.0* 174.9*     Recent Labs   Lab 12/24/24  0735   ABGPHT 7.48*   XJHFQR2R 34*   OJXQD1L 103*   ABGHCO3 26.5   ABGBE 2.0   TEMP 98.6   ALLY Positive   SITE Right Radial   DEV Bi-PAP   THGB 11.2*           Cultures:  expanded panel was negative  Blood cultures 12/23 1 of 2 positive gram-positive cocci in clusters--- nonstaff by PCR  Repeat pending    Radiology:  XR CHEST AP PORTABLE  (CPT=71045)    Result Date: 12/24/2024  CONCLUSION:  New bibasilar alveolar opacities consistent with pneumonia.   LOCATION:  [id number]      Dictated by (CST): Atif Gonzalez MD on 12/24/2024 at 8:30 AM     Finalized by (CST): Atif Gonzalez MD on 12/24/2024 at 8:31 AM       US VENOUS DOPPLER LEG BILAT - DIAG IMG (CPT=93970)    Result Date: 12/23/2024  CONCLUSION:  No direct evidence for DVT in either lower extremity.  Portions of the proximal calf posterior tibial veins are poorly visualized bilaterally because of body habitus.   LOCATION:  [facility]   Dictated by (CST): Xavi Ca MD on 12/23/2024 at 6:56 PM     Finalized by (CST): Xavi Ca MD on 12/23/2024 at 7:03 PM       CT BRAIN OR HEAD (CPT=70450)    Result Date: 12/23/2024  CONCLUSION:  No acute intracranial abnormality.    LOCATION:  [facility]   Dictated by (CST): Rogelio Farah MD on 12/23/2024 at 1:30 PM     Finalized by (CST): Rogelio Farah MD on 12/23/2024 at 1:33 PM      Chest x-ray reviewed-infiltrates diffusely of improved from mid hospitalization-compared to 2023 however new evidence of left retrocardiac densities    Negative Dopplers       Medications reviewed     Assessment and Plan:   Patient Active Problem List   Diagnosis    Essential hypertension    Carotid arterial disease (HCC)    Esophageal reflux     Osteoarthritis    High cholesterol    Anxiety state    CAD in native artery    Hx of CABG    CHF (congestive heart failure) (HCC)    COPD (chronic obstructive pulmonary disease) (HCC)    History of carotid endarterectomy    Controlled type 2 diabetes mellitus with stage 3 chronic kidney disease, without long-term current use of insulin (HCC)    Mild aortic sclerosis    Hyperlipidemia associated with type 2 diabetes mellitus (HCC)    Hypernatremia    Acute respiratory failure with hypoxia ([facility])    Hemiparesis as late effect of nontraumatic intracerebral hemorrhage ([facility])    Smoker    CKD stage 3b, GFR 30-44 ml/min (HCC)    History of CVA with residual deficit    Spastic hemiparesis as late effect of cerebral infarction (HCC)    Seizure (HCC)    Fatty liver    Acute cystitis without hematuria    Anemia    Gastrostomy status (HCC)    Current mild episode of major depressive disorder without prior episode (HCC)    Paroxysmal atrial fibrillation ([facility])    Contracture of muscle, left upper arm    Hypoxia    Hyperglycemia    Acidosis    Septic shock ([facility])    Palliative care encounter    Goals of care, counseling/discussion    Hypotension, unspecified hypotension type    Urinary tract infection without hematuria, site unspecified    Acute renal failure, unspecified acute renal failure type ([facility])    Palliative care by specialist    TIP (acute kidney injury) ([facility])    Altered mental status    Hypokalemia    ATN (acute tubular necrosis) ([facility])    HFrEF (heart failure with reduced ejection fraction) ([facility])    Acute renal insufficiency    Elevated troponin    Atrial fibrillation with rapid ventricular response ([facility])       Assessment:  Febrile illness-suspected recurrent aspiration with chest x-ray now with evolving retrocardiac densities  Acute on chronic hypoxic respiratory failure-suspect evolving pneumonia plans to follow carefully on broad-spectrum antibiotic coverage remains BiPAP dependent  Acute encephalopathy-unremarkable  plain brain suspect toxic metabolic-no evidence of improvement  Known HFrEF EF 45% background of remote coronary disease CABG /PCI's and pulmonary hypertension--fluid status seems stable  Chronic A-fib on Eliquis  Acute on chronic kidney disease-had recovered well- now again improving  Elevated LFTs on last presentation again trending down  History of COPD smoker  Aspiration risk ongoing-had seen speech prior admission-speech to follow once off BiPAP  Positive blood culture 1 of 2-suspected contaminant repeat pending     Plan:  Plan to continue broad-spectrum antibiotic coverage  Multiple cultures are pending  Continue NIPPV at this time-plan to wean if able to continue to wean FiO2--baseline 2 L reportedly  Bladder scanning      CC     Martha Campbell MD  12/24/2024  11:41 AM

## 2024-12-24 NOTE — PROGRESS NOTES
[facility]  Progress Note    Carla Kim Patient Status:  Emergency    1950 MRN [id number]   [facility] EMERGENCY DEPARTMENT Attending Damon Lee MD   Hosp Day # 1 PCP PHYSICIAN NONSTAFF        Chart reviewed  Pt is on high flow O2  Limited historian; care reviewed w/ RN        Current Facility-Administered Medications:     furosemide (Lasix) 10 mg/mL injection 40 mg, 40 mg, Intravenous, BID (Diuretic)    potassium chloride 40 mEq in 250mL sodium chloride 0.9% IVPB premix, 40 mEq, Intravenous, Once    dilTIAZem (cardIZEM) 100 mg in sodium chloride 0.9% 100 mL IVPB-ADDV, 2.5-20 mg/hr, Intravenous, Continuous    sodium chloride 0.45% infusion, , Intravenous, Continuous    piperacillin-tazobactam (Zosyn) 3.375 g in dextrose 5% 100 mL IVPB-ADDV, 3.375 g, Intravenous, Q12H    glucose (Dex4) 15 GM/59ML oral liquid 15 g, 15 g, Oral, Q15 Min PRN **OR** glucose (Glutose) 40% oral gel 15 g, 15 g, Oral, Q15 Min PRN **OR** glucose-vitamin C (Dex-4) chewable tab 4 tablet, 4 tablet, Oral, Q15 Min PRN **OR** dextrose 50% injection 50 mL, 50 mL, Intravenous, Q15 Min PRN **OR** glucose (Dex4) 15 GM/59ML oral liquid 30 g, 30 g, Oral, Q15 Min PRN **OR** glucose (Glutose) 40% oral gel 30 g, 30 g, Oral, Q15 Min PRN **OR** glucose-vitamin C (Dex-4) chewable tab 8 tablet, 8 tablet, Oral, Q15 Min PRN    heparin (Porcine) 35585 units/250mL infusion PE/DVT/THROMBUS CONTINUOUS, 200-3,000 Units/hr, Intravenous, Continuous    insulin aspart (NovoLOG) 100 Units/mL FlexPen 1-10 Units, 1-10 Units, Subcutaneous, q6h        Review of Systems:  Pt cannot provide      Physical Exam:   /72   Pulse 111   Temp 98.9 °F (37.2 °C) (Temporal)   Resp (!) 30   Ht 5' 3\" (1.6 m)   Wt 137 lb 12.6 oz (62.5 kg)   SpO2 93%   BMI 24.41 kg/m²   Temp (24hrs), Av.5 °F (37.5 °C), Min:98.2 °F (36.8 °C), Max:102 °F (38.9 °C)       Intake/Output Summary (Last 24 hours) at 2024 0916  Last data filed at 2024  0632  Gross per 24 hour   Intake 2365.1 ml   Output 450 ml   Net 1915.1 ml     Wt Readings from Last 6 Encounters:   12/24/24 137 lb 12.6 oz (62.5 kg)   12/18/24 156 lb (70.8 kg)   01/11/23 150 lb (68 kg)   10/25/22 151 lb 8 oz (68.7 kg)   06/22/22 172 lb 13.5 oz (78.4 kg)   09/13/21 155 lb (70.3 kg)       General: awake; alert; disoriented -unable to verbalize much  HEENT: No scleral icterus, dry MM  Neck: Supple, no MEGHANA or thyromegaly  Cardiac: tachy, irreg/irreg, S1, S2 normal, no murmur or rub  Lungs: Clear without wheezes, rales, rhonchi.    Abdomen: Soft, non-tender. + bowel sounds, no palpable organomegaly  Extremities: Without clubbing, cyanosis or edema.  Neurologic: L side contracted  Skin: Warm and dry, no rashes    Recent Labs     12/23/24  0951 12/23/24  1547 12/24/24  0739   WBC 12.7* 13.3* 13.7*   HGB 12.5 12.6 10.6*   MCV 91.3 93.0 91.6   .0* 416.0 328.0  328.0       Recent Labs     12/23/24  0951 12/23/24  1547 12/24/24  0747   * 149* 149*   K 3.4* 3.3* 2.8*    113* 113*   CO2 24.0 21.0 22.0   BUN 62* 48* 55*   CREATSERUM 2.57* 2.64* 2.54*   CA 8.8 8.7 8.2*   PHOS  --   --  3.7       Recent Labs     12/23/24  0951 12/24/24  0747   * 83*   * 101*   ALB 4.0 3.4         Imaging:  Cxr reviewed    Impression/Plan:    1.  TIP/CKD III- b/l creat appears to be close to 1.7 mg/dl or so in setting of longstanding DM/HTN.  TIP appears to be due to decr renal perfusion- afib/RVR  as well as likely prerenal azotemia as she appears volume depleted.    - continue   fluids     2.  Hypernatremia- due to free water deficit, suspect poor intake.    - continue hypotonic fluids     3.  AMS- in setting of above. Recent admit due to klebsiella urosepsis.      4.  Afib/RVR- per cards    Thank you for allowing me to participate in the care of your patient. Please do not hesitate to call with any questions or concerns.         Franck Ortiz  12/24/2024

## 2024-12-25 NOTE — PROGRESS NOTES
[facility]   part of [facility]     Hospitalist Progress Note     Carla Kim Patient Status:  Inpatient    1950 MRN [id number]   Location [facility] 4SW-A Attending Keri [address],    Hosp Day # 2 PCP PHYSICIAN NONSTAFF     Chief Complaint: sob    Subjective:     Patient remains confused and currently on cont bipap.     Objective:    Review of Systems:   A comprehensive review of systems was completed; pertinent positive and negatives stated in subjective.    Vital signs:  Temp:  [98.3 °F (36.8 °C)-99.3 °F (37.4 °C)] 98.4 °F (36.9 °C)  Pulse:  [106-147] 140  Resp:  [20-36] 26  BP: (100-149)/(58-92) 100/58  SpO2:  [65 %-97 %] 65 %  FiO2 (%):  [50 %] 50 %    Physical Exam:    General: mild acute distress  Respiratory: No wheezes, positive rhonchi  Cardiovascular: S1, S2, regular rate and rhythm  Abdomen: Soft, Non-tender, non-distended, positive bowel sounds  Neuro: No new focal deficits.   Extremities: No edema      Diagnostic Data:    Labs:  Recent Labs   Lab 24  0951 24  1547 24  0739   WBC 12.7* 13.3* 13.7*   HGB 12.5 12.6 10.6*   MCV 91.3 93.0 91.6   .0* 416.0 328.0  328.0       Recent Labs   Lab 24  0951 24  1547 24  0747 24  1445   * 198* 200*  --    BUN 62* 48* 55*  --    CREATSERUM 2.57* 2.64* 2.54*  --    CA 8.8 8.7 8.2*  --    ALB 4.0  --  3.4  --    * 149* 149*  --    K 3.4* 3.3* 2.8* 3.4*    113* 113*  --    CO2 24.0 21.0 22.0  --    ALKPHO 134  --  96  --    *  --  101*  --    *  --  83*  --    BILT 0.5  --  0.5  --    TP 8.5*  --  7.3  --        Estimated Glomerular Filtration Rate: 19.2 mL/min/1.73m2 (A) (by CKD-EPI based on SCr of 2.54 mg/dL (H)).    Recent Labs   Lab 24  0951   TROPHS 66*       No results for input(s): \"PTP\", \"INR\" in the last 168 hours.               Microbiology    Hospital Encounter on 24   1. Blood Culture     Status: None (Preliminary result)    Collection  Time: 12/23/24  4:19 PM    Specimen: Blood,peripheral   Result Value Ref Range    Blood Culture Result No Growth 1 Day N/A         Imaging: Reviewed in Epic.    Medications:    [Held by provider] furosemide  40 mg Intravenous BID (Diuretic)    levETIRAcetam  500 mg Intravenous Q12H    scopolamine  1 patch Transdermal Q72H       Assessment & Plan:        #Acute on chronic hypoxemic respiratory failure  #Aspiration pneumonia  -PTA: on 2L,   now on cont bipap @ 12/8 w/ fio2 50%  -pt on eliquis PTA, risk of PE low, unable to get CTA chest given ckd  now on heparin gtt  -echo reviewed, RV okay    #AMS  -febrile illness  -CT brain neg  -abg w/o hypercapnia  -likely multifactorial      #CAP  #Suspect aspiration pneumonia  #Bacteremia   -iv zosyn (12/23-)  -sputum cultures, Bcx  -ST to see     #HFrEF   #TIP on CKD3  #Hypernatremia  -cards and renal evals noted, discussed w/ critical care, ivf for now, hold lasix  -echo reviewed     #CAD s/p CABG  #Pafib  #HLD    #hx of CVA     #COPD     #DM2  -A1c 6.5 as of Dec 2024  -ISS for now     #Seizure disorder  -keppra    #ACP  -palliative care team to see     #Recent hospitalization  -dec 2024 admitted for septic shock 2/2 uti          Supplementary Documentation:     Quality:  DVT Mechanical Prophylaxis:        DVT Pharmacologic Prophylaxis   Medication   None                Code Status: DNAR/Comfort Care  Han: External urinary catheter in place  Han Duration (in days):   Central line:    DEEDEE:     Discharge is dependent on: clinical improvement   At this point Ms. Kim is expected to be discharge to: tbd    The 21st Century Cures Act makes medical notes like these available to patients in the interest of transparency. Please be advised this is a medical document. Medical documents are intended to carry relevant information, facts as evident, and the clinical opinion of the practitioner. The medical note is intended as peer to peer communication and may appear blunt or  direct. It is written in medical language and may contain abbreviations or verbiage that are unfamiliar.              **Certification      PHYSICIAN Certification of Need for Inpatient Hospitalization - Initial Certification    Patient will require inpatient services that will reasonably be expected to span two midnight's based on the clinical documentation in H+P.   Based on patients current state of illness, I anticipate that, after discharge, patient will require TBD.

## 2024-12-25 NOTE — PLAN OF CARE
Patient on comfort care. Morphine gtt infusing. On room air. See flowsheets for CPOT/RDOS scores.

## 2024-12-25 NOTE — SLP NOTE
Family has made decision for comfort measures only at this time. Will d/c from services.    Jaye Jackson MA, CCC-SLP  Pager [phone]

## 2024-12-31 LAB
BACTERIA BLD CULT: POSITIVE
BACTERIA BLD CULT: POSITIVE

## 2024-12-31 NOTE — PROCEDURES
[facility]  Pre-Procedural Note  Bipin Pool Patient Status:  Inpatient    1950 MRN [id number]   Location [address] Attending Beth Parker MD   Hosp Day # 6 PCP Keaton Mitchell MD     Procedure: Carotid and MG/2ML nebulizer solution 0.5 mg 0.5 mg Nebulization [employer]   Piperacillin Sod-Tazobactam So (ZOSYN) 3.375 g in dextrose 5 % 100 mL ADD-vantage 3.375 g Intravenous Q8H   Clopidogrel Bisulfate (PLAVIX) tab 75 mg 75 mg Oral Daily   Normal Saline Flush EF 35-40 %   • COPD    • Diabetes mellitus ([facility] [address]    • Disorder of liver     \"fatty liver\"   • Fatigue    • HEART ATTACK 2/28/14    Acute mi and stents   • High blood pressure    • High cholesterol    • History of blood transfusion    • History of depress No abnormal movements

## 2025-01-01 NOTE — DISCHARGE SUMMARY
[facility]     Carla Kim Patient Status:  Inpatient    1950 MRN [id number]   Location [facility] 4SW-A Attending No att. providers found   Hosp Day # 2 PCP PHYSICIAN NONSTAFF     Date of Admission: 2024  Date of Discharge:  2024     Discharge Disposition:     Discharge Diagnosis:  Recurrent aspiration pneumonia  Acute on chronic hypoxic respiratory failure  Acute metabolic encephalopathy  Cad  Chronic afib  Copd  Sam  History of CVA with residual left-sided weakness    History of Present Illness: 74 year old female with multiple medical problems, hospitalized earlier this month and CO'ed 5 days s/p 13 day stay for septic shock 2/2 uti. Pt presents today for AMS and febrile illness. Daughter at the bedside. Pt unable to provide much hpi on her own. Daughter last saw her a few days ago. Reportedly pt had fever and more sob in the last 24h w/ associated weakness and concerns of lethargy.     Brief Synopsis: Patient was started on broad-spectrum antibiotic coverage and cultures were sent.  She was started on IV heparin and IV Cardizem for heart rate control and she also received IV Lasix for acute on chronic systolic CHF.  Patient continued to deteriorate and power of  discussed with palliative care and decision was made for hospice care.    Lace+ Score: 69  59-90 High Risk  29-58 Medium Risk  0-28   Low Risk       TCM Follow-Up Recommendation:  LACE > 58: High Risk of readmission after discharge from the hospital.      Procedures during hospitalization:       Incidental or significant findings and recommendations (brief descriptions):      Lab/Test results pending at Discharge:       Consultants:  Pulmonary  cardiology    Discharge Medication List:     Discharge Medications        ASK your doctor about these medications        Instructions Prescription details   acetaminophen 325 MG Tabs  Commonly known as: Tylenol      Take 2 tablets (650 mg  total) by mouth every 6 (six) hours as needed for Pain or Fever (Not to exceed 3 GM APAP/DAY from all sources).   Refills: 0     baclofen 5 MG Tabs  Commonly known as: Lioresal      Take 1 tablet (5 mg total) by mouth 3 (three) times daily.   Refills: 0     Betadine Swabsticks 10 % Swab  Generic drug: Povidone-Iodine      Apply 1 Application topically daily. Apply to left heel and right heel   Refills: 0     Betadine Swabsticks 10 % Swab  Generic drug: Povidone-Iodine      Apply 1 Application topically as needed (Apply to left heel and/or right heel).   Refills: 0     Cholecalciferol 1.25 MG (06002 UT) Tabs      Take 50,000 Units by mouth Every Monday.   Refills: 0     clopidogrel 75 MG Tabs  Commonly known as: Plavix      Take 1 tablet (75 mg total) by mouth daily.   Refills: 0     Eliquis 5 MG Tabs  Generic drug: apixaban  Ask about: Which instructions should I use?      Take 1 tablet (5 mg total) by mouth every 12 (twelve) hours.   Refills: 0     levETIRAcetam 500 MG Tabs  Commonly known as: Keppra  Ask about: Which instructions should I use?      Take 1 tablet (500 mg total) by mouth every 12 (twelve) hours.   Refills: 0     MiraLax 17 GM/SCOOP Powd  Generic drug: polyethylene glycol (PEG 3350)  Ask about: Which instructions should I use?      Take 17 g by mouth daily.   Refills: 0     ondansetron 4 mg tablet  Commonly known as: Zofran      Take 1 tablet (4 mg total) by mouth every 6 (six) hours as needed (nausea/vomiting).   Refills: 0     pantoprazole 40 MG Tbec  Commonly known as: Protonix      Take 1 tablet (40 mg total) by mouth every morning.   Refills: 0     rosuvastatin 40 MG Tabs  Commonly known as: Crestor  Ask about: Which instructions should I use?      Take 1 tablet (40 mg total) by mouth at bedtime.   Refills: 0     sennosides 8.6 MG Tabs  Commonly known as: Senokot      Take 2 tablets (17.2 mg total) by mouth at bedtime.   Refills: 0     sertraline 100 MG Tabs  Commonly known as: Zoloft      Take  1 tablet (100 mg total) by mouth daily.   Refills: 0     zinc oxide 20 % Oint  Commonly known as: zinc oxide      Apply 1 Application topically as needed for Dry Skin (Apply to buttocks and/or sacrum).   Refills: 0     zinc oxide 20 % Oint  Commonly known as: zinc oxide      Apply 1 Application topically daily. Apply to buttocks and sacrum   Refills: 0              [place]     Follow-up appointment:   No follow-up provider specified.  Appointments for Next 30 Days 2025 - 2025      None            Vital signs:       Physical Exam:    General: No acute distress   Lungs: clear to auscultation  Cardiovascular: S1, S2  Abdomen: Soft      -----------------------------------------------------------------------------------------------  PATIENT DISCHARGE INSTRUCTIONS: See electronic chart    Renea Nguyen MD    Total time spent on discharge plannin minutes     The  [place] Cures Act makes medical notes like these available to patients in the interest of transparency. Please be advised this is a medical document. Medical documents are intended to carry relevant information, facts as evident, and the clinical opinion of the practitioner. The medical note is intended as peer to peer communication and may appear blunt or direct. It is written in medical language and may contain abbreviations or verbiage that are unfamiliar.

## 2025-01-02 LAB — BACTERIA BLD CULT: POSITIVE

## 2025-02-05 NOTE — PROGRESS NOTES
Physician Clarification    Additional information related to the patient's condition  Sepsis due to aspiration pneumonia, present on admission       This note is part of the patient's medical record.

## (undated) DIAGNOSIS — F32.0 CURRENT MILD EPISODE OF MAJOR DEPRESSIVE DISORDER, UNSPECIFIED WHETHER RECURRENT (HCC): ICD-10-CM

## (undated) DEVICE — ENDOSCOPY PACK UPPER: Brand: MEDLINE INDUSTRIES, INC.

## (undated) DEVICE — Device: Brand: DEFENDO AIR/WATER/SUCTION AND BIOPSY VALVE

## (undated) DEVICE — 1200CC GUARDIAN II: Brand: GUARDIAN

## (undated) DEVICE — 3M™ RED DOT™ MONITORING ELECTRODE WITH FOAM TAPE AND STICKY GEL, 50/BAG, 20/CASE, 72/PLT 2570: Brand: RED DOT™

## (undated) DEVICE — FILTERLINE NASAL ADULT O2/CO2

## (undated) NOTE — MR AVS SNAPSHOT
[facility],[address], [address]  [phone]               Thank you for choosing us for your health care visit with Velma Verdugo NP.   We are glad to serve you and happy to provide you with this What changed:  Another medication with the same name was removed. Continue taking this medication, and follow the directions you see here. Amlodipine Besy-Benazepril HCl 10-20 MG Caps   Take 1 Cap by mouth daily.    Commonly known as:  Jay Andrews TOUJEO SOLOSTAR 300 UNIT/ML Sopn   Generic drug:  Insulin Glargine   Inject 42 Units into the skin nightly. What changed:  how much to take           Umeclidinium Bromide 62.5 MCG/INH Aepb   Inhale 1 puff into the lungs daily.    Commonly known as:  INCR

## (undated) NOTE — LETTER
05/18/21        [address] [place] [address] Road [id number]      Dear Andres Charles,    [facility] records indicate that you have outstanding lab work and or testing that was ordered for you and has not yet been completed:  Orders Placed This Encounter

## (undated) NOTE — IP AVS SNAPSHOT
Patient Demographics     Address  [place] [address] Phone  [phone] (Home) *Preferred*  [phone] [facility])      Emergency Contact(s)     Name Relation Home Work Sharonda Daughter   [phone]    Tyshawn Everett Commonly known as:  PACERONE      1 tablet (200 mg total) by Per NG Tube route daily. Merlin Pollack, MD         AmLODIPine Besylate 10 MG Tabs  Commonly known as:  NORVASC      Take 1 tablet (10 mg total) by mouth daily.    Merlin Pollack, MD         aspirin 8 Inhale 2 puffs into the lungs every 8 (eight) hours as needed for Wheezing. Bing Arango., MD         modafinil 100 MG Tabs  Commonly known as:  PROVIGIL      Take 1 tablet (100 mg total) by mouth daily.    DAVE Mackenzie [id number] Sertraline HCl (ZOLOFT) tab 100 mg 06/05/18 0820 Given      [id number] Umeclidinium Bromide (INCRUSE ELLIPTA) 62.5 MCG/INH inhaler 1 puff 06/05/18 0821 Given      [id number] amiodarone HCl (PACERONE) tab 200 mg 06/05/18 0819 Given      [id number] a [id number] Insulin Aspart Pen (NOVOLOG) 100 UNIT/ML flexpen 1-68 Units 06/05/18 1256 Given      [id number] insulin detemir (LEVEMIR) 100 UNIT/ML flextouch 20 Units 06/05/18 0949 Given            LEFT LOWER ARM     Order ID Medication Name Action Time Action ABG O2 Saturation 95 92 - 100 % — [facility]   Calculated O2 Saturation 97 92 - 100 % — [facility]   Patient Temperature 98.0 F — [facility]   Total Hemoglobin 10.5 11.7 - 16.0 g/dL L [facility]   Carboxyhemoglobin 1.5 0.0 - 3.0 % SAT — [facility]   Commen Type Source Collected On   Blood — 06/04/18 1703          Components    Component Value Reference Range [facility]   Potassium 4.6 3.6 - 5.1 mmol/L — [facility]            Testing Performed By     Lab - Abbreviation Name Director Address Valid Date Range year old[NB.2] female with[NB.1] recent stroke, left hemiplegia, cad/PVD/DM2/HTN/COPD who was discharged to rehab last night presented with nausea and vomiting and what sounds like possible ground coffee emesis.  Patient is very drowsy and cannot provide an No date: BIOPSY OF SKIN LESION      Comment: hyperkeratosis  1996 and 2012: BYPASS SURGERY      Comment: cabg x 4  1996: CABG      Comment: x4  No date: CATH BARE METAL STENT (BMS)      Comment: R carotid/coronary  1987: CHOLECYSTECTOMY  No date:  LOUIE PETERSON hydrALAzine HCl 100 MG Oral Tab Take 1 tablet (100 mg total) by mouth 3 (three) times daily. Disp: 90 tablet Rfl: 5   lisinopril 20 MG Oral Tab 1 tablet (20 mg total) by Per NG Tube route 2 (two) times daily.  Disp: 60 tablet Rfl: 5   Labetalol HCl 200 MG O Respiratory: Clear to auscultation bilaterally. No wheezes. No rhonchi. Cardiovascular: S1, S2. Regular rate and rhythm. No murmurs, rubs or gallops. Equal pulses. Chest and Back: No tenderness or deformity. Abdomen: Soft, nontender, nondistended.   Pos 5. Recent right MCA stroke due to ICA occlusion with left hemiplegia   1. On supplemental TF  2. Dietician consult for TF  3. Hold ASA and plavix until seen by GI  6. HTN   1. Resume home meds  7. uncontrolled DM 2  1. Hyperglycemia protocol  8.  Chronic CO and is providing history . Per daughter, pt was c/o feeling unwell to her daughter and TF were started last night. Per daughter when she arrived to [facility] today, she looked distended and hada  Large \"hot chocolate color\" emesis. Pt is on ASA and plavix.  No 2005: OTHER SURGICAL HISTORY      Comment: Hx of transcath placement of intrarhoracic                carotid artery[NB. 2]    Social History:[NB.1]  reports that she has been smoking Cigarettes. She has a 50.00 pack-year smoking history.  She has never used (two) times daily. Disp: 60 tablet Rfl: 5   Labetalol HCl 200 MG Oral Tab Take 1 tablet (200 mg total) by mouth every 12 (twelve) hours. Disp: 60 tablet Rfl: 5   AmLODIPine Besylate 10 MG Oral Tab Take 1 tablet (10 mg total) by mouth daily.  Disp: 30 tablet Abdomen: Soft, nontender, nondistended. Positive bowel sounds. No rebound, guarding or organomegaly. Neurologic:[NB.1] left hemiplegia, drowsy[NB. 3]   Musculoskeletal: Moves all extremities. Extremities: No edema or cyanosis. [NB.1] Mild edema on left ha 9. Chronic diastolic heart failure   10. CAD s/p CABG   11. PVD  12. Hx of tobacco use   13. Depression   14.  PAF    Quality:  · DVT Prophylaxis: SCD  · Han:[NB.1] none[NB.3]    Plan of care discussed with[NB.1] patient, daughter and RN[NB.3]    Julissa Lucia emesis and increasing shortness of breath. She started on BiPAP, IV fluids discontinued and given Lasix. ABG G revealed respiratory alkalosis. Pulmonary is on consult for her acute hypoxic respiratory failure and remains on high oxygen requirements.   Ne complication, not stated as uncontrolled    • Uncontrolled diabetes mellitus ([facility] [address] 3/19/2014   • Unspecified essential hypertension    • Unspecified sleep apnea    • Wears glasses    • Wheezing        Past Surgical History:  No date: ANGIOGRAM  2/28/14: AN ipratropium-albuterol (DUONEB) nebulizer solution 3 mL 3 mL Nebulization Q4H WA (5 times daily)   [COMPLETED] Labetalol HCl (NORMODYNE) tab 100 mg 100 mg Oral Once   [COMPLETED] Potassium Chloride ER (K-DUR M20) CR tab 40 mEq 40 mEq Oral Q4H   [COMPLETED] Sertraline HCl (ZOLOFT) tab 100 mg 100 mg Oral Daily   Umeclidinium Bromide (INCRUSE ELLIPTA) 62.5 MCG/INH inhaler 1 puff 1 puff Inhalation Daily   glucose (DEX4) oral liquid 15 g 15 g Oral Q15 Min PRN   Or      Glucose-Vitamin C (DEX-4) 4-0.006 g chewable Gross per 24 hour   Intake              280 ml   Output                0 ml   Net              280 ml       Physical Exam:                                      General: Alert, cooperative, no distress, appears stated age.   Head:  Normocephalic, without ob items.                                                                                                                                         ASSESSMENT:  Impaired mobility and self-care secondary to CVA with left hemip Will follow. Thank you for the consult. Indy Whitten MD  [facility]. [DK.1]        Electronically signed by Amna Edmond MD on 6/4/2018  7:22 PM   Attribution Angel    DK. 1 - Amna Edmond MD on 6/4/2018  7:15 PM echocardiography for diagnosis and ventricular function evaluation. Image quality was adequate. ECG rhythm:   Normal sinus ---------------------------------------------------------------------------- Conclusions: 1. Left ventricle:  The cavity size was norm Aortic valve:   Trileaflet; mildly to moderately thickened, mildly to moderately calcified leaflets. Cusp separation was normal.  Doppler: Transvalvular velocity was within the normal range. There was no stenosis. No significant regurgitation.  Tricuspid va Reference  Aortic root ID, ED, MM                          3.1   cm     ---------   Left atrium                                     Value        Reference  LA ID                                           4.7   cm     ---------  LA ID, A-P, ES Pulmonic valve                                  Value        Reference  Pulmonic regurg velocity, ED                    1.36  m/sec  ---------  Pulmonic regurg gradient, ED                    7     mm Hg  --------- Legend: (L)  and  (H)  leno values outsid Acute on chronic diastolic heart failure (HCC)    Urinary retention    Dehydration    Hypotension    Elevated troponin    Acute systolic CHF (congestive heart failure) [facility])      Past Medical History  Past Medical History:   Diagnosis Date   • Abdominal Patient’s self-stated goal is to go to acute rehab. OBJECTIVE  Precautions: Bed/chair alarm    WEIGHT BEARING RESTRICTION  Weight Bearing Restriction: None                PAIN ASSESSMENT   Ratin  Location: denies pain  Management Techniques:  Activit Lower Extremity Sitting: Heel raises (AAROM on LLE), LAQ (x5 on LLE), hip flexion (minimal clearance off floor with LLE)    Supine: RLE hip ab/adduction, unilateral bridging with RLE flexed, PROM hip IR/ER, RLE SLR     Upper Extremity      Position Sitting Goal #6     Goal Comments: Goals established on 5/29/2018 - all goals ongoing as of 6/5/2018[AR.1]      Attribution Key    AR. 1 Michelle Lozada, PTA on 6/5/2018  1:29 PM               Physical Therapy Note signed by Francia Jolly, PT at 6/4/2018  2:54 • Black stools    • CONGESTIVE HEART FAILURE 3/2013    EF 35-40 %   • COPD    • Diabetes mellitus ([facility] [address]    • Disorder of liver     \"fatty liver\"   • Fatigue    • HEART ATTACK 2/28/14    Acute mi and stents   • High blood pressure    • High cholesterol BALANCE[NL.1]                                                                                                                     Static Sitting: Poor +  Dynamic Sitting: Poor           Static Standing: Poor -  Dynamic Standing: Dependent[NL.2]    ACTIVITY The pt was able to stand 15 seconds first rep and 2.5 minutes on second rep. During second rep, pt was able to progress to standing with 1 person Max A. Pt completed sit>stand>supine in to bed with 2 person Max A.       THERAPEUTIC EXERCISES  Lower Extrem Goal #3 Patient is able to tolerate standing x2 mins with mod A of 1 to promote future function. Goal #4     Goal #5     Goal #6     Goal Comments: Goals established on 5/29/2018 - all goals ongoing as of 6/4/2018. [NL.1]      Attribution Key    NL. 1 - • Leaking of urine    • Night sweats    • Peripheral vascular disease ([facility] [address] 3/14    Carotid Artery Blockage TIA   • Shortness of breath    • Sputum production    • Stented coronary artery    • STROKE 2005    TIA   • Stroke syndrome [facility]) 12/2005    CVA no AM-PAC '6-Clicks' INPATIENT SHORT FORM - BASIC MOBILITY  How much difficulty does the patient currently have. ..  -   Turning over in bed (including adjusting bedclothes, sheets and blankets)?: A Lot   -   Sitting down on and standing up from a chair with a Patient End of Session: In bed;Needs met;Call light within reach;RN aware of session/findings; All patient questions and concerns addressed;SCDs in place    ASSESSMENT   Pt continues to require Max Ax2 for bed mobility and transfers, however, patient displa Occupational Therapy Note signed by Christopher Guthrie OT at 6/5/2018 11:36 AM  Version 3 of 3    Author:  Christopher Guthrie OT Service:  (none) Author Type:  Occupational Therapist    Filed:  6/5/2018 11:36 AM Date of Service:  6/5/2018 10:31 AM Status: • Atherosclerosis of coronary artery 1996  & 2012     2 surgeries for CABG X 4 1996 2012    • Atherosclerosis of coronary artery 2/28/14    acute MI, CHF, Stents    • Belching    • Black stools    • CONGESTIVE HEART FAILURE 3/2013    EF 35-40 %   • COPD O2 Device: High flow nasal cannula  Liters of O2:  5     ACTIVITIES OF DAILY LIVING ASSESSMENT  AM-PAC ‘6-Clicks’ Inpatient Daily Activity Short Form  How much help from another person does the patient currently need…[RM.1]  -   Putting on and taking off r maximize independence with ADLs. Continue to recommend AMY. Pt will benefit from skilled OT services to maximize independence with ADLs.[RM.1]       OT Discharge Recommendations: Sub-acute rehabilitation  OT Device Recommendations: TBD[RM.2]    PLAN[RM. 1 OCCUPATIONAL THERAPY TREATMENT NOTE - INPATIENT     Room Number: 2984/6053-L  Session: 2[RM. 1]   Number of Visits to Meet Established Goals: 8    Presenting Problem: Acute Kidney Infection[RM.2]     History related to current admission: Pt is 75year old f • History of blood transfusion    • History of depression    • Leaking of urine    • Night sweats    • Peripheral vascular disease ([facility] [address] 3/14    Carotid Artery Blockage TIA   • Shortness of breath    • Sputum production    • Stented coronary artery    • ST -   Putting on and taking off regular upper body clothing?: A Lot  -   Taking care of personal grooming such as brushing teeth?: A Lot  -   Eating meals?: A Little[RM. 2]    AM-PAC Score:[RM.1]  Score: 10  Approx Degree of Impairment: 74.7%  Standardized Sc eval/education; Neuromuscluar reeducation; Compensatory technique education  Rehab Potential : Fair  Frequency (Obs): 5x/week[RM.2]      OT Goals: -all goals ongoing 6/5/18  ADL Goals   Patient will perform grooming: with min assist  Patient will perform upp hospitalizations of past 6 months):  [facility] admission for acute CVA, aspiration PNA  Date of Admission: 4/26/2018  Date of Discharge:   5/18 /2018  Discharge Disposition:  [facility]      Problem List[RM. 1]  Principal Problem:    TIP (acute kidney injury) ([facility] [address] No date: ANGIOGRAM  2/28/14: ANGIOPLASTY (CORONARY)      Comment: stents acute mi  No date: APPENDECTOMY  No date: BACK SURGERY  No date: BIOPSY OF SKIN LESION      Comment: hyperkeratosis  1996 and 2012: BYPASS SURGERY      Comment: cabg x 4  1996: CABG wipe face w/ warm wash cloth. 2+ finger subluxation still noted in shoulder in anterior position of humerus. SQT transfer Dependent A x3 w/ LUE support throughout secondary to shoulder subluxation. Therapist repositioned pt's L forearm on pillow. [RM.1]   P Video Swallow Study Notes     No notes of this type exist for this encounter.          SLP Note - SLP Notes      SLP Note signed by ED Dowd at 5/29/2018 11:16 AM  Version 1 of 1    Author:  ED Dowd Service:  Rehab Author Type: Discharge Recommendations[CJ.1]  Discharge Recommendations/Plan: Undetermined[CJ.2]    Treatment Plan[CJ.1]  Treatment Plan/Recommendations: No further inpatient SLP service warranted[CJ.2]    Interdisciplinary Communication: Plan posted at bedside  Recomm Provider Henok Zeng    6/26/2018 2:20 PM Yonas Henry MD [facility] [place]    6/27/2018 1:00 PM Jose Guadalupe Gomez MD [employer], [address] [place] [address]

## (undated) NOTE — Clinical Note
To f/u with as soon as she has us abdomen, still  Needs micro and repeat a1c almost at goal now 8.4%

## (undated) NOTE — Clinical Note
June 23, 2017    [address], [address]      Dear Yuan Stafford:  It was a pleasure speaking to you over the phone recently. I have enclosed some information that you may find helpful.   I look forward to talking with yo

## (undated) NOTE — MR AVS SNAPSHOT
[facility],[address], [address] [place] [address] Rd  [phone]               Thank you for choosing us for your health care visit with Velma Verdugo NP.   We are glad to serve you and happy to provide you with this Clobetasol Propionate 0.05 % Oint   APPLY 1 APPLICATION TOPICALLY EVERY EVENING FOR TWO WEEKS, THEN EVERY OTHER DAY FOR 2 WEEKS, THEN APPLY TWICE WEEKLY.    Commonly known as:  TEMOVATE           furosemide 20 MG Tabs   TAKE 1 TABLET (20 MG TOTAL) BY MOUTH - GlipiZIDE ER 10 MG Tb24  - Insulin Glargine 300 UNIT/ML Sopn            MyChart     Call the [employer] for assistance with your inactive [employer] account    If you have questions, you can call [phone] to talk to our [facility] Staff.  Remember,

## (undated) NOTE — Clinical Note
JOHN, pt eligible for TCM until 6-. She states that she will call back to schedule. TCM template completed.

## (undated) NOTE — Clinical Note
Today A1C at 9. 2! fro 11 doing better going to try xultophy to see if he can get to goal... Needs recheck of micro, lipid but making progress.

## (undated) NOTE — IP AVS SNAPSHOT
Patient Demographics     Address  [place] [address] Phone  [phone] (Home) *Preferred*  [phone] [facility])      Emergency Contact(s)     Name Relation Home Work Sharonda Daughter   [phone]    Grisel Monsivais Dr Christina Peter (Nephrology) - please call when transfered to [facility] rehab.            Taisha Albert, Ronni Galdamez MD.    Specialties:  PULMONARY DISEASES, Intensivist  Why:  As needed, as able   Contact information:  [address] Inject 20 Units into the skin 2 (two) times daily.    Chong Marroquin NP         Insulin Pen Needle 32G X 4 MM Misc  Commonly known as:  CAREFINE PEN NEEDLES      Use daily with Krissy Kelly NP         Labetalol HCl 200 MG Tabs  Commonly know Bring a paper prescription for each of these medications  amiodarone HCl 200 MG Tabs  AmLODIPine Besylate 10 MG Tabs  CloNIDine HCl 0.3 MG/24HR Ptwk  ezetimibe 10 MG Tabs  HydrALAZINE HCl 100 MG Tabs  Insulin Aspart Pen 100 UNIT/ML Sopn  insulin detemir 10 [id number] ipratropium-albuterol (DUONEB) nebulizer solution 3 mL 05/18/18 1140 Given      [id number] ipratropium-albuterol (DUONEB) nebulizer solution 3 mL 05/18/18 1522 Given      [id number] lisinopril (PRINIVIL,ZESTRIL) tab 20 mg 05/17/18 2124 Given Interface  Full face mask   Mask size  Medium   Set rate  20 breaths/min   Set IPAP  12   Set EPAP  5   O2%  40 %   I time  0.9      Lab Results Last 24 Hours    No matching results found     Microbiology Results (All)     Procedure Component Value Units D Mely Pettit Patient Status:  Emergency    1950 MRN [id number]   Location 656 [facility] Attending Tri Grimm MD   Hosp Day # 0 PCP Franc Beebe MD     Chief Complaint: Nausea/Vomiting Amber Spicer    History of Pres No date: BIOPSY OF SKIN LESION      Comment: hyperkeratosis  1996 and 2012: BYPASS SURGERY      Comment: cabg x 4  1996: CABG      Comment: x4  No date: CATH BARE METAL STENT (BMS)      Comment: R carotid/coronary  1987: CHOLECYSTECTOMY  No date:  LOUIE PETERSON CLOBETASOL PROPIONATE 0.05 % External Ointment APPLY 1 APPLICATION TOPICALLY EVERY EVENING FOR TWO WEEKS, THEN EVERY OTHER DAY FOR 2 WEEKS, THEN APPLY TWICE WEEKLY. Disp: 60 g Rfl: 0   aspirin 81 MG Oral Tab EC Take 1 tablet (81 mg total) by mouth daily.  D Recent Labs   Lab  04/26/18   1640   WBC  22.9*   HGB  18.2*   MCV  86.5   PLT  311.0       Recent Labs   Lab  04/26/18   1640   GLU  332*   BUN  29*   CREATSERUM  1.96*   GFRAA  30*   GFRNAA  26*   CA  10.3   ALB  3.8   NA  134*   K  4.5   CL  101   CO2 Hosp Day # 23 PCP Franc Beebe MD     Reason for consultation:  Feeding difficulties     HPI: This is a 75year old female with a past medical history that includes[MG.1] CAD s/p CABG (96) with re-do (2012) and PCI (2014), COPD (baseline 3-4 L), uncontr TIA   • Stroke syndrome [facility]) 12/2005    CVA no residual effects   • Type II or unspecified type diabetes mellitus without mention of complication, not stated as uncontrolled    • [facility] 3/19/2014   • Unspecified essential hy [COMPLETED] Midazolam HCl (VERSED) 2 MG/2ML injection      [COMPLETED] ceFAZolin sodium (ANCEF/KEFZOL) 2 GM/20ML premix IV syringe      [COMPLETED] hydrALAzine HCl (APRESOLINE) 20 MG/ML injection      [COMPLETED] iodixanol (VISIPAQUE) 320 MG/ML injection 5 [COMPLETED] QUEtiapine Fumarate (SEROQUEL) tab 12.5 mg 12.5 mg Oral Once   [COMPLETED] potassium chloride IVPB premix 40 mEq 40 mEq Intravenous Once   Followed by      [COMPLETED] potassium chloride IVPB premix 20 mEq 20 mEq Intravenous Once   ezetimibe (Z ondansetron HCl (ZOFRAN) injection 4 mg 4 mg Intravenous Q6H PRN   famoTIDine (PEPCID) tab 20 mg 20 mg Oral Daily   [COMPLETED] Heparin Sodium (Porcine) 5000 UNIT/ML injection      [COMPLETED] Verapamil HCl (ISOPTIN) 2.5 MG/ML injection      Sudhir Wilson Hematologic: The patient reports no easy bruising, frequent gum bleeding or nose bleeding;   The patient has no history of known chronic anemia            Dermatologic: The patient reports no recent rashes or chronic skin disorders            Rheu Lab  05/11/18   0435   RBC  3.34*   HGB  9.4*   HCT  30.1*   MCV  90.1   MCH  28.1   MCHC  31.2   RDW  14.2   NEPRELIM  7.49*   WBC  9.9   PLT  355.0       Recent Labs   Lab  05/10/18   0604   ALT  24   AST  24       Imaging:[MG.1]     PROCEDURE: KILTR 6Fr Mynxgrip closure device     CONTRAST & MEDICATIONS USED:     Visipaque contrast 320 cc  Heparinized saline via the sheath and catheters 2200 cc  Intravenous normal saline 800 cc  Integrillin IA 7.3 ml  Integrillin IV infusion  Total fluoroscopy time 53 had now a small amount of antegrade flow outlining a ruptured plaque at the level of the distal end of the prior carotid stent.  I performed angioplasty of the plaque using the   [place] Balloon micro catheter with some improvement in caliber but with jose miguel trickle of contrast extending downstream. Shelby Mancilla is a mild stenosis at the origin of the right external carotid artery.  Views over the head demonstrates collateral flow through the   ethmoidal and middle meningeal branches to the ophthalmic territory with distal end of the carotid stent with sluggish flow downstream. Dorette Mariely is a tight S-bend in the internal carotid   artery immediately distal to the point of severe stenosis. Dorette Mariely is a mild long segment stenosis within the remainder of the carotid stent.   capillary phase consistent with infarction as seen on the recent CT angiogram.      Roadmap view of the right common femoral artery shows the puncture site located at the distal common femoral segment, projecting over the femoral head.  There is no femoral LEFT KIDNEY:  Normal anatomic position.  Bipolar size 11.4 cm.  No hydronephrosis.  No shadowing calculus.  No solid or cystic masses.   BLADDER:  Not imaged.     Doppler velocity measurements and renal/aortic ratios are as follows:  AORTA:     100 cm/s    PROCEDURE:  XR CHEST AP PORTABLE  (CPT=71045)     TECHNIQUE:  AP chest radiograph was obtained.     COMPARISON:  ANGELO WARE CHEST AP PORTABLE  (CPT=71045), 5/08/2018, 5:21.     INDICATIONS:  Bibasilar infiltrates on a background of COPD     PATIENT STATED 4. Ancef 2 gm IVPB on-call to OR  5. CBC, INR, BMP, Mg in AM    Thank you for the consultation, we will follow the patient with you. OLE Marcano  12:28 PM  5/15/2018  [facility]  [phone][JJ. 1]    Attending physician addendum: after discharge from the hospital.[OO.2]        Discharge Diagnosis:[OO.1]   stroke[OO. 2]    History of Present Illness:[OO.3 75years old female presented to emergency room with nausea vomiting diarrhea dizziness generalized weakness fatigue and after sh Consultants:[OO.1]  ? neurology[OO. 2]    Discharge Medication List:     Discharge Medications      START taking these medications      Instructions Prescription details   amiodarone HCl 200 MG Tabs  Commonly known as:  PACERONE      1 tablet (200 mg total) Commonly known as:  TRESIBA FLEXTOUCH      Inject 24 Units into the skin daily.    Quantity:  3 mL  Refills:  0     Insulin Pen Needle 32G X 4 MM Misc  Commonly known as:  CAREFINE PEN NEEDLES      Use daily with Lewis Savage   Quantity:  90 each  Refills:  3 appointment time 6 am    Víctor Whitney MD  [address] [id number]    In 2 weeks      Jyothi Schulz 35  [address] [address] [phone]      after :  Lee Reyes PTA (Physical Therapy Assistant)        PHYSICAL THERAPY TREATMENT NOTE - INPATIENT    Room Number: 8229/5462-X     Session: 3 (extended 6 more visits 5/15/18)  Number of Visits to Meet Established Goals: 6    Presenting Problem complication, not stated as uncontrolled    • Uncontrolled diabetes mellitus ([facility] 3/19/2014   • Unspecified essential hypertension    • Unspecified sleep apnea    • Wears glasses    • Wheezing        Past Surgical History  Past Surgical History:  No date: -   Climbing 3-5 steps with a railing?: Total       AM-PAC Score:  Raw Score: 8   PT Approx Degree of Impairment Score: 86.62%   Standardized Score (AM-PAC Scale): 28.58   CMS Modifier (G-Code): CM    FUNCTIONAL ABILITY STATUS  Gait Assessment   Gait Isela Rehab Potential : Good  Frequency (Obs): Daily    CURRENT GOALS     Goal #1 Patient is able to demonstrate supine - sit EOB @ level: max assistance      Goal #2 Patient is able to demonstrate transfers sit to stand at assistance level: max A    Goal #3 Cassius Echeverria Past Medical History:   Diagnosis Date   • Abdominal distention    • Anxiety    • Atherosclerosis of coronary artery 1996  & 2012     2 surgeries for CABG X 4 1996 2012    • Atherosclerosis of coronary artery 2/28/14    acute MI, CHF, Stents    • Belching PAIN ASSESSMENT   Ratin  Location: denies       BALANCE                                                                                                                     Static Sitting: Fair -  Dynamic Sitting: Poor +           Static Standing: Patient End of Session: Up in chair;Needs met;Call light within reach;RN aware of session/findings; All patient questions and concerns addressed    ASSESSMENT     Pt seen for active flexibility and BLE[AR.1] therex/ROM[AR.2], due to [address] Filed:  5/16/2018 10:51 AM Date of Service:  5/16/2018 10:41 AM Status:  Signed    :  Ronaldo Wilson PT (Physical Therapist)        PHYSICAL THERAPY TREATMENT NOTE - INPATIENT    Room Number: 1744/9498-D     Session: 1 (extended 6 more visits 5/ • Type II or unspecified type diabetes mellitus without mention of complication, not stated as uncontrolled    • [facility] 3/19/2014   • Unspecified essential hypertension    • Unspecified sleep apnea    • Wears glasses    • Whee -   Need to walk in hospital room?: Total   -   Climbing 3-5 steps with a railing?: Total       AM-PAC Score:  Raw Score: 9   PT Approx Degree of Impairment Score: 81.38%   Standardized Score (AM-PAC Scale): 30.55   CMS Modifier (G-Code): CM    FUNCTIONAL diarrhea and dizziness with code stroke. Imaging revealing  R MCA stroke. Pertinent comorbidities and personal factors impacting therapy include anxiety, CABG, CHF, DM, MI, CVA, HTN, CEA, COPD  .   The patient presents with the following impairments: Eusebio Verdugo Physical Therapy Note signed by Bethany Mae PT at 5/16/2018  8:27 AM  Version 1 of 1    Author:  Bethany Mae PT Service:  Rehab Author Type:  Physical Therapist    Filed:  5/16/2018  8:27 AM Date of Service:  5/15/2018  1:41 PM Status:  [facility] Carotid Artery Blockage TIA   • Shortness of breath    • Sputum production    • Stented coronary artery    • STROKE 2005    TIA   • Stroke syndrome [facility] 12/2005    CVA no residual effects   • Type II or unspecified type diabetes mellitus without mention -   Moving from lying on back to sitting on the side of the bed?: A Lot   How much help from another person does the patient currently need. ..   -   Moving to and from a bed to a chair (including a wheelchair)?: Total   -   Need to walk in hospital room?: CVA, HTN, CEA, COPD  . The patient presents with the following impairments decreased safety, static and dynamic sitting and standing balance, endurance , force generating capacity L>R, midline orientation with inattention to left side.   Functional outcome Occupational Therapist    Filed:  5/18/2018  2:23 PM Date of Service:  5/18/2018  2:17 PM Status:  Signed    :  Margot Souza OT (Occupational Therapist)       OCCUPATIONAL THERAPY TREATMENT NOTE - INPATIENT     Room Number: 6816/4532-X  [facility] 2 surgeries for CABG X 4 1996 2012    • Atherosclerosis of coronary artery 2/28/14    acute MI, CHF, Stents    • Belching    • Black stools    • CONGESTIVE HEART FAILURE 3/2013    EF 35-40 %   • COPD    • Diabetes mellitus ([facility]    • Disorder of liver How much help from another person does the patient currently need…  -   Putting on and taking off regular lower body clothing?: Total  -   Bathing (including washing, rinsing, drying)?: Total  -   Toileting, which includes using toilet, bedpan or urinal? : OT Treatment Plan: Balance activities; Energy conservation/work simplification techniques;ADL training;IADL training;Functional transfer training; Endurance training;Cognitive reorientation;Patient/Family education;Patient/Family training;Neuromuscluar reedu to evaluate due to motion artifact on multiple scans. However, the right internal carotid cervical segment distal to the cervical stent is reconstituted.   There is robust   opacification/flow in the right cervical, petrous and cavernous segments including • Type II or unspecified type diabetes mellitus without mention of complication, not stated as uncontrolled    • Uncontrolled diabetes [facility] ([facility] 3/19/2014   • Unspecified essential hypertension    • Unspecified sleep apnea    • Wears glasses    • Whee Supine to Sit : Not tested  Sit to Stand: Not tested    Skilled Therapy Provided: Room air, 93%. Seated in the chair. Provided min cueing to use her R hand to reposition her L hand.   Pt with decreased visual attention today - max cueing to track past mid-l Rehab Potential : Good  Frequency (Obs): Daily[MS.1]           OT Goals: -progressing 5/17  ADL Goals   Patient will perform grooming: with min assist  Patient will perform toileting: with max assist-d/c goal on 5/14, not appropriate.      UE Exercise Progr including the supra clinoid segment of the right internal carotid artery. Somewhat decreased opacification of distal right M 3 and M4 branches in the region of infarct is again noted.      Small amount of hemorrhage involving the right MCA territory i Past Surgical History  Past Surgical History:  No date: ANGIOGRAM  2/28/14: ANGIOPLASTY (CORONARY)      Comment: stents acute mi  No date: APPENDECTOMY  No date: BACK SURGERY  No date: BIOPSY OF SKIN LESION      Comment: hyperkeratosis  1996 and 2012: Judith Chan mod A. Pt was initiating forward trunk rotation. Educated the pt about spline and CVA sling use before standing. With min cueing, pt was able to use her R hand to raise L hand/arm and OT applied the splint and sling with max A x 1.  Max A x 2 and max A x Patient will be moderate assistance with left SROM HEP (home exercise program).      Additional Goals:  Pt will participate in PHQ-9 depression screen - MET 5/9  Pt will follow one step instructions during ADLs with 40% consistency - MET 5/9  Pt will identi Aspiration Precautions: Upright position; Slow rate;Small bites and sips (straw ok single sips)  Medication Administration Recommendations: Crushed in puree    Patient Experiencing Pain: No[JL.2]                Discharge Recommendations[JL.1]  Discharge Rec 6/26/2018 2:20 PM Meghna Blackwood MD [address] [facility] [place]    6/27/2018 1:00 PM Elsa Whitfield  [address]

## (undated) NOTE — Clinical Note
ldl higher, a1c not improving but she is starting to be more motivated, it now testing 2 times daily and taking meds more consistently.  Will keep you posted

## (undated) NOTE — LETTER
Consent to Procedure/Sedation    Date: 5/7/2018    Time: 0900    1. I authorize the performance upon Sherry Mccarty the following:    Diagnostic Cerebral Angiogram    2.  I authorize Dr. Duy Patel (and whomever is designated as the doctor’s assistant), to Geoff Witness: ____________________     Date: ______________    Printed: 2018   8:58 AM    Patient Name: Giancarlo Sutton        : 1950       Medical Record #: [id number]

## (undated) NOTE — LETTER
[facility] [address], [address] [place] [address]    Consent for Anesthesia   1.   IOdalys agree to be cared for by an anesthesiologist, who is specially trained to monitor me and give me medicine to put me to sleep or keep me comfor vision, nerves, or muscles and in extremely rare instances death. 5. My doctor has explained to me other choices available to me for my care (alternatives).   6. Pregnant Patients (“epidural”):  I understand that the risks of having an epidural (medicine g

## (undated) NOTE — LETTER
[facility]  [address] [facility], [address] [place] [address]    Consent for Operation    Date: __________________    Time: _______________    1.  I authorize the performance upon Lenore Brown the following operation:    Diagnostic Cerebral Angiogram videotape. The [facility] [place] will not be responsible for storage or maintenance of this tape. 6. For the purpose of advancing medical education, I consent to the admittance of observers to the Operating Room.     7. I authorize the use of any specimen, organs Signature of Patient:   ___________________________    When the patient is a minor or mentally incompetent to give consent:  Signature of person authorized to consent for patient: ___________________________   Relationship to patient: _____________________ drugs/illegal medications). Failure to inform my anesthesiologist about these medicines may increase my risk of anesthetic complications. · If I am allergic to anything or have had a reaction to anesthesia before.     3. I understand how the anesthesia med I have discussed the procedure and information above with the patient (or patient’s representative) and answered their questions. The patient or their representative has agreed to have anesthesia services.     _______________________________________________

## (undated) NOTE — IP AVS SNAPSHOT
[address]            (For Outpatient Use Only) Initial Admit Date: 4/26/2018   Inpt/Obs Admit Date: Inpt: 4/26/18 / Obs: N/A   Discharge Date:    Tanneren Edd:  [de-identified]   MRN: [de-identified]   CSN: [id number]        ENCOUNTER  Patient Subscriber Name:  Amol Simmons :    Subscriber ID:  Pt Rel to Subscriber:    Hospital Account Financial Class: Medicare    May 18, 2018

## (undated) NOTE — LETTER
11/14/22            Marilynn Barbosa  [address] [address]  [place] [address]            Den Varma,    We want to ensure that you are being taken care of by a Home Physician for the Psychological evaluation, Hospital Follow up and your care in general.    We have tried to call Fatou Vickers several times but have been unsuccessful in reaching. Please call our office to let us know.     Thank you,   Sandra Flower MD

## (undated) NOTE — ED AVS SNAPSHOT
Janett Bronson   MRN: [id number]    Department:  [facility] Emergency Department   Date of Visit:  2/26/2019           Disclosure     Insurance plans vary and the physician(s) referred by the ER may not be covered by your plan.  Please contact y tell this physician (or your personal doctor if your instructions are to return to your personal doctor) about any new or lasting problems. The primary care or specialist physician will see patients referred from the [facility] Emergency Department.  Aniyah John

## (undated) NOTE — LETTER
Consent to Procedure/Sedation    Date: __________________    Time: _______________    1.  I authorize the performance upon Sean Thomas the following:       Bilateral Cerebral Angiogram With possibleThromectomy Intervention, Possible Closure device Ins Signature of person authorized to consent for patient: Relationship to patient:  ___________________________    ___________________    Witness: ____________________     Date: ______________    Printed: 4/27/2018   11:23 AM    Patient Name: Isis Galdamez

## (undated) NOTE — LETTER
Date: 10/26/2018    Patient Name: Markus Leonardo          To Whom it may concern: The above patient was seen at the [facility] for treatment of a medical condition.     Received request for order regarding need for left upper extremity b

## (undated) NOTE — LETTER
06/12/19        [address] [place] [id number]      Dear Norberto Trimble,    [address] [facility] records indicate that you have outstanding lab work and or testing that was ordered for you and has not yet been completed:  Orders Placed This Encounter

## (undated) NOTE — MR AVS SNAPSHOT
[facility],[address]  [phone]               Thank you for choosing us for your health care visit with Estelle Collado NP.   We are glad to serve you and happy to provide you with this furosemide 20 MG Tabs   Take 1 tablet (20 mg total) by mouth daily. Commonly known as:  LASIX           GlipiZIDE ER 10 MG Tb24   Take 1 tablet (10 mg total) by mouth daily.    Commonly known as:  GLIPIZIDE XL           * Glucose Blood Strp   Test sugar Take 1 tablet (100 mg total) by mouth daily. Commonly known as:  ZOLOFT           TOUJEO SOLOSTAR 300 UNIT/ML Sopn   Generic drug:  Insulin Glargine   Inject 32 Units into the skin nightly.    What changed:    - how much to take  - Another medication with Eat plenty of low-fat dairy products High fat meats and dairy   Choose whole grain products Foods high in sodium   Water is best for hydration Fast food.    Eat at home when possible     Tips for increasing your physical activity – Adults who are physically

## (undated) NOTE — LETTER
Lottie Newton [address] [facility] [address], [address] [facility]     PICC LINE INSERTION CONSENT     I agree to have a Peripherally Inserted Central Catheter (PICC) placed in my arm.    1. The PICC insertion procedure, care, maintenance, risks, benefits, and c goals; and potential problems that might occur during recuperation.  They also discussed reasonable alternatives to the PICC, including risks, benefits, and side effects related to the alternatives and risks related to not receiving this procedure      ____

## (undated) NOTE — Clinical Note
Came in was at 8.4 today 11.7 stressed need to get back on track have been giving her xultophy but she hasn't been adhering again, agrees to get back on track needs repeat eye and lipid also

## (undated) NOTE — ED AVS SNAPSHOT
Sherry Mccarty   MRN: [id number]    Department:  [facility] Emergency Department   Date of Visit:  9/5/2018           Disclosure     Insurance plans vary and the physician(s) referred by the ER may not be covered by your plan.  Please contact yo tell this physician (or your personal doctor if your instructions are to return to your personal doctor) about any new or lasting problems. The primary care or specialist physician will see patients referred from the [facility] Emergency Department.  Salvadore Skiff

## (undated) NOTE — IP AVS SNAPSHOT
[address]            (For Outpatient Use Only) Initial Admit Date: 5/19/2018   Inpt/Obs Admit Date: Inpt: 5/20/18 / Obs: 05/19/18   Discharge Date:    Hospital Acct:  [de-identified]   MRN: [de-identified]   CSN: [id number]        MARIO  [place] Subscriber ID:  Pt Rel to Subscriber:    Hospital Account Financial Class: Medicare    June 5, 2018

## (undated) NOTE — Clinical Note
Has not been in for some time, stopped xultophy and her a1c is most likely very high (labs running), restarted meds, made appt for 2 weeks with me and CHRISTIANO Davila.  Agrees to try again  But states not getting out of bed, so would like Richard Singh to contact her t

## (undated) NOTE — LETTER
[facility]  Kings Ferrell 60 [address] [facility], [address] [place] [address]    Consent for Operation    Date: __________________    Time: _______________    1.  I authorize the performance upon Odalys Summers the following operation:    Procedure(s):  Esophagogastroduod revealed by the pictures or by descriptive texts accompanying them. If the procedure has been videotaped, the surgeon will obtain the original videotape. The hospital will not be responsible for storage or maintenance of this tape.     6. For the purpose of THAT MY DOCTOR PROVIDED ME WITH THE ABOVE EXPLANATIONS, THAT ALL BLANKS OR STATEMENTS REQUIRING INSERTION OR COMPLETION WERE FILLED IN.     Signature of Patient:   ___________________________    When the patient is a minor or mentally incompetent to give co supplements, and pills I can buy without a prescription (including street drugs/illegal medications). Failure to inform my anesthesiologist about these medicines may increase my risk of anesthetic complications.   · If I am allergic to anything or have had Anesthesiologist Signature     Date   Time  I have discussed the procedure and information above with the patient (or patient’s representative) and answered their questions. The patient or their representative has agreed to have anesthesia services.     ___

## (undated) NOTE — IP AVS SNAPSHOT
[address]            (For Outpatient Use Only) Initial Admit Date: 2022   Inpt/Obs Admit Date: Inpt: 22 / Obs: 22   Discharge Date:    Lupe Mesa:  [de-identified]   MRN: [de-identified]   CSN: [id number]   CEID: [id number]        ENCOUNTER  Patient Class: Inpatient Admitting Provider: Donn Aggarwal MD Unit: [address] [place] [address] Service: Cardiac Telemetry Attending Provider: Jinx Rubinstein, MD   Bed: 0354-G   Visit Type:   Referring Physician: No ref. provider found Billing Flag:    Admit Diagnosis: Pyelonephritis [N12]      PATIENT  Legal Name:   Sheri Kim   Legal Sex: Female  Gender ID:              Pref Name:    PCP:  Yuval Ann MD Home: [phone]   Address:  [address] : 1950 (72 yrs) Mobile: [phone]         City/State/Zip: [facility] [place] Marital:  Language: Evan Outhouse: Jeb SSN4: [id number] Anabaptist: [facility] *     Race: White Ethnicity: Non  Or [address] [place] [address] Street   Name Relationship Legal Guardian? Home Phone Work Phone Mobile Phone   1. Jaye Davis  2.  [facility] Daughter  Daughter          21      GUARANTOR  Guarantor: Lesley Olivera : 1950 Home Phone: [phone]   Address: [address]  Sex: Female Work Phone: [phone]   City/State/Zip: [place] [address] [place]   Rel. to Patient: Self Guarantor ID: [id number]   [address]   Employer:  Status: RETIRED     COVERAGE  PRIMARY INSURANCE   Payor: MEDICARE Plan: MEDICARE PART A&B   Group Number:  Insurance Type: INDEMNITY   Subscriber Name: Maynor Patel : 1950   Subscriber ID: [id number] Pt Rel to Subscriber: Self   SECONDARY INSURANCE   Payor: [place] PPO Plan: [facility]   Group Number: [id number] Insurance Type: [id number] Name: Maynor Patel : 1950   Subscriber ID: [id number] Pt Rel to Subscriber: SELF   TERTIARY INSURANCE   Payor:  Plan:    Group Number:  Insurance Type:    Subscriber Name:  Subscriber :    Subscriber ID:  Pt Rel to Subscriber:    Hospital Account Financial Class: Medicare    2022

## (undated) NOTE — LETTER
[employer] for Bipin Pool     : 1950     Date: 2019  Provider:  JANELL Blue  Phone for doctor or clinic: [facility], [address] [place] [address], [address] [place] [address], [address] [place] [address] [phone]

## (undated) NOTE — MR AVS SNAPSHOT
[facility],[address], [address] [place] [address] Rd  [phone]               Thank you for choosing us for your health care visit with Franck Oakley NP.   We are glad to serve you and happy to provide you with this Clopidogrel Bisulfate 75 MG Tabs   One tab daily   Commonly known as:  PLAVIX           furosemide 20 MG Tabs   Take 1 tablet (20 mg total) by mouth daily.    Commonly known as:  LASIX           Glucose Blood Strp   Test sugar 3 times daily   Commonly know Call the My [employer] for assistance with your inactive [employer] account    If you have questions, you can call [phone] to talk to our [facility] Staff. Remember, [employer] is NOT to be used for urgent needs. For medical emergencies, dial 911.     Vi

## (undated) NOTE — Clinical Note
Pt is coming for hospital follow up 8/29/18. Pt is now home from [facility]. Pt stated she is doing better. Pt stated her activity level is limited due to the left- sided paralysis but that she is scooting around in the wheelchair.  Pt confirmed Zack shahic

## (undated) NOTE — IP AVS SNAPSHOT
[address]            (For Outpatient Use Only) Initial Admit Date: 10/21/2022   Inpt/Obs Admit Date: Inpt: 10/21/22 / Obs: N/A   Discharge Date:    Carlso Joshi:  [de-identified]   MRN: [de-identified]   CSN: [id number]   CEID: [id number]        ENCOUNTER  Patient Class: Inpatient Admitting Provider: Loan Rivera MD Unit: [place] [address] Service: Medical Attending Provider: Joslyn Ibanez DO   Bed: 519-A   Visit Type:   Referring Physician: No ref. provider found Billing Flag:    Admit Diagnosis: Hypoxia [R09.02]      PATIENT  Legal Name:   Herrera Yu   Legal Sex: Female  Gender ID:              Pref Name:    PCP:  Elian Mckay MD Home: [phone]   Address:  [address] : 1950 (72 yrs) Mobile: [phone]         City/State/Zip: [address] [place] [address], [facility] Marital:  Language: Maryam Flakes: [place] SSN4: [id number] Pentecostalism: [facility] *     Race: White Ethnicity: Non  Or [address] [place] [address]   Name Relationship Legal Guardian? Home Phone Work Phone Mobile Phone   1. Jaye Davis  2.  Wilfredo Salinas Daughter  Sister          [phone]     GUARANTOR  Guarantor: Shabnam Waller : 1950 Home Phone: [phone]   Address: [address]  Sex: Female Work Phone: [phone]   City/State/Zip: [address] [place] [address]   Rel. to Patient: Self Guarantor ID: [id number]   GUARANTOR EMPLOYER   Employer:  Status: RETIRED     COVERAGE  PRIMARY INSURANCE   Payor: MEDICARE Plan: MEDICARE PART A&B   Group Number:  Insurance Type: Adbi [id number] Name: Sridevi Adams : 1950   Subscriber ID: [id number] Pt Rel to Subscriber: Self   SECONDARY INSURANCE   Payor: [place] PPO Plan: [facility]   Group Number: [id number] Insurance Type: Abdi [id number] Name: Sridevi Adams : 1950   Subscriber ID: [id number] Pt Rel to Subscriber: SELF   TERTIARY INSURANCE   Payor: Plan:    Group Number:  Insurance Type:    Subscriber Name:  Subscriber :    Subscriber ID:  Pt Rel to Subscriber:    Hospital Account Financial Class: Medicare    2022

## (undated) NOTE — LETTER
02/10/20        [address] [place] [address] Road [id number]      Dear Yuan Stafford,    [address] [facility] records indicate that you have outstanding lab work and or testing that was ordered for you and has not yet been completed:  Orders Placed This Encounter

## (undated) NOTE — LETTER
[facility]  Kings Ferrell 60 9[address] [facility], [address] [place] [address]    Consent for Operation    Date: __________________    Time: _______________    1.  I authorize the performance upon Lu Schneider the following operation:    Procedure(s):  PERCUTANEOUS ENDOS procedure has been videotaped, the surgeon will obtain the original videotape. The hospital will not be responsible for storage or maintenance of this tape.     6. For the purpose of advancing medical education, I consent to the admittance of observers to t STATEMENTS REQUIRING INSERTION OR COMPLETION WERE FILLED IN.     Signature of Patient:   ___________________________    When the patient is a minor or mentally incompetent to give consent:  Signature of person authorized to consent for patient: ____________ drugs/illegal medications). Failure to inform my anesthesiologist about these medicines may increase my risk of anesthetic complications. · If I am allergic to anything or have had a reaction to anesthesia before.     3. I understand how the anesthesia med I have discussed the procedure and information above with the patient (or patient’s representative) and answered their questions. The patient or their representative has agreed to have anesthesia services.     _______________________________________________

## (undated) NOTE — LETTER
January 12, 2018    [address] [place] [id number]      Dear Mei Jordan:    Chayito Ayala had previously enrolled in our Chronic Care Management program and had been speaking with your care manager.  We have since made several attempts t